# Patient Record
Sex: MALE | Race: WHITE | NOT HISPANIC OR LATINO | Employment: OTHER | URBAN - METROPOLITAN AREA
[De-identification: names, ages, dates, MRNs, and addresses within clinical notes are randomized per-mention and may not be internally consistent; named-entity substitution may affect disease eponyms.]

---

## 2023-01-30 PROBLEM — E78.5 HYPERLIPIDEMIA: Status: ACTIVE | Noted: 2023-01-30

## 2023-01-30 PROBLEM — E11.621 DIABETIC FOOT ULCER (MULTI): Status: ACTIVE | Noted: 2023-01-30
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
## 2023-01-30 PROBLEM — J01.90 ACUTE SINUSITIS: Status: ACTIVE | Noted: 2023-01-30

## 2023-01-30 PROBLEM — S98.139A AMPUTATED TOE (CMS-HCC): Status: ACTIVE | Noted: 2023-01-30

## 2023-01-30 PROBLEM — E03.9 HYPOTHYROIDISM: Status: ACTIVE | Noted: 2023-01-30

## 2023-01-30 PROBLEM — L97.509 DIABETIC FOOT ULCER (MULTI): Status: ACTIVE | Noted: 2023-01-30

## 2023-01-30 PROBLEM — J44.9 COPD (CHRONIC OBSTRUCTIVE PULMONARY DISEASE) (MULTI): Status: ACTIVE | Noted: 2023-01-30

## 2023-01-30 PROBLEM — I10 HYPERTENSION: Status: ACTIVE | Noted: 2023-01-30

## 2023-01-30 PROBLEM — I21.02: Status: ACTIVE | Noted: 2023-01-30

## 2023-01-30 PROBLEM — Z89.519 S/P BKA (BELOW KNEE AMPUTATION) UNILATERAL (MULTI): Status: ACTIVE | Noted: 2023-01-30

## 2023-01-30 PROBLEM — B02.29 POST HERPETIC NEURALGIA: Status: ACTIVE | Noted: 2023-01-30

## 2023-01-30 PROBLEM — F41.9 ANXIETY: Status: ACTIVE | Noted: 2023-01-30

## 2023-01-30 PROBLEM — M86.679: Status: ACTIVE | Noted: 2023-01-30

## 2023-01-30 PROBLEM — K59.00 CONSTIPATION: Status: ACTIVE | Noted: 2023-01-30
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
## 2023-01-30 PROBLEM — K58.9 IBS (IRRITABLE BOWEL SYNDROME): Status: ACTIVE | Noted: 2023-01-30

## 2023-01-30 PROBLEM — Z04.9 CONDITION NOT FOUND: Status: ACTIVE | Noted: 2023-01-30

## 2023-01-30 PROBLEM — R07.9 CHEST PAIN: Status: ACTIVE | Noted: 2023-01-30

## 2023-01-30 PROBLEM — J30.2 ALLERGIC RHINITIS, SEASONAL: Status: ACTIVE | Noted: 2023-01-30

## 2023-01-30 PROBLEM — D64.9 ANEMIA: Status: ACTIVE | Noted: 2023-01-30

## 2023-01-30 PROBLEM — G47.00 INSOMNIA: Status: ACTIVE | Noted: 2023-01-30

## 2023-01-30 PROBLEM — J32.9 SINUSITIS: Status: ACTIVE | Noted: 2023-01-30

## 2023-01-30 PROBLEM — E11.9 DIABETES MELLITUS (MULTI): Status: ACTIVE | Noted: 2023-01-30

## 2023-01-30 PROBLEM — R53.83 FATIGUE: Status: ACTIVE | Noted: 2023-01-30

## 2023-01-30 RX ORDER — ATORVASTATIN CALCIUM 40 MG/1
1 TABLET, FILM COATED ORAL NIGHTLY
COMMUNITY
Start: 2022-03-14 | End: 2023-04-06 | Stop reason: SDUPTHER

## 2023-01-30 RX ORDER — BUSPIRONE HYDROCHLORIDE 15 MG/1
0.5 TABLET ORAL 2 TIMES DAILY
COMMUNITY
Start: 2022-05-26 | End: 2023-10-09

## 2023-01-30 RX ORDER — SERTRALINE HYDROCHLORIDE 50 MG/1
1 TABLET, FILM COATED ORAL DAILY
COMMUNITY
Start: 2022-05-26

## 2023-01-30 RX ORDER — HYDROXYZINE PAMOATE 25 MG/1
1 CAPSULE ORAL EVERY 6 HOURS PRN
COMMUNITY
Start: 2022-03-14 | End: 2023-05-15

## 2023-01-30 RX ORDER — PEN NEEDLE, DIABETIC 30 GX3/16"
NEEDLE, DISPOSABLE MISCELLANEOUS
COMMUNITY
End: 2023-04-06 | Stop reason: SDUPTHER

## 2023-01-30 RX ORDER — METOPROLOL SUCCINATE 25 MG/1
1 TABLET, EXTENDED RELEASE ORAL DAILY
COMMUNITY
Start: 2022-03-14

## 2023-01-30 RX ORDER — LEVOTHYROXINE SODIUM 50 UG/1
1 CAPSULE ORAL
COMMUNITY
Start: 2022-03-15 | End: 2023-04-06 | Stop reason: SDUPTHER

## 2023-01-30 RX ORDER — PRASUGREL 10 MG/1
1 TABLET, FILM COATED ORAL DAILY
COMMUNITY
Start: 2021-12-21 | End: 2023-04-06 | Stop reason: SDUPTHER

## 2023-01-30 RX ORDER — HYDROXYZINE HYDROCHLORIDE 25 MG/1
1 TABLET, FILM COATED ORAL EVERY 6 HOURS PRN
COMMUNITY
Start: 2022-05-26 | End: 2023-04-06 | Stop reason: SDUPTHER

## 2023-01-30 RX ORDER — NITROGLYCERIN 80 MG/1
PATCH TRANSDERMAL
COMMUNITY
Start: 2020-12-30

## 2023-01-30 RX ORDER — FLUTICASONE PROPIONATE 50 MCG
2 SPRAY, SUSPENSION (ML) NASAL DAILY
COMMUNITY
Start: 2017-05-02

## 2023-01-30 RX ORDER — INSULIN GLARGINE 100 [IU]/ML
20 INJECTION, SOLUTION SUBCUTANEOUS 2 TIMES DAILY
COMMUNITY
Start: 2016-04-22 | End: 2023-10-24 | Stop reason: SDUPTHER

## 2023-01-30 RX ORDER — ASPIRIN 81 MG/1
1 TABLET ORAL DAILY
COMMUNITY
Start: 2022-03-14 | End: 2023-04-06 | Stop reason: SDUPTHER

## 2023-01-30 RX ORDER — LOSARTAN POTASSIUM 25 MG/1
1 TABLET ORAL DAILY
COMMUNITY
Start: 2022-03-15

## 2023-01-30 RX ORDER — INSULIN LISPRO 100 [IU]/ML
INJECTION, SOLUTION INTRAVENOUS; SUBCUTANEOUS
COMMUNITY
Start: 2016-04-22

## 2023-01-30 RX ORDER — DICYCLOMINE HYDROCHLORIDE 10 MG/1
1 CAPSULE ORAL 2 TIMES DAILY PRN
COMMUNITY
Start: 2022-03-15 | End: 2023-03-09 | Stop reason: SDUPTHER

## 2023-01-30 RX ORDER — MIRTAZAPINE 15 MG/1
1 TABLET, FILM COATED ORAL NIGHTLY
COMMUNITY
Start: 2022-05-26 | End: 2023-03-07 | Stop reason: SDUPTHER

## 2023-01-30 RX ORDER — INSULIN LISPRO 100 [IU]/ML
INJECTION, SOLUTION INTRAVENOUS; SUBCUTANEOUS
COMMUNITY
Start: 2021-10-17

## 2023-03-06 ENCOUNTER — APPOINTMENT (OUTPATIENT)
Dept: PRIMARY CARE | Facility: CLINIC | Age: 78
End: 2023-03-06
Payer: MEDICARE

## 2023-03-07 DIAGNOSIS — K58.9 IRRITABLE BOWEL SYNDROME, UNSPECIFIED TYPE: ICD-10-CM

## 2023-03-07 DIAGNOSIS — F41.9 ANXIETY: Primary | ICD-10-CM

## 2023-03-07 DIAGNOSIS — F51.01 PRIMARY INSOMNIA: ICD-10-CM

## 2023-03-07 RX ORDER — MIRTAZAPINE 15 MG/1
15 TABLET, FILM COATED ORAL NIGHTLY
Qty: 30 TABLET | Refills: 0 | Status: SHIPPED | OUTPATIENT
Start: 2023-03-07 | End: 2023-03-08 | Stop reason: SDUPTHER

## 2023-03-09 RX ORDER — MIRTAZAPINE 15 MG/1
15 TABLET, FILM COATED ORAL NIGHTLY
Qty: 30 TABLET | Refills: 0 | Status: SHIPPED | OUTPATIENT
Start: 2023-03-09 | End: 2023-05-30

## 2023-03-09 RX ORDER — DICYCLOMINE HYDROCHLORIDE 10 MG/1
10 CAPSULE ORAL 2 TIMES DAILY PRN
Qty: 180 CAPSULE | Refills: 1 | Status: SHIPPED | OUTPATIENT
Start: 2023-03-09 | End: 2023-04-06 | Stop reason: SDUPTHER

## 2023-04-06 DIAGNOSIS — E11.621 DIABETIC FOOT ULCER ASSOCIATED WITH TYPE 2 DIABETES MELLITUS, UNSPECIFIED LATERALITY, UNSPECIFIED PART OF FOOT, UNSPECIFIED ULCER STAGE (MULTI): ICD-10-CM

## 2023-04-06 DIAGNOSIS — L97.509 DIABETIC FOOT ULCER ASSOCIATED WITH TYPE 2 DIABETES MELLITUS, UNSPECIFIED LATERALITY, UNSPECIFIED PART OF FOOT, UNSPECIFIED ULCER STAGE (MULTI): ICD-10-CM

## 2023-04-06 DIAGNOSIS — K58.9 IRRITABLE BOWEL SYNDROME, UNSPECIFIED TYPE: ICD-10-CM

## 2023-04-06 DIAGNOSIS — Z79.4 TYPE 2 DIABETES MELLITUS WITHOUT COMPLICATION, WITH LONG-TERM CURRENT USE OF INSULIN (MULTI): ICD-10-CM

## 2023-04-06 DIAGNOSIS — I25.10 CORONARY ARTERY DISEASE, UNSPECIFIED VESSEL OR LESION TYPE, UNSPECIFIED WHETHER ANGINA PRESENT, UNSPECIFIED WHETHER NATIVE OR TRANSPLANTED HEART: Primary | ICD-10-CM

## 2023-04-06 DIAGNOSIS — E11.9 TYPE 2 DIABETES MELLITUS WITHOUT COMPLICATION, WITH LONG-TERM CURRENT USE OF INSULIN (MULTI): ICD-10-CM

## 2023-04-06 DIAGNOSIS — E78.5 HYPERLIPIDEMIA, UNSPECIFIED HYPERLIPIDEMIA TYPE: ICD-10-CM

## 2023-04-06 DIAGNOSIS — E03.9 HYPOTHYROIDISM, UNSPECIFIED TYPE: ICD-10-CM

## 2023-04-06 RX ORDER — ASPIRIN 81 MG/1
81 TABLET ORAL DAILY
Qty: 90 TABLET | Refills: 0 | Status: SHIPPED | OUTPATIENT
Start: 2023-04-06

## 2023-04-06 RX ORDER — HYDROXYZINE HYDROCHLORIDE 25 MG/1
25 TABLET, FILM COATED ORAL EVERY 6 HOURS PRN
Qty: 60 TABLET | Refills: 2 | Status: SHIPPED | OUTPATIENT
Start: 2023-04-06 | End: 2023-05-15

## 2023-04-06 RX ORDER — PRASUGREL 10 MG/1
10 TABLET, FILM COATED ORAL DAILY
Qty: 90 TABLET | Refills: 0 | Status: SHIPPED | OUTPATIENT
Start: 2023-04-06

## 2023-04-06 RX ORDER — ATORVASTATIN CALCIUM 40 MG/1
40 TABLET, FILM COATED ORAL NIGHTLY
Qty: 90 TABLET | Refills: 0 | Status: SHIPPED | OUTPATIENT
Start: 2023-04-06

## 2023-04-06 RX ORDER — LANCETS 33 GAUGE
EACH MISCELLANEOUS
Qty: 100 EACH | Refills: 3 | Status: SHIPPED | OUTPATIENT
Start: 2023-04-06

## 2023-04-06 RX ORDER — PEN NEEDLE, DIABETIC 30 GX3/16"
NEEDLE, DISPOSABLE MISCELLANEOUS
Qty: 100 EACH | Refills: 3 | Status: SHIPPED | OUTPATIENT
Start: 2023-04-06

## 2023-04-06 RX ORDER — DICYCLOMINE HYDROCHLORIDE 10 MG/1
10 CAPSULE ORAL 2 TIMES DAILY PRN
Qty: 180 CAPSULE | Refills: 1 | Status: SHIPPED | OUTPATIENT
Start: 2023-04-06 | End: 2023-06-05 | Stop reason: SDUPTHER

## 2023-04-06 RX ORDER — LEVOTHYROXINE SODIUM 50 UG/1
50 CAPSULE ORAL
Qty: 90 CAPSULE | Refills: 0 | Status: SHIPPED | OUTPATIENT
Start: 2023-04-06 | End: 2023-05-15 | Stop reason: SDUPTHER

## 2023-05-13 DIAGNOSIS — E03.9 HYPOTHYROIDISM, UNSPECIFIED TYPE: ICD-10-CM

## 2023-05-13 DIAGNOSIS — E11.621 DIABETIC FOOT ULCER ASSOCIATED WITH TYPE 2 DIABETES MELLITUS, UNSPECIFIED LATERALITY, UNSPECIFIED PART OF FOOT, UNSPECIFIED ULCER STAGE (MULTI): ICD-10-CM

## 2023-05-13 DIAGNOSIS — L97.509 DIABETIC FOOT ULCER ASSOCIATED WITH TYPE 2 DIABETES MELLITUS, UNSPECIFIED LATERALITY, UNSPECIFIED PART OF FOOT, UNSPECIFIED ULCER STAGE (MULTI): ICD-10-CM

## 2023-05-15 RX ORDER — LEVOTHYROXINE SODIUM 50 UG/1
50 CAPSULE ORAL
Qty: 90 CAPSULE | Refills: 0 | Status: SHIPPED | OUTPATIENT
Start: 2023-05-15 | End: 2023-08-07

## 2023-05-15 RX ORDER — HYDROXYZINE HYDROCHLORIDE 25 MG/1
TABLET, FILM COATED ORAL
Qty: 180 TABLET | Refills: 0 | Status: SHIPPED | OUTPATIENT
Start: 2023-05-15 | End: 2023-06-20

## 2023-05-27 DIAGNOSIS — F51.01 PRIMARY INSOMNIA: ICD-10-CM

## 2023-05-30 RX ORDER — MIRTAZAPINE 15 MG/1
15 TABLET, FILM COATED ORAL NIGHTLY
Qty: 90 TABLET | Refills: 1 | Status: SHIPPED | OUTPATIENT
Start: 2023-05-30 | End: 2023-11-28

## 2023-05-31 RX ORDER — LIDOCAINE 40 MG/G
CREAM TOPICAL
COMMUNITY
Start: 2022-02-18

## 2023-06-05 ENCOUNTER — LAB (OUTPATIENT)
Dept: LAB | Facility: LAB | Age: 78
End: 2023-06-05
Payer: MEDICARE

## 2023-06-05 ENCOUNTER — OFFICE VISIT (OUTPATIENT)
Dept: PRIMARY CARE | Facility: CLINIC | Age: 78
End: 2023-06-05
Payer: MEDICARE

## 2023-06-05 VITALS
DIASTOLIC BLOOD PRESSURE: 74 MMHG | TEMPERATURE: 97.6 F | OXYGEN SATURATION: 100 % | BODY MASS INDEX: 22.18 KG/M2 | WEIGHT: 138 LBS | HEART RATE: 73 BPM | SYSTOLIC BLOOD PRESSURE: 128 MMHG | HEIGHT: 66 IN

## 2023-06-05 DIAGNOSIS — I10 HYPERTENSION, UNSPECIFIED TYPE: Primary | ICD-10-CM

## 2023-06-05 DIAGNOSIS — K58.9 IRRITABLE BOWEL SYNDROME, UNSPECIFIED TYPE: ICD-10-CM

## 2023-06-05 DIAGNOSIS — Z89.519 S/P BKA (BELOW KNEE AMPUTATION) UNILATERAL (MULTI): ICD-10-CM

## 2023-06-05 DIAGNOSIS — I10 HYPERTENSION, UNSPECIFIED TYPE: ICD-10-CM

## 2023-06-05 DIAGNOSIS — E78.5 HYPERLIPIDEMIA, UNSPECIFIED HYPERLIPIDEMIA TYPE: ICD-10-CM

## 2023-06-05 DIAGNOSIS — D64.9 ANEMIA, UNSPECIFIED TYPE: ICD-10-CM

## 2023-06-05 DIAGNOSIS — E03.9 HYPOTHYROIDISM, UNSPECIFIED TYPE: ICD-10-CM

## 2023-06-05 DIAGNOSIS — Z79.4 TYPE 2 DIABETES MELLITUS WITHOUT COMPLICATION, WITH LONG-TERM CURRENT USE OF INSULIN (MULTI): ICD-10-CM

## 2023-06-05 DIAGNOSIS — E11.9 TYPE 2 DIABETES MELLITUS WITHOUT COMPLICATION, WITH LONG-TERM CURRENT USE OF INSULIN (MULTI): ICD-10-CM

## 2023-06-05 DIAGNOSIS — E55.9 VITAMIN D DEFICIENCY: ICD-10-CM

## 2023-06-05 LAB
ALANINE AMINOTRANSFERASE (SGPT) (U/L) IN SER/PLAS: 24 U/L (ref 10–52)
ALBUMIN (G/DL) IN SER/PLAS: 4 G/DL (ref 3.4–5)
ALKALINE PHOSPHATASE (U/L) IN SER/PLAS: 92 U/L (ref 33–136)
ANION GAP IN SER/PLAS: 12 MMOL/L (ref 10–20)
ASPARTATE AMINOTRANSFERASE (SGOT) (U/L) IN SER/PLAS: 17 U/L (ref 9–39)
BILIRUBIN TOTAL (MG/DL) IN SER/PLAS: 1.1 MG/DL (ref 0–1.2)
CALCIDIOL (25 OH VITAMIN D3) (NG/ML) IN SER/PLAS: 12 NG/ML
CALCIUM (MG/DL) IN SER/PLAS: 9.3 MG/DL (ref 8.6–10.6)
CARBON DIOXIDE, TOTAL (MMOL/L) IN SER/PLAS: 29 MMOL/L (ref 21–32)
CHLORIDE (MMOL/L) IN SER/PLAS: 103 MMOL/L (ref 98–107)
CHOLESTEROL (MG/DL) IN SER/PLAS: 79 MG/DL (ref 0–199)
CHOLESTEROL IN HDL (MG/DL) IN SER/PLAS: 32.4 MG/DL
CHOLESTEROL/HDL RATIO: 2.4
CREATININE (MG/DL) IN SER/PLAS: 1.39 MG/DL (ref 0.5–1.3)
ERYTHROCYTE DISTRIBUTION WIDTH (RATIO) BY AUTOMATED COUNT: 15.7 % (ref 11.5–14.5)
ERYTHROCYTE MEAN CORPUSCULAR HEMOGLOBIN CONCENTRATION (G/DL) BY AUTOMATED: 31.7 G/DL (ref 32–36)
ERYTHROCYTE MEAN CORPUSCULAR VOLUME (FL) BY AUTOMATED COUNT: 87 FL (ref 80–100)
ERYTHROCYTES (10*6/UL) IN BLOOD BY AUTOMATED COUNT: 4.23 X10E12/L (ref 4.5–5.9)
ESTIMATED AVERAGE GLUCOSE FOR HBA1C: 143 MG/DL
GFR MALE: 52 ML/MIN/1.73M2
GLUCOSE (MG/DL) IN SER/PLAS: 170 MG/DL (ref 74–99)
HEMATOCRIT (%) IN BLOOD BY AUTOMATED COUNT: 36.6 % (ref 41–52)
HEMOGLOBIN (G/DL) IN BLOOD: 11.6 G/DL (ref 13.5–17.5)
HEMOGLOBIN A1C/HEMOGLOBIN TOTAL IN BLOOD: 6.6 %
LDL: 27 MG/DL (ref 0–99)
LEUKOCYTES (10*3/UL) IN BLOOD BY AUTOMATED COUNT: 6.1 X10E9/L (ref 4.4–11.3)
NRBC (PER 100 WBCS) BY AUTOMATED COUNT: 0 /100 WBC (ref 0–0)
PLATELETS (10*3/UL) IN BLOOD AUTOMATED COUNT: 121 X10E9/L (ref 150–450)
POTASSIUM (MMOL/L) IN SER/PLAS: 4.5 MMOL/L (ref 3.5–5.3)
PROTEIN TOTAL: 7.3 G/DL (ref 6.4–8.2)
SODIUM (MMOL/L) IN SER/PLAS: 139 MMOL/L (ref 136–145)
THYROTROPIN (MIU/L) IN SER/PLAS BY DETECTION LIMIT <= 0.05 MIU/L: 5.98 MIU/L (ref 0.44–3.98)
THYROXINE (T4) FREE (NG/DL) IN SER/PLAS: 1.03 NG/DL (ref 0.78–1.48)
TRIGLYCERIDE (MG/DL) IN SER/PLAS: 97 MG/DL (ref 0–149)
UREA NITROGEN (MG/DL) IN SER/PLAS: 20 MG/DL (ref 6–23)
VLDL: 19 MG/DL (ref 0–40)

## 2023-06-05 PROCEDURE — 82306 VITAMIN D 25 HYDROXY: CPT

## 2023-06-05 PROCEDURE — 84439 ASSAY OF FREE THYROXINE: CPT

## 2023-06-05 PROCEDURE — 1036F TOBACCO NON-USER: CPT | Performed by: STUDENT IN AN ORGANIZED HEALTH CARE EDUCATION/TRAINING PROGRAM

## 2023-06-05 PROCEDURE — 85027 COMPLETE CBC AUTOMATED: CPT

## 2023-06-05 PROCEDURE — 1159F MED LIST DOCD IN RCRD: CPT | Performed by: STUDENT IN AN ORGANIZED HEALTH CARE EDUCATION/TRAINING PROGRAM

## 2023-06-05 PROCEDURE — 83036 HEMOGLOBIN GLYCOSYLATED A1C: CPT

## 2023-06-05 PROCEDURE — 80061 LIPID PANEL: CPT

## 2023-06-05 PROCEDURE — 84443 ASSAY THYROID STIM HORMONE: CPT

## 2023-06-05 PROCEDURE — 80053 COMPREHEN METABOLIC PANEL: CPT

## 2023-06-05 PROCEDURE — 1160F RVW MEDS BY RX/DR IN RCRD: CPT | Performed by: STUDENT IN AN ORGANIZED HEALTH CARE EDUCATION/TRAINING PROGRAM

## 2023-06-05 PROCEDURE — 3074F SYST BP LT 130 MM HG: CPT | Performed by: STUDENT IN AN ORGANIZED HEALTH CARE EDUCATION/TRAINING PROGRAM

## 2023-06-05 PROCEDURE — 3078F DIAST BP <80 MM HG: CPT | Performed by: STUDENT IN AN ORGANIZED HEALTH CARE EDUCATION/TRAINING PROGRAM

## 2023-06-05 PROCEDURE — 99214 OFFICE O/P EST MOD 30 MIN: CPT | Performed by: STUDENT IN AN ORGANIZED HEALTH CARE EDUCATION/TRAINING PROGRAM

## 2023-06-05 PROCEDURE — 36415 COLL VENOUS BLD VENIPUNCTURE: CPT

## 2023-06-05 RX ORDER — DICYCLOMINE HYDROCHLORIDE 10 MG/1
10 CAPSULE ORAL 2 TIMES DAILY PRN
Qty: 180 CAPSULE | Refills: 1 | Status: SHIPPED | OUTPATIENT
Start: 2023-06-05 | End: 2023-11-28

## 2023-06-05 ASSESSMENT — PATIENT HEALTH QUESTIONNAIRE - PHQ9
1. LITTLE INTEREST OR PLEASURE IN DOING THINGS: NOT AT ALL
SUM OF ALL RESPONSES TO PHQ9 QUESTIONS 1 AND 2: 0
2. FEELING DOWN, DEPRESSED OR HOPELESS: NOT AT ALL

## 2023-06-05 ASSESSMENT — ENCOUNTER SYMPTOMS
OCCASIONAL FEELINGS OF UNSTEADINESS: 0
DEPRESSION: 0
LOSS OF SENSATION IN FEET: 0

## 2023-06-05 NOTE — PROGRESS NOTES
77-year-old male presenting for follow-up on multiple conditions:    Left BKA   Completed physical therapy.  Still requiring walker, has played in the prosthetic, does not have good balance.  Does not want further physical therapy at this time.    HTN  Stable, tolerates current regimen well    HLD  Stable, tolerating current regimen well.    DMII  Stable, reports home blood sugars at goal    History of MI status post PCI with stents  Following with cardiology, compliant with medication    Hypothyroidism  Stable, tolerating current regimen well.    COPD  Stable, tolerating current regimen well.    12 point ROS reviewed and negative other than as stated in HPI    General: Alert, oriented, pleasant, in no acute distress  HEENT:      Head: normocephalic, atraumatic;      eyes: EOMI, no scleral icterus;   CV: Heart with regular rate and rhythm, normal S1/S2, no murmurs  Lungs: CTAB without wheezing, rhonchi or rales; good respiratory effort, no increased work of breathing  Abdomen: soft, non-tender, non-distended, no masses appreciated  Extremities: BKA left with prostatic  Neuro: Cranial nerves grossly intact; alert and oriented, wheelchair-bound  Psych: Appropriate mood and affect    1.  Left BKA  - Advised to call prosthetic  or consider further physical therapy for his continued walker dependence.  Patient declines at this time    2.  HTN  - At goal in office  - Continue losartan 25 mg daily    3.  HLD  - Continue atorvastatin 40 mg daily  - Repeat lipid panel    4.  DMII   -Repeat A1c, microalbumin  - Continue Lantus 20 units twice daily, Humalog SSI    5.  History of MI status post PCI with stents  - Continue aspirin 81 mg daily, atorvastatin 40 mg daily, metoprolol succinate 25 mg daily  - Continue to follow with cardiology at [facility]    6.  Hypothyroidism  - Continue levothyroxine 50 mcg daily  - Repeat TSH    7.  COPD  - Stable, not medicated    6-month labs    Follow-up 6 months  Medicare    Christopher D'Amico, DO

## 2023-06-06 DIAGNOSIS — E03.9 HYPOTHYROIDISM, UNSPECIFIED TYPE: ICD-10-CM

## 2023-06-06 DIAGNOSIS — E55.9 VITAMIN D DEFICIENCY: Primary | ICD-10-CM

## 2023-06-06 RX ORDER — ERGOCALCIFEROL 1.25 MG/1
50000 CAPSULE ORAL
Qty: 12 CAPSULE | Refills: 0 | Status: SHIPPED | OUTPATIENT
Start: 2023-06-06 | End: 2023-08-29

## 2023-06-06 NOTE — RESULT ENCOUNTER NOTE
Mild anemia, improved from last labs.  Continue to monitor.  Borderline low platelets, consistent with past labs, continue to monitor.    Low HDL at 32.4 slightly improved from last labs, continue to improve diet.    Chronic kidney disease mostly stable over the past year, mild decrease in function.  Continue to avoid nephrotoxic medications.    TSH mildly elevated with normal T4, slight increase in levothyroxine may be warranted.  He can take 1 extra tablet 1 day a week, in other words 1 tablet daily 6 days a week, and 2 tablets 1 day a week.  We can repeat labs in 4-6 weeks.    Vitamin D significantly low at 12, would recommend weekly supplementation x12 weeks with follow-up lab    A1c at 6.5, at goal.  Continue to monitor blood sugar routinely    Remaining labs unremarkable

## 2023-06-20 DIAGNOSIS — E11.621 DIABETIC FOOT ULCER ASSOCIATED WITH TYPE 2 DIABETES MELLITUS, UNSPECIFIED LATERALITY, UNSPECIFIED PART OF FOOT, UNSPECIFIED ULCER STAGE (MULTI): ICD-10-CM

## 2023-06-20 DIAGNOSIS — L97.509 DIABETIC FOOT ULCER ASSOCIATED WITH TYPE 2 DIABETES MELLITUS, UNSPECIFIED LATERALITY, UNSPECIFIED PART OF FOOT, UNSPECIFIED ULCER STAGE (MULTI): ICD-10-CM

## 2023-06-20 RX ORDER — HYDROXYZINE HYDROCHLORIDE 25 MG/1
TABLET, FILM COATED ORAL
Qty: 180 TABLET | Refills: 0 | Status: SHIPPED | OUTPATIENT
Start: 2023-06-20 | End: 2023-07-31

## 2023-07-31 DIAGNOSIS — L97.509 DIABETIC FOOT ULCER ASSOCIATED WITH TYPE 2 DIABETES MELLITUS, UNSPECIFIED LATERALITY, UNSPECIFIED PART OF FOOT, UNSPECIFIED ULCER STAGE (MULTI): ICD-10-CM

## 2023-07-31 DIAGNOSIS — E11.621 DIABETIC FOOT ULCER ASSOCIATED WITH TYPE 2 DIABETES MELLITUS, UNSPECIFIED LATERALITY, UNSPECIFIED PART OF FOOT, UNSPECIFIED ULCER STAGE (MULTI): ICD-10-CM

## 2023-07-31 RX ORDER — HYDROXYZINE HYDROCHLORIDE 25 MG/1
TABLET, FILM COATED ORAL
Qty: 180 TABLET | Refills: 0 | Status: SHIPPED | OUTPATIENT
Start: 2023-07-31 | End: 2024-02-14

## 2023-08-04 DIAGNOSIS — E03.9 HYPOTHYROIDISM, UNSPECIFIED TYPE: ICD-10-CM

## 2023-08-07 RX ORDER — LEVOTHYROXINE SODIUM 50 UG/1
TABLET ORAL
Qty: 90 TABLET | Refills: 0 | Status: SHIPPED | OUTPATIENT
Start: 2023-08-07 | End: 2023-10-30

## 2023-10-07 DIAGNOSIS — F41.9 ANXIETY: Primary | ICD-10-CM

## 2023-10-09 RX ORDER — BUSPIRONE HYDROCHLORIDE 15 MG/1
TABLET ORAL
Qty: 90 TABLET | Refills: 0 | Status: SHIPPED | OUTPATIENT
Start: 2023-10-09 | End: 2024-01-08

## 2023-10-24 DIAGNOSIS — E11.69 TYPE 2 DIABETES MELLITUS WITH OTHER SPECIFIED COMPLICATION, UNSPECIFIED WHETHER LONG TERM INSULIN USE (MULTI): ICD-10-CM

## 2023-10-24 RX ORDER — INSULIN GLARGINE 100 [IU]/ML
20 INJECTION, SOLUTION SUBCUTANEOUS 2 TIMES DAILY
Qty: 3 ML | Refills: 3 | Status: SHIPPED | OUTPATIENT
Start: 2023-10-24 | End: 2023-10-25 | Stop reason: SDUPTHER

## 2023-10-25 ENCOUNTER — TELEPHONE (OUTPATIENT)
Dept: PRIMARY CARE | Facility: CLINIC | Age: 78
End: 2023-10-25
Payer: MEDICARE

## 2023-10-25 DIAGNOSIS — E11.69 TYPE 2 DIABETES MELLITUS WITH OTHER SPECIFIED COMPLICATION, UNSPECIFIED WHETHER LONG TERM INSULIN USE (MULTI): ICD-10-CM

## 2023-10-25 RX ORDER — INSULIN GLARGINE 100 [IU]/ML
20 INJECTION, SOLUTION SUBCUTANEOUS 2 TIMES DAILY
Qty: 36 ML | Refills: 1 | Status: SHIPPED | OUTPATIENT
Start: 2023-10-25 | End: 2024-04-22

## 2023-10-25 NOTE — TELEPHONE ENCOUNTER
Can you send another script for the patient's Lantus, it was refilled yesterday however the patient is complaining that the quantity was wrong ,normally gets 3-4 pens at one time and yesterday he was only given 1 box

## 2023-10-30 DIAGNOSIS — E03.9 HYPOTHYROIDISM, UNSPECIFIED TYPE: ICD-10-CM

## 2023-10-30 RX ORDER — LEVOTHYROXINE SODIUM 50 UG/1
TABLET ORAL
Qty: 90 TABLET | Refills: 0 | Status: SHIPPED | OUTPATIENT
Start: 2023-10-30 | End: 2024-02-14

## 2023-11-25 DIAGNOSIS — K58.9 IRRITABLE BOWEL SYNDROME, UNSPECIFIED TYPE: ICD-10-CM

## 2023-11-27 DIAGNOSIS — F51.01 PRIMARY INSOMNIA: ICD-10-CM

## 2023-11-28 RX ORDER — MIRTAZAPINE 15 MG/1
15 TABLET, FILM COATED ORAL NIGHTLY
Qty: 90 TABLET | Refills: 0 | Status: SHIPPED | OUTPATIENT
Start: 2023-11-28

## 2023-11-28 RX ORDER — DICYCLOMINE HYDROCHLORIDE 10 MG/1
CAPSULE ORAL
Qty: 180 CAPSULE | Refills: 0 | Status: SHIPPED | OUTPATIENT
Start: 2023-11-28 | End: 2024-03-04 | Stop reason: SDUPTHER

## 2023-12-11 ENCOUNTER — OFFICE VISIT (OUTPATIENT)
Dept: PRIMARY CARE | Facility: CLINIC | Age: 78
End: 2023-12-11
Payer: MEDICARE

## 2023-12-11 VITALS
OXYGEN SATURATION: 95 % | HEIGHT: 66 IN | DIASTOLIC BLOOD PRESSURE: 71 MMHG | SYSTOLIC BLOOD PRESSURE: 122 MMHG | HEART RATE: 77 BPM | BODY MASS INDEX: 24.11 KG/M2 | WEIGHT: 150 LBS

## 2023-12-11 DIAGNOSIS — J44.9 CHRONIC OBSTRUCTIVE PULMONARY DISEASE, UNSPECIFIED COPD TYPE (MULTI): ICD-10-CM

## 2023-12-11 DIAGNOSIS — D69.6 THROMBOCYTOPENIA (CMS-HCC): ICD-10-CM

## 2023-12-11 DIAGNOSIS — E03.9 HYPOTHYROIDISM, UNSPECIFIED TYPE: ICD-10-CM

## 2023-12-11 DIAGNOSIS — R26.81 GAIT INSTABILITY: ICD-10-CM

## 2023-12-11 DIAGNOSIS — E78.5 HYPERLIPIDEMIA, UNSPECIFIED HYPERLIPIDEMIA TYPE: ICD-10-CM

## 2023-12-11 DIAGNOSIS — R05.8 PRODUCTIVE COUGH: ICD-10-CM

## 2023-12-11 DIAGNOSIS — E55.9 VITAMIN D DEFICIENCY: ICD-10-CM

## 2023-12-11 DIAGNOSIS — Z89.519 S/P BKA (BELOW KNEE AMPUTATION) UNILATERAL (MULTI): ICD-10-CM

## 2023-12-11 DIAGNOSIS — I50.9 CONGESTIVE HEART FAILURE, UNSPECIFIED HF CHRONICITY, UNSPECIFIED HEART FAILURE TYPE (MULTI): ICD-10-CM

## 2023-12-11 DIAGNOSIS — E11.9 TYPE 2 DIABETES MELLITUS WITHOUT COMPLICATION, WITH LONG-TERM CURRENT USE OF INSULIN (MULTI): ICD-10-CM

## 2023-12-11 DIAGNOSIS — I10 HYPERTENSION, UNSPECIFIED TYPE: Primary | ICD-10-CM

## 2023-12-11 DIAGNOSIS — D64.9 ANEMIA, UNSPECIFIED TYPE: ICD-10-CM

## 2023-12-11 DIAGNOSIS — E11.69 TYPE 2 DIABETES MELLITUS WITH OTHER SPECIFIED COMPLICATION, WITHOUT LONG-TERM CURRENT USE OF INSULIN (MULTI): ICD-10-CM

## 2023-12-11 DIAGNOSIS — I25.119 ATHEROSCLEROSIS OF NATIVE CORONARY ARTERY OF NATIVE HEART WITH ANGINA PECTORIS (CMS-HCC): ICD-10-CM

## 2023-12-11 DIAGNOSIS — Z00.00 MEDICARE ANNUAL WELLNESS VISIT, SUBSEQUENT: ICD-10-CM

## 2023-12-11 DIAGNOSIS — Z79.4 TYPE 2 DIABETES MELLITUS WITHOUT COMPLICATION, WITH LONG-TERM CURRENT USE OF INSULIN (MULTI): ICD-10-CM

## 2023-12-11 PROBLEM — T83.511S: Status: ACTIVE | Noted: 2022-03-01

## 2023-12-11 PROBLEM — I25.10 CORONARY ARTERY DISEASE: Status: ACTIVE | Noted: 2023-12-11

## 2023-12-11 PROBLEM — M86.60 CHRONIC OSTEOMYELITIS (MULTI): Status: ACTIVE | Noted: 2023-12-11

## 2023-12-11 PROBLEM — Z86.69 PERSONAL HISTORY OF OTHER DISEASES OF THE NERVOUS SYSTEM AND SENSE ORGANS: Status: ACTIVE | Noted: 2022-03-01

## 2023-12-11 PROBLEM — M86.679: Status: RESOLVED | Noted: 2023-01-30 | Resolved: 2023-12-11

## 2023-12-11 PROBLEM — F32.9 MAJOR DEPRESSIVE DISORDER: Status: ACTIVE | Noted: 2023-12-11

## 2023-12-11 PROBLEM — S88.112A: Status: ACTIVE | Noted: 2023-12-11

## 2023-12-11 PROBLEM — E11.621 DIABETIC FOOT ULCER (MULTI): Status: RESOLVED | Noted: 2023-01-30 | Resolved: 2023-12-11

## 2023-12-11 PROBLEM — F32.A DEPRESSION, UNSPECIFIED: Status: ACTIVE | Noted: 2022-03-01

## 2023-12-11 PROBLEM — F05 DELIRIUM DUE TO KNOWN PHYSIOLOGICAL CONDITION: Status: ACTIVE | Noted: 2023-12-11

## 2023-12-11 PROBLEM — K21.9 GASTROESOPHAGEAL REFLUX DISEASE: Status: ACTIVE | Noted: 2023-12-11

## 2023-12-11 PROBLEM — F32.9 MAJOR DEPRESSIVE DISORDER, SINGLE EPISODE, UNSPECIFIED: Status: ACTIVE | Noted: 2023-12-11

## 2023-12-11 PROBLEM — L97.509 DIABETIC FOOT ULCER (MULTI): Status: RESOLVED | Noted: 2023-01-30 | Resolved: 2023-12-11

## 2023-12-11 PROBLEM — E44.0 MODERATE PROTEIN-CALORIE MALNUTRITION (MULTI): Status: RESOLVED | Noted: 2022-03-01 | Resolved: 2023-12-11

## 2023-12-11 PROBLEM — M86.60 CHRONIC OSTEOMYELITIS (MULTI): Status: RESOLVED | Noted: 2023-12-11 | Resolved: 2023-12-11

## 2023-12-11 PROBLEM — R41.0 DISORIENTATION, UNSPECIFIED: Status: ACTIVE | Noted: 2022-03-01

## 2023-12-11 PROBLEM — F41.1 GENERALIZED ANXIETY DISORDER: Status: ACTIVE | Noted: 2022-03-01

## 2023-12-11 PROBLEM — K59.4 ANAL SPASM: Status: ACTIVE | Noted: 2022-03-01

## 2023-12-11 PROBLEM — E44.0 MODERATE PROTEIN-CALORIE MALNUTRITION (MULTI): Status: ACTIVE | Noted: 2022-03-01

## 2023-12-11 PROBLEM — Z98.61 CORONARY ANGIOPLASTY STATUS: Status: ACTIVE | Noted: 2022-03-01

## 2023-12-11 PROBLEM — G47.9 SLEEP DISORDER: Status: ACTIVE | Noted: 2023-12-11

## 2023-12-11 PROBLEM — R27.9 UNSPECIFIED LACK OF COORDINATION: Status: ACTIVE | Noted: 2022-03-02

## 2023-12-11 PROBLEM — M62.81 MUSCLE WEAKNESS (GENERALIZED): Status: ACTIVE | Noted: 2022-03-02

## 2023-12-11 PROBLEM — S88.119A AMPUTATED BELOW KNEE (MULTI): Status: ACTIVE | Noted: 2022-03-01

## 2023-12-11 PROCEDURE — 99214 OFFICE O/P EST MOD 30 MIN: CPT | Performed by: STUDENT IN AN ORGANIZED HEALTH CARE EDUCATION/TRAINING PROGRAM

## 2023-12-11 PROCEDURE — G0439 PPPS, SUBSEQ VISIT: HCPCS | Performed by: STUDENT IN AN ORGANIZED HEALTH CARE EDUCATION/TRAINING PROGRAM

## 2023-12-11 PROCEDURE — 1126F AMNT PAIN NOTED NONE PRSNT: CPT | Performed by: STUDENT IN AN ORGANIZED HEALTH CARE EDUCATION/TRAINING PROGRAM

## 2023-12-11 PROCEDURE — 3074F SYST BP LT 130 MM HG: CPT | Performed by: STUDENT IN AN ORGANIZED HEALTH CARE EDUCATION/TRAINING PROGRAM

## 2023-12-11 PROCEDURE — 1170F FXNL STATUS ASSESSED: CPT | Performed by: STUDENT IN AN ORGANIZED HEALTH CARE EDUCATION/TRAINING PROGRAM

## 2023-12-11 PROCEDURE — 1036F TOBACCO NON-USER: CPT | Performed by: STUDENT IN AN ORGANIZED HEALTH CARE EDUCATION/TRAINING PROGRAM

## 2023-12-11 PROCEDURE — 1160F RVW MEDS BY RX/DR IN RCRD: CPT | Performed by: STUDENT IN AN ORGANIZED HEALTH CARE EDUCATION/TRAINING PROGRAM

## 2023-12-11 PROCEDURE — 1159F MED LIST DOCD IN RCRD: CPT | Performed by: STUDENT IN AN ORGANIZED HEALTH CARE EDUCATION/TRAINING PROGRAM

## 2023-12-11 PROCEDURE — 3078F DIAST BP <80 MM HG: CPT | Performed by: STUDENT IN AN ORGANIZED HEALTH CARE EDUCATION/TRAINING PROGRAM

## 2023-12-11 RX ORDER — CYCLOBENZAPRINE HYDROCHLORIDE 7.5 MG/1
TABLET, FILM COATED ORAL
COMMUNITY

## 2023-12-11 RX ORDER — HYDROXYZINE PAMOATE 25 MG/1
CAPSULE ORAL EVERY 8 HOURS
COMMUNITY

## 2023-12-11 RX ORDER — ACETAMINOPHEN 325 MG/1
TABLET ORAL EVERY 4 HOURS
COMMUNITY
End: 2023-12-11 | Stop reason: ALTCHOICE

## 2023-12-11 RX ORDER — AMOXICILLIN AND CLAVULANATE POTASSIUM 875; 125 MG/1; MG/1
875 TABLET, FILM COATED ORAL 2 TIMES DAILY
Qty: 20 TABLET | Refills: 0 | Status: SHIPPED | OUTPATIENT
Start: 2023-12-11 | End: 2023-12-21

## 2023-12-11 RX ORDER — CLONAZEPAM 0.5 MG/1
TABLET ORAL EVERY 8 HOURS
COMMUNITY

## 2023-12-11 RX ORDER — BRIMONIDINE TARTRATE 2 MG/ML
SOLUTION/ DROPS OPHTHALMIC EVERY 8 HOURS
COMMUNITY

## 2023-12-11 RX ORDER — VALSARTAN 40 MG/1
TABLET ORAL EVERY 24 HOURS
COMMUNITY

## 2023-12-11 RX ORDER — HYDROCORTISONE 25 MG/G
1 CREAM TOPICAL EVERY 12 HOURS
COMMUNITY

## 2023-12-11 RX ORDER — CARBOXYMETHYLCELLULOSE SODIUM 5 MG/ML
SOLUTION/ DROPS OPHTHALMIC
COMMUNITY

## 2023-12-11 ASSESSMENT — ENCOUNTER SYMPTOMS
LOSS OF SENSATION IN FEET: 0
OCCASIONAL FEELINGS OF UNSTEADINESS: 1
DEPRESSION: 0

## 2023-12-11 ASSESSMENT — ACTIVITIES OF DAILY LIVING (ADL)
TAKING_MEDICATION: INDEPENDENT
DOING_HOUSEWORK: TOTAL CARE
GROCERY_SHOPPING: NEEDS ASSISTANCE
MANAGING_FINANCES: INDEPENDENT
BATHING: INDEPENDENT
DRESSING: INDEPENDENT

## 2023-12-11 NOTE — PROGRESS NOTES
"Subjective   Reason for Visit: Chavez Llamas is an 78 y.o. male here for a Medicare Wellness visit.          Reviewed all medications by prescribing practitioner or clinical pharmacist (such as prescriptions, OTCs, herbal therapies and supplements) and documented in the medical record.    HPI    Patient Care Team:  Christopher D'Amico, DO as PCP - General (Family Medicine)  Brennan Lopez DO as PCP - [facility] Attributed Provider     Review of Systems    Objective   Vitals:  /71   Pulse 77   Ht 1.676 m (5' 6\")   Wt 68 kg (150 lb)   SpO2 95%   BMI 24.21 kg/m²       Physical Exam    Assessment/Plan   Problem List Items Addressed This Visit    None           HPI: 78-year-old male presenting for follow-up on chronic conditions, Medicare annual wellness exam.    Left BKA   Completed physical therapy at the time.  Currently has prostatic.  Is able to walk around the house when holding the wall, mostly uses wheelchair, due to fear of falling and breaking his hip.     HTN  Stable, tolerates current regimen well     HLD  Stable, tolerating current regimen well.     DMII  Stable, reports home blood sugars at goal     History of MI status post PCI with stents, CHF  Following with cardiology, compliant with medication     Hypothyroidism  Stable, tolerating current regimen well.     COPD, productive cough  Stable, not currently medicated.  Reports over the past few months continues to have productive cough, though it will improve a few days at a time.    SocHx:   - Smoking: Approximately 20 years, quit about 40 years ago    12 point ROS reviewed and negative other than as stated in HPI    General: Alert, oriented, pleasant, in no acute distress  HEENT:      Head: normocephalic, atraumatic;      eyes: EOMI, no scleral icterus;   Neck: soft, supple, non-tender, no masses appreciated  CV: Heart with regular rate and rhythm, normal S1/S2, no murmurs  Lungs: CTAB without wheezing, rhonchi or rales; good respiratory " effort, no increased work of breathing  Abdomen: soft, non-tender, non-distended, no masses appreciated  Extremities: Trace to +1 pitting edema on the right, BKA prosthetic on the left  Neuro: Cranial nerves grossly intact; alert and oriented, in wheelchair  Psych: Appropriate mood and affect    #HM  -CBC, CMP, Lipid panel, Vit D, TSH with reflex T4  -Vaccines:       Flu: declines      Shingrix: Recommended, advised to go to local pharmacy      Pneumococcal: UTD      Tdap: Recommended, advised to go to local pharmacy  -Colonoscopy: No longer screening  -AAA screening: No aneurysm on CT in 2022    #Status post left BKA #gait instability  -Recommend physical therapy evaluation and treatment     #HTN  - At goal in office  - Continue losartan 25 mg daily     #HLD  - Continue atorvastatin 40 mg daily  - Repeat lipid panel     #DMII   -Repeat A1c, microalbumin  - Continue Lantus 20 units twice daily, Humalog SSI     #History of MI status post PCI with stents #CHF  - Continue aspirin 81 mg daily, atorvastatin 40 mg daily, metoprolol succinate 25 mg daily  - Continue to follow with cardiology at [facility]     #Hypothyroidism  - Continue levothyroxine 50 mcg daily  - Repeat TSH     #COPD #Productive cough  - Stable, not medicated  -Given persistent cough, agreed to prescribe Augmentin 875 mg twice daily x 10 days    F/U 3-6 months    Chris D'Amico, DO

## 2024-01-06 DIAGNOSIS — F41.9 ANXIETY: ICD-10-CM

## 2024-01-08 RX ORDER — BUSPIRONE HYDROCHLORIDE 15 MG/1
TABLET ORAL
Qty: 90 TABLET | Refills: 2 | Status: SHIPPED | OUTPATIENT
Start: 2024-01-08

## 2024-02-14 DIAGNOSIS — E11.621 DIABETIC FOOT ULCER ASSOCIATED WITH TYPE 2 DIABETES MELLITUS, UNSPECIFIED LATERALITY, UNSPECIFIED PART OF FOOT, UNSPECIFIED ULCER STAGE (MULTI): ICD-10-CM

## 2024-02-14 DIAGNOSIS — L97.509 DIABETIC FOOT ULCER ASSOCIATED WITH TYPE 2 DIABETES MELLITUS, UNSPECIFIED LATERALITY, UNSPECIFIED PART OF FOOT, UNSPECIFIED ULCER STAGE (MULTI): ICD-10-CM

## 2024-02-14 DIAGNOSIS — E03.9 HYPOTHYROIDISM, UNSPECIFIED TYPE: ICD-10-CM

## 2024-02-14 RX ORDER — LEVOTHYROXINE SODIUM 50 UG/1
TABLET ORAL
Qty: 90 TABLET | Refills: 0 | Status: SHIPPED | OUTPATIENT
Start: 2024-02-14 | End: 2024-05-09 | Stop reason: SDUPTHER

## 2024-02-14 RX ORDER — HYDROXYZINE HYDROCHLORIDE 25 MG/1
TABLET, FILM COATED ORAL
Qty: 180 TABLET | Refills: 0 | Status: SHIPPED | OUTPATIENT
Start: 2024-02-14 | End: 2024-03-25

## 2024-03-04 DIAGNOSIS — K58.9 IRRITABLE BOWEL SYNDROME, UNSPECIFIED TYPE: ICD-10-CM

## 2024-03-04 RX ORDER — DICYCLOMINE HYDROCHLORIDE 10 MG/1
CAPSULE ORAL
Qty: 180 CAPSULE | Refills: 1 | Status: SHIPPED | OUTPATIENT
Start: 2024-03-04

## 2024-03-25 DIAGNOSIS — E11.621 DIABETIC FOOT ULCER ASSOCIATED WITH TYPE 2 DIABETES MELLITUS, UNSPECIFIED LATERALITY, UNSPECIFIED PART OF FOOT, UNSPECIFIED ULCER STAGE (MULTI): ICD-10-CM

## 2024-03-25 DIAGNOSIS — L97.509 DIABETIC FOOT ULCER ASSOCIATED WITH TYPE 2 DIABETES MELLITUS, UNSPECIFIED LATERALITY, UNSPECIFIED PART OF FOOT, UNSPECIFIED ULCER STAGE (MULTI): ICD-10-CM

## 2024-03-25 RX ORDER — HYDROXYZINE HYDROCHLORIDE 25 MG/1
TABLET, FILM COATED ORAL
Qty: 180 TABLET | Refills: 0 | Status: SHIPPED | OUTPATIENT
Start: 2024-03-25

## 2024-05-09 DIAGNOSIS — E03.9 HYPOTHYROIDISM, UNSPECIFIED TYPE: ICD-10-CM

## 2024-05-09 RX ORDER — LEVOTHYROXINE SODIUM 50 UG/1
TABLET ORAL
Qty: 90 TABLET | Refills: 2 | Status: SHIPPED | OUTPATIENT
Start: 2024-05-09

## 2024-06-03 ENCOUNTER — TELEPHONE (OUTPATIENT)
Dept: PRIMARY CARE | Facility: CLINIC | Age: 79
End: 2024-06-03
Payer: MEDICARE

## 2024-06-03 NOTE — TELEPHONE ENCOUNTER
The POA for the [facility] [phone] called requesting some type of HomeCare- she would like to speak with you when you have time///his wife Kayleigh is in the Critical care Unit at [facility] and she is his main caregiver and once she is released from the hospital, she will not be able to assist him at home and will need help

## 2024-06-11 ENCOUNTER — LAB (OUTPATIENT)
Dept: LAB | Facility: LAB | Age: 79
End: 2024-06-11
Payer: MEDICARE

## 2024-06-11 ENCOUNTER — APPOINTMENT (OUTPATIENT)
Dept: PRIMARY CARE | Facility: CLINIC | Age: 79
End: 2024-06-11
Payer: MEDICARE

## 2024-06-11 VITALS
WEIGHT: 150 LBS | HEIGHT: 66 IN | SYSTOLIC BLOOD PRESSURE: 124 MMHG | BODY MASS INDEX: 24.11 KG/M2 | OXYGEN SATURATION: 96 % | DIASTOLIC BLOOD PRESSURE: 62 MMHG | HEART RATE: 77 BPM

## 2024-06-11 DIAGNOSIS — I50.9 CONGESTIVE HEART FAILURE, UNSPECIFIED HF CHRONICITY, UNSPECIFIED HEART FAILURE TYPE (MULTI): ICD-10-CM

## 2024-06-11 DIAGNOSIS — E78.5 HYPERLIPIDEMIA, UNSPECIFIED HYPERLIPIDEMIA TYPE: ICD-10-CM

## 2024-06-11 DIAGNOSIS — D69.6 THROMBOCYTOPENIA (CMS-HCC): ICD-10-CM

## 2024-06-11 DIAGNOSIS — J44.9 CHRONIC OBSTRUCTIVE PULMONARY DISEASE, UNSPECIFIED COPD TYPE (MULTI): ICD-10-CM

## 2024-06-11 DIAGNOSIS — E55.9 VITAMIN D DEFICIENCY: ICD-10-CM

## 2024-06-11 DIAGNOSIS — E11.9 TYPE 2 DIABETES MELLITUS WITHOUT COMPLICATION, WITH LONG-TERM CURRENT USE OF INSULIN (MULTI): ICD-10-CM

## 2024-06-11 DIAGNOSIS — I25.119 ATHEROSCLEROSIS OF NATIVE CORONARY ARTERY OF NATIVE HEART WITH ANGINA PECTORIS (CMS-HCC): ICD-10-CM

## 2024-06-11 DIAGNOSIS — E03.9 HYPOTHYROIDISM, UNSPECIFIED TYPE: ICD-10-CM

## 2024-06-11 DIAGNOSIS — Z79.4 TYPE 2 DIABETES MELLITUS WITHOUT COMPLICATION, WITH LONG-TERM CURRENT USE OF INSULIN (MULTI): ICD-10-CM

## 2024-06-11 DIAGNOSIS — Z89.519 S/P BKA (BELOW KNEE AMPUTATION) UNILATERAL (MULTI): ICD-10-CM

## 2024-06-11 DIAGNOSIS — Z00.00 MEDICARE ANNUAL WELLNESS VISIT, SUBSEQUENT: ICD-10-CM

## 2024-06-11 DIAGNOSIS — I10 HYPERTENSION, UNSPECIFIED TYPE: ICD-10-CM

## 2024-06-11 DIAGNOSIS — I10 HYPERTENSION, UNSPECIFIED TYPE: Primary | ICD-10-CM

## 2024-06-11 DIAGNOSIS — D64.9 ANEMIA, UNSPECIFIED TYPE: ICD-10-CM

## 2024-06-11 PROBLEM — H35.60 RETINAL HEMORRHAGE, UNSPECIFIED EYE: Status: ACTIVE | Noted: 2022-02-12

## 2024-06-11 PROBLEM — Z95.1 PRESENCE OF AORTOCORONARY BYPASS GRAFT: Status: ACTIVE | Noted: 2022-02-12

## 2024-06-11 PROBLEM — I50.22 CHRONIC SYSTOLIC (CONGESTIVE) HEART FAILURE (MULTI): Status: ACTIVE | Noted: 2022-02-12

## 2024-06-11 PROBLEM — Z89.512 ACQUIRED ABSENCE OF LEFT LEG BELOW KNEE (MULTI): Status: ACTIVE | Noted: 2022-02-12

## 2024-06-11 PROBLEM — N17.9 ACUTE KIDNEY FAILURE, UNSPECIFIED (CMS-HCC): Status: ACTIVE | Noted: 2022-02-12

## 2024-06-11 PROBLEM — N20.0 CALCULUS OF KIDNEY: Status: ACTIVE | Noted: 2022-02-12

## 2024-06-11 LAB
25(OH)D3 SERPL-MCNC: 28 NG/ML (ref 30–100)
ALBUMIN SERPL BCP-MCNC: 3.5 G/DL (ref 3.4–5)
ALP SERPL-CCNC: 82 U/L (ref 33–136)
ALT SERPL W P-5'-P-CCNC: 9 U/L (ref 10–52)
ANION GAP SERPL CALC-SCNC: 14 MMOL/L (ref 10–20)
AST SERPL W P-5'-P-CCNC: 10 U/L (ref 9–39)
BILIRUB SERPL-MCNC: 1.3 MG/DL (ref 0–1.2)
BUN SERPL-MCNC: 18 MG/DL (ref 6–23)
CALCIUM SERPL-MCNC: 8.4 MG/DL (ref 8.6–10.6)
CHLORIDE SERPL-SCNC: 100 MMOL/L (ref 98–107)
CHOLEST SERPL-MCNC: 60 MG/DL (ref 0–199)
CHOLESTEROL/HDL RATIO: 3.3
CO2 SERPL-SCNC: 26 MMOL/L (ref 21–32)
CREAT SERPL-MCNC: 1.32 MG/DL (ref 0.5–1.3)
EGFRCR SERPLBLD CKD-EPI 2021: 55 ML/MIN/1.73M*2
ERYTHROCYTE [DISTWIDTH] IN BLOOD BY AUTOMATED COUNT: 16 % (ref 11.5–14.5)
EST. AVERAGE GLUCOSE BLD GHB EST-MCNC: 105 MG/DL
GLUCOSE SERPL-MCNC: 180 MG/DL (ref 74–99)
HBA1C MFR BLD: 5.3 %
HCT VFR BLD AUTO: 30.1 % (ref 41–52)
HDLC SERPL-MCNC: 18.1 MG/DL
HGB BLD-MCNC: 9.7 G/DL (ref 13.5–17.5)
LDLC SERPL CALC-MCNC: 23 MG/DL
MCH RBC QN AUTO: 26.4 PG (ref 26–34)
MCHC RBC AUTO-ENTMCNC: 32.2 G/DL (ref 32–36)
MCV RBC AUTO: 82 FL (ref 80–100)
NON HDL CHOLESTEROL: 42 MG/DL (ref 0–149)
NRBC BLD-RTO: 0 /100 WBCS (ref 0–0)
PLATELET # BLD AUTO: 121 X10*3/UL (ref 150–450)
POTASSIUM SERPL-SCNC: 4.4 MMOL/L (ref 3.5–5.3)
PROT SERPL-MCNC: 6.5 G/DL (ref 6.4–8.2)
RBC # BLD AUTO: 3.68 X10*6/UL (ref 4.5–5.9)
SODIUM SERPL-SCNC: 136 MMOL/L (ref 136–145)
T4 FREE SERPL-MCNC: 1.26 NG/DL (ref 0.78–1.48)
TRIGL SERPL-MCNC: 93 MG/DL (ref 0–149)
TSH SERPL-ACNC: 4.44 MIU/L (ref 0.44–3.98)
VLDL: 19 MG/DL (ref 0–40)
WBC # BLD AUTO: 4.9 X10*3/UL (ref 4.4–11.3)

## 2024-06-11 PROCEDURE — 85027 COMPLETE CBC AUTOMATED: CPT

## 2024-06-11 PROCEDURE — 84443 ASSAY THYROID STIM HORMONE: CPT

## 2024-06-11 PROCEDURE — 1157F ADVNC CARE PLAN IN RCRD: CPT | Performed by: STUDENT IN AN ORGANIZED HEALTH CARE EDUCATION/TRAINING PROGRAM

## 2024-06-11 PROCEDURE — 3078F DIAST BP <80 MM HG: CPT | Performed by: STUDENT IN AN ORGANIZED HEALTH CARE EDUCATION/TRAINING PROGRAM

## 2024-06-11 PROCEDURE — 36415 COLL VENOUS BLD VENIPUNCTURE: CPT

## 2024-06-11 PROCEDURE — 1159F MED LIST DOCD IN RCRD: CPT | Performed by: STUDENT IN AN ORGANIZED HEALTH CARE EDUCATION/TRAINING PROGRAM

## 2024-06-11 PROCEDURE — 1036F TOBACCO NON-USER: CPT | Performed by: STUDENT IN AN ORGANIZED HEALTH CARE EDUCATION/TRAINING PROGRAM

## 2024-06-11 PROCEDURE — 1160F RVW MEDS BY RX/DR IN RCRD: CPT | Performed by: STUDENT IN AN ORGANIZED HEALTH CARE EDUCATION/TRAINING PROGRAM

## 2024-06-11 PROCEDURE — 99214 OFFICE O/P EST MOD 30 MIN: CPT | Performed by: STUDENT IN AN ORGANIZED HEALTH CARE EDUCATION/TRAINING PROGRAM

## 2024-06-11 PROCEDURE — 3074F SYST BP LT 130 MM HG: CPT | Performed by: STUDENT IN AN ORGANIZED HEALTH CARE EDUCATION/TRAINING PROGRAM

## 2024-06-11 PROCEDURE — G2211 COMPLEX E/M VISIT ADD ON: HCPCS | Performed by: STUDENT IN AN ORGANIZED HEALTH CARE EDUCATION/TRAINING PROGRAM

## 2024-06-11 PROCEDURE — 80053 COMPREHEN METABOLIC PANEL: CPT

## 2024-06-11 PROCEDURE — 82306 VITAMIN D 25 HYDROXY: CPT

## 2024-06-11 PROCEDURE — 84439 ASSAY OF FREE THYROXINE: CPT

## 2024-06-11 PROCEDURE — 83036 HEMOGLOBIN GLYCOSYLATED A1C: CPT

## 2024-06-11 PROCEDURE — 80061 LIPID PANEL: CPT

## 2024-06-11 RX ORDER — SPIRONOLACTONE 25 MG/1
25 TABLET ORAL
COMMUNITY
Start: 2024-05-23

## 2024-06-11 RX ORDER — METOPROLOL SUCCINATE 25 MG/1
25 TABLET, EXTENDED RELEASE ORAL DAILY
Qty: 90 TABLET | Refills: 3 | Status: SHIPPED | OUTPATIENT
Start: 2024-06-11 | End: 2025-06-11

## 2024-06-11 NOTE — LETTER
June 13, 2024     Chavez Llamas  [address] [address]      Dear Mr. Llamas:    Below are the results from your recent visit:    Mild kidney dysfunction, stable over past year.     TSH is borderline elevated with normal T4.  This might indicate some minor level of undertreatment.  We can continue the current regimen and repeat labs at next visit, or he could consider taking 1 tablet 6 days a week and 2 tablets 1 day a week, and repeating lab in 4-6 weeks.     Borderline vitamin D deficiency at 28, recommend OTC vitamin D3, 2482-4129 units daily if not already taking.     Moderate anemia, mostly stable over the past couple years.  Likely secondary to chronic diabetes.     Hemoglobin A1c is relatively low considering that you're on just insulin.  Are you following with endocrinology?  If not I would consider changing your medication regimen to try to get you off insulin altogether and on more effective modern medications.  Particularly given your cardiac history, you would likely benefit from something like Ozempic/Mounjaro and Jardiance/Farxiga, and you could likely get off insulin altogether.  If you are amenable, I will get you  started with Meryl, our pharmacist.      Please give our office a call at [phone] and Manju will assist you.      Remaining labs unremarkable

## 2024-06-11 NOTE — PROGRESS NOTES
78-year-old male presenting for 6-month follow-up on chronic conditions.    Left BKA   Completed physical therapy at the time.  Currently has prosthetic.     HTN  Stable, tolerates current regimen well     HLD  Stable, tolerating current regimen well.     DMII  Stable, reports home blood sugars at goal     History of MI status post PCI with stents, CHF  Following with cardiology, compliant with medication     Hypothyroidism  Stable, tolerating current regimen well.     COPD  Stable, not currently medicated.    SocHx:   - Smoking: Approximately 20 years, quit about 40 years ago    12 point ROS reviewed and negative other than as stated in HPI    General: Alert, oriented, pleasant, in no acute distress  HEENT:      Head: normocephalic, atraumatic;      eyes: EOMI, no scleral icterus;   Neck: soft, supple, non-tender, no masses appreciated  CV: Heart with regular rate and rhythm, normal S1/S2, no murmurs  Lungs: CTAB without wheezing, rhonchi or rales; good respiratory effort, no increased work of breathing  Neuro: Cranial nerves grossly intact; alert and oriented, in wheelchair  Psych: Appropriate mood and affect      #HTN  - At goal in office  - Continue losartan 25 mg daily, metoprolol succinate 25 mg  -Repeat CMP    #HLD  - Continue atorvastatin 40 mg daily  - Repeat lipid panel     #DMII   -Repeat A1c, microalbumin  - Continue Lantus 20 units twice daily, Humalog SSI     #History of MI status post PCI with stents #CHF  - Continue aspirin 81 mg daily, atorvastatin 40 mg daily, metoprolol succinate 25 mg daily  - Continue to follow with cardiology at [facility]     #Hypothyroidism  - Continue levothyroxine 50 mcg daily  - Repeat TSH     #COPD   - Stable, not medicated    #Status post left BKA #gait instability  - Stable, using wheelchair in office    F/U 3-6 months, Medicare in December    Chris D'Amico, DO

## 2024-06-12 NOTE — RESULT ENCOUNTER NOTE
Mild kidney dysfunction, stable over past year.    TSH is borderline elevated with normal T4.  This might indicate some minor level of undertreatment.  We can continue the current regimen and repeat labs at next visit, or he could consider taking 1 tablet 6 days a week and 2 tablets 1 day a week, and repeating lab in 4-6 weeks.    Borderline vitamin D deficiency at 28, recommend OTC vitamin D3, 1757-4388 units daily if not already taking.    Moderate anemia, mostly stable over the past couple years.  Likely secondary to chronic diabetes.    Hemoglobin A1c is relatively low considering he is on just insulin.  Does he follow with endocrinology?  If not I would consider changing his medication regimen to try to get him off insulin altogether and on more effective modern medications.  Particularly given his cardiac history, he would likely benefit from something like Ozempic/Mounjaro and Jardiance/Farxiga, and he could likely get off insulin altogether.  If he is amenable, I will get him started with Meryl.    Remaining labs unremarkable.

## 2024-06-13 ENCOUNTER — TELEPHONE (OUTPATIENT)
Dept: PRIMARY CARE | Facility: CLINIC | Age: 79
End: 2024-06-13
Payer: MEDICARE

## 2024-06-27 ENCOUNTER — TELEPHONE (OUTPATIENT)
Dept: PRIMARY CARE | Facility: CLINIC | Age: 79
End: 2024-06-27
Payer: MEDICARE

## 2024-06-27 NOTE — TELEPHONE ENCOUNTER
Result Communication    Resulted Orders   CBC   Result Value Ref Range    WBC 4.9 4.4 - 11.3 x10*3/uL    nRBC 0.0 0.0 - 0.0 /100 WBCs    RBC 3.68 (L) 4.50 - 5.90 x10*6/uL    Hemoglobin 9.7 (L) 13.5 - 17.5 g/dL    Hematocrit 30.1 (L) 41.0 - 52.0 %    MCV 82 80 - 100 fL    MCH 26.4 26.0 - 34.0 pg    MCHC 32.2 32.0 - 36.0 g/dL    RDW 16.0 (H) 11.5 - 14.5 %    Platelets 121 (L) 150 - 450 x10*3/uL   Lipid Panel   Result Value Ref Range    Cholesterol 60 0 - 199 mg/dL      Comment:            Age      Desirable   Borderline High   High     0-19 Y     0 - 169       170 - 199     >/= 200    20-24 Y     0 - 189       190 - 224     >/= 225         >24 Y     0 - 199       200 - 239     >/= 240   **All ranges are based on fasting samples. Specific   therapeutic targets will vary based on patient-specific   cardiac risk.    Pediatric guidelines reference:Pediatrics 2011, 128(S5).Adult guidelines reference: NCEP ATPIII Guidelines,LUIS ENRIQUE 2001, 258:2486-97    Venipuncture immediately after or during the administration of Metamizole may lead to falsely low results. Testing should be performed immediately prior to Metamizole dosing.    HDL-Cholesterol 18.1 mg/dL      Comment:        Age       Very Low   Low     Normal    High    0-19 Y    < 35      < 40     40-45     ----  20-24 Y    ----     < 40      >45      ----        >24 Y      ----     < 40     40-60      >60      Cholesterol/HDL Ratio 3.3       Comment:        Ref Values  Desirable  < 3.4  High Risk  > 5.0    LDL Calculated 23 <=99 mg/dL      Comment:                                  Near   Borderline      AGE      Desirable  Optimal    High     High     Very High     0-19 Y     0 - 109     ---    110-129   >/= 130     ----    20-24 Y     0 - 119     ---    120-159   >/= 160     ----      >24 Y     0 -  99   100-129  130-159   160-189     >/=190      VLDL 19 0 - 40 mg/dL    Triglycerides 93 0 - 149 mg/dL      Comment:         Age         Desirable   Borderline High   High      Very High   0 D-90 D    19 - 174         ----         ----        ----  91 D- 9 Y     0 -  74        75 -  99     >/= 100      ----    10-19 Y     0 -  89        90 - 129     >/= 130      ----    20-24 Y     0 - 114       115 - 149     >/= 150      ----         >24 Y     0 - 149       150 - 199    200- 499    >/= 500    Venipuncture immediately after or during the administration of Metamizole may lead to falsely low results. Testing should be performed immediately prior to Metamizole dosing.    Non HDL Cholesterol 42 0 - 149 mg/dL      Comment:            Age       Desirable   Borderline High   High     Very High     0-19 Y     0 - 119       120 - 144     >/= 145    >/= 160    20-24 Y     0 - 149       150 - 189     >/= 190      ----         >24 Y    30 mg/dL above LDL Cholesterol goal     Comprehensive Metabolic Panel   Result Value Ref Range    Glucose 180 (H) 74 - 99 mg/dL    Sodium 136 136 - 145 mmol/L    Potassium 4.4 3.5 - 5.3 mmol/L    Chloride 100 98 - 107 mmol/L    Bicarbonate 26 21 - 32 mmol/L    Anion Gap 14 10 - 20 mmol/L    Urea Nitrogen 18 6 - 23 mg/dL    Creatinine 1.32 (H) 0.50 - 1.30 mg/dL    eGFR 55 (L) >60 mL/min/1.73m*2      Comment:      Calculations of estimated GFR are performed using the 2021 CKD-EPI Study Refit equation without the race variable for the IDMS-Traceable creatinine methods.  https://jasn.asnjournals.org/content/early/2021/09/22/ASN.5994800723    Calcium 8.4 (L) 8.6 - 10.6 mg/dL    Albumin 3.5 3.4 - 5.0 g/dL    Alkaline Phosphatase 82 33 - 136 U/L    Total Protein 6.5 6.4 - 8.2 g/dL    AST 10 9 - 39 U/L    Bilirubin, Total 1.3 (H) 0.0 - 1.2 mg/dL    ALT 9 (L) 10 - 52 U/L      Comment:      Patients treated with Sulfasalazine may generate falsely decreased results for ALT.   TSH with reflex to Free T4 if abnormal   Result Value Ref Range    Thyroid Stimulating Hormone 4.44 (H) 0.44 - 3.98 mIU/L    Narrative    TSH testing is performed using different testing methodology at  [facility] than at other [facility]. Direct result comparisons should only be made within the same method.     Vitamin D 25-Hydroxy,Total (for eval of Vitamin D levels)   Result Value Ref Range    Vitamin D, 25-Hydroxy, Total 28 (L) 30 - 100 ng/mL    Narrative    Deficiency:         < 20   ng/ml  Insufficiency:      20-29  ng/ml  Sufficiency:         ng/ml  This assay accurately quantifies the sum of Vitamin D3, 25-Hydroxy and Vitamin D2,25-Hydroxy.   Hemoglobin A1C   Result Value Ref Range    Hemoglobin A1C 5.3 see below %    Estimated Average Glucose 105 Not Established mg/dL    Narrative    Diagnosis of Diabetes-Adults  Non-Diabetic: < or = 5.6%  Increased risk for developing diabetes: 5.7-6.4%  Diagnostic of diabetes: > or = 6.5%    Monitoring of Diabetes  Age (y)....................... Therapeutic Goal (%)  Adults: >18.........................<7.0  Pediatrics: 13-18...................<7.5  Pediatrics: 7-12....................<8.0  Pediatrics: 0-6..................... 7.5-8.5    American Diabetes Association. Diabetes Care 33(S1), Jan 2010       Thyroxine, Free   Result Value Ref Range    Thyroxine, Free 1.26 0.78 - 1.48 ng/dL    Narrative    Thyroxine Free testing is performed using different testing methodology at [facility] than at other [facility]. Direct result comparisons should only be made within the same method.       1:57 PM      Results were not successfully communicated with the patient and they did not acknowledge their understanding.

## 2024-06-27 NOTE — TELEPHONE ENCOUNTER
Result Communication    Resulted Orders   CBC   Result Value Ref Range    WBC 4.9 4.4 - 11.3 x10*3/uL    nRBC 0.0 0.0 - 0.0 /100 WBCs    RBC 3.68 (L) 4.50 - 5.90 x10*6/uL    Hemoglobin 9.7 (L) 13.5 - 17.5 g/dL    Hematocrit 30.1 (L) 41.0 - 52.0 %    MCV 82 80 - 100 fL    MCH 26.4 26.0 - 34.0 pg    MCHC 32.2 32.0 - 36.0 g/dL    RDW 16.0 (H) 11.5 - 14.5 %    Platelets 121 (L) 150 - 450 x10*3/uL   Lipid Panel   Result Value Ref Range    Cholesterol 60 0 - 199 mg/dL      Comment:            Age      Desirable   Borderline High   High     0-19 Y     0 - 169       170 - 199     >/= 200    20-24 Y     0 - 189       190 - 224     >/= 225         >24 Y     0 - 199       200 - 239     >/= 240   **All ranges are based on fasting samples. Specific   therapeutic targets will vary based on patient-specific   cardiac risk.    Pediatric guidelines reference:Pediatrics 2011, 128(S5).Adult guidelines reference: NCEP ATPIII Guidelines,LUIS ENRIQUE 2001, 258:2486-97    Venipuncture immediately after or during the administration of Metamizole may lead to falsely low results. Testing should be performed immediately prior to Metamizole dosing.    HDL-Cholesterol 18.1 mg/dL      Comment:        Age       Very Low   Low     Normal    High    0-19 Y    < 35      < 40     40-45     ----  20-24 Y    ----     < 40      >45      ----        >24 Y      ----     < 40     40-60      >60      Cholesterol/HDL Ratio 3.3       Comment:        Ref Values  Desirable  < 3.4  High Risk  > 5.0    LDL Calculated 23 <=99 mg/dL      Comment:                                  Near   Borderline      AGE      Desirable  Optimal    High     High     Very High     0-19 Y     0 - 109     ---    110-129   >/= 130     ----    20-24 Y     0 - 119     ---    120-159   >/= 160     ----      >24 Y     0 -  99   100-129  130-159   160-189     >/=190      VLDL 19 0 - 40 mg/dL    Triglycerides 93 0 - 149 mg/dL      Comment:         Age         Desirable   Borderline High   High      Very High   0 D-90 D    19 - 174         ----         ----        ----  91 D- 9 Y     0 -  74        75 -  99     >/= 100      ----    10-19 Y     0 -  89        90 - 129     >/= 130      ----    20-24 Y     0 - 114       115 - 149     >/= 150      ----         >24 Y     0 - 149       150 - 199    200- 499    >/= 500    Venipuncture immediately after or during the administration of Metamizole may lead to falsely low results. Testing should be performed immediately prior to Metamizole dosing.    Non HDL Cholesterol 42 0 - 149 mg/dL      Comment:            Age       Desirable   Borderline High   High     Very High     0-19 Y     0 - 119       120 - 144     >/= 145    >/= 160    20-24 Y     0 - 149       150 - 189     >/= 190      ----         >24 Y    30 mg/dL above LDL Cholesterol goal     Comprehensive Metabolic Panel   Result Value Ref Range    Glucose 180 (H) 74 - 99 mg/dL    Sodium 136 136 - 145 mmol/L    Potassium 4.4 3.5 - 5.3 mmol/L    Chloride 100 98 - 107 mmol/L    Bicarbonate 26 21 - 32 mmol/L    Anion Gap 14 10 - 20 mmol/L    Urea Nitrogen 18 6 - 23 mg/dL    Creatinine 1.32 (H) 0.50 - 1.30 mg/dL    eGFR 55 (L) >60 mL/min/1.73m*2      Comment:      Calculations of estimated GFR are performed using the 2021 CKD-EPI Study Refit equation without the race variable for the IDMS-Traceable creatinine methods.  https://jasn.asnjournals.org/content/early/2021/09/22/ASN.6682010897    Calcium 8.4 (L) 8.6 - 10.6 mg/dL    Albumin 3.5 3.4 - 5.0 g/dL    Alkaline Phosphatase 82 33 - 136 U/L    Total Protein 6.5 6.4 - 8.2 g/dL    AST 10 9 - 39 U/L    Bilirubin, Total 1.3 (H) 0.0 - 1.2 mg/dL    ALT 9 (L) 10 - 52 U/L      Comment:      Patients treated with Sulfasalazine may generate falsely decreased results for ALT.   TSH with reflex to Free T4 if abnormal   Result Value Ref Range    Thyroid Stimulating Hormone 4.44 (H) 0.44 - 3.98 mIU/L    Narrative    TSH testing is performed using different testing methodology at  [facility] than at other [facility]. Direct result comparisons should only be made within the same method.     Vitamin D 25-Hydroxy,Total (for eval of Vitamin D levels)   Result Value Ref Range    Vitamin D, 25-Hydroxy, Total 28 (L) 30 - 100 ng/mL    Narrative    Deficiency:         < 20   ng/ml  Insufficiency:      20-29  ng/ml  Sufficiency:         ng/ml  This assay accurately quantifies the sum of Vitamin D3, 25-Hydroxy and Vitamin D2,25-Hydroxy.   Hemoglobin A1C   Result Value Ref Range    Hemoglobin A1C 5.3 see below %    Estimated Average Glucose 105 Not Established mg/dL    Narrative    Diagnosis of Diabetes-Adults  Non-Diabetic: < or = 5.6%  Increased risk for developing diabetes: 5.7-6.4%  Diagnostic of diabetes: > or = 6.5%    Monitoring of Diabetes  Age (y)....................... Therapeutic Goal (%)  Adults: >18.........................<7.0  Pediatrics: 13-18...................<7.5  Pediatrics: 7-12....................<8.0  Pediatrics: 0-6..................... 7.5-8.5    American Diabetes Association. Diabetes Care 33(S1), Jan 2010       Thyroxine, Free   Result Value Ref Range    Thyroxine, Free 1.26 0.78 - 1.48 ng/dL    Narrative    Thyroxine Free testing is performed using different testing methodology at [facility] than at other [facility]. Direct result comparisons should only be made within the same method.       11:36 AM      Results were not successfully communicated with the patient and they did not acknowledge their understanding.  Left message for pt to return call in regards to lab results; sent pt a letter a week ago still; has not called in;

## 2024-06-27 NOTE — TELEPHONE ENCOUNTER
----- Message from Christopher D'Amico, DO sent at 6/12/2024  6:52 PM EDT -----  Mild kidney dysfunction, stable over past year.    TSH is borderline elevated with normal T4.  This might indicate some minor level of undertreatment.  We can continue the current regimen and repeat labs at next visit, or he could consider taking 1 tablet 6 days a week and 2 tablets 1 day a week, and repeating lab in 4-6 weeks.    Borderline vitamin D deficiency at 28, recommend OTC vitamin D3, 3151-4213 units daily if not already taking.    Moderate anemia, mostly stable over the past couple years.  Likely secondary to chronic diabetes.    Hemoglobin A1c is relatively low considering he is on just insulin.  Does he follow with endocrinology?  If not I would consider changing his medication regimen to try to get him off insulin altogether and on more effective modern medications.  Particularly given his cardiac history, he would likely benefit from something like Ozempic/Mounjaro and Jardiance/Farxiga, and he could likely get off insulin altogether.  If he is amenable, I will get him started with Meryl.    Remaining labs unremarkable.

## 2024-06-27 NOTE — TELEPHONE ENCOUNTER
----- Message from Christopher D'Amico, DO sent at 6/12/2024  6:52 PM EDT -----  Mild kidney dysfunction, stable over past year.    TSH is borderline elevated with normal T4.  This might indicate some minor level of undertreatment.  We can continue the current regimen and repeat labs at next visit, or he could consider taking 1 tablet 6 days a week and 2 tablets 1 day a week, and repeating lab in 4-6 weeks.    Borderline vitamin D deficiency at 28, recommend OTC vitamin D3, 5556-0808 units daily if not already taking.    Moderate anemia, mostly stable over the past couple years.  Likely secondary to chronic diabetes.    Hemoglobin A1c is relatively low considering he is on just insulin.  Does he follow with endocrinology?  If not I would consider changing his medication regimen to try to get him off insulin altogether and on more effective modern medications.  Particularly given his cardiac history, he would likely benefit from something like Ozempic/Mounjaro and Jardiance/Farxiga, and he could likely get off insulin altogether.  If he is amenable, I will get him started with Meryl.    Remaining labs unremarkable.

## 2024-08-08 DIAGNOSIS — E78.5 HYPERLIPIDEMIA, UNSPECIFIED HYPERLIPIDEMIA TYPE: ICD-10-CM

## 2024-08-08 DIAGNOSIS — I10 HYPERTENSION, UNSPECIFIED TYPE: ICD-10-CM

## 2024-08-10 RX ORDER — SPIRONOLACTONE 25 MG/1
25 TABLET ORAL
Qty: 90 TABLET | Refills: 1 | Status: SHIPPED | OUTPATIENT
Start: 2024-08-10

## 2024-08-10 RX ORDER — ATORVASTATIN CALCIUM 40 MG/1
40 TABLET, FILM COATED ORAL NIGHTLY
Qty: 90 TABLET | Refills: 0 | Status: SHIPPED | OUTPATIENT
Start: 2024-08-10

## 2024-09-09 DIAGNOSIS — L97.509 DIABETIC FOOT ULCER ASSOCIATED WITH TYPE 2 DIABETES MELLITUS, UNSPECIFIED LATERALITY, UNSPECIFIED PART OF FOOT, UNSPECIFIED ULCER STAGE (MULTI): ICD-10-CM

## 2024-09-09 DIAGNOSIS — E11.621 DIABETIC FOOT ULCER ASSOCIATED WITH TYPE 2 DIABETES MELLITUS, UNSPECIFIED LATERALITY, UNSPECIFIED PART OF FOOT, UNSPECIFIED ULCER STAGE (MULTI): ICD-10-CM

## 2024-09-09 RX ORDER — HYDROXYZINE HYDROCHLORIDE 25 MG/1
TABLET, FILM COATED ORAL
Qty: 180 TABLET | Refills: 0 | Status: SHIPPED | OUTPATIENT
Start: 2024-09-09

## 2024-10-13 DIAGNOSIS — F41.9 ANXIETY: ICD-10-CM

## 2024-10-14 RX ORDER — BUSPIRONE HYDROCHLORIDE 15 MG/1
TABLET ORAL
Qty: 90 TABLET | Refills: 0 | Status: SHIPPED | OUTPATIENT
Start: 2024-10-14

## 2024-11-22 DIAGNOSIS — E11.69 TYPE 2 DIABETES MELLITUS WITH OTHER SPECIFIED COMPLICATION, UNSPECIFIED WHETHER LONG TERM INSULIN USE (MULTI): ICD-10-CM

## 2024-11-25 RX ORDER — INSULIN GLARGINE 100 [IU]/ML
INJECTION, SOLUTION SUBCUTANEOUS
Qty: 36 ML | Refills: 0 | Status: SHIPPED | OUTPATIENT
Start: 2024-11-25

## 2024-12-03 ENCOUNTER — TELEPHONE (OUTPATIENT)
Dept: PHARMACY | Facility: CLINIC | Age: 79
End: 2024-12-03

## 2024-12-03 DIAGNOSIS — E11.9 TYPE 2 DIABETES MELLITUS WITHOUT COMPLICATION, WITH LONG-TERM CURRENT USE OF INSULIN (MULTI): ICD-10-CM

## 2024-12-03 DIAGNOSIS — Z79.4 TYPE 2 DIABETES MELLITUS WITHOUT COMPLICATION, WITH LONG-TERM CURRENT USE OF INSULIN (MULTI): ICD-10-CM

## 2024-12-03 RX ORDER — INSULIN LISPRO 100 [IU]/ML
INJECTION, SOLUTION INTRAVENOUS; SUBCUTANEOUS
Qty: 15 ML | Refills: 2 | Status: CANCELLED | OUTPATIENT
Start: 2024-12-03

## 2024-12-03 NOTE — TELEPHONE ENCOUNTER
Spoke with patients pharmacy to clarify insulin refill:   Lantus 20 units once daily: confirmed pt is adherent   Humalog:   Patient has not filled since 2022  [facility] has a history of 1 prescription being written at 20 units, same dose as lantus     Left voicemail for patients daughter to discuss.     Thanks,   Meryl Houston

## 2024-12-04 ENCOUNTER — APPOINTMENT (OUTPATIENT)
Dept: RADIOLOGY | Facility: HOSPITAL | Age: 79
DRG: 250 | End: 2024-12-04
Payer: MEDICARE

## 2024-12-04 ENCOUNTER — APPOINTMENT (OUTPATIENT)
Dept: CARDIOLOGY | Facility: HOSPITAL | Age: 79
DRG: 250 | End: 2024-12-04
Payer: MEDICARE

## 2024-12-04 ENCOUNTER — TELEPHONE (OUTPATIENT)
Dept: PHARMACY | Facility: HOSPITAL | Age: 79
End: 2024-12-04
Payer: MEDICARE

## 2024-12-04 ENCOUNTER — HOSPITAL ENCOUNTER (INPATIENT)
Facility: HOSPITAL | Age: 79
Discharge: HOME HEALTH | End: 2024-12-04
Admitting: INTERNAL MEDICINE
Payer: MEDICARE

## 2024-12-04 DIAGNOSIS — Z79.4 TYPE 2 DIABETES MELLITUS WITHOUT COMPLICATION, WITH LONG-TERM CURRENT USE OF INSULIN (MULTI): Primary | ICD-10-CM

## 2024-12-04 DIAGNOSIS — U07.1 COVID-19: ICD-10-CM

## 2024-12-04 DIAGNOSIS — R55 SYNCOPE, UNSPECIFIED SYNCOPE TYPE: ICD-10-CM
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
## 2024-12-04 DIAGNOSIS — E11.621 DIABETIC FOOT ULCER ASSOCIATED WITH TYPE 2 DIABETES MELLITUS, UNSPECIFIED LATERALITY, UNSPECIFIED PART OF FOOT, UNSPECIFIED ULCER STAGE (MULTI): ICD-10-CM

## 2024-12-04 DIAGNOSIS — Z79.4 TYPE 2 DIABETES MELLITUS WITHOUT COMPLICATION, WITH LONG-TERM CURRENT USE OF INSULIN (MULTI): ICD-10-CM

## 2024-12-04 DIAGNOSIS — E78.5 HYPERLIPIDEMIA, UNSPECIFIED HYPERLIPIDEMIA TYPE: ICD-10-CM

## 2024-12-04 DIAGNOSIS — L97.509 DIABETIC FOOT ULCER ASSOCIATED WITH TYPE 2 DIABETES MELLITUS, UNSPECIFIED LATERALITY, UNSPECIFIED PART OF FOOT, UNSPECIFIED ULCER STAGE (MULTI): ICD-10-CM

## 2024-12-04 DIAGNOSIS — I25.85 CHRONIC CORONARY MICROVASCULAR DYSFUNCTION: ICD-10-CM

## 2024-12-04 DIAGNOSIS — I21.4 NSTEMI (NON-ST ELEVATED MYOCARDIAL INFARCTION) (MULTI): ICD-10-CM

## 2024-12-04 DIAGNOSIS — E11.9 TYPE 2 DIABETES MELLITUS WITHOUT COMPLICATION, WITH LONG-TERM CURRENT USE OF INSULIN (MULTI): ICD-10-CM

## 2024-12-04 DIAGNOSIS — E87.20 LACTIC ACIDOSIS: ICD-10-CM

## 2024-12-04 DIAGNOSIS — F41.9 ANXIETY: ICD-10-CM

## 2024-12-04 DIAGNOSIS — R55 SYNCOPE AND COLLAPSE: ICD-10-CM

## 2024-12-04 DIAGNOSIS — E11.9 TYPE 2 DIABETES MELLITUS WITHOUT COMPLICATION, WITH LONG-TERM CURRENT USE OF INSULIN (MULTI): Primary | ICD-10-CM

## 2024-12-04 LAB
ABO GROUP (TYPE) IN BLOOD: NORMAL
ALBUMIN SERPL BCP-MCNC: 3.9 G/DL (ref 3.4–5)
ALP SERPL-CCNC: 75 U/L (ref 33–136)
ALT SERPL W P-5'-P-CCNC: 14 U/L (ref 10–52)
ANION GAP SERPL CALCULATED.3IONS-SCNC: 21 MMOL/L (ref 10–20)
ANTIBODY SCREEN: NORMAL
APPEARANCE UR: CLEAR
AST SERPL W P-5'-P-CCNC: 12 U/L (ref 9–39)
BASOPHILS # BLD AUTO: 0.02 X10*3/UL (ref 0–0.1)
BASOPHILS NFR BLD AUTO: 0.2 %
BILIRUB SERPL-MCNC: 1.7 MG/DL (ref 0–1.2)
BILIRUB UR STRIP.AUTO-MCNC: NEGATIVE MG/DL
BNP SERPL-MCNC: 848 PG/ML (ref 0–99)
BUN SERPL-MCNC: 24 MG/DL (ref 6–23)
CALCIUM SERPL-MCNC: 9.3 MG/DL (ref 8.6–10.3)
CARDIAC TROPONIN I PNL SERPL HS: 22 NG/L (ref 0–20)
CARDIAC TROPONIN I PNL SERPL HS: 24 NG/L (ref 0–20)
CHLORIDE SERPL-SCNC: 98 MMOL/L (ref 98–107)
CK SERPL-CCNC: 68 U/L (ref 0–325)
CO2 SERPL-SCNC: 22 MMOL/L (ref 21–32)
COLOR UR: ABNORMAL
CREAT SERPL-MCNC: 1.46 MG/DL (ref 0.5–1.3)
EGFRCR SERPLBLD CKD-EPI 2021: 48 ML/MIN/1.73M*2
EOSINOPHIL # BLD AUTO: 0 X10*3/UL (ref 0–0.4)
EOSINOPHIL NFR BLD AUTO: 0 %
ERYTHROCYTE [DISTWIDTH] IN BLOOD BY AUTOMATED COUNT: 14.9 % (ref 11.5–14.5)
FLUAV RNA RESP QL NAA+PROBE: NOT DETECTED
FLUBV RNA RESP QL NAA+PROBE: NOT DETECTED
GLUCOSE SERPL-MCNC: 225 MG/DL (ref 74–99)
GLUCOSE UR STRIP.AUTO-MCNC: ABNORMAL MG/DL
HCT VFR BLD AUTO: 35.8 % (ref 41–52)
HGB BLD-MCNC: 10.6 G/DL (ref 13.5–17.5)
HGB BLD-MCNC: 11.8 G/DL (ref 13.5–17.5)
HYALINE CASTS #/AREA URNS AUTO: ABNORMAL /LPF
IMM GRANULOCYTES # BLD AUTO: 0.06 X10*3/UL (ref 0–0.5)
IMM GRANULOCYTES NFR BLD AUTO: 0.6 % (ref 0–0.9)
KETONES UR STRIP.AUTO-MCNC: ABNORMAL MG/DL
LACTATE SERPL-SCNC: 2.6 MMOL/L (ref 0.4–2)
LACTATE SERPL-SCNC: 7.9 MMOL/L (ref 0.4–2)
LEUKOCYTE ESTERASE UR QL STRIP.AUTO: NEGATIVE
LYMPHOCYTES # BLD AUTO: 0.25 X10*3/UL (ref 0.8–3)
LYMPHOCYTES NFR BLD AUTO: 2.7 %
MAGNESIUM SERPL-MCNC: 1.87 MG/DL (ref 1.6–2.4)
MCH RBC QN AUTO: 28.7 PG (ref 26–34)
MCHC RBC AUTO-ENTMCNC: 33 G/DL (ref 32–36)
MCV RBC AUTO: 87 FL (ref 80–100)
MONOCYTES # BLD AUTO: 0.8 X10*3/UL (ref 0.05–0.8)
MONOCYTES NFR BLD AUTO: 8.5 %
MUCOUS THREADS #/AREA URNS AUTO: ABNORMAL /LPF
NEUTROPHILS # BLD AUTO: 8.29 X10*3/UL (ref 1.6–5.5)
NEUTROPHILS NFR BLD AUTO: 88 %
NITRITE UR QL STRIP.AUTO: NEGATIVE
NRBC BLD-RTO: 0 /100 WBCS (ref 0–0)
PH UR STRIP.AUTO: 5.5 [PH]
PHOSPHATE SERPL-MCNC: 5.4 MG/DL (ref 2.5–4.9)
PLATELET # BLD AUTO: 113 X10*3/UL (ref 150–450)
POTASSIUM SERPL-SCNC: 5.1 MMOL/L (ref 3.5–5.3)
PROT SERPL-MCNC: 7.3 G/DL (ref 6.4–8.2)
PROT UR STRIP.AUTO-MCNC: ABNORMAL MG/DL
RBC # BLD AUTO: 4.11 X10*6/UL (ref 4.5–5.9)
RBC # UR STRIP.AUTO: ABNORMAL /UL
RBC #/AREA URNS AUTO: ABNORMAL /HPF
RH FACTOR (ANTIGEN D): NORMAL
SARS-COV-2 RNA RESP QL NAA+PROBE: DETECTED
SODIUM SERPL-SCNC: 136 MMOL/L (ref 136–145)
SP GR UR STRIP.AUTO: >1.05
UROBILINOGEN UR STRIP.AUTO-MCNC: NORMAL MG/DL
WBC # BLD AUTO: 9.4 X10*3/UL (ref 4.4–11.3)
WBC #/AREA URNS AUTO: ABNORMAL /HPF

## 2024-12-04 PROCEDURE — 72131 CT LUMBAR SPINE W/O DYE: CPT

## 2024-12-04 PROCEDURE — 84100 ASSAY OF PHOSPHORUS: CPT

## 2024-12-04 PROCEDURE — 99223 1ST HOSP IP/OBS HIGH 75: CPT | Performed by: INTERNAL MEDICINE

## 2024-12-04 PROCEDURE — 83605 ASSAY OF LACTIC ACID: CPT

## 2024-12-04 PROCEDURE — 93005 ELECTROCARDIOGRAM TRACING: CPT

## 2024-12-04 PROCEDURE — 76377 3D RENDER W/INTRP POSTPROCES: CPT

## 2024-12-04 PROCEDURE — 96374 THER/PROPH/DIAG INJ IV PUSH: CPT

## 2024-12-04 PROCEDURE — 70486 CT MAXILLOFACIAL W/O DYE: CPT

## 2024-12-04 PROCEDURE — 74177 CT ABD & PELVIS W/CONTRAST: CPT

## 2024-12-04 PROCEDURE — 87040 BLOOD CULTURE FOR BACTERIA: CPT | Mod: WESLAB

## 2024-12-04 PROCEDURE — 72128 CT CHEST SPINE W/O DYE: CPT

## 2024-12-04 PROCEDURE — 72125 CT NECK SPINE W/O DYE: CPT

## 2024-12-04 PROCEDURE — 96361 HYDRATE IV INFUSION ADD-ON: CPT

## 2024-12-04 PROCEDURE — 83735 ASSAY OF MAGNESIUM: CPT

## 2024-12-04 PROCEDURE — 71045 X-RAY EXAM CHEST 1 VIEW: CPT

## 2024-12-04 PROCEDURE — 82550 ASSAY OF CK (CPK): CPT

## 2024-12-04 PROCEDURE — 70450 CT HEAD/BRAIN W/O DYE: CPT

## 2024-12-04 PROCEDURE — 84484 ASSAY OF TROPONIN QUANT: CPT

## 2024-12-04 PROCEDURE — 96375 TX/PRO/DX INJ NEW DRUG ADDON: CPT

## 2024-12-04 PROCEDURE — 86901 BLOOD TYPING SEROLOGIC RH(D): CPT

## 2024-12-04 PROCEDURE — G0390 TRAUMA RESPONS W/HOSP CRITI: HCPCS

## 2024-12-04 PROCEDURE — 90471 IMMUNIZATION ADMIN: CPT

## 2024-12-04 PROCEDURE — 99285 EMERGENCY DEPT VISIT HI MDM: CPT | Mod: 25

## 2024-12-04 PROCEDURE — 83880 ASSAY OF NATRIURETIC PEPTIDE: CPT

## 2024-12-04 PROCEDURE — 72170 X-RAY EXAM OF PELVIS: CPT

## 2024-12-04 PROCEDURE — 87636 SARSCOV2 & INF A&B AMP PRB: CPT

## 2024-12-04 PROCEDURE — 2550000001 HC RX 255 CONTRASTS

## 2024-12-04 PROCEDURE — 71045 X-RAY EXAM CHEST 1 VIEW: CPT | Mod: FOREIGN READ | Performed by: RADIOLOGY

## 2024-12-04 PROCEDURE — 80061 LIPID PANEL: CPT | Mod: WESLAB | Performed by: NURSE PRACTITIONER

## 2024-12-04 PROCEDURE — 85018 HEMOGLOBIN: CPT | Performed by: INTERNAL MEDICINE

## 2024-12-04 PROCEDURE — 85025 COMPLETE CBC W/AUTO DIFF WBC: CPT

## 2024-12-04 PROCEDURE — 84146 ASSAY OF PROLACTIN: CPT | Mod: WESLAB | Performed by: INTERNAL MEDICINE

## 2024-12-04 PROCEDURE — 2500000004 HC RX 250 GENERAL PHARMACY W/ HCPCS (ALT 636 FOR OP/ED)

## 2024-12-04 PROCEDURE — 72170 X-RAY EXAM OF PELVIS: CPT | Mod: FOREIGN READ | Performed by: RADIOLOGY

## 2024-12-04 PROCEDURE — G0378 HOSPITAL OBSERVATION PER HR: HCPCS

## 2024-12-04 PROCEDURE — 36415 COLL VENOUS BLD VENIPUNCTURE: CPT

## 2024-12-04 PROCEDURE — 80053 COMPREHEN METABOLIC PANEL: CPT

## 2024-12-04 PROCEDURE — 93010 ELECTROCARDIOGRAM REPORT: CPT | Performed by: INTERNAL MEDICINE

## 2024-12-04 PROCEDURE — 90715 TDAP VACCINE 7 YRS/> IM: CPT

## 2024-12-04 PROCEDURE — 81001 URINALYSIS AUTO W/SCOPE: CPT

## 2024-12-04 PROCEDURE — 96376 TX/PRO/DX INJ SAME DRUG ADON: CPT

## 2024-12-04 RX ORDER — ONDANSETRON HYDROCHLORIDE 2 MG/ML
4 INJECTION, SOLUTION INTRAVENOUS EVERY 8 HOURS PRN
Status: DISCONTINUED | OUTPATIENT
Start: 2024-12-04 | End: 2024-12-10 | Stop reason: HOSPADM

## 2024-12-04 RX ORDER — ONDANSETRON HYDROCHLORIDE 2 MG/ML
4 INJECTION, SOLUTION INTRAVENOUS ONCE
Status: COMPLETED | OUTPATIENT
Start: 2024-12-04 | End: 2024-12-04

## 2024-12-04 RX ORDER — GUAIFENESIN/DEXTROMETHORPHAN 100-10MG/5
5 SYRUP ORAL EVERY 4 HOURS PRN
Status: DISCONTINUED | OUTPATIENT
Start: 2024-12-04 | End: 2024-12-10 | Stop reason: HOSPADM

## 2024-12-04 RX ORDER — ACETAMINOPHEN 160 MG/5ML
650 SOLUTION ORAL EVERY 4 HOURS PRN
Status: DISCONTINUED | OUTPATIENT
Start: 2024-12-04 | End: 2024-12-10 | Stop reason: HOSPADM

## 2024-12-04 RX ORDER — ACETAMINOPHEN 325 MG/1
650 TABLET ORAL EVERY 4 HOURS PRN
Status: DISCONTINUED | OUTPATIENT
Start: 2024-12-04 | End: 2024-12-10 | Stop reason: HOSPADM

## 2024-12-04 RX ORDER — PROCHLORPERAZINE EDISYLATE 5 MG/ML
10 INJECTION INTRAMUSCULAR; INTRAVENOUS ONCE
Status: COMPLETED | OUTPATIENT
Start: 2024-12-04 | End: 2024-12-04

## 2024-12-04 RX ORDER — ACETAMINOPHEN 500 MG
5 TABLET ORAL NIGHTLY PRN
Status: DISCONTINUED | OUTPATIENT
Start: 2024-12-04 | End: 2024-12-10 | Stop reason: HOSPADM

## 2024-12-04 RX ORDER — MORPHINE SULFATE 4 MG/ML
4 INJECTION, SOLUTION INTRAMUSCULAR; INTRAVENOUS ONCE
Status: COMPLETED | OUTPATIENT
Start: 2024-12-04 | End: 2024-12-04

## 2024-12-04 RX ORDER — PANTOPRAZOLE SODIUM 40 MG/10ML
40 INJECTION, POWDER, LYOPHILIZED, FOR SOLUTION INTRAVENOUS 2 TIMES DAILY
Status: DISCONTINUED | OUTPATIENT
Start: 2024-12-04 | End: 2024-12-10 | Stop reason: HOSPADM

## 2024-12-04 RX ORDER — ONDANSETRON 4 MG/1
4 TABLET, ORALLY DISINTEGRATING ORAL EVERY 8 HOURS PRN
Status: DISCONTINUED | OUTPATIENT
Start: 2024-12-04 | End: 2024-12-10 | Stop reason: HOSPADM

## 2024-12-04 RX ORDER — ACETAMINOPHEN 650 MG/1
650 SUPPOSITORY RECTAL EVERY 4 HOURS PRN
Status: DISCONTINUED | OUTPATIENT
Start: 2024-12-04 | End: 2024-12-10 | Stop reason: HOSPADM

## 2024-12-04 RX ADMIN — ONDANSETRON 4 MG: 2 INJECTION INTRAMUSCULAR; INTRAVENOUS at 18:33

## 2024-12-04 RX ADMIN — SODIUM CHLORIDE 500 ML: 9 INJECTION, SOLUTION INTRAVENOUS at 19:24

## 2024-12-04 RX ADMIN — MORPHINE SULFATE 4 MG: 4 INJECTION, SOLUTION INTRAMUSCULAR; INTRAVENOUS at 16:26

## 2024-12-04 RX ADMIN — PROCHLORPERAZINE EDISYLATE 10 MG: 5 INJECTION INTRAMUSCULAR; INTRAVENOUS at 21:41

## 2024-12-04 RX ADMIN — TETANUS TOXOID, REDUCED DIPHTHERIA TOXOID AND ACELLULAR PERTUSSIS VACCINE, ADSORBED 0.5 ML: 5; 2.5; 8; 8; 2.5 SUSPENSION INTRAMUSCULAR at 16:17

## 2024-12-04 RX ADMIN — ONDANSETRON 4 MG: 2 INJECTION INTRAMUSCULAR; INTRAVENOUS at 16:26

## 2024-12-04 RX ADMIN — IOHEXOL 75 ML: 350 INJECTION, SOLUTION INTRAVENOUS at 17:47

## 2024-12-04 RX ADMIN — ONDANSETRON 4 MG: 2 INJECTION INTRAMUSCULAR; INTRAVENOUS at 22:26

## 2024-12-04 ASSESSMENT — LIFESTYLE VARIABLES
HAVE YOU EVER FELT YOU SHOULD CUT DOWN ON YOUR DRINKING: NO
EVER HAD A DRINK FIRST THING IN THE MORNING TO STEADY YOUR NERVES TO GET RID OF A HANGOVER: NO
TOTAL SCORE: 0
HAVE PEOPLE ANNOYED YOU BY CRITICIZING YOUR DRINKING: NO
EVER FELT BAD OR GUILTY ABOUT YOUR DRINKING: NO

## 2024-12-04 ASSESSMENT — PAIN SCALES - GENERAL: PAINLEVEL_OUTOF10: 9

## 2024-12-04 ASSESSMENT — PAIN - FUNCTIONAL ASSESSMENT: PAIN_FUNCTIONAL_ASSESSMENT: 0-10

## 2024-12-04 NOTE — ED TRIAGE NOTES
Pt had a syncopal episode on the toilet and fell and hit his head. Pt has laceration on his nose. Pt also c/o left hip pain. Pt states he has also been vomiting coffee ground emesis all day today. Pt has been on the floor for quite sometime before EMS arrival.

## 2024-12-04 NOTE — H&P
Location [facility]     History Of Present Illness  Chavez Llamas is a 79 y.o. male on day 0 of admission presenting with a chief complaint of syncopal episode today on the toilet with subsequent fall.  When he fell and then when he woke up he woke up on his right side.  Says that he hit his head and left hip when he fell.    Has had a cough for about 2 days and has been running up wet thick lime green secretions.  Denies any angina or shortness of breath. Has been having generalized abdominal pain for the past 2 days that started after he started coughing that he says is feels like sore like he pulled a muscle from the coughing that is his left upper quadrant and right upper quadrant that was sudden, constant, has been the same, without any radiation, upon ER presentation now are both 4-5 that he has never had before.  He does not have any known hernias.  Has never had an ulcer before.  Has never had an EGD.  Said had a colonoscopy many moons ago does not recall when otherwise as screening and was negative.  Denies ever falling or having a single bowel episode before.  Denies straining on the toilet stating that he just sat down.  Has any symptoms immediately before having the syncopal episode.  He definitely hit his head when he fell.  Says that he felt like his had exploded on top before he fell which he is never had before.    Has had nausea intermittently for a couple of weeks that he thinks he gets just when he is hungry.  He says he is very nauseated now.  After he passed out and fell today said he vomited 8 times and it was in the dark so he could not tell if there was any blood in it but he says that the squad said that it was coffee grounds.    States that his bilateral hips hurt the left side being more so than the right.  States that the front of his head and the top of his head hurt.  Denies any pain from anywhere else.  Does not have any diagnoses of poor circulation he is aware of.  Never gets  any swelling in his legs.    Denies any fevers.  That he has had chills and cold sweats at night for 2 days. He denies any dysuria.  Passing gas.  Last bowel movement was yesterday and normal-says he does not know if there was any blood.  Denies any hematuria.    Denies taking any anticoagulants when asked other than stating he is on a baby aspirin however when calling [facility] for his home medication list after I saw the patient he is on Effient.  Please see as below past medical and past surgical history.  Please also see his labs and imaging that are noted as delineated below.    Home medications  Patient doesn't know his home meds and doses and frequencies and daughter at bedside doesn't know other than for humalog and says patient gets all his meds except for humalog from [facility] in [place]-so I called them:  Metoprolol tartrate ER 5 mg once daily  Lantus 20 units twice daily  Atorvastatin 40 mg daily  EFFIENT 10 mg daily  Spironolactone 25 mg every morning  Levothyroxine 50 mcg every morning  Spironolactone 7.5 mg twice a day  Buspar 7.5 mg twice daily  Hydroxyzine 25 mg twice daily  Nitro 0.4 as needed    Humalog that he gets elsewhere    Past Medical History  CAD  MI's x 11 (eleven)  Elevated cholesterol  Hypothyroidism  IDDM    Surgical History  CABG 20+ years ago  Left foot toes amputated about 9 years ago  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  Patient says that he has had a hard  time coming out of anesthesia in the past.  2) General: No significant unintentional weight loss or gain, fevers, weakness, fatigue, appetite loss.  Positive chills   3) HEENT: As per HPI  4) Cardiac: No angina or leg edema.  5) Pulmonary: No asthma, wheezing, sob.  6) GI: As per HPI.   7) Hematologic: No known personal or family history of bleeding diathesis.  8) Endocrine: Positive diabetes and hypothyroidism  9) : No dysuria, hematuria, or frequency.  10) Musculoskeletal: As per HPI  11) Neuro: No history of seizures or strokes.  12) Psych: No anxiety or depression    Physical Exam  Seen with surgeon  1) VS-noted as documented.  2) General-laying on gurney. Has chills. Pleasant and cooperative.  3) Neuro-Awake, alert, oriented to person, place, and time. Speech is normal. Affect is normal. Follows commands appropriately.  4) HEENT-Head normocephalic.  Does have some mild abrasions to the top of his head.  C-collar on.  Brown dried vomit on the right side of his mouth cheek.  Dried blood on his nose.  Conjunctiva pink. Sclera anicteric. MMM.  5) Heart-irregular-EKG being done says A-fib  The monitor says vent rhythm with a rate of 83  Blood pressure on the monitor 104/69 with a MAP of 78  6) Lungs-Clear on room air. Speaks in complete sentences and does not have any accessory muscle use.  7) Extremities-No edema. The patient's extremities are warm and normal color.  Right knee abrasion.  Right shin abrasions x 2.  Well-healed left BKA.  Right lower extremity is a 4 out of 5.  8) Skin-Warm and dry. No diaphoresis or jaundice.  9) Psych-Normal mood. Appropriate affect.   10) Abdomen-Soft. Positive bowel sounds. Non distended.  Diffuse abdominal tenderness.  No obvious hernias    Has some end thoracic tenderness    Daughter and her  came to bedside    Vital signs in last 24 hours:  Temperature:  [35.8 °C (96.4 °F)] 35.8 °C (96.4 °F)  Heart Rate:  [75-87] 78  Respirations:  [16-20] 16  BP: ()/(54-79)  "84/73  Heart Rate:  [75-87]   Temperature:  [35.8 °C (96.4 °F)]   Respirations:  [16-20]   BP: ()/(54-79)   Height:  [172.7 cm (5' 8\")]   Weight:  [68.5 kg (151 lb 0.2 oz)]   Pulse Ox:  [95 %-99 %]      Intake/Output last 3 Shifts:  No intake/output data recorded.    Scheduled medications    Continuous medications    PRN medications    Relevant Results  Results from last 7 days   Lab Units 12/04/24  1623   WBC AUTO x10*3/uL 9.4   HEMOGLOBIN g/dL 11.8*   HEMATOCRIT % 35.8*   PLATELETS AUTO x10*3/uL 113*      Assessment/Plan   Trauma due to syncopal episode with subsequent fall  Patient also shivering with chills  Anion gap 21  TB 1.7  Lactate 7.9    Troponin 24  WBC 9.4  H&H 11.8 and 35.8   Plts 113  Work up for infectious source  Flu A and B negative  COVID-positive  U/A  Await results of today's blood cultures  Await results of all of today's imaging  Note done as H&P instead of consult in case patient gets admitted to us    PMH:  CAD with history of MI's x 11 (eleven)  Patient on Effient   Elevated cholesterol  Hypothyroidism  IDDM    Kimberly Medina, APRN-CNP           "

## 2024-12-04 NOTE — TELEPHONE ENCOUNTER
Spoke with patients daughter to discuss insulin regimen:   - Patient has been taking humalog and lantus   - He last filled humalog at an unknown pharmacy, however it was not [facility], his daughter does not know what dosage he has been taking   - He is using finger prick method to test his BG  - Last A1c was 5.6%, given age and medication regimen this is well below goal   - We will follow up after next A1c (12/10) to make adjustments to medication regimen at this time     Thanks,   Meryl Houston

## 2024-12-04 NOTE — ED PROVIDER NOTES
HPI   Chief Complaint   Patient presents with   • Fall       Patient is an 79-year-old male presents today with a chief complaint of a fall.  Patient has single episode while on the toilet, fell and hit his head, patient has an abrasion to his nose, and complaint of left hip pain, patient is having vomiting, and some coffee-ground emesis.  Patient been on the order for a while for EMS arrived, patient has no headache, no dizziness, states most of the week, normal names of all her, like his prosthesis, loose.  Patient is a limited trauma activation.      History provided by:  Patient and EMS personnel          Patient History   Past Medical History:   Diagnosis Date   • Old myocardial infarction 02/01/2017    Past heart attack     Past Surgical History:   Procedure Laterality Date   • CORONARY ARTERY BYPASS GRAFT  04/14/2015    Coronary Artery Surgery   • MR HEAD ANGIO WO IV CONTRAST  2/23/2022    MR HEAD ANGIO WO IV CONTRAST [facility]     Family History   Problem Relation Name Age of Onset   • Diabetes Mother     • Other (Cerebrovascular Accident) Father       Social History     Tobacco Use   • Smoking status: Former     Types: Cigarettes   • Smokeless tobacco: Never   Vaping Use   • Vaping status: Never Used   Substance Use Topics   • Alcohol use: Not on file   • Drug use: Not on file       Physical Exam   ED Triage Vitals [12/04/24 1557]   Temperature Heart Rate Respirations BP   35.8 °C (96.4 °F) 87 20 130/79      Pulse Ox Temp src Heart Rate Source Patient Position   97 % -- -- --      BP Location FiO2 (%)     -- --       Physical Exam  Vitals and nursing note reviewed. Exam conducted with a chaperone present.   Constitutional:       General: He is not in acute distress.     Appearance: Normal appearance. He is normal weight. He is not ill-appearing, toxic-appearing or diaphoretic.   HENT:      Head: Normocephalic.      Right Ear: Tympanic membrane, ear canal and external ear normal.      Left Ear:  Tympanic membrane, ear canal and external ear normal.      Nose: No congestion or rhinorrhea.      Mouth/Throat:      Mouth: Mucous membranes are moist.      Pharynx: Oropharynx is clear.   Eyes:      Extraocular Movements: Extraocular movements intact.      Conjunctiva/sclera: Conjunctivae normal.      Pupils: Pupils are equal, round, and reactive to light.   Cardiovascular:      Rate and Rhythm: Normal rate and regular rhythm.      Pulses: Normal pulses.      Heart sounds: Normal heart sounds.   Pulmonary:      Effort: Pulmonary effort is normal.      Breath sounds: Normal breath sounds.   Abdominal:      General: Abdomen is flat. Bowel sounds are normal.      Palpations: Abdomen is soft.   Genitourinary:     Penis: Normal.    Musculoskeletal:         General: Normal range of motion.      Cervical back: Normal range of motion and neck supple.   Skin:     General: Skin is warm and dry.      Capillary Refill: Capillary refill takes less than 2 seconds.   Neurological:      General: No focal deficit present.      Mental Status: He is alert and oriented to person, place, and time. Mental status is at baseline.   Psychiatric:         Mood and Affect: Mood normal.         Behavior: Behavior normal.         Thought Content: Thought content normal.         Judgment: Judgment normal.           ED Course & MDM   ED Course as of 12/05/24 0051   Wed Dec 04, 2024   1615 EKG interpreted by myself independently, EKG shows an A-fib, rate of 71 bpm, , , QTc 478, patient has a left bundle branch block, do not appreciate a significant ST elevation depression, negative for acute MI. [RACHAEL]   2100 EKG interpreted by myself independently, EKG shows sinus rhythm with first-degree AV block, rate 91 bpm, AL interval 230, , , QTc 47, patient has a left bundle branch block, no ST elevation or depression, negative for acute MI. [RACHAEL]      ED Course User Index  [RACHAEL] Rubin Vivar,          Diagnoses as of 12/05/24 0051    Fall, initial encounter   Lactic acidosis   COVID-19                 No data recorded                                 Medical Decision Making  Patient seen and evaluated at bedside, patient is in no acute distress.  I will order a CT head, cervical, thoracic,  Facial area, lumbar, CT chest abdomen pelvis, CBC, CMP, troponin, lactic acid, COVID, flu, Zofran, morphine, normal saline.. Differential diagnosis includes but is not limited to vasovagal syncope, ACS, intercranial hemorrhage, cervical fracture, hip fracture, traumatic injury.  Patient's lab work and imaging as below, patient had a significantly elevated lactic acidosis noted, this did improve on repeat, due to patient's COVID-19 diagnoses, generalized weakness, and elevated lactate, patient admitted to the general medicine service for further evaluation and treatment, potential further workup for seizure as cause.    Diagnosis: Fall, 19, lactic acidosis  CT thoracic spine wo IV contrast   Final Result    1. No evidence for acute injury to the chest, abdomen, pelvis,    thoracic, or lumbar spine.    2. There is mild chronic compression of the superior endplate of L4.    Mild degenerative change in the thoracic and lumbar spine.    Signed by Calin Montana MD     CT lumbar spine wo IV contrast   Final Result    1. No evidence for acute injury to the chest, abdomen, pelvis,    thoracic, or lumbar spine.    2. There is mild chronic compression of the superior endplate of L4.    Mild degenerative change in the thoracic and lumbar spine.    Signed by Calin Montana MD     CT chest abdomen pelvis w IV contrast   Final Result    1. No evidence for acute injury to the chest, abdomen, pelvis,    thoracic, or lumbar spine.    2. There is mild chronic compression of the superior endplate of L4.    Mild degenerative change in the thoracic and lumbar spine.    Signed by Calin Montana MD     CT head wo IV contrast   Final Result    No acute intracranial  abnormality.          No evidence of facial bone or cervical spine fracture.                Signed by: Andrez Veloz 12/4/2024 5:35 PM    Dictation workstation:   [id number]     CT maxillofacial bones wo IV contrast   Final Result    No acute intracranial abnormality.          No evidence of facial bone or cervical spine fracture.                Signed by: Andrez Veloz 12/4/2024 5:35 PM    Dictation workstation:   [id number]     CT cervical spine wo IV contrast   Final Result    No acute intracranial abnormality.          No evidence of facial bone or cervical spine fracture.                Signed by: Andrez Veloz 12/4/2024 5:35 PM    Dictation workstation:   [id number]     CT 3D reconstruction   Final Result    1. No evidence for acute injury to the chest, abdomen, pelvis,    thoracic, or lumbar spine.    2. There is mild chronic compression of the superior endplate of L4.    Mild degenerative change in the thoracic and lumbar spine.    Signed by Calin Montana MD     XR pelvis 1-2 views   Final Result    No acute osseous abnormalities.    Signed by Dajuan Hall MD     XR chest 1 view   Final Result    No evidence consolidating infiltrate or effusion.    Findings suggestive of fracture of the right ninth rib.    Signed by Dajuan Hall MD     Transthoracic Echo (TTE) Complete    (Results Pending)  Results for orders placed or performed during the hospital encounter of 12/04/24  -CBC and Auto Differential:   Collection Time: 12/04/24  4:23 PM       Result                      Value             Ref Range           WBC                         9.4               4.4 - 11.3 x*       nRBC                        0.0               0.0 - 0.0 /1*       RBC                         4.11 (L)          4.50 - 5.90 *       Hemoglobin                  11.8 (L)          13.5 - 17.5 *       Hematocrit                  35.8 (L)          41.0 - 52.0 %       MCV                         87                80 - 100 fL         MCH                          28.7              26.0 - 34.0 *       MCHC                        33.0              32.0 - 36.0 *       RDW                         14.9 (H)          11.5 - 14.5 %       Platelets                   113 (L)           150 - 450 x1*       Neutrophils %               88.0              40.0 - 80.0 %       Immature Granulocytes *     0.6               0.0 - 0.9 %         Lymphocytes %               2.7               13.0 - 44.0 %       Monocytes %                 8.5               2.0 - 10.0 %        Eosinophils %               0.0               0.0 - 6.0 %         Basophils %                 0.2               0.0 - 2.0 %         Neutrophils Absolute        8.29 (H)          1.60 - 5.50 *       Immature Granulocytes *     0.06              0.00 - 0.50 *       Lymphocytes Absolute        0.25 (L)          0.80 - 3.00 *       Monocytes Absolute          0.80              0.05 - 0.80 *       Eosinophils Absolute        0.00              0.00 - 0.40 *       Basophils Absolute          0.02              0.00 - 0.10 *  -Phosphorus:   Collection Time: 12/04/24  4:23 PM       Result                      Value             Ref Range           Phosphorus                  5.4 (H)           2.5 - 4.9 mg*  -Magnesium:   Collection Time: 12/04/24  4:23 PM       Result                      Value             Ref Range           Magnesium                   1.87              1.60 - 2.40 *  -Comprehensive metabolic panel:   Collection Time: 12/04/24  4:23 PM       Result                      Value             Ref Range           Glucose                     225 (H)           74 - 99 mg/dL       Sodium                      136               136 - 145 mm*       Potassium                   5.1               3.5 - 5.3 mm*       Chloride                    98                98 - 107 mmo*       Bicarbonate                 22                21 - 32 mmol*       Anion Gap                   21 (H)            10 - 20 mmol*       Urea  Nitrogen               24 (H)            6 - 23 mg/dL        Creatinine                  1.46 (H)          0.50 - 1.30 *       eGFR                        48 (L)            >60 mL/min/1*       Calcium                     9.3               8.6 - 10.3 m*       Albumin                     3.9               3.4 - 5.0 g/*       Alkaline Phosphatase        75                33 - 136 U/L        Total Protein               7.3               6.4 - 8.2 g/*       AST                         12                9 - 39 U/L          Bilirubin, Total            1.7 (H)           0.0 - 1.2 mg*       ALT                         14                10 - 52 U/L    -Lactate:   Collection Time: 12/04/24  4:23 PM       Result                      Value             Ref Range           Lactate                     7.9 (HH)          0.4 - 2.0 mm*  -B-Type Natriuretic Peptide:   Collection Time: 12/04/24  4:23 PM       Result                      Value             Ref Range           BNP                         848 (H)           0 - 99 pg/mL   -Creatine Kinase:   Collection Time: 12/04/24  4:23 PM       Result                      Value             Ref Range           Creatine Kinase             68                0 - 325 U/L    -Type And Screen:   Collection Time: 12/04/24  4:23 PM       Result                      Value             Ref Range           ABO TYPE                    O                                     Rh TYPE                     POS                                   ANTIBODY SCREEN             NEG                              -Troponin I, High Sensitivity, Initial:   Collection Time: 12/04/24  4:23 PM       Result                      Value             Ref Range           Troponin I, High Sensi*     24 (H)            0 - 20 ng/L    -Sars-CoV-2 and Influenza A/B PCR:   Collection Time: 12/04/24  4:27 PM       Result                      Value             Ref Range           Flu A Result                Not Detected      Not Detected         Flu B Result                Not Detected      Not Detected        Coronavirus 2019, PCR       Detected (A)      Not Detected   -Troponin, High Sensitivity, 1 Hour:   Collection Time: 12/04/24  6:58 PM       Result                      Value             Ref Range           Troponin I, High Sensi*     22 (H)            0 - 20 ng/L    -Lactate:   Collection Time: 12/04/24  6:58 PM       Result                      Value             Ref Range           Lactate                     2.6 (H)           0.4 - 2.0 mm*  -Urinalysis with Reflex Culture and Microscopic:   Collection Time: 12/04/24  9:30 PM       Result                      Value             Ref Range           Color, Urine                Light-Yellow      Light-Yellow*       Appearance, Urine           Clear             Clear               Specific Gravity, Urine     >1.050 (N)        1.005 - 1.035       pH, Urine                   5.5               5.0, 5.5, 6.*       Protein, Urine              70 (1+) (A)       NEGATIVE, 10*       Glucose, Urine              100 (1+) (A)      Normal mg/dL        Blood, Urine                0.5 (2+) (A)      NEGATIVE            Ketones, Urine              TRACE (A)         NEGATIVE mg/*       Bilirubin, Urine            NEGATIVE          NEGATIVE            Urobilinogen, Urine         Normal            Normal mg/dL        Nitrite, Urine              NEGATIVE          NEGATIVE            Leukocyte Esterase, Ur*     NEGATIVE          NEGATIVE       -Urinalysis Microscopic:   Collection Time: 12/04/24  9:30 PM       Result                      Value             Ref Range           WBC, Urine                  NONE              1-5, NONE /H*       RBC, Urine                  3-5               NONE, 1-2, 3*       Mucus, Urine                FEW               Reference ra*       Hyaline Casts, Urine        2+ (A)            NONE /LPF      -Troponin I, High Sensitivity:   Collection Time: 12/04/24 11:47 PM       Result                       Value             Ref Range           Troponin I, High Sensi*     192 (HH)          0 - 20 ng/L    -Hemoglobin:   Collection Time: 12/04/24 11:47 PM       Result                      Value             Ref Range           Hemoglobin                  10.6 (L)          13.5 - 17.5 *  .        Procedure  Procedures  Sections of this report were created using voice-to-text technology and may contain errors in translation    Rubin Vivar DO  Emergency Medicine         Rubin Vivar DO  12/05/24 0051

## 2024-12-04 NOTE — CONSULTS
Inpatient consult to Acute Care Surgery  Consult performed by: YANELI Phan-CNP  Consult ordered by: Rubin Vivar, DO      See today's H&P

## 2024-12-04 NOTE — Clinical Note
Vessel: RCA. Balloon inserted. Inflated 15 ml for 30 seconds  Second inflation balloon ruptured up at 15 ml for 20 sec  Balloon removed

## 2024-12-05 ENCOUNTER — APPOINTMENT (OUTPATIENT)
Dept: CARDIOLOGY | Facility: HOSPITAL | Age: 79
DRG: 250 | End: 2024-12-05
Payer: MEDICARE
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
## 2024-12-05 PROBLEM — R11.10 EMESIS: Status: ACTIVE | Noted: 2024-12-05

## 2024-12-05 PROBLEM — I25.10 CAD (CORONARY ARTERY DISEASE): Status: ACTIVE | Noted: 2024-12-05

## 2024-12-05 PROBLEM — U07.1 COVID-19: Status: ACTIVE | Noted: 2024-12-05

## 2024-12-05 PROBLEM — I21.4 NSTEMI (NON-ST ELEVATED MYOCARDIAL INFARCTION) (MULTI): Status: ACTIVE | Noted: 2024-12-05

## 2024-12-05 LAB
ALBUMIN SERPL BCP-MCNC: 3.5 G/DL (ref 3.4–5)
ALP SERPL-CCNC: 64 U/L (ref 33–136)
ALT SERPL W P-5'-P-CCNC: 18 U/L (ref 10–52)
ANION GAP SERPL CALCULATED.3IONS-SCNC: 12 MMOL/L (ref 10–20)
AST SERPL W P-5'-P-CCNC: 36 U/L (ref 9–39)
ATRIAL RATE: 91 BPM
BASOPHILS # BLD AUTO: 0.01 X10*3/UL (ref 0–0.1)
BASOPHILS NFR BLD AUTO: 0.1 %
BILIRUB SERPL-MCNC: 1.1 MG/DL (ref 0–1.2)
BUN SERPL-MCNC: 32 MG/DL (ref 6–23)
CALCIUM SERPL-MCNC: 8.6 MG/DL (ref 8.6–10.3)
CARDIAC TROPONIN I PNL SERPL HS: 192 NG/L (ref 0–20)
CARDIAC TROPONIN I PNL SERPL HS: 4799 NG/L (ref 0–20)
CHLORIDE SERPL-SCNC: 102 MMOL/L (ref 98–107)
CHOLEST SERPL-MCNC: 80 MG/DL (ref 0–199)
CHOLESTEROL/HDL RATIO: 2.9
CO2 SERPL-SCNC: 26 MMOL/L (ref 21–32)
CREAT SERPL-MCNC: 1.39 MG/DL (ref 0.5–1.3)
EGFRCR SERPLBLD CKD-EPI 2021: 51 ML/MIN/1.73M*2
EOSINOPHIL # BLD AUTO: 0 X10*3/UL (ref 0–0.4)
EOSINOPHIL NFR BLD AUTO: 0 %
ERYTHROCYTE [DISTWIDTH] IN BLOOD BY AUTOMATED COUNT: 15.2 % (ref 11.5–14.5)
EST. AVERAGE GLUCOSE BLD GHB EST-MCNC: 111 MG/DL
GLUCOSE BLD MANUAL STRIP-MCNC: 108 MG/DL (ref 74–99)
GLUCOSE BLD MANUAL STRIP-MCNC: 111 MG/DL (ref 74–99)
GLUCOSE BLD MANUAL STRIP-MCNC: 117 MG/DL (ref 74–99)
GLUCOSE BLD MANUAL STRIP-MCNC: 85 MG/DL (ref 74–99)
GLUCOSE SERPL-MCNC: 114 MG/DL (ref 74–99)
HBA1C MFR BLD: 5.5 %
HCT VFR BLD AUTO: 31.1 % (ref 41–52)
HDLC SERPL-MCNC: 27.9 MG/DL
HGB BLD-MCNC: 10.4 G/DL (ref 13.5–17.5)
HOLD SPECIMEN: NORMAL
IMM GRANULOCYTES # BLD AUTO: 0.04 X10*3/UL (ref 0–0.5)
IMM GRANULOCYTES NFR BLD AUTO: 0.5 % (ref 0–0.9)
LDLC SERPL CALC-MCNC: 34 MG/DL
LYMPHOCYTES # BLD AUTO: 0.5 X10*3/UL (ref 0.8–3)
LYMPHOCYTES NFR BLD AUTO: 6.6 %
MCH RBC QN AUTO: 28.5 PG (ref 26–34)
MCHC RBC AUTO-ENTMCNC: 33.4 G/DL (ref 32–36)
MCV RBC AUTO: 85 FL (ref 80–100)
MONOCYTES # BLD AUTO: 0.82 X10*3/UL (ref 0.05–0.8)
MONOCYTES NFR BLD AUTO: 10.9 %
NEUTROPHILS # BLD AUTO: 6.15 X10*3/UL (ref 1.6–5.5)
NEUTROPHILS NFR BLD AUTO: 81.9 %
NON HDL CHOLESTEROL: 52 MG/DL (ref 0–149)
NRBC BLD-RTO: 0 /100 WBCS (ref 0–0)
P AXIS: 67 DEGREES
P OFFSET: 155 MS
P ONSET: 96 MS
PLATELET # BLD AUTO: 102 X10*3/UL (ref 150–450)
POTASSIUM SERPL-SCNC: 4.2 MMOL/L (ref 3.5–5.3)
PR INTERVAL: 230 MS
PROLACTIN SERPL-MCNC: 12.4 UG/L (ref 2–18)
PROLACTIN SERPL-MCNC: 37.1 UG/L (ref 2–18)
PROT SERPL-MCNC: 6.5 G/DL (ref 6.4–8.2)
Q ONSET: 211 MS
Q ONSET: 213 MS
QRS COUNT: 12 BEATS
QRS COUNT: 15 BEATS
QRS DURATION: 158 MS
QRS DURATION: 162 MS
QT INTERVAL: 396 MS
QT INTERVAL: 440 MS
QTC CALCULATION(BAZETT): 478 MS
QTC CALCULATION(BAZETT): 487 MS
QTC FREDERICIA: 455 MS
QTC FREDERICIA: 465 MS
R AXIS: -9 DEGREES
R AXIS: 43 DEGREES
RBC # BLD AUTO: 3.65 X10*6/UL (ref 4.5–5.9)
SODIUM SERPL-SCNC: 136 MMOL/L (ref 136–145)
T AXIS: 166 DEGREES
T AXIS: 175 DEGREES
T OFFSET: 409 MS
T OFFSET: 433 MS
TRIGL SERPL-MCNC: 92 MG/DL (ref 0–149)
VENTRICULAR RATE: 71 BPM
VENTRICULAR RATE: 91 BPM
VLDL: 18 MG/DL (ref 0–40)
WBC # BLD AUTO: 7.5 X10*3/UL (ref 4.4–11.3)

## 2024-12-05 PROCEDURE — 99233 SBSQ HOSP IP/OBS HIGH 50: CPT | Performed by: INTERNAL MEDICINE

## 2024-12-05 PROCEDURE — 2060000001 HC INTERMEDIATE ICU ROOM DAILY

## 2024-12-05 PROCEDURE — 36415 COLL VENOUS BLD VENIPUNCTURE: CPT | Performed by: PSYCHIATRY & NEUROLOGY

## 2024-12-05 PROCEDURE — 99223 1ST HOSP IP/OBS HIGH 75: CPT | Performed by: INTERNAL MEDICINE

## 2024-12-05 PROCEDURE — 83036 HEMOGLOBIN GLYCOSYLATED A1C: CPT | Mod: WESLAB | Performed by: NURSE PRACTITIONER

## 2024-12-05 PROCEDURE — 2500000004 HC RX 250 GENERAL PHARMACY W/ HCPCS (ALT 636 FOR OP/ED): Performed by: INTERNAL MEDICINE

## 2024-12-05 PROCEDURE — 2500000002 HC RX 250 W HCPCS SELF ADMINISTERED DRUGS (ALT 637 FOR MEDICARE OP, ALT 636 FOR OP/ED): Performed by: INTERNAL MEDICINE

## 2024-12-05 PROCEDURE — 84146 ASSAY OF PROLACTIN: CPT | Mod: WESLAB | Performed by: PSYCHIATRY & NEUROLOGY

## 2024-12-05 PROCEDURE — 36415 COLL VENOUS BLD VENIPUNCTURE: CPT | Performed by: INTERNAL MEDICINE

## 2024-12-05 PROCEDURE — 2500000001 HC RX 250 WO HCPCS SELF ADMINISTERED DRUGS (ALT 637 FOR MEDICARE OP): Performed by: INTERNAL MEDICINE

## 2024-12-05 PROCEDURE — 85025 COMPLETE CBC W/AUTO DIFF WBC: CPT | Performed by: INTERNAL MEDICINE

## 2024-12-05 PROCEDURE — 80053 COMPREHEN METABOLIC PANEL: CPT | Performed by: INTERNAL MEDICINE

## 2024-12-05 PROCEDURE — 2500000002 HC RX 250 W HCPCS SELF ADMINISTERED DRUGS (ALT 637 FOR MEDICARE OP, ALT 636 FOR OP/ED): Performed by: NURSE PRACTITIONER

## 2024-12-05 PROCEDURE — 84484 ASSAY OF TROPONIN QUANT: CPT | Performed by: INTERNAL MEDICINE

## 2024-12-05 PROCEDURE — 82947 ASSAY GLUCOSE BLOOD QUANT: CPT

## 2024-12-05 RX ORDER — BUSPIRONE HYDROCHLORIDE 5 MG/1
7.5 TABLET ORAL 2 TIMES DAILY
Status: DISCONTINUED | OUTPATIENT
Start: 2024-12-05 | End: 2024-12-10 | Stop reason: HOSPADM

## 2024-12-05 RX ORDER — SUCRALFATE 1 G/10ML
1 SUSPENSION ORAL EVERY 6 HOURS SCHEDULED
Status: DISCONTINUED | OUTPATIENT
Start: 2024-12-05 | End: 2024-12-10 | Stop reason: HOSPADM

## 2024-12-05 RX ORDER — NITROGLYCERIN 80 MG/1
1 PATCH TRANSDERMAL DAILY
Status: DISCONTINUED | OUTPATIENT
Start: 2024-12-05 | End: 2024-12-07

## 2024-12-05 RX ORDER — DEXTROSE 50 % IN WATER (D50W) INTRAVENOUS SYRINGE
12.5
Status: DISCONTINUED | OUTPATIENT
Start: 2024-12-05 | End: 2024-12-10 | Stop reason: HOSPADM

## 2024-12-05 RX ORDER — DEXTROSE 50 % IN WATER (D50W) INTRAVENOUS SYRINGE
25
Status: DISCONTINUED | OUTPATIENT
Start: 2024-12-05 | End: 2024-12-10 | Stop reason: HOSPADM

## 2024-12-05 RX ORDER — METOPROLOL SUCCINATE 25 MG/1
25 TABLET, EXTENDED RELEASE ORAL DAILY
Status: DISCONTINUED | OUTPATIENT
Start: 2024-12-05 | End: 2024-12-10 | Stop reason: HOSPADM

## 2024-12-05 RX ORDER — MIRTAZAPINE 15 MG/1
15 TABLET, FILM COATED ORAL NIGHTLY
Status: DISCONTINUED | OUTPATIENT
Start: 2024-12-05 | End: 2024-12-10 | Stop reason: HOSPADM

## 2024-12-05 RX ORDER — LEVOTHYROXINE SODIUM 50 UG/1
50 TABLET ORAL DAILY
Status: DISCONTINUED | OUTPATIENT
Start: 2024-12-05 | End: 2024-12-10 | Stop reason: HOSPADM

## 2024-12-05 RX ORDER — ASPIRIN 81 MG/1
81 TABLET ORAL DAILY
Status: DISCONTINUED | OUTPATIENT
Start: 2024-12-05 | End: 2024-12-10 | Stop reason: HOSPADM

## 2024-12-05 RX ORDER — PRASUGREL 10 MG/1
10 TABLET, FILM COATED ORAL DAILY
Status: DISCONTINUED | OUTPATIENT
Start: 2024-12-05 | End: 2024-12-10 | Stop reason: HOSPADM

## 2024-12-05 RX ORDER — ATORVASTATIN CALCIUM 40 MG/1
40 TABLET, FILM COATED ORAL NIGHTLY
Status: DISCONTINUED | OUTPATIENT
Start: 2024-12-05 | End: 2024-12-10 | Stop reason: HOSPADM

## 2024-12-05 RX ORDER — INSULIN LISPRO 100 [IU]/ML
0-10 INJECTION, SOLUTION INTRAVENOUS; SUBCUTANEOUS
Status: DISCONTINUED | OUTPATIENT
Start: 2024-12-05 | End: 2024-12-10 | Stop reason: HOSPADM

## 2024-12-05 RX ADMIN — LEVOTHYROXINE SODIUM 50 MCG: 0.05 TABLET ORAL at 11:02

## 2024-12-05 RX ADMIN — PANTOPRAZOLE SODIUM 40 MG: 40 INJECTION, POWDER, FOR SOLUTION INTRAVENOUS at 20:38

## 2024-12-05 RX ADMIN — METOPROLOL SUCCINATE 25 MG: 25 TABLET, EXTENDED RELEASE ORAL at 11:02

## 2024-12-05 RX ADMIN — SUCRALFATE ORAL SUSPENSION 1 G: 1 SUSPENSION ORAL at 12:28

## 2024-12-05 RX ADMIN — ASPIRIN 81 MG: 81 TABLET, COATED ORAL at 11:02

## 2024-12-05 RX ADMIN — SUCRALFATE ORAL SUSPENSION 1 G: 1 SUSPENSION ORAL at 17:34

## 2024-12-05 RX ADMIN — NITROGLYCERIN 1 PATCH: 0.4 PATCH TRANSDERMAL at 13:51

## 2024-12-05 RX ADMIN — ATORVASTATIN CALCIUM 40 MG: 40 TABLET, FILM COATED ORAL at 20:38

## 2024-12-05 RX ADMIN — ACETAMINOPHEN 650 MG: 325 TABLET ORAL at 20:38

## 2024-12-05 RX ADMIN — ACETAMINOPHEN 650 MG: 325 TABLET ORAL at 16:22

## 2024-12-05 RX ADMIN — MIRTAZAPINE 15 MG: 15 TABLET, FILM COATED ORAL at 20:38

## 2024-12-05 RX ADMIN — PANTOPRAZOLE SODIUM 40 MG: 40 INJECTION, POWDER, FOR SOLUTION INTRAVENOUS at 03:28

## 2024-12-05 RX ADMIN — BUSPIRONE HYDROCHLORIDE 7.5 MG: 5 TABLET ORAL at 20:38

## 2024-12-05 RX ADMIN — PRASUGREL 10 MG: 10 TABLET, FILM COATED ORAL at 11:02

## 2024-12-05 RX ADMIN — PANTOPRAZOLE SODIUM 40 MG: 40 INJECTION, POWDER, FOR SOLUTION INTRAVENOUS at 10:00

## 2024-12-05 SDOH — SOCIAL STABILITY: SOCIAL INSECURITY: WITHIN THE LAST YEAR, HAVE YOU BEEN AFRAID OF YOUR PARTNER OR EX-PARTNER?: NO

## 2024-12-05 SDOH — ECONOMIC STABILITY: INCOME INSECURITY: IN THE PAST 12 MONTHS HAS THE ELECTRIC, GAS, OIL, OR WATER COMPANY THREATENED TO SHUT OFF SERVICES IN YOUR HOME?: NO

## 2024-12-05 SDOH — SOCIAL STABILITY: SOCIAL INSECURITY: WITHIN THE LAST YEAR, HAVE YOU BEEN HUMILIATED OR EMOTIONALLY ABUSED IN OTHER WAYS BY YOUR PARTNER OR EX-PARTNER?: NO

## 2024-12-05 SDOH — SOCIAL STABILITY: SOCIAL INSECURITY
WITHIN THE LAST YEAR, HAVE YOU BEEN RAPED OR FORCED TO HAVE ANY KIND OF SEXUAL ACTIVITY BY YOUR PARTNER OR EX-PARTNER?: NO

## 2024-12-05 SDOH — SOCIAL STABILITY: SOCIAL INSECURITY: HAVE YOU HAD THOUGHTS OF HARMING ANYONE ELSE?: NO

## 2024-12-05 SDOH — ECONOMIC STABILITY: HOUSING INSECURITY: IN THE LAST 12 MONTHS, WAS THERE A TIME WHEN YOU WERE NOT ABLE TO PAY THE MORTGAGE OR RENT ON TIME?: NO

## 2024-12-05 SDOH — ECONOMIC STABILITY: TRANSPORTATION INSECURITY: IN THE PAST 12 MONTHS, HAS LACK OF TRANSPORTATION KEPT YOU FROM MEDICAL APPOINTMENTS OR FROM GETTING MEDICATIONS?: NO

## 2024-12-05 SDOH — ECONOMIC STABILITY: FOOD INSECURITY
WITHIN THE PAST 12 MONTHS, YOU WORRIED THAT YOUR FOOD WOULD RUN OUT BEFORE YOU GOT THE MONEY TO BUY MORE.: PATIENT DECLINED

## 2024-12-05 SDOH — SOCIAL STABILITY: SOCIAL INSECURITY
WITHIN THE LAST YEAR, HAVE YOU BEEN KICKED, HIT, SLAPPED, OR OTHERWISE PHYSICALLY HURT BY YOUR PARTNER OR EX-PARTNER?: NO

## 2024-12-05 SDOH — HEALTH STABILITY: PHYSICAL HEALTH
HOW OFTEN DO YOU NEED TO HAVE SOMEONE HELP YOU WHEN YOU READ INSTRUCTIONS, PAMPHLETS, OR OTHER WRITTEN MATERIAL FROM YOUR DOCTOR OR PHARMACY?: RARELY

## 2024-12-05 SDOH — ECONOMIC STABILITY: FOOD INSECURITY: HOW HARD IS IT FOR YOU TO PAY FOR THE VERY BASICS LIKE FOOD, HOUSING, MEDICAL CARE, AND HEATING?: NOT VERY HARD

## 2024-12-05 SDOH — SOCIAL STABILITY: SOCIAL INSECURITY: ABUSE: ADULT

## 2024-12-05 SDOH — ECONOMIC STABILITY: HOUSING INSECURITY: IN THE PAST 12 MONTHS, HOW MANY TIMES HAVE YOU MOVED WHERE YOU WERE LIVING?: 1

## 2024-12-05 SDOH — SOCIAL STABILITY: SOCIAL NETWORK: HOW OFTEN DO YOU ATTEND MEETINGS OF THE CLUBS OR ORGANIZATIONS YOU BELONG TO?: PATIENT UNABLE TO ANSWER

## 2024-12-05 SDOH — SOCIAL STABILITY: SOCIAL INSECURITY: ARE THERE ANY APPARENT SIGNS OF INJURIES/BEHAVIORS THAT COULD BE RELATED TO ABUSE/NEGLECT?: NO

## 2024-12-05 SDOH — ECONOMIC STABILITY: HOUSING INSECURITY: AT ANY TIME IN THE PAST 12 MONTHS, WERE YOU HOMELESS OR LIVING IN A SHELTER (INCLUDING NOW)?: NO

## 2024-12-05 SDOH — SOCIAL STABILITY: SOCIAL NETWORK: HOW OFTEN DO YOU GET TOGETHER WITH FRIENDS OR RELATIVES?: PATIENT UNABLE TO ANSWER

## 2024-12-05 SDOH — SOCIAL STABILITY: SOCIAL INSECURITY: WERE YOU ABLE TO COMPLETE ALL THE BEHAVIORAL HEALTH SCREENINGS?: YES

## 2024-12-05 SDOH — ECONOMIC STABILITY: FOOD INSECURITY: WITHIN THE PAST 12 MONTHS, THE FOOD YOU BOUGHT JUST DIDN'T LAST AND YOU DIDN'T HAVE MONEY TO GET MORE.: PATIENT DECLINED

## 2024-12-05 SDOH — SOCIAL STABILITY: SOCIAL INSECURITY: DOES ANYONE TRY TO KEEP YOU FROM HAVING/CONTACTING OTHER FRIENDS OR DOING THINGS OUTSIDE YOUR HOME?: NO

## 2024-12-05 SDOH — SOCIAL STABILITY: SOCIAL INSECURITY: ARE YOU MARRIED, WIDOWED, DIVORCED, SEPARATED, NEVER MARRIED, OR LIVING WITH A PARTNER?: WIDOWED

## 2024-12-05 SDOH — SOCIAL STABILITY: SOCIAL INSECURITY: HAVE YOU HAD ANY THOUGHTS OF HARMING ANYONE ELSE?: NO

## 2024-12-05 SDOH — SOCIAL STABILITY: SOCIAL NETWORK: HOW OFTEN DO YOU ATTEND CHURCH OR RELIGIOUS SERVICES?: PATIENT UNABLE TO ANSWER

## 2024-12-05 SDOH — ECONOMIC STABILITY: HOUSING INSECURITY
IN THE LAST 12 MONTHS, WAS THERE A TIME WHEN YOU WERE NOT ABLE TO PAY THE MORTGAGE OR RENT ON TIME?: PATIENT UNABLE TO ANSWER

## 2024-12-05 SDOH — SOCIAL STABILITY: SOCIAL INSECURITY: DO YOU FEEL ANYONE HAS EXPLOITED OR TAKEN ADVANTAGE OF YOU FINANCIALLY OR OF YOUR PERSONAL PROPERTY?: NO

## 2024-12-05 SDOH — SOCIAL STABILITY: SOCIAL INSECURITY: ARE YOU OR HAVE YOU BEEN THREATENED OR ABUSED PHYSICALLY, EMOTIONALLY, OR SEXUALLY BY ANYONE?: NO

## 2024-12-05 SDOH — SOCIAL STABILITY: SOCIAL INSECURITY: DO YOU FEEL UNSAFE GOING BACK TO THE PLACE WHERE YOU ARE LIVING?: NO

## 2024-12-05 SDOH — SOCIAL STABILITY: SOCIAL INSECURITY: HAS ANYONE EVER THREATENED TO HURT YOUR FAMILY OR YOUR PETS?: NO

## 2024-12-05 ASSESSMENT — ENCOUNTER SYMPTOMS
DIARRHEA: 0
SORE THROAT: 0
CONSTIPATION: 0
CHILLS: 0
COUGH: 1
PALPITATIONS: 0
VOMITING: 0
NUMBNESS: 1
SHORTNESS OF BREATH: 0
HEADACHES: 1
NAUSEA: 0
ABDOMINAL PAIN: 0
FEVER: 0

## 2024-12-05 ASSESSMENT — COGNITIVE AND FUNCTIONAL STATUS - GENERAL
DAILY ACTIVITIY SCORE: 18
MOVING TO AND FROM BED TO CHAIR: A LITTLE
PERSONAL GROOMING: A LITTLE
PATIENT BASELINE BEDBOUND: NO
DRESSING REGULAR UPPER BODY CLOTHING: A LITTLE
WALKING IN HOSPITAL ROOM: A LITTLE
MOVING TO AND FROM BED TO CHAIR: A LITTLE
MOVING FROM LYING ON BACK TO SITTING ON SIDE OF FLAT BED WITH BEDRAILS: A LITTLE
EATING MEALS: A LITTLE
MOVING FROM LYING ON BACK TO SITTING ON SIDE OF FLAT BED WITH BEDRAILS: A LITTLE
TURNING FROM BACK TO SIDE WHILE IN FLAT BAD: A LITTLE
CLIMB 3 TO 5 STEPS WITH RAILING: A LITTLE
MOVING FROM LYING ON BACK TO SITTING ON SIDE OF FLAT BED WITH BEDRAILS: A LITTLE
WALKING IN HOSPITAL ROOM: A LITTLE
STANDING UP FROM CHAIR USING ARMS: A LOT
EATING MEALS: A LITTLE
MOBILITY SCORE: 17
MOBILITY SCORE: 18
STANDING UP FROM CHAIR USING ARMS: A LITTLE
TOILETING: A LITTLE
TOILETING: A LITTLE
TURNING FROM BACK TO SIDE WHILE IN FLAT BAD: A LITTLE
DAILY ACTIVITIY SCORE: 24
CLIMB 3 TO 5 STEPS WITH RAILING: A LITTLE
DAILY ACTIVITIY SCORE: 18
DRESSING REGULAR LOWER BODY CLOTHING: A LITTLE
PERSONAL GROOMING: A LITTLE
HELP NEEDED FOR BATHING: A LITTLE
MOVING TO AND FROM BED TO CHAIR: A LITTLE
TURNING FROM BACK TO SIDE WHILE IN FLAT BAD: A LITTLE
DRESSING REGULAR UPPER BODY CLOTHING: A LITTLE
HELP NEEDED FOR BATHING: A LITTLE
MOBILITY SCORE: 18
WALKING IN HOSPITAL ROOM: A LITTLE
DRESSING REGULAR LOWER BODY CLOTHING: A LITTLE
CLIMB 3 TO 5 STEPS WITH RAILING: A LITTLE
STANDING UP FROM CHAIR USING ARMS: A LITTLE

## 2024-12-05 ASSESSMENT — PAIN - FUNCTIONAL ASSESSMENT
PAIN_FUNCTIONAL_ASSESSMENT: 0-10

## 2024-12-05 ASSESSMENT — LIFESTYLE VARIABLES
AUDIT-C TOTAL SCORE: 0
PRESCIPTION_ABUSE_PAST_12_MONTHS: NO
HOW OFTEN DO YOU HAVE 6 OR MORE DRINKS ON ONE OCCASION: NEVER
HOW MANY STANDARD DRINKS CONTAINING ALCOHOL DO YOU HAVE ON A TYPICAL DAY: PATIENT DOES NOT DRINK
AUDIT-C TOTAL SCORE: 0
SKIP TO QUESTIONS 9-10: 1
SUBSTANCE_ABUSE_PAST_12_MONTHS: NO
HOW OFTEN DO YOU HAVE A DRINK CONTAINING ALCOHOL: NEVER

## 2024-12-05 ASSESSMENT — PATIENT HEALTH QUESTIONNAIRE - PHQ9
2. FEELING DOWN, DEPRESSED OR HOPELESS: NOT AT ALL
SUM OF ALL RESPONSES TO PHQ9 QUESTIONS 1 & 2: 0
1. LITTLE INTEREST OR PLEASURE IN DOING THINGS: NOT AT ALL

## 2024-12-05 ASSESSMENT — ACTIVITIES OF DAILY LIVING (ADL)
BATHING: INDEPENDENT
DRESSING YOURSELF: INDEPENDENT
PATIENT'S MEMORY ADEQUATE TO SAFELY COMPLETE DAILY ACTIVITIES?: YES
HEARING - LEFT EAR: FUNCTIONAL
HEARING - RIGHT EAR: FUNCTIONAL
TOILETING: INDEPENDENT
ASSISTIVE_DEVICE: OTHER (COMMENT)
ADEQUATE_TO_COMPLETE_ADL: YES
LACK_OF_TRANSPORTATION: NO
WALKS IN HOME: INDEPENDENT
FEEDING YOURSELF: INDEPENDENT
GROOMING: INDEPENDENT
JUDGMENT_ADEQUATE_SAFELY_COMPLETE_DAILY_ACTIVITIES: YES
LACK_OF_TRANSPORTATION: NO

## 2024-12-05 ASSESSMENT — PAIN SCALES - GENERAL
PAINLEVEL_OUTOF10: 3
PAINLEVEL_OUTOF10: 1
PAINLEVEL_OUTOF10: 0 - NO PAIN
PAINLEVEL_OUTOF10: 0 - NO PAIN
PAINLEVEL_OUTOF10: 3
PAINLEVEL_OUTOF10: 0 - NO PAIN

## 2024-12-05 ASSESSMENT — PAIN DESCRIPTION - DESCRIPTORS
DESCRIPTORS: ACHING

## 2024-12-05 ASSESSMENT — PAIN DESCRIPTION - LOCATION: LOCATION: HEAD

## 2024-12-05 NOTE — ASSESSMENT & PLAN NOTE
"Recorded as \"coffee ground\" by EMS but no further evidence  With the patient being on Effient and aspirin, will treat currently for possible upper GI bleed with IV Protonix  Stat hemoglobin done is lower by about 1.2 g, but would expect this with the degree of fluid resuscitation in the emergency department  Repeat hemoglobin at 06 100.  "

## 2024-12-05 NOTE — PROGRESS NOTES
Chavez Llamas is a 79 y.o. male on day 0 of admission presenting with Syncope and collapse.      Subjective   The patient denies cough chest congestion or chest pain.  He reports he had a cough yesterday with some yellow sputum.       Objective     Last Recorded Vitals  /61 (BP Location: Right arm, Patient Position: Lying)   Pulse 60   Temp 36.5 °C (97.7 °F) (Temporal)   Resp 24   Wt 69.6 kg (153 lb 7 oz)   SpO2 100%   Intake/Output last 3 Shifts:    Intake/Output Summary (Last 24 hours) at 12/5/2024 1350  Last data filed at 12/5/2024 0500  Gross per 24 hour   Intake 500 ml   Output 300 ml   Net 200 ml       Admission Weight  Weight: 68.5 kg (151 lb 0.2 oz) (12/04/24 1559)    Daily Weight  12/05/24 : 69.6 kg (153 lb 7 oz)    Image Results  ECG 12 lead  Sinus rhythm with 1st degree AV block  Left bundle branch block  Abnormal ECG  When compared with ECG of 04-DEC-2024 16:12, (unconfirmed)  Sinus rhythm has replaced Atrial fibrillation  ECG 12 lead  Atrial fibrillation with a competing junctional pacemaker  Left bundle branch block  Abnormal ECG  When compared with ECG of 18-FEB-2022 13:46,  Atrial fibrillation has replaced Sinus rhythm  Left bundle branch block is now Present  Criteria for Anterior infarct are no longer Present      Physical Exam  Constitutional:       Appearance: Normal appearance.   HENT:      Head: Normocephalic and atraumatic.      Nose: Nose normal.      Mouth/Throat:      Mouth: Mucous membranes are moist.      Pharynx: Oropharynx is clear.   Eyes:      Extraocular Movements: Extraocular movements intact.      Conjunctiva/sclera: Conjunctivae normal.      Pupils: Pupils are equal, round, and reactive to light.   Cardiovascular:      Rate and Rhythm: Normal rate and regular rhythm.      Pulses: Normal pulses.      Heart sounds: Normal heart sounds.   Pulmonary:      Effort: Pulmonary effort is normal.      Breath sounds: Normal breath sounds.   Abdominal:      General: Abdomen is  flat. Bowel sounds are normal.      Palpations: Abdomen is soft.      Tenderness: There is no abdominal tenderness.   Musculoskeletal:         General: Normal range of motion.      Cervical back: Normal range of motion and neck supple.   Skin:     General: Skin is warm and dry.   Neurological:      General: No focal deficit present.      Mental Status: He is alert and oriented to person, place, and time.   Psychiatric:         Mood and Affect: Mood normal.         Behavior: Behavior normal.         Thought Content: Thought content normal.         Relevant Results               Assessment/Plan                  Assessment & Plan  Syncope and collapse  Most likely vasovagal or related to non-STEMI with possible arrhythmia.  He will be monitored on telemetry.  Hemodynamic status is normal.  Plan for further evaluation and treatment per cardiology.  Emesis  He has had no recurrent hematemesis or emesis since admission.  He reports he vomited 5 times after he passed out.  This may have been secondary to RCA non-STEMI.  No further GI evaluation or treatment is indicated.  Hemoglobin levels show no significant changes.  COVID-19  Without hypoxemia.    CAD (coronary artery disease)  Clinical course and laboratory studies compatible with non-STEMI.  Plan for further evaluation and treatment and including possible cardiac catheterization per cardiology.  Case reviewed with cardiology and patient in room today.  ACS protocol medications are resumed as ordered.      Fall, initial encounter  Routine fall precautions per nursing protocol  Hyperlipidemia  Statin is continued.  Blood pressure adequately controlled.  Present  Hypertension  Medications are continued.  Vital signs will be monitored and medications adjusted as indicated.  Hypothyroidism  This is controlled by history.  The present medications are continued.  S/P BKA (below knee amputation) unilateral (Multi)  He reports he is trying to locate his left lower extremity  prosthetic.  Fall precautions as above  Type 2 diabetes mellitus without complications (Multi)  Blood sugars adequately controlled.  Continue present treatment with sliding scale coverage while nothing by mouth status awaiting decision on possible left heart catheterization.  NSTEMI (non-ST elevated myocardial infarction) (Multi)    He is having no active anginal symptoms during his hospitalization.  Cardiology is concerned about reocclusion of right coronary artery.  ACS protocol initiated by cardiology with plan for possible heart catheterization after review with interventional cardiologist.  The present treatment is continued.  Plan for further evaluation and treatment per cardiology.              Sedrick Diop MD

## 2024-12-05 NOTE — NURSING NOTE
Four Eye Skin Check completed by Basil OLIVIA and Estevan OLIVIA.     Blanchable redness to sacrum marked to LDA, photo taken, wound was mesasured and assessed. Wound consulted, initiated Nutrition consult if applicable.     Patient also has small cut to right knee, bandaged with mepilex. Small cut to nose from fall PTA.    Airway patent, Nasal mucosa clear. Mouth with normal mucosa. Throat has no vesicles, no oropharyngeal exudates and uvula is midline.

## 2024-12-05 NOTE — ASSESSMENT & PLAN NOTE
79 y.o. male with history of HTN, HLD, DMII, hypothyroid, COPD, CAD (CABG x2), PVD presenting after an episode of syncope.  Episode of loss of consciousness immediately following sudden onset severe occipital headache.  He continues to have a dull headache today as well as notable left upper and lower extremity paresthesias which are new and feeling as though he does not have control of his left hand.  Plan for stroke workup and EEG.  Reassess tomorrow.

## 2024-12-05 NOTE — H&P
"History Of Present Illness  Chavez Llamas is a 79 y.o. male presenting with syncope.  Over the last 2 days, the patient has had a worsening cough productive of yellowish sputum with associated nausea.  Today, the patient was sitting on the toilet, he developed sudden pressure on the back of his head, next thing he remembers is being on the ground.  Per conversation with ER and with daughter, EMS did report \"coffee-ground emesis\" at the house, but cleaned this up beforehand this was not witnessed by anyone; no current emesis while in the emergency department and no melena.  A trauma was called and evaluated by surgery, and CT imaging was largely unremarkable.  COVID did come back positive however, and family asked that remdesivir not be given if it would become necessary.  Of interest, initial lactate was 8.9, repeat was 2.6; family and patient deny any history of seizure disorder.     Past Medical History  He has a past medical history of Old myocardial infarction (02/01/2017).    Surgical History  He has a past surgical history that includes Coronary artery bypass graft (04/14/2015) and MR angio head wo IV contrast (2/23/2022).     Social History  He reports that he has quit smoking. His smoking use included cigarettes. He has never used smokeless tobacco. No history on file for alcohol use and drug use.    Family History  Family History   Problem Relation Name Age of Onset    Diabetes Mother      Other (Cerebrovascular Accident) Father          Allergies  Patient has no known allergies.    Review of Systems  Per HPI  Physical Exam  General: Elderly male, ill-appearing  Eyes: Clear sclera   Neck: No JVD  Cardio: No dedicated stethoscope in room.  Abdomen: Soft nondistended  Skin: Warm dry  Neuro: Oriented x 3.  Skeletal: No bony deformity  Last Recorded Vitals  /65   Pulse 95   Temp 35.8 °C (96.4 °F)   Resp 19   Wt 68.5 kg (151 lb 0.2 oz)   SpO2 97%     Relevant Results      Results for orders placed " or performed during the hospital encounter of 12/04/24 (from the past 24 hours)   CBC and Auto Differential   Result Value Ref Range    WBC 9.4 4.4 - 11.3 x10*3/uL    nRBC 0.0 0.0 - 0.0 /100 WBCs    RBC 4.11 (L) 4.50 - 5.90 x10*6/uL    Hemoglobin 11.8 (L) 13.5 - 17.5 g/dL    Hematocrit 35.8 (L) 41.0 - 52.0 %    MCV 87 80 - 100 fL    MCH 28.7 26.0 - 34.0 pg    MCHC 33.0 32.0 - 36.0 g/dL    RDW 14.9 (H) 11.5 - 14.5 %    Platelets 113 (L) 150 - 450 x10*3/uL    Neutrophils % 88.0 40.0 - 80.0 %    Immature Granulocytes %, Automated 0.6 0.0 - 0.9 %    Lymphocytes % 2.7 13.0 - 44.0 %    Monocytes % 8.5 2.0 - 10.0 %    Eosinophils % 0.0 0.0 - 6.0 %    Basophils % 0.2 0.0 - 2.0 %    Neutrophils Absolute 8.29 (H) 1.60 - 5.50 x10*3/uL    Immature Granulocytes Absolute, Automated 0.06 0.00 - 0.50 x10*3/uL    Lymphocytes Absolute 0.25 (L) 0.80 - 3.00 x10*3/uL    Monocytes Absolute 0.80 0.05 - 0.80 x10*3/uL    Eosinophils Absolute 0.00 0.00 - 0.40 x10*3/uL    Basophils Absolute 0.02 0.00 - 0.10 x10*3/uL   Phosphorus   Result Value Ref Range    Phosphorus 5.4 (H) 2.5 - 4.9 mg/dL   Magnesium   Result Value Ref Range    Magnesium 1.87 1.60 - 2.40 mg/dL   Comprehensive metabolic panel   Result Value Ref Range    Glucose 225 (H) 74 - 99 mg/dL    Sodium 136 136 - 145 mmol/L    Potassium 5.1 3.5 - 5.3 mmol/L    Chloride 98 98 - 107 mmol/L    Bicarbonate 22 21 - 32 mmol/L    Anion Gap 21 (H) 10 - 20 mmol/L    Urea Nitrogen 24 (H) 6 - 23 mg/dL    Creatinine 1.46 (H) 0.50 - 1.30 mg/dL    eGFR 48 (L) >60 mL/min/1.73m*2    Calcium 9.3 8.6 - 10.3 mg/dL    Albumin 3.9 3.4 - 5.0 g/dL    Alkaline Phosphatase 75 33 - 136 U/L    Total Protein 7.3 6.4 - 8.2 g/dL    AST 12 9 - 39 U/L    Bilirubin, Total 1.7 (H) 0.0 - 1.2 mg/dL    ALT 14 10 - 52 U/L   Lactate   Result Value Ref Range    Lactate 7.9 (HH) 0.4 - 2.0 mmol/L   B-Type Natriuretic Peptide   Result Value Ref Range     (H) 0 - 99 pg/mL   Creatine Kinase   Result Value Ref Range     "Creatine Kinase 68 0 - 325 U/L   Type And Screen   Result Value Ref Range    ABO TYPE O     Rh TYPE POS     ANTIBODY SCREEN NEG    Troponin I, High Sensitivity, Initial   Result Value Ref Range    Troponin I, High Sensitivity 24 (H) 0 - 20 ng/L   Sars-CoV-2 and Influenza A/B PCR   Result Value Ref Range    Flu A Result Not Detected Not Detected    Flu B Result Not Detected Not Detected    Coronavirus 2019, PCR Detected (A) Not Detected   Troponin, High Sensitivity, 1 Hour   Result Value Ref Range    Troponin I, High Sensitivity 22 (H) 0 - 20 ng/L   Lactate   Result Value Ref Range    Lactate 2.6 (H) 0.4 - 2.0 mmol/L   Urinalysis with Reflex Culture and Microscopic   Result Value Ref Range    Color, Urine Light-Yellow Light-Yellow, Yellow, Dark-Yellow    Appearance, Urine Clear Clear    Specific Gravity, Urine >1.050 (N) 1.005 - 1.035    pH, Urine 5.5 5.0, 5.5, 6.0, 6.5, 7.0, 7.5, 8.0    Protein, Urine 70 (1+) (A) NEGATIVE, 10 (TRACE), 20 (TRACE) mg/dL    Glucose, Urine 100 (1+) (A) Normal mg/dL    Blood, Urine 0.5 (2+) (A) NEGATIVE    Ketones, Urine TRACE (A) NEGATIVE mg/dL    Bilirubin, Urine NEGATIVE NEGATIVE    Urobilinogen, Urine Normal Normal mg/dL    Nitrite, Urine NEGATIVE NEGATIVE    Leukocyte Esterase, Urine NEGATIVE NEGATIVE   Urinalysis Microscopic   Result Value Ref Range    WBC, Urine NONE 1-5, NONE /HPF    RBC, Urine 3-5 NONE, 1-2, 3-5 /HPF    Mucus, Urine FEW Reference range not established. /LPF    Hyaline Casts, Urine 2+ (A) NONE /LPF   Hemoglobin   Result Value Ref Range    Hemoglobin 10.6 (L) 13.5 - 17.5 g/dL         Assessment/Plan   Assessment & Plan  Syncope and collapse  While on toilet.  Possible vasovagal event versus other.  With the very elevated lactate which cleared quickly with fluids, I do have some suspicion for seizure.  Prolactin ordered.  Monitor on telemetry  Echocardiogram  Neurology and cardiology consults.    Emesis  Recorded as \"coffee ground\" by EMS but no further " evidence  With the patient being on Effient and aspirin, will treat currently for possible upper GI bleed with IV Protonix  Stat hemoglobin done is lower by about 1.2 g, but would expect this with the degree of fluid resuscitation in the emergency department  Repeat hemoglobin at 06 100.  COVID-19  Without hypoxemia.    CAD (coronary artery disease)  While assessing for possible GI bleed, will hold on antiplatelets.        Physical therapy Occupational Therapy.    Discussed CODE STATUS and he states that he would like to be full code.    ADD 1258: Third troponin is estimated at 192.  With concern for GI bleed and are receiving aspirin at least earlier today and on Effient, will hold on heparinization.  Repeat troponin at 05 100 and will time out with the CBC.    ADD 0631: Troponin markedly elevated at 5000.  Getting EKG, reviewed previous which shows left bundle branch block.  No current chest pain and hemodynamically stable.  There is been no evidence of hematemesis while here but there has been a slight hemoglobin drop (possibly hemodilutional, at least partially).  Discussed with Dr. Nuno over the phone, and given some evidence of GI bleed, will hold on anticoagulation.  He will discuss with interventional cardiology on possible catheterization.         Davin Crews,

## 2024-12-05 NOTE — PROGRESS NOTES
"Physical Therapy                 Therapy Communication Note    Patient Name: Chavez Llamas  MRN: [id number]  Department: [address]  Room: 16/16  Today's Date: 12/5/2024     Discipline: Physical Therapy    Missed Visit Reason: Missed Visit Reason: Other (Comment) (Troponin 4,799 12/5/24 at 5:05 (elevated from 192 on 12/4/24 at 23:47);  cardiology consult;  pending cardiology.)    Missed Time: Cancel    Addendum 14:59;  Per physician progress note this date;  \"...Cardiology is concerned about reocclusion of right coronary artery. ACS protocol initiated by cardiology with plan for possible heart catheterization after review with interventional cardiologist.\";  incomplete database;  pending cardiology.      "

## 2024-12-05 NOTE — PROGRESS NOTES
Chavez Llamas is a 79 y.o. male on day 0 of admission presenting with Syncope and collapse.      Subjective   Denies dizziness chest pain or shortness of breath       Objective     Last Recorded Vitals  /52 (BP Location: Right arm, Patient Position: Lying)   Pulse 66   Temp 37 °C (98.6 °F) (Temporal)   Resp (!) 23   Wt 69.6 kg (153 lb 7 oz)   SpO2 98%   Intake/Output last 3 Shifts:    Intake/Output Summary (Last 24 hours) at 12/5/2024 0958  Last data filed at 12/5/2024 0500  Gross per 24 hour   Intake 500 ml   Output 300 ml   Net 200 ml       Admission Weight  Weight: 68.5 kg (151 lb 0.2 oz) (12/04/24 1559)    Daily Weight  12/05/24 : 69.6 kg (153 lb 7 oz)    Image Results  ECG 12 lead  Sinus rhythm with 1st degree AV block  Left bundle branch block  Abnormal ECG  When compared with ECG of 04-DEC-2024 16:12, (unconfirmed)  Sinus rhythm has replaced Atrial fibrillation  ECG 12 lead  Atrial fibrillation with a competing junctional pacemaker  Left bundle branch block  Abnormal ECG  When compared with ECG of 18-FEB-2022 13:46,  Atrial fibrillation has replaced Sinus rhythm  Left bundle branch block is now Present  Criteria for Anterior infarct are no longer Present      Physical Exam  Constitutional:       Appearance: Normal appearance.   HENT:      Head: Normocephalic and atraumatic.      Nose: Nose normal.      Mouth/Throat:      Mouth: Mucous membranes are moist.      Pharynx: Oropharynx is clear.   Eyes:      Extraocular Movements: Extraocular movements intact.      Conjunctiva/sclera: Conjunctivae normal.      Pupils: Pupils are equal, round, and reactive to light.   Cardiovascular:      Rate and Rhythm: Normal rate and regular rhythm.      Pulses: Normal pulses.      Heart sounds: Normal heart sounds.   Pulmonary:      Effort: Pulmonary effort is normal.      Breath sounds: Normal breath sounds.   Abdominal:      General: Abdomen is flat. Bowel sounds are normal.      Palpations: Abdomen is soft.     "  Tenderness: There is no abdominal tenderness.   Musculoskeletal:         General: Normal range of motion.      Cervical back: Normal range of motion and neck supple.   Skin:     General: Skin is warm and dry.   Neurological:      General: No focal deficit present.      Mental Status: He is alert and oriented to person, place, and time.   Psychiatric:         Mood and Affect: Mood normal.         Behavior: Behavior normal.         Thought Content: Thought content normal.         Relevant Results               Assessment/Plan                  Assessment & Plan  Syncope and collapse  While on toilet.  Possible vasovagal event versus other.  With the very elevated lactate which cleared quickly with fluids, I do have some suspicion for seizure.  Prolactin ordered.  Monitor on telemetry  Echocardiogram  Neurology and cardiology consults.    Emesis  Recorded as \"coffee ground\" by EMS but no further evidence  With the patient being on Effient and aspirin, will treat currently for possible upper GI bleed with IV Protonix  Stat hemoglobin done is lower by about 1.2 g, but would expect this with the degree of fluid resuscitation in the emergency department  Repeat hemoglobin at 06 100.  COVID-19  Without hypoxemia.    CAD (coronary artery disease)  While assessing for possible GI bleed, will hold on antiplatelets.      Fall, initial encounter    Fall precautions              Sedrick Diop MD      "

## 2024-12-05 NOTE — NURSING NOTE
Assumed care of patient. Pt currently resting in bed. No complaints voiced at this time. Bed in low and locked position. Call bell and personal items within reach.

## 2024-12-05 NOTE — CONSULTS
Inpatient consult to Cardiology  Consult performed by: Benjamin Nuno MD  Consult ordered by: Davin Crews DO  Reason for consult: syncope          Please see consult note by Dr. Nuno on 12/5/24

## 2024-12-05 NOTE — ASSESSMENT & PLAN NOTE
He has had no recurrent hematemesis or emesis since admission.  He reports he vomited 5 times after he passed out.  This may have been secondary to RCA non-STEMI.  No further GI evaluation or treatment is indicated.  Hemoglobin levels show no significant changes.

## 2024-12-05 NOTE — ASSESSMENT & PLAN NOTE
Blood sugars adequately controlled.  Continue present treatment with sliding scale coverage while nothing by mouth status awaiting decision on possible left heart catheterization.

## 2024-12-05 NOTE — ASSESSMENT & PLAN NOTE
Clinical course and laboratory studies compatible with non-STEMI.  Plan for further evaluation and treatment and including possible cardiac catheterization per cardiology.  Case reviewed with cardiology and patient in room today.  ACS protocol medications are resumed as ordered.

## 2024-12-05 NOTE — CONSULTS
Consults  History Of Present Illness:    Chavez Llamas is a 79 y.o. male presenting with syncope.    The patient is an 79-year-old white male with a relatively extensive past medical history which includes hypertension, hyperlipidemia, type 2 diabetes, hypothyroidism, former smoking, COPD, coronary artery disease and peripheral vascular disease.    The patient has a longstanding history of coronary artery disease and underwent a CABG x 2 with an LIMA to the LAD and SVG to unknown target in the 1990s at [facility].  The patient may have had a PCI procedure at the [facility] subsequent to that.  On 3/12/2010 the patient underwent cardiac catheterization [facility] which showed a 70% distal stenosis of the LMCA and 100% occlusion of the proximal LAD and proximal LCx.  There was a 70% stenosis of the proximal ramus intermedius branch widely patent LIMA graft to the LAD totally occluded saphenous vein graft to unknown target and moderate LV dysfunction ejection fraction 35-40%.  The patient underwent a PCI and bare-metal stent deployment to the mid RCA at that time.  The patient was subsequently underwent a repeat cardiac cath and PCI in 12/1/2020 at [facility].  At that time the patient was noted to have an 80 to 85% stenosis within the previously deployed mid RCA stent with an additional 80% stenosis at the distal edge of the stent.  The distal RCA had 3 tandem lesions ranging between 80 to 95%.  The LIMA to the LAD was widely patent but there was a 50% stenosis of the mid LAD beyond the anastomotic site.  The patient subsequently underwent PCI and stent deployment x 3 to the RCA.  The patient returned to [facility] with accelerated angina acute coronary syndrome for a delayed in-stent thrombosis and on 3/13/2021 the patient had successful balloon angioplasty of the proximal segment of the PDA RCA with a 12 mm balloon and balloon angioplasty of the distal mid and proximal  RCA.  Of note the patient required a temporary transvenous pacemaker at that time for transient third-degree AV block.  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    Patient was in usual state of health until yesterday.  In retrospect he had had 3 days of congested cough but did not feel overtly ill.  He was on the toilet and evidently felt like his head was going to explode and he had a syncopal event falling against the towel rack.  The patient recalls having 6 episodes of vomiting upon returning to consciousness.  According to EMS they may have had a hematemesis although the vomitus had been cleaned before their arrival.  The patient did not recall having any chest discomfort.  The patient was brought by EMS to [facility] where he was tested as positive for COVID-19.  CBC includes a hemoglobin of 11.8 hematocrit 35.8.  The comprehensive metabolic panel notable for creatinine 1.46.  BNP was 848 CK of 68.  Nasal swab negative for influenza A and B and actually was positive for COVID.  The initial high-sensitivity troponin was 22.  The patient's EKG showed sinus rhythm with a left bundle branch block conduction delay.  Review of one of the patient's EKGs indicates the possibility of a transient Mobitz 1 second-degree AV block.  There is a suggestion of possible ischemic ST segment depression in leads I to aVL in the apical precordial leads difficult to discern from his repolarization abnormality from left bundle branch block conduction delay.  The repeat high-sensitivity troponins however have been elevated at 22 then 192 then 4799.  The comprehensive metabolic panel notable for creatinine of 1.39.  CBC includes hematocrit of 31.1 hemoglobin 10.4 most recently with a WBC of 7500.  Occult blood pending.  Patient has been seen by gastroenterology.  Of note the patient did have CT scans of the head cervical spine and  maxillofacial bones all negative for fracture or subdural hematoma.     Last Recorded Vitals:  Vitals:    12/04/24 2345 12/05/24 0045 12/05/24 0237 12/05/24 0440   BP: 112/65 118/59 114/52    BP Location:   Right arm    Patient Position:   Lying    Pulse: 95 82 66    Resp: 19 (!) 23     Temp:   37 °C (98.6 °F)    TempSrc:   Temporal    SpO2: 97% (!) 93% 98%    Weight:    69.6 kg (153 lb 7 oz)   Height:           Last Labs:  CBC - 12/5/2024:  5:05 AM  7.5 10.4 102    31.1      CMP - 12/5/2024:  5:05 AM  8.6 6.5 36 --- 1.1   5.4 3.5 18 64      PTT - No results in last year.  _   _ _     Troponin I, High Sensitivity   Date/Time Value Ref Range Status   12/05/2024 05:05 AM 4,799 (HH) 0 - 20 ng/L Final     Comment:     Previous result verified on 12/5/2024 0033 on specimen/case [id number] called with component [facility] for procedure Troponin I, High Sensitivity with value 192 ng/L.   12/04/2024 11:47  (HH) 0 - 20 ng/L Final   12/04/2024 06:58 PM 22 (H) 0 - 20 ng/L Final     BNP   Date/Time Value Ref Range Status   12/04/2024 04:23  (H) 0 - 99 pg/mL Final     Hemoglobin A1C   Date/Time Value Ref Range Status   06/11/2024 09:29 AM 5.3 see below % Final   06/05/2023 10:02 AM 6.6 (A) % Final     Comment:          Diagnosis of Diabetes-Adults   Non-Diabetic: < or = 5.6%   Increased risk for developing diabetes: 5.7-6.4%   Diagnostic of diabetes: > or = 6.5%  .       Monitoring of Diabetes                Age (y)     Therapeutic Goal (%)   Adults:          >18           <7.0   Pediatrics:    13-18           <7.5                   7-12           <8.0                   0- 6            7.5-8.5   American Diabetes Association. Diabetes Care 33(S1), Jan 2010.   06/01/2022 02:54 PM 4.2 % Final     Comment:          Diagnosis of Diabetes-Adults   Non-Diabetic: < or = 5.6%   Increased risk for developing diabetes: 5.7-6.4%   Diagnostic of diabetes: > or = 6.5%  .       Monitoring of Diabetes                Age (y)      "Therapeutic Goal (%)   Adults:          >18           <7.0   Pediatrics:    13-18           <7.5                   7-12           <8.0                   0- 6            7.5-8.5   American Diabetes Association. Diabetes Care 33(S1), Jan 2010.       LDL Calculated   Date/Time Value Ref Range Status   06/11/2024 09:29 AM 23 <=99 mg/dL Final     Comment:                                 Near   Borderline      AGE      Desirable  Optimal    High     High     Very High     0-19 Y     0 - 109     ---    110-129   >/= 130     ----    20-24 Y     0 - 119     ---    120-159   >/= 160     ----      >24 Y     0 -  99   100-129  130-159   160-189     >/=190     02/23/2022 05:15 AM 22 (L) 65 - 130 MG/DL Final   03/14/2021 04:17 AM 22 (L) 65 - 130 MG/DL Final   12/02/2020 03:37 AM 51 (L) 65 - 130 MG/DL Final     VLDL   Date/Time Value Ref Range Status   06/11/2024 09:29 AM 19 0 - 40 mg/dL Final   06/05/2023 10:02 AM 19 0 - 40 mg/dL Final   09/21/2022 08:32 AM 13 0 - 40 mg/dL Final   09/03/2021 08:50 AM 15 0 - 40 mg/dL Final      Last I/O:  I/O last 3 completed shifts:  In: 500 (7.2 mL/kg) [IV Piggyback:500]  Out: 300 (4.3 mL/kg) [Urine:300 (0.1 mL/kg/hr)]  Weight: 69.6 kg     Past Cardiology Tests (Last 3 Years):  EKG:  ECG 12 lead 12/04/2024 (Preliminary)      ECG 12 lead 12/04/2024 (Preliminary)    Echo:  No results found for this or any previous visit from the past 1095 days.    Ejection Fractions:  No results found for: \"EF\"  Cath:  No results found for this or any previous visit from the past 1095 days.    Stress Test:  No results found for this or any previous visit from the past 1095 days.    Cardiac Imaging:  No results found for this or any previous visit from the past 1095 days.      Past Medical History:  He has a past medical history of Old myocardial infarction (02/01/2017).    Past Surgical History:  He has a past surgical history that includes Coronary artery bypass graft (04/14/2015) and MR angio head wo IV contrast " (2/23/2022).      Social History:  He reports that he has quit smoking. His smoking use included cigarettes. He has never used smokeless tobacco. No history on file for alcohol use and drug use.    Family History:  Family History   Problem Relation Name Age of Onset    Diabetes Mother      Other (Cerebrovascular Accident) Father          Allergies:  Patient has no known allergies.    Inpatient Medications:  Scheduled medications   Medication Dose Route Frequency    aspirin  81 mg oral Daily    atorvastatin  40 mg oral Nightly    busPIRone  7.5 mg oral BID    insulin lispro  0-10 Units subcutaneous TID AC    levothyroxine  50 mcg oral Daily    metoprolol succinate XL  25 mg oral Daily    mirtazapine  15 mg oral Nightly    nitroglycerin  1 patch transdermal Daily    pantoprazole  40 mg intravenous BID    prasugrel  10 mg oral Daily    sucralfate  1 g oral q6h JENNIFER     PRN medications   Medication    acetaminophen    Or    acetaminophen    Or    acetaminophen    benzocaine-menthol    dextromethorphan-guaifenesin    melatonin    ondansetron ODT    Or    ondansetron     Continuous Medications   Medication Dose Last Rate     Outpatient Medications:  Current Outpatient Medications   Medication Instructions    aspirin 81 mg, oral, Daily    atorvastatin (LIPITOR) 40 mg, oral, Nightly    busPIRone (Buspar) 15 mg tablet TAKE 1/2 (ONE-HALF) TABLET BY MOUTH IN THE MORNING AND AT BEDTIME    dicyclomine (Bentyl) 10 mg capsule TAKE 1 CAPSULE BY MOUTH TWICE DAILY AS NEEDED FOR  IBS    hydrOXYzine HCL (Atarax) 25 mg tablet Take one tablet by mouth twice trivedi    hydrOXYzine pamoate (Vistaril) 25 mg capsule Every 8 hours    insulin lispro (HumaLOG) 100 unit/mL injection Inject 20 units subcutaneously once daily    lancets (BD Ultra Fine Lancets) 33 gauge misc Testing T.I.D    Lantus Solostar U-100 Insulin 100 unit/mL (3 mL) pen INJECT 20 UNITS SUBCUTANEOUSLY TWICE DAILY.    levothyroxine (Synthroid, Levoxyl) 50 mcg tablet TAKE 1  "TABLET BY MOUTH ONCE DAILY IN THE MORNING BEFORE MEAL(S) ON AN EMPTY STOMACH    metoprolol succinate XL (TOPROL-XL) 25 mg, oral, Daily    mirtazapine (REMERON) 15 mg, oral, Nightly    nitroglycerin (Nitrodur) 0.4 mg/hr patch USE AS DIRECTED.    pen needle, diabetic 31 gauge x 3/16\" needle Use as directed    prasugrel (EFFIENT) 10 mg, oral, Daily    spironolactone (ALDACTONE) 25 mg, oral, Daily before breakfast       Physical Exam:  The patient is a nonobese balding elderly white male lying supine in bed.  He is awake alert conversant in no overt painful respiratory distress.  JVP not elevated carotid impulses are 2+  Chest fair air movement breath sounds clear anteriorly.  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  No peripheral edema on the right pedal pulses are present     Assessment/Plan   12/5: The patient is an 79-year-old white male with a history of former smoking COPD hypertension hyperlipidemia type 2 diabetes remote CABG x 2 in the 1990s at [facility] with LIMA to LAD and saphenous vein graft to unknown target.  He had a PCI bare-metal stent deployed to the mid RCA in 2010.  In 2020 he had PCI and stent appointment for an in-stent restenosis within the mid RCA.  In 3/2021 he required PTCA for in-stent restenosis/thrombus within the RCA.  At that time he did have some transient complete heart block requiring temporary pacemaker.  Patient is currently admitted with a syncopal event while on the toilet.  Initial possibilities include a simple vasovagal event related to nausea and vomiting with the patient being COVID-positive as well.  However his high-sensitivity troponin is elevated and it is certainly conceivable that he had an episode of myocardial ischemia resulting in vagal reaction and transient bradycardia with syncope.  In this regard he would be at high risk for a recurrent RCA stenosis given the fact that he has had 3 " separate interventional procedures to that vessel.  The patient had been on aspirin and Effient prior to admission both of which have been briefly held.  IV heparin not instituted out of concern initially because for the hematemesis.  This patient may benefit from a relook coronary angiogram to determine whether there is a need for additional PCI to the RCA and will discuss with interventional cardiology.    Peripheral IV 12/04/24 20 G Left Forearm (Active)   Site Assessment Clean;Dry;Intact 12/05/24 1000   Dressing Type Transparent 12/05/24 1000   Line Status Flushed 12/05/24 1000   Cap Change Cap changed 12/05/24 1000   Dressing Status Clean;Dry 12/05/24 1000   Dressing Intervention Other (Comment) 12/05/24 1000   Number of days: 1       Code Status:  Full Code    I spent  minutes in the professional and overall care of this patient.        Benjamin Nuno MD

## 2024-12-05 NOTE — ASSESSMENT & PLAN NOTE
While on toilet.  Possible vasovagal event versus other.  With the very elevated lactate which cleared quickly with fluids, I do have some suspicion for seizure.  Prolactin ordered.  Monitor on telemetry  Echocardiogram  Neurology and cardiology consults.

## 2024-12-05 NOTE — CONSULTS
Inpatient consult to Neurology  Consult performed by: Neyda Lynne, YANELI-CNP  Consult ordered by: Davin Crews DO          History Of Present Illness  Chavez Llamas is a 79 y.o. male with history of HTN, HLD, DMII, hypothyroid, COPD, CAD (CABG x2), PVD presenting after an episode of syncope.   Patient is awake alert and oriented to all tells me that he had a cough for a few days prior to admission however overall felt fine on the day of 12/4 he went to the restroom and was sitting on the toilet, not yet having a bowel movement ,denies bearing down, suddenly felt extreme occipital head pain and the next thing he remembers he was waking up on the floor.  He also tells me that he has new paresthesias to his left hand arm and left leg he feels he does not have good control of his left hand either.  He continues to have a headache today it is dull and not worsening.  Chronic difficulty with his vision no new changes.  CT of the brain and CT of the cervical spine were unremarkable for acute findings  Labs positive COVID  Past Medical History  Past Medical History:   Diagnosis Date    Old myocardial infarction 02/01/2017    Past heart attack     Surgical History  Past Surgical History:   Procedure Laterality Date    CORONARY ARTERY BYPASS GRAFT  04/14/2015    Coronary Artery Surgery    MR HEAD ANGIO WO IV CONTRAST  2/23/2022    MR HEAD ANGIO WO IV CONTRAST [facility]     Social History  Social History     Tobacco Use    Smoking status: Former     Types: Cigarettes    Smokeless tobacco: Never   Vaping Use    Vaping status: Never Used     Allergies  Patient has no known allergies.  Medications Prior to Admission   Medication Sig Dispense Refill Last Dose/Taking    aspirin 81 mg EC tablet Take 1 tablet (81 mg) by mouth once daily. 90 tablet 0     atorvastatin (Lipitor) 40 mg tablet Take 1 tablet (40 mg) by mouth once daily at bedtime. 90 tablet 0     busPIRone (Buspar) 15 mg tablet TAKE 1/2 (ONE-HALF)  "TABLET BY MOUTH IN THE MORNING AND AT BEDTIME 90 tablet 0     dicyclomine (Bentyl) 10 mg capsule TAKE 1 CAPSULE BY MOUTH TWICE DAILY AS NEEDED FOR   capsule 1     hydrOXYzine HCL (Atarax) 25 mg tablet Take one tablet by mouth twice trivedi 180 tablet 0     hydrOXYzine pamoate (Vistaril) 25 mg capsule every 8 hours.       insulin lispro (HumaLOG) 100 unit/mL injection Take as directed per insulin instructions. Inject 20 units subcutaneously once daily       lancets (BD Ultra Fine Lancets) 33 gauge misc Testing T.I.D 100 each 3     Lantus Solostar U-100 Insulin 100 unit/mL (3 mL) pen INJECT 20 UNITS SUBCUTANEOUSLY TWICE DAILY. 36 mL 0     levothyroxine (Synthroid, Levoxyl) 50 mcg tablet TAKE 1 TABLET BY MOUTH ONCE DAILY IN THE MORNING BEFORE MEAL(S) ON AN EMPTY STOMACH 90 tablet 2     metoprolol succinate XL (Toprol-XL) 25 mg 24 hr tablet Take 1 tablet (25 mg) by mouth once daily. 90 tablet 3     mirtazapine (Remeron) 15 mg tablet TAKE 1 TABLET BY MOUTH ONCE DAILY AT BEDTIME 90 tablet 0     nitroglycerin (Nitrodur) 0.4 mg/hr patch USE AS DIRECTED.       pen needle, diabetic 31 gauge x 3/16\" needle Use as directed 100 each 3     prasugrel (Effient) 10 mg tablet Take 1 tablet (10 mg) by mouth once daily. 90 tablet 0     spironolactone (Aldactone) 25 mg tablet Take 1 tablet (25 mg) by mouth once daily in the morning. Take before meals. 90 tablet 1        Review of Systems   Neurological:  Positive for syncope and numbness.       Neurological Exam  Neurologic exam:    Awake alert oriented to all, no dysarthria, no aphasia  Naming repetition intact, speech is fluent  PERRL, EOMI without ptosis or nystagmus, visual fields intact, face symmetrical, tongue midline  Strength in upper and lower extremities is 5/5 ( left BKA)  Sensation is reduced to pinprick to left upper and lower extremities compared to right otherwise intact  FTN intact EMIR intact  Hyporeflexia throughout  Toes downgoing on the right, left BKA  Gait " "not assessed at this time    Physical Exam  Cardiovascular:      Rate and Rhythm: Normal rate.   Pulmonary:      Effort: Pulmonary effort is normal.         Last Recorded Vitals  Blood pressure 114/52, pulse 66, temperature 37 °C (98.6 °F), temperature source Temporal, resp. rate (!) 23, height 1.727 m (5' 8\"), weight 69.6 kg (153 lb 7 oz), SpO2 98%.    Relevant Results                    Ubaldo Coma Scale  Best Eye Response: Spontaneous  Best Verbal Response: Oriented  Best Motor Response: Follows commands  Ubaldo Coma Scale Score: 15                 I have personally reviewed the following imaging results ECG 12 lead    Result Date: 12/5/2024  Sinus rhythm with 1st degree AV block Left bundle branch block Abnormal ECG When compared with ECG of 04-DEC-2024 16:12, (unconfirmed) Sinus rhythm has replaced Atrial fibrillation    ECG 12 lead    Result Date: 12/5/2024  Atrial fibrillation with a competing junctional pacemaker Left bundle branch block Abnormal ECG When compared with ECG of 18-FEB-2022 13:46, Atrial fibrillation has replaced Sinus rhythm Left bundle branch block is now Present Criteria for Anterior infarct are no longer Present    CT chest abdomen pelvis w IV contrast    Result Date: 12/4/2024  STUDY: CT Chest, Abdomen, and Pelvis with IV Contrast, CT Thoracic Spine and Lumbar Spine without IV Contrast; 12/4/2024 5:48 PM. INDICATION: Trauma, fall.  Head injury.  Coffee ground emesis.  Syncope COMPARISON: CT AP 2/18/2022. ACCESSION NUMBER(S): [id number], [id number], [id number], [id number] ORDERING CLINICIAN: GRETEL FALCON TECHNIQUE: CT of the chest, abdomen, and pelvis was performed.  Contiguous axial images were obtained at 3 mm slice thickness through the chest, abdomen, and pelvis.  Coronal and sagittal reconstructions at 3 mm slice thickness were performed.  Omnipaque 350 75 mL was administered intravenously.  Please note that spinal images were generated from the original CT abdomen and pelvis " imaging. FINDINGS: CHEST: MEDIASTINUM: The heart is normal in size without pericardial effusion.  Central vascular structures opacify normally.  LUNGS/PLEURA: There is no pleural effusion, pleural thickening, or pneumothorax. The airways are patent. Lungs are clear without consolidation, interstitial disease, or suspicious nodules. LYMPH NODES: Thoracic lymph nodes are not enlarged. ABDOMEN:  LIVER: No hepatomegaly.  Smooth surface contour.  Normal attenuation.  BILE DUCTS: No intrahepatic or extrahepatic biliary ductal dilatation.  GALLBLADDER: The gallbladder is unremarkable. STOMACH: No abnormalities identified.  PANCREAS: No masses or ductal dilatation.  SPLEEN: No splenomegaly or focal splenic lesion.  ADRENAL GLANDS: No thickening or nodules.  KIDNEYS AND URETERS: Kidneys are normal in size and location.  No renal or ureteral calculi.  PELVIS:  BLADDER: No abnormalities identified.  REPRODUCTIVE ORGANS: No abnormalities identified.  BOWEL: No abnormalities identified.  VESSELS: No abnormalities identified.  Abdominal aorta is normal in caliber.  PERITONEUM/RETROPERITONEUM/LYMPH NODES: No free fluid.  No pneumoperitoneum. No lymphadenopathy.  ABDOMINAL WALL: No abnormalities identified. SOFT TISSUES: No abnormalities identified.  BONES: No acute fracture or aggressive osseous lesion. THORACIC SPINE: There is mild dextroscoliosis. The spine is in otherwise good alignment.  There is no fracture or traumatic subluxation. The vertebral body heights are well maintained.  Disc spaces are preserved.  No significant central canal stenosis is demonstrated. The neural foramina are patent throughout. There is mild degenerative change. The paravertebral soft tissues are within normal limits. LUMBAR SPINE: The alignment is anatomic.  There is mild compression of the superior endplate of L4. This was present on the study dated 2/18/2022 and is not significantly changed. The vertebral body heights are otherwise well  maintained.  Disc spaces are preserved.  No significant central canal stenosis is demonstrated.  The neural foramina are patent throughout.  The paravertebral soft tissues are within normal limits.    1. No evidence for acute injury to the chest, abdomen, pelvis, thoracic, or lumbar spine. 2. There is mild chronic compression of the superior endplate of L4. Mild degenerative change in the thoracic and lumbar spine. Signed by Calin Montana MD    CT thoracic spine wo IV contrast    Result Date: 12/4/2024  STUDY: CT Chest, Abdomen, and Pelvis with IV Contrast, CT Thoracic Spine and Lumbar Spine without IV Contrast; 12/4/2024 5:48 PM. INDICATION: Trauma, fall.  Head injury.  Coffee ground emesis.  Syncope COMPARISON: CT AP 2/18/2022. ACCESSION NUMBER(S): [id number], [id number], [id number], [id number] ORDERING CLINICIAN: GRETEL FALCON TECHNIQUE: CT of the chest, abdomen, and pelvis was performed.  Contiguous axial images were obtained at 3 mm slice thickness through the chest, abdomen, and pelvis.  Coronal and sagittal reconstructions at 3 mm slice thickness were performed.  Omnipaque 350 75 mL was administered intravenously.  Please note that spinal images were generated from the original CT abdomen and pelvis imaging. FINDINGS: CHEST: MEDIASTINUM: The heart is normal in size without pericardial effusion.  Central vascular structures opacify normally.  LUNGS/PLEURA: There is no pleural effusion, pleural thickening, or pneumothorax. The airways are patent. Lungs are clear without consolidation, interstitial disease, or suspicious nodules. LYMPH NODES: Thoracic lymph nodes are not enlarged. ABDOMEN:  LIVER: No hepatomegaly.  Smooth surface contour.  Normal attenuation.  BILE DUCTS: No intrahepatic or extrahepatic biliary ductal dilatation.  GALLBLADDER: The gallbladder is unremarkable. STOMACH: No abnormalities identified.  PANCREAS: No masses or ductal dilatation.  SPLEEN: No splenomegaly or focal splenic lesion.   ADRENAL GLANDS: No thickening or nodules.  KIDNEYS AND URETERS: Kidneys are normal in size and location.  No renal or ureteral calculi.  PELVIS:  BLADDER: No abnormalities identified.  REPRODUCTIVE ORGANS: No abnormalities identified.  BOWEL: No abnormalities identified.  VESSELS: No abnormalities identified.  Abdominal aorta is normal in caliber.  PERITONEUM/RETROPERITONEUM/LYMPH NODES: No free fluid.  No pneumoperitoneum. No lymphadenopathy.  ABDOMINAL WALL: No abnormalities identified. SOFT TISSUES: No abnormalities identified.  BONES: No acute fracture or aggressive osseous lesion. THORACIC SPINE: There is mild dextroscoliosis. The spine is in otherwise good alignment.  There is no fracture or traumatic subluxation. The vertebral body heights are well maintained.  Disc spaces are preserved.  No significant central canal stenosis is demonstrated. The neural foramina are patent throughout. There is mild degenerative change. The paravertebral soft tissues are within normal limits. LUMBAR SPINE: The alignment is anatomic.  There is mild compression of the superior endplate of L4. This was present on the study dated 2/18/2022 and is not significantly changed. The vertebral body heights are otherwise well maintained.  Disc spaces are preserved.  No significant central canal stenosis is demonstrated.  The neural foramina are patent throughout.  The paravertebral soft tissues are within normal limits.    1. No evidence for acute injury to the chest, abdomen, pelvis, thoracic, or lumbar spine. 2. There is mild chronic compression of the superior endplate of L4. Mild degenerative change in the thoracic and lumbar spine. Signed by Calin Montana MD    CT lumbar spine wo IV contrast    Result Date: 12/4/2024  STUDY: CT Chest, Abdomen, and Pelvis with IV Contrast, CT Thoracic Spine and Lumbar Spine without IV Contrast; 12/4/2024 5:48 PM. INDICATION: Trauma, fall.  Head injury.  Coffee ground emesis.  Syncope COMPARISON: CT  AP 2/18/2022. ACCESSION NUMBER(S): [id number], [id number], [id number], [id number] ORDERING CLINICIAN: GRETEL FALCON TECHNIQUE: CT of the chest, abdomen, and pelvis was performed.  Contiguous axial images were obtained at 3 mm slice thickness through the chest, abdomen, and pelvis.  Coronal and sagittal reconstructions at 3 mm slice thickness were performed.  Omnipaque 350 75 mL was administered intravenously.  Please note that spinal images were generated from the original CT abdomen and pelvis imaging. FINDINGS: CHEST: MEDIASTINUM: The heart is normal in size without pericardial effusion.  Central vascular structures opacify normally.  LUNGS/PLEURA: There is no pleural effusion, pleural thickening, or pneumothorax. The airways are patent. Lungs are clear without consolidation, interstitial disease, or suspicious nodules. LYMPH NODES: Thoracic lymph nodes are not enlarged. ABDOMEN:  LIVER: No hepatomegaly.  Smooth surface contour.  Normal attenuation.  BILE DUCTS: No intrahepatic or extrahepatic biliary ductal dilatation.  GALLBLADDER: The gallbladder is unremarkable. STOMACH: No abnormalities identified.  PANCREAS: No masses or ductal dilatation.  SPLEEN: No splenomegaly or focal splenic lesion.  ADRENAL GLANDS: No thickening or nodules.  KIDNEYS AND URETERS: Kidneys are normal in size and location.  No renal or ureteral calculi.  PELVIS:  BLADDER: No abnormalities identified.  REPRODUCTIVE ORGANS: No abnormalities identified.  BOWEL: No abnormalities identified.  VESSELS: No abnormalities identified.  Abdominal aorta is normal in caliber.  PERITONEUM/RETROPERITONEUM/LYMPH NODES: No free fluid.  No pneumoperitoneum. No lymphadenopathy.  ABDOMINAL WALL: No abnormalities identified. SOFT TISSUES: No abnormalities identified.  BONES: No acute fracture or aggressive osseous lesion. THORACIC SPINE: There is mild dextroscoliosis. The spine is in otherwise good alignment.  There is no fracture or traumatic  subluxation. The vertebral body heights are well maintained.  Disc spaces are preserved.  No significant central canal stenosis is demonstrated. The neural foramina are patent throughout. There is mild degenerative change. The paravertebral soft tissues are within normal limits. LUMBAR SPINE: The alignment is anatomic.  There is mild compression of the superior endplate of L4. This was present on the study dated 2/18/2022 and is not significantly changed. The vertebral body heights are otherwise well maintained.  Disc spaces are preserved.  No significant central canal stenosis is demonstrated.  The neural foramina are patent throughout.  The paravertebral soft tissues are within normal limits.    1. No evidence for acute injury to the chest, abdomen, pelvis, thoracic, or lumbar spine. 2. There is mild chronic compression of the superior endplate of L4. Mild degenerative change in the thoracic and lumbar spine. Signed by Calin Montana MD    CT 3D reconstruction    Result Date: 12/4/2024  STUDY: CT Chest, Abdomen, and Pelvis with IV Contrast, CT Thoracic Spine and Lumbar Spine without IV Contrast; 12/4/2024 5:48 PM. INDICATION: Trauma, fall.  Head injury.  Coffee ground emesis.  Syncope COMPARISON: CT AP 2/18/2022. ACCESSION NUMBER(S): [id number], [id number], [id number], [id number] ORDERING CLINICIAN: GRETEL FALCON TECHNIQUE: CT of the chest, abdomen, and pelvis was performed.  Contiguous axial images were obtained at 3 mm slice thickness through the chest, abdomen, and pelvis.  Coronal and sagittal reconstructions at 3 mm slice thickness were performed.  Omnipaque 350 75 mL was administered intravenously.  Please note that spinal images were generated from the original CT abdomen and pelvis imaging. FINDINGS: CHEST: MEDIASTINUM: The heart is normal in size without pericardial effusion.  Central vascular structures opacify normally.  LUNGS/PLEURA: There is no pleural effusion, pleural thickening, or  pneumothorax. The airways are patent. Lungs are clear without consolidation, interstitial disease, or suspicious nodules. LYMPH NODES: Thoracic lymph nodes are not enlarged. ABDOMEN:  LIVER: No hepatomegaly.  Smooth surface contour.  Normal attenuation.  BILE DUCTS: No intrahepatic or extrahepatic biliary ductal dilatation.  GALLBLADDER: The gallbladder is unremarkable. STOMACH: No abnormalities identified.  PANCREAS: No masses or ductal dilatation.  SPLEEN: No splenomegaly or focal splenic lesion.  ADRENAL GLANDS: No thickening or nodules.  KIDNEYS AND URETERS: Kidneys are normal in size and location.  No renal or ureteral calculi.  PELVIS:  BLADDER: No abnormalities identified.  REPRODUCTIVE ORGANS: No abnormalities identified.  BOWEL: No abnormalities identified.  VESSELS: No abnormalities identified.  Abdominal aorta is normal in caliber.  PERITONEUM/RETROPERITONEUM/LYMPH NODES: No free fluid.  No pneumoperitoneum. No lymphadenopathy.  ABDOMINAL WALL: No abnormalities identified. SOFT TISSUES: No abnormalities identified.  BONES: No acute fracture or aggressive osseous lesion. THORACIC SPINE: There is mild dextroscoliosis. The spine is in otherwise good alignment.  There is no fracture or traumatic subluxation. The vertebral body heights are well maintained.  Disc spaces are preserved.  No significant central canal stenosis is demonstrated. The neural foramina are patent throughout. There is mild degenerative change. The paravertebral soft tissues are within normal limits. LUMBAR SPINE: The alignment is anatomic.  There is mild compression of the superior endplate of L4. This was present on the study dated 2/18/2022 and is not significantly changed. The vertebral body heights are otherwise well maintained.  Disc spaces are preserved.  No significant central canal stenosis is demonstrated.  The neural foramina are patent throughout.  The paravertebral soft tissues are within normal limits.    1. No evidence for  acute injury to the chest, abdomen, pelvis, thoracic, or lumbar spine. 2. There is mild chronic compression of the superior endplate of L4. Mild degenerative change in the thoracic and lumbar spine. Signed by Calin Montana MD    CT head wo IV contrast    Result Date: 12/4/2024  Interpreted By:  Andrez Veloz, STUDY: CT HEAD WO IV CONTRAST; CT CERVICAL SPINE WO IV CONTRAST; CT FACIAL BONES WO IV CONTRAST;  12/4/2024 5:22 pm   INDICATION: Signs/Symptoms:fall, struck face; Signs/Symptoms:fall, struck head, +loc. Altered mental status   COMPARISON: None.   ACCESSION NUMBER(S): [id number]; [id number]; [id number]   ORDERING CLINICIAN: GRETEL FALCON   TECHNIQUE: Axial noncontrast CT images of the head, face, and cervical spine. 3D volume rendering of the facial bones was obtained on a separate workstation also reviewed.   FINDINGS: BRAIN PARENCHYMA: Gray-white matter interfaces are preserved. No mass, mass effect or midline shift. Scattered periventricular and deep white matter hypodensities suggestive of mild chronic microvascular ischemic change.   HEMORRHAGE: No acute intracranial hemorrhage. VENTRICLES and EXTRA-AXIAL SPACES: No hydrocephalus. No extra-axial collections. Mild generalized cerebral volume loss and associated ventricular and sulcal enlargement. EXTRACRANIAL SOFT TISSUES: Unremarkable. CALVARIUM: No depressed calvarial fracture.   FACIAL BONES: No acute facial bone fracture. There is absent dentition. SOFT TISSUES: No evidence of large soft tissue hematoma. Extensive bilateral facial vascular calcifications, which may be seen with diabetes or chronic renal disease. PARANASAL SINUSES: No hemorrhage in the paranasal sinuses. Mild polypoid mucosal thickening of the bilateral frontal sinuses, ethmoid air cells, and maxillary sinuses. MASTOIDS: Within normal limits. ORBITS: The globes, extraocular muscles and optic nerve sheath complexes are symmetric. No retrobulbar hematoma.   ALIGNMENT: Normal cervical  lordosis. VERTEBRAE: No acute fracture. Vertebral body heights are maintained. There are no suspicious osseous lesions. Degenerative disc space narrowing and mild posterolateral osseous spurring at C4-C5 through C6-C7 contributing to mild bilateral foraminal stenosis at this levels.. SPINAL CANAL: Small calcified central disc protrusion contributing to mild-to-moderate central canal stenosis at C3-C4 additionally small disc osteophyte complexes at. PREVERTEBRAL SOFT TISSUES: No prevertebral soft tissue swelling. LUNG APICES: Partially visualized emphysematous changes in the lung apices with asymmetric bronchiectasis and presumed scarring in the left lung apex. Please see separately dictated chest CT.   OTHER FINDINGS: None.         No acute intracranial abnormality.   No evidence of facial bone or cervical spine fracture.     Signed by: Andrez Veloz 12/4/2024 5:35 PM Dictation workstation:   [id number]    CT maxillofacial bones wo IV contrast    Result Date: 12/4/2024  Interpreted By:  Adnrez Veloz, STUDY: CT HEAD WO IV CONTRAST; CT CERVICAL SPINE WO IV CONTRAST; CT FACIAL BONES WO IV CONTRAST;  12/4/2024 5:22 pm   INDICATION: Signs/Symptoms:fall, struck face; Signs/Symptoms:fall, struck head, +loc. Altered mental status   COMPARISON: None.   ACCESSION NUMBER(S): [id number]; [id number]; [id number]   ORDERING CLINICIAN: GRETEL FALCON   TECHNIQUE: Axial noncontrast CT images of the head, face, and cervical spine. 3D volume rendering of the facial bones was obtained on a separate workstation also reviewed.   FINDINGS: BRAIN PARENCHYMA: Gray-white matter interfaces are preserved. No mass, mass effect or midline shift. Scattered periventricular and deep white matter hypodensities suggestive of mild chronic microvascular ischemic change.   HEMORRHAGE: No acute intracranial hemorrhage. VENTRICLES and EXTRA-AXIAL SPACES: No hydrocephalus. No extra-axial collections. Mild generalized cerebral volume loss and  associated ventricular and sulcal enlargement. EXTRACRANIAL SOFT TISSUES: Unremarkable. CALVARIUM: No depressed calvarial fracture.   FACIAL BONES: No acute facial bone fracture. There is absent dentition. SOFT TISSUES: No evidence of large soft tissue hematoma. Extensive bilateral facial vascular calcifications, which may be seen with diabetes or chronic renal disease. PARANASAL SINUSES: No hemorrhage in the paranasal sinuses. Mild polypoid mucosal thickening of the bilateral frontal sinuses, ethmoid air cells, and maxillary sinuses. MASTOIDS: Within normal limits. ORBITS: The globes, extraocular muscles and optic nerve sheath complexes are symmetric. No retrobulbar hematoma.   ALIGNMENT: Normal cervical lordosis. VERTEBRAE: No acute fracture. Vertebral body heights are maintained. There are no suspicious osseous lesions. Degenerative disc space narrowing and mild posterolateral osseous spurring at C4-C5 through C6-C7 contributing to mild bilateral foraminal stenosis at this levels.. SPINAL CANAL: Small calcified central disc protrusion contributing to mild-to-moderate central canal stenosis at C3-C4 additionally small disc osteophyte complexes at. PREVERTEBRAL SOFT TISSUES: No prevertebral soft tissue swelling. LUNG APICES: Partially visualized emphysematous changes in the lung apices with asymmetric bronchiectasis and presumed scarring in the left lung apex. Please see separately dictated chest CT.   OTHER FINDINGS: None.         No acute intracranial abnormality.   No evidence of facial bone or cervical spine fracture.     Signed by: Andrez Veloz 12/4/2024 5:35 PM Dictation workstation:   [id number]    CT cervical spine wo IV contrast    Result Date: 12/4/2024  Interpreted By:  Andrez Veloz, STUDY: CT HEAD WO IV CONTRAST; CT CERVICAL SPINE WO IV CONTRAST; CT FACIAL BONES WO IV CONTRAST;  12/4/2024 5:22 pm   INDICATION: Signs/Symptoms:fall, struck face; Signs/Symptoms:fall, struck head, +loc. Altered mental  status   COMPARISON: None.   ACCESSION NUMBER(S): [id number]; [id number]; [id number]   ORDERING CLINICIAN: GRETEL FALCON   TECHNIQUE: Axial noncontrast CT images of the head, face, and cervical spine. 3D volume rendering of the facial bones was obtained on a separate workstation also reviewed.   FINDINGS: BRAIN PARENCHYMA: Gray-white matter interfaces are preserved. No mass, mass effect or midline shift. Scattered periventricular and deep white matter hypodensities suggestive of mild chronic microvascular ischemic change.   HEMORRHAGE: No acute intracranial hemorrhage. VENTRICLES and EXTRA-AXIAL SPACES: No hydrocephalus. No extra-axial collections. Mild generalized cerebral volume loss and associated ventricular and sulcal enlargement. EXTRACRANIAL SOFT TISSUES: Unremarkable. CALVARIUM: No depressed calvarial fracture.   FACIAL BONES: No acute facial bone fracture. There is absent dentition. SOFT TISSUES: No evidence of large soft tissue hematoma. Extensive bilateral facial vascular calcifications, which may be seen with diabetes or chronic renal disease. PARANASAL SINUSES: No hemorrhage in the paranasal sinuses. Mild polypoid mucosal thickening of the bilateral frontal sinuses, ethmoid air cells, and maxillary sinuses. MASTOIDS: Within normal limits. ORBITS: The globes, extraocular muscles and optic nerve sheath complexes are symmetric. No retrobulbar hematoma.   ALIGNMENT: Normal cervical lordosis. VERTEBRAE: No acute fracture. Vertebral body heights are maintained. There are no suspicious osseous lesions. Degenerative disc space narrowing and mild posterolateral osseous spurring at C4-C5 through C6-C7 contributing to mild bilateral foraminal stenosis at this levels.. SPINAL CANAL: Small calcified central disc protrusion contributing to mild-to-moderate central canal stenosis at C3-C4 additionally small disc osteophyte complexes at. PREVERTEBRAL SOFT TISSUES: No prevertebral soft tissue swelling. LUNG APICES:  Partially visualized emphysematous changes in the lung apices with asymmetric bronchiectasis and presumed scarring in the left lung apex. Please see separately dictated chest CT.   OTHER FINDINGS: None.         No acute intracranial abnormality.   No evidence of facial bone or cervical spine fracture.     Signed by: Andrez Veloz 12/4/2024 5:35 PM Dictation workstation:   [id number]    XR chest 1 view    Result Date: 12/4/2024  STUDY: Chest Radiograph;  12/4/24, 4:14PM. INDICATION: Fall. COMPARISON: XR Chest 3/15/21. ACCESSION NUMBER(S): [id number] ORDERING CLINICIAN: GRETEL FALCON TECHNIQUE:  Frontal chest was obtained at 16:13 hours. FINDINGS: The patient status post median sternotomy. CARDIOMEDIASTINAL SILHOUETTE: Cardiomediastinal silhouette is normal in size and configuration.  LUNGS: Lungs are clear.  ABDOMEN: No remarkable upper abdominal findings.  BONES: There is a lucency through the anterior lateral right ninth rib and a nondisplaced fracture cannot be excluded.    No evidence consolidating infiltrate or effusion. Findings suggestive of fracture of the right ninth rib. Signed by Dajuan Hall MD    XR pelvis 1-2 views    Result Date: 12/4/2024  STUDY: Pelvis Radiographs; 12/04/2024 4:13 PM INDICATION: Fall. COMPARISON: 02/18/2022 CT Abdomen and Pelvis. ACCESSION NUMBER(S): [id number] ORDERING CLINICIAN: GRETEL FALCON TECHNIQUE:  One view of the pelvis. FINDINGS:  There are degenerative change the facet joints of the lower lumbosacral spine.  The pelvic ring is intact.  There is no acute fracture.      No acute osseous abnormalities. Signed by Dajuan Hall MD  .      Assessment/Plan   Assessment & Plan  Syncope and collapse  79 y.o. male with history of HTN, HLD, DMII, hypothyroid, COPD, CAD (CABG x2), PVD presenting after an episode of syncope.  Episode of loss of consciousness immediately following sudden onset severe occipital headache.  He continues to have a dull headache today as well as notable left  upper and lower extremity paresthesias which are new and feeling as though he does not have control of his left hand.  Plan for stroke workup and EEG.  Reassess tomorrow.  Emesis    COVID-19    CAD (coronary artery disease)    Fall, initial encounter                 YANELI Mcneil-CNP

## 2024-12-05 NOTE — CONSULTS
Inpatient consult to Infectious Diseases  Consult performed by: Ludwig Malik MD  Consult ordered by: Davin Crews DO            Primary MD: Christopher D'Amico, DO    Reason For Consult  Coronavirus infection    History Of Present Illness  Chavez Llamas is a 79 y.o. male presenting with syncopal episode.  He went to the restroom and found himself waking up on the floor.  He reports having mild nonproductive cough a couple of days prior to presentation.  He came to the hospital for further evaluation management.  He reports headaches but no change in vision.  He has had normal oxygen saturation.  Chest imaging was negative for pneumonia.  I was notified that family does not want remdesivir.  His main complaint is headaches  He also complains of nausea, but denies any abdominal pain or diarrhea.  He denies any fever or chills     Past Medical History  He has a past medical history of Old myocardial infarction (02/01/2017).    Surgical History  He has a past surgical history that includes Coronary artery bypass graft (04/14/2015) and MR angio head wo IV contrast (2/23/2022).     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     Immunization History   Administered Date(s) Administered    Flu vaccine, trivalent, preservative free, HIGH-DOSE, age 65y+ (Fluzone) 10/19/2016, 09/13/2017, 09/28/2018, 10/03/2019,  10/02/2020, 09/03/2021, 09/21/2022    Influenza, Unspecified 09/25/2009, 11/09/2010, 10/11/2012, 09/25/2013, 09/21/2022    Influenza, seasonal, injectable 10/05/2015    Moderna SARS-CoV-2 Vaccination 02/28/2021, 03/26/2021    Pneumococcal Conjugate, Unspecified 10/19/2016    Pneumococcal conjugate vaccine, 13-valent (PREVNAR 13) 10/19/2016    Pneumococcal polysaccharide vaccine, 23-valent, age 2 years and older (PNEUMOVAX 23) 12/21/2017    Tdap vaccine, age 7 year and older (BOOSTRIX, ADACEL) 12/04/2024     Medications  Home medications:  Medications Prior to Admission   Medication Sig Dispense Refill Last Dose/Taking    aspirin 81 mg EC tablet Take 1 tablet (81 mg) by mouth once daily. 90 tablet 0     atorvastatin (Lipitor) 40 mg tablet Take 1 tablet (40 mg) by mouth once daily at bedtime. 90 tablet 0     busPIRone (Buspar) 15 mg tablet TAKE 1/2 (ONE-HALF) TABLET BY MOUTH IN THE MORNING AND AT BEDTIME 90 tablet 0     dicyclomine (Bentyl) 10 mg capsule TAKE 1 CAPSULE BY MOUTH TWICE DAILY AS NEEDED FOR   capsule 1     hydrOXYzine HCL (Atarax) 25 mg tablet Take one tablet by mouth twice trivedi 180 tablet 0     hydrOXYzine pamoate (Vistaril) 25 mg capsule every 8 hours.       insulin lispro (HumaLOG) 100 unit/mL injection Take as directed per insulin instructions. Inject 20 units subcutaneously once daily       lancets (BD Ultra Fine Lancets) 33 gauge misc Testing T.I.D 100 each 3     Lantus Solostar U-100 Insulin 100 unit/mL (3 mL) pen INJECT 20 UNITS SUBCUTANEOUSLY TWICE DAILY. 36 mL 0     levothyroxine (Synthroid, Levoxyl) 50 mcg tablet TAKE 1 TABLET BY MOUTH ONCE DAILY IN THE MORNING BEFORE MEAL(S) ON AN EMPTY STOMACH 90 tablet 2     metoprolol succinate XL (Toprol-XL) 25 mg 24 hr tablet Take 1 tablet (25 mg) by mouth once daily. 90 tablet 3     mirtazapine (Remeron) 15 mg tablet TAKE 1 TABLET BY MOUTH ONCE DAILY AT BEDTIME 90 tablet 0     nitroglycerin (Nitrodur) 0.4 mg/hr patch USE AS DIRECTED.       pen  "needle, diabetic 31 gauge x 3/16\" needle Use as directed 100 each 3     prasugrel (Effient) 10 mg tablet Take 1 tablet (10 mg) by mouth once daily. 90 tablet 0     spironolactone (Aldactone) 25 mg tablet Take 1 tablet (25 mg) by mouth once daily in the morning. Take before meals. 90 tablet 1      Current medications:  Scheduled medications  aspirin, 81 mg, oral, Daily  atorvastatin, 40 mg, oral, Nightly  busPIRone, 7.5 mg, oral, BID  insulin lispro, 0-10 Units, subcutaneous, TID AC  levothyroxine, 50 mcg, oral, Daily  metoprolol succinate XL, 25 mg, oral, Daily  mirtazapine, 15 mg, oral, Nightly  nitroglycerin, 1 patch, transdermal, Daily  pantoprazole, 40 mg, intravenous, BID  prasugrel, 10 mg, oral, Daily  sucralfate, 1 g, oral, q6h JENNIFER      Continuous medications     PRN medications  PRN medications: acetaminophen **OR** acetaminophen **OR** acetaminophen, benzocaine-menthol, dextromethorphan-guaifenesin, melatonin, ondansetron ODT **OR** ondansetron    Review of Systems   Constitutional:  Negative for chills and fever.   HENT:  Negative for sore throat.    Respiratory:  Positive for cough. Negative for shortness of breath.    Cardiovascular:  Negative for chest pain, palpitations and leg swelling.   Gastrointestinal:  Negative for abdominal pain, constipation, diarrhea, nausea and vomiting.   Neurological:  Positive for syncope and headaches.   All other systems reviewed and are negative.       Objective  Range of Vitals (last 24 hours)  Heart Rate:  [48-95]   Temp:  [35.8 °C (96.4 °F)-37 °C (98.6 °F)]   Resp:  [7-27]   BP: ()/()   Height:  [172.7 cm (5' 8\")]   Weight:  [68.5 kg (151 lb 0.2 oz)-69.6 kg (153 lb 7 oz)]   SpO2:  [77 %-100 %]   Daily Weight  12/05/24 : 69.6 kg (153 lb 7 oz)    Body mass index is 23.33 kg/m².     Physical Exam  Constitutional:       Appearance: Normal appearance.   HENT:      Head: Normocephalic and atraumatic.      Nose: Nose normal.   Eyes:      General: No scleral " icterus.     Extraocular Movements: Extraocular movements intact.      Conjunctiva/sclera: Conjunctivae normal.   Cardiovascular:      Rate and Rhythm: Normal rate and regular rhythm.      Heart sounds: Normal heart sounds.   Pulmonary:      Effort: Pulmonary effort is normal.      Breath sounds: Normal breath sounds.   Abdominal:      General: Bowel sounds are normal.      Palpations: Abdomen is soft.      Tenderness: There is no abdominal tenderness.   Musculoskeletal:      Cervical back: Normal range of motion and neck supple.      Right lower leg: No edema.      Left lower leg: No edema.   Skin:     General: Skin is warm and dry.   Neurological:      Mental Status: He is alert and oriented to person, place, and time.   Psychiatric:         Behavior: Behavior normal.          Relevant Results  Outside Hospital Results    Labs  Results from last 72 hours   Lab Units 12/05/24  0505 12/04/24  2347 12/04/24  1623   WBC AUTO x10*3/uL 7.5  --  9.4   HEMOGLOBIN g/dL 10.4* 10.6* 11.8*   HEMATOCRIT % 31.1*  --  35.8*   PLATELETS AUTO x10*3/uL 102*  --  113*   NEUTROS PCT AUTO % 81.9  --  88.0   LYMPHS PCT AUTO % 6.6  --  2.7   MONOS PCT AUTO % 10.9  --  8.5   EOS PCT AUTO % 0.0  --  0.0     Results from last 72 hours   Lab Units 12/05/24  0505 12/04/24  1623   SODIUM mmol/L 136 136   POTASSIUM mmol/L 4.2 5.1   CHLORIDE mmol/L 102 98   CO2 mmol/L 26 22   BUN mg/dL 32* 24*   CREATININE mg/dL 1.39* 1.46*   GLUCOSE mg/dL 114* 225*   CALCIUM mg/dL 8.6 9.3   ANION GAP mmol/L 12 21*   EGFR mL/min/1.73m*2 51* 48*   PHOSPHORUS mg/dL  --  5.4*     Results from last 72 hours   Lab Units 12/05/24  0505 12/04/24  1623   ALK PHOS U/L 64 75   BILIRUBIN TOTAL mg/dL 1.1 1.7*   PROTEIN TOTAL g/dL 6.5 7.3   ALT U/L 18 14   AST U/L 36 12   ALBUMIN g/dL 3.5 3.9     Estimated Creatinine Clearance: 41 mL/min (A) (by C-G formula based on SCr of 1.39 mg/dL (H)).  CRP   Date Value Ref Range Status   01/31/2022 15.3 (H) 0 - 2.0 MG/DL Final      "Comment:     Performed at [address] [address]     Sedimentation Rate   Date Value Ref Range Status   01/31/2022 37 (H) 0 - 12 MM/HR Final     Comment:     Performed at [address] [address]     No results found for: \"HIV1X2\", \"HIVCONF\", \"XJZPPT7RG\"  No results found for: \"HEPCABINIT\", \"HEPCAB\", \"HCVPCRQUANT\"  Microbiology  Reviewed-blood cultures pending  Imaging  ECG 12 lead    Result Date: 12/5/2024  Sinus rhythm with 1st degree AV block Left bundle branch block Abnormal ECG When compared with ECG of 04-DEC-2024 16:12, (unconfirmed) Sinus rhythm has replaced Atrial fibrillation Confirmed by Jonathan Tristan ([id number]) on 12/5/2024 2:45:20 PM    ECG 12 lead    Result Date: 12/5/2024  Atrial fibrillation with a competing junctional pacemaker Left bundle branch block Abnormal ECG When compared with ECG of 18-FEB-2022 13:46, Atrial fibrillation has replaced Sinus rhythm Left bundle branch block is now Present Criteria for Anterior infarct are no longer Present Confirmed by Jonathan Tristan ([id number]) on 12/5/2024 2:42:44 PM    CT chest abdomen pelvis w IV contrast    Result Date: 12/4/2024  STUDY: CT Chest, Abdomen, and Pelvis with IV Contrast, CT Thoracic Spine and Lumbar Spine without IV Contrast; 12/4/2024 5:48 PM. INDICATION: Trauma, fall.  Head injury.  Coffee ground emesis.  Syncope COMPARISON: CT AP 2/18/2022. ACCESSION NUMBER(S): [id number], [id number], [id number], [id number] ORDERING CLINICIAN: GRETEL FALCON TECHNIQUE: CT of the chest, abdomen, and pelvis was performed.  Contiguous axial images were obtained at 3 mm slice thickness through the chest, abdomen, and pelvis.  Coronal and sagittal reconstructions at 3 mm slice thickness were performed.  Omnipaque 350 75 mL was administered intravenously.  Please note that spinal images were generated from the original CT abdomen and pelvis imaging. FINDINGS: CHEST: MEDIASTINUM: The heart is normal in size without " pericardial effusion.  Central vascular structures opacify normally.  LUNGS/PLEURA: There is no pleural effusion, pleural thickening, or pneumothorax. The airways are patent. Lungs are clear without consolidation, interstitial disease, or suspicious nodules. LYMPH NODES: Thoracic lymph nodes are not enlarged. ABDOMEN:  LIVER: No hepatomegaly.  Smooth surface contour.  Normal attenuation.  BILE DUCTS: No intrahepatic or extrahepatic biliary ductal dilatation.  GALLBLADDER: The gallbladder is unremarkable. STOMACH: No abnormalities identified.  PANCREAS: No masses or ductal dilatation.  SPLEEN: No splenomegaly or focal splenic lesion.  ADRENAL GLANDS: No thickening or nodules.  KIDNEYS AND URETERS: Kidneys are normal in size and location.  No renal or ureteral calculi.  PELVIS:  BLADDER: No abnormalities identified.  REPRODUCTIVE ORGANS: No abnormalities identified.  BOWEL: No abnormalities identified.  VESSELS: No abnormalities identified.  Abdominal aorta is normal in caliber.  PERITONEUM/RETROPERITONEUM/LYMPH NODES: No free fluid.  No pneumoperitoneum. No lymphadenopathy.  ABDOMINAL WALL: No abnormalities identified. SOFT TISSUES: No abnormalities identified.  BONES: No acute fracture or aggressive osseous lesion. THORACIC SPINE: There is mild dextroscoliosis. The spine is in otherwise good alignment.  There is no fracture or traumatic subluxation. The vertebral body heights are well maintained.  Disc spaces are preserved.  No significant central canal stenosis is demonstrated. The neural foramina are patent throughout. There is mild degenerative change. The paravertebral soft tissues are within normal limits. LUMBAR SPINE: The alignment is anatomic.  There is mild compression of the superior endplate of L4. This was present on the study dated 2/18/2022 and is not significantly changed. The vertebral body heights are otherwise well maintained.  Disc spaces are preserved.  No significant central canal stenosis is  demonstrated.  The neural foramina are patent throughout.  The paravertebral soft tissues are within normal limits.    1. No evidence for acute injury to the chest, abdomen, pelvis, thoracic, or lumbar spine. 2. There is mild chronic compression of the superior endplate of L4. Mild degenerative change in the thoracic and lumbar spine. Signed by Calin Montana MD    CT thoracic spine wo IV contrast    Result Date: 12/4/2024  STUDY: CT Chest, Abdomen, and Pelvis with IV Contrast, CT Thoracic Spine and Lumbar Spine without IV Contrast; 12/4/2024 5:48 PM. INDICATION: Trauma, fall.  Head injury.  Coffee ground emesis.  Syncope COMPARISON: CT AP 2/18/2022. ACCESSION NUMBER(S): [id number], [id number], [id number], [id number] ORDERING CLINICIAN: GRETEL FALCON TECHNIQUE: CT of the chest, abdomen, and pelvis was performed.  Contiguous axial images were obtained at 3 mm slice thickness through the chest, abdomen, and pelvis.  Coronal and sagittal reconstructions at 3 mm slice thickness were performed.  Omnipaque 350 75 mL was administered intravenously.  Please note that spinal images were generated from the original CT abdomen and pelvis imaging. FINDINGS: CHEST: MEDIASTINUM: The heart is normal in size without pericardial effusion.  Central vascular structures opacify normally.  LUNGS/PLEURA: There is no pleural effusion, pleural thickening, or pneumothorax. The airways are patent. Lungs are clear without consolidation, interstitial disease, or suspicious nodules. LYMPH NODES: Thoracic lymph nodes are not enlarged. ABDOMEN:  LIVER: No hepatomegaly.  Smooth surface contour.  Normal attenuation.  BILE DUCTS: No intrahepatic or extrahepatic biliary ductal dilatation.  GALLBLADDER: The gallbladder is unremarkable. STOMACH: No abnormalities identified.  PANCREAS: No masses or ductal dilatation.  SPLEEN: No splenomegaly or focal splenic lesion.  ADRENAL GLANDS: No thickening or nodules.  KIDNEYS AND URETERS: Kidneys are  normal in size and location.  No renal or ureteral calculi.  PELVIS:  BLADDER: No abnormalities identified.  REPRODUCTIVE ORGANS: No abnormalities identified.  BOWEL: No abnormalities identified.  VESSELS: No abnormalities identified.  Abdominal aorta is normal in caliber.  PERITONEUM/RETROPERITONEUM/LYMPH NODES: No free fluid.  No pneumoperitoneum. No lymphadenopathy.  ABDOMINAL WALL: No abnormalities identified. SOFT TISSUES: No abnormalities identified.  BONES: No acute fracture or aggressive osseous lesion. THORACIC SPINE: There is mild dextroscoliosis. The spine is in otherwise good alignment.  There is no fracture or traumatic subluxation. The vertebral body heights are well maintained.  Disc spaces are preserved.  No significant central canal stenosis is demonstrated. The neural foramina are patent throughout. There is mild degenerative change. The paravertebral soft tissues are within normal limits. LUMBAR SPINE: The alignment is anatomic.  There is mild compression of the superior endplate of L4. This was present on the study dated 2/18/2022 and is not significantly changed. The vertebral body heights are otherwise well maintained.  Disc spaces are preserved.  No significant central canal stenosis is demonstrated.  The neural foramina are patent throughout.  The paravertebral soft tissues are within normal limits.    1. No evidence for acute injury to the chest, abdomen, pelvis, thoracic, or lumbar spine. 2. There is mild chronic compression of the superior endplate of L4. Mild degenerative change in the thoracic and lumbar spine. Signed by Calin Montana MD    CT lumbar spine wo IV contrast    Result Date: 12/4/2024  STUDY: CT Chest, Abdomen, and Pelvis with IV Contrast, CT Thoracic Spine and Lumbar Spine without IV Contrast; 12/4/2024 5:48 PM. INDICATION: Trauma, fall.  Head injury.  Coffee ground emesis.  Syncope COMPARISON: CT AP 2/18/2022. ACCESSION NUMBER(S): [id number], [id number], [id number],  [id number] ORDERING CLINICIAN: GRETEL FALCON TECHNIQUE: CT of the chest, abdomen, and pelvis was performed.  Contiguous axial images were obtained at 3 mm slice thickness through the chest, abdomen, and pelvis.  Coronal and sagittal reconstructions at 3 mm slice thickness were performed.  Omnipaque 350 75 mL was administered intravenously.  Please note that spinal images were generated from the original CT abdomen and pelvis imaging. FINDINGS: CHEST: MEDIASTINUM: The heart is normal in size without pericardial effusion.  Central vascular structures opacify normally.  LUNGS/PLEURA: There is no pleural effusion, pleural thickening, or pneumothorax. The airways are patent. Lungs are clear without consolidation, interstitial disease, or suspicious nodules. LYMPH NODES: Thoracic lymph nodes are not enlarged. ABDOMEN:  LIVER: No hepatomegaly.  Smooth surface contour.  Normal attenuation.  BILE DUCTS: No intrahepatic or extrahepatic biliary ductal dilatation.  GALLBLADDER: The gallbladder is unremarkable. STOMACH: No abnormalities identified.  PANCREAS: No masses or ductal dilatation.  SPLEEN: No splenomegaly or focal splenic lesion.  ADRENAL GLANDS: No thickening or nodules.  KIDNEYS AND URETERS: Kidneys are normal in size and location.  No renal or ureteral calculi.  PELVIS:  BLADDER: No abnormalities identified.  REPRODUCTIVE ORGANS: No abnormalities identified.  BOWEL: No abnormalities identified.  VESSELS: No abnormalities identified.  Abdominal aorta is normal in caliber.  PERITONEUM/RETROPERITONEUM/LYMPH NODES: No free fluid.  No pneumoperitoneum. No lymphadenopathy.  ABDOMINAL WALL: No abnormalities identified. SOFT TISSUES: No abnormalities identified.  BONES: No acute fracture or aggressive osseous lesion. THORACIC SPINE: There is mild dextroscoliosis. The spine is in otherwise good alignment.  There is no fracture or traumatic subluxation. The vertebral body heights are well maintained.  Disc spaces are  preserved.  No significant central canal stenosis is demonstrated. The neural foramina are patent throughout. There is mild degenerative change. The paravertebral soft tissues are within normal limits. LUMBAR SPINE: The alignment is anatomic.  There is mild compression of the superior endplate of L4. This was present on the study dated 2/18/2022 and is not significantly changed. The vertebral body heights are otherwise well maintained.  Disc spaces are preserved.  No significant central canal stenosis is demonstrated.  The neural foramina are patent throughout.  The paravertebral soft tissues are within normal limits.    1. No evidence for acute injury to the chest, abdomen, pelvis, thoracic, or lumbar spine. 2. There is mild chronic compression of the superior endplate of L4. Mild degenerative change in the thoracic and lumbar spine. Signed by Calin Montana MD    CT 3D reconstruction    Result Date: 12/4/2024  STUDY: CT Chest, Abdomen, and Pelvis with IV Contrast, CT Thoracic Spine and Lumbar Spine without IV Contrast; 12/4/2024 5:48 PM. INDICATION: Trauma, fall.  Head injury.  Coffee ground emesis.  Syncope COMPARISON: CT AP 2/18/2022. ACCESSION NUMBER(S): [id number], [id number], [id number], [id number] ORDERING CLINICIAN: GRETEL FALCON TECHNIQUE: CT of the chest, abdomen, and pelvis was performed.  Contiguous axial images were obtained at 3 mm slice thickness through the chest, abdomen, and pelvis.  Coronal and sagittal reconstructions at 3 mm slice thickness were performed.  Omnipaque 350 75 mL was administered intravenously.  Please note that spinal images were generated from the original CT abdomen and pelvis imaging. FINDINGS: CHEST: MEDIASTINUM: The heart is normal in size without pericardial effusion.  Central vascular structures opacify normally.  LUNGS/PLEURA: There is no pleural effusion, pleural thickening, or pneumothorax. The airways are patent. Lungs are clear without consolidation,  interstitial disease, or suspicious nodules. LYMPH NODES: Thoracic lymph nodes are not enlarged. ABDOMEN:  LIVER: No hepatomegaly.  Smooth surface contour.  Normal attenuation.  BILE DUCTS: No intrahepatic or extrahepatic biliary ductal dilatation.  GALLBLADDER: The gallbladder is unremarkable. STOMACH: No abnormalities identified.  PANCREAS: No masses or ductal dilatation.  SPLEEN: No splenomegaly or focal splenic lesion.  ADRENAL GLANDS: No thickening or nodules.  KIDNEYS AND URETERS: Kidneys are normal in size and location.  No renal or ureteral calculi.  PELVIS:  BLADDER: No abnormalities identified.  REPRODUCTIVE ORGANS: No abnormalities identified.  BOWEL: No abnormalities identified.  VESSELS: No abnormalities identified.  Abdominal aorta is normal in caliber.  PERITONEUM/RETROPERITONEUM/LYMPH NODES: No free fluid.  No pneumoperitoneum. No lymphadenopathy.  ABDOMINAL WALL: No abnormalities identified. SOFT TISSUES: No abnormalities identified.  BONES: No acute fracture or aggressive osseous lesion. THORACIC SPINE: There is mild dextroscoliosis. The spine is in otherwise good alignment.  There is no fracture or traumatic subluxation. The vertebral body heights are well maintained.  Disc spaces are preserved.  No significant central canal stenosis is demonstrated. The neural foramina are patent throughout. There is mild degenerative change. The paravertebral soft tissues are within normal limits. LUMBAR SPINE: The alignment is anatomic.  There is mild compression of the superior endplate of L4. This was present on the study dated 2/18/2022 and is not significantly changed. The vertebral body heights are otherwise well maintained.  Disc spaces are preserved.  No significant central canal stenosis is demonstrated.  The neural foramina are patent throughout.  The paravertebral soft tissues are within normal limits.    1. No evidence for acute injury to the chest, abdomen, pelvis, thoracic, or lumbar spine. 2.  There is mild chronic compression of the superior endplate of L4. Mild degenerative change in the thoracic and lumbar spine. Signed by Calin Montana MD    CT head wo IV contrast    Result Date: 12/4/2024  Interpreted By:  Andrez Veloz, STUDY: CT HEAD WO IV CONTRAST; CT CERVICAL SPINE WO IV CONTRAST; CT FACIAL BONES WO IV CONTRAST;  12/4/2024 5:22 pm   INDICATION: Signs/Symptoms:fall, struck face; Signs/Symptoms:fall, struck head, +loc. Altered mental status   COMPARISON: None.   ACCESSION NUMBER(S): [id number]; [id number]; [id number]   ORDERING CLINICIAN: GRETEL FALCON   TECHNIQUE: Axial noncontrast CT images of the head, face, and cervical spine. 3D volume rendering of the facial bones was obtained on a separate workstation also reviewed.   FINDINGS: BRAIN PARENCHYMA: Gray-white matter interfaces are preserved. No mass, mass effect or midline shift. Scattered periventricular and deep white matter hypodensities suggestive of mild chronic microvascular ischemic change.   HEMORRHAGE: No acute intracranial hemorrhage. VENTRICLES and EXTRA-AXIAL SPACES: No hydrocephalus. No extra-axial collections. Mild generalized cerebral volume loss and associated ventricular and sulcal enlargement. EXTRACRANIAL SOFT TISSUES: Unremarkable. CALVARIUM: No depressed calvarial fracture.   FACIAL BONES: No acute facial bone fracture. There is absent dentition. SOFT TISSUES: No evidence of large soft tissue hematoma. Extensive bilateral facial vascular calcifications, which may be seen with diabetes or chronic renal disease. PARANASAL SINUSES: No hemorrhage in the paranasal sinuses. Mild polypoid mucosal thickening of the bilateral frontal sinuses, ethmoid air cells, and maxillary sinuses. MASTOIDS: Within normal limits. ORBITS: The globes, extraocular muscles and optic nerve sheath complexes are symmetric. No retrobulbar hematoma.   ALIGNMENT: Normal cervical lordosis. VERTEBRAE: No acute fracture. Vertebral body heights are  maintained. There are no suspicious osseous lesions. Degenerative disc space narrowing and mild posterolateral osseous spurring at C4-C5 through C6-C7 contributing to mild bilateral foraminal stenosis at this levels.. SPINAL CANAL: Small calcified central disc protrusion contributing to mild-to-moderate central canal stenosis at C3-C4 additionally small disc osteophyte complexes at. PREVERTEBRAL SOFT TISSUES: No prevertebral soft tissue swelling. LUNG APICES: Partially visualized emphysematous changes in the lung apices with asymmetric bronchiectasis and presumed scarring in the left lung apex. Please see separately dictated chest CT.   OTHER FINDINGS: None.         No acute intracranial abnormality.   No evidence of facial bone or cervical spine fracture.     Signed by: Andrez Veloz 12/4/2024 5:35 PM Dictation workstation:   [id number]    CT maxillofacial bones wo IV contrast    Result Date: 12/4/2024  Interpreted By:  Andrez Veloz, STUDY: CT HEAD WO IV CONTRAST; CT CERVICAL SPINE WO IV CONTRAST; CT FACIAL BONES WO IV CONTRAST;  12/4/2024 5:22 pm   INDICATION: Signs/Symptoms:fall, struck face; Signs/Symptoms:fall, struck head, +loc. Altered mental status   COMPARISON: None.   ACCESSION NUMBER(S): [id number]; [id number]; [id number]   ORDERING CLINICIAN: GRETEL FALCON   TECHNIQUE: Axial noncontrast CT images of the head, face, and cervical spine. 3D volume rendering of the facial bones was obtained on a separate workstation also reviewed.   FINDINGS: BRAIN PARENCHYMA: Gray-white matter interfaces are preserved. No mass, mass effect or midline shift. Scattered periventricular and deep white matter hypodensities suggestive of mild chronic microvascular ischemic change.   HEMORRHAGE: No acute intracranial hemorrhage. VENTRICLES and EXTRA-AXIAL SPACES: No hydrocephalus. No extra-axial collections. Mild generalized cerebral volume loss and associated ventricular and sulcal enlargement. EXTRACRANIAL SOFT TISSUES:  Unremarkable. CALVARIUM: No depressed calvarial fracture.   FACIAL BONES: No acute facial bone fracture. There is absent dentition. SOFT TISSUES: No evidence of large soft tissue hematoma. Extensive bilateral facial vascular calcifications, which may be seen with diabetes or chronic renal disease. PARANASAL SINUSES: No hemorrhage in the paranasal sinuses. Mild polypoid mucosal thickening of the bilateral frontal sinuses, ethmoid air cells, and maxillary sinuses. MASTOIDS: Within normal limits. ORBITS: The globes, extraocular muscles and optic nerve sheath complexes are symmetric. No retrobulbar hematoma.   ALIGNMENT: Normal cervical lordosis. VERTEBRAE: No acute fracture. Vertebral body heights are maintained. There are no suspicious osseous lesions. Degenerative disc space narrowing and mild posterolateral osseous spurring at C4-C5 through C6-C7 contributing to mild bilateral foraminal stenosis at this levels.. SPINAL CANAL: Small calcified central disc protrusion contributing to mild-to-moderate central canal stenosis at C3-C4 additionally small disc osteophyte complexes at. PREVERTEBRAL SOFT TISSUES: No prevertebral soft tissue swelling. LUNG APICES: Partially visualized emphysematous changes in the lung apices with asymmetric bronchiectasis and presumed scarring in the left lung apex. Please see separately dictated chest CT.   OTHER FINDINGS: None.         No acute intracranial abnormality.   No evidence of facial bone or cervical spine fracture.     Signed by: Andrez Veloz 12/4/2024 5:35 PM Dictation workstation:   [id number]    CT cervical spine wo IV contrast    Result Date: 12/4/2024  Interpreted By:  Andrez Veloz, STUDY: CT HEAD WO IV CONTRAST; CT CERVICAL SPINE WO IV CONTRAST; CT FACIAL BONES WO IV CONTRAST;  12/4/2024 5:22 pm   INDICATION: Signs/Symptoms:fall, struck face; Signs/Symptoms:fall, struck head, +loc. Altered mental status   COMPARISON: None.   ACCESSION NUMBER(S): [id number];  [id number]; [id number]   ORDERING CLINICIAN: GRETEL FALCON   TECHNIQUE: Axial noncontrast CT images of the head, face, and cervical spine. 3D volume rendering of the facial bones was obtained on a separate workstation also reviewed.   FINDINGS: BRAIN PARENCHYMA: Gray-white matter interfaces are preserved. No mass, mass effect or midline shift. Scattered periventricular and deep white matter hypodensities suggestive of mild chronic microvascular ischemic change.   HEMORRHAGE: No acute intracranial hemorrhage. VENTRICLES and EXTRA-AXIAL SPACES: No hydrocephalus. No extra-axial collections. Mild generalized cerebral volume loss and associated ventricular and sulcal enlargement. EXTRACRANIAL SOFT TISSUES: Unremarkable. CALVARIUM: No depressed calvarial fracture.   FACIAL BONES: No acute facial bone fracture. There is absent dentition. SOFT TISSUES: No evidence of large soft tissue hematoma. Extensive bilateral facial vascular calcifications, which may be seen with diabetes or chronic renal disease. PARANASAL SINUSES: No hemorrhage in the paranasal sinuses. Mild polypoid mucosal thickening of the bilateral frontal sinuses, ethmoid air cells, and maxillary sinuses. MASTOIDS: Within normal limits. ORBITS: The globes, extraocular muscles and optic nerve sheath complexes are symmetric. No retrobulbar hematoma.   ALIGNMENT: Normal cervical lordosis. VERTEBRAE: No acute fracture. Vertebral body heights are maintained. There are no suspicious osseous lesions. Degenerative disc space narrowing and mild posterolateral osseous spurring at C4-C5 through C6-C7 contributing to mild bilateral foraminal stenosis at this levels.. SPINAL CANAL: Small calcified central disc protrusion contributing to mild-to-moderate central canal stenosis at C3-C4 additionally small disc osteophyte complexes at. PREVERTEBRAL SOFT TISSUES: No prevertebral soft tissue swelling. LUNG APICES: Partially visualized emphysematous changes in the lung apices with  asymmetric bronchiectasis and presumed scarring in the left lung apex. Please see separately dictated chest CT.   OTHER FINDINGS: None.         No acute intracranial abnormality.   No evidence of facial bone or cervical spine fracture.     Signed by: Andrez Veloz 12/4/2024 5:35 PM Dictation workstation:   [id number]    XR chest 1 view    Result Date: 12/4/2024  STUDY: Chest Radiograph;  12/4/24, 4:14PM. INDICATION: Fall. COMPARISON: XR Chest 3/15/21. ACCESSION NUMBER(S): [id number] ORDERING CLINICIAN: GRETEL FALCON TECHNIQUE:  Frontal chest was obtained at 16:13 hours. FINDINGS: The patient status post median sternotomy. CARDIOMEDIASTINAL SILHOUETTE: Cardiomediastinal silhouette is normal in size and configuration.  LUNGS: Lungs are clear.  ABDOMEN: No remarkable upper abdominal findings.  BONES: There is a lucency through the anterior lateral right ninth rib and a nondisplaced fracture cannot be excluded.    No evidence consolidating infiltrate or effusion. Findings suggestive of fracture of the right ninth rib. Signed by Dajuan Hall MD    XR pelvis 1-2 views    Result Date: 12/4/2024  STUDY: Pelvis Radiographs; 12/04/2024 4:13 PM INDICATION: Fall. COMPARISON: 02/18/2022 CT Abdomen and Pelvis. ACCESSION NUMBER(S): [id number] ORDERING CLINICIAN: GRETEL FALCON TECHNIQUE:  One view of the pelvis. FINDINGS:  There are degenerative change the facet joints of the lower lumbosacral spine.  The pelvic ring is intact.  There is no acute fracture.      No acute osseous abnormalities. Signed by Dajuan Hall MD    Assessment/Plan   Syncope, vasovagal versus related to coronavirus infection  Coronavirus infection-normal oxygenation, patient high risk for progression    Monitor oxygenation  Remdesivir declined  Cardiology follow-up  Monitor temperature and WBC  Droplet plus precautions      Ludwig Malik MD

## 2024-12-05 NOTE — PROGRESS NOTES
Occupational Therapy                 Therapy Communication Note    Patient Name: Chavez Llamas  MRN: [id number]  Department: [facility] 3 E  Room: 16/16-B  Today's Date: 12/5/2024     Discipline: Occupational Therapy    Missed Visit Reason: Missed Visit Reason: Cancel (Troponin 4,799 12/5/24 at 5:05 (elevated from 192 on 12/4/24 at 23:47);  cardiology consult;  pending cardiology)    Missed Time: Cancel    Comment:

## 2024-12-05 NOTE — ASSESSMENT & PLAN NOTE
Most likely vasovagal or related to non-STEMI with possible arrhythmia.  He will be monitored on telemetry.  Hemodynamic status is normal.  Plan for further evaluation and treatment per cardiology.

## 2024-12-05 NOTE — CONSULTS
"Consults    Reason For Consult  CGE    History Of Present Illness  Chavez Llamas is a 79 y.o. male presenting with syncope. He was found to have elevated trop and + covid. Per chart review, had episode of coffee ground emesis at home. No melena or vomiting since arrival. HH consistent with baseline. Takes baby Aspirin and Effient. BUN/Cr both elevated. CT c/a/p without bowel wall thickening. No recorded EGD or hx GI blood loss.       Past Medical History  He has a past medical history of Old myocardial infarction (02/01/2017).    Surgical History  He has a past surgical history that includes Coronary artery bypass graft (04/14/2015) and MR angio head wo IV contrast (2/23/2022).     Social History  He reports that he has quit smoking. His smoking use included cigarettes. He has never used smokeless tobacco. No history on file for alcohol use and drug use.    Family History  Family History   Problem Relation Name Age of Onset    Diabetes Mother      Other (Cerebrovascular Accident) Father          Allergies  Patient has no known allergies.    Review of Systems     Physical Exam     Last Recorded Vitals  Blood pressure 114/52, pulse 66, temperature 37 °C (98.6 °F), temperature source Temporal, resp. rate (!) 23, height 1.727 m (5' 8\"), weight 69.6 kg (153 lb 7 oz), SpO2 98%.    Relevant Results  Results for orders placed or performed during the hospital encounter of 12/04/24 (from the past 24 hours)   CBC and Auto Differential   Result Value Ref Range    WBC 9.4 4.4 - 11.3 x10*3/uL    nRBC 0.0 0.0 - 0.0 /100 WBCs    RBC 4.11 (L) 4.50 - 5.90 x10*6/uL    Hemoglobin 11.8 (L) 13.5 - 17.5 g/dL    Hematocrit 35.8 (L) 41.0 - 52.0 %    MCV 87 80 - 100 fL    MCH 28.7 26.0 - 34.0 pg    MCHC 33.0 32.0 - 36.0 g/dL    RDW 14.9 (H) 11.5 - 14.5 %    Platelets 113 (L) 150 - 450 x10*3/uL    Neutrophils % 88.0 40.0 - 80.0 %    Immature Granulocytes %, Automated 0.6 0.0 - 0.9 %    Lymphocytes % 2.7 13.0 - 44.0 %    Monocytes % 8.5 " 2.0 - 10.0 %    Eosinophils % 0.0 0.0 - 6.0 %    Basophils % 0.2 0.0 - 2.0 %    Neutrophils Absolute 8.29 (H) 1.60 - 5.50 x10*3/uL    Immature Granulocytes Absolute, Automated 0.06 0.00 - 0.50 x10*3/uL    Lymphocytes Absolute 0.25 (L) 0.80 - 3.00 x10*3/uL    Monocytes Absolute 0.80 0.05 - 0.80 x10*3/uL    Eosinophils Absolute 0.00 0.00 - 0.40 x10*3/uL    Basophils Absolute 0.02 0.00 - 0.10 x10*3/uL   Phosphorus   Result Value Ref Range    Phosphorus 5.4 (H) 2.5 - 4.9 mg/dL   Magnesium   Result Value Ref Range    Magnesium 1.87 1.60 - 2.40 mg/dL   Comprehensive metabolic panel   Result Value Ref Range    Glucose 225 (H) 74 - 99 mg/dL    Sodium 136 136 - 145 mmol/L    Potassium 5.1 3.5 - 5.3 mmol/L    Chloride 98 98 - 107 mmol/L    Bicarbonate 22 21 - 32 mmol/L    Anion Gap 21 (H) 10 - 20 mmol/L    Urea Nitrogen 24 (H) 6 - 23 mg/dL    Creatinine 1.46 (H) 0.50 - 1.30 mg/dL    eGFR 48 (L) >60 mL/min/1.73m*2    Calcium 9.3 8.6 - 10.3 mg/dL    Albumin 3.9 3.4 - 5.0 g/dL    Alkaline Phosphatase 75 33 - 136 U/L    Total Protein 7.3 6.4 - 8.2 g/dL    AST 12 9 - 39 U/L    Bilirubin, Total 1.7 (H) 0.0 - 1.2 mg/dL    ALT 14 10 - 52 U/L   Lactate   Result Value Ref Range    Lactate 7.9 (HH) 0.4 - 2.0 mmol/L   B-Type Natriuretic Peptide   Result Value Ref Range     (H) 0 - 99 pg/mL   Creatine Kinase   Result Value Ref Range    Creatine Kinase 68 0 - 325 U/L   Type And Screen   Result Value Ref Range    ABO TYPE O     Rh TYPE POS     ANTIBODY SCREEN NEG    Troponin I, High Sensitivity, Initial   Result Value Ref Range    Troponin I, High Sensitivity 24 (H) 0 - 20 ng/L   Blood Culture    Specimen: Peripheral Venipuncture; Blood culture   Result Value Ref Range    Blood Culture Loaded on Instrument - Culture in progress    Blood Culture    Specimen: Peripheral Venipuncture; Blood culture   Result Value Ref Range    Blood Culture Loaded on Instrument - Culture in progress    ECG 12 lead   Result Value Ref Range    Ventricular  Rate 71 BPM    QRS Duration 158 ms    QT Interval 440 ms    QTC Calculation(Bazett) 478 ms    R Axis 43 degrees    T Axis 166 degrees    QRS Count 12 beats    Q Onset 213 ms    T Offset 433 ms    QTC Fredericia 465 ms   Sars-CoV-2 and Influenza A/B PCR   Result Value Ref Range    Flu A Result Not Detected Not Detected    Flu B Result Not Detected Not Detected    Coronavirus 2019, PCR Detected (A) Not Detected   Troponin, High Sensitivity, 1 Hour   Result Value Ref Range    Troponin I, High Sensitivity 22 (H) 0 - 20 ng/L   Lactate   Result Value Ref Range    Lactate 2.6 (H) 0.4 - 2.0 mmol/L   ECG 12 lead   Result Value Ref Range    Ventricular Rate 91 BPM    Atrial Rate 91 BPM    OH Interval 230 ms    QRS Duration 162 ms    QT Interval 396 ms    QTC Calculation(Bazett) 487 ms    P Axis 67 degrees    R Axis -9 degrees    T Axis 175 degrees    QRS Count 15 beats    Q Onset 211 ms    P Onset 96 ms    P Offset 155 ms    T Offset 409 ms    QTC Fredericia 455 ms   Urinalysis with Reflex Culture and Microscopic   Result Value Ref Range    Color, Urine Light-Yellow Light-Yellow, Yellow, Dark-Yellow    Appearance, Urine Clear Clear    Specific Gravity, Urine >1.050 (N) 1.005 - 1.035    pH, Urine 5.5 5.0, 5.5, 6.0, 6.5, 7.0, 7.5, 8.0    Protein, Urine 70 (1+) (A) NEGATIVE, 10 (TRACE), 20 (TRACE) mg/dL    Glucose, Urine 100 (1+) (A) Normal mg/dL    Blood, Urine 0.5 (2+) (A) NEGATIVE    Ketones, Urine TRACE (A) NEGATIVE mg/dL    Bilirubin, Urine NEGATIVE NEGATIVE    Urobilinogen, Urine Normal Normal mg/dL    Nitrite, Urine NEGATIVE NEGATIVE    Leukocyte Esterase, Urine NEGATIVE NEGATIVE   Extra Urine Gray Tube   Result Value Ref Range    Extra Tube Hold for add-ons.    Urinalysis Microscopic   Result Value Ref Range    WBC, Urine NONE 1-5, NONE /HPF    RBC, Urine 3-5 NONE, 1-2, 3-5 /HPF    Mucus, Urine FEW Reference range not established. /LPF    Hyaline Casts, Urine 2+ (A) NONE /LPF   Troponin I, High Sensitivity   Result Value  Ref Range    Troponin I, High Sensitivity 192 (HH) 0 - 20 ng/L   Hemoglobin   Result Value Ref Range    Hemoglobin 10.6 (L) 13.5 - 17.5 g/dL   Troponin I, High Sensitivity   Result Value Ref Range    Troponin I, High Sensitivity 4,799 (HH) 0 - 20 ng/L   Comprehensive Metabolic Panel   Result Value Ref Range    Glucose 114 (H) 74 - 99 mg/dL    Sodium 136 136 - 145 mmol/L    Potassium 4.2 3.5 - 5.3 mmol/L    Chloride 102 98 - 107 mmol/L    Bicarbonate 26 21 - 32 mmol/L    Anion Gap 12 10 - 20 mmol/L    Urea Nitrogen 32 (H) 6 - 23 mg/dL    Creatinine 1.39 (H) 0.50 - 1.30 mg/dL    eGFR 51 (L) >60 mL/min/1.73m*2    Calcium 8.6 8.6 - 10.3 mg/dL    Albumin 3.5 3.4 - 5.0 g/dL    Alkaline Phosphatase 64 33 - 136 U/L    Total Protein 6.5 6.4 - 8.2 g/dL    AST 36 9 - 39 U/L    Bilirubin, Total 1.1 0.0 - 1.2 mg/dL    ALT 18 10 - 52 U/L   CBC and Auto Differential   Result Value Ref Range    WBC 7.5 4.4 - 11.3 x10*3/uL    nRBC 0.0 0.0 - 0.0 /100 WBCs    RBC 3.65 (L) 4.50 - 5.90 x10*6/uL    Hemoglobin 10.4 (L) 13.5 - 17.5 g/dL    Hematocrit 31.1 (L) 41.0 - 52.0 %    MCV 85 80 - 100 fL    MCH 28.5 26.0 - 34.0 pg    MCHC 33.4 32.0 - 36.0 g/dL    RDW 15.2 (H) 11.5 - 14.5 %    Platelets 102 (L) 150 - 450 x10*3/uL    Neutrophils % 81.9 40.0 - 80.0 %    Immature Granulocytes %, Automated 0.5 0.0 - 0.9 %    Lymphocytes % 6.6 13.0 - 44.0 %    Monocytes % 10.9 2.0 - 10.0 %    Eosinophils % 0.0 0.0 - 6.0 %    Basophils % 0.1 0.0 - 2.0 %    Neutrophils Absolute 6.15 (H) 1.60 - 5.50 x10*3/uL    Immature Granulocytes Absolute, Automated 0.04 0.00 - 0.50 x10*3/uL    Lymphocytes Absolute 0.50 (L) 0.80 - 3.00 x10*3/uL    Monocytes Absolute 0.82 (H) 0.05 - 0.80 x10*3/uL    Eosinophils Absolute 0.00 0.00 - 0.40 x10*3/uL    Basophils Absolute 0.01 0.00 - 0.10 x10*3/uL     ECG 12 lead    Result Date: 12/5/2024  Sinus rhythm with 1st degree AV block Left bundle branch block Abnormal ECG When compared with ECG of 04-DEC-2024 16:12, (unconfirmed) Sinus  rhythm has replaced Atrial fibrillation    ECG 12 lead    Result Date: 12/5/2024  Atrial fibrillation with a competing junctional pacemaker Left bundle branch block Abnormal ECG When compared with ECG of 18-FEB-2022 13:46, Atrial fibrillation has replaced Sinus rhythm Left bundle branch block is now Present Criteria for Anterior infarct are no longer Present    CT chest abdomen pelvis w IV contrast    Result Date: 12/4/2024  STUDY: CT Chest, Abdomen, and Pelvis with IV Contrast, CT Thoracic Spine and Lumbar Spine without IV Contrast; 12/4/2024 5:48 PM. INDICATION: Trauma, fall.  Head injury.  Coffee ground emesis.  Syncope COMPARISON: CT AP 2/18/2022. ACCESSION NUMBER(S): [id number], [id number], [id number], [id number] ORDERING CLINICIAN: GRETEL FALCON TECHNIQUE: CT of the chest, abdomen, and pelvis was performed.  Contiguous axial images were obtained at 3 mm slice thickness through the chest, abdomen, and pelvis.  Coronal and sagittal reconstructions at 3 mm slice thickness were performed.  Omnipaque 350 75 mL was administered intravenously.  Please note that spinal images were generated from the original CT abdomen and pelvis imaging. FINDINGS: CHEST: MEDIASTINUM: The heart is normal in size without pericardial effusion.  Central vascular structures opacify normally.  LUNGS/PLEURA: There is no pleural effusion, pleural thickening, or pneumothorax. The airways are patent. Lungs are clear without consolidation, interstitial disease, or suspicious nodules. LYMPH NODES: Thoracic lymph nodes are not enlarged. ABDOMEN:  LIVER: No hepatomegaly.  Smooth surface contour.  Normal attenuation.  BILE DUCTS: No intrahepatic or extrahepatic biliary ductal dilatation.  GALLBLADDER: The gallbladder is unremarkable. STOMACH: No abnormalities identified.  PANCREAS: No masses or ductal dilatation.  SPLEEN: No splenomegaly or focal splenic lesion.  ADRENAL GLANDS: No thickening or nodules.  KIDNEYS AND URETERS: Kidneys are  normal in size and location.  No renal or ureteral calculi.  PELVIS:  BLADDER: No abnormalities identified.  REPRODUCTIVE ORGANS: No abnormalities identified.  BOWEL: No abnormalities identified.  VESSELS: No abnormalities identified.  Abdominal aorta is normal in caliber.  PERITONEUM/RETROPERITONEUM/LYMPH NODES: No free fluid.  No pneumoperitoneum. No lymphadenopathy.  ABDOMINAL WALL: No abnormalities identified. SOFT TISSUES: No abnormalities identified.  BONES: No acute fracture or aggressive osseous lesion. THORACIC SPINE: There is mild dextroscoliosis. The spine is in otherwise good alignment.  There is no fracture or traumatic subluxation. The vertebral body heights are well maintained.  Disc spaces are preserved.  No significant central canal stenosis is demonstrated. The neural foramina are patent throughout. There is mild degenerative change. The paravertebral soft tissues are within normal limits. LUMBAR SPINE: The alignment is anatomic.  There is mild compression of the superior endplate of L4. This was present on the study dated 2/18/2022 and is not significantly changed. The vertebral body heights are otherwise well maintained.  Disc spaces are preserved.  No significant central canal stenosis is demonstrated.  The neural foramina are patent throughout.  The paravertebral soft tissues are within normal limits.    1. No evidence for acute injury to the chest, abdomen, pelvis, thoracic, or lumbar spine. 2. There is mild chronic compression of the superior endplate of L4. Mild degenerative change in the thoracic and lumbar spine. Signed by Calin Montana MD    CT thoracic spine wo IV contrast    Result Date: 12/4/2024  STUDY: CT Chest, Abdomen, and Pelvis with IV Contrast, CT Thoracic Spine and Lumbar Spine without IV Contrast; 12/4/2024 5:48 PM. INDICATION: Trauma, fall.  Head injury.  Coffee ground emesis.  Syncope COMPARISON: CT AP 2/18/2022. ACCESSION NUMBER(S): [id number], [id number],  [id number], [id number] ORDERING CLINICIAN: GRETEL FALCON TECHNIQUE: CT of the chest, abdomen, and pelvis was performed.  Contiguous axial images were obtained at 3 mm slice thickness through the chest, abdomen, and pelvis.  Coronal and sagittal reconstructions at 3 mm slice thickness were performed.  Omnipaque 350 75 mL was administered intravenously.  Please note that spinal images were generated from the original CT abdomen and pelvis imaging. FINDINGS: CHEST: MEDIASTINUM: The heart is normal in size without pericardial effusion.  Central vascular structures opacify normally.  LUNGS/PLEURA: There is no pleural effusion, pleural thickening, or pneumothorax. The airways are patent. Lungs are clear without consolidation, interstitial disease, or suspicious nodules. LYMPH NODES: Thoracic lymph nodes are not enlarged. ABDOMEN:  LIVER: No hepatomegaly.  Smooth surface contour.  Normal attenuation.  BILE DUCTS: No intrahepatic or extrahepatic biliary ductal dilatation.  GALLBLADDER: The gallbladder is unremarkable. STOMACH: No abnormalities identified.  PANCREAS: No masses or ductal dilatation.  SPLEEN: No splenomegaly or focal splenic lesion.  ADRENAL GLANDS: No thickening or nodules.  KIDNEYS AND URETERS: Kidneys are normal in size and location.  No renal or ureteral calculi.  PELVIS:  BLADDER: No abnormalities identified.  REPRODUCTIVE ORGANS: No abnormalities identified.  BOWEL: No abnormalities identified.  VESSELS: No abnormalities identified.  Abdominal aorta is normal in caliber.  PERITONEUM/RETROPERITONEUM/LYMPH NODES: No free fluid.  No pneumoperitoneum. No lymphadenopathy.  ABDOMINAL WALL: No abnormalities identified. SOFT TISSUES: No abnormalities identified.  BONES: No acute fracture or aggressive osseous lesion. THORACIC SPINE: There is mild dextroscoliosis. The spine is in otherwise good alignment.  There is no fracture or traumatic subluxation. The vertebral body heights are well maintained.  Disc  spaces are preserved.  No significant central canal stenosis is demonstrated. The neural foramina are patent throughout. There is mild degenerative change. The paravertebral soft tissues are within normal limits. LUMBAR SPINE: The alignment is anatomic.  There is mild compression of the superior endplate of L4. This was present on the study dated 2/18/2022 and is not significantly changed. The vertebral body heights are otherwise well maintained.  Disc spaces are preserved.  No significant central canal stenosis is demonstrated.  The neural foramina are patent throughout.  The paravertebral soft tissues are within normal limits.    1. No evidence for acute injury to the chest, abdomen, pelvis, thoracic, or lumbar spine. 2. There is mild chronic compression of the superior endplate of L4. Mild degenerative change in the thoracic and lumbar spine. Signed by Calin Montana MD    CT lumbar spine wo IV contrast    Result Date: 12/4/2024  STUDY: CT Chest, Abdomen, and Pelvis with IV Contrast, CT Thoracic Spine and Lumbar Spine without IV Contrast; 12/4/2024 5:48 PM. INDICATION: Trauma, fall.  Head injury.  Coffee ground emesis.  Syncope COMPARISON: CT AP 2/18/2022. ACCESSION NUMBER(S): [id number], [id number], [id number], [id number] ORDERING CLINICIAN: GRETEL FALCON TECHNIQUE: CT of the chest, abdomen, and pelvis was performed.  Contiguous axial images were obtained at 3 mm slice thickness through the chest, abdomen, and pelvis.  Coronal and sagittal reconstructions at 3 mm slice thickness were performed.  Omnipaque 350 75 mL was administered intravenously.  Please note that spinal images were generated from the original CT abdomen and pelvis imaging. FINDINGS: CHEST: MEDIASTINUM: The heart is normal in size without pericardial effusion.  Central vascular structures opacify normally.  LUNGS/PLEURA: There is no pleural effusion, pleural thickening, or pneumothorax. The airways are patent. Lungs are clear without  consolidation, interstitial disease, or suspicious nodules. LYMPH NODES: Thoracic lymph nodes are not enlarged. ABDOMEN:  LIVER: No hepatomegaly.  Smooth surface contour.  Normal attenuation.  BILE DUCTS: No intrahepatic or extrahepatic biliary ductal dilatation.  GALLBLADDER: The gallbladder is unremarkable. STOMACH: No abnormalities identified.  PANCREAS: No masses or ductal dilatation.  SPLEEN: No splenomegaly or focal splenic lesion.  ADRENAL GLANDS: No thickening or nodules.  KIDNEYS AND URETERS: Kidneys are normal in size and location.  No renal or ureteral calculi.  PELVIS:  BLADDER: No abnormalities identified.  REPRODUCTIVE ORGANS: No abnormalities identified.  BOWEL: No abnormalities identified.  VESSELS: No abnormalities identified.  Abdominal aorta is normal in caliber.  PERITONEUM/RETROPERITONEUM/LYMPH NODES: No free fluid.  No pneumoperitoneum. No lymphadenopathy.  ABDOMINAL WALL: No abnormalities identified. SOFT TISSUES: No abnormalities identified.  BONES: No acute fracture or aggressive osseous lesion. THORACIC SPINE: There is mild dextroscoliosis. The spine is in otherwise good alignment.  There is no fracture or traumatic subluxation. The vertebral body heights are well maintained.  Disc spaces are preserved.  No significant central canal stenosis is demonstrated. The neural foramina are patent throughout. There is mild degenerative change. The paravertebral soft tissues are within normal limits. LUMBAR SPINE: The alignment is anatomic.  There is mild compression of the superior endplate of L4. This was present on the study dated 2/18/2022 and is not significantly changed. The vertebral body heights are otherwise well maintained.  Disc spaces are preserved.  No significant central canal stenosis is demonstrated.  The neural foramina are patent throughout.  The paravertebral soft tissues are within normal limits.    1. No evidence for acute injury to the chest, abdomen, pelvis, thoracic, or lumbar  spine. 2. There is mild chronic compression of the superior endplate of L4. Mild degenerative change in the thoracic and lumbar spine. Signed by Calin Montana MD    CT 3D reconstruction    Result Date: 12/4/2024  STUDY: CT Chest, Abdomen, and Pelvis with IV Contrast, CT Thoracic Spine and Lumbar Spine without IV Contrast; 12/4/2024 5:48 PM. INDICATION: Trauma, fall.  Head injury.  Coffee ground emesis.  Syncope COMPARISON: CT AP 2/18/2022. ACCESSION NUMBER(S): [id number], [id number], [id number], [id number] ORDERING CLINICIAN: GRETEL FALCON TECHNIQUE: CT of the chest, abdomen, and pelvis was performed.  Contiguous axial images were obtained at 3 mm slice thickness through the chest, abdomen, and pelvis.  Coronal and sagittal reconstructions at 3 mm slice thickness were performed.  Omnipaque 350 75 mL was administered intravenously.  Please note that spinal images were generated from the original CT abdomen and pelvis imaging. FINDINGS: CHEST: MEDIASTINUM: The heart is normal in size without pericardial effusion.  Central vascular structures opacify normally.  LUNGS/PLEURA: There is no pleural effusion, pleural thickening, or pneumothorax. The airways are patent. Lungs are clear without consolidation, interstitial disease, or suspicious nodules. LYMPH NODES: Thoracic lymph nodes are not enlarged. ABDOMEN:  LIVER: No hepatomegaly.  Smooth surface contour.  Normal attenuation.  BILE DUCTS: No intrahepatic or extrahepatic biliary ductal dilatation.  GALLBLADDER: The gallbladder is unremarkable. STOMACH: No abnormalities identified.  PANCREAS: No masses or ductal dilatation.  SPLEEN: No splenomegaly or focal splenic lesion.  ADRENAL GLANDS: No thickening or nodules.  KIDNEYS AND URETERS: Kidneys are normal in size and location.  No renal or ureteral calculi.  PELVIS:  BLADDER: No abnormalities identified.  REPRODUCTIVE ORGANS: No abnormalities identified.  BOWEL: No abnormalities identified.  VESSELS: No  abnormalities identified.  Abdominal aorta is normal in caliber.  PERITONEUM/RETROPERITONEUM/LYMPH NODES: No free fluid.  No pneumoperitoneum. No lymphadenopathy.  ABDOMINAL WALL: No abnormalities identified. SOFT TISSUES: No abnormalities identified.  BONES: No acute fracture or aggressive osseous lesion. THORACIC SPINE: There is mild dextroscoliosis. The spine is in otherwise good alignment.  There is no fracture or traumatic subluxation. The vertebral body heights are well maintained.  Disc spaces are preserved.  No significant central canal stenosis is demonstrated. The neural foramina are patent throughout. There is mild degenerative change. The paravertebral soft tissues are within normal limits. LUMBAR SPINE: The alignment is anatomic.  There is mild compression of the superior endplate of L4. This was present on the study dated 2/18/2022 and is not significantly changed. The vertebral body heights are otherwise well maintained.  Disc spaces are preserved.  No significant central canal stenosis is demonstrated.  The neural foramina are patent throughout.  The paravertebral soft tissues are within normal limits.    1. No evidence for acute injury to the chest, abdomen, pelvis, thoracic, or lumbar spine. 2. There is mild chronic compression of the superior endplate of L4. Mild degenerative change in the thoracic and lumbar spine. Signed by Calin Montana MD    CT head wo IV contrast    Result Date: 12/4/2024  Interpreted By:  Andrez Veloz, STUDY: CT HEAD WO IV CONTRAST; CT CERVICAL SPINE WO IV CONTRAST; CT FACIAL BONES WO IV CONTRAST;  12/4/2024 5:22 pm   INDICATION: Signs/Symptoms:fall, struck face; Signs/Symptoms:fall, struck head, +loc. Altered mental status   COMPARISON: None.   ACCESSION NUMBER(S): [id number]; [id number]; [id number]   ORDERING CLINICIAN: GRETEL FALCON   TECHNIQUE: Axial noncontrast CT images of the head, face, and cervical spine. 3D volume rendering of the facial bones was obtained on  a separate workstation also reviewed.   FINDINGS: BRAIN PARENCHYMA: Gray-white matter interfaces are preserved. No mass, mass effect or midline shift. Scattered periventricular and deep white matter hypodensities suggestive of mild chronic microvascular ischemic change.   HEMORRHAGE: No acute intracranial hemorrhage. VENTRICLES and EXTRA-AXIAL SPACES: No hydrocephalus. No extra-axial collections. Mild generalized cerebral volume loss and associated ventricular and sulcal enlargement. EXTRACRANIAL SOFT TISSUES: Unremarkable. CALVARIUM: No depressed calvarial fracture.   FACIAL BONES: No acute facial bone fracture. There is absent dentition. SOFT TISSUES: No evidence of large soft tissue hematoma. Extensive bilateral facial vascular calcifications, which may be seen with diabetes or chronic renal disease. PARANASAL SINUSES: No hemorrhage in the paranasal sinuses. Mild polypoid mucosal thickening of the bilateral frontal sinuses, ethmoid air cells, and maxillary sinuses. MASTOIDS: Within normal limits. ORBITS: The globes, extraocular muscles and optic nerve sheath complexes are symmetric. No retrobulbar hematoma.   ALIGNMENT: Normal cervical lordosis. VERTEBRAE: No acute fracture. Vertebral body heights are maintained. There are no suspicious osseous lesions. Degenerative disc space narrowing and mild posterolateral osseous spurring at C4-C5 through C6-C7 contributing to mild bilateral foraminal stenosis at this levels.. SPINAL CANAL: Small calcified central disc protrusion contributing to mild-to-moderate central canal stenosis at C3-C4 additionally small disc osteophyte complexes at. PREVERTEBRAL SOFT TISSUES: No prevertebral soft tissue swelling. LUNG APICES: Partially visualized emphysematous changes in the lung apices with asymmetric bronchiectasis and presumed scarring in the left lung apex. Please see separately dictated chest CT.   OTHER FINDINGS: None.         No acute intracranial abnormality.   No evidence  of facial bone or cervical spine fracture.     Signed by: Andrez Veloz 12/4/2024 5:35 PM Dictation workstation:   [id number]    CT maxillofacial bones wo IV contrast    Result Date: 12/4/2024  Interpreted By:  Andrez Veloz, STUDY: CT HEAD WO IV CONTRAST; CT CERVICAL SPINE WO IV CONTRAST; CT FACIAL BONES WO IV CONTRAST;  12/4/2024 5:22 pm   INDICATION: Signs/Symptoms:fall, struck face; Signs/Symptoms:fall, struck head, +loc. Altered mental status   COMPARISON: None.   ACCESSION NUMBER(S): [id number]; [id number]; [id number]   ORDERING CLINICIAN: GRETEL FALCON   TECHNIQUE: Axial noncontrast CT images of the head, face, and cervical spine. 3D volume rendering of the facial bones was obtained on a separate workstation also reviewed.   FINDINGS: BRAIN PARENCHYMA: Gray-white matter interfaces are preserved. No mass, mass effect or midline shift. Scattered periventricular and deep white matter hypodensities suggestive of mild chronic microvascular ischemic change.   HEMORRHAGE: No acute intracranial hemorrhage. VENTRICLES and EXTRA-AXIAL SPACES: No hydrocephalus. No extra-axial collections. Mild generalized cerebral volume loss and associated ventricular and sulcal enlargement. EXTRACRANIAL SOFT TISSUES: Unremarkable. CALVARIUM: No depressed calvarial fracture.   FACIAL BONES: No acute facial bone fracture. There is absent dentition. SOFT TISSUES: No evidence of large soft tissue hematoma. Extensive bilateral facial vascular calcifications, which may be seen with diabetes or chronic renal disease. PARANASAL SINUSES: No hemorrhage in the paranasal sinuses. Mild polypoid mucosal thickening of the bilateral frontal sinuses, ethmoid air cells, and maxillary sinuses. MASTOIDS: Within normal limits. ORBITS: The globes, extraocular muscles and optic nerve sheath complexes are symmetric. No retrobulbar hematoma.   ALIGNMENT: Normal cervical lordosis. VERTEBRAE: No acute fracture. Vertebral body heights are maintained. There  are no suspicious osseous lesions. Degenerative disc space narrowing and mild posterolateral osseous spurring at C4-C5 through C6-C7 contributing to mild bilateral foraminal stenosis at this levels.. SPINAL CANAL: Small calcified central disc protrusion contributing to mild-to-moderate central canal stenosis at C3-C4 additionally small disc osteophyte complexes at. PREVERTEBRAL SOFT TISSUES: No prevertebral soft tissue swelling. LUNG APICES: Partially visualized emphysematous changes in the lung apices with asymmetric bronchiectasis and presumed scarring in the left lung apex. Please see separately dictated chest CT.   OTHER FINDINGS: None.         No acute intracranial abnormality.   No evidence of facial bone or cervical spine fracture.     Signed by: Andrez Veloz 12/4/2024 5:35 PM Dictation workstation:   [id number]    CT cervical spine wo IV contrast    Result Date: 12/4/2024  Interpreted By:  Andrez Veloz, STUDY: CT HEAD WO IV CONTRAST; CT CERVICAL SPINE WO IV CONTRAST; CT FACIAL BONES WO IV CONTRAST;  12/4/2024 5:22 pm   INDICATION: Signs/Symptoms:fall, struck face; Signs/Symptoms:fall, struck head, +loc. Altered mental status   COMPARISON: None.   ACCESSION NUMBER(S): [id number]; [id number]; [id number]   ORDERING CLINICIAN: GRETEL FALCON   TECHNIQUE: Axial noncontrast CT images of the head, face, and cervical spine. 3D volume rendering of the facial bones was obtained on a separate workstation also reviewed.   FINDINGS: BRAIN PARENCHYMA: Gray-white matter interfaces are preserved. No mass, mass effect or midline shift. Scattered periventricular and deep white matter hypodensities suggestive of mild chronic microvascular ischemic change.   HEMORRHAGE: No acute intracranial hemorrhage. VENTRICLES and EXTRA-AXIAL SPACES: No hydrocephalus. No extra-axial collections. Mild generalized cerebral volume loss and associated ventricular and sulcal enlargement. EXTRACRANIAL SOFT TISSUES: Unremarkable. CALVARIUM:  No depressed calvarial fracture.   FACIAL BONES: No acute facial bone fracture. There is absent dentition. SOFT TISSUES: No evidence of large soft tissue hematoma. Extensive bilateral facial vascular calcifications, which may be seen with diabetes or chronic renal disease. PARANASAL SINUSES: No hemorrhage in the paranasal sinuses. Mild polypoid mucosal thickening of the bilateral frontal sinuses, ethmoid air cells, and maxillary sinuses. MASTOIDS: Within normal limits. ORBITS: The globes, extraocular muscles and optic nerve sheath complexes are symmetric. No retrobulbar hematoma.   ALIGNMENT: Normal cervical lordosis. VERTEBRAE: No acute fracture. Vertebral body heights are maintained. There are no suspicious osseous lesions. Degenerative disc space narrowing and mild posterolateral osseous spurring at C4-C5 through C6-C7 contributing to mild bilateral foraminal stenosis at this levels.. SPINAL CANAL: Small calcified central disc protrusion contributing to mild-to-moderate central canal stenosis at C3-C4 additionally small disc osteophyte complexes at. PREVERTEBRAL SOFT TISSUES: No prevertebral soft tissue swelling. LUNG APICES: Partially visualized emphysematous changes in the lung apices with asymmetric bronchiectasis and presumed scarring in the left lung apex. Please see separately dictated chest CT.   OTHER FINDINGS: None.         No acute intracranial abnormality.   No evidence of facial bone or cervical spine fracture.     Signed by: Andrez Veloz 12/4/2024 5:35 PM Dictation workstation:   [id number]    XR chest 1 view    Result Date: 12/4/2024  STUDY: Chest Radiograph;  12/4/24, 4:14PM. INDICATION: Fall. COMPARISON: XR Chest 3/15/21. ACCESSION NUMBER(S): [id number] ORDERING CLINICIAN: GRETEL FALCON TECHNIQUE:  Frontal chest was obtained at 16:13 hours. FINDINGS: The patient status post median sternotomy. CARDIOMEDIASTINAL SILHOUETTE: Cardiomediastinal silhouette is normal in size and configuration.  LUNGS:  Lungs are clear.  ABDOMEN: No remarkable upper abdominal findings.  BONES: There is a lucency through the anterior lateral right ninth rib and a nondisplaced fracture cannot be excluded.    No evidence consolidating infiltrate or effusion. Findings suggestive of fracture of the right ninth rib. Signed by Dajuan Hall MD    XR pelvis 1-2 views    Result Date: 12/4/2024  STUDY: Pelvis Radiographs; 12/04/2024 4:13 PM INDICATION: Fall. COMPARISON: 02/18/2022 CT Abdomen and Pelvis. ACCESSION NUMBER(S): [id number] ORDERING CLINICIAN: GRETEL FALCON TECHNIQUE:  One view of the pelvis. FINDINGS:  There are degenerative change the facet joints of the lower lumbosacral spine.  The pelvic ring is intact.  There is no acute fracture.      No acute osseous abnormalities. Signed by Dajuan Hall MD        Assessment/Plan     Covid, Anemia, Emesis   No active melena or hematemesis since arrival. HH consistent with baseline. No urgent plans for endoscopy given + covid status, cardiac abnormalities, and lack of bleeding. BUN/Cr both elevated. Unclear if he even had true hematemesis as this was at home. Recommend conservative management with high dose PPI and Carafate, No absolute contraindication to anticoagulation from GI standpoint with close monitoring of HH     I spent 30 minutes in the professional and overall care of this patient.

## 2024-12-06 ENCOUNTER — APPOINTMENT (OUTPATIENT)
Dept: CARDIOLOGY | Facility: HOSPITAL | Age: 79
End: 2024-12-06
Payer: MEDICARE

## 2024-12-06 ENCOUNTER — APPOINTMENT (OUTPATIENT)
Dept: RADIOLOGY | Facility: HOSPITAL | Age: 79
DRG: 250 | End: 2024-12-06
Payer: MEDICARE

## 2024-12-06 ENCOUNTER — APPOINTMENT (OUTPATIENT)
Dept: CARDIOLOGY | Facility: HOSPITAL | Age: 79
DRG: 250 | End: 2024-12-06
Payer: MEDICARE

## 2024-12-06 ENCOUNTER — APPOINTMENT (OUTPATIENT)
Dept: NEUROLOGY | Facility: HOSPITAL | Age: 79
DRG: 250 | End: 2024-12-06
Payer: MEDICARE

## 2024-12-06 LAB
ACT BLD: 281 SEC (ref 89–169)
ACT BLD: 343 SEC (ref 89–169)
ALBUMIN SERPL BCP-MCNC: 3.2 G/DL (ref 3.4–5)
ALP SERPL-CCNC: 55 U/L (ref 33–136)
ALT SERPL W P-5'-P-CCNC: 20 U/L (ref 10–52)
ANION GAP SERPL CALCULATED.3IONS-SCNC: 10 MMOL/L (ref 10–20)
AORTIC VALVE PEAK VELOCITY: 1.4 M/S
APTT PPP: 60.1 SECONDS (ref 22–32.5)
APTT PPP: >139 SECONDS (ref 22–32.5)
AST SERPL W P-5'-P-CCNC: 44 U/L (ref 9–39)
AV PEAK GRADIENT: 8 MMHG
AVA (PEAK VEL): 1.94 CM2
BILIRUB SERPL-MCNC: 1.4 MG/DL (ref 0–1.2)
BUN SERPL-MCNC: 37 MG/DL (ref 6–23)
CALCIUM SERPL-MCNC: 8.5 MG/DL (ref 8.6–10.3)
CARDIAC TROPONIN I PNL SERPL HS: 3272 NG/L (ref 0–20)
CHLORIDE SERPL-SCNC: 104 MMOL/L (ref 98–107)
CO2 SERPL-SCNC: 27 MMOL/L (ref 21–32)
CREAT SERPL-MCNC: 1.41 MG/DL (ref 0.5–1.3)
EGFRCR SERPLBLD CKD-EPI 2021: 50 ML/MIN/1.73M*2
EJECTION FRACTION APICAL 4 CHAMBER: 30.6
EJECTION FRACTION: 43 %
ERYTHROCYTE [DISTWIDTH] IN BLOOD BY AUTOMATED COUNT: 15.1 % (ref 11.5–14.5)
GLOBAL LONGITUDINAL STRAIN: -11 %
GLUCOSE BLD MANUAL STRIP-MCNC: 92 MG/DL (ref 74–99)
GLUCOSE SERPL-MCNC: 89 MG/DL (ref 74–99)
HCT VFR BLD AUTO: 29.7 % (ref 41–52)
HGB BLD-MCNC: 10.2 G/DL (ref 13.5–17.5)
HOLD SPECIMEN: NORMAL
LEFT ATRIUM VOLUME AREA LENGTH INDEX BSA: 42.9 ML/M2
LEFT VENTRICLE INTERNAL DIMENSION DIASTOLE: 5.05 CM (ref 3.5–6)
LEFT VENTRICULAR OUTFLOW TRACT DIAMETER: 1.98 CM
LV EJECTION FRACTION BIPLANE: 38 %
MCH RBC QN AUTO: 28.9 PG (ref 26–34)
MCHC RBC AUTO-ENTMCNC: 34.3 G/DL (ref 32–36)
MCV RBC AUTO: 84 FL (ref 80–100)
MITRAL VALVE E/A RATIO: 0.63
NRBC BLD-RTO: 0 /100 WBCS (ref 0–0)
PLATELET # BLD AUTO: 98 X10*3/UL (ref 150–450)
POTASSIUM SERPL-SCNC: 4.2 MMOL/L (ref 3.5–5.3)
PROT SERPL-MCNC: 6.3 G/DL (ref 6.4–8.2)
RBC # BLD AUTO: 3.53 X10*6/UL (ref 4.5–5.9)
SODIUM SERPL-SCNC: 137 MMOL/L (ref 136–145)
WBC # BLD AUTO: 6.1 X10*3/UL (ref 4.4–11.3)

## 2024-12-06 PROCEDURE — 84484 ASSAY OF TROPONIN QUANT: CPT | Performed by: INTERNAL MEDICINE

## 2024-12-06 PROCEDURE — 2500000001 HC RX 250 WO HCPCS SELF ADMINISTERED DRUGS (ALT 637 FOR MEDICARE OP): Performed by: INTERNAL MEDICINE

## 2024-12-06 PROCEDURE — 80053 COMPREHEN METABOLIC PANEL: CPT | Performed by: INTERNAL MEDICINE

## 2024-12-06 PROCEDURE — C1769 GUIDE WIRE: HCPCS | Performed by: INTERNAL MEDICINE

## 2024-12-06 PROCEDURE — 99153 MOD SED SAME PHYS/QHP EA: CPT | Performed by: INTERNAL MEDICINE

## 2024-12-06 PROCEDURE — 85027 COMPLETE CBC AUTOMATED: CPT | Performed by: INTERNAL MEDICINE

## 2024-12-06 PROCEDURE — 82947 ASSAY GLUCOSE BLOOD QUANT: CPT

## 2024-12-06 PROCEDURE — 37799 UNLISTED PX VASCULAR SURGERY: CPT | Performed by: INTERNAL MEDICINE

## 2024-12-06 PROCEDURE — B2111ZZ FLUOROSCOPY OF MULTIPLE CORONARY ARTERIES USING LOW OSMOLAR CONTRAST: ICD-10-PCS | Performed by: INTERNAL MEDICINE

## 2024-12-06 PROCEDURE — 93356 MYOCRD STRAIN IMG SPCKL TRCK: CPT | Performed by: INTERNAL MEDICINE

## 2024-12-06 PROCEDURE — 2500000002 HC RX 250 W HCPCS SELF ADMINISTERED DRUGS (ALT 637 FOR MEDICARE OP, ALT 636 FOR OP/ED): Performed by: NURSE PRACTITIONER

## 2024-12-06 PROCEDURE — 2720000007 HC OR 272 NO HCPCS: Performed by: INTERNAL MEDICINE

## 2024-12-06 PROCEDURE — 2500000001 HC RX 250 WO HCPCS SELF ADMINISTERED DRUGS (ALT 637 FOR MEDICARE OP): Performed by: NURSE PRACTITIONER

## 2024-12-06 PROCEDURE — 2500000004 HC RX 250 GENERAL PHARMACY W/ HCPCS (ALT 636 FOR OP/ED): Performed by: NURSE PRACTITIONER

## 2024-12-06 PROCEDURE — B2151ZZ FLUOROSCOPY OF LEFT HEART USING LOW OSMOLAR CONTRAST: ICD-10-PCS | Performed by: INTERNAL MEDICINE

## 2024-12-06 PROCEDURE — 4A023N7 MEASUREMENT OF CARDIAC SAMPLING AND PRESSURE, LEFT HEART, PERCUTANEOUS APPROACH: ICD-10-PCS | Performed by: INTERNAL MEDICINE

## 2024-12-06 PROCEDURE — 93880 EXTRACRANIAL BILAT STUDY: CPT | Performed by: INTERNAL MEDICINE

## 2024-12-06 PROCEDURE — C1887 CATHETER, GUIDING: HCPCS | Performed by: INTERNAL MEDICINE

## 2024-12-06 PROCEDURE — 85347 COAGULATION TIME ACTIVATED: CPT

## 2024-12-06 PROCEDURE — 2500000004 HC RX 250 GENERAL PHARMACY W/ HCPCS (ALT 636 FOR OP/ED): Performed by: INTERNAL MEDICINE

## 2024-12-06 PROCEDURE — 99233 SBSQ HOSP IP/OBS HIGH 50: CPT | Performed by: INTERNAL MEDICINE

## 2024-12-06 PROCEDURE — G0278 ILIAC ART ANGIO,CARDIAC CATH: HCPCS | Performed by: INTERNAL MEDICINE

## 2024-12-06 PROCEDURE — 93459 L HRT ART/GRFT ANGIO: CPT | Performed by: INTERNAL MEDICINE

## 2024-12-06 PROCEDURE — 93880 EXTRACRANIAL BILAT STUDY: CPT

## 2024-12-06 PROCEDURE — C1725 CATH, TRANSLUMIN NON-LASER: HCPCS | Performed by: INTERNAL MEDICINE

## 2024-12-06 PROCEDURE — 85730 THROMBOPLASTIN TIME PARTIAL: CPT | Performed by: INTERNAL MEDICINE

## 2024-12-06 PROCEDURE — 99152 MOD SED SAME PHYS/QHP 5/>YRS: CPT | Performed by: INTERNAL MEDICINE

## 2024-12-06 PROCEDURE — 2550000001 HC RX 255 CONTRASTS: Performed by: INTERNAL MEDICINE

## 2024-12-06 PROCEDURE — 02703ZZ DILATION OF CORONARY ARTERY, ONE ARTERY, PERCUTANEOUS APPROACH: ICD-10-PCS | Performed by: INTERNAL MEDICINE

## 2024-12-06 PROCEDURE — 2060000001 HC INTERMEDIATE ICU ROOM DAILY

## 2024-12-06 PROCEDURE — 93356 MYOCRD STRAIN IMG SPCKL TRCK: CPT

## 2024-12-06 PROCEDURE — 92920 PRQ TRLUML C ANGIOP 1ART&/BR: CPT | Performed by: INTERNAL MEDICINE

## 2024-12-06 PROCEDURE — 93306 TTE W/DOPPLER COMPLETE: CPT | Performed by: INTERNAL MEDICINE

## 2024-12-06 PROCEDURE — 2500000002 HC RX 250 W HCPCS SELF ADMINISTERED DRUGS (ALT 637 FOR MEDICARE OP, ALT 636 FOR OP/ED): Performed by: INTERNAL MEDICINE

## 2024-12-06 PROCEDURE — 36415 COLL VENOUS BLD VENIPUNCTURE: CPT | Performed by: INTERNAL MEDICINE

## 2024-12-06 PROCEDURE — C1894 INTRO/SHEATH, NON-LASER: HCPCS | Performed by: INTERNAL MEDICINE

## 2024-12-06 RX ORDER — FENTANYL CITRATE 50 UG/ML
INJECTION, SOLUTION INTRAMUSCULAR; INTRAVENOUS AS NEEDED
Status: DISCONTINUED | OUTPATIENT
Start: 2024-12-06 | End: 2024-12-06 | Stop reason: HOSPADM

## 2024-12-06 RX ORDER — MIDAZOLAM HYDROCHLORIDE 1 MG/ML
INJECTION, SOLUTION INTRAMUSCULAR; INTRAVENOUS AS NEEDED
Status: DISCONTINUED | OUTPATIENT
Start: 2024-12-06 | End: 2024-12-06 | Stop reason: HOSPADM

## 2024-12-06 RX ORDER — IODIXANOL 320 MG/ML
INJECTION, SOLUTION INTRAVASCULAR AS NEEDED
Status: DISCONTINUED | OUTPATIENT
Start: 2024-12-06 | End: 2024-12-06 | Stop reason: HOSPADM

## 2024-12-06 RX ORDER — HEPARIN SODIUM 1000 [USP'U]/ML
INJECTION, SOLUTION INTRAVENOUS; SUBCUTANEOUS AS NEEDED
Status: DISCONTINUED | OUTPATIENT
Start: 2024-12-06 | End: 2024-12-06 | Stop reason: HOSPADM

## 2024-12-06 RX ORDER — LIDOCAINE HYDROCHLORIDE 10 MG/ML
INJECTION, SOLUTION EPIDURAL; INFILTRATION; INTRACAUDAL; PERINEURAL AS NEEDED
Status: DISCONTINUED | OUTPATIENT
Start: 2024-12-06 | End: 2024-12-06 | Stop reason: HOSPADM

## 2024-12-06 RX ADMIN — ATORVASTATIN CALCIUM 40 MG: 40 TABLET, FILM COATED ORAL at 20:51

## 2024-12-06 RX ADMIN — PRASUGREL 10 MG: 10 TABLET, FILM COATED ORAL at 08:56

## 2024-12-06 RX ADMIN — SUCRALFATE ORAL SUSPENSION 1 G: 1 SUSPENSION ORAL at 00:05

## 2024-12-06 RX ADMIN — LEVOTHYROXINE SODIUM 50 MCG: 0.05 TABLET ORAL at 06:04

## 2024-12-06 RX ADMIN — MIRTAZAPINE 15 MG: 15 TABLET, FILM COATED ORAL at 20:51

## 2024-12-06 RX ADMIN — PANTOPRAZOLE SODIUM 40 MG: 40 INJECTION, POWDER, FOR SOLUTION INTRAVENOUS at 20:51

## 2024-12-06 RX ADMIN — ACETAMINOPHEN 650 MG: 325 TABLET ORAL at 08:56

## 2024-12-06 RX ADMIN — SUCRALFATE ORAL SUSPENSION 1 G: 1 SUSPENSION ORAL at 06:04

## 2024-12-06 RX ADMIN — METOPROLOL SUCCINATE 25 MG: 25 TABLET, EXTENDED RELEASE ORAL at 10:30

## 2024-12-06 RX ADMIN — ASPIRIN 81 MG: 81 TABLET, COATED ORAL at 08:56

## 2024-12-06 RX ADMIN — SUCRALFATE ORAL SUSPENSION 1 G: 1 SUSPENSION ORAL at 23:20

## 2024-12-06 RX ADMIN — BUSPIRONE HYDROCHLORIDE 7.5 MG: 5 TABLET ORAL at 20:51

## 2024-12-06 ASSESSMENT — COGNITIVE AND FUNCTIONAL STATUS - GENERAL
DAILY ACTIVITIY SCORE: 18
TOILETING: A LITTLE
MOBILITY SCORE: 18
DRESSING REGULAR LOWER BODY CLOTHING: A LITTLE
EATING MEALS: A LITTLE
HELP NEEDED FOR BATHING: A LITTLE
MOVING FROM LYING ON BACK TO SITTING ON SIDE OF FLAT BED WITH BEDRAILS: A LITTLE
STANDING UP FROM CHAIR USING ARMS: A LITTLE
DRESSING REGULAR UPPER BODY CLOTHING: A LITTLE
MOVING TO AND FROM BED TO CHAIR: A LITTLE
WALKING IN HOSPITAL ROOM: A LITTLE
CLIMB 3 TO 5 STEPS WITH RAILING: A LITTLE
TURNING FROM BACK TO SIDE WHILE IN FLAT BAD: A LITTLE
PERSONAL GROOMING: A LITTLE

## 2024-12-06 ASSESSMENT — PAIN - FUNCTIONAL ASSESSMENT
PAIN_FUNCTIONAL_ASSESSMENT: 0-10

## 2024-12-06 ASSESSMENT — PAIN SCALES - GENERAL
PAINLEVEL_OUTOF10: 0 - NO PAIN
PAINLEVEL_OUTOF10: 0 - NO PAIN
PAINLEVEL_OUTOF10: 6

## 2024-12-06 ASSESSMENT — PAIN DESCRIPTION - DESCRIPTORS: DESCRIPTORS: ACHING;DISCOMFORT;SORE

## 2024-12-06 NOTE — ASSESSMENT & PLAN NOTE
Clinical course and laboratory studies compatible with non-STEMI.  He has had no active anginal symptoms since admission.  ACS protocol initiated on admission and is continued.  He is scheduled for left heart catheterization today.  Plan for further evaluation and treatment per cardiology.

## 2024-12-06 NOTE — PROGRESS NOTES
"Chavez Llamas is a 79 y.o. male on day 1 of admission presenting with Syncope and collapse.    Subjective   Patient denies any further vomiting. No melena. HH stable        Objective     Physical Exam  Vitals reviewed.   Constitutional:       General: He is awake.      Appearance: Normal appearance.   HENT:      Head: Normocephalic and atraumatic.      Mouth/Throat:      Mouth: Mucous membranes are moist.   Cardiovascular:      Rate and Rhythm: Normal rate and regular rhythm.   Pulmonary:      Effort: Pulmonary effort is normal.      Breath sounds: Normal breath sounds.   Abdominal:      General: There is no distension.      Palpations: Abdomen is soft.      Tenderness: There is no abdominal tenderness. There is no guarding.   Musculoskeletal:      Cervical back: Normal range of motion and neck supple.   Skin:     General: Skin is warm and dry.   Neurological:      General: No focal deficit present.      Mental Status: He is alert and oriented to person, place, and time. Mental status is at baseline.   Psychiatric:         Attention and Perception: Attention and perception normal.         Mood and Affect: Mood normal.         Behavior: Behavior normal.         Last Recorded Vitals  Blood pressure (!) 110/48, pulse 82, temperature 37.2 °C (99 °F), temperature source Temporal, resp. rate 22, height 1.727 m (5' 8\"), weight 69 kg (152 lb 1.9 oz), SpO2 99%.  Intake/Output last 3 Shifts:  I/O last 3 completed shifts:  In: 560 (8.1 mL/kg) [P.O.:60; IV Piggyback:500]  Out: 1000 (14.5 mL/kg) [Urine:1000 (0.4 mL/kg/hr)]  Weight: 69 kg     Relevant Results               Results for orders placed or performed during the hospital encounter of 12/04/24 (from the past 24 hours)   Prolactin   Result Value Ref Range    Prolactin 12.4 2.0 - 18.0 ug/L   POCT GLUCOSE   Result Value Ref Range    POCT Glucose 117 (H) 74 - 99 mg/dL   POCT GLUCOSE   Result Value Ref Range    POCT Glucose 108 (H) 74 - 99 mg/dL   POCT GLUCOSE   Result " Value Ref Range    POCT Glucose 85 74 - 99 mg/dL   POCT GLUCOSE   Result Value Ref Range    POCT Glucose 92 74 - 99 mg/dL   CBC   Result Value Ref Range    WBC 6.1 4.4 - 11.3 x10*3/uL    nRBC 0.0 0.0 - 0.0 /100 WBCs    RBC 3.53 (L) 4.50 - 5.90 x10*6/uL    Hemoglobin 10.2 (L) 13.5 - 17.5 g/dL    Hematocrit 29.7 (L) 41.0 - 52.0 %    MCV 84 80 - 100 fL    MCH 28.9 26.0 - 34.0 pg    MCHC 34.3 32.0 - 36.0 g/dL    RDW 15.1 (H) 11.5 - 14.5 %    Platelets 98 (L) 150 - 450 x10*3/uL   PST Top   Result Value Ref Range    Extra Tube Hold for add-ons.      ECG 12 lead    Result Date: 12/5/2024  Sinus rhythm with 1st degree AV block Left bundle branch block Abnormal ECG When compared with ECG of 04-DEC-2024 16:12, (unconfirmed) Sinus rhythm has replaced Atrial fibrillation Confirmed by Jonathan Tristan ([id number]) on 12/5/2024 2:45:20 PM    ECG 12 lead    Result Date: 12/5/2024  Atrial fibrillation with a competing junctional pacemaker Left bundle branch block Abnormal ECG When compared with ECG of 18-FEB-2022 13:46, Atrial fibrillation has replaced Sinus rhythm Left bundle branch block is now Present Criteria for Anterior infarct are no longer Present Confirmed by Jonathan Tristan ([id number]) on 12/5/2024 2:42:44 PM    CT chest abdomen pelvis w IV contrast    Result Date: 12/4/2024  STUDY: CT Chest, Abdomen, and Pelvis with IV Contrast, CT Thoracic Spine and Lumbar Spine without IV Contrast; 12/4/2024 5:48 PM. INDICATION: Trauma, fall.  Head injury.  Coffee ground emesis.  Syncope COMPARISON: CT AP 2/18/2022. ACCESSION NUMBER(S): [id number], [id number], [id number], [id number] ORDERING CLINICIAN: GRETEL FALCON TECHNIQUE: CT of the chest, abdomen, and pelvis was performed.  Contiguous axial images were obtained at 3 mm slice thickness through the chest, abdomen, and pelvis.  Coronal and sagittal reconstructions at 3 mm slice thickness were performed.  Omnipaque 350 75 mL was administered intravenously.  Please note that spinal images were  generated from the original CT abdomen and pelvis imaging. FINDINGS: CHEST: MEDIASTINUM: The heart is normal in size without pericardial effusion.  Central vascular structures opacify normally.  LUNGS/PLEURA: There is no pleural effusion, pleural thickening, or pneumothorax. The airways are patent. Lungs are clear without consolidation, interstitial disease, or suspicious nodules. LYMPH NODES: Thoracic lymph nodes are not enlarged. ABDOMEN:  LIVER: No hepatomegaly.  Smooth surface contour.  Normal attenuation.  BILE DUCTS: No intrahepatic or extrahepatic biliary ductal dilatation.  GALLBLADDER: The gallbladder is unremarkable. STOMACH: No abnormalities identified.  PANCREAS: No masses or ductal dilatation.  SPLEEN: No splenomegaly or focal splenic lesion.  ADRENAL GLANDS: No thickening or nodules.  KIDNEYS AND URETERS: Kidneys are normal in size and location.  No renal or ureteral calculi.  PELVIS:  BLADDER: No abnormalities identified.  REPRODUCTIVE ORGANS: No abnormalities identified.  BOWEL: No abnormalities identified.  VESSELS: No abnormalities identified.  Abdominal aorta is normal in caliber.  PERITONEUM/RETROPERITONEUM/LYMPH NODES: No free fluid.  No pneumoperitoneum. No lymphadenopathy.  ABDOMINAL WALL: No abnormalities identified. SOFT TISSUES: No abnormalities identified.  BONES: No acute fracture or aggressive osseous lesion. THORACIC SPINE: There is mild dextroscoliosis. The spine is in otherwise good alignment.  There is no fracture or traumatic subluxation. The vertebral body heights are well maintained.  Disc spaces are preserved.  No significant central canal stenosis is demonstrated. The neural foramina are patent throughout. There is mild degenerative change. The paravertebral soft tissues are within normal limits. LUMBAR SPINE: The alignment is anatomic.  There is mild compression of the superior endplate of L4. This was present on the study dated 2/18/2022 and is not significantly changed. The  vertebral body heights are otherwise well maintained.  Disc spaces are preserved.  No significant central canal stenosis is demonstrated.  The neural foramina are patent throughout.  The paravertebral soft tissues are within normal limits.    1. No evidence for acute injury to the chest, abdomen, pelvis, thoracic, or lumbar spine. 2. There is mild chronic compression of the superior endplate of L4. Mild degenerative change in the thoracic and lumbar spine. Signed by Calin Montana MD    CT thoracic spine wo IV contrast    Result Date: 12/4/2024  STUDY: CT Chest, Abdomen, and Pelvis with IV Contrast, CT Thoracic Spine and Lumbar Spine without IV Contrast; 12/4/2024 5:48 PM. INDICATION: Trauma, fall.  Head injury.  Coffee ground emesis.  Syncope COMPARISON: CT AP 2/18/2022. ACCESSION NUMBER(S): [id number], [id number], [id number], [id number] ORDERING CLINICIAN: GRETEL FALCON TECHNIQUE: CT of the chest, abdomen, and pelvis was performed.  Contiguous axial images were obtained at 3 mm slice thickness through the chest, abdomen, and pelvis.  Coronal and sagittal reconstructions at 3 mm slice thickness were performed.  Omnipaque 350 75 mL was administered intravenously.  Please note that spinal images were generated from the original CT abdomen and pelvis imaging. FINDINGS: CHEST: MEDIASTINUM: The heart is normal in size without pericardial effusion.  Central vascular structures opacify normally.  LUNGS/PLEURA: There is no pleural effusion, pleural thickening, or pneumothorax. The airways are patent. Lungs are clear without consolidation, interstitial disease, or suspicious nodules. LYMPH NODES: Thoracic lymph nodes are not enlarged. ABDOMEN:  LIVER: No hepatomegaly.  Smooth surface contour.  Normal attenuation.  BILE DUCTS: No intrahepatic or extrahepatic biliary ductal dilatation.  GALLBLADDER: The gallbladder is unremarkable. STOMACH: No abnormalities identified.  PANCREAS: No masses or ductal dilatation.   SPLEEN: No splenomegaly or focal splenic lesion.  ADRENAL GLANDS: No thickening or nodules.  KIDNEYS AND URETERS: Kidneys are normal in size and location.  No renal or ureteral calculi.  PELVIS:  BLADDER: No abnormalities identified.  REPRODUCTIVE ORGANS: No abnormalities identified.  BOWEL: No abnormalities identified.  VESSELS: No abnormalities identified.  Abdominal aorta is normal in caliber.  PERITONEUM/RETROPERITONEUM/LYMPH NODES: No free fluid.  No pneumoperitoneum. No lymphadenopathy.  ABDOMINAL WALL: No abnormalities identified. SOFT TISSUES: No abnormalities identified.  BONES: No acute fracture or aggressive osseous lesion. THORACIC SPINE: There is mild dextroscoliosis. The spine is in otherwise good alignment.  There is no fracture or traumatic subluxation. The vertebral body heights are well maintained.  Disc spaces are preserved.  No significant central canal stenosis is demonstrated. The neural foramina are patent throughout. There is mild degenerative change. The paravertebral soft tissues are within normal limits. LUMBAR SPINE: The alignment is anatomic.  There is mild compression of the superior endplate of L4. This was present on the study dated 2/18/2022 and is not significantly changed. The vertebral body heights are otherwise well maintained.  Disc spaces are preserved.  No significant central canal stenosis is demonstrated.  The neural foramina are patent throughout.  The paravertebral soft tissues are within normal limits.    1. No evidence for acute injury to the chest, abdomen, pelvis, thoracic, or lumbar spine. 2. There is mild chronic compression of the superior endplate of L4. Mild degenerative change in the thoracic and lumbar spine. Signed by Calin Montana MD    CT lumbar spine wo IV contrast    Result Date: 12/4/2024  STUDY: CT Chest, Abdomen, and Pelvis with IV Contrast, CT Thoracic Spine and Lumbar Spine without IV Contrast; 12/4/2024 5:48 PM. INDICATION: Trauma, fall.  Head  injury.  Coffee ground emesis.  Syncope COMPARISON: CT AP 2/18/2022. ACCESSION NUMBER(S): [id number], [id number], [id number], [id number] ORDERING CLINICIAN: GRETEL FALCON TECHNIQUE: CT of the chest, abdomen, and pelvis was performed.  Contiguous axial images were obtained at 3 mm slice thickness through the chest, abdomen, and pelvis.  Coronal and sagittal reconstructions at 3 mm slice thickness were performed.  Omnipaque 350 75 mL was administered intravenously.  Please note that spinal images were generated from the original CT abdomen and pelvis imaging. FINDINGS: CHEST: MEDIASTINUM: The heart is normal in size without pericardial effusion.  Central vascular structures opacify normally.  LUNGS/PLEURA: There is no pleural effusion, pleural thickening, or pneumothorax. The airways are patent. Lungs are clear without consolidation, interstitial disease, or suspicious nodules. LYMPH NODES: Thoracic lymph nodes are not enlarged. ABDOMEN:  LIVER: No hepatomegaly.  Smooth surface contour.  Normal attenuation.  BILE DUCTS: No intrahepatic or extrahepatic biliary ductal dilatation.  GALLBLADDER: The gallbladder is unremarkable. STOMACH: No abnormalities identified.  PANCREAS: No masses or ductal dilatation.  SPLEEN: No splenomegaly or focal splenic lesion.  ADRENAL GLANDS: No thickening or nodules.  KIDNEYS AND URETERS: Kidneys are normal in size and location.  No renal or ureteral calculi.  PELVIS:  BLADDER: No abnormalities identified.  REPRODUCTIVE ORGANS: No abnormalities identified.  BOWEL: No abnormalities identified.  VESSELS: No abnormalities identified.  Abdominal aorta is normal in caliber.  PERITONEUM/RETROPERITONEUM/LYMPH NODES: No free fluid.  No pneumoperitoneum. No lymphadenopathy.  ABDOMINAL WALL: No abnormalities identified. SOFT TISSUES: No abnormalities identified.  BONES: No acute fracture or aggressive osseous lesion. THORACIC SPINE: There is mild dextroscoliosis. The spine is in otherwise good  alignment.  There is no fracture or traumatic subluxation. The vertebral body heights are well maintained.  Disc spaces are preserved.  No significant central canal stenosis is demonstrated. The neural foramina are patent throughout. There is mild degenerative change. The paravertebral soft tissues are within normal limits. LUMBAR SPINE: The alignment is anatomic.  There is mild compression of the superior endplate of L4. This was present on the study dated 2/18/2022 and is not significantly changed. The vertebral body heights are otherwise well maintained.  Disc spaces are preserved.  No significant central canal stenosis is demonstrated.  The neural foramina are patent throughout.  The paravertebral soft tissues are within normal limits.    1. No evidence for acute injury to the chest, abdomen, pelvis, thoracic, or lumbar spine. 2. There is mild chronic compression of the superior endplate of L4. Mild degenerative change in the thoracic and lumbar spine. Signed by Calin Montana MD    CT 3D reconstruction    Result Date: 12/4/2024  STUDY: CT Chest, Abdomen, and Pelvis with IV Contrast, CT Thoracic Spine and Lumbar Spine without IV Contrast; 12/4/2024 5:48 PM. INDICATION: Trauma, fall.  Head injury.  Coffee ground emesis.  Syncope COMPARISON: CT AP 2/18/2022. ACCESSION NUMBER(S): [id number], [id number], [id number], [id number] ORDERING CLINICIAN: GRETEL FALCON TECHNIQUE: CT of the chest, abdomen, and pelvis was performed.  Contiguous axial images were obtained at 3 mm slice thickness through the chest, abdomen, and pelvis.  Coronal and sagittal reconstructions at 3 mm slice thickness were performed.  Omnipaque 350 75 mL was administered intravenously.  Please note that spinal images were generated from the original CT abdomen and pelvis imaging. FINDINGS: CHEST: MEDIASTINUM: The heart is normal in size without pericardial effusion.  Central vascular structures opacify normally.  LUNGS/PLEURA: There is no  pleural effusion, pleural thickening, or pneumothorax. The airways are patent. Lungs are clear without consolidation, interstitial disease, or suspicious nodules. LYMPH NODES: Thoracic lymph nodes are not enlarged. ABDOMEN:  LIVER: No hepatomegaly.  Smooth surface contour.  Normal attenuation.  BILE DUCTS: No intrahepatic or extrahepatic biliary ductal dilatation.  GALLBLADDER: The gallbladder is unremarkable. STOMACH: No abnormalities identified.  PANCREAS: No masses or ductal dilatation.  SPLEEN: No splenomegaly or focal splenic lesion.  ADRENAL GLANDS: No thickening or nodules.  KIDNEYS AND URETERS: Kidneys are normal in size and location.  No renal or ureteral calculi.  PELVIS:  BLADDER: No abnormalities identified.  REPRODUCTIVE ORGANS: No abnormalities identified.  BOWEL: No abnormalities identified.  VESSELS: No abnormalities identified.  Abdominal aorta is normal in caliber.  PERITONEUM/RETROPERITONEUM/LYMPH NODES: No free fluid.  No pneumoperitoneum. No lymphadenopathy.  ABDOMINAL WALL: No abnormalities identified. SOFT TISSUES: No abnormalities identified.  BONES: No acute fracture or aggressive osseous lesion. THORACIC SPINE: There is mild dextroscoliosis. The spine is in otherwise good alignment.  There is no fracture or traumatic subluxation. The vertebral body heights are well maintained.  Disc spaces are preserved.  No significant central canal stenosis is demonstrated. The neural foramina are patent throughout. There is mild degenerative change. The paravertebral soft tissues are within normal limits. LUMBAR SPINE: The alignment is anatomic.  There is mild compression of the superior endplate of L4. This was present on the study dated 2/18/2022 and is not significantly changed. The vertebral body heights are otherwise well maintained.  Disc spaces are preserved.  No significant central canal stenosis is demonstrated.  The neural foramina are patent throughout.  The paravertebral soft tissues are  within normal limits.    1. No evidence for acute injury to the chest, abdomen, pelvis, thoracic, or lumbar spine. 2. There is mild chronic compression of the superior endplate of L4. Mild degenerative change in the thoracic and lumbar spine. Signed by Calin Montana MD    CT head wo IV contrast    Result Date: 12/4/2024  Interpreted By:  Andrez Veloz, STUDY: CT HEAD WO IV CONTRAST; CT CERVICAL SPINE WO IV CONTRAST; CT FACIAL BONES WO IV CONTRAST;  12/4/2024 5:22 pm   INDICATION: Signs/Symptoms:fall, struck face; Signs/Symptoms:fall, struck head, +loc. Altered mental status   COMPARISON: None.   ACCESSION NUMBER(S): [id number]; [id number]; [id number]   ORDERING CLINICIAN: GRETEL FALCON   TECHNIQUE: Axial noncontrast CT images of the head, face, and cervical spine. 3D volume rendering of the facial bones was obtained on a separate workstation also reviewed.   FINDINGS: BRAIN PARENCHYMA: Gray-white matter interfaces are preserved. No mass, mass effect or midline shift. Scattered periventricular and deep white matter hypodensities suggestive of mild chronic microvascular ischemic change.   HEMORRHAGE: No acute intracranial hemorrhage. VENTRICLES and EXTRA-AXIAL SPACES: No hydrocephalus. No extra-axial collections. Mild generalized cerebral volume loss and associated ventricular and sulcal enlargement. EXTRACRANIAL SOFT TISSUES: Unremarkable. CALVARIUM: No depressed calvarial fracture.   FACIAL BONES: No acute facial bone fracture. There is absent dentition. SOFT TISSUES: No evidence of large soft tissue hematoma. Extensive bilateral facial vascular calcifications, which may be seen with diabetes or chronic renal disease. PARANASAL SINUSES: No hemorrhage in the paranasal sinuses. Mild polypoid mucosal thickening of the bilateral frontal sinuses, ethmoid air cells, and maxillary sinuses. MASTOIDS: Within normal limits. ORBITS: The globes, extraocular muscles and optic nerve sheath complexes are symmetric. No  retrobulbar hematoma.   ALIGNMENT: Normal cervical lordosis. VERTEBRAE: No acute fracture. Vertebral body heights are maintained. There are no suspicious osseous lesions. Degenerative disc space narrowing and mild posterolateral osseous spurring at C4-C5 through C6-C7 contributing to mild bilateral foraminal stenosis at this levels.. SPINAL CANAL: Small calcified central disc protrusion contributing to mild-to-moderate central canal stenosis at C3-C4 additionally small disc osteophyte complexes at. PREVERTEBRAL SOFT TISSUES: No prevertebral soft tissue swelling. LUNG APICES: Partially visualized emphysematous changes in the lung apices with asymmetric bronchiectasis and presumed scarring in the left lung apex. Please see separately dictated chest CT.   OTHER FINDINGS: None.         No acute intracranial abnormality.   No evidence of facial bone or cervical spine fracture.     Signed by: Anrdez Veloz 12/4/2024 5:35 PM Dictation workstation:   [id number]    CT maxillofacial bones wo IV contrast    Result Date: 12/4/2024  Interpreted By:  Andrez Veloz, STUDY: CT HEAD WO IV CONTRAST; CT CERVICAL SPINE WO IV CONTRAST; CT FACIAL BONES WO IV CONTRAST;  12/4/2024 5:22 pm   INDICATION: Signs/Symptoms:fall, struck face; Signs/Symptoms:fall, struck head, +loc. Altered mental status   COMPARISON: None.   ACCESSION NUMBER(S): [id number]; [id number]; [id number]   ORDERING CLINICIAN: GRETEL FALCON   TECHNIQUE: Axial noncontrast CT images of the head, face, and cervical spine. 3D volume rendering of the facial bones was obtained on a separate workstation also reviewed.   FINDINGS: BRAIN PARENCHYMA: Gray-white matter interfaces are preserved. No mass, mass effect or midline shift. Scattered periventricular and deep white matter hypodensities suggestive of mild chronic microvascular ischemic change.   HEMORRHAGE: No acute intracranial hemorrhage. VENTRICLES and EXTRA-AXIAL SPACES: No hydrocephalus. No extra-axial collections.  Mild generalized cerebral volume loss and associated ventricular and sulcal enlargement. EXTRACRANIAL SOFT TISSUES: Unremarkable. CALVARIUM: No depressed calvarial fracture.   FACIAL BONES: No acute facial bone fracture. There is absent dentition. SOFT TISSUES: No evidence of large soft tissue hematoma. Extensive bilateral facial vascular calcifications, which may be seen with diabetes or chronic renal disease. PARANASAL SINUSES: No hemorrhage in the paranasal sinuses. Mild polypoid mucosal thickening of the bilateral frontal sinuses, ethmoid air cells, and maxillary sinuses. MASTOIDS: Within normal limits. ORBITS: The globes, extraocular muscles and optic nerve sheath complexes are symmetric. No retrobulbar hematoma.   ALIGNMENT: Normal cervical lordosis. VERTEBRAE: No acute fracture. Vertebral body heights are maintained. There are no suspicious osseous lesions. Degenerative disc space narrowing and mild posterolateral osseous spurring at C4-C5 through C6-C7 contributing to mild bilateral foraminal stenosis at this levels.. SPINAL CANAL: Small calcified central disc protrusion contributing to mild-to-moderate central canal stenosis at C3-C4 additionally small disc osteophyte complexes at. PREVERTEBRAL SOFT TISSUES: No prevertebral soft tissue swelling. LUNG APICES: Partially visualized emphysematous changes in the lung apices with asymmetric bronchiectasis and presumed scarring in the left lung apex. Please see separately dictated chest CT.   OTHER FINDINGS: None.         No acute intracranial abnormality.   No evidence of facial bone or cervical spine fracture.     Signed by: Andrez Veloz 12/4/2024 5:35 PM Dictation workstation:   [id number]    CT cervical spine wo IV contrast    Result Date: 12/4/2024  Interpreted By:  Andrez Veloz, STUDY: CT HEAD WO IV CONTRAST; CT CERVICAL SPINE WO IV CONTRAST; CT FACIAL BONES WO IV CONTRAST;  12/4/2024 5:22 pm   INDICATION: Signs/Symptoms:fall, struck face;  Signs/Symptoms:fall, struck head, +loc. Altered mental status   COMPARISON: None.   ACCESSION NUMBER(S): [id number]; [id number]; [id number]   ORDERING CLINICIAN: GRETEL FALCON   TECHNIQUE: Axial noncontrast CT images of the head, face, and cervical spine. 3D volume rendering of the facial bones was obtained on a separate workstation also reviewed.   FINDINGS: BRAIN PARENCHYMA: Gray-white matter interfaces are preserved. No mass, mass effect or midline shift. Scattered periventricular and deep white matter hypodensities suggestive of mild chronic microvascular ischemic change.   HEMORRHAGE: No acute intracranial hemorrhage. VENTRICLES and EXTRA-AXIAL SPACES: No hydrocephalus. No extra-axial collections. Mild generalized cerebral volume loss and associated ventricular and sulcal enlargement. EXTRACRANIAL SOFT TISSUES: Unremarkable. CALVARIUM: No depressed calvarial fracture.   FACIAL BONES: No acute facial bone fracture. There is absent dentition. SOFT TISSUES: No evidence of large soft tissue hematoma. Extensive bilateral facial vascular calcifications, which may be seen with diabetes or chronic renal disease. PARANASAL SINUSES: No hemorrhage in the paranasal sinuses. Mild polypoid mucosal thickening of the bilateral frontal sinuses, ethmoid air cells, and maxillary sinuses. MASTOIDS: Within normal limits. ORBITS: The globes, extraocular muscles and optic nerve sheath complexes are symmetric. No retrobulbar hematoma.   ALIGNMENT: Normal cervical lordosis. VERTEBRAE: No acute fracture. Vertebral body heights are maintained. There are no suspicious osseous lesions. Degenerative disc space narrowing and mild posterolateral osseous spurring at C4-C5 through C6-C7 contributing to mild bilateral foraminal stenosis at this levels.. SPINAL CANAL: Small calcified central disc protrusion contributing to mild-to-moderate central canal stenosis at C3-C4 additionally small disc osteophyte complexes at. PREVERTEBRAL SOFT  TISSUES: No prevertebral soft tissue swelling. LUNG APICES: Partially visualized emphysematous changes in the lung apices with asymmetric bronchiectasis and presumed scarring in the left lung apex. Please see separately dictated chest CT.   OTHER FINDINGS: None.         No acute intracranial abnormality.   No evidence of facial bone or cervical spine fracture.     Signed by: Andrez Veloz 12/4/2024 5:35 PM Dictation workstation:   [id number]    XR chest 1 view    Result Date: 12/4/2024  STUDY: Chest Radiograph;  12/4/24, 4:14PM. INDICATION: Fall. COMPARISON: XR Chest 3/15/21. ACCESSION NUMBER(S): [id number] ORDERING CLINICIAN: GRETEL FALCON TECHNIQUE:  Frontal chest was obtained at 16:13 hours. FINDINGS: The patient status post median sternotomy. CARDIOMEDIASTINAL SILHOUETTE: Cardiomediastinal silhouette is normal in size and configuration.  LUNGS: Lungs are clear.  ABDOMEN: No remarkable upper abdominal findings.  BONES: There is a lucency through the anterior lateral right ninth rib and a nondisplaced fracture cannot be excluded.    No evidence consolidating infiltrate or effusion. Findings suggestive of fracture of the right ninth rib. Signed by Dajuan Hall MD    XR pelvis 1-2 views    Result Date: 12/4/2024  STUDY: Pelvis Radiographs; 12/04/2024 4:13 PM INDICATION: Fall. COMPARISON: 02/18/2022 CT Abdomen and Pelvis. ACCESSION NUMBER(S): [id number] ORDERING CLINICIAN: GRETEL FALCON TECHNIQUE:  One view of the pelvis. FINDINGS:  There are degenerative change the facet joints of the lower lumbosacral spine.  The pelvic ring is intact.  There is no acute fracture.      No acute osseous abnormalities. Signed by Dajuan Hall MD                  Assessment/Plan   Assessment & Plan  Syncope and collapse    Hyperlipidemia    Hypertension    Hypothyroidism    S/P BKA (below knee amputation) unilateral (Multi)    Type 2 diabetes mellitus without complications (Multi)    Emesis    COVID-19    CAD (coronary artery  disease)    Fall, initial encounter    NSTEMI (non-ST elevated myocardial infarction) (Multi)    Covid, NV, Anemia   No active melena or hematemesis since arrival. HH stable and consistent with baseline. No plans for endoscopy at this time. GI will sign off. Please call us with any signs of overt GI blood loss to reconsider EGD. For now, continue high dose PPI        I spent 20 minutes in the professional and overall care of this patient.      Harini Alva, APRN-CNP

## 2024-12-06 NOTE — ASSESSMENT & PLAN NOTE
Most likely vasovagal or related to non-STEMI with possible arrhythmia.  He will be monitored on telemetry.  Hemodynamic status is normal.  Scheduled for left heart catheterization today.  He has had no significant arrhythmia on telemetry.  Plan for further evaluation and treatment per cardiology.

## 2024-12-06 NOTE — CONSULTS
Wound Care Consult     Visit Date: 12/6/2024      Patient Name: Chavez Llamas         MRN: [id number]           YOB: 1945     Reason for Consult: Sacral wound        Wound History: deferred to the patient's chart     Pertinent Labs:   Albumin   Date Value Ref Range Status   12/06/2024 3.2 (L) 3.4 - 5.0 g/dL Final       Wound Assessment:  Wound 12/05/24 Pressure Injury Buttock (Active)   Wound Image   12/05/24 0234   Site Assessment Blanchable erythema 12/05/24 0234   Joselin-Wound Assessment Blanchable erythema;Intact 12/06/24 1300   Non-staged Wound Description Not applicable 12/06/24 1300   Pressure Injury Stage 1 12/06/24 1300   Shape diffuse 12/06/24 1300   Wound Length (cm) 10.5 cm 12/06/24 1300   Wound Width (cm) 11 cm 12/06/24 1300   Wound Surface Area (cm^2) 115.5 cm^2 12/06/24 1300   Wound Depth (cm) 0 cm 12/06/24 1300   Wound Volume (cm^3) 0 cm^3 12/06/24 1300   Drainage Description None 12/05/24 2038   Drainage Amount None 12/05/24 2038   Dressing Foam 12/05/24 2038   Dressing Changed New 12/05/24 2038   Dressing Status Clean;Dry 12/05/24 2038       Wound Team Summary Assessment: Wound care Recommendations:  Wash area with soap and water and pat dry-Apply TRIAD cream sacral area and cover with foam dressing . Change dressing daily     Wound Team Plan: Follow up to wound care recommendations as needed     Matt Alves RN  12/6/2024  1:09 PM

## 2024-12-06 NOTE — PROGRESS NOTES
Chavez Llamas is a 79 y.o. male on day 1 of admission presenting with Syncope and collapse.      Subjective  Patient sitting up in bed in no acute distress  Awaiting cardiac cath  Has no complaints of my exam  Tmax 36.5    Objective        Last Recorded Vitals      12/5/2024    12:56 PM 12/5/2024     4:23 PM 12/5/2024     7:00 PM 12/5/2024    11:47 PM 12/6/2024     3:41 AM 12/6/2024     4:35 AM 12/6/2024     8:40 AM   Vitals   Systolic 104 110 106 97 100  110   Diastolic 61 46 62 51 55  48   BP Location Right arm Right arm Left arm Left arm Left arm  Left arm   Heart Rate 60 77 73 64 77  82   Temp 36.5 °C (97.7 °F) 36 °C (96.8 °F) 36.3 °C (97.3 °F) 36.4 °C (97.5 °F) 36.4 °C (97.5 °F)  37.2 °C (99 °F)   Resp 24 14 17 22      Weight (lb)      152.12    BMI      23.13 kg/m2    BSA (m2)      1.82 m2        Imaging  ECG 12 lead    Result Date: 12/5/2024  Sinus rhythm with 1st degree AV block Left bundle branch block Abnormal ECG When compared with ECG of 04-DEC-2024 16:12, (unconfirmed) Sinus rhythm has replaced Atrial fibrillation Confirmed by Jonathan Tristan ([id number]) on 12/5/2024 2:45:20 PM    ECG 12 lead    Result Date: 12/5/2024  Atrial fibrillation with a competing junctional pacemaker Left bundle branch block Abnormal ECG When compared with ECG of 18-FEB-2022 13:46, Atrial fibrillation has replaced Sinus rhythm Left bundle branch block is now Present Criteria for Anterior infarct are no longer Present Confirmed by Jonathan Tristan ([id number]) on 12/5/2024 2:42:44 PM    CT chest abdomen pelvis w IV contrast    Result Date: 12/4/2024  STUDY: CT Chest, Abdomen, and Pelvis with IV Contrast, CT Thoracic Spine and Lumbar Spine without IV Contrast; 12/4/2024 5:48 PM. INDICATION: Trauma, fall.  Head injury.  Coffee ground emesis.  Syncope COMPARISON: CT AP 2/18/2022. ACCESSION NUMBER(S): [id number], [id number], [id number], [id number] ORDERING CLINICIAN: GRETEL FALCON TECHNIQUE: CT of the chest, abdomen, and pelvis was  performed.  Contiguous axial images were obtained at 3 mm slice thickness through the chest, abdomen, and pelvis.  Coronal and sagittal reconstructions at 3 mm slice thickness were performed.  Omnipaque 350 75 mL was administered intravenously.  Please note that spinal images were generated from the original CT abdomen and pelvis imaging. FINDINGS: CHEST: MEDIASTINUM: The heart is normal in size without pericardial effusion.  Central vascular structures opacify normally.  LUNGS/PLEURA: There is no pleural effusion, pleural thickening, or pneumothorax. The airways are patent. Lungs are clear without consolidation, interstitial disease, or suspicious nodules. LYMPH NODES: Thoracic lymph nodes are not enlarged. ABDOMEN:  LIVER: No hepatomegaly.  Smooth surface contour.  Normal attenuation.  BILE DUCTS: No intrahepatic or extrahepatic biliary ductal dilatation.  GALLBLADDER: The gallbladder is unremarkable. STOMACH: No abnormalities identified.  PANCREAS: No masses or ductal dilatation.  SPLEEN: No splenomegaly or focal splenic lesion.  ADRENAL GLANDS: No thickening or nodules.  KIDNEYS AND URETERS: Kidneys are normal in size and location.  No renal or ureteral calculi.  PELVIS:  BLADDER: No abnormalities identified.  REPRODUCTIVE ORGANS: No abnormalities identified.  BOWEL: No abnormalities identified.  VESSELS: No abnormalities identified.  Abdominal aorta is normal in caliber.  PERITONEUM/RETROPERITONEUM/LYMPH NODES: No free fluid.  No pneumoperitoneum. No lymphadenopathy.  ABDOMINAL WALL: No abnormalities identified. SOFT TISSUES: No abnormalities identified.  BONES: No acute fracture or aggressive osseous lesion. THORACIC SPINE: There is mild dextroscoliosis. The spine is in otherwise good alignment.  There is no fracture or traumatic subluxation. The vertebral body heights are well maintained.  Disc spaces are preserved.  No significant central canal stenosis is demonstrated. The neural foramina are patent  throughout. There is mild degenerative change. The paravertebral soft tissues are within normal limits. LUMBAR SPINE: The alignment is anatomic.  There is mild compression of the superior endplate of L4. This was present on the study dated 2/18/2022 and is not significantly changed. The vertebral body heights are otherwise well maintained.  Disc spaces are preserved.  No significant central canal stenosis is demonstrated.  The neural foramina are patent throughout.  The paravertebral soft tissues are within normal limits.    1. No evidence for acute injury to the chest, abdomen, pelvis, thoracic, or lumbar spine. 2. There is mild chronic compression of the superior endplate of L4. Mild degenerative change in the thoracic and lumbar spine. Signed by Calin Montana MD    CT thoracic spine wo IV contrast    Result Date: 12/4/2024  STUDY: CT Chest, Abdomen, and Pelvis with IV Contrast, CT Thoracic Spine and Lumbar Spine without IV Contrast; 12/4/2024 5:48 PM. INDICATION: Trauma, fall.  Head injury.  Coffee ground emesis.  Syncope COMPARISON: CT AP 2/18/2022. ACCESSION NUMBER(S): [id number], [id number], [id number], [id number] ORDERING CLINICIAN: GRETEL FALCON TECHNIQUE: CT of the chest, abdomen, and pelvis was performed.  Contiguous axial images were obtained at 3 mm slice thickness through the chest, abdomen, and pelvis.  Coronal and sagittal reconstructions at 3 mm slice thickness were performed.  Omnipaque 350 75 mL was administered intravenously.  Please note that spinal images were generated from the original CT abdomen and pelvis imaging. FINDINGS: CHEST: MEDIASTINUM: The heart is normal in size without pericardial effusion.  Central vascular structures opacify normally.  LUNGS/PLEURA: There is no pleural effusion, pleural thickening, or pneumothorax. The airways are patent. Lungs are clear without consolidation, interstitial disease, or suspicious nodules. LYMPH NODES: Thoracic lymph nodes are not enlarged.  ABDOMEN:  LIVER: No hepatomegaly.  Smooth surface contour.  Normal attenuation.  BILE DUCTS: No intrahepatic or extrahepatic biliary ductal dilatation.  GALLBLADDER: The gallbladder is unremarkable. STOMACH: No abnormalities identified.  PANCREAS: No masses or ductal dilatation.  SPLEEN: No splenomegaly or focal splenic lesion.  ADRENAL GLANDS: No thickening or nodules.  KIDNEYS AND URETERS: Kidneys are normal in size and location.  No renal or ureteral calculi.  PELVIS:  BLADDER: No abnormalities identified.  REPRODUCTIVE ORGANS: No abnormalities identified.  BOWEL: No abnormalities identified.  VESSELS: No abnormalities identified.  Abdominal aorta is normal in caliber.  PERITONEUM/RETROPERITONEUM/LYMPH NODES: No free fluid.  No pneumoperitoneum. No lymphadenopathy.  ABDOMINAL WALL: No abnormalities identified. SOFT TISSUES: No abnormalities identified.  BONES: No acute fracture or aggressive osseous lesion. THORACIC SPINE: There is mild dextroscoliosis. The spine is in otherwise good alignment.  There is no fracture or traumatic subluxation. The vertebral body heights are well maintained.  Disc spaces are preserved.  No significant central canal stenosis is demonstrated. The neural foramina are patent throughout. There is mild degenerative change. The paravertebral soft tissues are within normal limits. LUMBAR SPINE: The alignment is anatomic.  There is mild compression of the superior endplate of L4. This was present on the study dated 2/18/2022 and is not significantly changed. The vertebral body heights are otherwise well maintained.  Disc spaces are preserved.  No significant central canal stenosis is demonstrated.  The neural foramina are patent throughout.  The paravertebral soft tissues are within normal limits.    1. No evidence for acute injury to the chest, abdomen, pelvis, thoracic, or lumbar spine. 2. There is mild chronic compression of the superior endplate of L4. Mild degenerative change in the  thoracic and lumbar spine. Signed by Calin Montana MD    CT lumbar spine wo IV contrast    Result Date: 12/4/2024  STUDY: CT Chest, Abdomen, and Pelvis with IV Contrast, CT Thoracic Spine and Lumbar Spine without IV Contrast; 12/4/2024 5:48 PM. INDICATION: Trauma, fall.  Head injury.  Coffee ground emesis.  Syncope COMPARISON: CT AP 2/18/2022. ACCESSION NUMBER(S): [id number], [id number], [id number], [id number] ORDERING CLINICIAN: GRETEL FALCON TECHNIQUE: CT of the chest, abdomen, and pelvis was performed.  Contiguous axial images were obtained at 3 mm slice thickness through the chest, abdomen, and pelvis.  Coronal and sagittal reconstructions at 3 mm slice thickness were performed.  Omnipaque 350 75 mL was administered intravenously.  Please note that spinal images were generated from the original CT abdomen and pelvis imaging. FINDINGS: CHEST: MEDIASTINUM: The heart is normal in size without pericardial effusion.  Central vascular structures opacify normally.  LUNGS/PLEURA: There is no pleural effusion, pleural thickening, or pneumothorax. The airways are patent. Lungs are clear without consolidation, interstitial disease, or suspicious nodules. LYMPH NODES: Thoracic lymph nodes are not enlarged. ABDOMEN:  LIVER: No hepatomegaly.  Smooth surface contour.  Normal attenuation.  BILE DUCTS: No intrahepatic or extrahepatic biliary ductal dilatation.  GALLBLADDER: The gallbladder is unremarkable. STOMACH: No abnormalities identified.  PANCREAS: No masses or ductal dilatation.  SPLEEN: No splenomegaly or focal splenic lesion.  ADRENAL GLANDS: No thickening or nodules.  KIDNEYS AND URETERS: Kidneys are normal in size and location.  No renal or ureteral calculi.  PELVIS:  BLADDER: No abnormalities identified.  REPRODUCTIVE ORGANS: No abnormalities identified.  BOWEL: No abnormalities identified.  VESSELS: No abnormalities identified.  Abdominal aorta is normal in caliber.  PERITONEUM/RETROPERITONEUM/LYMPH NODES:  No free fluid.  No pneumoperitoneum. No lymphadenopathy.  ABDOMINAL WALL: No abnormalities identified. SOFT TISSUES: No abnormalities identified.  BONES: No acute fracture or aggressive osseous lesion. THORACIC SPINE: There is mild dextroscoliosis. The spine is in otherwise good alignment.  There is no fracture or traumatic subluxation. The vertebral body heights are well maintained.  Disc spaces are preserved.  No significant central canal stenosis is demonstrated. The neural foramina are patent throughout. There is mild degenerative change. The paravertebral soft tissues are within normal limits. LUMBAR SPINE: The alignment is anatomic.  There is mild compression of the superior endplate of L4. This was present on the study dated 2/18/2022 and is not significantly changed. The vertebral body heights are otherwise well maintained.  Disc spaces are preserved.  No significant central canal stenosis is demonstrated.  The neural foramina are patent throughout.  The paravertebral soft tissues are within normal limits.    1. No evidence for acute injury to the chest, abdomen, pelvis, thoracic, or lumbar spine. 2. There is mild chronic compression of the superior endplate of L4. Mild degenerative change in the thoracic and lumbar spine. Signed by Calin Montana MD    CT 3D reconstruction    Result Date: 12/4/2024  STUDY: CT Chest, Abdomen, and Pelvis with IV Contrast, CT Thoracic Spine and Lumbar Spine without IV Contrast; 12/4/2024 5:48 PM. INDICATION: Trauma, fall.  Head injury.  Coffee ground emesis.  Syncope COMPARISON: CT AP 2/18/2022. ACCESSION NUMBER(S): [id number], [id number], [id number], [id number] ORDERING CLINICIAN: GRETEL FALCON TECHNIQUE: CT of the chest, abdomen, and pelvis was performed.  Contiguous axial images were obtained at 3 mm slice thickness through the chest, abdomen, and pelvis.  Coronal and sagittal reconstructions at 3 mm slice thickness were performed.  Omnipaque 350 75 mL was  administered intravenously.  Please note that spinal images were generated from the original CT abdomen and pelvis imaging. FINDINGS: CHEST: MEDIASTINUM: The heart is normal in size without pericardial effusion.  Central vascular structures opacify normally.  LUNGS/PLEURA: There is no pleural effusion, pleural thickening, or pneumothorax. The airways are patent. Lungs are clear without consolidation, interstitial disease, or suspicious nodules. LYMPH NODES: Thoracic lymph nodes are not enlarged. ABDOMEN:  LIVER: No hepatomegaly.  Smooth surface contour.  Normal attenuation.  BILE DUCTS: No intrahepatic or extrahepatic biliary ductal dilatation.  GALLBLADDER: The gallbladder is unremarkable. STOMACH: No abnormalities identified.  PANCREAS: No masses or ductal dilatation.  SPLEEN: No splenomegaly or focal splenic lesion.  ADRENAL GLANDS: No thickening or nodules.  KIDNEYS AND URETERS: Kidneys are normal in size and location.  No renal or ureteral calculi.  PELVIS:  BLADDER: No abnormalities identified.  REPRODUCTIVE ORGANS: No abnormalities identified.  BOWEL: No abnormalities identified.  VESSELS: No abnormalities identified.  Abdominal aorta is normal in caliber.  PERITONEUM/RETROPERITONEUM/LYMPH NODES: No free fluid.  No pneumoperitoneum. No lymphadenopathy.  ABDOMINAL WALL: No abnormalities identified. SOFT TISSUES: No abnormalities identified.  BONES: No acute fracture or aggressive osseous lesion. THORACIC SPINE: There is mild dextroscoliosis. The spine is in otherwise good alignment.  There is no fracture or traumatic subluxation. The vertebral body heights are well maintained.  Disc spaces are preserved.  No significant central canal stenosis is demonstrated. The neural foramina are patent throughout. There is mild degenerative change. The paravertebral soft tissues are within normal limits. LUMBAR SPINE: The alignment is anatomic.  There is mild compression of the superior endplate of L4. This was present on  the study dated 2/18/2022 and is not significantly changed. The vertebral body heights are otherwise well maintained.  Disc spaces are preserved.  No significant central canal stenosis is demonstrated.  The neural foramina are patent throughout.  The paravertebral soft tissues are within normal limits.    1. No evidence for acute injury to the chest, abdomen, pelvis, thoracic, or lumbar spine. 2. There is mild chronic compression of the superior endplate of L4. Mild degenerative change in the thoracic and lumbar spine. Signed by Calin Montana MD    CT head wo IV contrast    Result Date: 12/4/2024  Interpreted By:  Andrez Veloz, STUDY: CT HEAD WO IV CONTRAST; CT CERVICAL SPINE WO IV CONTRAST; CT FACIAL BONES WO IV CONTRAST;  12/4/2024 5:22 pm   INDICATION: Signs/Symptoms:fall, struck face; Signs/Symptoms:fall, struck head, +loc. Altered mental status   COMPARISON: None.   ACCESSION NUMBER(S): [id number]; [id number]; [id number]   ORDERING CLINICIAN: GRETEL FALCON   TECHNIQUE: Axial noncontrast CT images of the head, face, and cervical spine. 3D volume rendering of the facial bones was obtained on a separate workstation also reviewed.   FINDINGS: BRAIN PARENCHYMA: Gray-white matter interfaces are preserved. No mass, mass effect or midline shift. Scattered periventricular and deep white matter hypodensities suggestive of mild chronic microvascular ischemic change.   HEMORRHAGE: No acute intracranial hemorrhage. VENTRICLES and EXTRA-AXIAL SPACES: No hydrocephalus. No extra-axial collections. Mild generalized cerebral volume loss and associated ventricular and sulcal enlargement. EXTRACRANIAL SOFT TISSUES: Unremarkable. CALVARIUM: No depressed calvarial fracture.   FACIAL BONES: No acute facial bone fracture. There is absent dentition. SOFT TISSUES: No evidence of large soft tissue hematoma. Extensive bilateral facial vascular calcifications, which may be seen with diabetes or chronic renal disease. PARANASAL  SINUSES: No hemorrhage in the paranasal sinuses. Mild polypoid mucosal thickening of the bilateral frontal sinuses, ethmoid air cells, and maxillary sinuses. MASTOIDS: Within normal limits. ORBITS: The globes, extraocular muscles and optic nerve sheath complexes are symmetric. No retrobulbar hematoma.   ALIGNMENT: Normal cervical lordosis. VERTEBRAE: No acute fracture. Vertebral body heights are maintained. There are no suspicious osseous lesions. Degenerative disc space narrowing and mild posterolateral osseous spurring at C4-C5 through C6-C7 contributing to mild bilateral foraminal stenosis at this levels.. SPINAL CANAL: Small calcified central disc protrusion contributing to mild-to-moderate central canal stenosis at C3-C4 additionally small disc osteophyte complexes at. PREVERTEBRAL SOFT TISSUES: No prevertebral soft tissue swelling. LUNG APICES: Partially visualized emphysematous changes in the lung apices with asymmetric bronchiectasis and presumed scarring in the left lung apex. Please see separately dictated chest CT.   OTHER FINDINGS: None.         No acute intracranial abnormality.   No evidence of facial bone or cervical spine fracture.     Signed by: Andrez Veloz 12/4/2024 5:35 PM Dictation workstation:   [id number]    CT maxillofacial bones wo IV contrast    Result Date: 12/4/2024  Interpreted By:  Andrez Veloz, STUDY: CT HEAD WO IV CONTRAST; CT CERVICAL SPINE WO IV CONTRAST; CT FACIAL BONES WO IV CONTRAST;  12/4/2024 5:22 pm   INDICATION: Signs/Symptoms:fall, struck face; Signs/Symptoms:fall, struck head, +loc. Altered mental status   COMPARISON: None.   ACCESSION NUMBER(S): [id number]; [id number]; [id number]   ORDERING CLINICIAN: GRETEL FALCON   TECHNIQUE: Axial noncontrast CT images of the head, face, and cervical spine. 3D volume rendering of the facial bones was obtained on a separate workstation also reviewed.   FINDINGS: BRAIN PARENCHYMA: Gray-white matter interfaces are preserved. No  mass, mass effect or midline shift. Scattered periventricular and deep white matter hypodensities suggestive of mild chronic microvascular ischemic change.   HEMORRHAGE: No acute intracranial hemorrhage. VENTRICLES and EXTRA-AXIAL SPACES: No hydrocephalus. No extra-axial collections. Mild generalized cerebral volume loss and associated ventricular and sulcal enlargement. EXTRACRANIAL SOFT TISSUES: Unremarkable. CALVARIUM: No depressed calvarial fracture.   FACIAL BONES: No acute facial bone fracture. There is absent dentition. SOFT TISSUES: No evidence of large soft tissue hematoma. Extensive bilateral facial vascular calcifications, which may be seen with diabetes or chronic renal disease. PARANASAL SINUSES: No hemorrhage in the paranasal sinuses. Mild polypoid mucosal thickening of the bilateral frontal sinuses, ethmoid air cells, and maxillary sinuses. MASTOIDS: Within normal limits. ORBITS: The globes, extraocular muscles and optic nerve sheath complexes are symmetric. No retrobulbar hematoma.   ALIGNMENT: Normal cervical lordosis. VERTEBRAE: No acute fracture. Vertebral body heights are maintained. There are no suspicious osseous lesions. Degenerative disc space narrowing and mild posterolateral osseous spurring at C4-C5 through C6-C7 contributing to mild bilateral foraminal stenosis at this levels.. SPINAL CANAL: Small calcified central disc protrusion contributing to mild-to-moderate central canal stenosis at C3-C4 additionally small disc osteophyte complexes at. PREVERTEBRAL SOFT TISSUES: No prevertebral soft tissue swelling. LUNG APICES: Partially visualized emphysematous changes in the lung apices with asymmetric bronchiectasis and presumed scarring in the left lung apex. Please see separately dictated chest CT.   OTHER FINDINGS: None.         No acute intracranial abnormality.   No evidence of facial bone or cervical spine fracture.     Signed by: Andrez Veloz 12/4/2024 5:35 PM Dictation workstation:    [id number]    CT cervical spine wo IV contrast    Result Date: 12/4/2024  Interpreted By:  Andrez Veloz, STUDY: CT HEAD WO IV CONTRAST; CT CERVICAL SPINE WO IV CONTRAST; CT FACIAL BONES WO IV CONTRAST;  12/4/2024 5:22 pm   INDICATION: Signs/Symptoms:fall, struck face; Signs/Symptoms:fall, struck head, +loc. Altered mental status   COMPARISON: None.   ACCESSION NUMBER(S): [id number]; [id number]; [id number]   ORDERING CLINICIAN: GRETEL FALCON   TECHNIQUE: Axial noncontrast CT images of the head, face, and cervical spine. 3D volume rendering of the facial bones was obtained on a separate workstation also reviewed.   FINDINGS: BRAIN PARENCHYMA: Gray-white matter interfaces are preserved. No mass, mass effect or midline shift. Scattered periventricular and deep white matter hypodensities suggestive of mild chronic microvascular ischemic change.   HEMORRHAGE: No acute intracranial hemorrhage. VENTRICLES and EXTRA-AXIAL SPACES: No hydrocephalus. No extra-axial collections. Mild generalized cerebral volume loss and associated ventricular and sulcal enlargement. EXTRACRANIAL SOFT TISSUES: Unremarkable. CALVARIUM: No depressed calvarial fracture.   FACIAL BONES: No acute facial bone fracture. There is absent dentition. SOFT TISSUES: No evidence of large soft tissue hematoma. Extensive bilateral facial vascular calcifications, which may be seen with diabetes or chronic renal disease. PARANASAL SINUSES: No hemorrhage in the paranasal sinuses. Mild polypoid mucosal thickening of the bilateral frontal sinuses, ethmoid air cells, and maxillary sinuses. MASTOIDS: Within normal limits. ORBITS: The globes, extraocular muscles and optic nerve sheath complexes are symmetric. No retrobulbar hematoma.   ALIGNMENT: Normal cervical lordosis. VERTEBRAE: No acute fracture. Vertebral body heights are maintained. There are no suspicious osseous lesions. Degenerative disc space narrowing and mild posterolateral osseous spurring at C4-C5  through C6-C7 contributing to mild bilateral foraminal stenosis at this levels.. SPINAL CANAL: Small calcified central disc protrusion contributing to mild-to-moderate central canal stenosis at C3-C4 additionally small disc osteophyte complexes at. PREVERTEBRAL SOFT TISSUES: No prevertebral soft tissue swelling. LUNG APICES: Partially visualized emphysematous changes in the lung apices with asymmetric bronchiectasis and presumed scarring in the left lung apex. Please see separately dictated chest CT.   OTHER FINDINGS: None.         No acute intracranial abnormality.   No evidence of facial bone or cervical spine fracture.     Signed by: Andrez Veloz 12/4/2024 5:35 PM Dictation workstation:   [id number]    XR chest 1 view    Result Date: 12/4/2024  STUDY: Chest Radiograph;  12/4/24, 4:14PM. INDICATION: Fall. COMPARISON: XR Chest 3/15/21. ACCESSION NUMBER(S): [id number] ORDERING CLINICIAN: GRETEL FALCON TECHNIQUE:  Frontal chest was obtained at 16:13 hours. FINDINGS: The patient status post median sternotomy. CARDIOMEDIASTINAL SILHOUETTE: Cardiomediastinal silhouette is normal in size and configuration.  LUNGS: Lungs are clear.  ABDOMEN: No remarkable upper abdominal findings.  BONES: There is a lucency through the anterior lateral right ninth rib and a nondisplaced fracture cannot be excluded.    No evidence consolidating infiltrate or effusion. Findings suggestive of fracture of the right ninth rib. Signed by Dajuan Hall MD    XR pelvis 1-2 views    Result Date: 12/4/2024  STUDY: Pelvis Radiographs; 12/04/2024 4:13 PM INDICATION: Fall. COMPARISON: 02/18/2022 CT Abdomen and Pelvis. ACCESSION NUMBER(S): [id number] ORDERING CLINICIAN: GRETEL FALCON TECHNIQUE:  One view of the pelvis. FINDINGS:  There are degenerative change the facet joints of the lower lumbosacral spine.  The pelvic ring is intact.  There is no acute fracture.      No acute osseous abnormalities. Signed by Dajuan Hall MD       Relevant  Results  Results for orders placed or performed during the hospital encounter of 12/04/24 (from the past 24 hours)   Prolactin   Result Value Ref Range    Prolactin 12.4 2.0 - 18.0 ug/L   POCT GLUCOSE   Result Value Ref Range    POCT Glucose 117 (H) 74 - 99 mg/dL   POCT GLUCOSE   Result Value Ref Range    POCT Glucose 108 (H) 74 - 99 mg/dL   POCT GLUCOSE   Result Value Ref Range    POCT Glucose 85 74 - 99 mg/dL   POCT GLUCOSE   Result Value Ref Range    POCT Glucose 92 74 - 99 mg/dL   CBC   Result Value Ref Range    WBC 6.1 4.4 - 11.3 x10*3/uL    nRBC 0.0 0.0 - 0.0 /100 WBCs    RBC 3.53 (L) 4.50 - 5.90 x10*6/uL    Hemoglobin 10.2 (L) 13.5 - 17.5 g/dL    Hematocrit 29.7 (L) 41.0 - 52.0 %    MCV 84 80 - 100 fL    MCH 28.9 26.0 - 34.0 pg    MCHC 34.3 32.0 - 36.0 g/dL    RDW 15.1 (H) 11.5 - 14.5 %    Platelets 98 (L) 150 - 450 x10*3/uL   PST Top   Result Value Ref Range    Extra Tube Hold for add-ons.    Comprehensive Metabolic Panel   Result Value Ref Range    Glucose 89 74 - 99 mg/dL    Sodium 137 136 - 145 mmol/L    Potassium 4.2 3.5 - 5.3 mmol/L    Chloride 104 98 - 107 mmol/L    Bicarbonate 27 21 - 32 mmol/L    Anion Gap 10 10 - 20 mmol/L    Urea Nitrogen 37 (H) 6 - 23 mg/dL    Creatinine 1.41 (H) 0.50 - 1.30 mg/dL    eGFR 50 (L) >60 mL/min/1.73m*2    Calcium 8.5 (L) 8.6 - 10.3 mg/dL    Albumin 3.2 (L) 3.4 - 5.0 g/dL    Alkaline Phosphatase 55 33 - 136 U/L    Total Protein 6.3 (L) 6.4 - 8.2 g/dL    AST 44 (H) 9 - 39 U/L    Bilirubin, Total 1.4 (H) 0.0 - 1.2 mg/dL    ALT 20 10 - 52 U/L   POCT GLUCOSE   Result Value Ref Range    POCT Glucose 92 74 - 99 mg/dL   Transthoracic Echo (TTE) Complete   Result Value Ref Range    BSA 1.82 m2       Physical Exam  Constitutional:       Appearance: Normal appearance.   HENT:      Head: Normocephalic and atraumatic.      Nose: Nose normal.   Eyes:      General: No scleral icterus.     Extraocular Movements: Extraocular movements intact.      Conjunctiva/sclera: Conjunctivae  normal.   Cardiovascular:      Rate and Rhythm: Normal rate and regular rhythm.      Heart sounds: Normal heart sounds.   Pulmonary:      Effort: Pulmonary effort is normal.      Breath sounds: Normal breath sounds.   Abdominal:      General: Bowel sounds are normal.      Palpations: Abdomen is soft.      Tenderness: There is no abdominal tenderness.   Musculoskeletal:      Cervical back: Normal range of motion and neck supple.      Right lower leg: No edema.      Left lower leg: No edema.   Skin:     General: Skin is warm and dry.   Neurological:      Mental Status: He is alert and oriented to person, place, and time.   Psychiatric:         Behavior: Behavior normal.     No results found for the last 90 days.    This patient does not have an active medication from one of the medication groupers.    Assessment/Plan  Syncope, vasovagal versus related to coronavirus infection  Coronavirus infection-normal oxygenation, patient high risk for progression     Monitor oxygenation  Remdesivir declined 12/5  Cardiology follow-up, plan for cardiac cath on 12/6  Monitor temperature and WBC  Droplet plus precautions      Total time spent caring for the patient today was 20 minutes.  This includes time spent before the visit reviewing the chart, time spent during the visit, and time spent after the visit on documentation.

## 2024-12-06 NOTE — PROGRESS NOTES
12/06/24 1343   Discharge Planning   Living Arrangements Alone   Support Systems Children   Assistance Needed children help with shopping and cooking   Type of Residence Private residence   Number of Stairs Within Residence 0   Home or Post Acute Services Post acute facilities (Rehab/SNF/etc)   Expected Discharge Disposition SNF   Does the patient need discharge transport arranged? Yes   RoundTrip coordination needed? Yes     Met with step aldo Bowles in the [facility] out side of patient room.  Patient is in isolation for +COVID.  Jelena is POA.  A copy of the paperwork was requested for hospital records.  Patient resides alone in a ranch style home with a basement.  There are two steps to enter the home.  Patient is basically wheelchair bound with a history of a left BKA.   He is able to pivot in and out of wheelchair with a walker. Family shops and provides meals.  Step daughter sets up pills.  Family checks on patient daily.   Patient is able to do his own laundry. Patient does not require oxygen or cpap.  Patient is diabetic and checks blood sugar levels daily with a glucometer. Patient does not smoke or use alcohol.  Patient did have a fall from the toilet just prior to admission.     Plan is for a heart cath today.  Patient will need PT/OT when cleared by cardiology. Step daughter feels patient may benefit from SNF.

## 2024-12-06 NOTE — PROGRESS NOTES
Occupational Therapy                 Therapy Communication Note    Patient Name: Chavez Llamas  MRN: [id number]  Department: [facility]  Room: 16/16-B  Today's Date: 12/6/2024     Discipline: Occupational Therapy    Missed Visit Reason: Missed Visit Reason: Cancel (Last troponin reading is 4,799.  Per cardiology, patient to undergo coronary angiogram to determine whether there is a need for additional PCI to the RCA.  Will hold OT/PT at this time)    Missed Time: Cancel    Comment:

## 2024-12-06 NOTE — CARE PLAN
The patient's goals for the shift include  to have my MRI    The clinical goals for the shift include pt will remain safe and free from injury      Problem: Pain - Adult  Goal: Verbalizes/displays adequate comfort level or baseline comfort level  Outcome: Progressing     Problem: Safety - Adult  Goal: Free from fall injury  Outcome: Progressing     Problem: Discharge Planning  Goal: Discharge to home or other facility with appropriate resources  Outcome: Progressing     Problem: Chronic Conditions and Co-morbidities  Goal: Patient's chronic conditions and co-morbidity symptoms are monitored and maintained or improved  Outcome: Progressing     Problem: Diabetes  Goal: Achieve decreasing blood glucose levels by end of shift  Outcome: Progressing  Goal: Increase stability of blood glucose readings by end of shift  Outcome: Progressing  Goal: Decrease in ketones present in urine by end of shift  Outcome: Progressing  Goal: Maintain electrolyte levels within acceptable range throughout shift  Outcome: Progressing  Goal: Maintain glucose levels >70mg/dl to <250mg/dl throughout shift  Outcome: Progressing  Goal: No changes in neurological exam by end of shift  Outcome: Progressing  Goal: Learn about and adhere to nutrition recommendations by end of shift  Outcome: Progressing  Goal: Vital signs within normal range for age by end of shift  Outcome: Progressing  Goal: Increase self care and/or family involovement by end of shift  Outcome: Progressing  Goal: Receive DSME education by end of shift  Outcome: Progressing

## 2024-12-06 NOTE — PROGRESS NOTES
Physical Therapy                 Therapy Communication Note    Patient Name: Chavez Llamas  MRN: [id number]  Department: [facility] NONV1  Room: 16/16-B  Today's Date: 12/6/2024     Discipline: Physical Therapy    Missed Visit Reason: Missed Visit Reason: Cancel, Other (Comment) (Last troponin reading is 4,799.  Per cardiology, patient to undergo coronary angiogram to determine whether there is a need for additional PCI to the RCA.  Will hold OT/PT at this time)    Missed Time: Cancel

## 2024-12-06 NOTE — PROGRESS NOTES
Subjective Data:      Overnight Events:         Objective Data:  Last Recorded Vitals:  Vitals:    12/05/24 2347 12/06/24 0341 12/06/24 0435 12/06/24 0840   BP: 97/51 100/55  (!) 110/48   BP Location: Left arm Left arm  Left arm   Patient Position: Lying Lying  Lying   Pulse: 64 77  82   Resp: 22      Temp: 36.4 °C (97.5 °F) 36.4 °C (97.5 °F)  37.2 °C (99 °F)   TempSrc: Temporal Temporal  Temporal   SpO2: 97% 99%     Weight:   69 kg (152 lb 1.9 oz)    Height:           Last Labs:  CBC - 12/6/2024:  4:55 AM  6.1 10.2 98    29.7      CMP - 12/6/2024:  4:55 AM  8.5 6.3 44 --- 1.4   5.4 3.2 20 55      PTT - No results in last year.  _   _ _     TROPHS   Date/Time Value Ref Range Status   12/05/2024 05:05 AM 4,799 0 - 20 ng/L Final     Comment:     Previous result verified on 12/5/2024 0033 on specimen/case [id number] called with component [facility] for procedure Troponin I, High Sensitivity with value 192 ng/L.   12/04/2024 11:47  0 - 20 ng/L Final   12/04/2024 06:58 PM 22 0 - 20 ng/L Final     BNP   Date/Time Value Ref Range Status   12/04/2024 04:23  0 - 99 pg/mL Final     HGBA1C   Date/Time Value Ref Range Status   12/05/2024 05:05 AM 5.5 See comment % Final   06/11/2024 09:29 AM 5.3 see below % Final     LDLCALC   Date/Time Value Ref Range Status   12/04/2024 11:47 PM 34 <=99 mg/dL Final     Comment:                                 Near   Borderline      AGE      Desirable  Optimal    High     High     Very High     0-19 Y     0 - 109     ---    110-129   >/= 130     ----    20-24 Y     0 - 119     ---    120-159   >/= 160     ----      >24 Y     0 -  99   100-129  130-159   160-189     >/=190     06/11/2024 09:29 AM 23 <=99 mg/dL Final     Comment:                                 Near   Borderline      AGE      Desirable  Optimal    High     High     Very High     0-19 Y     0 - 109     ---    110-129   >/= 130     ----    20-24 Y     0 - 119     ---    120-159   >/= 160     ----      >24 Y     0 -  99  "  100-129  130-159   160-189     >/=190     02/23/2022 05:15 AM 22 65 - 130 MG/DL Final   03/14/2021 04:17 AM 22 65 - 130 MG/DL Final   12/02/2020 03:37 AM 51 65 - 130 MG/DL Final     VLDL   Date/Time Value Ref Range Status   12/04/2024 11:47 PM 18 0 - 40 mg/dL Final   06/11/2024 09:29 AM 19 0 - 40 mg/dL Final   06/05/2023 10:02 AM 19 0 - 40 mg/dL Final   09/21/2022 08:32 AM 13 0 - 40 mg/dL Final   09/03/2021 08:50 AM 15 0 - 40 mg/dL Final      Last I/O:  I/O last 3 completed shifts:  In: 560 (8.1 mL/kg) [P.O.:60; IV Piggyback:500]  Out: 1000 (14.5 mL/kg) [Urine:1000 (0.4 mL/kg/hr)]  Weight: 69 kg     Past Cardiology Tests (Last 3 Years):  EKG:  ECG 12 lead 12/04/2024      ECG 12 lead 12/04/2024    Echo:  No results found for this or any previous visit from the past 1095 days.    Ejection Fractions:  No results found for: \"EF\"  Cath:  No results found for this or any previous visit from the past 1095 days.    Stress Test:  No results found for this or any previous visit from the past 1095 days.    Cardiac Imaging:  No results found for this or any previous visit from the past 1095 days.      Inpatient Medications:  Scheduled medications   Medication Dose Route Frequency    aspirin  81 mg oral Daily    atorvastatin  40 mg oral Nightly    busPIRone  7.5 mg oral BID    insulin lispro  0-10 Units subcutaneous TID AC    levothyroxine  50 mcg oral Daily    metoprolol succinate XL  25 mg oral Daily    mirtazapine  15 mg oral Nightly    nitroglycerin  1 patch transdermal Daily    pantoprazole  40 mg intravenous BID    prasugrel  10 mg oral Daily    sucralfate  1 g oral q6h JENNIFER     PRN medications   Medication    acetaminophen    Or    acetaminophen    Or    acetaminophen    benzocaine-menthol    dextromethorphan-guaifenesin    dextrose    dextrose    glucagon    glucagon    melatonin    ondansetron ODT    Or    ondansetron     Continuous Medications   Medication Dose Last Rate       Physical Exam:  The patient is a nonobese " balding elderly white male lying supine in bed.  He is awake alert conversant in no overt painful respiratory distress.  JVP not elevated carotid impulses are 2+  Chest fair air movement breath sounds clear anteriorly.  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  No peripheral edema on the right pedal pulses are present        Assessment/Plan   12/5: The patient is an 79-year-old white male with a history of former smoking COPD hypertension hyperlipidemia type 2 diabetes remote CABG x 2 in the 1990s at [facility] with LIMA to LAD and saphenous vein graft to unknown target.  He had a PCI bare-metal stent deployed to the mid RCA in 2010.  In 2020 he had PCI and stent appointment for an in-stent restenosis within the mid RCA.  In 3/2021 he required PTCA for in-stent restenosis/thrombus within the RCA.  At that time he did have some transient complete heart block requiring temporary pacemaker.  Patient is currently admitted with a syncopal event while on the toilet.  Initial possibilities include a simple vasovagal event related to nausea and vomiting with the patient being COVID-positive as well.  However his high-sensitivity troponin is elevated and it is certainly conceivable that he had an episode of myocardial ischemia resulting in vagal reaction and transient bradycardia with syncope.  In this regard he would be at high risk for a recurrent RCA stenosis given the fact that he has had 3 separate interventional procedures to that vessel.  The patient had been on aspirin and Effient prior to admission both of which have been briefly held.  IV heparin not instituted out of concern initially because for the hematemesis.  This patient may benefit from a relook coronary angiogram to determine whether there is a need for additional PCI to the RCA and will discuss with interventional cardiology.    12/6: Patient had a uneventful night without any  chest discomfort nausea vomiting.  Telemetry monitor demonstrates sinus rhythm primarily in the 70s per minute range occasional PACs no high-grade AV gracie heart block with baseline left bundle branch block conduction delay.  Will initiate low-dose Toprol-XL 25 mg daily.  All renal parameters remain stable with creatinine of 1.41 potassium of 4.2.  Serial hemoglobins remain stable currently 10.2 with hematocrit of 29.7.  His initial hemoglobin was 11.8 with hematocrit of 35.8.  Patient is being monitored by gastroenterology.  The patient has had no active melena or hematemesis and there is no initial plan at this point for any endoscopic procedure.  The patient empirically is been placed on high-dose PPI.  Patient being followed by infectious disease for his COVID positivity and remdesivir therapy was declined by the patient.  Will continue to monitor high-sensitivity troponins care.  The case was discussed at length with interventional cardiology and the patient is scheduled to undergo cardiac cath and possible PCI to the suspected culprit RCA.  The patient remains on his aspirin and prasugrel which had been taken for an extended period of time since last PCI procedure in 3/2021.  Patient currently receiving nitro drip patch as well at least on a temporary basis.          Code Status:  Full Code    I spent  minutes in the professional and overall care of this patient.        Benjamin Nuno MD

## 2024-12-06 NOTE — PROGRESS NOTES
12/06/24 1333   [facility] Disability Status   Are you deaf or do you have serious difficulty hearing? N   Are you blind or do you have serious difficulty seeing, even when wearing glasses? N   Because of a physical, mental, or emotional condition, do you have serious difficulty concentrating, remembering, or making decisions? (5 years old or older) N   Do you have serious difficulty walking or climbing stairs? Y  (BKA)   Do you have serious difficulty dressing or bathing? N   Because of a physical, mental, or emotional condition, do you have serious difficulty doing errands alone such as visiting the doctor? Y

## 2024-12-06 NOTE — PROGRESS NOTES
Pt with altered mental status in the setting of STEMI and Covid.  His overall presentation suggests a non-neurologic event and I am inclined to cancel the MRI and the EEG is not going to get done today, so I have cancelled it.  Please call with further neurologic questions and concerns.

## 2024-12-06 NOTE — PROGRESS NOTES
Chavez Llamas is a 79 y.o. male on day 1 of admission presenting with Syncope and collapse.      Subjective   He feels better today.  He is hungry since he has been n.p.o. since admission.  He denies chest pain, shortness of breath, nausea or vomiting today.  He is scheduled for cardiac catheterization at 10:30 AM today.       Objective     Last Recorded Vitals  BP (!) 110/48 (BP Location: Left arm, Patient Position: Lying)   Pulse 82   Temp 37.2 °C (99 °F) (Temporal)   Resp 22   Wt 69 kg (152 lb 1.9 oz)   SpO2 99%   Intake/Output last 3 Shifts:    Intake/Output Summary (Last 24 hours) at 12/6/2024 1042  Last data filed at 12/6/2024 0608  Gross per 24 hour   Intake 60 ml   Output 700 ml   Net -640 ml       Admission Weight  Weight: 68.5 kg (151 lb 0.2 oz) (12/04/24 1559)    Daily Weight  12/06/24 : 69 kg (152 lb 1.9 oz)    Image Results  ECG 12 lead  Sinus rhythm with 1st degree AV block  Left bundle branch block  Abnormal ECG  When compared with ECG of 04-DEC-2024 16:12, (unconfirmed)  Sinus rhythm has replaced Atrial fibrillation  Confirmed by Jonathan Tristan ([id number]) on 12/5/2024 2:45:20 PM  ECG 12 lead  Atrial fibrillation with a competing junctional pacemaker  Left bundle branch block  Abnormal ECG  When compared with ECG of 18-FEB-2022 13:46,  Atrial fibrillation has replaced Sinus rhythm  Left bundle branch block is now Present  Criteria for Anterior infarct are no longer Present  Confirmed by Jonathan Tristan ([id number]) on 12/5/2024 2:42:44 PM      Physical Exam  Constitutional:       Appearance: Normal appearance.   HENT:      Head: Normocephalic and atraumatic.      Nose: Nose normal.      Mouth/Throat:      Mouth: Mucous membranes are moist.      Pharynx: Oropharynx is clear.   Eyes:      Extraocular Movements: Extraocular movements intact.      Conjunctiva/sclera: Conjunctivae normal.      Pupils: Pupils are equal, round, and reactive to light.   Cardiovascular:      Rate and Rhythm: Normal rate and  regular rhythm.      Pulses: Normal pulses.      Heart sounds: Normal heart sounds.   Pulmonary:      Effort: Pulmonary effort is normal.      Breath sounds: Normal breath sounds.   Abdominal:      General: Abdomen is flat. Bowel sounds are normal.      Palpations: Abdomen is soft.      Tenderness: There is no abdominal tenderness.   Musculoskeletal:         General: Normal range of motion.      Cervical back: Normal range of motion and neck supple.   Skin:     General: Skin is warm and dry.   Neurological:      General: No focal deficit present.      Mental Status: He is alert and oriented to person, place, and time.   Psychiatric:         Mood and Affect: Mood normal.         Behavior: Behavior normal.         Thought Content: Thought content normal.         Relevant Results               Assessment/Plan                  Assessment & Plan  Syncope and collapse  Most likely vasovagal or related to non-STEMI with possible arrhythmia.  He will be monitored on telemetry.  Hemodynamic status is normal.  Scheduled for left heart catheterization today.  He has had no significant arrhythmia on telemetry.  Plan for further evaluation and treatment per cardiology.  Emesis  He has had no recurrent hematemesis or emesis since admission.  He reports he vomited 5 times after he passed out.  This may have been secondary to RCA non-STEMI.  No further GI evaluation or treatment is indicated.  Hemoglobin levels show no significant changes.  COVID-19  Without hypoxemia.  No indication for antiviral treatment.  Respiratory status will be monitored.    CAD (coronary artery disease)  Clinical course and laboratory studies compatible with non-STEMI.  He has had no active anginal symptoms since admission.  ACS protocol initiated on admission and is continued.  He is scheduled for left heart catheterization today.  Plan for further evaluation and treatment per cardiology.  Fall, initial encounter  Routine fall precautions per nursing  protocol  Hyperlipidemia  Statin is continued.  Blood pressure adequately controlled.  Present  Hypertension  Medications are continued.  Vital signs will be monitored and medications adjusted as indicated.  Hypothyroidism  This is controlled by history.  The present medications are continued.  S/P BKA (below knee amputation) unilateral (Multi)  He reports he is trying to locate his left lower extremity prosthetic.  Fall precautions as above  Type 2 diabetes mellitus without complications (Multi)  Blood sugars adequately controlled.  Continue present treatment with sliding scale coverage while nothing by mouth status awaiting decision on possible left heart catheterization.  NSTEMI (non-ST elevated myocardial infarction) (Multi)    No recurrent anginal symptoms today.  Left heart catheterization today.            Sedrick Diop MD

## 2024-12-06 NOTE — ASSESSMENT & PLAN NOTE
Without hypoxemia.  No indication for antiviral treatment.  Respiratory status will be monitored.

## 2024-12-07 ENCOUNTER — HOME HEALTH ADMISSION (OUTPATIENT)
Dept: HOME HEALTH SERVICES | Facility: HOME HEALTH | Age: 79
End: 2024-12-07
Payer: MEDICARE

## 2024-12-07 ENCOUNTER — PHARMACY VISIT (OUTPATIENT)
Dept: PHARMACY | Facility: CLINIC | Age: 79
End: 2024-12-07

## 2024-12-07 ENCOUNTER — DOCUMENTATION (OUTPATIENT)
Dept: HOME HEALTH SERVICES | Facility: HOME HEALTH | Age: 79
End: 2024-12-07
Payer: MEDICARE

## 2024-12-07 LAB
ANION GAP SERPL CALCULATED.3IONS-SCNC: 13 MMOL/L (ref 10–20)
BUN SERPL-MCNC: 39 MG/DL (ref 6–23)
CALCIUM SERPL-MCNC: 8.1 MG/DL (ref 8.6–10.3)
CARDIAC TROPONIN I PNL SERPL HS: 2068 NG/L (ref 0–20)
CHLORIDE SERPL-SCNC: 102 MMOL/L (ref 98–107)
CO2 SERPL-SCNC: 24 MMOL/L (ref 21–32)
CREAT SERPL-MCNC: 1.57 MG/DL (ref 0.5–1.3)
EGFRCR SERPLBLD CKD-EPI 2021: 44 ML/MIN/1.73M*2
ERYTHROCYTE [DISTWIDTH] IN BLOOD BY AUTOMATED COUNT: 15.2 % (ref 11.5–14.5)
GLUCOSE BLD MANUAL STRIP-MCNC: 101 MG/DL (ref 74–99)
GLUCOSE BLD MANUAL STRIP-MCNC: 147 MG/DL (ref 74–99)
GLUCOSE BLD MANUAL STRIP-MCNC: 149 MG/DL (ref 74–99)
GLUCOSE BLD MANUAL STRIP-MCNC: 154 MG/DL (ref 74–99)
GLUCOSE BLD MANUAL STRIP-MCNC: 156 MG/DL (ref 74–99)
GLUCOSE SERPL-MCNC: 106 MG/DL (ref 74–99)
HCT VFR BLD AUTO: 28.8 % (ref 41–52)
HGB BLD-MCNC: 9.8 G/DL (ref 13.5–17.5)
MCH RBC QN AUTO: 28.9 PG (ref 26–34)
MCHC RBC AUTO-ENTMCNC: 34 G/DL (ref 32–36)
MCV RBC AUTO: 85 FL (ref 80–100)
NRBC BLD-RTO: 0 /100 WBCS (ref 0–0)
PLATELET # BLD AUTO: 104 X10*3/UL (ref 150–450)
POTASSIUM SERPL-SCNC: 4.1 MMOL/L (ref 3.5–5.3)
RBC # BLD AUTO: 3.39 X10*6/UL (ref 4.5–5.9)
SODIUM SERPL-SCNC: 135 MMOL/L (ref 136–145)
WBC # BLD AUTO: 4 X10*3/UL (ref 4.4–11.3)

## 2024-12-07 PROCEDURE — 2500000002 HC RX 250 W HCPCS SELF ADMINISTERED DRUGS (ALT 637 FOR MEDICARE OP, ALT 636 FOR OP/ED): Performed by: NURSE PRACTITIONER

## 2024-12-07 PROCEDURE — 97161 PT EVAL LOW COMPLEX 20 MIN: CPT | Mod: GP

## 2024-12-07 PROCEDURE — RXMED WILLOW AMBULATORY MEDICATION CHARGE

## 2024-12-07 PROCEDURE — 97110 THERAPEUTIC EXERCISES: CPT | Mod: GP

## 2024-12-07 PROCEDURE — 84484 ASSAY OF TROPONIN QUANT: CPT | Performed by: NURSE PRACTITIONER

## 2024-12-07 PROCEDURE — 99233 SBSQ HOSP IP/OBS HIGH 50: CPT | Performed by: INTERNAL MEDICINE

## 2024-12-07 PROCEDURE — 2500000004 HC RX 250 GENERAL PHARMACY W/ HCPCS (ALT 636 FOR OP/ED): Performed by: NURSE PRACTITIONER

## 2024-12-07 PROCEDURE — 1200000002 HC GENERAL ROOM WITH TELEMETRY DAILY

## 2024-12-07 PROCEDURE — 36415 COLL VENOUS BLD VENIPUNCTURE: CPT | Performed by: NURSE PRACTITIONER

## 2024-12-07 PROCEDURE — 85027 COMPLETE CBC AUTOMATED: CPT | Performed by: NURSE PRACTITIONER

## 2024-12-07 PROCEDURE — 2500000001 HC RX 250 WO HCPCS SELF ADMINISTERED DRUGS (ALT 637 FOR MEDICARE OP): Performed by: NURSE PRACTITIONER

## 2024-12-07 PROCEDURE — 99232 SBSQ HOSP IP/OBS MODERATE 35: CPT | Performed by: INTERNAL MEDICINE

## 2024-12-07 PROCEDURE — 80048 BASIC METABOLIC PNL TOTAL CA: CPT | Performed by: NURSE PRACTITIONER

## 2024-12-07 PROCEDURE — 2500000001 HC RX 250 WO HCPCS SELF ADMINISTERED DRUGS (ALT 637 FOR MEDICARE OP): Performed by: INTERNAL MEDICINE

## 2024-12-07 PROCEDURE — 82947 ASSAY GLUCOSE BLOOD QUANT: CPT

## 2024-12-07 RX ORDER — AMLODIPINE BESYLATE 5 MG/1
5 TABLET ORAL DAILY
Qty: 30 TABLET | Refills: 0 | Status: SHIPPED | OUTPATIENT
Start: 2024-12-08 | End: 2024-12-10

## 2024-12-07 RX ORDER — AMLODIPINE BESYLATE 5 MG/1
5 TABLET ORAL DAILY
Status: DISCONTINUED | OUTPATIENT
Start: 2024-12-07 | End: 2024-12-10 | Stop reason: HOSPADM

## 2024-12-07 RX ADMIN — NITROGLYCERIN 1 PATCH: 0.4 PATCH TRANSDERMAL at 09:04

## 2024-12-07 RX ADMIN — LEVOTHYROXINE SODIUM 50 MCG: 0.05 TABLET ORAL at 05:12

## 2024-12-07 RX ADMIN — ACETAMINOPHEN 650 MG: 325 TABLET ORAL at 12:43

## 2024-12-07 RX ADMIN — INSULIN LISPRO 2 UNITS: 100 INJECTION, SOLUTION INTRAVENOUS; SUBCUTANEOUS at 12:43

## 2024-12-07 RX ADMIN — ATORVASTATIN CALCIUM 40 MG: 40 TABLET, FILM COATED ORAL at 22:00

## 2024-12-07 RX ADMIN — BUSPIRONE HYDROCHLORIDE 7.5 MG: 5 TABLET ORAL at 22:00

## 2024-12-07 RX ADMIN — PANTOPRAZOLE SODIUM 40 MG: 40 INJECTION, POWDER, FOR SOLUTION INTRAVENOUS at 22:01

## 2024-12-07 RX ADMIN — SUCRALFATE ORAL SUSPENSION 1 G: 1 SUSPENSION ORAL at 11:33

## 2024-12-07 RX ADMIN — SUCRALFATE ORAL SUSPENSION 1 G: 1 SUSPENSION ORAL at 05:13

## 2024-12-07 RX ADMIN — PANTOPRAZOLE SODIUM 40 MG: 40 INJECTION, POWDER, FOR SOLUTION INTRAVENOUS at 09:04

## 2024-12-07 RX ADMIN — METOPROLOL SUCCINATE 25 MG: 25 TABLET, EXTENDED RELEASE ORAL at 09:04

## 2024-12-07 RX ADMIN — AMLODIPINE BESYLATE 5 MG: 5 TABLET ORAL at 11:33

## 2024-12-07 RX ADMIN — MIRTAZAPINE 15 MG: 15 TABLET, FILM COATED ORAL at 22:00

## 2024-12-07 RX ADMIN — SUCRALFATE ORAL SUSPENSION 1 G: 1 SUSPENSION ORAL at 17:44

## 2024-12-07 RX ADMIN — ASPIRIN 81 MG: 81 TABLET, COATED ORAL at 09:04

## 2024-12-07 RX ADMIN — PRASUGREL 10 MG: 10 TABLET, FILM COATED ORAL at 09:04

## 2024-12-07 RX ADMIN — BUSPIRONE HYDROCHLORIDE 7.5 MG: 5 TABLET ORAL at 09:04

## 2024-12-07 ASSESSMENT — COGNITIVE AND FUNCTIONAL STATUS - GENERAL
MOBILITY SCORE: 18
PERSONAL GROOMING: A LITTLE
CLIMB 3 TO 5 STEPS WITH RAILING: TOTAL
HELP NEEDED FOR BATHING: A LITTLE
TOILETING: A LITTLE
DRESSING REGULAR UPPER BODY CLOTHING: A LITTLE
EATING MEALS: A LITTLE
TURNING FROM BACK TO SIDE WHILE IN FLAT BAD: A LITTLE
STANDING UP FROM CHAIR USING ARMS: TOTAL
MOVING TO AND FROM BED TO CHAIR: A LITTLE
WALKING IN HOSPITAL ROOM: TOTAL
HELP NEEDED FOR BATHING: A LITTLE
WALKING IN HOSPITAL ROOM: A LITTLE
CLIMB 3 TO 5 STEPS WITH RAILING: A LITTLE
MOVING TO AND FROM BED TO CHAIR: A LITTLE
DAILY ACTIVITIY SCORE: 19
TOILETING: A LITTLE
MOVING TO AND FROM BED TO CHAIR: TOTAL
STANDING UP FROM CHAIR USING ARMS: A LITTLE
WALKING IN HOSPITAL ROOM: A LITTLE
DAILY ACTIVITIY SCORE: 19
DRESSING REGULAR UPPER BODY CLOTHING: A LITTLE
TURNING FROM BACK TO SIDE WHILE IN FLAT BAD: A LOT
HELP NEEDED FOR BATHING: A LITTLE
CLIMB 3 TO 5 STEPS WITH RAILING: TOTAL
PERSONAL GROOMING: A LITTLE
STANDING UP FROM CHAIR USING ARMS: A LITTLE
DRESSING REGULAR UPPER BODY CLOTHING: A LITTLE
MOBILITY SCORE: 9
MOVING TO AND FROM BED TO CHAIR: A LITTLE
WALKING IN HOSPITAL ROOM: A LITTLE
DRESSING REGULAR LOWER BODY CLOTHING: A LITTLE
MOVING FROM LYING ON BACK TO SITTING ON SIDE OF FLAT BED WITH BEDRAILS: A LITTLE
EATING MEALS: A LITTLE
EATING MEALS: A LITTLE
MOVING FROM LYING ON BACK TO SITTING ON SIDE OF FLAT BED WITH BEDRAILS: A LITTLE
DAILY ACTIVITIY SCORE: 18
TOILETING: A LITTLE
MOBILITY SCORE: 18
MOBILITY SCORE: 18
CLIMB 3 TO 5 STEPS WITH RAILING: TOTAL
PERSONAL GROOMING: A LITTLE
STANDING UP FROM CHAIR USING ARMS: A LITTLE

## 2024-12-07 ASSESSMENT — PAIN SCALES - GENERAL
PAINLEVEL_OUTOF10: 0 - NO PAIN
PAINLEVEL_OUTOF10: 8

## 2024-12-07 ASSESSMENT — PAIN - FUNCTIONAL ASSESSMENT
PAIN_FUNCTIONAL_ASSESSMENT: FLACC (FACE, LEGS, ACTIVITY, CRY, CONSOLABILITY)
PAIN_FUNCTIONAL_ASSESSMENT: FLACC (FACE, LEGS, ACTIVITY, CRY, CONSOLABILITY)
PAIN_FUNCTIONAL_ASSESSMENT: 0-10
PAIN_FUNCTIONAL_ASSESSMENT: 0-10

## 2024-12-07 ASSESSMENT — ACTIVITIES OF DAILY LIVING (ADL): ADL_ASSISTANCE: INDEPENDENT

## 2024-12-07 ASSESSMENT — PAIN DESCRIPTION - DESCRIPTORS: DESCRIPTORS: ACHING;DISCOMFORT

## 2024-12-07 NOTE — NURSING NOTE
Spoke with pts Daughter Jelena to notify of pt being transferred to room 423B. Updated that pt refused medication to help with his cough at this time. Pt is resting in bed with no complaints at this time. Will call report and transfer pt to room 423.

## 2024-12-07 NOTE — PROGRESS NOTES
Chavez Llamas is a 79 y.o. male on day 2 of admission presenting with Syncope and collapse.    Subjective   Interval History:   Afebrile, no chills  S/p cardiac cath-note reviewed  Nonproductive cough  Complains of generalized weakness  Remains on room air  Agreed to evaluation by physical therapy    Review of Systems   All other systems reviewed and are negative.      Objective   Range of Vitals (last 24 hours)  Heart Rate:  [70-88]   Temp:  [36 °C (96.8 °F)-37.1 °C (98.7 °F)]   Resp:  [0-31]   BP: ()/(39-91)   Weight:  [68.1 kg (150 lb 2.1 oz)]   SpO2:  [93 %-100 %]   Daily Weight  12/07/24 : 68.1 kg (150 lb 2.1 oz)    Body mass index is 22.83 kg/m².    Physical Exam  Constitutional:       Appearance: Normal appearance.   HENT:      Head: Normocephalic and atraumatic.      Nose: Nose normal.   Eyes:      General: No scleral icterus.     Extraocular Movements: Extraocular movements intact.      Conjunctiva/sclera: Conjunctivae normal.   Cardiovascular:      Rate and Rhythm: Normal rate and regular rhythm.      Heart sounds: Normal heart sounds.   Pulmonary:      Effort: Pulmonary effort is normal.      Breath sounds: Normal breath sounds.   Abdominal:      General: Bowel sounds are normal.      Palpations: Abdomen is soft.      Tenderness: There is no abdominal tenderness.   Musculoskeletal:      Cervical back: Normal range of motion and neck supple.      Right lower leg: No edema.      Left lower leg: No edema.   Skin:     General: Skin is warm and dry.   Neurological:      Mental Status: He is alert and oriented to person, place, and time.   Psychiatric:         Behavior: Behavior normal.     Antibiotics  This patient does not have an active medication from one of the medication groupers.    Relevant Results  Labs  Results from last 72 hours   Lab Units 12/07/24  0417 12/06/24  0455 12/05/24  0505 12/04/24  2347 12/04/24  1623   WBC AUTO x10*3/uL 4.0* 6.1 7.5  --  9.4   HEMOGLOBIN g/dL 9.8* 10.2* 10.4*    < > 11.8*   HEMATOCRIT % 28.8* 29.7* 31.1*  --  35.8*   PLATELETS AUTO x10*3/uL 104* 98* 102*  --  113*   NEUTROS PCT AUTO %  --   --  81.9  --  88.0   LYMPHS PCT AUTO %  --   --  6.6  --  2.7   MONOS PCT AUTO %  --   --  10.9  --  8.5   EOS PCT AUTO %  --   --  0.0  --  0.0    < > = values in this interval not displayed.     Results from last 72 hours   Lab Units 12/07/24  0417 12/06/24  0455 12/05/24  0505 12/04/24  1623   SODIUM mmol/L 135* 137 136 136   POTASSIUM mmol/L 4.1 4.2 4.2 5.1   CHLORIDE mmol/L 102 104 102 98   CO2 mmol/L 24 27 26 22   BUN mg/dL 39* 37* 32* 24*   CREATININE mg/dL 1.57* 1.41* 1.39* 1.46*   GLUCOSE mg/dL 106* 89 114* 225*   CALCIUM mg/dL 8.1* 8.5* 8.6 9.3   ANION GAP mmol/L 13 10 12 21*   EGFR mL/min/1.73m*2 44* 50* 51* 48*   PHOSPHORUS mg/dL  --   --   --  5.4*     Results from last 72 hours   Lab Units 12/06/24 0455 12/05/24  0505 12/04/24  1623   ALK PHOS U/L 55 64 75   BILIRUBIN TOTAL mg/dL 1.4* 1.1 1.7*   PROTEIN TOTAL g/dL 6.3* 6.5 7.3   ALT U/L 20 18 14   AST U/L 44* 36 12   ALBUMIN g/dL 3.2* 3.5 3.9     Estimated Creatinine Clearance: 36.1 mL/min (A) (by C-G formula based on SCr of 1.57 mg/dL (H)).  CRP   Date Value Ref Range Status   01/31/2022 15.3 (H) 0 - 2.0 MG/DL Final     Comment:     Performed at [address] [address]     Microbiology    Imaging  Cardiac Catheterization Procedure    Result Date: 12/6/2024           [address] [address]            Phone [phone] Cardiovascular Catheterization Report Patient Name:     ORLANDO VICTORIA    Performing Physician:  [id number] Chaim Soriano MD Study Date:       12/6/2024            Verifying Physician:   [id number] Chaim Soriano MD MRN/PID:          [id number]             Cardiologist/Co-Scrub: Accession#:       [id number]         Ordering  Provider:     [id number] ANTHONY CASANOVA Date of           1945 / 79      Cardiologist: Birth/Age:        years Gender:           M                    Fellow: Encounter#:       [id number]           Surgeon:  Study: Left Heart Cath  Indications: ORLANDO VICTORIA is a 79 year old male who presents with dyslipidemia, hypertension and a chest pain assessment of atypical angina. Worsening angina. Stress test performed: No. CTA performed: No. [place] accessed: No. LVEF Assessed: Yes. LVEF = 40%. Cardiac arrest: No. Cardiac surgical consult: No. Cardiovascular Instability: No Frailty status of patient entering lab: 4 = Vulnerable.  Procedure Description: After infiltration with 2% Lidocaine, the right femoral artery was cannulated with a modified Seldinger technique. Subsequently a 6 Luxembourger sheath was placed in the right femoral artery. Selective coronary catheterization was performed using a 5 Fr catheter(s) exchanged over a guide wire to cannulate the coronary arteries. A JL 4 tip catheter was used for left coronary injections. A JR 4 tip catheter was used for right coronary injections.  Coronary Angiography: The coronary circulation is right dominant.  Left Main Coronary Artery: The left main has distal disease of 90%. Only calcified vessel.  Left Anterior Descending Coronary Artery Distribution: Left anterior descending artery is 100% occluded in the midsegment heavily calcified from the ostial artery. Diagonal 1 coming off of 90% lesion of the LAD before becoming 100% occluded.  Circumflex Coronary Artery Distribution: Left circumflex has severe diffuse disease in proximal to mid segment. 100% occluded in the midsegment.  Ramus Intermedius: The ramus is midsize artery with the ostial disease of 50%.  Right Coronary Artery Distribution: Right coronary artery is right dominant vessel. Has anterior takeoff. Very tortuous. Has stents in the proximal mid mid  distal segment probably 3 or 4 overlapped stents. There is underexpanded stent in the mid segment was 40% in-stent restenosis. There is a severe in-stent restenosis in underexpanded mid segment of the stent. Distally patent stents with moderate diffuse disease throughout the vessels.  Coronary Grafts:  LIMA Graft: Left internal mammary artery graft conduit, originating from the aorta and attached to the mid left anterior descending, is normal. The left internal mammary artery graft conduit originating from the aorta and attached to the mid left anterior descending is normal.  Left Ventriculography: Left ventricular end-diastolic pressure was normal at 10 millimeters of mercury. LV gram showed ejection fraction of 40% with diffuse hypokinesis. No gradient across aortic valve.  Coronary Interventions: Angiography reveals a 99% stenosis of the proximal to mid, mid and mid to distal right coronary artery coronary artery. Pre-intervention CIERRA flow was 3. Percutaneous coronary intervention was performed within the proximal to mid, mid and mid to distal right coronary artery. The vessel was pre-dilated using a compliant balloon 1.5 mm x 10 mm at 14 VERONICA. The stent was post dilated using a non-compliant balloon 2.0 mm x 8 mm at 22 VERONICA. The stenosis was successfully reduced from 99% to 50%. Post-intervention CIERRA flow was 3. Patient has complex lesion at the level of mid and proximal mid RCA the RCA has multiple interventions in the past and multiple overlapped stents. 2 underexpanded stents segment and heavily calcified artery. We used multiple guide but were unable to engage perfectly the artery due to anterior and subsequently 90 degrees patent. We tried with AL 7 5 as well as 3 DRC and finally a JR4 were able to get a better coaxial engagement. We had difficulty advancing any kind of balloon at the. Finally with raj wires and guide Real were able to advance initially 1.5 x 10 mm compliant balloon and we treated the mid  and proximal mid segment with high pressures balloon angioplasty and the balloon in fact ruptured. Subsequently with right with 2.0 x 10 mm noncompliant balloon and we were able to balloon but without significant improvement in the stenosis. Finally we used 2.0 x 8 mm noncompliant balloon with high pressures up to 22 manuel we got some improvement at the level of the mid RCA in-stent restenosis which was at the beginning 99% now it is about 50%. We also achieved slightly better stent expansion in the proximal mid lesion. Post stents remains underexpanded. Patient remained stable had CIERRA-3 flow.  Graft Stenosis Summary: Graft     Destination of Graft LIMA  mid left anterior descending  Cardiac Cath Post Procedure Notes: Post Procedure Diagnosis: Triple vessel disease. Blood Loss:               Estimated blood loss during the procedure was 10 cc                           mls. Specimens Removed:        Number of specimen(s) removed: none. ____________________________________________________________________________________ CONCLUSIONS:  1. Severe three-vessel coronary artery disease.  2. Patent LIMA to LAD but after the anastomosis there is moderate tubular lesion that has been stable for long time.  3. Distal left main of 90% that is giving flow to midsize ramus. Has been stable for many years.  4. Severe diffuse right coronary artery disease was 2 areas of underexpanded stents and severe in-stent restenosis of 90% in the midsegment.  5. Balloon angioplasty was attempted we were unable to expand the mid RCA stent. There was still significant in-stent restenosis of 50% after balloon angioplasty. Improved from 99%. CIERRA-3 flow.  6. Medical management for now. ICD 10 Codes: Non ST elevation (NSTEMI) myocardial infarction-I21.4  CPT Codes: Left Heart Cath Bypass Graft w ventriculography and coronary angio(LHC)-16807; Angioplasty, single Right Coronary major Artery/branch (PCI)-16743.RC; Moderate Sedation Services initial 15  minutes patient >5 years-57100  [id number] Chaim Soriano MD Performing Physician Electronically signed by [id number] Chaim Soriano MD on 12/6/2024 at 4:13:05 PM  ** Final **     Transthoracic Echo (TTE) Complete    Result Date: 12/6/2024           [place] [id number]            Phone [phone] TRANSTHORACIC ECHOCARDIOGRAM REPORT Patient Name:       ORLANDO Oconnell Physician:    [id number] Benjamin Nuno MD Study Date:         12/6/2024            Ordering Provider:    [id number] ABBY MATA MRN/PID:            [id number]             Fellow: Accession#:         [id number]         Nurse: Date of Birth/Age:  1945 / 79      Sonographer:          Marce ECHEVERRIA Gender Assigned at  M                    Additional Staff: Birth: Height:             172.70 cm            Admit Date:           12/4/2024 Weight:             68.95 kg             Admission Status:     Inpatient -                                                                Routine BSA / BMI:          1.82 m2 / 23.12      Department Location:                     kg/m2 Blood Pressure: 100 /55 mmHg Study Type:    TRANSTHORACIC ECHO (TTE) COMPLETE Diagnosis/ICD: Syncope and collapse-R55 Indication:    Syncope CPT Codes:     Echo Complete w Full Doppler-[id number] Patient History: Pertinent History: Acute Myocardial Infraction involving left anterior                    descending coronary artery CP HTN HLD CAD CHF NSTEMI DMII                    COPD. Study Detail: The following Echo studies were performed: 2D, M-Mode, Doppler,               color flow and Strain.  PHYSICIAN INTERPRETATION: Left Ventricle: The left ventricular systolic function is mildly decreased, with a visually estimated ejection fraction of 40-45%. Wall  motion is abnormal. The left ventricular cavity size is normal. There is left ventricular concentric remodeling. Left Ventricular Global Longitudinal Strain - -11.0 %. Abnormal (paradoxical) septal motion consistent with post-operative status and abnormal (paradoxical) septal motion, consistent with left bundle branch block. Spectral Doppler shows an abnormal pattern of left ventricular diastolic filling. There is severe hypokinesis of the basal inferior wall. Left Atrium: The left atrium is mildly dilated. 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 Right Ventricle: The right ventricle is normal in size. There is normal right ventriclar wall thickness. There is normal right ventricular global systolic function. Right Atrium: The right atrium is normal in size. Aortic Valve: The aortic valve is trileaflet. There is mild aortic valve cusp calcification. There is no evidence of reduced aortic valve leaflet excursion excursion. There is evidence of mildly elevated transaortic gradients consistent with sclerosis of the aortic valve. There is mild aortic valve regurgitation. The peak instantaneous gradient of the aortic valve is 8 mmHg. Mitral Valve: The mitral valve is normal in structure. There is normal mitral valve leaflet mobility. There is mild mitral annular calcification. There is mild to moderate mitral valve regurgitation. Tricuspid Valve: The tricuspid valve is structurally normal. There is normal tricuspid valve leaflet mobility. There is trace to mild tricuspid regurgitation. Pulmonic Valve: The pulmonic valve is structurally normal. There is trace pulmonic valve regurgitation. Pericardium: No pericardial effusion noted. Aorta: The aortic root is abnormal. The ascending aorta is at the upper limits of normal. The aortic root is at the upper limits of normal size. There is plaque visualized in the ascending aorta, which is classified as a Grade 2 [mild (focal or diffuse) intimal thickening of 2-3 mm]  atherosclerosis. Systemic Veins: The inferior vena cava appears normal in size, with IVC inspiratory collapse greater than 50%.  CONCLUSIONS:  1. The left ventricular systolic function is mildly decreased, with a visually estimated ejection fraction of 40-45%.  2. Abnormal wall motion.  3. Spectral Doppler shows an abnormal pattern of left ventricular diastolic filling.  4. Abnormal septal motion consistent with post-operative status and abnormal septal motion consistent with left bundle branch block.  5. There is severe hypokinesis of the basal inferior wall.  6. There is normal right ventricular global systolic function.  7. Mild to moderate mitral valve regurgitation.  8. Trace to mild tricuspid regurgitation.  9. Aortic valve sclerosis. 10. Mild aortic valve regurgitation. 11. Atrial septal aneurysm present. 12. There is plaque visualized in the ascending aorta. QUANTITATIVE DATA SUMMARY:  2D MEASUREMENTS:            Normal Ranges: LAs:             3.87 cm    (2.7-4.0cm) IVSd:            0.96 cm    (0.6-1.1cm) LVPWd:           1.07 cm    (0.6-1.1cm) LVIDd:           5.05 cm    (3.9-5.9cm) LVIDs:           4.10 cm LV Mass Index:   103.9 g/m2 LV % FS          18.9 %  LA VOLUME:                    Normal Ranges: LA Vol A4C:        64.7 ml    (22+/-6mL/m2) LA Vol A2C:        84.4 ml LA Vol BP:         78.1 ml LA Vol Index A4C:  35.6 ml/m2 LA Vol Index A2C:  46.4 ml/m2 LA Vol Index BP:   42.9 ml/m2 LA Area A4C:       19.9 cm2 LA Area A2C:       24.0 cm2 LA Major Axis A4C: 5.2 cm LA Major Axis A2C: 5.8 cm LA Volume Index:   42.5 ml/m2 LA Vol A4C:        59.0 ml LA Vol A2C:        78.7 ml LA Vol Index BSA:  37.9 ml/m2  RA VOLUME BY A/L METHOD:            Normal Ranges: RA Vol A4C:              52.4 ml    (8.3-19.5ml) RA Vol Index A4C:        28.8 ml/m2 RA Area A4C:             17.2 cm2 RA Major Axis A4C:       4.8 cm  M-MODE MEASUREMENTS:         Normal Ranges: LAs:                 5.33 cm (2.7-4.0cm)  AORTA  MEASUREMENTS:         Normal Ranges: Ao Sinus, d:        3.60 cm (2.1-3.5cm) Ao STJ, d:          3.40 cm (1.7-3.4cm) Asc Ao, d:          3.40 cm (2.1-3.4cm)  LV SYSTOLIC FUNCTION BY 2D PLANIMETRY (MOD):                                         Normal Ranges: EF-A4C View:                      31 %  (>=55%) EF-A2C View:                      46 % EF-Biplane:                       38 % EF-Visual:                        43 % EF-Auto:                          40 % LV EF Reported:                   43 % Global Longitudinal Strain (GLS): -11 %  LV DIASTOLIC FUNCTION:             Normal Ranges: MV Peak E:             0.45 m/s    (0.7-1.2 m/s) MV Peak A:             0.72 m/s    (0.42-0.7 m/s) E/A Ratio:             0.63        (1.0-2.2) MV A Dur:              188.39 msec  MITRAL VALVE:          Normal Ranges: MV DT:        207 msec (150-240msec)  AORTIC VALVE:           Normal Ranges: AoV Vmax:      1.40 m/s (<=1.7m/s) AoV Peak P.9 mmHg (<20mmHg) LVOT Max Deonte:  0.89 m/s (<=1.1m/s) LVOT VTI:      14.82 cm LVOT Diameter: 1.98 cm  (1.8-2.4cm) AoV Area,Vmax: 1.94 cm2 (2.5-4.5cm2)  RIGHT VENTRICLE: RV Basal 2.57 cm RV Mid   3.00 cm RV Major 6.2 cm  TRICUSPID VALVE/RVSP:         Normal Ranges: IVC Diam:             1.70 cm  AORTA: Asc Ao Diam 3.41 cm  [id number] Benjamin Nuno MD Electronically signed on 2024 at 1:29:30 PM  ** Final **     Vascular US Carotid Artery Duplex Bilateral    Result Date: 2024           [address] [address]            Phone [phone]  Vascular Lab Report  VASC US CAROTID ARTERY DUPLEX BILATERAL Patient Name:      ORLANDO Oconnell Physician:  [id number] Marina Farr MD Study Date:        2024           Ordering Provider:  [id number] LUIS DANIEL CARDENAS MRN/PID:           [id number]            Fellow: Accession#:        [id number]        Technologist:       Zoya Pina RVT Date of  Birth/Age: 1945 / 79     Technologist 2:                    years Gender:            M                   Encounter#:         [id number] Admission Status:  Inpatient           Location Performed: [facility]  Diagnosis/ICD: Syncope and collapse-R55 CPT Codes:     60949 Cerebrovascular Carotid Duplex scan complete  CONCLUSIONS: Right Carotid: Findings are consistent with less than 50% stenosis of the right proximal internal carotid artery. Right external carotid artery appears patent with no evidence of stenosis. The right vertebral artery is patent with antegrade flow. No evidence of hemodynamically significant stenosis in the right subclavian artery. Left Carotid: Findings are consistent with 50 to 69% stenosis of the left proximal internal carotid artery. Left external carotid artery appears patent with no evidence of stenosis. The left vertebral artery is patent with antegrade flow. No evidence of hemodynamically significant stenosis in the left subclavian artery.  Imaging & Doppler Findings: Right Plaque Morph: The proximal right internal carotid artery demonstrates heterogenous plaque. The proximal right external carotid artery demonstrates heterogenous plaque. The distal right common carotid artery demonstrates heterogenous plaque. Left Plaque Morph: The proximal left internal carotid artery demonstrates heterogenous and calcified plaque. The proximal left external carotid artery demonstrates heterogenous and calcified plaque. The distal left common carotid artery demonstrates heterogenous plaque.   Right                        Left   PSV      EDV                PSV      EDV 65 cm/s            CCA P    78 cm/s 60 cm/s            CCA D    49 cm/s 81 cm/s  21 cm/s   ICA P    202 cm/s 65 cm/s 65 cm/s  18 cm/s   ICA M    128 cm/s 26 cm/s 74 cm/s  24 cm/s   ICA D    60 cm/s  19 cm/s 100 cm/s            ECA     137 cm/s 38 cm/s  11 cm/s Vertebral  65 cm/s  15 cm/s 66 cm/s          Subclavian 141 cm/s                 Right Left ICA/CCA Ratio  1.4  4.1   [id number] Marina Farr MD Electronically signed by [id number] Marina Farr MD on 12/6/2024 at 12:19:53 PM  ** Final **     ECG 12 lead    Result Date: 12/5/2024  Sinus rhythm with 1st degree AV block Left bundle branch block Abnormal ECG When compared with ECG of 04-DEC-2024 16:12, (unconfirmed) Sinus rhythm has replaced Atrial fibrillation Confirmed by Jonathan Tristan ([id number]) on 12/5/2024 2:45:20 PM    ECG 12 lead    Result Date: 12/5/2024  Atrial fibrillation with a competing junctional pacemaker Left bundle branch block Abnormal ECG When compared with ECG of 18-FEB-2022 13:46, Atrial fibrillation has replaced Sinus rhythm Left bundle branch block is now Present Criteria for Anterior infarct are no longer Present Confirmed by Jonathan Tristan ([id number]) on 12/5/2024 2:42:44 PM    CT chest abdomen pelvis w IV contrast    Result Date: 12/4/2024  STUDY: CT Chest, Abdomen, and Pelvis with IV Contrast, CT Thoracic Spine and Lumbar Spine without IV Contrast; 12/4/2024 5:48 PM. INDICATION: Trauma, fall.  Head injury.  Coffee ground emesis.  Syncope COMPARISON: CT AP 2/18/2022. ACCESSION NUMBER(S): [id number], [id number], [id number], [id number] ORDERING CLINICIAN: GRETEL FALCON TECHNIQUE: CT of the chest, abdomen, and pelvis was performed.  Contiguous axial images were obtained at 3 mm slice thickness through the chest, abdomen, and pelvis.  Coronal and sagittal reconstructions at 3 mm slice thickness were performed.  Omnipaque 350 75 mL was administered intravenously.  Please note that spinal images were generated from the original CT abdomen and pelvis imaging. FINDINGS: CHEST: MEDIASTINUM: The heart is normal in size without pericardial effusion.  Central vascular structures opacify normally.  LUNGS/PLEURA: There is no pleural effusion, pleural thickening, or pneumothorax. The airways are patent. Lungs are clear without consolidation, interstitial disease, or suspicious nodules.  LYMPH NODES: Thoracic lymph nodes are not enlarged. ABDOMEN:  LIVER: No hepatomegaly.  Smooth surface contour.  Normal attenuation.  BILE DUCTS: No intrahepatic or extrahepatic biliary ductal dilatation.  GALLBLADDER: The gallbladder is unremarkable. STOMACH: No abnormalities identified.  PANCREAS: No masses or ductal dilatation.  SPLEEN: No splenomegaly or focal splenic lesion.  ADRENAL GLANDS: No thickening or nodules.  KIDNEYS AND URETERS: Kidneys are normal in size and location.  No renal or ureteral calculi.  PELVIS:  BLADDER: No abnormalities identified.  REPRODUCTIVE ORGANS: No abnormalities identified.  BOWEL: No abnormalities identified.  VESSELS: No abnormalities identified.  Abdominal aorta is normal in caliber.  PERITONEUM/RETROPERITONEUM/LYMPH NODES: No free fluid.  No pneumoperitoneum. No lymphadenopathy.  ABDOMINAL WALL: No abnormalities identified. SOFT TISSUES: No abnormalities identified.  BONES: No acute fracture or aggressive osseous lesion. THORACIC SPINE: There is mild dextroscoliosis. The spine is in otherwise good alignment.  There is no fracture or traumatic subluxation. The vertebral body heights are well maintained.  Disc spaces are preserved.  No significant central canal stenosis is demonstrated. The neural foramina are patent throughout. There is mild degenerative change. The paravertebral soft tissues are within normal limits. LUMBAR SPINE: The alignment is anatomic.  There is mild compression of the superior endplate of L4. This was present on the study dated 2/18/2022 and is not significantly changed. The vertebral body heights are otherwise well maintained.  Disc spaces are preserved.  No significant central canal stenosis is demonstrated.  The neural foramina are patent throughout.  The paravertebral soft tissues are within normal limits.    1. No evidence for acute injury to the chest, abdomen, pelvis, thoracic, or lumbar spine. 2. There is mild chronic compression of the superior  endplate of L4. Mild degenerative change in the thoracic and lumbar spine. Signed by Calin Montana MD    CT thoracic spine wo IV contrast    Result Date: 12/4/2024  STUDY: CT Chest, Abdomen, and Pelvis with IV Contrast, CT Thoracic Spine and Lumbar Spine without IV Contrast; 12/4/2024 5:48 PM. INDICATION: Trauma, fall.  Head injury.  Coffee ground emesis.  Syncope COMPARISON: CT AP 2/18/2022. ACCESSION NUMBER(S): [id number], [id number], [id number], [id number] ORDERING CLINICIAN: GRETEL FALCON TECHNIQUE: CT of the chest, abdomen, and pelvis was performed.  Contiguous axial images were obtained at 3 mm slice thickness through the chest, abdomen, and pelvis.  Coronal and sagittal reconstructions at 3 mm slice thickness were performed.  Omnipaque 350 75 mL was administered intravenously.  Please note that spinal images were generated from the original CT abdomen and pelvis imaging. FINDINGS: CHEST: MEDIASTINUM: The heart is normal in size without pericardial effusion.  Central vascular structures opacify normally.  LUNGS/PLEURA: There is no pleural effusion, pleural thickening, or pneumothorax. The airways are patent. Lungs are clear without consolidation, interstitial disease, or suspicious nodules. LYMPH NODES: Thoracic lymph nodes are not enlarged. ABDOMEN:  LIVER: No hepatomegaly.  Smooth surface contour.  Normal attenuation.  BILE DUCTS: No intrahepatic or extrahepatic biliary ductal dilatation.  GALLBLADDER: The gallbladder is unremarkable. STOMACH: No abnormalities identified.  PANCREAS: No masses or ductal dilatation.  SPLEEN: No splenomegaly or focal splenic lesion.  ADRENAL GLANDS: No thickening or nodules.  KIDNEYS AND URETERS: Kidneys are normal in size and location.  No renal or ureteral calculi.  PELVIS:  BLADDER: No abnormalities identified.  REPRODUCTIVE ORGANS: No abnormalities identified.  BOWEL: No abnormalities identified.  VESSELS: No abnormalities identified.  Abdominal aorta is normal in  caliber.  PERITONEUM/RETROPERITONEUM/LYMPH NODES: No free fluid.  No pneumoperitoneum. No lymphadenopathy.  ABDOMINAL WALL: No abnormalities identified. SOFT TISSUES: No abnormalities identified.  BONES: No acute fracture or aggressive osseous lesion. THORACIC SPINE: There is mild dextroscoliosis. The spine is in otherwise good alignment.  There is no fracture or traumatic subluxation. The vertebral body heights are well maintained.  Disc spaces are preserved.  No significant central canal stenosis is demonstrated. The neural foramina are patent throughout. There is mild degenerative change. The paravertebral soft tissues are within normal limits. LUMBAR SPINE: The alignment is anatomic.  There is mild compression of the superior endplate of L4. This was present on the study dated 2/18/2022 and is not significantly changed. The vertebral body heights are otherwise well maintained.  Disc spaces are preserved.  No significant central canal stenosis is demonstrated.  The neural foramina are patent throughout.  The paravertebral soft tissues are within normal limits.    1. No evidence for acute injury to the chest, abdomen, pelvis, thoracic, or lumbar spine. 2. There is mild chronic compression of the superior endplate of L4. Mild degenerative change in the thoracic and lumbar spine. Signed by Calin Montana MD    CT lumbar spine wo IV contrast    Result Date: 12/4/2024  STUDY: CT Chest, Abdomen, and Pelvis with IV Contrast, CT Thoracic Spine and Lumbar Spine without IV Contrast; 12/4/2024 5:48 PM. INDICATION: Trauma, fall.  Head injury.  Coffee ground emesis.  Syncope COMPARISON: CT AP 2/18/2022. ACCESSION NUMBER(S): [id number], [id number], [id number], [id number] ORDERING CLINICIAN: GRETEL FALCON TECHNIQUE: CT of the chest, abdomen, and pelvis was performed.  Contiguous axial images were obtained at 3 mm slice thickness through the chest, abdomen, and pelvis.  Coronal and sagittal reconstructions at 3 mm slice  thickness were performed.  Omnipaque 350 75 mL was administered intravenously.  Please note that spinal images were generated from the original CT abdomen and pelvis imaging. FINDINGS: CHEST: MEDIASTINUM: The heart is normal in size without pericardial effusion.  Central vascular structures opacify normally.  LUNGS/PLEURA: There is no pleural effusion, pleural thickening, or pneumothorax. The airways are patent. Lungs are clear without consolidation, interstitial disease, or suspicious nodules. LYMPH NODES: Thoracic lymph nodes are not enlarged. ABDOMEN:  LIVER: No hepatomegaly.  Smooth surface contour.  Normal attenuation.  BILE DUCTS: No intrahepatic or extrahepatic biliary ductal dilatation.  GALLBLADDER: The gallbladder is unremarkable. STOMACH: No abnormalities identified.  PANCREAS: No masses or ductal dilatation.  SPLEEN: No splenomegaly or focal splenic lesion.  ADRENAL GLANDS: No thickening or nodules.  KIDNEYS AND URETERS: Kidneys are normal in size and location.  No renal or ureteral calculi.  PELVIS:  BLADDER: No abnormalities identified.  REPRODUCTIVE ORGANS: No abnormalities identified.  BOWEL: No abnormalities identified.  VESSELS: No abnormalities identified.  Abdominal aorta is normal in caliber.  PERITONEUM/RETROPERITONEUM/LYMPH NODES: No free fluid.  No pneumoperitoneum. No lymphadenopathy.  ABDOMINAL WALL: No abnormalities identified. SOFT TISSUES: No abnormalities identified.  BONES: No acute fracture or aggressive osseous lesion. THORACIC SPINE: There is mild dextroscoliosis. The spine is in otherwise good alignment.  There is no fracture or traumatic subluxation. The vertebral body heights are well maintained.  Disc spaces are preserved.  No significant central canal stenosis is demonstrated. The neural foramina are patent throughout. There is mild degenerative change. The paravertebral soft tissues are within normal limits. LUMBAR SPINE: The alignment is anatomic.  There is mild compression  of the superior endplate of L4. This was present on the study dated 2/18/2022 and is not significantly changed. The vertebral body heights are otherwise well maintained.  Disc spaces are preserved.  No significant central canal stenosis is demonstrated.  The neural foramina are patent throughout.  The paravertebral soft tissues are within normal limits.    1. No evidence for acute injury to the chest, abdomen, pelvis, thoracic, or lumbar spine. 2. There is mild chronic compression of the superior endplate of L4. Mild degenerative change in the thoracic and lumbar spine. Signed by Calin Montana MD    CT 3D reconstruction    Result Date: 12/4/2024  STUDY: CT Chest, Abdomen, and Pelvis with IV Contrast, CT Thoracic Spine and Lumbar Spine without IV Contrast; 12/4/2024 5:48 PM. INDICATION: Trauma, fall.  Head injury.  Coffee ground emesis.  Syncope COMPARISON: CT AP 2/18/2022. ACCESSION NUMBER(S): [id number], [id number], [id number], [id number] ORDERING CLINICIAN: GRETEL FALCON TECHNIQUE: CT of the chest, abdomen, and pelvis was performed.  Contiguous axial images were obtained at 3 mm slice thickness through the chest, abdomen, and pelvis.  Coronal and sagittal reconstructions at 3 mm slice thickness were performed.  Omnipaque 350 75 mL was administered intravenously.  Please note that spinal images were generated from the original CT abdomen and pelvis imaging. FINDINGS: CHEST: MEDIASTINUM: The heart is normal in size without pericardial effusion.  Central vascular structures opacify normally.  LUNGS/PLEURA: There is no pleural effusion, pleural thickening, or pneumothorax. The airways are patent. Lungs are clear without consolidation, interstitial disease, or suspicious nodules. LYMPH NODES: Thoracic lymph nodes are not enlarged. ABDOMEN:  LIVER: No hepatomegaly.  Smooth surface contour.  Normal attenuation.  BILE DUCTS: No intrahepatic or extrahepatic biliary ductal dilatation.  GALLBLADDER: The gallbladder  is unremarkable. STOMACH: No abnormalities identified.  PANCREAS: No masses or ductal dilatation.  SPLEEN: No splenomegaly or focal splenic lesion.  ADRENAL GLANDS: No thickening or nodules.  KIDNEYS AND URETERS: Kidneys are normal in size and location.  No renal or ureteral calculi.  PELVIS:  BLADDER: No abnormalities identified.  REPRODUCTIVE ORGANS: No abnormalities identified.  BOWEL: No abnormalities identified.  VESSELS: No abnormalities identified.  Abdominal aorta is normal in caliber.  PERITONEUM/RETROPERITONEUM/LYMPH NODES: No free fluid.  No pneumoperitoneum. No lymphadenopathy.  ABDOMINAL WALL: No abnormalities identified. SOFT TISSUES: No abnormalities identified.  BONES: No acute fracture or aggressive osseous lesion. THORACIC SPINE: There is mild dextroscoliosis. The spine is in otherwise good alignment.  There is no fracture or traumatic subluxation. The vertebral body heights are well maintained.  Disc spaces are preserved.  No significant central canal stenosis is demonstrated. The neural foramina are patent throughout. There is mild degenerative change. The paravertebral soft tissues are within normal limits. LUMBAR SPINE: The alignment is anatomic.  There is mild compression of the superior endplate of L4. This was present on the study dated 2/18/2022 and is not significantly changed. The vertebral body heights are otherwise well maintained.  Disc spaces are preserved.  No significant central canal stenosis is demonstrated.  The neural foramina are patent throughout.  The paravertebral soft tissues are within normal limits.    1. No evidence for acute injury to the chest, abdomen, pelvis, thoracic, or lumbar spine. 2. There is mild chronic compression of the superior endplate of L4. Mild degenerative change in the thoracic and lumbar spine. Signed by Calin Montana MD    CT head wo IV contrast    Result Date: 12/4/2024  Interpreted By:  Andrez Veloz, STUDY: CT HEAD WO IV CONTRAST; CT CERVICAL  SPINE WO IV CONTRAST; CT FACIAL BONES WO IV CONTRAST;  12/4/2024 5:22 pm   INDICATION: Signs/Symptoms:fall, struck face; Signs/Symptoms:fall, struck head, +loc. Altered mental status   COMPARISON: None.   ACCESSION NUMBER(S): [id number]; [id number]; [id number]   ORDERING CLINICIAN: GRETEL FALCON   TECHNIQUE: Axial noncontrast CT images of the head, face, and cervical spine. 3D volume rendering of the facial bones was obtained on a separate workstation also reviewed.   FINDINGS: BRAIN PARENCHYMA: Gray-white matter interfaces are preserved. No mass, mass effect or midline shift. Scattered periventricular and deep white matter hypodensities suggestive of mild chronic microvascular ischemic change.   HEMORRHAGE: No acute intracranial hemorrhage. VENTRICLES and EXTRA-AXIAL SPACES: No hydrocephalus. No extra-axial collections. Mild generalized cerebral volume loss and associated ventricular and sulcal enlargement. EXTRACRANIAL SOFT TISSUES: Unremarkable. CALVARIUM: No depressed calvarial fracture.   FACIAL BONES: No acute facial bone fracture. There is absent dentition. SOFT TISSUES: No evidence of large soft tissue hematoma. Extensive bilateral facial vascular calcifications, which may be seen with diabetes or chronic renal disease. PARANASAL SINUSES: No hemorrhage in the paranasal sinuses. Mild polypoid mucosal thickening of the bilateral frontal sinuses, ethmoid air cells, and maxillary sinuses. MASTOIDS: Within normal limits. ORBITS: The globes, extraocular muscles and optic nerve sheath complexes are symmetric. No retrobulbar hematoma.   ALIGNMENT: Normal cervical lordosis. VERTEBRAE: No acute fracture. Vertebral body heights are maintained. There are no suspicious osseous lesions. Degenerative disc space narrowing and mild posterolateral osseous spurring at C4-C5 through C6-C7 contributing to mild bilateral foraminal stenosis at this levels.. SPINAL CANAL: Small calcified central disc protrusion contributing to  mild-to-moderate central canal stenosis at C3-C4 additionally small disc osteophyte complexes at. PREVERTEBRAL SOFT TISSUES: No prevertebral soft tissue swelling. LUNG APICES: Partially visualized emphysematous changes in the lung apices with asymmetric bronchiectasis and presumed scarring in the left lung apex. Please see separately dictated chest CT.   OTHER FINDINGS: None.         No acute intracranial abnormality.   No evidence of facial bone or cervical spine fracture.     Signed by: Andrez Veloz 12/4/2024 5:35 PM Dictation workstation:   [id number]    CT maxillofacial bones wo IV contrast    Result Date: 12/4/2024  Interpreted By:  Andrez Veloz, STUDY: CT HEAD WO IV CONTRAST; CT CERVICAL SPINE WO IV CONTRAST; CT FACIAL BONES WO IV CONTRAST;  12/4/2024 5:22 pm   INDICATION: Signs/Symptoms:fall, struck face; Signs/Symptoms:fall, struck head, +loc. Altered mental status   COMPARISON: None.   ACCESSION NUMBER(S): [id number]; [id number]; [id number]   ORDERING CLINICIAN: GRETEL FALCON   TECHNIQUE: Axial noncontrast CT images of the head, face, and cervical spine. 3D volume rendering of the facial bones was obtained on a separate workstation also reviewed.   FINDINGS: BRAIN PARENCHYMA: Gray-white matter interfaces are preserved. No mass, mass effect or midline shift. Scattered periventricular and deep white matter hypodensities suggestive of mild chronic microvascular ischemic change.   HEMORRHAGE: No acute intracranial hemorrhage. VENTRICLES and EXTRA-AXIAL SPACES: No hydrocephalus. No extra-axial collections. Mild generalized cerebral volume loss and associated ventricular and sulcal enlargement. EXTRACRANIAL SOFT TISSUES: Unremarkable. CALVARIUM: No depressed calvarial fracture.   FACIAL BONES: No acute facial bone fracture. There is absent dentition. SOFT TISSUES: No evidence of large soft tissue hematoma. Extensive bilateral facial vascular calcifications, which may be seen with diabetes or chronic renal  disease. PARANASAL SINUSES: No hemorrhage in the paranasal sinuses. Mild polypoid mucosal thickening of the bilateral frontal sinuses, ethmoid air cells, and maxillary sinuses. MASTOIDS: Within normal limits. ORBITS: The globes, extraocular muscles and optic nerve sheath complexes are symmetric. No retrobulbar hematoma.   ALIGNMENT: Normal cervical lordosis. VERTEBRAE: No acute fracture. Vertebral body heights are maintained. There are no suspicious osseous lesions. Degenerative disc space narrowing and mild posterolateral osseous spurring at C4-C5 through C6-C7 contributing to mild bilateral foraminal stenosis at this levels.. SPINAL CANAL: Small calcified central disc protrusion contributing to mild-to-moderate central canal stenosis at C3-C4 additionally small disc osteophyte complexes at. PREVERTEBRAL SOFT TISSUES: No prevertebral soft tissue swelling. LUNG APICES: Partially visualized emphysematous changes in the lung apices with asymmetric bronchiectasis and presumed scarring in the left lung apex. Please see separately dictated chest CT.   OTHER FINDINGS: None.         No acute intracranial abnormality.   No evidence of facial bone or cervical spine fracture.     Signed by: Andrez Veloz 12/4/2024 5:35 PM Dictation workstation:   [id number]    CT cervical spine wo IV contrast    Result Date: 12/4/2024  Interpreted By:  Andrez Veloz, STUDY: CT HEAD WO IV CONTRAST; CT CERVICAL SPINE WO IV CONTRAST; CT FACIAL BONES WO IV CONTRAST;  12/4/2024 5:22 pm   INDICATION: Signs/Symptoms:fall, struck face; Signs/Symptoms:fall, struck head, +loc. Altered mental status   COMPARISON: None.   ACCESSION NUMBER(S): [id number]; [id number]; [id number]   ORDERING CLINICIAN: GRETEL FALCON   TECHNIQUE: Axial noncontrast CT images of the head, face, and cervical spine. 3D volume rendering of the facial bones was obtained on a separate workstation also reviewed.   FINDINGS: BRAIN PARENCHYMA: Gray-white matter interfaces are  preserved. No mass, mass effect or midline shift. Scattered periventricular and deep white matter hypodensities suggestive of mild chronic microvascular ischemic change.   HEMORRHAGE: No acute intracranial hemorrhage. VENTRICLES and EXTRA-AXIAL SPACES: No hydrocephalus. No extra-axial collections. Mild generalized cerebral volume loss and associated ventricular and sulcal enlargement. EXTRACRANIAL SOFT TISSUES: Unremarkable. CALVARIUM: No depressed calvarial fracture.   FACIAL BONES: No acute facial bone fracture. There is absent dentition. SOFT TISSUES: No evidence of large soft tissue hematoma. Extensive bilateral facial vascular calcifications, which may be seen with diabetes or chronic renal disease. PARANASAL SINUSES: No hemorrhage in the paranasal sinuses. Mild polypoid mucosal thickening of the bilateral frontal sinuses, ethmoid air cells, and maxillary sinuses. MASTOIDS: Within normal limits. ORBITS: The globes, extraocular muscles and optic nerve sheath complexes are symmetric. No retrobulbar hematoma.   ALIGNMENT: Normal cervical lordosis. VERTEBRAE: No acute fracture. Vertebral body heights are maintained. There are no suspicious osseous lesions. Degenerative disc space narrowing and mild posterolateral osseous spurring at C4-C5 through C6-C7 contributing to mild bilateral foraminal stenosis at this levels.. SPINAL CANAL: Small calcified central disc protrusion contributing to mild-to-moderate central canal stenosis at C3-C4 additionally small disc osteophyte complexes at. PREVERTEBRAL SOFT TISSUES: No prevertebral soft tissue swelling. LUNG APICES: Partially visualized emphysematous changes in the lung apices with asymmetric bronchiectasis and presumed scarring in the left lung apex. Please see separately dictated chest CT.   OTHER FINDINGS: None.         No acute intracranial abnormality.   No evidence of facial bone or cervical spine fracture.     Signed by: Andrez Veloz 12/4/2024 5:35 PM Dictation  workstation:   [id number]    XR chest 1 view    Result Date: 12/4/2024  STUDY: Chest Radiograph;  12/4/24, 4:14PM. INDICATION: Fall. COMPARISON: XR Chest 3/15/21. ACCESSION NUMBER(S): [id number] ORDERING CLINICIAN: GRETEL FALCON TECHNIQUE:  Frontal chest was obtained at 16:13 hours. FINDINGS: The patient status post median sternotomy. CARDIOMEDIASTINAL SILHOUETTE: Cardiomediastinal silhouette is normal in size and configuration.  LUNGS: Lungs are clear.  ABDOMEN: No remarkable upper abdominal findings.  BONES: There is a lucency through the anterior lateral right ninth rib and a nondisplaced fracture cannot be excluded.    No evidence consolidating infiltrate or effusion. Findings suggestive of fracture of the right ninth rib. Signed by Dajuan Hall MD    XR pelvis 1-2 views    Result Date: 12/4/2024  STUDY: Pelvis Radiographs; 12/04/2024 4:13 PM INDICATION: Fall. COMPARISON: 02/18/2022 CT Abdomen and Pelvis. ACCESSION NUMBER(S): [id number] ORDERING CLINICIAN: GRETEL FALCON TECHNIQUE:  One view of the pelvis. FINDINGS:  There are degenerative change the facet joints of the lower lumbosacral spine.  The pelvic ring is intact.  There is no acute fracture.      No acute osseous abnormalities. Signed by Dajuan Hall MD     Assessment/Plan   Syncope, vasovagal versus related to coronavirus infection  Coronary artery disease s/p cardiac cath  Coronavirus infection-normal oxygenation, patient high risk for progression     Monitor oxygenation  Remdesivir declined 12/5  Cardiology follow-up  Monitor temperature and WBC  Droplet plus precautions  Discharge planning      Ludwig Malik MD

## 2024-12-07 NOTE — NURSING NOTE
Continued care of patient. Resting comfortably, No signs of pain or distress. Dressing to right groin dry and intact. Good palpable pulses. Will continue to monitor.

## 2024-12-07 NOTE — PROGRESS NOTES
Subjective Data:      Overnight Events:         Objective Data:  Last Recorded Vitals:  Vitals:    12/06/24 2245 12/07/24 0047 12/07/24 0443 12/07/24 0833   BP:  121/58 114/54    BP Location:  Right arm Left arm    Patient Position:  Lying Lying    Pulse: 72 70 75    Resp: (!) 0 18 17 18   Temp:  36 °C (96.8 °F) 36.2 °C (97.2 °F) 36.4 °C (97.5 °F)   TempSrc:  Temporal Temporal Temporal   SpO2: 94% 93%     Weight:   68.1 kg (150 lb 2.1 oz)    Height:           Last Labs:  CBC - 12/7/2024:  4:17 AM  4.0 9.8 104    28.8      CMP - 12/7/2024:  4:17 AM  8.1 6.3 44 --- 1.4   5.4 3.2 20 55      PTT - 12/6/2024:  7:42 PM  _   _ 60.1     TROPHS   Date/Time Value Ref Range Status   12/07/2024 04:17 AM 2,068 0 - 20 ng/L Final   12/06/2024 04:55 AM 3,272 0 - 20 ng/L Final   12/05/2024 05:05 AM 4,799 0 - 20 ng/L Final     Comment:     Previous result verified on 12/5/2024 0033 on specimen/case [id number] called with component [facility] for procedure Troponin I, High Sensitivity with value 192 ng/L.     BNP   Date/Time Value Ref Range Status   12/04/2024 04:23  0 - 99 pg/mL Final     HGBA1C   Date/Time Value Ref Range Status   12/05/2024 05:05 AM 5.5 See comment % Final   06/11/2024 09:29 AM 5.3 see below % Final     LDLCALC   Date/Time Value Ref Range Status   12/04/2024 11:47 PM 34 <=99 mg/dL Final     Comment:                                 Near   Borderline      AGE      Desirable  Optimal    High     High     Very High     0-19 Y     0 - 109     ---    110-129   >/= 130     ----    20-24 Y     0 - 119     ---    120-159   >/= 160     ----      >24 Y     0 -  99   100-129  130-159   160-189     >/=190     06/11/2024 09:29 AM 23 <=99 mg/dL Final     Comment:                                 Near   Borderline      AGE      Desirable  Optimal    High     High     Very High     0-19 Y     0 - 109     ---    110-129   >/= 130     ----    20-24 Y     0 - 119     ---    120-159   >/= 160     ----      >24 Y     0 -  99    100-129  130-159   160-189     >/=190     02/23/2022 05:15 AM 22 65 - 130 MG/DL Final   03/14/2021 04:17 AM 22 65 - 130 MG/DL Final   12/02/2020 03:37 AM 51 65 - 130 MG/DL Final     VLDL   Date/Time Value Ref Range Status   12/04/2024 11:47 PM 18 0 - 40 mg/dL Final   06/11/2024 09:29 AM 19 0 - 40 mg/dL Final   06/05/2023 10:02 AM 19 0 - 40 mg/dL Final   09/21/2022 08:32 AM 13 0 - 40 mg/dL Final   09/03/2021 08:50 AM 15 0 - 40 mg/dL Final      Last I/O:  I/O last 3 completed shifts:  In: 160 (2.3 mL/kg) [P.O.:160]  Out: 1170 (17.2 mL/kg) [Urine:1150 (0.5 mL/kg/hr); Blood:20]  Weight: 68.1 kg     Past Cardiology Tests (Last 3 Years):  EKG:  ECG 12 lead 12/04/2024      ECG 12 lead 12/04/2024    Echo:  Transthoracic Echo (TTE) Complete 12/06/2024    Ejection Fractions:  EF   Date/Time Value Ref Range Status   12/06/2024 08:59 AM 43 %      Cath:  Cardiac Catheterization Procedure 12/06/2024    Stress Test:  No results found for this or any previous visit from the past 1095 days.    Cardiac Imaging:  No results found for this or any previous visit from the past 1095 days.      Inpatient Medications:  Scheduled medications   Medication Dose Route Frequency    aspirin  81 mg oral Daily    atorvastatin  40 mg oral Nightly    busPIRone  7.5 mg oral BID    insulin lispro  0-10 Units subcutaneous TID AC    levothyroxine  50 mcg oral Daily    metoprolol succinate XL  25 mg oral Daily    mirtazapine  15 mg oral Nightly    nitroglycerin  1 patch transdermal Daily    oxygen   inhalation Continuous - 02/gases    pantoprazole  40 mg intravenous BID    prasugrel  10 mg oral Daily    sucralfate  1 g oral q6h JENNIFER     PRN medications   Medication    acetaminophen    Or    acetaminophen    Or    acetaminophen    benzocaine-menthol    dextromethorphan-guaifenesin    dextrose    dextrose    glucagon    glucagon    melatonin    ondansetron ODT    Or    ondansetron     Continuous Medications   Medication Dose Last Rate       Physical  Exam:  The patient is a nonobese balding elderly white male lying supine in bed.  He is awake alert conversant in no overt painful respiratory distress.  JVP not elevated carotid impulses are 2+  Chest fair air movement breath sounds clear anteriorly.  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  No peripheral edema on the right pedal pulses are present     Assessment/Plan   12/5: The patient is an 79-year-old white male with a history of former smoking COPD hypertension hyperlipidemia type 2 diabetes remote CABG x 2 in the 1990s at [facility] with LIMA to LAD and saphenous vein graft to unknown target.  He had a PCI bare-metal stent deployed to the mid RCA in 2010.  In 2020 he had PCI and stent appointment for an in-stent restenosis within the mid RCA.  In 3/2021 he required PTCA for in-stent restenosis/thrombus within the RCA.  At that time he did have some transient complete heart block requiring temporary pacemaker.  Patient is currently admitted with a syncopal event while on the toilet.  Initial possibilities include a simple vasovagal event related to nausea and vomiting with the patient being COVID-positive as well.  However his high-sensitivity troponin is elevated and it is certainly conceivable that he had an episode of myocardial ischemia resulting in vagal reaction and transient bradycardia with syncope.  In this regard he would be at high risk for a recurrent RCA stenosis given the fact that he has had 3 separate interventional procedures to that vessel.  The patient had been on aspirin and Effient prior to admission both of which have been briefly held.  IV heparin not instituted out of concern initially because for the hematemesis.  This patient may benefit from a relook coronary angiogram to determine whether there is a need for additional PCI to the RCA and will discuss with interventional cardiology.     12/6: Patient had a  uneventful night without any chest discomfort nausea vomiting.  Telemetry monitor demonstrates sinus rhythm primarily in the 70s per minute range occasional PACs no high-grade AV gracie heart block with baseline left bundle branch block conduction delay.  Will initiate low-dose Toprol-XL 25 mg daily.  All renal parameters remain stable with creatinine of 1.41 potassium of 4.2.  Serial hemoglobins remain stable currently 10.2 with hematocrit of 29.7.  His initial hemoglobin was 11.8 with hematocrit of 35.8.  Patient is being monitored by gastroenterology.  The patient has had no active melena or hematemesis and there is no initial plan at this point for any endoscopic procedure.  The patient empirically is been placed on high-dose PPI.  Patient being followed by infectious disease for his COVID positivity and remdesivir therapy was declined by the patient.  Will continue to monitor high-sensitivity troponins care.  The case was discussed at length with interventional cardiology and the patient is scheduled to undergo cardiac cath and possible PCI to the suspected culprit RCA.  The patient remains on his aspirin and prasugrel which had been taken for an extended period of time since last PCI procedure in 3/2021.  Patient currently receiving nitro  patch as well at least on a temporary basis.    12/7: The patient was seen events reviewed in detail discussed with patient and nursing staff.  The patient did undergo cardiac catheterization yesterday.  He was indeed found to have an in-stent restenosis within the mid RCA that was high-grade 90%.  A extended attempt was made to reopen the lesion with standard PTCA but there was balloon ruptures and ultimately the lesion could only be improved to about 70%.  The previously placed stents evidently were slightly underexpanded and the patient is thought to be at high risk for restenosis in the future.  Patient currently feeling fairly weak and requesting regular diet.  He  initially refusing physical therapy and home health care but may reconsider.  He is still recuperating from COVID.  With prefer that the patient remain for another 24 hours.  All labs from today include a creatinine of 1.57.  His troponin is declining currently 2068.  CBC relatively unchanged hemoglobin 9.8 WBC of 4000.  Will change from nitro drip patch to amlodipine 5 mg daily.  Continue the low-dose Toprol XL 25 mg daily.  The patient did had some transient second-degree AV gracie heart block during his PCI procedure but only when there was balloon inflation.          Peripheral IV 12/06/24 20 G Left;Posterior Forearm (Active)   Site Assessment Clean;Dry;Intact 12/06/24 2000   Dressing Type Tape;Transparent 12/06/24 2000   Line Status Capped;Flushed 12/06/24 2000   Dressing Status Clean;Dry;Occlusive 12/06/24 2000   Number of days: 1       Code Status:  Full Code    I spent 25 minutes in the professional and overall care of this patient.        Benjamin Nuno MD   Yes

## 2024-12-07 NOTE — NURSING NOTE
Transferred from ICU per bed Post Heart Catheterization. Report done.  Assumed care of patient. Dressing to Right Groin dry and intact. No complaint at this time, Will monitor.

## 2024-12-07 NOTE — NURSING NOTE
Four Eye Skin Check completed by Ping Rehman and Rosangela Cheatham.      No Acute findings noted and the following was completed:

## 2024-12-07 NOTE — CARE PLAN
Problem: Pain - Adult  Goal: Verbalizes/displays adequate comfort level or baseline comfort level  Outcome: Progressing     Problem: Safety - Adult  Goal: Free from fall injury  Outcome: Progressing     Problem: Discharge Planning  Goal: Discharge to home or other facility with appropriate resources  Outcome: Progressing     Problem: Chronic Conditions and Co-morbidities  Goal: Patient's chronic conditions and co-morbidity symptoms are monitored and maintained or improved  Outcome: Progressing     Problem: Diabetes  Goal: Achieve decreasing blood glucose levels by end of shift  Outcome: Progressing  Goal: Increase stability of blood glucose readings by end of shift  Outcome: Progressing  Goal: Decrease in ketones present in urine by end of shift  Outcome: Progressing  Goal: Maintain electrolyte levels within acceptable range throughout shift  Outcome: Progressing  Goal: Maintain glucose levels >70mg/dl to <250mg/dl throughout shift  Outcome: Progressing  Goal: No changes in neurological exam by end of shift  Outcome: Progressing  Goal: Learn about and adhere to nutrition recommendations by end of shift  Outcome: Progressing  Goal: Vital signs within normal range for age by end of shift  Outcome: Progressing  Goal: Increase self care and/or family involovement by end of shift  Outcome: Progressing  Goal: Receive DSME education by end of shift  Outcome: Progressing   The patient's goals for the shift include      The clinical goals for the shift include pt will remain safe and free from injury    Over the shift, the patient did not make progress toward the following goals. Barriers to progression include . Recommendations to address these barriers include .

## 2024-12-07 NOTE — DISCHARGE SUMMARY
"Discharge Diagnosis  Syncope and collapse    Issues Requiring Follow-Up    Home health care  Discharge Meds     Medication List      CONTINUE taking these medications     aspirin 81 mg EC tablet; Take 1 tablet (81 mg) by mouth once daily.   atorvastatin 40 mg tablet; Commonly known as: Lipitor; Take 1 tablet (40   mg) by mouth once daily at bedtime.   busPIRone 15 mg tablet; Commonly known as: Buspar; TAKE 1/2 (ONE-HALF)   TABLET BY MOUTH IN THE MORNING AND AT BEDTIME   dicyclomine 10 mg capsule; Commonly known as: Bentyl; TAKE 1 CAPSULE BY   MOUTH TWICE DAILY AS NEEDED FOR  IBS   insulin lispro 100 unit/mL injection; Commonly known as: HumaLOG   lancets 33 gauge misc; Commonly known as: BD Ultra Fine Lancets; Testing   T.I.D   Lantus Solostar U-100 Insulin 100 unit/mL (3 mL) pen; Generic drug:   insulin glargine; INJECT 20 UNITS SUBCUTANEOUSLY TWICE DAILY.   levothyroxine 50 mcg tablet; Commonly known as: Synthroid, Levoxyl; TAKE   1 TABLET BY MOUTH ONCE DAILY IN THE MORNING BEFORE MEAL(S) ON AN EMPTY   STOMACH   metoprolol succinate XL 25 mg 24 hr tablet; Commonly known as:   Toprol-XL; Take 1 tablet (25 mg) by mouth once daily.   mirtazapine 15 mg tablet; Commonly known as: Remeron; TAKE 1 TABLET BY   MOUTH ONCE DAILY AT BEDTIME   nitroglycerin 0.4 mg/hr patch; Commonly known as: Nitrodur   pen needle, diabetic 31 gauge x 3/16\" needle; Use as directed   prasugrel 10 mg tablet; Commonly known as: Effient; Take 1 tablet (10   mg) by mouth once daily.   spironolactone 25 mg tablet; Commonly known as: Aldactone; Take 1 tablet   (25 mg) by mouth once daily in the morning. Take before meals.     STOP taking these medications     hydrOXYzine HCL 25 mg tablet; Commonly known as: Atarax   hydrOXYzine pamoate 25 mg capsule; Commonly known as: Vistaril       Test Results Pending At Discharge  Pending Labs       Order Current Status    Blood Culture Preliminary result    Blood Culture Preliminary result            [facility] " Course   The patient was admitted to cardiac stepdown unit for evaluation and treatment of acute non-ST elevation myocardial infarction.  He presented with syncope nausea and vomiting compatible with RCA occlusion symptoms.  He has previous history of multiple cardiac stents.  He was treated with ACS protocol on admission with home medications continued.  He had left heart catheterization and angioplasty of in-stent stenosis of right coronary artery with operative report listed below.  He has had no recurrent anginal symptoms including no nausea vomiting or presyncopal symptoms since procedure.  He has generalized weakness but does not consent to short-term skilled nursing facility placement for rehabilitation and is refusing physical therapy occupational therapy evaluation.  He is discharged to home with home health care as ordered.    Pertinent Physical Exam At Time of Discharge  Physical Exam  Constitutional:       Appearance: Normal appearance.   HENT:      Head: Normocephalic and atraumatic.      Nose: Nose normal.      Mouth/Throat:      Mouth: Mucous membranes are moist.      Pharynx: Oropharynx is clear.   Eyes:      Extraocular Movements: Extraocular movements intact.      Conjunctiva/sclera: Conjunctivae normal.      Pupils: Pupils are equal, round, and reactive to light.   Cardiovascular:      Rate and Rhythm: Normal rate and regular rhythm.      Pulses: Normal pulses.      Heart sounds: Normal heart sounds.   Pulmonary:      Effort: Pulmonary effort is normal.      Breath sounds: Normal breath sounds.   Abdominal:      General: Abdomen is flat. Bowel sounds are normal.      Palpations: Abdomen is soft.      Tenderness: There is no abdominal tenderness.   Musculoskeletal:         General: Normal range of motion.      Cervical back: Normal range of motion and neck supple.   Skin:     General: Skin is warm and dry.   Neurological:      General: No focal deficit present.      Mental Status: He is alert and  oriented to person, place, and time.   Psychiatric:         Mood and Affect: Mood normal.         Behavior: Behavior normal.         Thought Content: Thought content normal.         Outpatient Follow-Up  Future Appointments   Date Time Provider Department [place]   12/9/2024  9:00 AM [facility] EEG EQUIP 1 [facility]   12/10/2024  8:20 AM Christopher D'Amico, DO [id number] [facility]   12/11/2024  8:00 AM Martinez GuptaD [id number] [facility]     Discharge time is 35 minutes    Sedrick Diop MD

## 2024-12-07 NOTE — HH CARE COORDINATION
Home Care received a Referral for Nursing, Physical Therapy, and Occupational Therapy. We have processed the referral for a Start of Care on 24-48 HOURS .     If you have any questions or concerns, please feel free to contact us at [phone]. Follow the prompts, enter your five digit zip code, and you will be directed to your care team on E[facility] 1.

## 2024-12-07 NOTE — PROGRESS NOTES
"Occupational Therapy                 Therapy Communication Note    Patient Name: Chavez Llamas  MRN: [id number]  Department: [facility]   Room: 03/03-A  Today's Date: 12/7/2024     Discipline: Occupational Therapy    Missed Visit Reason: Missed Visit Reason: Patient refused    Missed Time: Attempt    Comment:   Therapist donned PPE and attempted to see patient.  Patient stated, \"you people are trying to kick me out of here and I don't got time for this insurance bull*!!\"  Attempted to redirect patient without any success.  Assisted patient with setting up his tray for breakfast and exited the room.  Informed nursing of patient's behavior (Lillian MARTINEZ).  "

## 2024-12-07 NOTE — NURSING NOTE
Discussed appropriate level of care with Hospitalist as patient has a troponin greater than 2000 per this morning labs. Per hospitalist patient can be on floor status bed as troponin is down trending.

## 2024-12-07 NOTE — NURSING NOTE
Patient arrived to room from SDU. Not in any acute distress. Oriented to Call light system and room. Denies any needs. Call light and possessions within reach. Bed alarm on.

## 2024-12-07 NOTE — PROGRESS NOTES
Physical Therapy    Physical Therapy Evaluation & Treatment    Patient Name: Chavez Llamas  MRN: [id number]  Department: [facility] E  Room: 03/03-A  Today's Date: 12/7/2024   Time Calculation  Start Time: 1155  Stop Time: 1220  Time Calculation (min): 25 min    Assessment/Plan   PT Assessment  PT Assessment Results: Decreased strength, Decreased range of motion, Decreased endurance, Impaired balance, Decreased mobility, Decreased coordination, Decreased cognition, Impaired judgement, Decreased safety awareness, Impaired vision, Impaired sensation, Pain  Rehab Prognosis: Good  Barriers to Discharge: weakness, decreased balance, increased fall risk, poor safety insight  Evaluation/Treatment Tolerance: Patient limited by pain, Patient limited by fatigue, Other (Comment) (weakness; no left BKA available)  Medical Staff Made Aware: Yes  Strengths: Support of extended family/friends, Living arrangement secure  Barriers to Participation: Comorbidities, Coping skills  End of Session Communication: Bedside nurse  Assessment Comment: pt required close supervision to min assist of 1 for the above limited mobility however all mobility effortful, slow and requiring verbal/tactile cues for safety. pt demonstrates decreased strength, balance, AROM, endurance, mobility ease and safety, + high fall risk. pt is functioning below baseline and will benefit from continued skilled PT services while in acute care and mod int rehab post hospital d/c. Pt is NOT safe to be home alone for any length of time currently.  End of Session Patient Position: Bed, 3 rail up, Alarm on (call button in reach)   IP OR SWING BED PT PLAN  Inpatient or Swing Bed: Inpatient  PT Plan  Treatment/Interventions: Bed mobility, Transfer training, Gait training, Stair training, Balance training, Strengthening, Endurance training, Range of motion, Therapeutic exercise, Therapeutic activity  PT Plan: Ongoing PT  PT Frequency: 4 times per week  PT Discharge  Recommendations: Moderate intensity level of continued care  Equipment Recommended upon Discharge: Wheeled walker, Wheelchair, Other (comment) (left BKA prosthesis)  PT Recommended Transfer Status: Assist x2, Other (comment) (TBD; FWW; spoke with daughter regarding getting left BKA prosthesis into hospital)  PT - OK to Discharge: Yes      Subjective     General Visit Information:  General  Reason for Referral: impaired mobility; s/p NSTEMI; + Covid  Referred By: Dr Crews  Past Medical History Relevant to Rehab: DM, MI, CAD/CABG, s/p PCI, CHF, PVD, s/p left BKA, anxiety, COPD, falls, IBS  Family/Caregiver Present: No  Caregiver Feedback: Daughter arrived after PT session complete; Spoke at length regarding pt's current functional status compared to home functional status.  Prior to Session Communication: Bedside nurse  Patient Position Received: Bed, 3 rail up, Alarm on  Preferred Learning Style: verbal, visual  General Comment: 80 yo WM admitted [facility] via ED 12/4/24 with c/o syncope/fall face first off of commode. + N/V upon arrival; Pt tested + for COVID. Troponins initially 24 , elevation as high as 4, 799; Heart cath 12/6 with PCI to mid RCA---only about 70% successful of reopening stenotic area/stent. Troponins decreased to 2,068. Cleared by Dr Nuno and nurse for therapy; pt repuctantly agreeable to therapy with mod encouragement. + SCARLETT coreas, telemetry.  Home Living:  Home Living  Type of Home: House  Lives With: Alone  Home Adaptive Equipment: Wheelchair-manual, Walker rolling or standard  Home Layout: One level  Home Access: Stairs to enter with rails  Entrance Stairs-Rails: Both  Entrance Stairs-Number of Steps: 2  Bathroom Shower/Tub: Tub/shower unit  Bathroom Toilet: Standard  Bathroom Equipment: Shower chair with back  Bathroom Accessibility: pt reports he sponge bathes only in main floor bathroom  Home Living Comments: laundry on main floor  Prior Level of Function:  Prior Function Per  "Pt/Caregiver Report  Level of [place]: Independent with ADLs and functional transfers, Needs assistance with homemaking  Receives Help From: Family (daughters, sister and brother in-law per pt; pt reports family stops by every day)  ADL Assistance: Independent (sponge bathes; dressing)  Homemaking Assistance: Needs assistance (pt reports he does the laundry and cooks sometimes)  Driving/Transportation:  (family provides transportation)  Shopping:  (family does)  Ambulatory Assistance:  (uses w/c mostly; independent donning/doffoing left BKA prosthesis; pt reports limited amb without device into/out of the bathroom)  Vocational: Retired  Prior Function Comments: family manages his meds; pt admits to 1 fall in past 6 mos  Precautions:  Precautions  Hearing/Visual Limitations: pt reports no glasses of aides  Medical Precautions: Fall precautions, Infection precautions (+ Covid)    Vital Signs (Past 2hrs)        Date/Time Vitals Session Patient Position Pulse Resp SpO2 BP MAP (mmHg)    12/07/24 1155 During PT  Sitting  72  --  93 %  120/58  79                        Objective   Pain:  Pain Assessment  Pain Assessment: 0-10  0-10 (Numeric) Pain Score: 8  Pain Type: Acute pain  Pain Location: Groin  Pain Orientation: Left  Pain Radiating Towards: left LE  Pain Descriptors: Aching, Discomfort  Pain Interventions:  (pain meds per nurse ( alerted nurse); positioning, ice pack;)  Cognition:  Cognition  Overall Cognitive Status: Impaired  Orientation Level: Disoriented to time  Cognition Comments: pt appears somewhat confused; Cooperative 50% of time; pt stating, \" You don't know what you are talking about\" multiple times throughout session  Safety/Judgement:  (decreased safety insight during functional mobility)  Insight: Mild  Processing Speed: Delayed    General Assessments:  General Observation  General Observation: pt appeared somewhat upset and argumentative throughout session; slightly confused; small dressing " "right groin; + abrasion mid frontal forehead with dull ecchymosis               Activity Tolerance  Endurance: Decreased tolerance for upright activites  Activity Tolerance Comments: poor due to weakness, fatigue and left BKA prosthesis not available per pt    Sensation  Sensation Comment: + neuropathy right foot    Strength  Strength Comments: right hip 2/5, knee 2+/5, ankle 2+/5; left hip 2/5, knee 3/5  Coordination  Movements are Fluid and Coordinated: No  Lower Body Coordination: decreased bilateral LE's due to pain complaints, weakness, all movements limited and guarded    Postural Control  Posture Comment: mild to mod forward head, protracted shldrs    Static Sitting Balance  Static Sitting-Balance Support: Bilateral upper extremity supported  Static Sitting-Level of Assistance: Close supervision  Static Sitting-Comment/Number of Minutes: 7 to 8 min---fair + at best  Dynamic Sitting Balance  Dynamic Sitting-Balance Support: Bilateral upper extremity supported  Dynamic Sitting-Level of Assistance: Contact guard  Dynamic Sitting-Comments: fair    Static Standing Balance  Static Standing-Comment/Number of Minutes: NT---pt refused; no left BKAprosthesis available also  Dynamic Standing Balance  Dynamic Standing-Comments: pt refused; no left BKA prosthesis available also  Functional Assessments:  Bed Mobility  Bed Mobility: Yes  Bed Mobility 1  Bed Mobility 1: Supine to sitting  Level of Assistance 1: Minimum assistance, Moderate verbal cues, Minimal tactile cues  Bed Mobility Comments 1: head of bed elevated; initial min assist for trunk up; pt instructing PT \" not to help me\"---observed mod to max difficulty achieving sititng edge of bed, using bedrail with verbal/tactile cues for savage UE active use, via modified logrolling  Bed Mobility 2  Bed Mobility  2: Sitting to supine  Level of Assistance 2: Close supervision  Bed Mobility Comments 2: head of bed flat; mod difficulty observed for trunk down, savage LE's onto " bed, transition slow and effortful  Bed Mobility 3  Bed Mobility 3: Scooting  Level of Assistance 3: Maximum assistance  Bed Mobility Comments 3: max assist of 1 using drawsheet to scoot pt up toward head of bed, min active assist with bilateral UE's    Transfers  Transfer: No (pt refused)    Ambulation/Gait Training  Ambulation/Gait Training Performed: No    Stairs  Stairs: No  Extremity/Trunk Assessments:  Cervical Spine   Cervical Spine:  (mild to mod forward head)  RLE   RLE :  (see above strength comments)  LLE   LLE :  (see above strength comments)  Treatments:  Therapeutic Exercise  Therapeutic Exercise Performed: Yes  Therapeutic Exercise Activity 1: supine right AP x 15 reps  Therapeutic Exercise Activity 2: supine savage heel slides x 6 reps  Therapeutic Exercise Activity 3: supine right SLR x 3 reps  Therapeutic Exercise Activity 4: seated right LAQ's x 12 reps ( mod limited active right knee ext); left x 10 reps  Therapeutic Exercise Activity 5: supine savage hip abd/add x 3 reps  Outcome Measures:  [facility] Basic Mobility  Turning from your back to your side while in a flat bed without using bedrails: A little  Moving from lying on your back to sitting on the side of a flat bed without using bedrails: A lot  Moving to and from bed to chair (including a wheelchair): Total  Standing up from a chair using your arms (e.g. wheelchair or bedside chair): Total  To walk in hospital room: Total  Climbing 3-5 steps with railing: Total  Basic Mobility - Total Score: 9    Encounter Problems       Encounter Problems (Active)       Balance       STG - Maintains dynamic standing balance with upper extremity support (Progressing)       Start:  12/07/24    Expected End:  01/04/25       INTERVENTIONS:  1. Practice standing with minimal support.  2. Educate patient about standing tolerance.  3. Educate patient about independence with gait, transfers, and ADL's.  4. Educate patient about use of assistive device.  5. Educate patient  about self-directed care.         STG - Maintains static sitting balance without upper extremity support x 15 min edge of bed with good balance (Progressing)       Start:  12/07/24    Expected End:  01/04/25       INTERVENTIONS:  1. Practice sitting on the edge of a bed/mat with minimal support.  2. Educate patient about maintining total hip precautions while maintaining balance.  3. Educate patient about pressure relief.  4. Educate patient about use of assistive device.            Mobility       STG - Patient will ambulate 10 ft, FWW, + left BKA prosthesis, min assist of 1 (Progressing)       Start:  12/07/24    Expected End:  01/04/25            Increase strength bilateral LE's by 1/2 grade at all major joints to promote mobility ease (Progressing)       Start:  12/07/24    Expected End:  01/04/25               PT Transfers       STG - Transfer from bed to chair min assist of 1 stand pivot to right (Progressing)       Start:  12/07/24    Expected End:  01/04/25            STG - Patient to transfer to and from sit to supine mod Ind (Progressing)       Start:  12/07/24    Expected End:  01/04/25            STG - Patient will transfer sit to and from stand with min assist of 1 (Progressing)       Start:  12/07/24    Expected End:  01/04/25               Pain       pt will verbalize no > 4/10 pain during functional mobility       Start:  12/07/24    Expected End:  01/04/25               Pain - Adult          Safety       LTG - Patient will demonstrate safety requirements appropriate to situation/environment (Progressing)       Start:  12/07/24    Expected End:  01/04/25                   Education Documentation  Precautions, taught by Barbra Panda PT at 12/7/2024  1:03 PM.  Learner: Patient  Readiness: Acceptance  Method: Explanation, Demonstration  Response: Needs Reinforcement    Mobility Training, taught by Barbra Panda, PT at 12/7/2024  1:03 PM.  Learner: Patient  Readiness: Acceptance  Method: Explanation,  Demonstration  Response: Needs Reinforcement    Education Comments  No comments found.

## 2024-12-07 NOTE — PROGRESS NOTES
Pt is declining SNF, also declining to dc home and states he is not ready to return home, does not feel medically stable. [facility] met with pt and his stepdaughter at bedside, explained that he can appeal his dc with [facility] who will review his clinicals and decide whether he is stable enough for dc or agree that he needs to stay in the hospital for more time. [facility] provided the IM letter, pt requested his stepdaughter provide acknowledgement signature as he cannot see well which she did, a copy was made and given to them. [facility] asked them to call the number on the front of the IM to make [facility] aware of request to appeal dc, [facility] is faxing clinicals to [facility] in the meantime.    Update: Pts stepdacris Bowles provided appeals case number of [id number], this was sent with the clinicals to [facility]. Brief discussion around anticipated dc plan for whenever the pt discharges, at this time he would like to return home with [facility].     Pending: Decision from [facility] on pts appeal of dc.      12/07/24 8062   Discharge Planning   Expected Discharge Disposition Home

## 2024-12-07 NOTE — CARE PLAN
Problem: Pain - Adult  Goal: Verbalizes/displays adequate comfort level or baseline comfort level  Outcome: Progressing     Problem: Safety - Adult  Goal: Free from fall injury  Outcome: Progressing     Problem: Discharge Planning  Goal: Discharge to home or other facility with appropriate resources  Outcome: Progressing     Problem: Chronic Conditions and Co-morbidities  Goal: Patient's chronic conditions and co-morbidity symptoms are monitored and maintained or improved  Outcome: Progressing     Problem: Diabetes  Goal: Achieve decreasing blood glucose levels by end of shift  Outcome: Progressing  Goal: Increase stability of blood glucose readings by end of shift  Outcome: Progressing  Goal: Decrease in ketones present in urine by end of shift  Outcome: Progressing  Goal: Maintain electrolyte levels within acceptable range throughout shift  Outcome: Progressing  Goal: Maintain glucose levels >70mg/dl to <250mg/dl throughout shift  Outcome: Progressing  Goal: No changes in neurological exam by end of shift  Outcome: Progressing  Goal: Learn about and adhere to nutrition recommendations by end of shift  Outcome: Progressing  Goal: Vital signs within normal range for age by end of shift  Outcome: Progressing  Goal: Increase self care and/or family involovement by end of shift  Outcome: Progressing  Goal: Receive DSME education by end of shift  Outcome: Progressing      The clinical goals for the shift include pt remains stable

## 2024-12-07 NOTE — NURSING NOTE
"Patient's sheath pulled at 20:52. Report called to SDU at 22:30 to Ping. At time of transport, patient informed of covid precautions and will require reenforcement on education as he and his family both state \"covid isn't real\"   And are resistant to wearing PPE. Sheath site intact on transfer with no indication of bleeding at site, dressing clean, dry and intact, and no hematoma present with good doppler pulse present to right LE.    "

## 2024-12-08 ENCOUNTER — APPOINTMENT (OUTPATIENT)
Dept: RADIOLOGY | Facility: HOSPITAL | Age: 79
DRG: 250 | End: 2024-12-08
Payer: MEDICARE

## 2024-12-08 VITALS
TEMPERATURE: 98.6 F | OXYGEN SATURATION: 99 % | DIASTOLIC BLOOD PRESSURE: 54 MMHG | RESPIRATION RATE: 17 BRPM | SYSTOLIC BLOOD PRESSURE: 122 MMHG | WEIGHT: 150.13 LBS | HEART RATE: 75 BPM | HEIGHT: 68 IN | BODY MASS INDEX: 22.75 KG/M2

## 2024-12-08 LAB
ANION GAP SERPL CALCULATED.3IONS-SCNC: 11 MMOL/L (ref 10–20)
BACTERIA BLD CULT: NORMAL
BACTERIA BLD CULT: NORMAL
BUN SERPL-MCNC: 41 MG/DL (ref 6–23)
CALCIUM SERPL-MCNC: 7.8 MG/DL (ref 8.6–10.3)
CHLORIDE SERPL-SCNC: 104 MMOL/L (ref 98–107)
CO2 SERPL-SCNC: 24 MMOL/L (ref 21–32)
CREAT SERPL-MCNC: 1.65 MG/DL (ref 0.5–1.3)
EGFRCR SERPLBLD CKD-EPI 2021: 42 ML/MIN/1.73M*2
GLUCOSE BLD MANUAL STRIP-MCNC: 129 MG/DL (ref 74–99)
GLUCOSE BLD MANUAL STRIP-MCNC: 180 MG/DL (ref 74–99)
GLUCOSE BLD MANUAL STRIP-MCNC: 183 MG/DL (ref 74–99)
GLUCOSE SERPL-MCNC: 136 MG/DL (ref 74–99)
HOLD SPECIMEN: NORMAL
POTASSIUM SERPL-SCNC: 3.8 MMOL/L (ref 3.5–5.3)
SODIUM SERPL-SCNC: 135 MMOL/L (ref 136–145)

## 2024-12-08 PROCEDURE — 99232 SBSQ HOSP IP/OBS MODERATE 35: CPT | Performed by: INTERNAL MEDICINE

## 2024-12-08 PROCEDURE — 82947 ASSAY GLUCOSE BLOOD QUANT: CPT

## 2024-12-08 PROCEDURE — 2500000002 HC RX 250 W HCPCS SELF ADMINISTERED DRUGS (ALT 637 FOR MEDICARE OP, ALT 636 FOR OP/ED): Performed by: NURSE PRACTITIONER

## 2024-12-08 PROCEDURE — 36415 COLL VENOUS BLD VENIPUNCTURE: CPT | Performed by: INTERNAL MEDICINE

## 2024-12-08 PROCEDURE — 2500000001 HC RX 250 WO HCPCS SELF ADMINISTERED DRUGS (ALT 637 FOR MEDICARE OP): Performed by: INTERNAL MEDICINE

## 2024-12-08 PROCEDURE — 2500000005 HC RX 250 GENERAL PHARMACY W/O HCPCS: Performed by: NURSE PRACTITIONER

## 2024-12-08 PROCEDURE — 80048 BASIC METABOLIC PNL TOTAL CA: CPT | Performed by: INTERNAL MEDICINE

## 2024-12-08 PROCEDURE — 2500000001 HC RX 250 WO HCPCS SELF ADMINISTERED DRUGS (ALT 637 FOR MEDICARE OP): Performed by: NURSE PRACTITIONER

## 2024-12-08 PROCEDURE — 71045 X-RAY EXAM CHEST 1 VIEW: CPT | Performed by: RADIOLOGY

## 2024-12-08 PROCEDURE — 99233 SBSQ HOSP IP/OBS HIGH 50: CPT | Performed by: FAMILY MEDICINE

## 2024-12-08 PROCEDURE — 71045 X-RAY EXAM CHEST 1 VIEW: CPT

## 2024-12-08 PROCEDURE — 1200000002 HC GENERAL ROOM WITH TELEMETRY DAILY

## 2024-12-08 PROCEDURE — 2500000004 HC RX 250 GENERAL PHARMACY W/ HCPCS (ALT 636 FOR OP/ED): Performed by: NURSE PRACTITIONER

## 2024-12-08 RX ADMIN — ASPIRIN 81 MG: 81 TABLET, COATED ORAL at 09:16

## 2024-12-08 RX ADMIN — LEVOTHYROXINE SODIUM 50 MCG: 0.05 TABLET ORAL at 05:20

## 2024-12-08 RX ADMIN — PANTOPRAZOLE SODIUM 40 MG: 40 INJECTION, POWDER, FOR SOLUTION INTRAVENOUS at 09:16

## 2024-12-08 RX ADMIN — MIRTAZAPINE 15 MG: 15 TABLET, FILM COATED ORAL at 20:45

## 2024-12-08 RX ADMIN — BUSPIRONE HYDROCHLORIDE 7.5 MG: 5 TABLET ORAL at 09:20

## 2024-12-08 RX ADMIN — Medication 97 PERCENT: at 02:44

## 2024-12-08 RX ADMIN — PRASUGREL 10 MG: 10 TABLET, FILM COATED ORAL at 09:16

## 2024-12-08 RX ADMIN — INSULIN LISPRO 2 UNITS: 100 INJECTION, SOLUTION INTRAVENOUS; SUBCUTANEOUS at 12:30

## 2024-12-08 RX ADMIN — AMLODIPINE BESYLATE 5 MG: 5 TABLET ORAL at 09:15

## 2024-12-08 RX ADMIN — INSULIN LISPRO 2 UNITS: 100 INJECTION, SOLUTION INTRAVENOUS; SUBCUTANEOUS at 17:26

## 2024-12-08 RX ADMIN — METOPROLOL SUCCINATE 25 MG: 25 TABLET, EXTENDED RELEASE ORAL at 09:16

## 2024-12-08 RX ADMIN — ATORVASTATIN CALCIUM 40 MG: 40 TABLET, FILM COATED ORAL at 20:45

## 2024-12-08 RX ADMIN — SUCRALFATE ORAL SUSPENSION 1 G: 1 SUSPENSION ORAL at 12:47

## 2024-12-08 RX ADMIN — BUSPIRONE HYDROCHLORIDE 7.5 MG: 5 TABLET ORAL at 20:45

## 2024-12-08 ASSESSMENT — COGNITIVE AND FUNCTIONAL STATUS - GENERAL
EATING MEALS: A LITTLE
DRESSING REGULAR UPPER BODY CLOTHING: A LITTLE
DAILY ACTIVITIY SCORE: 18
TOILETING: A LITTLE
MOBILITY SCORE: 15
CLIMB 3 TO 5 STEPS WITH RAILING: TOTAL
DAILY ACTIVITIY SCORE: 19
MOVING FROM LYING ON BACK TO SITTING ON SIDE OF FLAT BED WITH BEDRAILS: A LOT
CLIMB 3 TO 5 STEPS WITH RAILING: TOTAL
STANDING UP FROM CHAIR USING ARMS: A LITTLE
PERSONAL GROOMING: A LITTLE
MOVING TO AND FROM BED TO CHAIR: A LITTLE
EATING MEALS: A LITTLE
DRESSING REGULAR LOWER BODY CLOTHING: A LITTLE
DRESSING REGULAR UPPER BODY CLOTHING: A LITTLE
HELP NEEDED FOR BATHING: A LITTLE
MOBILITY SCORE: 19
TOILETING: A LITTLE
HELP NEEDED FOR BATHING: A LITTLE
STANDING UP FROM CHAIR USING ARMS: A LITTLE
WALKING IN HOSPITAL ROOM: A LITTLE
WALKING IN HOSPITAL ROOM: A LITTLE
PERSONAL GROOMING: A LITTLE
TURNING FROM BACK TO SIDE WHILE IN FLAT BAD: A LITTLE

## 2024-12-08 ASSESSMENT — PAIN SCALES - GENERAL
PAINLEVEL_OUTOF10: 7
PAINLEVEL_OUTOF10: 0 - NO PAIN
PAINLEVEL_OUTOF10: 0 - NO PAIN

## 2024-12-08 NOTE — CARE PLAN
Problem: Pain - Adult  Goal: Verbalizes/displays adequate comfort level or baseline comfort level  Outcome: Progressing   The patient's goals for the shift include      The clinical goals for the shift include maintain safety

## 2024-12-08 NOTE — PROGRESS NOTES
Chavez Llamas is a 79 y.o. male on day 3 of admission presenting with Syncope and collapse.    Subjective   He feels lousy  S/p cardiac cath-note reviewed  Nonproductive cough  Complains of generalized weakness  Remains on room air  Agreed to evaluation by physical therapy    Review of Systems   All other systems reviewed and are negative.    Objective   Range of Vitals (last 24 hours)  Heart Rate:  [62-72]   Temp:  [36.1 °C (97 °F)-36.9 °C (98.4 °F)]   Resp:  [17-19]   BP: (105-129)/(44-66)   SpO2:  [93 %-100 %]   Daily Weight  12/07/24 : 68.1 kg (150 lb 2.1 oz)    Body mass index is 22.83 kg/m².    Physical Exam  Constitutional:       Appearance: Normal appearance.   HENT:      Head: Normocephalic and atraumatic.      Nose: Nose normal.   Eyes:      General: No scleral icterus.     Extraocular Movements: Extraocular movements intact.      Conjunctiva/sclera: Conjunctivae normal.   Cardiovascular:      Rate and Rhythm: Normal rate and regular rhythm.      Heart sounds: Normal heart sounds.   Pulmonary:      Effort: Pulmonary effort is normal.      Breath sounds: Normal breath sounds.   Abdominal:      General: Bowel sounds are normal.      Palpations: Abdomen is soft.      Tenderness: There is no abdominal tenderness.   Musculoskeletal:      Cervical back: Normal range of motion and neck supple.      Right lower leg: No edema.      Left lower leg: No edema.   Skin:     General: Skin is warm and dry.   Neurological:      Mental Status: He is alert and oriented to person, place, and time.   Psychiatric:         Behavior: Behavior normal.     Antibiotics  This patient does not have an active medication from one of the medication groupers.    Relevant Results  Labs  Results from last 72 hours   Lab Units 12/07/24  0417 12/06/24  0455   WBC AUTO x10*3/uL 4.0* 6.1   HEMOGLOBIN g/dL 9.8* 10.2*   HEMATOCRIT % 28.8* 29.7*   PLATELETS AUTO x10*3/uL 104* 98*     Results from last 72 hours   Lab Units 12/08/24  0609  12/07/24  0417 12/06/24  0455   SODIUM mmol/L 135* 135* 137   POTASSIUM mmol/L 3.8 4.1 4.2   CHLORIDE mmol/L 104 102 104   CO2 mmol/L 24 24 27   BUN mg/dL 41* 39* 37*   CREATININE mg/dL 1.65* 1.57* 1.41*   GLUCOSE mg/dL 136* 106* 89   CALCIUM mg/dL 7.8* 8.1* 8.5*   ANION GAP mmol/L 11 13 10   EGFR mL/min/1.73m*2 42* 44* 50*     Results from last 72 hours   Lab Units 12/06/24  0455   ALK PHOS U/L 55   BILIRUBIN TOTAL mg/dL 1.4*   PROTEIN TOTAL g/dL 6.3*   ALT U/L 20   AST U/L 44*   ALBUMIN g/dL 3.2*     Estimated Creatinine Clearance: 34.4 mL/min (A) (by C-G formula based on SCr of 1.65 mg/dL (H)).  CRP   Date Value Ref Range Status   01/31/2022 15.3 (H) 0 - 2.0 MG/DL Final     Comment:     Performed at [place] [id number]     Microbiology    Imaging  Cardiac Catheterization Procedure    Result Date: 12/6/2024           [place] [address]            Phone [phone] Cardiovascular Catheterization Report Patient Name:     ORLANDO VICTORIA    Performing Physician:  Alia Soriano MD Study Date:       12/6/2024            Verifying Physician:   Alia Soriano MD MRN/PID:          [id number]             Cardiologist/Co-Scrub: Accession#:       [id number]         Ordering Provider:     [id number] SORIANO Date of           1945 / 79      Cardiologist: Birth/Age:        years Gender:           M                    Fellow: Encounter#:       [id number]           Surgeon:  Study: Left Heart Cath  Indications: ORLANDO VICTORIA is a 79 year old male who presents with dyslipidemia, hypertension and a chest pain assessment of atypical angina. Worsening angina. Stress test performed: No. CTA performed: No. [place] accessed: No. LVEF Assessed: Yes. LVEF =  40%. Cardiac arrest: No. Cardiac surgical consult: No. Cardiovascular Instability: No Frailty status of patient entering lab: 4 = Vulnerable.  Procedure Description: After infiltration with 2% Lidocaine, the right femoral artery was cannulated with a modified Seldinger technique. Subsequently a 6 Jordanian sheath was placed in the right femoral artery. Selective coronary catheterization was performed using a 5 Fr catheter(s) exchanged over a guide wire to cannulate the coronary arteries. A JL 4 tip catheter was used for left coronary injections. A JR 4 tip catheter was used for right coronary injections.  Coronary Angiography: The coronary circulation is right dominant.  Left Main Coronary Artery: The left main has distal disease of 90%. Only calcified vessel.  Left Anterior Descending Coronary Artery Distribution: Left anterior descending artery is 100% occluded in the midsegment heavily calcified from the ostial artery. Diagonal 1 coming off of 90% lesion of the LAD before becoming 100% occluded.  Circumflex Coronary Artery Distribution: Left circumflex has severe diffuse disease in proximal to mid segment. 100% occluded in the midsegment.  Ramus Intermedius: The ramus is midsize artery with the ostial disease of 50%.  Right Coronary Artery Distribution: Right coronary artery is right dominant vessel. Has anterior takeoff. Very tortuous. Has stents in the proximal mid mid distal segment probably 3 or 4 overlapped stents. There is underexpanded stent in the mid segment was 40% in-stent restenosis. There is a severe in-stent restenosis in underexpanded mid segment of the stent. Distally patent stents with moderate diffuse disease throughout the vessels.  Coronary Grafts:  LIMA Graft: Left internal mammary artery graft conduit, originating from the aorta and attached to the mid left anterior descending, is normal. The left internal mammary artery graft conduit originating from the aorta and attached to the mid left  anterior descending is normal.  Left Ventriculography: Left ventricular end-diastolic pressure was normal at 10 millimeters of mercury. LV gram showed ejection fraction of 40% with diffuse hypokinesis. No gradient across aortic valve.  Coronary Interventions: Angiography reveals a 99% stenosis of the proximal to mid, mid and mid to distal right coronary artery coronary artery. Pre-intervention CIERRA flow was 3. Percutaneous coronary intervention was performed within the proximal to mid, mid and mid to distal right coronary artery. The vessel was pre-dilated using a compliant balloon 1.5 mm x 10 mm at 14 VERONICA. The stent was post dilated using a non-compliant balloon 2.0 mm x 8 mm at 22 VERONICA. The stenosis was successfully reduced from 99% to 50%. Post-intervention CIERRA flow was 3. Patient has complex lesion at the level of mid and proximal mid RCA the RCA has multiple interventions in the past and multiple overlapped stents. 2 underexpanded stents segment and heavily calcified artery. We used multiple guide but were unable to engage perfectly the artery due to anterior and subsequently 90 degrees patent. We tried with AL 7 5 as well as 3 DRC and finally a JR4 were able to get a better coaxial engagement. We had difficulty advancing any kind of balloon at the. Finally with raj wires and guide Real were able to advance initially 1.5 x 10 mm compliant balloon and we treated the mid and proximal mid segment with high pressures balloon angioplasty and the balloon in fact ruptured. Subsequently with right with 2.0 x 10 mm noncompliant balloon and we were able to balloon but without significant improvement in the stenosis. Finally we used 2.0 x 8 mm noncompliant balloon with high pressures up to 22 veronica we got some improvement at the level of the mid RCA in-stent restenosis which was at the beginning 99% now it is about 50%. We also achieved slightly better stent expansion in the proximal mid lesion. Post stents remains  underexpanded. Patient remained stable had CIERRA-3 flow.  Graft Stenosis Summary: Graft     Destination of Graft LIMA  mid left anterior descending  Cardiac Cath Post Procedure Notes: Post Procedure Diagnosis: Triple vessel disease. Blood Loss:               Estimated blood loss during the procedure was 10 cc                           mls. Specimens Removed:        Number of specimen(s) removed: none. ____________________________________________________________________________________ CONCLUSIONS:  1. Severe three-vessel coronary artery disease.  2. Patent LIMA to LAD but after the anastomosis there is moderate tubular lesion that has been stable for long time.  3. Distal left main of 90% that is giving flow to midsize ramus. Has been stable for many years.  4. Severe diffuse right coronary artery disease was 2 areas of underexpanded stents and severe in-stent restenosis of 90% in the midsegment.  5. Balloon angioplasty was attempted we were unable to expand the mid RCA stent. There was still significant in-stent restenosis of 50% after balloon angioplasty. Improved from 99%. CIERRA-3 flow.  6. Medical management for now. ICD 10 Codes: Non ST elevation (NSTEMI) myocardial infarction-I21.4  CPT Codes: Left Heart Cath Bypass Graft w ventriculography and coronary angio(LHC)-64999; Angioplasty, single Right Coronary major Artery/branch (PCI)-21094.RC; Moderate Sedation Services initial 15 minutes patient >5 years-27393  [id number] Chaim Soriano MD Performing Physician Electronically signed by [id number] Chaim Soriano MD on 12/6/2024 at 4:13:05 PM  ** Final **     Transthoracic Echo (TTE) Complete    Result Date: 12/6/2024           [place] [address]            Phone [phone] TRANSTHORACIC ECHOCARDIOGRAM REPORT Patient Name:       ORLANDO Oconnell Physician:    [id number] Benjamin Nuno MD Study Date:          12/6/2024            Ordering Provider:    [id number] ABBY MATA MRN/PID:            [id number]             Fellow: Accession#:         [id number]         Nurse: Date of Birth/Age:  1945 / 79      Sonographer:          Marce ECHEVERRIA Gender Assigned at  M                    Additional Staff: Birth: Height:             172.70 cm            Admit Date:           12/4/2024 Weight:             68.95 kg             Admission Status:     Inpatient -                                                                Routine BSA / BMI:          1.82 m2 / 23.12      Department Location:                     kg/m2 Blood Pressure: 100 /55 mmHg Study Type:    TRANSTHORACIC ECHO (TTE) COMPLETE Diagnosis/ICD: Syncope and collapse-R55 Indication:    Syncope CPT Codes:     Echo Complete w Full Doppler-54665 Patient History: Pertinent History: Acute Myocardial Infraction involving left anterior                    descending coronary artery CP HTN HLD CAD CHF NSTEMI DMII                    COPD. Study Detail: The following Echo studies were performed: 2D, M-Mode, Doppler,               color flow and Strain.  PHYSICIAN INTERPRETATION: Left Ventricle: The left ventricular systolic function is mildly decreased, with a visually estimated ejection fraction of 40-45%. Wall motion is abnormal. The left ventricular cavity size is normal. There is left ventricular concentric remodeling. Left Ventricular Global Longitudinal Strain - -11.0 %. Abnormal (paradoxical) septal motion consistent with post-operative status and abnormal (paradoxical) septal motion, consistent with left bundle branch block. Spectral Doppler shows an abnormal pattern of left ventricular diastolic filling. There is severe hypokinesis of the basal inferior wall. Left Atrium: The left atrium is mildly dilated. There is evidence of an atrial  septal aneurysm. Right Ventricle: The right ventricle is normal in size. There is normal right ventriclar wall thickness. There is normal right ventricular global systolic function. Right Atrium: The right atrium is normal in size. Aortic Valve: The aortic valve is trileaflet. There is mild aortic valve cusp calcification. There is no evidence of reduced aortic valve leaflet excursion excursion. There is evidence of mildly elevated transaortic gradients consistent with sclerosis of the aortic valve. There is mild aortic valve regurgitation. The peak instantaneous gradient of the aortic valve is 8 mmHg. Mitral Valve: The mitral valve is normal in structure. There is normal mitral valve leaflet mobility. There is mild mitral annular calcification. There is mild to moderate mitral valve regurgitation. Tricuspid Valve: The tricuspid valve is structurally normal. There is normal tricuspid valve leaflet mobility. There is trace to mild tricuspid regurgitation. Pulmonic Valve: The pulmonic valve is structurally normal. There is trace pulmonic valve regurgitation. Pericardium: No pericardial effusion noted. Aorta: The aortic root is abnormal. The ascending aorta is at the upper limits of normal. The aortic root is at the upper limits of normal size. There is plaque visualized in the ascending aorta, which is classified as a Grade 2 [mild (focal or diffuse) intimal thickening of 2-3 mm] atherosclerosis. Systemic Veins: The inferior vena cava appears normal in size, with IVC inspiratory collapse greater than 50%.  CONCLUSIONS:  1. The left ventricular systolic function is mildly decreased, with a visually estimated ejection fraction of 40-45%.  2. Abnormal wall motion.  3. Spectral Doppler shows an abnormal pattern of left ventricular diastolic filling.  4. Abnormal septal motion consistent with post-operative status and abnormal septal motion consistent with left bundle branch block.  5. There is severe hypokinesis of the  basal inferior wall.  6. There is normal right ventricular global systolic function.  7. Mild to moderate mitral valve regurgitation.  8. Trace to mild tricuspid regurgitation.  9. Aortic valve sclerosis. 10. Mild aortic valve regurgitation. 11. Atrial septal aneurysm present. 12. There is plaque visualized in the ascending aorta. QUANTITATIVE DATA SUMMARY:  2D MEASUREMENTS:            Normal Ranges: LAs:             3.87 cm    (2.7-4.0cm) IVSd:            0.96 cm    (0.6-1.1cm) LVPWd:           1.07 cm    (0.6-1.1cm) LVIDd:           5.05 cm    (3.9-5.9cm) LVIDs:           4.10 cm LV Mass Index:   103.9 g/m2 LV % FS          18.9 %  LA VOLUME:                    Normal Ranges: LA Vol A4C:        64.7 ml    (22+/-6mL/m2) LA Vol A2C:        84.4 ml LA Vol BP:         78.1 ml LA Vol Index A4C:  35.6 ml/m2 LA Vol Index A2C:  46.4 ml/m2 LA Vol Index BP:   42.9 ml/m2 LA Area A4C:       19.9 cm2 LA Area A2C:       24.0 cm2 LA Major Axis A4C: 5.2 cm LA Major Axis A2C: 5.8 cm LA Volume Index:   42.5 ml/m2 LA Vol A4C:        59.0 ml LA Vol A2C:        78.7 ml LA Vol Index BSA:  37.9 ml/m2  RA VOLUME BY A/L METHOD:            Normal Ranges: RA Vol A4C:              52.4 ml    (8.3-19.5ml) RA Vol Index A4C:        28.8 ml/m2 RA Area A4C:             17.2 cm2 RA Major Axis A4C:       4.8 cm  M-MODE MEASUREMENTS:         Normal Ranges: LAs:                 5.33 cm (2.7-4.0cm)  AORTA MEASUREMENTS:         Normal Ranges: Ao Sinus, d:        3.60 cm (2.1-3.5cm) Ao STJ, d:          3.40 cm (1.7-3.4cm) Asc Ao, d:          3.40 cm (2.1-3.4cm)  LV SYSTOLIC FUNCTION BY 2D PLANIMETRY (MOD):                                         Normal Ranges: EF-A4C View:                      31 %  (>=55%) EF-A2C View:                      46 % EF-Biplane:                       38 % EF-Visual:                        43 % EF-Auto:                          40 % LV EF Reported:                   43 % Global Longitudinal Strain (GLS): -11 %  LV DIASTOLIC  FUNCTION:             Normal Ranges: MV Peak E:             0.45 m/s    (0.7-1.2 m/s) MV Peak A:             0.72 m/s    (0.42-0.7 m/s) E/A Ratio:             0.63        (1.0-2.2) MV A Dur:              188.39 msec  MITRAL VALVE:          Normal Ranges: MV DT:        207 msec (150-240msec)  AORTIC VALVE:           Normal Ranges: AoV Vmax:      1.40 m/s (<=1.7m/s) AoV Peak P.9 mmHg (<20mmHg) LVOT Max Deonte:  0.89 m/s (<=1.1m/s) LVOT VTI:      14.82 cm LVOT Diameter: 1.98 cm  (1.8-2.4cm) AoV Area,Vmax: 1.94 cm2 (2.5-4.5cm2)  RIGHT VENTRICLE: RV Basal 2.57 cm RV Mid   3.00 cm RV Major 6.2 cm  TRICUSPID VALVE/RVSP:         Normal Ranges: IVC Diam:             1.70 cm  AORTA: Asc Ao Diam 3.41 cm  [id number] Benjamin Nuno MD Electronically signed on 2024 at 1:29:30 PM  ** Final **     Vascular US Carotid Artery Duplex Bilateral    Result Date: 2024           [place] [address]            Phone [phone]  Vascular Lab Report  [facility] US CAROTID ARTERY DUPLEX BILATERAL Patient Name:      ORLANDO Oconnell Physician:  [id number] Marina Farr MD Study Date:        2024           Ordering Provider:  [id number] LUIS DANIEL CARDENAS MRN/PID:           [id number]            Fellow: Accession#:        [id number]        Technologist:       Zoya Pina RVT Date of Birth/Age: 1945 / 79     Technologist 2:                    years Gender:            M                   Encounter#:         [id number] Admission Status:  Inpatient           Location Performed: [facility]  Diagnosis/ICD: Syncope and collapse-R55 CPT Codes:     70074 Cerebrovascular Carotid Duplex scan complete  CONCLUSIONS: Right Carotid: Findings are consistent with less than 50% stenosis of the right proximal internal carotid artery. Right external carotid artery appears patent with no evidence of stenosis. The right vertebral artery  is patent with antegrade flow. No evidence of hemodynamically significant stenosis in the right subclavian artery. Left Carotid: Findings are consistent with 50 to 69% stenosis of the left proximal internal carotid artery. Left external carotid artery appears patent with no evidence of stenosis. The left vertebral artery is patent with antegrade flow. No evidence of hemodynamically significant stenosis in the left subclavian artery.  Imaging & Doppler Findings: Right Plaque Morph: The proximal right internal carotid artery demonstrates heterogenous plaque. The proximal right external carotid artery demonstrates heterogenous plaque. The distal right common carotid artery demonstrates heterogenous plaque. Left Plaque Morph: The proximal left internal carotid artery demonstrates heterogenous and calcified plaque. The proximal left external carotid artery demonstrates heterogenous and calcified plaque. The distal left common carotid artery demonstrates heterogenous plaque.   Right                        Left   PSV      EDV                PSV      EDV 65 cm/s            CCA P    78 cm/s 60 cm/s            CCA D    49 cm/s 81 cm/s  21 cm/s   ICA P    202 cm/s 65 cm/s 65 cm/s  18 cm/s   ICA M    128 cm/s 26 cm/s 74 cm/s  24 cm/s   ICA D    60 cm/s  19 cm/s 100 cm/s            ECA     137 cm/s 38 cm/s  11 cm/s Vertebral  65 cm/s  15 cm/s 66 cm/s          Subclavian 141 cm/s                Right Left ICA/CCA Ratio  1.4  4.1   [id number] Marina Farr MD Electronically signed by [id number] Marina Farr MD on 12/6/2024 at 12:19:53 PM  ** Final **     ECG 12 lead    Result Date: 12/5/2024  Sinus rhythm with 1st degree AV block Left bundle branch block Abnormal ECG When compared with ECG of 04-DEC-2024 16:12, (unconfirmed) Sinus rhythm has replaced Atrial fibrillation Confirmed by Jonathan Tristan ([id number]) on 12/5/2024 2:45:20 PM    ECG 12 lead    Result Date: 12/5/2024  Atrial fibrillation with a competing junctional pacemaker Left bundle  branch block Abnormal ECG When compared with ECG of 18-FEB-2022 13:46, Atrial fibrillation has replaced Sinus rhythm Left bundle branch block is now Present Criteria for Anterior infarct are no longer Present Confirmed by Jonathan Tristan ([id number]) on 12/5/2024 2:42:44 PM    CT chest abdomen pelvis w IV contrast    Result Date: 12/4/2024  STUDY: CT Chest, Abdomen, and Pelvis with IV Contrast, CT Thoracic Spine and Lumbar Spine without IV Contrast; 12/4/2024 5:48 PM. INDICATION: Trauma, fall.  Head injury.  Coffee ground emesis.  Syncope COMPARISON: CT AP 2/18/2022. ACCESSION NUMBER(S): [id number], [id number], [id number], [id number] ORDERING CLINICIAN: GRETEL FALCON TECHNIQUE: CT of the chest, abdomen, and pelvis was performed.  Contiguous axial images were obtained at 3 mm slice thickness through the chest, abdomen, and pelvis.  Coronal and sagittal reconstructions at 3 mm slice thickness were performed.  Omnipaque 350 75 mL was administered intravenously.  Please note that spinal images were generated from the original CT abdomen and pelvis imaging. FINDINGS: CHEST: MEDIASTINUM: The heart is normal in size without pericardial effusion.  Central vascular structures opacify normally.  LUNGS/PLEURA: There is no pleural effusion, pleural thickening, or pneumothorax. The airways are patent. Lungs are clear without consolidation, interstitial disease, or suspicious nodules. LYMPH NODES: Thoracic lymph nodes are not enlarged. ABDOMEN:  LIVER: No hepatomegaly.  Smooth surface contour.  Normal attenuation.  BILE DUCTS: No intrahepatic or extrahepatic biliary ductal dilatation.  GALLBLADDER: The gallbladder is unremarkable. STOMACH: No abnormalities identified.  PANCREAS: No masses or ductal dilatation.  SPLEEN: No splenomegaly or focal splenic lesion.  ADRENAL GLANDS: No thickening or nodules.  KIDNEYS AND URETERS: Kidneys are normal in size and location.  No renal or ureteral calculi.  PELVIS:  BLADDER: No abnormalities  identified.  REPRODUCTIVE ORGANS: No abnormalities identified.  BOWEL: No abnormalities identified.  VESSELS: No abnormalities identified.  Abdominal aorta is normal in caliber.  PERITONEUM/RETROPERITONEUM/LYMPH NODES: No free fluid.  No pneumoperitoneum. No lymphadenopathy.  ABDOMINAL WALL: No abnormalities identified. SOFT TISSUES: No abnormalities identified.  BONES: No acute fracture or aggressive osseous lesion. THORACIC SPINE: There is mild dextroscoliosis. The spine is in otherwise good alignment.  There is no fracture or traumatic subluxation. The vertebral body heights are well maintained.  Disc spaces are preserved.  No significant central canal stenosis is demonstrated. The neural foramina are patent throughout. There is mild degenerative change. The paravertebral soft tissues are within normal limits. LUMBAR SPINE: The alignment is anatomic.  There is mild compression of the superior endplate of L4. This was present on the study dated 2/18/2022 and is not significantly changed. The vertebral body heights are otherwise well maintained.  Disc spaces are preserved.  No significant central canal stenosis is demonstrated.  The neural foramina are patent throughout.  The paravertebral soft tissues are within normal limits.    1. No evidence for acute injury to the chest, abdomen, pelvis, thoracic, or lumbar spine. 2. There is mild chronic compression of the superior endplate of L4. Mild degenerative change in the thoracic and lumbar spine. Signed by Calin Montana MD    CT thoracic spine wo IV contrast    Result Date: 12/4/2024  STUDY: CT Chest, Abdomen, and Pelvis with IV Contrast, CT Thoracic Spine and Lumbar Spine without IV Contrast; 12/4/2024 5:48 PM. INDICATION: Trauma, fall.  Head injury.  Coffee ground emesis.  Syncope COMPARISON: CT AP 2/18/2022. ACCESSION NUMBER(S): [id number], [id number], [id number], [id number] ORDERING CLINICIAN: GRETEL FALCON TECHNIQUE: CT of the chest, abdomen, and pelvis  was performed.  Contiguous axial images were obtained at 3 mm slice thickness through the chest, abdomen, and pelvis.  Coronal and sagittal reconstructions at 3 mm slice thickness were performed.  Omnipaque 350 75 mL was administered intravenously.  Please note that spinal images were generated from the original CT abdomen and pelvis imaging. FINDINGS: CHEST: MEDIASTINUM: The heart is normal in size without pericardial effusion.  Central vascular structures opacify normally.  LUNGS/PLEURA: There is no pleural effusion, pleural thickening, or pneumothorax. The airways are patent. Lungs are clear without consolidation, interstitial disease, or suspicious nodules. LYMPH NODES: Thoracic lymph nodes are not enlarged. ABDOMEN:  LIVER: No hepatomegaly.  Smooth surface contour.  Normal attenuation.  BILE DUCTS: No intrahepatic or extrahepatic biliary ductal dilatation.  GALLBLADDER: The gallbladder is unremarkable. STOMACH: No abnormalities identified.  PANCREAS: No masses or ductal dilatation.  SPLEEN: No splenomegaly or focal splenic lesion.  ADRENAL GLANDS: No thickening or nodules.  KIDNEYS AND URETERS: Kidneys are normal in size and location.  No renal or ureteral calculi.  PELVIS:  BLADDER: No abnormalities identified.  REPRODUCTIVE ORGANS: No abnormalities identified.  BOWEL: No abnormalities identified.  VESSELS: No abnormalities identified.  Abdominal aorta is normal in caliber.  PERITONEUM/RETROPERITONEUM/LYMPH NODES: No free fluid.  No pneumoperitoneum. No lymphadenopathy.  ABDOMINAL WALL: No abnormalities identified. SOFT TISSUES: No abnormalities identified.  BONES: No acute fracture or aggressive osseous lesion. THORACIC SPINE: There is mild dextroscoliosis. The spine is in otherwise good alignment.  There is no fracture or traumatic subluxation. The vertebral body heights are well maintained.  Disc spaces are preserved.  No significant central canal stenosis is demonstrated. The neural foramina are patent  throughout. There is mild degenerative change. The paravertebral soft tissues are within normal limits. LUMBAR SPINE: The alignment is anatomic.  There is mild compression of the superior endplate of L4. This was present on the study dated 2/18/2022 and is not significantly changed. The vertebral body heights are otherwise well maintained.  Disc spaces are preserved.  No significant central canal stenosis is demonstrated.  The neural foramina are patent throughout.  The paravertebral soft tissues are within normal limits.    1. No evidence for acute injury to the chest, abdomen, pelvis, thoracic, or lumbar spine. 2. There is mild chronic compression of the superior endplate of L4. Mild degenerative change in the thoracic and lumbar spine. Signed by Calin Montana MD    CT lumbar spine wo IV contrast    Result Date: 12/4/2024  STUDY: CT Chest, Abdomen, and Pelvis with IV Contrast, CT Thoracic Spine and Lumbar Spine without IV Contrast; 12/4/2024 5:48 PM. INDICATION: Trauma, fall.  Head injury.  Coffee ground emesis.  Syncope COMPARISON: CT AP 2/18/2022. ACCESSION NUMBER(S): [id number], [id number], [id number], [id number] ORDERING CLINICIAN: GRETEL FALCON TECHNIQUE: CT of the chest, abdomen, and pelvis was performed.  Contiguous axial images were obtained at 3 mm slice thickness through the chest, abdomen, and pelvis.  Coronal and sagittal reconstructions at 3 mm slice thickness were performed.  Omnipaque 350 75 mL was administered intravenously.  Please note that spinal images were generated from the original CT abdomen and pelvis imaging. FINDINGS: CHEST: MEDIASTINUM: The heart is normal in size without pericardial effusion.  Central vascular structures opacify normally.  LUNGS/PLEURA: There is no pleural effusion, pleural thickening, or pneumothorax. The airways are patent. Lungs are clear without consolidation, interstitial disease, or suspicious nodules. LYMPH NODES: Thoracic lymph nodes are not enlarged.  ABDOMEN:  LIVER: No hepatomegaly.  Smooth surface contour.  Normal attenuation.  BILE DUCTS: No intrahepatic or extrahepatic biliary ductal dilatation.  GALLBLADDER: The gallbladder is unremarkable. STOMACH: No abnormalities identified.  PANCREAS: No masses or ductal dilatation.  SPLEEN: No splenomegaly or focal splenic lesion.  ADRENAL GLANDS: No thickening or nodules.  KIDNEYS AND URETERS: Kidneys are normal in size and location.  No renal or ureteral calculi.  PELVIS:  BLADDER: No abnormalities identified.  REPRODUCTIVE ORGANS: No abnormalities identified.  BOWEL: No abnormalities identified.  VESSELS: No abnormalities identified.  Abdominal aorta is normal in caliber.  PERITONEUM/RETROPERITONEUM/LYMPH NODES: No free fluid.  No pneumoperitoneum. No lymphadenopathy.  ABDOMINAL WALL: No abnormalities identified. SOFT TISSUES: No abnormalities identified.  BONES: No acute fracture or aggressive osseous lesion. THORACIC SPINE: There is mild dextroscoliosis. The spine is in otherwise good alignment.  There is no fracture or traumatic subluxation. The vertebral body heights are well maintained.  Disc spaces are preserved.  No significant central canal stenosis is demonstrated. The neural foramina are patent throughout. There is mild degenerative change. The paravertebral soft tissues are within normal limits. LUMBAR SPINE: The alignment is anatomic.  There is mild compression of the superior endplate of L4. This was present on the study dated 2/18/2022 and is not significantly changed. The vertebral body heights are otherwise well maintained.  Disc spaces are preserved.  No significant central canal stenosis is demonstrated.  The neural foramina are patent throughout.  The paravertebral soft tissues are within normal limits.    1. No evidence for acute injury to the chest, abdomen, pelvis, thoracic, or lumbar spine. 2. There is mild chronic compression of the superior endplate of L4. Mild degenerative change in the  thoracic and lumbar spine. Signed by Calin Montana MD    CT 3D reconstruction    Result Date: 12/4/2024  STUDY: CT Chest, Abdomen, and Pelvis with IV Contrast, CT Thoracic Spine and Lumbar Spine without IV Contrast; 12/4/2024 5:48 PM. INDICATION: Trauma, fall.  Head injury.  Coffee ground emesis.  Syncope COMPARISON: CT AP 2/18/2022. ACCESSION NUMBER(S): [id number], [id number], [id number], [id number] ORDERING CLINICIAN: GRETEL FALCON TECHNIQUE: CT of the chest, abdomen, and pelvis was performed.  Contiguous axial images were obtained at 3 mm slice thickness through the chest, abdomen, and pelvis.  Coronal and sagittal reconstructions at 3 mm slice thickness were performed.  Omnipaque 350 75 mL was administered intravenously.  Please note that spinal images were generated from the original CT abdomen and pelvis imaging. FINDINGS: CHEST: MEDIASTINUM: The heart is normal in size without pericardial effusion.  Central vascular structures opacify normally.  LUNGS/PLEURA: There is no pleural effusion, pleural thickening, or pneumothorax. The airways are patent. Lungs are clear without consolidation, interstitial disease, or suspicious nodules. LYMPH NODES: Thoracic lymph nodes are not enlarged. ABDOMEN:  LIVER: No hepatomegaly.  Smooth surface contour.  Normal attenuation.  BILE DUCTS: No intrahepatic or extrahepatic biliary ductal dilatation.  GALLBLADDER: The gallbladder is unremarkable. STOMACH: No abnormalities identified.  PANCREAS: No masses or ductal dilatation.  SPLEEN: No splenomegaly or focal splenic lesion.  ADRENAL GLANDS: No thickening or nodules.  KIDNEYS AND URETERS: Kidneys are normal in size and location.  No renal or ureteral calculi.  PELVIS:  BLADDER: No abnormalities identified.  REPRODUCTIVE ORGANS: No abnormalities identified.  BOWEL: No abnormalities identified.  VESSELS: No abnormalities identified.  Abdominal aorta is normal in caliber.  PERITONEUM/RETROPERITONEUM/LYMPH NODES: No free  fluid.  No pneumoperitoneum. No lymphadenopathy.  ABDOMINAL WALL: No abnormalities identified. SOFT TISSUES: No abnormalities identified.  BONES: No acute fracture or aggressive osseous lesion. THORACIC SPINE: There is mild dextroscoliosis. The spine is in otherwise good alignment.  There is no fracture or traumatic subluxation. The vertebral body heights are well maintained.  Disc spaces are preserved.  No significant central canal stenosis is demonstrated. The neural foramina are patent throughout. There is mild degenerative change. The paravertebral soft tissues are within normal limits. LUMBAR SPINE: The alignment is anatomic.  There is mild compression of the superior endplate of L4. This was present on the study dated 2/18/2022 and is not significantly changed. The vertebral body heights are otherwise well maintained.  Disc spaces are preserved.  No significant central canal stenosis is demonstrated.  The neural foramina are patent throughout.  The paravertebral soft tissues are within normal limits.    1. No evidence for acute injury to the chest, abdomen, pelvis, thoracic, or lumbar spine. 2. There is mild chronic compression of the superior endplate of L4. Mild degenerative change in the thoracic and lumbar spine. Signed by Calin Montana MD    CT head wo IV contrast    Result Date: 12/4/2024  Interpreted By:  Andrez Veloz, STUDY: CT HEAD WO IV CONTRAST; CT CERVICAL SPINE WO IV CONTRAST; CT FACIAL BONES WO IV CONTRAST;  12/4/2024 5:22 pm   INDICATION: Signs/Symptoms:fall, struck face; Signs/Symptoms:fall, struck head, +loc. Altered mental status   COMPARISON: None.   ACCESSION NUMBER(S): [id number]; [id number]; [id number]   ORDERING CLINICIAN: GRETEL FALCON   TECHNIQUE: Axial noncontrast CT images of the head, face, and cervical spine. 3D volume rendering of the facial bones was obtained on a separate workstation also reviewed.   FINDINGS: BRAIN PARENCHYMA: Gray-white matter interfaces are preserved.  No mass, mass effect or midline shift. Scattered periventricular and deep white matter hypodensities suggestive of mild chronic microvascular ischemic change.   HEMORRHAGE: No acute intracranial hemorrhage. VENTRICLES and EXTRA-AXIAL SPACES: No hydrocephalus. No extra-axial collections. Mild generalized cerebral volume loss and associated ventricular and sulcal enlargement. EXTRACRANIAL SOFT TISSUES: Unremarkable. CALVARIUM: No depressed calvarial fracture.   FACIAL BONES: No acute facial bone fracture. There is absent dentition. SOFT TISSUES: No evidence of large soft tissue hematoma. Extensive bilateral facial vascular calcifications, which may be seen with diabetes or chronic renal disease. PARANASAL SINUSES: No hemorrhage in the paranasal sinuses. Mild polypoid mucosal thickening of the bilateral frontal sinuses, ethmoid air cells, and maxillary sinuses. MASTOIDS: Within normal limits. ORBITS: The globes, extraocular muscles and optic nerve sheath complexes are symmetric. No retrobulbar hematoma.   ALIGNMENT: Normal cervical lordosis. VERTEBRAE: No acute fracture. Vertebral body heights are maintained. There are no suspicious osseous lesions. Degenerative disc space narrowing and mild posterolateral osseous spurring at C4-C5 through C6-C7 contributing to mild bilateral foraminal stenosis at this levels.. SPINAL CANAL: Small calcified central disc protrusion contributing to mild-to-moderate central canal stenosis at C3-C4 additionally small disc osteophyte complexes at. PREVERTEBRAL SOFT TISSUES: No prevertebral soft tissue swelling. LUNG APICES: Partially visualized emphysematous changes in the lung apices with asymmetric bronchiectasis and presumed scarring in the left lung apex. Please see separately dictated chest CT.   OTHER FINDINGS: None.         No acute intracranial abnormality.   No evidence of facial bone or cervical spine fracture.     Signed by: Andrez Veloz 12/4/2024 5:35 PM Dictation workstation:    [id number]    CT maxillofacial bones wo IV contrast    Result Date: 12/4/2024  Interpreted By:  Andrez Veloz, STUDY: CT HEAD WO IV CONTRAST; CT CERVICAL SPINE WO IV CONTRAST; CT FACIAL BONES WO IV CONTRAST;  12/4/2024 5:22 pm   INDICATION: Signs/Symptoms:fall, struck face; Signs/Symptoms:fall, struck head, +loc. Altered mental status   COMPARISON: None.   ACCESSION NUMBER(S): [id number]; [id number]; [id number]   ORDERING CLINICIAN: GRETEL FALCON   TECHNIQUE: Axial noncontrast CT images of the head, face, and cervical spine. 3D volume rendering of the facial bones was obtained on a separate workstation also reviewed.   FINDINGS: BRAIN PARENCHYMA: Gray-white matter interfaces are preserved. No mass, mass effect or midline shift. Scattered periventricular and deep white matter hypodensities suggestive of mild chronic microvascular ischemic change.   HEMORRHAGE: No acute intracranial hemorrhage. VENTRICLES and EXTRA-AXIAL SPACES: No hydrocephalus. No extra-axial collections. Mild generalized cerebral volume loss and associated ventricular and sulcal enlargement. EXTRACRANIAL SOFT TISSUES: Unremarkable. CALVARIUM: No depressed calvarial fracture.   FACIAL BONES: No acute facial bone fracture. There is absent dentition. SOFT TISSUES: No evidence of large soft tissue hematoma. Extensive bilateral facial vascular calcifications, which may be seen with diabetes or chronic renal disease. PARANASAL SINUSES: No hemorrhage in the paranasal sinuses. Mild polypoid mucosal thickening of the bilateral frontal sinuses, ethmoid air cells, and maxillary sinuses. MASTOIDS: Within normal limits. ORBITS: The globes, extraocular muscles and optic nerve sheath complexes are symmetric. No retrobulbar hematoma.   ALIGNMENT: Normal cervical lordosis. VERTEBRAE: No acute fracture. Vertebral body heights are maintained. There are no suspicious osseous lesions. Degenerative disc space narrowing and mild posterolateral osseous spurring at  C4-C5 through C6-C7 contributing to mild bilateral foraminal stenosis at this levels.. SPINAL CANAL: Small calcified central disc protrusion contributing to mild-to-moderate central canal stenosis at C3-C4 additionally small disc osteophyte complexes at. PREVERTEBRAL SOFT TISSUES: No prevertebral soft tissue swelling. LUNG APICES: Partially visualized emphysematous changes in the lung apices with asymmetric bronchiectasis and presumed scarring in the left lung apex. Please see separately dictated chest CT.   OTHER FINDINGS: None.         No acute intracranial abnormality.   No evidence of facial bone or cervical spine fracture.     Signed by: Andrez Veloz 12/4/2024 5:35 PM Dictation workstation:   [id number]    CT cervical spine wo IV contrast    Result Date: 12/4/2024  Interpreted By:  Andrez Veloz, STUDY: CT HEAD WO IV CONTRAST; CT CERVICAL SPINE WO IV CONTRAST; CT FACIAL BONES WO IV CONTRAST;  12/4/2024 5:22 pm   INDICATION: Signs/Symptoms:fall, struck face; Signs/Symptoms:fall, struck head, +loc. Altered mental status   COMPARISON: None.   ACCESSION NUMBER(S): [id number]; [id number]; [id number]   ORDERING CLINICIAN: GRETEL FALCON   TECHNIQUE: Axial noncontrast CT images of the head, face, and cervical spine. 3D volume rendering of the facial bones was obtained on a separate workstation also reviewed.   FINDINGS: BRAIN PARENCHYMA: Gray-white matter interfaces are preserved. No mass, mass effect or midline shift. Scattered periventricular and deep white matter hypodensities suggestive of mild chronic microvascular ischemic change.   HEMORRHAGE: No acute intracranial hemorrhage. VENTRICLES and EXTRA-AXIAL SPACES: No hydrocephalus. No extra-axial collections. Mild generalized cerebral volume loss and associated ventricular and sulcal enlargement. EXTRACRANIAL SOFT TISSUES: Unremarkable. CALVARIUM: No depressed calvarial fracture.   FACIAL BONES: No acute facial bone fracture. There is absent dentition. SOFT  TISSUES: No evidence of large soft tissue hematoma. Extensive bilateral facial vascular calcifications, which may be seen with diabetes or chronic renal disease. PARANASAL SINUSES: No hemorrhage in the paranasal sinuses. Mild polypoid mucosal thickening of the bilateral frontal sinuses, ethmoid air cells, and maxillary sinuses. MASTOIDS: Within normal limits. ORBITS: The globes, extraocular muscles and optic nerve sheath complexes are symmetric. No retrobulbar hematoma.   ALIGNMENT: Normal cervical lordosis. VERTEBRAE: No acute fracture. Vertebral body heights are maintained. There are no suspicious osseous lesions. Degenerative disc space narrowing and mild posterolateral osseous spurring at C4-C5 through C6-C7 contributing to mild bilateral foraminal stenosis at this levels.. SPINAL CANAL: Small calcified central disc protrusion contributing to mild-to-moderate central canal stenosis at C3-C4 additionally small disc osteophyte complexes at. PREVERTEBRAL SOFT TISSUES: No prevertebral soft tissue swelling. LUNG APICES: Partially visualized emphysematous changes in the lung apices with asymmetric bronchiectasis and presumed scarring in the left lung apex. Please see separately dictated chest CT.   OTHER FINDINGS: None.         No acute intracranial abnormality.   No evidence of facial bone or cervical spine fracture.     Signed by: Andrez Veloz 12/4/2024 5:35 PM Dictation workstation:   [id number]    XR chest 1 view    Result Date: 12/4/2024  STUDY: Chest Radiograph;  12/4/24, 4:14PM. INDICATION: Fall. COMPARISON: XR Chest 3/15/21. ACCESSION NUMBER(S): [id number] ORDERING CLINICIAN: GRETEL FALCON TECHNIQUE:  Frontal chest was obtained at 16:13 hours. FINDINGS: The patient status post median sternotomy. CARDIOMEDIASTINAL SILHOUETTE: Cardiomediastinal silhouette is normal in size and configuration.  LUNGS: Lungs are clear.  ABDOMEN: No remarkable upper abdominal findings.  BONES: There is a lucency through the  anterior lateral right ninth rib and a nondisplaced fracture cannot be excluded.    No evidence consolidating infiltrate or effusion. Findings suggestive of fracture of the right ninth rib. Signed by Dajuan Hall MD    XR pelvis 1-2 views    Result Date: 12/4/2024  STUDY: Pelvis Radiographs; 12/04/2024 4:13 PM INDICATION: Fall. COMPARISON: 02/18/2022 CT Abdomen and Pelvis. ACCESSION NUMBER(S): [id number] ORDERING CLINICIAN: GRETEL FALCON TECHNIQUE:  One view of the pelvis. FINDINGS:  There are degenerative change the facet joints of the lower lumbosacral spine.  The pelvic ring is intact.  There is no acute fracture.      No acute osseous abnormalities. Signed by Dajuan Hall MD     Assessment/Plan   NSTEMI status post PCI to the RCA  Syncope, vasovagal versus related to coronavirus infection  Coronavirus infection-normal oxygenation     Plan:  The patient is coughing this morning and feels lousy.  He was hypotensive overnight and received IV fluids.  I will order chest x-ray to rule out pneumonia  Monitor oxygenation  Remdesivir declined 12/5  Cardiology follow-up  Droplet plus precautions  Discharge planning, refused SNF    Vinh Bernal MD

## 2024-12-08 NOTE — NURSING NOTE
Assumed care for patient at 1900. Patient is resting in bed. Bedtime medications given and patient tolerated well. No c/o pain at the moment. Will continue to monitor.

## 2024-12-08 NOTE — PROGRESS NOTES
Subjective Data:      Overnight Events:         Objective Data:  Last Recorded Vitals:  Vitals:    12/07/24 1800 12/07/24 2349 12/08/24 0244 12/08/24 0939   BP: (!) 112/44 129/60  118/52   BP Location: Right arm Right arm  Right arm   Patient Position: Lying Lying  Lying   Pulse: 62 69  68   Resp: 19 18  17   Temp: 36.8 °C (98.2 °F) 36.9 °C (98.4 °F)  37.6 °C (99.7 °F)   TempSrc: Oral Oral  Oral   SpO2: 100% 97% 97% 98%   Weight:       Height:           Last Labs:  CBC - 12/7/2024:  4:17 AM  4.0 9.8 104    28.8      CMP - 12/8/2024:  6:09 AM  7.8 6.3 44 --- 1.4   5.4 3.2 20 55      PTT - 12/6/2024:  7:42 PM  _   _ 60.1     TROPHS   Date/Time Value Ref Range Status   12/07/2024 04:17 AM 2,068 0 - 20 ng/L Final   12/06/2024 04:55 AM 3,272 0 - 20 ng/L Final   12/05/2024 05:05 AM 4,799 0 - 20 ng/L Final     Comment:     Previous result verified on 12/5/2024 0033 on specimen/case [id number] called with component [facility] for procedure Troponin I, High Sensitivity with value 192 ng/L.     BNP   Date/Time Value Ref Range Status   12/04/2024 04:23  0 - 99 pg/mL Final     HGBA1C   Date/Time Value Ref Range Status   12/05/2024 05:05 AM 5.5 See comment % Final   06/11/2024 09:29 AM 5.3 see below % Final     LDLCALC   Date/Time Value Ref Range Status   12/04/2024 11:47 PM 34 <=99 mg/dL Final     Comment:                                 Near   Borderline      AGE      Desirable  Optimal    High     High     Very High     0-19 Y     0 - 109     ---    110-129   >/= 130     ----    20-24 Y     0 - 119     ---    120-159   >/= 160     ----      >24 Y     0 -  99   100-129  130-159   160-189     >/=190     06/11/2024 09:29 AM 23 <=99 mg/dL Final     Comment:                                 Near   Borderline      AGE      Desirable  Optimal    High     High     Very High     0-19 Y     0 - 109     ---    110-129   >/= 130     ----    20-24 Y     0 - 119     ---    120-159   >/= 160     ----      >24 Y     0 -  99   100-129   130-159   160-189     >/=190     02/23/2022 05:15 AM 22 65 - 130 MG/DL Final   03/14/2021 04:17 AM 22 65 - 130 MG/DL Final   12/02/2020 03:37 AM 51 65 - 130 MG/DL Final     VLDL   Date/Time Value Ref Range Status   12/04/2024 11:47 PM 18 0 - 40 mg/dL Final   06/11/2024 09:29 AM 19 0 - 40 mg/dL Final   06/05/2023 10:02 AM 19 0 - 40 mg/dL Final   09/21/2022 08:32 AM 13 0 - 40 mg/dL Final   09/03/2021 08:50 AM 15 0 - 40 mg/dL Final      Last I/O:  I/O last 3 completed shifts:  In: 300 (4.4 mL/kg) [P.O.:300]  Out: 450 (6.6 mL/kg) [Urine:450 (0.2 mL/kg/hr)]  Weight: 68.1 kg     Past Cardiology Tests (Last 3 Years):  EKG:  ECG 12 lead 12/04/2024      ECG 12 lead 12/04/2024    Echo:  Transthoracic Echo (TTE) Complete 12/06/2024    Ejection Fractions:  EF   Date/Time Value Ref Range Status   12/06/2024 08:59 AM 43 %      Cath:  Cardiac Catheterization Procedure 12/06/2024    Stress Test:  No results found for this or any previous visit from the past 1095 days.    Cardiac Imaging:  No results found for this or any previous visit from the past 1095 days.      Inpatient Medications:  Scheduled medications   Medication Dose Route Frequency    amLODIPine  5 mg oral Daily    aspirin  81 mg oral Daily    atorvastatin  40 mg oral Nightly    busPIRone  7.5 mg oral BID    insulin lispro  0-10 Units subcutaneous TID AC    levothyroxine  50 mcg oral Daily    metoprolol succinate XL  25 mg oral Daily    mirtazapine  15 mg oral Nightly    oxygen   inhalation Continuous - 02/gases    pantoprazole  40 mg intravenous BID    prasugrel  10 mg oral Daily    sucralfate  1 g oral q6h JENNIFER     PRN medications   Medication    acetaminophen    Or    acetaminophen    Or    acetaminophen    benzocaine-menthol    dextromethorphan-guaifenesin    dextrose    dextrose    glucagon    glucagon    melatonin    ondansetron ODT    Or    ondansetron     Continuous Medications   Medication Dose Last Rate       Physical Exam:  The patient is a nonobese balding  elderly white male lying supine in bed.  He is awake alert conversant in no overt painful respiratory distress.  JVP not elevated carotid impulses are 2+  Chest fair air movement breath sounds clear anteriorly.  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  No peripheral edema on the right pedal pulses are present     Assessment/Plan   12/5: The patient is an 79-year-old white male with a history of former smoking COPD hypertension hyperlipidemia type 2 diabetes remote CABG x 2 in the 1990s at [facility] with LIMA to LAD and saphenous vein graft to unknown target.  He had a PCI bare-metal stent deployed to the mid RCA in 2010.  In 2020 he had PCI and stent appointment for an in-stent restenosis within the mid RCA.  In 3/2021 he required PTCA for in-stent restenosis/thrombus within the RCA.  At that time he did have some transient complete heart block requiring temporary pacemaker.  Patient is currently admitted with a syncopal event while on the toilet.  Initial possibilities include a simple vasovagal event related to nausea and vomiting with the patient being COVID-positive as well.  However his high-sensitivity troponin is elevated and it is certainly conceivable that he had an episode of myocardial ischemia resulting in vagal reaction and transient bradycardia with syncope.  In this regard he would be at high risk for a recurrent RCA stenosis given the fact that he has had 3 separate interventional procedures to that vessel.  The patient had been on aspirin and Effient prior to admission both of which have been briefly held.  IV heparin not instituted out of concern initially because for the hematemesis.  This patient may benefit from a relook coronary angiogram to determine whether there is a need for additional PCI to the RCA and will discuss with interventional cardiology.     12/6: Patient had a uneventful night without any chest  discomfort nausea vomiting.  Telemetry monitor demonstrates sinus rhythm primarily in the 70s per minute range occasional PACs no high-grade AV gracie heart block with baseline left bundle branch block conduction delay.  Will initiate low-dose Toprol-XL 25 mg daily.  All renal parameters remain stable with creatinine of 1.41 potassium of 4.2.  Serial hemoglobins remain stable currently 10.2 with hematocrit of 29.7.  His initial hemoglobin was 11.8 with hematocrit of 35.8.  Patient is being monitored by gastroenterology.  The patient has had no active melena or hematemesis and there is no initial plan at this point for any endoscopic procedure.  The patient empirically is been placed on high-dose PPI.  Patient being followed by infectious disease for his COVID positivity and remdesivir therapy was declined by the patient.  Will continue to monitor high-sensitivity troponins care.  The case was discussed at length with interventional cardiology and the patient is scheduled to undergo cardiac cath and possible PCI to the suspected culprit RCA.  The patient remains on his aspirin and prasugrel which had been taken for an extended period of time since last PCI procedure in 3/2021.  Patient currently receiving nitro  patch as well at least on a temporary basis.     12/7: The patient was seen events reviewed in detail discussed with patient and nursing staff.  The patient did undergo cardiac catheterization yesterday.  He was indeed found to have an in-stent restenosis within the mid RCA that was high-grade 90%.  A extended attempt was made to reopen the lesion with standard PTCA but there was balloon ruptures and ultimately the lesion could only be improved to about 70%.  The previously placed stents evidently were slightly underexpanded and the patient is thought to be at high risk for restenosis in the future.  Patient currently feeling fairly weak and requesting regular diet.  He initially refusing physical therapy and  home health care but may reconsider.  He is still recuperating from COVID.  With prefer that the patient remain for another 24 hours.  All labs from today include a creatinine of 1.57.  His troponin is declining currently 2068.  CBC relatively unchanged hemoglobin 9.8 WBC of 4000.  Will change from nitro drip patch to amlodipine 5 mg daily.  Continue the low-dose Toprol XL 25 mg daily.  The patient did had some transient second-degree AV gracie heart block during his PCI procedure but only when there was balloon inflation.     12/8: The patient is lying in bed supine recent increased cough.  He still complains of some generalized weakness though less so than yesterday.  He is resistant and and refuses to be sent to a rehab facility. Recorded blood pressures appear to be acceptable in the range of 107 to 129 mmHg.  The patient currently is on amlodipine 5 mg daily rather than Nitropatch along with Toprol-XL 25 mg daily and dual antiplatelet therapy.  Renal parameters remain relatively unchanged creatinine to 1.65.  Anticipate discharge in next 24 to 48 hours.  He will be seen in the office in 3 to 4 weeks.    Peripheral IV 12/06/24 20 G Left;Posterior Forearm (Active)   Site Assessment Clean;Dry;Intact 12/07/24 2100   Dressing Status Clean;Dry 12/07/24 2100   Number of days: 2       Code Status:  Full Code    I spent 20 minutes in the professional and overall care of this patient.        Benjamin Nuno MD

## 2024-12-08 NOTE — CARE PLAN
The patient's goals for the shift include      The clinical goals for the shift include pt remains stable      Problem: Pain - Adult  Goal: Verbalizes/displays adequate comfort level or baseline comfort level  Outcome: Progressing     Problem: Safety - Adult  Goal: Free from fall injury  Outcome: Progressing     Problem: Discharge Planning  Goal: Discharge to home or other facility with appropriate resources  Outcome: Progressing     Problem: Chronic Conditions and Co-morbidities  Goal: Patient's chronic conditions and co-morbidity symptoms are monitored and maintained or improved  Outcome: Progressing     Problem: Diabetes  Goal: Achieve decreasing blood glucose levels by end of shift  Outcome: Progressing  Goal: Increase stability of blood glucose readings by end of shift  Outcome: Progressing  Goal: Decrease in ketones present in urine by end of shift  Outcome: Progressing  Goal: Maintain electrolyte levels within acceptable range throughout shift  Outcome: Progressing  Goal: Maintain glucose levels >70mg/dl to <250mg/dl throughout shift  Outcome: Progressing  Goal: No changes in neurological exam by end of shift  Outcome: Progressing  Goal: Learn about and adhere to nutrition recommendations by end of shift  Outcome: Progressing  Goal: Vital signs within normal range for age by end of shift  Outcome: Progressing  Goal: Increase self care and/or family involovement by end of shift  Outcome: Progressing  Goal: Receive DSME education by end of shift  Outcome: Progressing

## 2024-12-09 ENCOUNTER — APPOINTMENT (OUTPATIENT)
Dept: NEUROLOGY | Facility: HOSPITAL | Age: 79
DRG: 250 | End: 2024-12-09
Payer: MEDICARE

## 2024-12-09 LAB
ANION GAP SERPL CALCULATED.3IONS-SCNC: 11 MMOL/L (ref 10–20)
BACTERIA BLD CULT: NORMAL
BACTERIA BLD CULT: NORMAL
BUN SERPL-MCNC: 34 MG/DL (ref 6–23)
CALCIUM SERPL-MCNC: 8.1 MG/DL (ref 8.6–10.3)
CHLORIDE SERPL-SCNC: 103 MMOL/L (ref 98–107)
CO2 SERPL-SCNC: 27 MMOL/L (ref 21–32)
CREAT SERPL-MCNC: 1.62 MG/DL (ref 0.5–1.3)
EGFRCR SERPLBLD CKD-EPI 2021: 43 ML/MIN/1.73M*2
ERYTHROCYTE [DISTWIDTH] IN BLOOD BY AUTOMATED COUNT: 14.9 % (ref 11.5–14.5)
GLUCOSE BLD MANUAL STRIP-MCNC: 160 MG/DL (ref 74–99)
GLUCOSE BLD MANUAL STRIP-MCNC: 163 MG/DL (ref 74–99)
GLUCOSE BLD MANUAL STRIP-MCNC: 178 MG/DL (ref 74–99)
GLUCOSE SERPL-MCNC: 131 MG/DL (ref 74–99)
HCT VFR BLD AUTO: 27.1 % (ref 41–52)
HGB BLD-MCNC: 9.5 G/DL (ref 13.5–17.5)
MCH RBC QN AUTO: 28.8 PG (ref 26–34)
MCHC RBC AUTO-ENTMCNC: 35.1 G/DL (ref 32–36)
MCV RBC AUTO: 82 FL (ref 80–100)
NRBC BLD-RTO: 0 /100 WBCS (ref 0–0)
PLATELET # BLD AUTO: 108 X10*3/UL (ref 150–450)
POTASSIUM SERPL-SCNC: 3.8 MMOL/L (ref 3.5–5.3)
RBC # BLD AUTO: 3.3 X10*6/UL (ref 4.5–5.9)
SODIUM SERPL-SCNC: 137 MMOL/L (ref 136–145)
WBC # BLD AUTO: 4.5 X10*3/UL (ref 4.4–11.3)

## 2024-12-09 PROCEDURE — 82947 ASSAY GLUCOSE BLOOD QUANT: CPT

## 2024-12-09 PROCEDURE — 36415 COLL VENOUS BLD VENIPUNCTURE: CPT | Performed by: FAMILY MEDICINE

## 2024-12-09 PROCEDURE — 2500000005 HC RX 250 GENERAL PHARMACY W/O HCPCS: Performed by: NURSE PRACTITIONER

## 2024-12-09 PROCEDURE — 85027 COMPLETE CBC AUTOMATED: CPT | Performed by: FAMILY MEDICINE

## 2024-12-09 PROCEDURE — 2500000001 HC RX 250 WO HCPCS SELF ADMINISTERED DRUGS (ALT 637 FOR MEDICARE OP): Performed by: INTERNAL MEDICINE

## 2024-12-09 PROCEDURE — 2500000004 HC RX 250 GENERAL PHARMACY W/ HCPCS (ALT 636 FOR OP/ED): Performed by: NURSE PRACTITIONER

## 2024-12-09 PROCEDURE — 2500000002 HC RX 250 W HCPCS SELF ADMINISTERED DRUGS (ALT 637 FOR MEDICARE OP, ALT 636 FOR OP/ED): Performed by: NURSE PRACTITIONER

## 2024-12-09 PROCEDURE — 1200000002 HC GENERAL ROOM WITH TELEMETRY DAILY

## 2024-12-09 PROCEDURE — 2500000001 HC RX 250 WO HCPCS SELF ADMINISTERED DRUGS (ALT 637 FOR MEDICARE OP): Performed by: NURSE PRACTITIONER

## 2024-12-09 PROCEDURE — 99232 SBSQ HOSP IP/OBS MODERATE 35: CPT | Performed by: INTERNAL MEDICINE

## 2024-12-09 PROCEDURE — 97166 OT EVAL MOD COMPLEX 45 MIN: CPT | Mod: GO

## 2024-12-09 PROCEDURE — 99232 SBSQ HOSP IP/OBS MODERATE 35: CPT | Performed by: NURSE PRACTITIONER

## 2024-12-09 PROCEDURE — 80048 BASIC METABOLIC PNL TOTAL CA: CPT | Performed by: FAMILY MEDICINE

## 2024-12-09 PROCEDURE — 97530 THERAPEUTIC ACTIVITIES: CPT | Mod: GO

## 2024-12-09 RX ADMIN — PRASUGREL 10 MG: 10 TABLET, FILM COATED ORAL at 08:28

## 2024-12-09 RX ADMIN — Medication 21 PERCENT: at 08:43

## 2024-12-09 RX ADMIN — LEVOTHYROXINE SODIUM 50 MCG: 0.05 TABLET ORAL at 05:02

## 2024-12-09 RX ADMIN — BUSPIRONE HYDROCHLORIDE 7.5 MG: 5 TABLET ORAL at 21:34

## 2024-12-09 RX ADMIN — INSULIN LISPRO 2 UNITS: 100 INJECTION, SOLUTION INTRAVENOUS; SUBCUTANEOUS at 17:24

## 2024-12-09 RX ADMIN — ACETAMINOPHEN 650 MG: 325 TABLET ORAL at 00:51

## 2024-12-09 RX ADMIN — Medication 5 MG: at 21:33

## 2024-12-09 RX ADMIN — ASPIRIN 81 MG: 81 TABLET, COATED ORAL at 08:28

## 2024-12-09 RX ADMIN — MIRTAZAPINE 15 MG: 15 TABLET, FILM COATED ORAL at 21:33

## 2024-12-09 RX ADMIN — INSULIN LISPRO 2 UNITS: 100 INJECTION, SOLUTION INTRAVENOUS; SUBCUTANEOUS at 08:29

## 2024-12-09 RX ADMIN — METOPROLOL SUCCINATE 25 MG: 25 TABLET, EXTENDED RELEASE ORAL at 08:28

## 2024-12-09 RX ADMIN — AMLODIPINE BESYLATE 5 MG: 5 TABLET ORAL at 08:28

## 2024-12-09 RX ADMIN — ATORVASTATIN CALCIUM 40 MG: 40 TABLET, FILM COATED ORAL at 21:33

## 2024-12-09 RX ADMIN — PANTOPRAZOLE SODIUM 40 MG: 40 INJECTION, POWDER, FOR SOLUTION INTRAVENOUS at 21:33

## 2024-12-09 ASSESSMENT — PAIN SCALES - GENERAL
PAINLEVEL_OUTOF10: 0 - NO PAIN
PAINLEVEL_OUTOF10: 1
PAINLEVEL_OUTOF10: 3
PAINLEVEL_OUTOF10: 0 - NO PAIN
PAINLEVEL_OUTOF10: 1

## 2024-12-09 ASSESSMENT — COGNITIVE AND FUNCTIONAL STATUS - GENERAL
PERSONAL GROOMING: A LITTLE
EATING MEALS: A LITTLE
PERSONAL GROOMING: A LITTLE
MOVING FROM LYING ON BACK TO SITTING ON SIDE OF FLAT BED WITH BEDRAILS: A LOT
MOVING TO AND FROM BED TO CHAIR: A LITTLE
TOILETING: A LITTLE
TOILETING: A LOT
DAILY ACTIVITIY SCORE: 16
STANDING UP FROM CHAIR USING ARMS: A LITTLE
HELP NEEDED FOR BATHING: A LOT
CLIMB 3 TO 5 STEPS WITH RAILING: TOTAL
MOBILITY SCORE: 15
DAILY ACTIVITIY SCORE: 18
HELP NEEDED FOR BATHING: A LITTLE
DRESSING REGULAR UPPER BODY CLOTHING: A LITTLE
WALKING IN HOSPITAL ROOM: A LITTLE
DRESSING REGULAR UPPER BODY CLOTHING: A LITTLE
DRESSING REGULAR LOWER BODY CLOTHING: A LOT
DRESSING REGULAR LOWER BODY CLOTHING: A LITTLE
TURNING FROM BACK TO SIDE WHILE IN FLAT BAD: A LITTLE

## 2024-12-09 ASSESSMENT — PAIN DESCRIPTION - DESCRIPTORS
DESCRIPTORS: ACHING;DISCOMFORT
DESCRIPTORS: ACHING;DISCOMFORT

## 2024-12-09 ASSESSMENT — PAIN - FUNCTIONAL ASSESSMENT
PAIN_FUNCTIONAL_ASSESSMENT: 0-10

## 2024-12-09 ASSESSMENT — PAIN DESCRIPTION - ORIENTATION: ORIENTATION: LEFT

## 2024-12-09 ASSESSMENT — ACTIVITIES OF DAILY LIVING (ADL)
ADL_ASSISTANCE: INDEPENDENT
BATHING_ASSISTANCE: MODERATE

## 2024-12-09 ASSESSMENT — PAIN DESCRIPTION - LOCATION: LOCATION: GROIN

## 2024-12-09 NOTE — NURSING NOTE
Assumed care for patient at 1900. Patient is in bed. Patient took bed time medications and tolerated well. Will continue to follow plan of care.

## 2024-12-09 NOTE — PROGRESS NOTES
"Chavez Llamas is a 79 y.o. male on day 4 of admission presenting with Syncope and collapse.    RNCC, accompanied by CORWIN Camacho, met with patient, informed him that his appeal was denied. RNCC offered to call patient's daughter to let her know. He wanted RNCC to use his cell phone \"so I can hear what you say to her\". It was explained to patient that he has COVID and his phone is considered contaminated. He became agitated, complaining about rules, tells RNCC \"you should hear yourself, the words you are using\". RNCC ended the interaction. Upon leaving the room, patient states \"call my daughter\".   RNCC called patient's daughter Jelena to inform her of denial. She is out of town for a , will make arrangements to pick him up tomorrow.  Patient given original fax from [facility], copy went in chart.     Shaylee Woodall RN      "

## 2024-12-09 NOTE — CARE PLAN
The patient's goals for the shift include      The clinical goals for the shift include maintain safety      Problem: Pain - Adult  Goal: Verbalizes/displays adequate comfort level or baseline comfort level  Outcome: Progressing     Problem: Safety - Adult  Goal: Free from fall injury  Outcome: Progressing     Problem: Discharge Planning  Goal: Discharge to home or other facility with appropriate resources  Outcome: Progressing     Problem: Chronic Conditions and Co-morbidities  Goal: Patient's chronic conditions and co-morbidity symptoms are monitored and maintained or improved  Outcome: Progressing     Problem: Diabetes  Goal: Achieve decreasing blood glucose levels by end of shift  Outcome: Progressing  Goal: Increase stability of blood glucose readings by end of shift  Outcome: Progressing  Goal: Decrease in ketones present in urine by end of shift  Outcome: Progressing  Goal: Maintain electrolyte levels within acceptable range throughout shift  Outcome: Progressing  Goal: Maintain glucose levels >70mg/dl to <250mg/dl throughout shift  Outcome: Progressing  Goal: No changes in neurological exam by end of shift  Outcome: Progressing  Goal: Learn about and adhere to nutrition recommendations by end of shift  Outcome: Progressing  Goal: Vital signs within normal range for age by end of shift  Outcome: Progressing  Goal: Increase self care and/or family involovement by end of shift  Outcome: Progressing  Goal: Receive DSME education by end of shift  Outcome: Progressing

## 2024-12-09 NOTE — DOCUMENTATION CLARIFICATION NOTE
PATIENT:               ORLANDO VICTORIA  ACCT #:                  [id number]  MRN:                       [id number]  :                       1945  ADMIT DATE:       2024 3:46 PM  DISCH DATE:  RESPONDING PROVIDER #:        [id number]          PROVIDER RESPONSE TEXT:    Chronic Systolic Congestive Heart Failure    CDI QUERY TEXT:    Clarification    Instruction:    Based on your assessment of the patient and the clinical information, please provide the requested documentation by clicking on the appropriate radio button and enter any additional information if prompted.    Question: Please further clarify the type and acuity of congestive heart failure    When answering this query, please exercise your independent professional judgment. The fact that a question is being asked, does not imply that any particular answer is desired or expected.    The patient's clinical indicators include:  Clinical Information: 79y.o. M presents via EMS s/p syncope episode w/fall. Admitted with Syncope & COVID-19 without hypoxemia. -VS: 35.8, 94, 21, 130/79, 97% on RA     H&P: Syncope and collapse while on toilet.  Possible vasovagal event versus other. COVID-19 without hypoxemia. CAD with history of MI's x 11 (eleven)     Cardiology Consult: Patient is currently admitted with a syncopal event while on the toilet.  Initial possibilities include a simple vasovagal event related to nausea and vomiting with the patient being COVID-positive as well.  However his high-sensitivity troponin is elevated and it is certainly conceivable that he had an episode of myocardial ischemia resulting in vagal reaction and transient bradycardia with syncope.    Clinical Indicators:   BNP: 848   COVID-19: Detected     at 1623 Troponin I- 24   at 1858 Troponin I- 22   at 2347 Troponin I- 192   at 0505 Troponin I- 4,799  12 at 0455 Troponin I- 3,272   at 0417 Troponin I- 2,068    1.The left ventricular systolic  function is mildly decreased, with a visually estimated ejection fraction of 40-45%.  2.Abnormal wall motion.  3.Spectral Doppler shows an abnormal pattern of left ventricular diastolic filling.  4.Abnormal septal motion consistent with post-operative status and abnormal septal motion consistent with left bundle branch block.  5.There is severe hypokinesis of the basal inferior wall.  6.There is normal right ventricular global systolic function.  7.Mild to moderate mitral valve regurgitation.  8.Trace to mild tricuspid regurgitation.  9.Aortic valve sclerosis.  10.Mild aortic valve regurgitation.  11.Atrial septal aneurysm present.  12.There is plaque visualized in the ascending aorta.    Treatment:  12/6 Left heart catherization    Risk Factors: PMHx: Old MI, CAD, DM2 w/peripheral vascular disease, COPD.  BMI: 23.13  Options provided:  -- Acute Systolic Congestive Heart Failure  -- Acute on Chronic Systolic Congestive Heart Failure  -- Chronic Systolic Congestive Heart Failure  -- Other - I will add my own diagnosis  -- Refer to Clinical Documentation Reviewer    Query created by: Paige Almaguer on 12/9/2024 1:16 PM      Electronically signed by:  KATE CALIXTO 12/9/2024 3:41 PM

## 2024-12-09 NOTE — NURSING NOTE
Patient reports coughing over night, offered cough medication, patient refused. Patient reports leg pain, patient refused tylenol, and offered repositioning, patient refused protonix and buspar, attending notified.

## 2024-12-09 NOTE — PROGRESS NOTES
"Chavez Llamas is a 79 y.o. male on day 4 of admission presenting with Syncope and collapse.    Subjective   Alert and oriented x 3.  Denies complaints chest pain or pressure palpitations or feeling rapid heart rate.  Reports cough.       Objective     Physical Exam  Vitals and nursing note reviewed.   HENT:      Head: Normocephalic and atraumatic.      Nose: Nose normal.      Mouth/Throat:      Mouth: Mucous membranes are moist.      Pharynx: Oropharynx is clear.   Cardiovascular:      Rate and Rhythm: Normal rate and regular rhythm.      Pulses: Normal pulses.      Heart sounds: Normal heart sounds. No murmur heard.     No friction rub. No gallop.   Pulmonary:      Effort: Pulmonary effort is normal.      Breath sounds: Normal breath sounds.      Comments: Cough elicited with deep inspiration  Abdominal:      General: Bowel sounds are normal.   Musculoskeletal:         General: Normal range of motion.      Cervical back: Normal range of motion.      Right lower leg: No edema.      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      Capillary Refill: Capillary refill takes less than 2 seconds.   Neurological:      Mental Status: He is alert and oriented to person, place, and time. Mental status is at baseline.         Last Recorded Vitals  Blood pressure (!) 119/46, pulse 70, temperature 36.8 °C (98.3 °F), temperature source Oral, resp. rate 18, height 1.727 m (5' 8\"), weight 68.1 kg (150 lb 2.1 oz), SpO2 97%.  Intake/Output last 3 Shifts:  I/O last 3 completed shifts:  In: - (0 mL/kg)   Out: 800 (11.7 mL/kg) [Urine:800 (0.3 mL/kg/hr)]  Weight: 68.1 kg     Relevant Results  Results for orders placed or performed during the hospital encounter of 12/04/24 (from the past 24 hours)   POCT GLUCOSE   Result Value Ref Range    POCT Glucose 129 (H) 74 - 99 mg/dL   POCT GLUCOSE   Result Value Ref Range    POCT Glucose 183 (H) 74 - 99 mg/dL   POCT GLUCOSE "   Result Value Ref Range    POCT Glucose 180 (H) 74 - 99 mg/dL   CBC   Result Value Ref Range    WBC 4.5 4.4 - 11.3 x10*3/uL    nRBC 0.0 0.0 - 0.0 /100 WBCs    RBC 3.30 (L) 4.50 - 5.90 x10*6/uL    Hemoglobin 9.5 (L) 13.5 - 17.5 g/dL    Hematocrit 27.1 (L) 41.0 - 52.0 %    MCV 82 80 - 100 fL    MCH 28.8 26.0 - 34.0 pg    MCHC 35.1 32.0 - 36.0 g/dL    RDW 14.9 (H) 11.5 - 14.5 %    Platelets 108 (L) 150 - 450 x10*3/uL   Basic Metabolic Panel   Result Value Ref Range    Glucose 131 (H) 74 - 99 mg/dL    Sodium 137 136 - 145 mmol/L    Potassium 3.8 3.5 - 5.3 mmol/L    Chloride 103 98 - 107 mmol/L    Bicarbonate 27 21 - 32 mmol/L    Anion Gap 11 10 - 20 mmol/L    Urea Nitrogen 34 (H) 6 - 23 mg/dL    Creatinine 1.62 (H) 0.50 - 1.30 mg/dL    eGFR 43 (L) >60 mL/min/1.73m*2    Calcium 8.1 (L) 8.6 - 10.3 mg/dL     Transthoracic Echo (TTE) Complete    Result Date: 12/6/2024           [place] [address]            Phone [phone] TRANSTHORACIC ECHOCARDIOGRAM REPORT Patient Name:       ORLANDO Oconnell Physician:    [id number] Benjamin Nuno MD Study Date:         12/6/2024            Ordering Provider:    [id number] ABBY MATA MRN/PID:            [id number]             Fellow: Accession#:         [id number]         Nurse: Date of Birth/Age:  1945 / 79      Sonographer:          Marce ECHEVERRIA Gender Assigned at  M                    Additional Staff: Birth: Height:             172.70 cm            Admit Date:           12/4/2024 Weight:             68.95 kg             Admission Status:     Inpatient -                                                                Routine BSA / BMI:          1.82 m2 / 23.12      Department Location:                     kg/m2 Blood Pressure:  100 /55 mmHg Study Type:    TRANSTHORACIC ECHO (TTE) COMPLETE Diagnosis/ICD: Syncope and collapse-R55 Indication:    Syncope CPT Codes:     Echo Complete w Full [id number] Patient History: Pertinent History: Acute Myocardial Infraction involving left anterior                    descending coronary artery CP HTN HLD CAD CHF NSTEMI DMII                    COPD. Study Detail: The following Echo studies were performed: 2D, M-Mode, Doppler,               color flow and Strain.  PHYSICIAN INTERPRETATION: Left Ventricle: The left ventricular systolic function is mildly decreased, with a visually estimated ejection fraction of 40-45%. Wall motion is abnormal. The left ventricular cavity size is normal. There is left ventricular concentric remodeling. Left Ventricular Global Longitudinal Strain - -11.0 %. Abnormal (paradoxical) septal motion consistent with post-operative status and abnormal (paradoxical) septal motion, consistent with left bundle branch block. Spectral Doppler shows an abnormal pattern of left ventricular diastolic filling. There is severe hypokinesis of the basal inferior wall. Left Atrium: The left atrium is mildly dilated. 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 Right Ventricle: The right ventricle is normal in size. There is normal right ventriclar wall thickness. There is normal right ventricular global systolic function. Right Atrium: The right atrium is normal in size. Aortic Valve: The aortic valve is trileaflet. There is mild aortic valve cusp calcification. There is no evidence of reduced aortic valve leaflet excursion excursion. There is evidence of mildly elevated transaortic gradients consistent with sclerosis of the aortic valve. There is mild aortic valve regurgitation. The peak instantaneous gradient of the aortic valve is 8 mmHg. Mitral Valve: The mitral valve is normal in structure. There is normal mitral valve leaflet mobility. There is mild mitral annular calcification. There  is mild to moderate mitral valve regurgitation. Tricuspid Valve: The tricuspid valve is structurally normal. There is normal tricuspid valve leaflet mobility. There is trace to mild tricuspid regurgitation. Pulmonic Valve: The pulmonic valve is structurally normal. There is trace pulmonic valve regurgitation. Pericardium: No pericardial effusion noted. Aorta: The aortic root is abnormal. The ascending aorta is at the upper limits of normal. The aortic root is at the upper limits of normal size. There is plaque visualized in the ascending aorta, which is classified as a Grade 2 [mild (focal or diffuse) intimal thickening of 2-3 mm] atherosclerosis. Systemic Veins: The inferior vena cava appears normal in size, with IVC inspiratory collapse greater than 50%.  CONCLUSIONS:  1. The left ventricular systolic function is mildly decreased, with a visually estimated ejection fraction of 40-45%.  2. Abnormal wall motion.  3. Spectral Doppler shows an abnormal pattern of left ventricular diastolic filling.  4. Abnormal septal motion consistent with post-operative status and abnormal septal motion consistent with left bundle branch block.  5. There is severe hypokinesis of the basal inferior wall.  6. There is normal right ventricular global systolic function.  7. Mild to moderate mitral valve regurgitation.  8. Trace to mild tricuspid regurgitation.  9. Aortic valve sclerosis. 10. Mild aortic valve regurgitation. 11. Atrial septal aneurysm present. 12. There is plaque visualized in the ascending aorta. QUANTITATIVE DATA SUMMARY:  2D MEASUREMENTS:            Normal Ranges: LAs:             3.87 cm    (2.7-4.0cm) IVSd:            0.96 cm    (0.6-1.1cm) LVPWd:           1.07 cm    (0.6-1.1cm) LVIDd:           5.05 cm    (3.9-5.9cm) LVIDs:           4.10 cm LV Mass Index:   103.9 g/m2 LV % FS          18.9 %  LA VOLUME:                    Normal Ranges: LA Vol A4C:        64.7 ml    (22+/-6mL/m2) LA Vol A2C:        84.4 ml LA Vol  BP:         78.1 ml LA Vol Index A4C:  35.6 ml/m2 LA Vol Index A2C:  46.4 ml/m2 LA Vol Index BP:   42.9 ml/m2 LA Area A4C:       19.9 cm2 LA Area A2C:       24.0 cm2 LA Major Axis A4C: 5.2 cm LA Major Axis A2C: 5.8 cm LA Volume Index:   42.5 ml/m2 LA Vol A4C:        59.0 ml LA Vol A2C:        78.7 ml LA Vol Index BSA:  37.9 ml/m2  RA VOLUME BY A/L METHOD:            Normal Ranges: RA Vol A4C:              52.4 ml    (8.3-19.5ml) RA Vol Index A4C:        28.8 ml/m2 RA Area A4C:             17.2 cm2 RA Major Axis A4C:       4.8 cm  M-MODE MEASUREMENTS:         Normal Ranges: LAs:                 5.33 cm (2.7-4.0cm)  AORTA MEASUREMENTS:         Normal Ranges: Ao Sinus, d:        3.60 cm (2.1-3.5cm) Ao STJ, d:          3.40 cm (1.7-3.4cm) Asc Ao, d:          3.40 cm (2.1-3.4cm)  LV SYSTOLIC FUNCTION BY 2D PLANIMETRY (MOD):                                         Normal Ranges: EF-A4C View:                      31 %  (>=55%) EF-A2C View:                      46 % EF-Biplane:                       38 % EF-Visual:                        43 % EF-Auto:                          40 % LV EF Reported:                   43 % Global Longitudinal Strain (GLS): -11 %  LV DIASTOLIC FUNCTION:             Normal Ranges: MV Peak E:             0.45 m/s    (0.7-1.2 m/s) MV Peak A:             0.72 m/s    (0.42-0.7 m/s) E/A Ratio:             0.63        (1.0-2.2) MV A Dur:              188.39 msec  MITRAL VALVE:          Normal Ranges: MV DT:        207 msec (150-240msec)  AORTIC VALVE:           Normal Ranges: AoV Vmax:      1.40 m/s (<=1.7m/s) AoV Peak P.9 mmHg (<20mmHg) LVOT Max Deonte:  0.89 m/s (<=1.1m/s) LVOT VTI:      14.82 cm LVOT Diameter: 1.98 cm  (1.8-2.4cm) AoV Area,Vmax: 1.94 cm2 (2.5-4.5cm2)  RIGHT VENTRICLE: RV Basal 2.57 cm RV Mid   3.00 cm RV Major 6.2 cm  TRICUSPID VALVE/RVSP:         Normal Ranges: IVC Diam:             1.70 cm  AORTA: Asc Ao Diam 3.41 cm  [id number] Benjamin Nuno MD Electronically signed on 2024 at  1:29:30 PM  ** Final **          Assessment/Plan   Assessment & Plan  Syncope and collapse    Hyperlipidemia    Hypertension    Hypothyroidism    S/P BKA (below knee amputation) unilateral (Multi)    Type 2 diabetes mellitus without complications (Multi)    Emesis    COVID-19    CAD (coronary artery disease)    Fall, initial encounter    NSTEMI (non-ST elevated myocardial infarction) (Multi)      12/5: The patient is an 79-year-old white male with a history of former smoking COPD hypertension hyperlipidemia type 2 diabetes remote CABG x 2 in the 1990s at [facility] with LIMA to LAD and saphenous vein graft to unknown target.  He had a PCI bare-metal stent deployed to the mid RCA in 2010.  In 2020 he had PCI and stent appointment for an in-stent restenosis within the mid RCA.  In 3/2021 he required PTCA for in-stent restenosis/thrombus within the RCA.  At that time he did have some transient complete heart block requiring temporary pacemaker.  Patient is currently admitted with a syncopal event while on the toilet.  Initial possibilities include a simple vasovagal event related to nausea and vomiting with the patient being COVID-positive as well.  However his high-sensitivity troponin is elevated and it is certainly conceivable that he had an episode of myocardial ischemia resulting in vagal reaction and transient bradycardia with syncope.  In this regard he would be at high risk for a recurrent RCA stenosis given the fact that he has had 3 separate interventional procedures to that vessel.  The patient had been on aspirin and Effient prior to admission both of which have been briefly held.  IV heparin not instituted out of concern initially because for the hematemesis.  This patient may benefit from a relook coronary angiogram to determine whether there is a need for additional PCI to the RCA and will discuss with interventional cardiology.     12/6: Patient had a uneventful night without any chest  discomfort nausea vomiting.  Telemetry monitor demonstrates sinus rhythm primarily in the 70s per minute range occasional PACs no high-grade AV gracie heart block with baseline left bundle branch block conduction delay.  Will initiate low-dose Toprol-XL 25 mg daily.  All renal parameters remain stable with creatinine of 1.41 potassium of 4.2.  Serial hemoglobins remain stable currently 10.2 with hematocrit of 29.7.  His initial hemoglobin was 11.8 with hematocrit of 35.8.  Patient is being monitored by gastroenterology.  The patient has had no active melena or hematemesis and there is no initial plan at this point for any endoscopic procedure.  The patient empirically is been placed on high-dose PPI.  Patient being followed by infectious disease for his COVID positivity and remdesivir therapy was declined by the patient.  Will continue to monitor high-sensitivity troponins care.  The case was discussed at length with interventional cardiology and the patient is scheduled to undergo cardiac cath and possible PCI to the suspected culprit RCA.  The patient remains on his aspirin and prasugrel which had been taken for an extended period of time since last PCI procedure in 3/2021.  Patient currently receiving nitro  patch as well at least on a temporary basis.     12/7: The patient was seen events reviewed in detail discussed with patient and nursing staff.  The patient did undergo cardiac catheterization yesterday.  He was indeed found to have an in-stent restenosis within the mid RCA that was high-grade 90%.  A extended attempt was made to reopen the lesion with standard PTCA but there was balloon ruptures and ultimately the lesion could only be improved to about 70%.  The previously placed stents evidently were slightly underexpanded and the patient is thought to be at high risk for restenosis in the future.  Patient currently feeling fairly weak and requesting regular diet.  He initially refusing physical therapy and  home health care but may reconsider.  He is still recuperating from COVID.  With prefer that the patient remain for another 24 hours.  All labs from today include a creatinine of 1.57.  His troponin is declining currently 2068.  CBC relatively unchanged hemoglobin 9.8 WBC of 4000.  Will change from nitro drip patch to amlodipine 5 mg daily.  Continue the low-dose Toprol XL 25 mg daily.  The patient did had some transient second-degree AV gracie heart block during his PCI procedure but only when there was balloon inflation.     12/8: The patient is lying in bed supine recent increased cough.  He still complains of some generalized weakness though less so than yesterday.  He is resistant and and refuses to be sent to a rehab facility. Recorded blood pressures appear to be acceptable in the range of 107 to 129 mmHg.  The patient currently is on amlodipine 5 mg daily rather than Nitropatch along with Toprol-XL 25 mg daily and dual antiplatelet therapy.  Renal parameters remain relatively unchanged creatinine to 1.65.  Anticipate discharge in next 24 to 48 hours.  He will be seen in the office in 3 to 4 weeks.    12/9: Stable overnight.  Continues to report cough.  Denies complaints chest pain or pressure palpitations or feeling rapid heart rate.  In isolation for coronavirus infection.  Blood pressures have been generally stable albeit mildly hypotensive occasionally.  On a combination as described above of amlodipine and metoprolol.  Parameters are in place for these medications.  Patient has been eating and drinking poorly, however this morning he has an appetite and is eating well.  Appears to be generally euvolemic on examination.  No further recommendations at this time.  Will sign off.  Patient to follow-up with Dr. Nuno in the office in the next 3 to 4 weeks.    I spent 35 minutes in the professional and overall care of this patient.      Kulwinder Bradley, APRN-CNP

## 2024-12-09 NOTE — PROGRESS NOTES
Chavez Victoria is a 79 y.o. male on day 4 of admission presenting with Syncope and collapse.      Subjective   Patient complain of feeling weak.  He denied chest pain.  Patient stated he has nonproductive cough.  He denied fever or chills.         Objective     Last Recorded Vitals  BP (!) 104/46 (BP Location: Left arm, Patient Position: Lying)   Pulse 69   Temp 36.2 °C (97.2 °F) (Oral)   Resp 17   Wt 68.1 kg (150 lb 2.1 oz)   SpO2 99%   Intake/Output last 3 Shifts:    Intake/Output Summary (Last 24 hours) at 12/9/2024 1332  Last data filed at 12/9/2024 0933  Gross per 24 hour   Intake 360 ml   Output 800 ml   Net -440 ml       Admission Weight  Weight: 68.5 kg (151 lb 0.2 oz) (12/04/24 1559)    Daily Weight  12/07/24 : 68.1 kg (150 lb 2.1 oz)    Image Results  Cardiac Catheterization [place] [address]             Phone [phone]    Cardiovascular Catheterization Report    Patient Name:     CHAVEZ VICTORIA    Performing Physician:  Alia Soriano MD  Study Date:       12/6/2024            Verifying Physician:   Alia Soriano MD  MRN/PID:          [id number]             Cardiologist/Co-Scrub:  Accession#:       [id number]         Ordering Provider:     Alia SORIANO  Date of           1945 / 79      Cardiologist:  Birth/Age:        years  Gender:           M                    Fellow:  Encounter#:       [id number]           Surgeon:       Study: Left Heart Cath       Indications:  CHAVEZ VICTORIA is a 79 year old male who presents with dyslipidemia, hypertension and a chest pain assessment of atypical angina. Worsening angina.  Stress test performed: No. CTA performed: No. [facility] accessed: No. LVEF  Assessed: Yes. LVEF =  40%.  Cardiac arrest: No.  Cardiac surgical consult: No.  Cardiovascular Instability: No  Frailty status of patient entering lab: 4 = Vulnerable.       Procedure Description:  After infiltration with 2% Lidocaine, the right femoral artery was cannulated with a modified Seldinger technique. Subsequently a 6 [place] sheath was placed in the right femoral artery. Selective coronary catheterization was performed using a 5 Fr catheter(s) exchanged over a guide wire to cannulate the coronary arteries. A JL 4 tip catheter was used for left coronary injections. A JR 4 tip catheter was used for right coronary injections.     Coronary Angiography:  The coronary circulation is right dominant.     Left Main Coronary Artery:  The left main has distal disease of 90%. Only calcified vessel.     Left Anterior Descending Coronary Artery Distribution:  Left anterior descending artery is 100% occluded in the midsegment heavily calcified from the ostial artery. Diagonal 1 coming off of 90% lesion of the LAD before becoming 100% occluded.     Circumflex Coronary Artery Distribution:  Left circumflex has severe diffuse disease in proximal to mid segment. 100% occluded in the midsegment.     Ramus Intermedius:  The ramus is midsize artery with the ostial disease of 50%.     Right Coronary Artery Distribution:    Right coronary artery is right dominant vessel. Has anterior takeoff. Very tortuous. Has stents in the proximal mid mid distal segment probably 3 or 4 overlapped stents. There is underexpanded stent in the mid segment was 40% in-stent restenosis. There is a severe in-stent restenosis in underexpanded mid segment of the stent. Distally patent stents with moderate diffuse disease throughout the vessels.     Coronary Grafts:     LIMA Graft:  Left internal mammary artery graft conduit, originating from the aorta and attached to the mid left anterior descending, is normal. The left internal mammary artery graft conduit originating from  the aorta and attached to the mid left anterior descending is normal.       Left Ventriculography:  Left ventricular end-diastolic pressure was normal at 10 millimeters of mercury. LV gram showed ejection fraction of 40% with diffuse hypokinesis. No gradient across aortic valve.     Coronary Interventions:  Angiography reveals a 99% stenosis of the proximal to mid, mid and mid to distal right coronary artery coronary artery. Pre-intervention CIERRA flow was 3. Percutaneous coronary intervention was performed within the proximal to mid, mid and mid to distal right coronary artery. The vessel was pre-dilated using a compliant balloon 1.5 mm x 10 mm at 14 VERONICA. The stent was post dilated using a non-compliant balloon 2.0 mm x 8 mm at 22 VERONICA. The stenosis was successfully reduced from 99% to 50%. Post-intervention CIERRA flow was 3. Patient has complex lesion at the level of mid and proximal mid RCA the RCA has multiple interventions in the past and multiple overlapped stents. 2 underexpanded stents segment and heavily calcified artery. We used multiple guide but were unable to engage perfectly the artery due to anterior and subsequently 90 degrees patent. We tried with AL 7 5 as well as 3 DRC and finally a JR4 were able to get a better coaxial engagement. We had difficulty advancing any kind of   balloon at the. Finally with raj wires and guide Real were able to advance initially 1.5 x 10 mm compliant balloon and we treated the mid and proximal mid segment with high pressures balloon angioplasty and the balloon in fact ruptured. Subsequently with right with 2.0 x 10 mm noncompliant balloon and we were able to balloon but without significant improvement in the stenosis. Finally we used 2.0 x 8 mm noncompliant balloon with high pressures up to 22 veronica we got some improvement at the level of the mid RCA in-stent restenosis which was at the beginning 99% now it is about 50%. We also achieved slightly better stent expansion in  the proximal mid lesion. Post stents remains underexpanded. Patient remained stable had CIERRA-3 flow.     Graft Stenosis Summary:  Graft     Destination of Graft  LIMA  mid left anterior descending       Cardiac Cath Post Procedure Notes:  Post Procedure Diagnosis: Triple vessel disease.  Blood Loss:               Estimated blood loss during the procedure was 10 cc                            mls.  Specimens Removed:        Number of specimen(s) removed: none.    ____________________________________________________________________________________  CONCLUSIONS:   1. Severe three-vessel coronary artery disease.   2. Patent LIMA to LAD but after the anastomosis there is moderate tubular lesion that has been stable for long time.   3. Distal left main of 90% that is giving flow to midsize ramus. Has been stable for many years.   4. Severe diffuse right coronary artery disease was 2 areas of underexpanded stents and severe in-stent restenosis of 90% in the midsegment.   5. Balloon angioplasty was attempted we were unable to expand the mid RCA stent. There was still significant in-stent restenosis of 50% after balloon angioplasty. Improved from 99%. CIERRA-3 flow.   6. Medical management for now.    ICD 10 Codes:  Non ST elevation (NSTEMI) myocardial infarction-I21.4     CPT Codes:  Left Heart Cath Bypass Graft w ventriculography and coronary angio(LHC)-47828; Angioplasty, single Right Coronary major Artery/branch (PCI)-07651.RC; Moderate Sedation Services initial 15 minutes patient >5 years-71861     [id number] Chaim Soriano MD  Performing Physician  Electronically signed by [id number] Chaim Soriano MD on 12/6/2024 at 4:13:05 PM         ** Final **      Physical Exam    General: pleasant cooperating during physical exam.  Ecchymotic area on his forehead from fall  HEENT: Pupils are equal and reactive to light and commendation , oral mucosa moist, no JVD o  Cardiovascular: Normal sinus rhythm, no MRG.  Lungs: Clear to auscultation  bilaterally, no wheezing, no crackles, no dullness to percussion.  Abdomen: No hepatosplenomegaly appreciated, soft , not tender, positive bowel sounds, positive bowel movement.  Neuro: Alert and oriented x3, strength in upper and lower extremities , sensation intact.  Musculoskeletal: Midline thoracic scar from previous surgery, left below-knee amputation n.  Vascular: Pulses are intact in upper and lower extremities  Skin: Ecchymosis in his face  Assessment/Plan      Syncope and collapse  Most likely vasovagal  Patient was found to have NSTEMI  CT head on admission no acute intracranial findings  CT maxillofacial facial bones no evidence of fracture    NSTEMI  History of coronary artery disease  Evaluated by cardiology  Initially he was on ACL protocol.  Patient had left heart catheterization.  He was found to have lesion in RCA.  He had previous stent in RCA  Continue with dual antiplatelet therapy  Cardiology on the case  Patient is on metoprolol succinate and amlodipine.  Continue with atorvastatin    Nausea and vomiting  Evaluated by GI    Chronic kidney disease  Advised the patient to follow-up with his primary care physician    Deconditioning  Patient was evaluated by physical therapy  Physical therapy recommended skilled nursing facility  Discussed with care coordination on the case.  Plan to discharge home with home and care in a.m.    Status post left BKA.    Hyperlipidemia  Hypertension  Continue with current medication.     Diabetes mellitus type 2  Cover with insulin sliding scale.    COVID-19 infection  Patient does not required oxygen  Oxygen saturation 99% on room air.  ID on the case  Expect the patient to get discharged in a.m.  Continue with droplet precaution contact isolation.    Discussed in detail regarding discharge planning  Discussed in detail with care coordination on the case  Plan to discharge home in a.m.         Marcos Amaya MD

## 2024-12-09 NOTE — PROGRESS NOTES
Chavez Llamas is a 79 y.o. male on day 4 of admission presenting with Syncope and collapse.    Subjective   Interval History:   Room not entered- limit exposure  Patient observed-lying in bed, not in any acute distress  Afebrile  Remains on room air       Review of Systems    Objective   Range of Vitals (last 24 hours)  Heart Rate:  [69-75]   Temp:  [36.2 °C (97.2 °F)-37.9 °C (100.2 °F)]   Resp:  [17-18]   BP: (104-122)/(46-54)   SpO2:  [97 %-99 %]   Daily Weight  12/07/24 : 68.1 kg (150 lb 2.1 oz)    Body mass index is 22.83 kg/m².    Physical Exam  Awake, alert    Antibiotics  This patient does not have an active medication from one of the medication groupers.    Relevant Results  Labs  Results from last 72 hours   Lab Units 12/09/24  0439 12/07/24  0417   WBC AUTO x10*3/uL 4.5 4.0*   HEMOGLOBIN g/dL 9.5* 9.8*   HEMATOCRIT % 27.1* 28.8*   PLATELETS AUTO x10*3/uL 108* 104*     Results from last 72 hours   Lab Units 12/09/24  0439 12/08/24  0609 12/07/24  0417   SODIUM mmol/L 137 135* 135*   POTASSIUM mmol/L 3.8 3.8 4.1   CHLORIDE mmol/L 103 104 102   CO2 mmol/L 27 24 24   BUN mg/dL 34* 41* 39*   CREATININE mg/dL 1.62* 1.65* 1.57*   GLUCOSE mg/dL 131* 136* 106*   CALCIUM mg/dL 8.1* 7.8* 8.1*   ANION GAP mmol/L 11 11 13   EGFR mL/min/1.73m*2 43* 42* 44*         Estimated Creatinine Clearance: 35 mL/min (A) (by C-G formula based on SCr of 1.62 mg/dL (H)).  CRP   Date Value Ref Range Status   01/31/2022 15.3 (H) 0 - 2.0 MG/DL Final     Comment:     Performed at [place] [id number]     Microbiology  Reviewed  Imaging  XR chest 1 view    Result Date: 12/8/2024  Interpreted By:  Ryan Freitas, STUDY: XR CHEST 1 VIEW;  12/8/2024 9:11 am   INDICATION: Signs/Symptoms:cough.     COMPARISON: CT chest and portable chest both from 4 December 2024   ACCESSION NUMBER(S): [id number]   ORDERING CLINICIAN: BRENDA PELAEZ   TECHNIQUE: Single frontal view of the chest; Portable technique   FINDINGS:   LINES  AND TUBES: n/a   HEART AND MEDIASTINUM: Heart size: Within normal limits Thoracic aorta: Within expected limits Radha and nonvascular: within normal limits   PULMONARY VESSELS: Within normal limits; no sign of edema   LUNGS AND AIRWAYS: Clear; no acute airspace disease   PLEURA: Effusion: No substantial pleural effusion on either side Pneumothorax: No substantial pneumothorax on either side Other: n/a   BONES: No acute findings       NO ACUTE DISEASE IN THE CHEST   MACRO: None   Signed by: Ryan Freitas 12/8/2024 9:27 AM Dictation workstation:   [id number]    Cardiac Catheterization Procedure    Result Date: 12/6/2024           [place] [address]            Phone [phone] Cardiovascular Catheterization Report Patient Name:     ORLANDO VICTORIA    Performing Physician:  Alia Soriano MD Study Date:       12/6/2024            Verifying Physician:   Alia Soriano MD MRN/PID:          [id number]             Cardiologist/Co-Scrub: Accession#:       [id number]         Ordering Provider:     Alia SORIANO Date of           1945 / 79      Cardiologist: Birth/Age:        years Gender:           M                    Fellow: Encounter#:       [id number]           Surgeon:  Study: Left Heart Cath  Indications: ORLANDO VICTORIA is a 79 year old male who presents with dyslipidemia, hypertension and a chest pain assessment of atypical angina. Worsening angina. Stress test performed: No. CTA performed: No. [place]at[place] accessed: No. LVEF Assessed: Yes. LVEF = 40%. Cardiac arrest: No. Cardiac surgical consult: No. Cardiovascular Instability: No Frailty status of patient entering lab: 4 = Vulnerable.  Procedure Description: After infiltration with 2% Lidocaine, the right femoral  artery was cannulated with a modified Seldinger technique. Subsequently a 6 Anguillan sheath was placed in the right femoral artery. Selective coronary catheterization was performed using a 5 Fr catheter(s) exchanged over a guide wire to cannulate the coronary arteries. A JL 4 tip catheter was used for left coronary injections. A JR 4 tip catheter was used for right coronary injections.  Coronary Angiography: The coronary circulation is right dominant.  Left Main Coronary Artery: The left main has distal disease of 90%. Only calcified vessel.  Left Anterior Descending Coronary Artery Distribution: Left anterior descending artery is 100% occluded in the midsegment heavily calcified from the ostial artery. Diagonal 1 coming off of 90% lesion of the LAD before becoming 100% occluded.  Circumflex Coronary Artery Distribution: Left circumflex has severe diffuse disease in proximal to mid segment. 100% occluded in the midsegment.  Ramus Intermedius: The ramus is midsize artery with the ostial disease of 50%.  Right Coronary Artery Distribution: Right coronary artery is right dominant vessel. Has anterior takeoff. Very tortuous. Has stents in the proximal mid mid distal segment probably 3 or 4 overlapped stents. There is underexpanded stent in the mid segment was 40% in-stent restenosis. There is a severe in-stent restenosis in underexpanded mid segment of the stent. Distally patent stents with moderate diffuse disease throughout the vessels.  Coronary Grafts:  LIMA Graft: Left internal mammary artery graft conduit, originating from the aorta and attached to the mid left anterior descending, is normal. The left internal mammary artery graft conduit originating from the aorta and attached to the mid left anterior descending is normal.  Left Ventriculography: Left ventricular end-diastolic pressure was normal at 10 millimeters of mercury. LV gram showed ejection fraction of 40% with diffuse hypokinesis. No gradient across  aortic valve.  Coronary Interventions: Angiography reveals a 99% stenosis of the proximal to mid, mid and mid to distal right coronary artery coronary artery. Pre-intervention CIERRA flow was 3. Percutaneous coronary intervention was performed within the proximal to mid, mid and mid to distal right coronary artery. The vessel was pre-dilated using a compliant balloon 1.5 mm x 10 mm at 14 VERONICA. The stent was post dilated using a non-compliant balloon 2.0 mm x 8 mm at 22 VERONICA. The stenosis was successfully reduced from 99% to 50%. Post-intervention CIERRA flow was 3. Patient has complex lesion at the level of mid and proximal mid RCA the RCA has multiple interventions in the past and multiple overlapped stents. 2 underexpanded stents segment and heavily calcified artery. We used multiple guide but were unable to engage perfectly the artery due to anterior and subsequently 90 degrees patent. We tried with AL 7 5 as well as 3 DRC and finally a JR4 were able to get a better coaxial engagement. We had difficulty advancing any kind of balloon at the. Finally with raj wires and guide Real were able to advance initially 1.5 x 10 mm compliant balloon and we treated the mid and proximal mid segment with high pressures balloon angioplasty and the balloon in fact ruptured. Subsequently with right with 2.0 x 10 mm noncompliant balloon and we were able to balloon but without significant improvement in the stenosis. Finally we used 2.0 x 8 mm noncompliant balloon with high pressures up to 22 veronica we got some improvement at the level of the mid RCA in-stent restenosis which was at the beginning 99% now it is about 50%. We also achieved slightly better stent expansion in the proximal mid lesion. Post stents remains underexpanded. Patient remained stable had CIERRA-3 flow.  Graft Stenosis Summary: Graft     Destination of Graft LIMA  mid left anterior descending  Cardiac Cath Post Procedure Notes: Post Procedure Diagnosis: Triple vessel  disease. Blood Loss:               Estimated blood loss during the procedure was 10 cc                           mls. Specimens Removed:        Number of specimen(s) removed: none. ____________________________________________________________________________________ CONCLUSIONS:  1. Severe three-vessel coronary artery disease.  2. Patent LIMA to LAD but after the anastomosis there is moderate tubular lesion that has been stable for long time.  3. Distal left main of 90% that is giving flow to midsize ramus. Has been stable for many years.  4. Severe diffuse right coronary artery disease was 2 areas of underexpanded stents and severe in-stent restenosis of 90% in the midsegment.  5. Balloon angioplasty was attempted we were unable to expand the mid RCA stent. There was still significant in-stent restenosis of 50% after balloon angioplasty. Improved from 99%. CIERRA-3 flow.  6. Medical management for now. ICD 10 Codes: Non ST elevation (NSTEMI) myocardial infarction-I21.4  CPT Codes: Left Heart Cath Bypass Graft w ventriculography and coronary angio(LHC)-63238; Angioplasty, single Right Coronary major Artery/branch (PCI)-16325.RC; Moderate Sedation Services initial 15 minutes patient >5 years-99144  [id number] Chaim Soriano MD Performing Physician Electronically signed by [id number] Chaim Soriano MD on 12/6/2024 at 4:13:05 PM  ** Final **     Transthoracic Echo (TTE) Complete    Result Date: 12/6/2024           [place] [address]            Phone [phone] TRANSTHORACIC ECHOCARDIOGRAM REPORT Patient Name:       ORLANDO Oconnell Physician:    [id number] Benjamin Nuno MD Study Date:         12/6/2024            Ordering Provider:    [id number] ABBY MATA MRN/PID:            [id number]             Fellow: Accession#:         [id number]         Nurse:  Date of Birth/Age:  1945 / 79      Sonographer:          Marce ECHEVERRIA Gender Assigned at  M                    Additional Staff: Birth: Height:             172.70 cm            Admit Date:           12/4/2024 Weight:             68.95 kg             Admission Status:     Inpatient -                                                                Routine BSA / BMI:          1.82 m2 / 23.12      Department Location:                     kg/m2 Blood Pressure: 100 /55 mmHg Study Type:    TRANSTHORACIC ECHO (TTE) COMPLETE Diagnosis/ICD: Syncope and collapse-R55 Indication:    Syncope CPT Codes:     Echo Complete w Full Doppler-61330 Patient History: Pertinent History: Acute Myocardial Infraction involving left anterior                    descending coronary artery CP HTN HLD CAD CHF NSTEMI DMII                    COPD. Study Detail: The following Echo studies were performed: 2D, M-Mode, Doppler,               color flow and Strain.  PHYSICIAN INTERPRETATION: Left Ventricle: The left ventricular systolic function is mildly decreased, with a visually estimated ejection fraction of 40-45%. Wall motion is abnormal. The left ventricular cavity size is normal. There is left ventricular concentric remodeling. Left Ventricular Global Longitudinal Strain - -11.0 %. Abnormal (paradoxical) septal motion consistent with post-operative status and abnormal (paradoxical) septal motion, consistent with left bundle branch block. Spectral Doppler shows an abnormal pattern of left ventricular diastolic filling. There is severe hypokinesis of the basal inferior wall. Left Atrium: The left atrium is mildly dilated. 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 Right Ventricle: The right ventricle is normal in size. There is normal right ventriclar wall thickness. There is normal right ventricular global systolic function. Right Atrium: The right atrium is normal in  size. Aortic Valve: The aortic valve is trileaflet. There is mild aortic valve cusp calcification. There is no evidence of reduced aortic valve leaflet excursion excursion. There is evidence of mildly elevated transaortic gradients consistent with sclerosis of the aortic valve. There is mild aortic valve regurgitation. The peak instantaneous gradient of the aortic valve is 8 mmHg. Mitral Valve: The mitral valve is normal in structure. There is normal mitral valve leaflet mobility. There is mild mitral annular calcification. There is mild to moderate mitral valve regurgitation. Tricuspid Valve: The tricuspid valve is structurally normal. There is normal tricuspid valve leaflet mobility. There is trace to mild tricuspid regurgitation. Pulmonic Valve: The pulmonic valve is structurally normal. There is trace pulmonic valve regurgitation. Pericardium: No pericardial effusion noted. Aorta: The aortic root is abnormal. The ascending aorta is at the upper limits of normal. The aortic root is at the upper limits of normal size. There is plaque visualized in the ascending aorta, which is classified as a Grade 2 [mild (focal or diffuse) intimal thickening of 2-3 mm] atherosclerosis. Systemic Veins: The inferior vena cava appears normal in size, with IVC inspiratory collapse greater than 50%.  CONCLUSIONS:  1. The left ventricular systolic function is mildly decreased, with a visually estimated ejection fraction of 40-45%.  2. Abnormal wall motion.  3. Spectral Doppler shows an abnormal pattern of left ventricular diastolic filling.  4. Abnormal septal motion consistent with post-operative status and abnormal septal motion consistent with left bundle branch block.  5. There is severe hypokinesis of the basal inferior wall.  6. There is normal right ventricular global systolic function.  7. Mild to moderate mitral valve regurgitation.  8. Trace to mild tricuspid regurgitation.  9. Aortic valve sclerosis. 10. Mild aortic valve  regurgitation. 11. Atrial septal aneurysm present. 12. There is plaque visualized in the ascending aorta. QUANTITATIVE DATA SUMMARY:  2D MEASUREMENTS:            Normal Ranges: LAs:             3.87 cm    (2.7-4.0cm) IVSd:            0.96 cm    (0.6-1.1cm) LVPWd:           1.07 cm    (0.6-1.1cm) LVIDd:           5.05 cm    (3.9-5.9cm) LVIDs:           4.10 cm LV Mass Index:   103.9 g/m2 LV % FS          18.9 %  LA VOLUME:                    Normal Ranges: LA Vol A4C:        64.7 ml    (22+/-6mL/m2) LA Vol A2C:        84.4 ml LA Vol BP:         78.1 ml LA Vol Index A4C:  35.6 ml/m2 LA Vol Index A2C:  46.4 ml/m2 LA Vol Index BP:   42.9 ml/m2 LA Area A4C:       19.9 cm2 LA Area A2C:       24.0 cm2 LA Major Axis A4C: 5.2 cm LA Major Axis A2C: 5.8 cm LA Volume Index:   42.5 ml/m2 LA Vol A4C:        59.0 ml LA Vol A2C:        78.7 ml LA Vol Index BSA:  37.9 ml/m2  RA VOLUME BY A/L METHOD:            Normal Ranges: RA Vol A4C:              52.4 ml    (8.3-19.5ml) RA Vol Index A4C:        28.8 ml/m2 RA Area A4C:             17.2 cm2 RA Major Axis A4C:       4.8 cm  M-MODE MEASUREMENTS:         Normal Ranges: LAs:                 5.33 cm (2.7-4.0cm)  AORTA MEASUREMENTS:         Normal Ranges: Ao Sinus, d:        3.60 cm (2.1-3.5cm) Ao STJ, d:          3.40 cm (1.7-3.4cm) Asc Ao, d:          3.40 cm (2.1-3.4cm)  LV SYSTOLIC FUNCTION BY 2D PLANIMETRY (MOD):                                         Normal Ranges: EF-A4C View:                      31 %  (>=55%) EF-A2C View:                      46 % EF-Biplane:                       38 % EF-Visual:                        43 % EF-Auto:                          40 % LV EF Reported:                   43 % Global Longitudinal Strain (GLS): -11 %  LV DIASTOLIC FUNCTION:             Normal Ranges: MV Peak E:             0.45 m/s    (0.7-1.2 m/s) MV Peak A:             0.72 m/s    (0.42-0.7 m/s) E/A Ratio:             0.63        (1.0-2.2) MV A Dur:              188.39 msec  MITRAL  VALVE:          Normal Ranges: MV DT:        207 msec (150-240msec)  AORTIC VALVE:           Normal Ranges: AoV Vmax:      1.40 m/s (<=1.7m/s) AoV Peak P.9 mmHg (<20mmHg) LVOT Max Deonte:  0.89 m/s (<=1.1m/s) LVOT VTI:      14.82 cm LVOT Diameter: 1.98 cm  (1.8-2.4cm) AoV Area,Vmax: 1.94 cm2 (2.5-4.5cm2)  RIGHT VENTRICLE: RV Basal 2.57 cm RV Mid   3.00 cm RV Major 6.2 cm  TRICUSPID VALVE/RVSP:         Normal Ranges: IVC Diam:             1.70 cm  AORTA: Asc Ao Diam 3.41 cm  [id number] Benjamin Nuno MD Electronically signed on 2024 at 1:29:30 PM  ** Final **     Vascular US Carotid Artery Duplex Bilateral    Result Date: 2024           [place] [address]            Phone [phone]  Vascular Lab Report  [facility] US CAROTID ARTERY DUPLEX BILATERAL Patient Name:      ORLANDO VICTORIA   Reading Physician:  [id number] Marina Farr MD Study Date:        2024           Ordering Provider:  [id number] LUIS DANIEL CARDENAS MRN/PID:           [id number]            Fellow: Accession#:        [id number]        Technologist:       Zoya Pina RVT Date of Birth/Age: 1945 / 79     Technologist 2:                    years Gender:            M                   Encounter#:         [id number] Admission Status:  Inpatient           Location Performed: [facility]  Diagnosis/ICD: Syncope and collapse-R55 CPT Codes:     56883 Cerebrovascular Carotid Duplex scan complete  CONCLUSIONS: Right Carotid: Findings are consistent with less than 50% stenosis of the right proximal internal carotid artery. Right external carotid artery appears patent with no evidence of stenosis. The right vertebral artery is patent with antegrade flow. No evidence of hemodynamically significant stenosis in the right subclavian artery. Left Carotid: Findings are consistent with 50 to 69% stenosis of the left proximal internal carotid artery.  Left external carotid artery appears patent with no evidence of stenosis. The left vertebral artery is patent with antegrade flow. No evidence of hemodynamically significant stenosis in the left subclavian artery.  Imaging & Doppler Findings: Right Plaque Morph: The proximal right internal carotid artery demonstrates heterogenous plaque. The proximal right external carotid artery demonstrates heterogenous plaque. The distal right common carotid artery demonstrates heterogenous plaque. Left Plaque Morph: The proximal left internal carotid artery demonstrates heterogenous and calcified plaque. The proximal left external carotid artery demonstrates heterogenous and calcified plaque. The distal left common carotid artery demonstrates heterogenous plaque.   Right                        Left   PSV      EDV                PSV      EDV 65 cm/s            CCA P    78 cm/s 60 cm/s            CCA D    49 cm/s 81 cm/s  21 cm/s   ICA P    202 cm/s 65 cm/s 65 cm/s  18 cm/s   ICA M    128 cm/s 26 cm/s 74 cm/s  24 cm/s   ICA D    60 cm/s  19 cm/s 100 cm/s            ECA     137 cm/s 38 cm/s  11 cm/s Vertebral  65 cm/s  15 cm/s 66 cm/s          Subclavian 141 cm/s                Right Left ICA/CCA Ratio  1.4  4.1   [id number] Marina Farr MD Electronically signed by [id number] Marina Farr MD on 12/6/2024 at 12:19:53 PM  ** Final **     ECG 12 lead    Result Date: 12/5/2024  Sinus rhythm with 1st degree AV block Left bundle branch block Abnormal ECG When compared with ECG of 04-DEC-2024 16:12, (unconfirmed) Sinus rhythm has replaced Atrial fibrillation Confirmed by Jonathan Tristan ([id number]) on 12/5/2024 2:45:20 PM    ECG 12 lead    Result Date: 12/5/2024  Atrial fibrillation with a competing junctional pacemaker Left bundle branch block Abnormal ECG When compared with ECG of 18-FEB-2022 13:46, Atrial fibrillation has replaced Sinus rhythm Left bundle branch block is now Present Criteria for Anterior infarct are no longer Present Confirmed by  Jonathan Tristan ([id number]) on 12/5/2024 2:42:44 PM    CT chest abdomen pelvis w IV contrast    Result Date: 12/4/2024  STUDY: CT Chest, Abdomen, and Pelvis with IV Contrast, CT Thoracic Spine and Lumbar Spine without IV Contrast; 12/4/2024 5:48 PM. INDICATION: Trauma, fall.  Head injury.  Coffee ground emesis.  Syncope COMPARISON: CT AP 2/18/2022. ACCESSION NUMBER(S): [id number], [id number], [id number], [id number] ORDERING CLINICIAN: GRETEL FALCNO TECHNIQUE: CT of the chest, abdomen, and pelvis was performed.  Contiguous axial images were obtained at 3 mm slice thickness through the chest, abdomen, and pelvis.  Coronal and sagittal reconstructions at 3 mm slice thickness were performed.  Omnipaque 350 75 mL was administered intravenously.  Please note that spinal images were generated from the original CT abdomen and pelvis imaging. FINDINGS: CHEST: MEDIASTINUM: The heart is normal in size without pericardial effusion.  Central vascular structures opacify normally.  LUNGS/PLEURA: There is no pleural effusion, pleural thickening, or pneumothorax. The airways are patent. Lungs are clear without consolidation, interstitial disease, or suspicious nodules. LYMPH NODES: Thoracic lymph nodes are not enlarged. ABDOMEN:  LIVER: No hepatomegaly.  Smooth surface contour.  Normal attenuation.  BILE DUCTS: No intrahepatic or extrahepatic biliary ductal dilatation.  GALLBLADDER: The gallbladder is unremarkable. STOMACH: No abnormalities identified.  PANCREAS: No masses or ductal dilatation.  SPLEEN: No splenomegaly or focal splenic lesion.  ADRENAL GLANDS: No thickening or nodules.  KIDNEYS AND URETERS: Kidneys are normal in size and location.  No renal or ureteral calculi.  PELVIS:  BLADDER: No abnormalities identified.  REPRODUCTIVE ORGANS: No abnormalities identified.  BOWEL: No abnormalities identified.  VESSELS: No abnormalities identified.  Abdominal aorta is normal in caliber.  PERITONEUM/RETROPERITONEUM/LYMPH NODES: No  free fluid.  No pneumoperitoneum. No lymphadenopathy.  ABDOMINAL WALL: No abnormalities identified. SOFT TISSUES: No abnormalities identified.  BONES: No acute fracture or aggressive osseous lesion. THORACIC SPINE: There is mild dextroscoliosis. The spine is in otherwise good alignment.  There is no fracture or traumatic subluxation. The vertebral body heights are well maintained.  Disc spaces are preserved.  No significant central canal stenosis is demonstrated. The neural foramina are patent throughout. There is mild degenerative change. The paravertebral soft tissues are within normal limits. LUMBAR SPINE: The alignment is anatomic.  There is mild compression of the superior endplate of L4. This was present on the study dated 2/18/2022 and is not significantly changed. The vertebral body heights are otherwise well maintained.  Disc spaces are preserved.  No significant central canal stenosis is demonstrated.  The neural foramina are patent throughout.  The paravertebral soft tissues are within normal limits.    1. No evidence for acute injury to the chest, abdomen, pelvis, thoracic, or lumbar spine. 2. There is mild chronic compression of the superior endplate of L4. Mild degenerative change in the thoracic and lumbar spine. Signed by Calin Montana MD    CT thoracic spine wo IV contrast    Result Date: 12/4/2024  STUDY: CT Chest, Abdomen, and Pelvis with IV Contrast, CT Thoracic Spine and Lumbar Spine without IV Contrast; 12/4/2024 5:48 PM. INDICATION: Trauma, fall.  Head injury.  Coffee ground emesis.  Syncope COMPARISON: CT AP 2/18/2022. ACCESSION NUMBER(S): [id number], [id number], [id number], [id number] ORDERING CLINICIAN: GRETEL FALCON TECHNIQUE: CT of the chest, abdomen, and pelvis was performed.  Contiguous axial images were obtained at 3 mm slice thickness through the chest, abdomen, and pelvis.  Coronal and sagittal reconstructions at 3 mm slice thickness were performed.  Omnipaque 350 75 mL was  administered intravenously.  Please note that spinal images were generated from the original CT abdomen and pelvis imaging. FINDINGS: CHEST: MEDIASTINUM: The heart is normal in size without pericardial effusion.  Central vascular structures opacify normally.  LUNGS/PLEURA: There is no pleural effusion, pleural thickening, or pneumothorax. The airways are patent. Lungs are clear without consolidation, interstitial disease, or suspicious nodules. LYMPH NODES: Thoracic lymph nodes are not enlarged. ABDOMEN:  LIVER: No hepatomegaly.  Smooth surface contour.  Normal attenuation.  BILE DUCTS: No intrahepatic or extrahepatic biliary ductal dilatation.  GALLBLADDER: The gallbladder is unremarkable. STOMACH: No abnormalities identified.  PANCREAS: No masses or ductal dilatation.  SPLEEN: No splenomegaly or focal splenic lesion.  ADRENAL GLANDS: No thickening or nodules.  KIDNEYS AND URETERS: Kidneys are normal in size and location.  No renal or ureteral calculi.  PELVIS:  BLADDER: No abnormalities identified.  REPRODUCTIVE ORGANS: No abnormalities identified.  BOWEL: No abnormalities identified.  VESSELS: No abnormalities identified.  Abdominal aorta is normal in caliber.  PERITONEUM/RETROPERITONEUM/LYMPH NODES: No free fluid.  No pneumoperitoneum. No lymphadenopathy.  ABDOMINAL WALL: No abnormalities identified. SOFT TISSUES: No abnormalities identified.  BONES: No acute fracture or aggressive osseous lesion. THORACIC SPINE: There is mild dextroscoliosis. The spine is in otherwise good alignment.  There is no fracture or traumatic subluxation. The vertebral body heights are well maintained.  Disc spaces are preserved.  No significant central canal stenosis is demonstrated. The neural foramina are patent throughout. There is mild degenerative change. The paravertebral soft tissues are within normal limits. LUMBAR SPINE: The alignment is anatomic.  There is mild compression of the superior endplate of L4. This was present on  the study dated 2/18/2022 and is not significantly changed. The vertebral body heights are otherwise well maintained.  Disc spaces are preserved.  No significant central canal stenosis is demonstrated.  The neural foramina are patent throughout.  The paravertebral soft tissues are within normal limits.    1. No evidence for acute injury to the chest, abdomen, pelvis, thoracic, or lumbar spine. 2. There is mild chronic compression of the superior endplate of L4. Mild degenerative change in the thoracic and lumbar spine. Signed by Calin Montana MD    CT lumbar spine wo IV contrast    Result Date: 12/4/2024  STUDY: CT Chest, Abdomen, and Pelvis with IV Contrast, CT Thoracic Spine and Lumbar Spine without IV Contrast; 12/4/2024 5:48 PM. INDICATION: Trauma, fall.  Head injury.  Coffee ground emesis.  Syncope COMPARISON: CT AP 2/18/2022. ACCESSION NUMBER(S): [id number], [id number], [id number], [id number] ORDERING CLINICIAN: GRETEL FALCON TECHNIQUE: CT of the chest, abdomen, and pelvis was performed.  Contiguous axial images were obtained at 3 mm slice thickness through the chest, abdomen, and pelvis.  Coronal and sagittal reconstructions at 3 mm slice thickness were performed.  Omnipaque 350 75 mL was administered intravenously.  Please note that spinal images were generated from the original CT abdomen and pelvis imaging. FINDINGS: CHEST: MEDIASTINUM: The heart is normal in size without pericardial effusion.  Central vascular structures opacify normally.  LUNGS/PLEURA: There is no pleural effusion, pleural thickening, or pneumothorax. The airways are patent. Lungs are clear without consolidation, interstitial disease, or suspicious nodules. LYMPH NODES: Thoracic lymph nodes are not enlarged. ABDOMEN:  LIVER: No hepatomegaly.  Smooth surface contour.  Normal attenuation.  BILE DUCTS: No intrahepatic or extrahepatic biliary ductal dilatation.  GALLBLADDER: The gallbladder is unremarkable. STOMACH: No abnormalities  identified.  PANCREAS: No masses or ductal dilatation.  SPLEEN: No splenomegaly or focal splenic lesion.  ADRENAL GLANDS: No thickening or nodules.  KIDNEYS AND URETERS: Kidneys are normal in size and location.  No renal or ureteral calculi.  PELVIS:  BLADDER: No abnormalities identified.  REPRODUCTIVE ORGANS: No abnormalities identified.  BOWEL: No abnormalities identified.  VESSELS: No abnormalities identified.  Abdominal aorta is normal in caliber.  PERITONEUM/RETROPERITONEUM/LYMPH NODES: No free fluid.  No pneumoperitoneum. No lymphadenopathy.  ABDOMINAL WALL: No abnormalities identified. SOFT TISSUES: No abnormalities identified.  BONES: No acute fracture or aggressive osseous lesion. THORACIC SPINE: There is mild dextroscoliosis. The spine is in otherwise good alignment.  There is no fracture or traumatic subluxation. The vertebral body heights are well maintained.  Disc spaces are preserved.  No significant central canal stenosis is demonstrated. The neural foramina are patent throughout. There is mild degenerative change. The paravertebral soft tissues are within normal limits. LUMBAR SPINE: The alignment is anatomic.  There is mild compression of the superior endplate of L4. This was present on the study dated 2/18/2022 and is not significantly changed. The vertebral body heights are otherwise well maintained.  Disc spaces are preserved.  No significant central canal stenosis is demonstrated.  The neural foramina are patent throughout.  The paravertebral soft tissues are within normal limits.    1. No evidence for acute injury to the chest, abdomen, pelvis, thoracic, or lumbar spine. 2. There is mild chronic compression of the superior endplate of L4. Mild degenerative change in the thoracic and lumbar spine. Signed by Calin Montana MD    CT 3D reconstruction    Result Date: 12/4/2024  STUDY: CT Chest, Abdomen, and Pelvis with IV Contrast, CT Thoracic Spine and Lumbar Spine without IV Contrast; 12/4/2024  5:48 PM. INDICATION: Trauma, fall.  Head injury.  Coffee ground emesis.  Syncope COMPARISON: CT AP 2/18/2022. ACCESSION NUMBER(S): [id number], [id number], [id number], [id number] ORDERING CLINICIAN: GRETEL FALCON TECHNIQUE: CT of the chest, abdomen, and pelvis was performed.  Contiguous axial images were obtained at 3 mm slice thickness through the chest, abdomen, and pelvis.  Coronal and sagittal reconstructions at 3 mm slice thickness were performed.  Omnipaque 350 75 mL was administered intravenously.  Please note that spinal images were generated from the original CT abdomen and pelvis imaging. FINDINGS: CHEST: MEDIASTINUM: The heart is normal in size without pericardial effusion.  Central vascular structures opacify normally.  LUNGS/PLEURA: There is no pleural effusion, pleural thickening, or pneumothorax. The airways are patent. Lungs are clear without consolidation, interstitial disease, or suspicious nodules. LYMPH NODES: Thoracic lymph nodes are not enlarged. ABDOMEN:  LIVER: No hepatomegaly.  Smooth surface contour.  Normal attenuation.  BILE DUCTS: No intrahepatic or extrahepatic biliary ductal dilatation.  GALLBLADDER: The gallbladder is unremarkable. STOMACH: No abnormalities identified.  PANCREAS: No masses or ductal dilatation.  SPLEEN: No splenomegaly or focal splenic lesion.  ADRENAL GLANDS: No thickening or nodules.  KIDNEYS AND URETERS: Kidneys are normal in size and location.  No renal or ureteral calculi.  PELVIS:  BLADDER: No abnormalities identified.  REPRODUCTIVE ORGANS: No abnormalities identified.  BOWEL: No abnormalities identified.  VESSELS: No abnormalities identified.  Abdominal aorta is normal in caliber.  PERITONEUM/RETROPERITONEUM/LYMPH NODES: No free fluid.  No pneumoperitoneum. No lymphadenopathy.  ABDOMINAL WALL: No abnormalities identified. SOFT TISSUES: No abnormalities identified.  BONES: No acute fracture or aggressive osseous lesion. THORACIC SPINE: There is mild  dextroscoliosis. The spine is in otherwise good alignment.  There is no fracture or traumatic subluxation. The vertebral body heights are well maintained.  Disc spaces are preserved.  No significant central canal stenosis is demonstrated. The neural foramina are patent throughout. There is mild degenerative change. The paravertebral soft tissues are within normal limits. LUMBAR SPINE: The alignment is anatomic.  There is mild compression of the superior endplate of L4. This was present on the study dated 2/18/2022 and is not significantly changed. The vertebral body heights are otherwise well maintained.  Disc spaces are preserved.  No significant central canal stenosis is demonstrated.  The neural foramina are patent throughout.  The paravertebral soft tissues are within normal limits.    1. No evidence for acute injury to the chest, abdomen, pelvis, thoracic, or lumbar spine. 2. There is mild chronic compression of the superior endplate of L4. Mild degenerative change in the thoracic and lumbar spine. Signed by Calin Monatna MD    CT head wo IV contrast    Result Date: 12/4/2024  Interpreted By:  Andrez Veloz, STUDY: CT HEAD WO IV CONTRAST; CT CERVICAL SPINE WO IV CONTRAST; CT FACIAL BONES WO IV CONTRAST;  12/4/2024 5:22 pm   INDICATION: Signs/Symptoms:fall, struck face; Signs/Symptoms:fall, struck head, +loc. Altered mental status   COMPARISON: None.   ACCESSION NUMBER(S): [id number]; [id number]; [id number]   ORDERING CLINICIAN: GRETEL FALCON   TECHNIQUE: Axial noncontrast CT images of the head, face, and cervical spine. 3D volume rendering of the facial bones was obtained on a separate workstation also reviewed.   FINDINGS: BRAIN PARENCHYMA: Gray-white matter interfaces are preserved. No mass, mass effect or midline shift. Scattered periventricular and deep white matter hypodensities suggestive of mild chronic microvascular ischemic change.   HEMORRHAGE: No acute intracranial hemorrhage. VENTRICLES and  EXTRA-AXIAL SPACES: No hydrocephalus. No extra-axial collections. Mild generalized cerebral volume loss and associated ventricular and sulcal enlargement. EXTRACRANIAL SOFT TISSUES: Unremarkable. CALVARIUM: No depressed calvarial fracture.   FACIAL BONES: No acute facial bone fracture. There is absent dentition. SOFT TISSUES: No evidence of large soft tissue hematoma. Extensive bilateral facial vascular calcifications, which may be seen with diabetes or chronic renal disease. PARANASAL SINUSES: No hemorrhage in the paranasal sinuses. Mild polypoid mucosal thickening of the bilateral frontal sinuses, ethmoid air cells, and maxillary sinuses. MASTOIDS: Within normal limits. ORBITS: The globes, extraocular muscles and optic nerve sheath complexes are symmetric. No retrobulbar hematoma.   ALIGNMENT: Normal cervical lordosis. VERTEBRAE: No acute fracture. Vertebral body heights are maintained. There are no suspicious osseous lesions. Degenerative disc space narrowing and mild posterolateral osseous spurring at C4-C5 through C6-C7 contributing to mild bilateral foraminal stenosis at this levels.. SPINAL CANAL: Small calcified central disc protrusion contributing to mild-to-moderate central canal stenosis at C3-C4 additionally small disc osteophyte complexes at. PREVERTEBRAL SOFT TISSUES: No prevertebral soft tissue swelling. LUNG APICES: Partially visualized emphysematous changes in the lung apices with asymmetric bronchiectasis and presumed scarring in the left lung apex. Please see separately dictated chest CT.   OTHER FINDINGS: None.         No acute intracranial abnormality.   No evidence of facial bone or cervical spine fracture.     Signed by: Andrez Veloz 12/4/2024 5:35 PM Dictation workstation:   [id number]    CT maxillofacial bones wo IV contrast    Result Date: 12/4/2024  Interpreted By:  Andrez Veloz, STUDY: CT HEAD WO IV CONTRAST; CT CERVICAL SPINE WO IV CONTRAST; CT FACIAL BONES WO IV CONTRAST;   12/4/2024 5:22 pm   INDICATION: Signs/Symptoms:fall, struck face; Signs/Symptoms:fall, struck head, +loc. Altered mental status   COMPARISON: None.   ACCESSION NUMBER(S): [id number]; [id number]; [id number]   ORDERING CLINICIAN: GRETEL FALCON   TECHNIQUE: Axial noncontrast CT images of the head, face, and cervical spine. 3D volume rendering of the facial bones was obtained on a separate workstation also reviewed.   FINDINGS: BRAIN PARENCHYMA: Gray-white matter interfaces are preserved. No mass, mass effect or midline shift. Scattered periventricular and deep white matter hypodensities suggestive of mild chronic microvascular ischemic change.   HEMORRHAGE: No acute intracranial hemorrhage. VENTRICLES and EXTRA-AXIAL SPACES: No hydrocephalus. No extra-axial collections. Mild generalized cerebral volume loss and associated ventricular and sulcal enlargement. EXTRACRANIAL SOFT TISSUES: Unremarkable. CALVARIUM: No depressed calvarial fracture.   FACIAL BONES: No acute facial bone fracture. There is absent dentition. SOFT TISSUES: No evidence of large soft tissue hematoma. Extensive bilateral facial vascular calcifications, which may be seen with diabetes or chronic renal disease. PARANASAL SINUSES: No hemorrhage in the paranasal sinuses. Mild polypoid mucosal thickening of the bilateral frontal sinuses, ethmoid air cells, and maxillary sinuses. MASTOIDS: Within normal limits. ORBITS: The globes, extraocular muscles and optic nerve sheath complexes are symmetric. No retrobulbar hematoma.   ALIGNMENT: Normal cervical lordosis. VERTEBRAE: No acute fracture. Vertebral body heights are maintained. There are no suspicious osseous lesions. Degenerative disc space narrowing and mild posterolateral osseous spurring at C4-C5 through C6-C7 contributing to mild bilateral foraminal stenosis at this levels.. SPINAL CANAL: Small calcified central disc protrusion contributing to mild-to-moderate central canal stenosis at C3-C4  additionally small disc osteophyte complexes at. PREVERTEBRAL SOFT TISSUES: No prevertebral soft tissue swelling. LUNG APICES: Partially visualized emphysematous changes in the lung apices with asymmetric bronchiectasis and presumed scarring in the left lung apex. Please see separately dictated chest CT.   OTHER FINDINGS: None.         No acute intracranial abnormality.   No evidence of facial bone or cervical spine fracture.     Signed by: Andrez Veloz 12/4/2024 5:35 PM Dictation workstation:   [id number]    CT cervical spine wo IV contrast    Result Date: 12/4/2024  Interpreted By:  Andrez Veloz, STUDY: CT HEAD WO IV CONTRAST; CT CERVICAL SPINE WO IV CONTRAST; CT FACIAL BONES WO IV CONTRAST;  12/4/2024 5:22 pm   INDICATION: Signs/Symptoms:fall, struck face; Signs/Symptoms:fall, struck head, +loc. Altered mental status   COMPARISON: None.   ACCESSION NUMBER(S): [id number]; [id number]; [id number]   ORDERING CLINICIAN: GRETEL FALCON   TECHNIQUE: Axial noncontrast CT images of the head, face, and cervical spine. 3D volume rendering of the facial bones was obtained on a separate workstation also reviewed.   FINDINGS: BRAIN PARENCHYMA: Gray-white matter interfaces are preserved. No mass, mass effect or midline shift. Scattered periventricular and deep white matter hypodensities suggestive of mild chronic microvascular ischemic change.   HEMORRHAGE: No acute intracranial hemorrhage. VENTRICLES and EXTRA-AXIAL SPACES: No hydrocephalus. No extra-axial collections. Mild generalized cerebral volume loss and associated ventricular and sulcal enlargement. EXTRACRANIAL SOFT TISSUES: Unremarkable. CALVARIUM: No depressed calvarial fracture.   FACIAL BONES: No acute facial bone fracture. There is absent dentition. SOFT TISSUES: No evidence of large soft tissue hematoma. Extensive bilateral facial vascular calcifications, which may be seen with diabetes or chronic renal disease. PARANASAL SINUSES: No hemorrhage in the  paranasal sinuses. Mild polypoid mucosal thickening of the bilateral frontal sinuses, ethmoid air cells, and maxillary sinuses. MASTOIDS: Within normal limits. ORBITS: The globes, extraocular muscles and optic nerve sheath complexes are symmetric. No retrobulbar hematoma.   ALIGNMENT: Normal cervical lordosis. VERTEBRAE: No acute fracture. Vertebral body heights are maintained. There are no suspicious osseous lesions. Degenerative disc space narrowing and mild posterolateral osseous spurring at C4-C5 through C6-C7 contributing to mild bilateral foraminal stenosis at this levels.. SPINAL CANAL: Small calcified central disc protrusion contributing to mild-to-moderate central canal stenosis at C3-C4 additionally small disc osteophyte complexes at. PREVERTEBRAL SOFT TISSUES: No prevertebral soft tissue swelling. LUNG APICES: Partially visualized emphysematous changes in the lung apices with asymmetric bronchiectasis and presumed scarring in the left lung apex. Please see separately dictated chest CT.   OTHER FINDINGS: None.         No acute intracranial abnormality.   No evidence of facial bone or cervical spine fracture.     Signed by: Andrez Veloz 12/4/2024 5:35 PM Dictation workstation:   [id number]    XR chest 1 view    Result Date: 12/4/2024  STUDY: Chest Radiograph;  12/4/24, 4:14PM. INDICATION: Fall. COMPARISON: XR Chest 3/15/21. ACCESSION NUMBER(S): [id number] ORDERING CLINICIAN: GRETEL FALCON TECHNIQUE:  Frontal chest was obtained at 16:13 hours. FINDINGS: The patient status post median sternotomy. CARDIOMEDIASTINAL SILHOUETTE: Cardiomediastinal silhouette is normal in size and configuration.  LUNGS: Lungs are clear.  ABDOMEN: No remarkable upper abdominal findings.  BONES: There is a lucency through the anterior lateral right ninth rib and a nondisplaced fracture cannot be excluded.    No evidence consolidating infiltrate or effusion. Findings suggestive of fracture of the right ninth rib. Signed by Dajuan  MD Rafael    XR pelvis 1-2 views    Result Date: 12/4/2024  STUDY: Pelvis Radiographs; 12/04/2024 4:13 PM INDICATION: Fall. COMPARISON: 02/18/2022 CT Abdomen and Pelvis. ACCESSION NUMBER(S): [id number] ORDERING CLINICIAN: GRETEL FALCON TECHNIQUE:  One view of the pelvis. FINDINGS:  There are degenerative change the facet joints of the lower lumbosacral spine.  The pelvic ring is intact.  There is no acute fracture.      No acute osseous abnormalities. Signed by Dajuan Hall MD        Assessment/Plan   Syncope, vasovagal versus related to coronavirus infection, resolved  Coronary artery disease s/p cardiac cath  Coronavirus infection-normal oxygenation, patient high risk for progression     Monitor oxygenation  Remdesivir declined 12/5  Cardiology follow-up  Monitor temperature and WBC  Droplet plus precautions  Discharge planning      Ludwig Malik MD

## 2024-12-09 NOTE — CARE PLAN
Problem: Bathing  Goal: STG - Patient will bathe lower body independent with set up  Outcome: Progressing     Problem: Dressings Lower Extremities  Goal: STG - Patient to complete lower body dressing independent with set up(underwear/pants)  Outcome: Progressing     Problem: Dressing Upper Extremities  Goal: LTG - Patient will complete upper body dressing independent  Outcome: Progressing     Problem: Grooming  Goal: STG - Patient completes grooming Independent  Outcome: Progressing     Problem: Toileting  Goal: STG - Patient will complete toileting tasks with 2ww and close supervision  Outcome: Progressing     Problem: OT Transfers  Goal: LTG - Patient will perform functional transfers with close supervision and 2ww  Outcome: Progressing

## 2024-12-09 NOTE — NURSING NOTE
Confirmed with Dago Bradley that patient has low BP and that patient is to receive full dose of morning BP medications.

## 2024-12-09 NOTE — PROGRESS NOTES
Occupational Therapy    Evaluation/Treatment    Patient Name: Chavez Llamas  MRN: [id number]  Department: [address]  Room: 421/421-B  Today's Date: 12/09/24  Time Calculation  Start Time: 1335  Stop Time: 1358  Time Calculation (min): 23 min       Assessment:  OT Assessment: Referral received, chart reviewed, and evaluation complete.  Presents with weakness, decreased aerobic capacity, and impaired blance.  Would benefit from acute OT services.  Recommend moderate intensity upon discharge.  Prognosis: Good  Barriers to Discharge: Decreased caregiver support  Evaluation/Treatment Tolerance: Patient limited by fatigue  Medical Staff Made Aware: Yes  End of Session Communication: Bedside nurse  End of Session Patient Position: Bed, 3 rail up, Alarm off, not on at start of session  Prognosis: Good  Barriers to Discharge: Decreased caregiver support  Evaluation/Treatment Tolerance: Patient limited by fatigue  Medical Staff Made Aware: Yes  Plan:  Treatment Interventions: ADL retraining, Functional transfer training, Endurance training, Patient/family training, Neuromuscular reeducation, Compensatory technique education  OT Frequency: 3 times per week  OT Discharge Recommendations: Moderate intensity level of continued care  Equipment Recommended upon Discharge: Wheeled walker  OT Recommended Transfer Status: Minimal assist, Assist of 1 (with prosthetic and walker)  OT - OK to Discharge: Yes  Treatment Interventions: ADL retraining, Functional transfer training, Endurance training, Patient/family training, Neuromuscular reeducation, Compensatory technique education    Subjective   Current Problem:  1. Fall, initial encounter        2. Syncope and collapse  Transthoracic Echo (TTE) Complete    Transthoracic Echo (TTE) Complete      3. Lactic acidosis        4. COVID-19        5. Hyperlipidemia, unspecified hyperlipidemia type  Vascular US Carotid Artery Duplex Bilateral    Vascular US Carotid Artery Duplex Bilateral       6. Syncope, unspecified syncope type  Vascular US Carotid Artery Duplex Bilateral    Vascular US Carotid Artery Duplex Bilateral      7. NSTEMI (non-ST elevated myocardial infarction) (Multi)  Case Request Cath Lab: Left Heart Cath    Case Request Cath Lab: Left Heart Cath    Cardiac Catheterization Procedure    Cardiac Catheterization Procedure      8. Chronic coronary microvascular dysfunction  Referral to Home Care    amLODIPine (Norvasc) 5 mg tablet        General:   OT Received On: 12/09/24  General  Reason for Referral: decreased functional status  Referred By: iVnh Bernal MD  Past Medical History Relevant to Rehab: DM, MI, CAD/CABG, s/p PCI, CHF, PVD, s/p left BKA, anxiety, COPD, falls, IBS  Family/Caregiver Present: Yes  Caregiver Feedback: friend was present and approached therapist after session and explained that patient is not always truthful with his answers.  he states patient lives alone and frequently falls.  It is his opininion that patient is not safe to be home alone.  Falls frequently (patient's spouse passed away and he has step daughters that check on him not that often)  Prior to Session Communication: Bedside nurse  Patient Position Received: Bed, 3 rail up  General Comment: 79 year WM admitted from home with c/o syncopal episode.  He apparently passed out while on the toilet.   Precautions:  Hearing/Visual Limitations: Hearing WFL, no glasses  Medical Precautions: Fall precautions    Vital Sign (Past 2hrs)                 Pain:  Pain Assessment  Pain Assessment: 0-10  0-10 (Numeric) Pain Score: 1  Pain Type: Acute pain  Pain Location: Groin    Objective   Cognition:  Overall Cognitive Status: Impaired  Orientation Level: Oriented X4  Problem Solving: Exceptions to WFL  Safety/Judgement: Exceptions to WFL           Home Living:  Type of Home: House  Lives With: Alone  Home Layout: Multi-level  Home Access: Stairs to enter with rails  Entrance Stairs-Rails: Both  Entrance Stairs-Number of  Steps: 1+2  Bathroom Shower/Tub: Tub/shower unit  Bathroom Toilet: Standard  Bathroom Equipment: Grab bars in shower, Shower chair with back  Prior Function:  Level of Levy: Independent with ADLs and functional transfers, Needs assistance with homemaking  Receives Help From: Family  ADL Assistance: Independent  Homemaking Assistance: Needs assistance  Ambulatory Assistance: Independent  Vocational: Retired  Hand Dominance: Right  IADL History:     ADL:  Eating Assistance: Independent  Grooming Assistance: Minimal  Bathing Assistance: Moderate  UE Dressing Assistance: Minimal  LE Dressing Assistance: Moderate  Toileting Assistance with Device: Moderate  Activities of Daily Living: LE Dressing  LE Dressing: Yes  Sock Level of Assistance: Setup  Shoe Level of Assistance: Setup  LE Dressing Where Assessed: Edge of bed  LE Dressing Comments: right side don/doffed sock and shoe, on left side don/doffed prosthesis  Activity Tolerance:  Endurance: Decreased tolerance for upright activites  Functional Standing Tolerance:     Bed Mobility/Transfers: Bed Mobility  Bed Mobility: Yes  Bed Mobility 1  Bed Mobility 1: Supine to sitting  Level of Assistance 1: Minimum assistance  Bed Mobility 2  Bed Mobility  2: Sitting to supine  Level of Assistance 2: Minimum assistance         Sitting Balance:  Static Sitting Balance  Static Sitting-Balance Support: Feet supported  Static Sitting-Level of Assistance: Independent  Standing Balance:  Static Standing Balance  Static Standing-Balance Support: Bilateral upper extremity supported  Static Standing-Level of Assistance: Minimum assistance  Static Standing-Comment/Number of Minutes: donned prosthetic LE and used walker  Sensation:  Light Touch: No apparent deficits  Strength:  Strength Comments: BUe 3+/5  Coordination:  Movements are Fluid and Coordinated: No   Hand Function:  Hand Function  Gross Grasp: Functional  Coordination: Impaired        Outcome Measures: [facility] Daily  Activity  Putting on and taking off regular lower body clothing: A lot  Bathing (including washing, rinsing, drying): A lot  Putting on and taking off regular upper body clothing: A little  Toileting, which includes using toilet, bedpan or urinal: A lot  Taking care of personal grooming such as brushing teeth: A little  Eating Meals: None  Daily Activity - Total Score: 16        Goals:       Signed            Problem: Bathing  Goal: STG - Patient will bathe lower body independent with set up  Outcome: Progressing     Problem: Dressings Lower Extremities  Goal: STG - Patient to complete lower body dressing independent with set up(underwear/pants)  Outcome: Progressing     Problem: Dressing Upper Extremities  Goal: LTG - Patient will complete upper body dressing independent  Outcome: Progressing     Problem: Grooming  Goal: STG - Patient completes grooming Independent  Outcome: Progressing     Problem: Toileting  Goal: STG - Patient will complete toileting tasks with 2ww and close supervision  Outcome: Progressing     Problem: OT Transfers  Goal: LTG - Patient will perform functional transfers with close supervision and 2ww  Outcome: Progressing

## 2024-12-09 NOTE — NURSING NOTE
Dr. Amaya made aware that patient is refusing insulin, patient has family at bedside that is providing outside food. Education provided and patient does not want any education as he is 79 and made it this long

## 2024-12-09 NOTE — NURSING NOTE
Assumed care of patient at this time, patient is resting in bed with brake in place and call light in reach denies ant need, alarm in place.

## 2024-12-10 ENCOUNTER — PHARMACY VISIT (OUTPATIENT)
Dept: PHARMACY | Facility: CLINIC | Age: 79
End: 2024-12-10
Payer: COMMERCIAL

## 2024-12-10 ENCOUNTER — APPOINTMENT (OUTPATIENT)
Dept: PRIMARY CARE | Facility: CLINIC | Age: 79
End: 2024-12-10
Payer: MEDICARE

## 2024-12-10 VITALS
HEART RATE: 64 BPM | OXYGEN SATURATION: 100 % | WEIGHT: 150.13 LBS | DIASTOLIC BLOOD PRESSURE: 51 MMHG | BODY MASS INDEX: 22.75 KG/M2 | SYSTOLIC BLOOD PRESSURE: 116 MMHG | RESPIRATION RATE: 16 BRPM | TEMPERATURE: 98.6 F | HEIGHT: 68 IN

## 2024-12-10 LAB
ANION GAP SERPL CALCULATED.3IONS-SCNC: 10 MMOL/L (ref 10–20)
BUN SERPL-MCNC: 29 MG/DL (ref 6–23)
CALCIUM SERPL-MCNC: 8.1 MG/DL (ref 8.6–10.3)
CHLORIDE SERPL-SCNC: 104 MMOL/L (ref 98–107)
CO2 SERPL-SCNC: 26 MMOL/L (ref 21–32)
CREAT SERPL-MCNC: 1.37 MG/DL (ref 0.5–1.3)
EGFRCR SERPLBLD CKD-EPI 2021: 52 ML/MIN/1.73M*2
ERYTHROCYTE [DISTWIDTH] IN BLOOD BY AUTOMATED COUNT: 15 % (ref 11.5–14.5)
GLUCOSE BLD MANUAL STRIP-MCNC: 150 MG/DL (ref 74–99)
GLUCOSE SERPL-MCNC: 152 MG/DL (ref 74–99)
HCT VFR BLD AUTO: 27.9 % (ref 41–52)
HGB BLD-MCNC: 9.5 G/DL (ref 13.5–17.5)
MCH RBC QN AUTO: 28.8 PG (ref 26–34)
MCHC RBC AUTO-ENTMCNC: 34.1 G/DL (ref 32–36)
MCV RBC AUTO: 85 FL (ref 80–100)
NRBC BLD-RTO: 0 /100 WBCS (ref 0–0)
PLATELET # BLD AUTO: 115 X10*3/UL (ref 150–450)
POTASSIUM SERPL-SCNC: 3.8 MMOL/L (ref 3.5–5.3)
RBC # BLD AUTO: 3.3 X10*6/UL (ref 4.5–5.9)
SODIUM SERPL-SCNC: 136 MMOL/L (ref 136–145)
WBC # BLD AUTO: 5.1 X10*3/UL (ref 4.4–11.3)

## 2024-12-10 PROCEDURE — 2500000002 HC RX 250 W HCPCS SELF ADMINISTERED DRUGS (ALT 637 FOR MEDICARE OP, ALT 636 FOR OP/ED): Performed by: NURSE PRACTITIONER

## 2024-12-10 PROCEDURE — 2500000001 HC RX 250 WO HCPCS SELF ADMINISTERED DRUGS (ALT 637 FOR MEDICARE OP): Performed by: INTERNAL MEDICINE

## 2024-12-10 PROCEDURE — 82947 ASSAY GLUCOSE BLOOD QUANT: CPT

## 2024-12-10 PROCEDURE — 99239 HOSP IP/OBS DSCHRG MGMT >30: CPT | Performed by: INTERNAL MEDICINE

## 2024-12-10 PROCEDURE — 2500000001 HC RX 250 WO HCPCS SELF ADMINISTERED DRUGS (ALT 637 FOR MEDICARE OP): Performed by: NURSE PRACTITIONER

## 2024-12-10 PROCEDURE — 85027 COMPLETE CBC AUTOMATED: CPT | Performed by: INTERNAL MEDICINE

## 2024-12-10 PROCEDURE — 36415 COLL VENOUS BLD VENIPUNCTURE: CPT | Performed by: INTERNAL MEDICINE

## 2024-12-10 PROCEDURE — 80048 BASIC METABOLIC PNL TOTAL CA: CPT | Performed by: INTERNAL MEDICINE

## 2024-12-10 PROCEDURE — RXMED WILLOW AMBULATORY MEDICATION CHARGE

## 2024-12-10 RX ORDER — INSULIN LISPRO 100 [IU]/ML
10 INJECTION, SOLUTION INTRAVENOUS; SUBCUTANEOUS DAILY
Start: 2024-12-10 | End: 2025-02-02 | Stop reason: HOSPADM

## 2024-12-10 RX ORDER — AMLODIPINE BESYLATE 5 MG/1
5 TABLET ORAL DAILY
Qty: 30 TABLET | Refills: 0 | Status: SHIPPED | OUTPATIENT
Start: 2024-12-10 | End: 2025-01-25 | Stop reason: HOSPADM

## 2024-12-10 RX ORDER — HYDROXYZINE PAMOATE 25 MG/1
25 CAPSULE ORAL 3 TIMES DAILY PRN
Qty: 30 CAPSULE | Refills: 0 | Status: SHIPPED | OUTPATIENT
Start: 2024-12-10 | End: 2025-02-02 | Stop reason: HOSPADM

## 2024-12-10 RX ADMIN — AMLODIPINE BESYLATE 5 MG: 5 TABLET ORAL at 08:57

## 2024-12-10 RX ADMIN — ASPIRIN 81 MG: 81 TABLET, COATED ORAL at 08:57

## 2024-12-10 RX ADMIN — PRASUGREL 10 MG: 10 TABLET, FILM COATED ORAL at 08:57

## 2024-12-10 RX ADMIN — METOPROLOL SUCCINATE 25 MG: 25 TABLET, EXTENDED RELEASE ORAL at 08:57

## 2024-12-10 RX ADMIN — BUSPIRONE HYDROCHLORIDE 7.5 MG: 5 TABLET ORAL at 08:57

## 2024-12-10 RX ADMIN — SUCRALFATE ORAL SUSPENSION 1 G: 1 SUSPENSION ORAL at 11:17

## 2024-12-10 RX ADMIN — LEVOTHYROXINE SODIUM 50 MCG: 0.05 TABLET ORAL at 06:22

## 2024-12-10 ASSESSMENT — COGNITIVE AND FUNCTIONAL STATUS - GENERAL
PERSONAL GROOMING: A LITTLE
TURNING FROM BACK TO SIDE WHILE IN FLAT BAD: A LITTLE
MOVING TO AND FROM BED TO CHAIR: A LOT
MOBILITY SCORE: 14
TOILETING: A LITTLE
DAILY ACTIVITIY SCORE: 18
EATING MEALS: A LITTLE
DRESSING REGULAR LOWER BODY CLOTHING: A LITTLE
HELP NEEDED FOR BATHING: A LITTLE
STANDING UP FROM CHAIR USING ARMS: A LOT
WALKING IN HOSPITAL ROOM: A LOT
CLIMB 3 TO 5 STEPS WITH RAILING: A LOT
DRESSING REGULAR UPPER BODY CLOTHING: A LITTLE
MOVING FROM LYING ON BACK TO SITTING ON SIDE OF FLAT BED WITH BEDRAILS: A LITTLE

## 2024-12-10 ASSESSMENT — PAIN SCALES - GENERAL: PAINLEVEL_OUTOF10: 0 - NO PAIN

## 2024-12-10 NOTE — NURSING NOTE
Assumed care of pt. Pt resting in bed, NAD. Bed low and locked. Bed alarm on for safety. Call light within reach. Covid isolation precautions in place. RA. BSSR completed

## 2024-12-10 NOTE — NURSING NOTE
Pt packed up. IV discontinued. Pure wick taken off. Pt dressed and prosthetic placed on pt's L stump. Family member bedside. Pt asked RN to go over discharge instructions with family member. Family member has no further questions. Aware that a script was sent home with family a couple of days ago.

## 2024-12-10 NOTE — PROGRESS NOTES
12/10/24 1159   Discharge Planning   Expected Discharge Disposition Home     Patient declining HHC, family states they will try to convince patient and if he agrees they will get ordered by patients PCP     **Patient has a  safe discharge plan**

## 2024-12-10 NOTE — PROGRESS NOTES
Chavez Llamas is a 79 y.o. male on day 5 of admission presenting with Syncope and collapse.      Subjective  Patient lying in bed in no distress  Stable on room air  Eager for discharge home today  Tmax 37.5    Objective        Last Recorded Vitals      12/9/2024    12:00 AM 12/9/2024     2:45 AM 12/9/2024     4:54 AM 12/9/2024     8:43 AM 12/9/2024     5:11 PM 12/10/2024    12:25 AM 12/10/2024     7:00 AM   Vitals   Systolic 119   104 117 118 116   Diastolic 46   46 57 54 51   BP Location Left arm   Left arm Left arm Left arm Left arm   Heart Rate 70   69 66 66 64   Temp 37.9 °C (100.2 °F) 37.2 °C (99 °F) 36.8 °C (98.3 °F) 36.2 °C (97.2 °F) 36.9 °C (98.4 °F) 37.5 °C (99.5 °F) 37 °C (98.6 °F)   Resp 18   17 18 16 16       Imaging  No results found.      Relevant Results  Results for orders placed or performed during the hospital encounter of 12/04/24 (from the past 24 hours)   POCT GLUCOSE   Result Value Ref Range    POCT Glucose 160 (H) 74 - 99 mg/dL   POCT GLUCOSE   Result Value Ref Range    POCT Glucose 178 (H) 74 - 99 mg/dL   CBC   Result Value Ref Range    WBC 5.1 4.4 - 11.3 x10*3/uL    nRBC 0.0 0.0 - 0.0 /100 WBCs    RBC 3.30 (L) 4.50 - 5.90 x10*6/uL    Hemoglobin 9.5 (L) 13.5 - 17.5 g/dL    Hematocrit 27.9 (L) 41.0 - 52.0 %    MCV 85 80 - 100 fL    MCH 28.8 26.0 - 34.0 pg    MCHC 34.1 32.0 - 36.0 g/dL    RDW 15.0 (H) 11.5 - 14.5 %    Platelets 115 (L) 150 - 450 x10*3/uL   Basic metabolic panel   Result Value Ref Range    Glucose 152 (H) 74 - 99 mg/dL    Sodium 136 136 - 145 mmol/L    Potassium 3.8 3.5 - 5.3 mmol/L    Chloride 104 98 - 107 mmol/L    Bicarbonate 26 21 - 32 mmol/L    Anion Gap 10 10 - 20 mmol/L    Urea Nitrogen 29 (H) 6 - 23 mg/dL    Creatinine 1.37 (H) 0.50 - 1.30 mg/dL    eGFR 52 (L) >60 mL/min/1.73m*2    Calcium 8.1 (L) 8.6 - 10.3 mg/dL   POCT GLUCOSE   Result Value Ref Range    POCT Glucose 150 (H) 74 - 99 mg/dL     Constitutional:  Alert and oriented to person, place, date/time in no  acute distress.  HEENT:  Atraumatic, normocephalic. PERRL. EOMI.  Nares patent.  Mucous membranes moist.  .   Neck:  Trachea midline.  Respiratory:  Clear to auscultation.  Cardiac:  Regular rate and rhythm.  No murmurs.  Cardiovascular:  No edema of the extremities.  Pulse exam: Radial and femoral pulses palpable bilateral. Pedal pulses:  Abdominal:  Soft, nontender, nondistended, bowel sounds present.  Musculoskeletal:  Moves extremities freely.  Dermatological: Clean and dry   Neurological: Alert and oriented to person, place, date/time  Psych:  Calm, cooperative    No results found for the last 90 days.    This patient does not have an active medication from one of the medication groupers.    Assessment/Plan  Syncope, vasovagal versus related to coronavirus infection, resolved  Coronary artery disease s/p cardiac cath  Coronavirus infection-normal oxygenation, patient high risk for progression     Monitor oxygenation  Remdesivir declined 12/5  Cardiology follow-up  Monitor temperature and WBC  Droplet plus precautions  Discharge planning    Total time spent caring for the patient today was 15 minutes.  This includes time spent before the visit reviewing the chart, time spent during the visit, and time spent after the visit on documentation.

## 2024-12-10 NOTE — CARE PLAN
The patient's goals for the shift include      The clinical goals for the shift include discharge    Over the shift, the patient did not make progress toward the following goals. Barriers to progression include n/a. Recommendations to address these barriers include n/a.

## 2024-12-10 NOTE — NURSING NOTE
Pt refused to work with PT. Assisted pt with ordering breakfast. Pt denies any other needs/concerns. Pt will not take his protonix or insulin that are ordered

## 2024-12-10 NOTE — CARE PLAN
The patient's goals for the shift include  rest  The clinical goals for the shift include safety

## 2024-12-10 NOTE — DISCHARGE SUMMARY
Discharge Diagnosis  Syncope and collapse    Issues Requiring Follow-Up  Coronary artery disease  NSTEMI  Syncope and collapse  COVID 19 infection  Anxiety      Discharge Meds     Medication List      START taking these medications     amLODIPine 5 mg tablet; Commonly known as: Norvasc; Take 1 tablet (5 mg)   by mouth once daily.     CHANGE how you take these medications     hydrOXYzine pamoate 25 mg capsule; Commonly known as: Vistaril; Take 1   capsule (25 mg) by mouth 3 times a day as needed for anxiety.; What   changed: how much to take, how to take this, when to take this, reasons to   take this   insulin lispro 100 unit/mL injection; Commonly known as: HumaLOG; Inject   10 Units under the skin early in the morning.. Inject 10 units   subcutaneous daily.  Please check blood glucose level before injection.;   What changed: how much to take, how to take this, when to take this,   additional instructions     CONTINUE taking these medications     aspirin 81 mg EC tablet; Take 1 tablet (81 mg) by mouth once daily.   atorvastatin 40 mg tablet; Commonly known as: Lipitor; Take 1 tablet (40   mg) by mouth once daily at bedtime.   busPIRone 15 mg tablet; Commonly known as: Buspar; TAKE 1/2 (ONE-HALF)   TABLET BY MOUTH IN THE MORNING AND AT BEDTIME   dicyclomine 10 mg capsule; Commonly known as: Bentyl; TAKE 1 CAPSULE BY   MOUTH TWICE DAILY AS NEEDED FOR  IBS   lancets 33 gauge misc; Commonly known as: BD Ultra Fine Lancets; Testing   T.I.D   Lantus Solostar U-100 Insulin 100 unit/mL (3 mL) pen; Generic drug:   insulin glargine; INJECT 20 UNITS SUBCUTANEOUSLY TWICE DAILY.   levothyroxine 50 mcg tablet; Commonly known as: Synthroid, Levoxyl; TAKE   1 TABLET BY MOUTH ONCE DAILY IN THE MORNING BEFORE MEAL(S) ON AN EMPTY   STOMACH   metoprolol succinate XL 25 mg 24 hr tablet; Commonly known as:   Toprol-XL; Take 1 tablet (25 mg) by mouth once daily.   mirtazapine 15 mg tablet; Commonly known as: Remeron; TAKE 1 TABLET BY  "  MOUTH ONCE DAILY AT BEDTIME   nitroglycerin 0.4 mg/hr patch; Commonly known as: Nitrodur   pen needle, diabetic 31 gauge x 3/16\" needle; Use as directed   prasugrel 10 mg tablet; Commonly known as: Effient; Take 1 tablet (10   mg) by mouth once daily.     STOP taking these medications     hydrOXYzine HCL 25 mg tablet; Commonly known as: Atarax   spironolactone 25 mg tablet; Commonly known as: Aldactone       Test Results Pending At Discharge  Pending Labs       No current pending labs.            Hospital Course     Patient is an 79 years old male with past medical history of coronary artery disease, hypertension, anxiety .  Patient presented to [facility] ER complaining of chest pain.  Patient was found to have acute non-ST elevation myocardial infarction.  Patient presented with syncope.  CAT scan of the brain did not reveal acute intracranial findings.  He was evaluated by cardiology patient was evaluated by Dr. Nuno from cardiology services.  Patient was taken to cardiac cath.  He was found to have lesion in RCA patient has previous stenting RCA.  Patient is on dual antiplatelet therapy.  I advised the patient regarding risk and benefits being on medication.  Patient is on aspirin, prasugrel atorvastatin.  He was found to have COVID-19 infection.  Patient was not hypoxic.  He was evaluated by Dr. Mandy Sánchez from ID services.  After few days in hospital patient was feeling quite more comfortable.  patient has been chest pain-free.  Initially there was plan to discharge patient to skilled nursing facility.  Discussed with care coordination on the case her.  Patient was advised to get discharged home with home health care.  He declined home health care  Discussed with cardiology on the case.  Patient is clinically hemodynamically to get discharged today.    Discussed with his daughter Jelena on the phone regarding new medication and few other changes of his medication  Decrease in the dose of lispro.  " Discontinue Nitropatch and spironolactone   patient will follow-up with Dr. Nuno in 2 weeks    Pertinent Physical Exam At Time of Discharge  Physical Exam  General: In non acute distress, cooperating during physical exam.  HEENT: Pupils are equal and reactive to light and commendation , oral mucosa moist, no JVD oral mucosa is moist.  Cardiovascular: Normal sinus rhythm, no MRG.  Lungs: Clear to auscultation bilaterally, no wheezing, no crackles, no dullness to percussion.  Abdomen: No hepatosplenomegaly appreciated, soft , not tender, positive bowel sounds, positive bowel movement.  Neuro: Alert and oriented x3, strength in upper and lower extremities , sensation intact.  Midline thoracic scar from previous surgery   musculoskeletal: No swelling in lower extremities, no limitation in range of motion.  Vascular: Pulses are intact in upper and lower extremities  Skin: No petechiae, ecchymosis or other stigmata for dermatology disease.   Outpatient Follow-Up  Future Appointments   Date Time Provider Department Center   12/11/2024  8:00 AM Meryl Houston, PharmD [id number]     Follow-up  with Dr. Nuno in 3 weeks  Follow-up with PCP in 1 week    Time spent discharging the patient 39 minutes.    Marcos Amaya MD

## 2024-12-11 ENCOUNTER — APPOINTMENT (OUTPATIENT)
Dept: PHARMACY | Facility: HOSPITAL | Age: 79
End: 2024-12-11
Payer: MEDICARE

## 2024-12-12 ENCOUNTER — TELEPHONE (OUTPATIENT)
Dept: HOME HEALTH SERVICES | Facility: HOME HEALTH | Age: 79
End: 2024-12-12
Payer: MEDICARE

## 2024-12-12 ENCOUNTER — PATIENT OUTREACH (OUTPATIENT)
Dept: PRIMARY CARE | Facility: CLINIC | Age: 79
End: 2024-12-12
Payer: MEDICARE

## 2024-12-12 NOTE — PROGRESS NOTES
"Discharge Facility: [facility]  Discharge Diagnosis:  syncope and collapse  Admission Date:  12/4/24  Discharge Date:   12/10/24    PCP Appointment Date:  TBD - Pt reports his daughter will need to schedule  Specialist Appointment Date:  D  Hospital Encounter and Summary Linked: Yes  See discharge assessment below for further details     Engagement  Call Start Time: 1125 (12/12/2024 11:31 AM)    Medications  Medications reviewed with patient/caregiver?: Yes (12/12/2024 11:31 AM)  Is the patient having any side effects they believe may be caused by any medication additions or changes?: No (12/12/2024 11:31 AM)  Does the patient have all medications ordered at discharge?: Yes (12/12/2024 11:31 AM)  Care Management Interventions: No intervention needed (12/12/2024 11:31 AM)  Prescription Comments: START taking these medications    o amLODIPine 5 mg tablet; Commonly known as: Norvasc; Take 1 tablet (5 mg)   by mouth once daily.     CHANGE how you take these medications    o hydrOXYzine pamoate 25 mg capsule; Commonly known as: Vistaril; Take 1   capsule (25 mg) by mouth 3 times a day as needed for anxiety.; What   changed: how much to take, how to take this, when to take this, reasons to   take this  o insulin lispro 100 unit/mL injection; Commonly known as: HumaLOG; Inject   10 Units under the skin early in the morning.. Inject 10 units   subcutaneous daily.  Please check blood glucose level before injection.;   What changed: how much to take, how to take this, when to take this,   additional instructions    STOP taking these medications    o hydrOXYzine HCL 25 mg tablet; Commonly known as: Atarax  o spironolactone 25 mg tablet; Commonly known as: Aldactone (12/12/2024 11:31 AM)  Is the patient taking all medications as directed (includes completed medication regime)?: Yes (12/12/2024 11:31 AM)  Medication Comments: Pt states his daughter takes care of his medications and that \"all of that is fine\". (12/12/2024 " 11:31 AM)    Appointments  Does the patient have a primary care provider?: Yes (12/12/2024 11:31 AM)  Care Management Interventions: Advised patient to make appointment (12/12/2024 11:31 AM)  Has the patient kept scheduled appointments due by today?: Not applicable (12/12/2024 11:31 AM)    Self Management  What is the home health agency?: Patient declined HH (12/12/2024 11:31 AM)  Has home health visited the patient within 72 hours of discharge?: Not applicable (12/12/2024 11:31 AM)  What Durable Medical Equipment (DME) was ordered?: n/a (12/12/2024 11:31 AM)    Wrap Up  Wrap Up Additional Comments: Successful transition of care outreach with patient. Patient relayed that he had a bad experience in the hospital and does not want to receive any further calls. I let him know that I would pass this information on to Dr. D'Amico and explained calling on behalf of his PCP. Patient then more open to speaking with me and was able to confirm he has received new Rxs and is taking his medications as prescribed.  Patient agreed to hospital follow up with Dr. D'Amico, but states his daughter will need to schedule it because she handles his appts.  He will have her call the office for an appt. Patient denies further discharge questions/concerns/needs at time of outreach call.  Encouraged him to call if he has any questions or concerns. (12/12/2024 11:31 AM)

## 2024-12-13 ENCOUNTER — DOCUMENTATION (OUTPATIENT)
Dept: CARDIAC REHAB | Facility: HOSPITAL | Age: 79
End: 2024-12-13
Payer: MEDICARE

## 2024-12-13 NOTE — TELEPHONE ENCOUNTER
Good evening Dr. D'Amico  - I wanted to inform you that your patient has declined homecare services at this time. If patient changes their mind and would like homecare, a new referral can be sent in at that time. Please let me know if there are any questions or concerns. Thank you!

## 2025-01-01 ENCOUNTER — APPOINTMENT (OUTPATIENT)
Dept: RADIOLOGY | Facility: HOSPITAL | Age: 80
DRG: 069 | End: 2025-01-01
Payer: MEDICARE

## 2025-01-01 ENCOUNTER — APPOINTMENT (OUTPATIENT)
Dept: CARDIOLOGY | Facility: HOSPITAL | Age: 80
DRG: 069 | End: 2025-01-01
Payer: MEDICARE

## 2025-01-01 ENCOUNTER — HOSPITAL ENCOUNTER (INPATIENT)
Facility: HOSPITAL | Age: 80
LOS: 5 days | End: 2025-02-02
Attending: EMERGENCY MEDICINE | Admitting: INTERNAL MEDICINE
Payer: MEDICARE

## 2025-01-01 ENCOUNTER — APPOINTMENT (OUTPATIENT)
Dept: CARDIOLOGY | Facility: HOSPITAL | Age: 80
End: 2025-01-01
Payer: MEDICARE

## 2025-01-01 ENCOUNTER — HOSPITAL ENCOUNTER (OUTPATIENT)
Dept: CARDIOLOGY | Facility: HOSPITAL | Age: 80
Discharge: HOME | DRG: 069 | End: 2025-01-31
Payer: MEDICARE

## 2025-01-01 VITALS
SYSTOLIC BLOOD PRESSURE: 118 MMHG | HEART RATE: 60 BPM | OXYGEN SATURATION: 80 % | WEIGHT: 141.76 LBS | DIASTOLIC BLOOD PRESSURE: 68 MMHG | HEIGHT: 68 IN | TEMPERATURE: 97.5 F | BODY MASS INDEX: 21.48 KG/M2 | RESPIRATION RATE: 22 BRPM

## 2025-01-01 DIAGNOSIS — R79.89 ELEVATED TROPONIN: ICD-10-CM

## 2025-01-01 DIAGNOSIS — I65.23 BILATERAL CAROTID ARTERY STENOSIS: ICD-10-CM

## 2025-01-01 DIAGNOSIS — R42 DIZZINESS: ICD-10-CM

## 2025-01-01 DIAGNOSIS — I63.9 CEREBROVASCULAR ACCIDENT (CVA), UNSPECIFIED MECHANISM (MULTI): Primary | ICD-10-CM

## 2025-01-01 LAB
ALBUMIN SERPL BCP-MCNC: 3.3 G/DL (ref 3.4–5)
ALBUMIN SERPL BCP-MCNC: 3.4 G/DL (ref 3.4–5)
ALBUMIN SERPL BCP-MCNC: 3.5 G/DL (ref 3.4–5)
ALBUMIN SERPL BCP-MCNC: 3.6 G/DL (ref 3.4–5)
ALBUMIN SERPL BCP-MCNC: 3.8 G/DL (ref 3.4–5)
ALP SERPL-CCNC: 60 U/L (ref 33–136)
ALP SERPL-CCNC: 62 U/L (ref 33–136)
ALT SERPL W P-5'-P-CCNC: 15 U/L (ref 10–52)
ALT SERPL W P-5'-P-CCNC: 7 U/L (ref 10–52)
ANION GAP BLDA CALCULATED.4IONS-SCNC: 20 MMO/L (ref 10–25)
ANION GAP SERPL CALCULATED.3IONS-SCNC: 11 MMOL/L (ref 10–20)
ANION GAP SERPL CALCULATED.3IONS-SCNC: 12 MMOL/L (ref 10–20)
ANION GAP SERPL CALCULATED.3IONS-SCNC: 13 MMOL/L (ref 10–20)
ANION GAP SERPL CALCULATED.3IONS-SCNC: 14 MMOL/L (ref 10–20)
ANION GAP SERPL CALCULATED.3IONS-SCNC: 14 MMOL/L (ref 10–20)
APPARATUS: ABNORMAL
APTT PPP: 32.5 SECONDS (ref 22–32.5)
APTT PPP: 34.2 SECONDS (ref 22–32.5)
ARTERIAL PATENCY WRIST A: POSITIVE
AST SERPL W P-5'-P-CCNC: 11 U/L (ref 9–39)
AST SERPL W P-5'-P-CCNC: 17 U/L (ref 9–39)
BASE EXCESS BLDA CALC-SCNC: -10.6 MMOL/L (ref -2–3)
BASOPHILS # BLD AUTO: 0.02 X10*3/UL (ref 0–0.1)
BASOPHILS # BLD AUTO: 0.03 X10*3/UL (ref 0–0.1)
BASOPHILS # BLD AUTO: 0.03 X10*3/UL (ref 0–0.1)
BASOPHILS # BLD AUTO: 0.04 X10*3/UL (ref 0–0.1)
BASOPHILS NFR BLD AUTO: 0.3 %
BASOPHILS NFR BLD AUTO: 0.3 %
BASOPHILS NFR BLD AUTO: 0.5 %
BASOPHILS NFR BLD AUTO: 0.5 %
BILIRUB SERPL-MCNC: 1.2 MG/DL (ref 0–1.2)
BILIRUB SERPL-MCNC: 2 MG/DL (ref 0–1.2)
BNP SERPL-MCNC: 1961 PG/ML (ref 0–99)
BODY TEMPERATURE: 37 DEGREES CELSIUS
BUN SERPL-MCNC: 22 MG/DL (ref 6–23)
BUN SERPL-MCNC: 24 MG/DL (ref 6–23)
BUN SERPL-MCNC: 30 MG/DL (ref 6–23)
BUN SERPL-MCNC: 31 MG/DL (ref 6–23)
BUN SERPL-MCNC: 36 MG/DL (ref 6–23)
CA-I BLDA-SCNC: 1.18 MMOL/L (ref 1.1–1.33)
CALCIUM SERPL-MCNC: 8.2 MG/DL (ref 8.6–10.3)
CALCIUM SERPL-MCNC: 8.3 MG/DL (ref 8.6–10.3)
CALCIUM SERPL-MCNC: 8.4 MG/DL (ref 8.6–10.3)
CALCIUM SERPL-MCNC: 8.7 MG/DL (ref 8.6–10.3)
CALCIUM SERPL-MCNC: 8.7 MG/DL (ref 8.6–10.3)
CARDIAC TROPONIN I PNL SERPL HS: 460 NG/L (ref 0–20)
CARDIAC TROPONIN I PNL SERPL HS: 465 NG/L (ref 0–20)
CARDIAC TROPONIN I PNL SERPL HS: 615 NG/L (ref 0–20)
CARDIAC TROPONIN I PNL SERPL HS: 661 NG/L (ref 0–20)
CARDIAC TROPONIN I PNL SERPL HS: 767 NG/L (ref 0–20)
CARDIAC TROPONIN I PNL SERPL HS: 842 NG/L (ref 0–20)
CHLORIDE BLDA-SCNC: 101 MMOL/L (ref 98–107)
CHLORIDE SERPL-SCNC: 100 MMOL/L (ref 98–107)
CHLORIDE SERPL-SCNC: 100 MMOL/L (ref 98–107)
CHLORIDE SERPL-SCNC: 101 MMOL/L (ref 98–107)
CHLORIDE SERPL-SCNC: 101 MMOL/L (ref 98–107)
CHLORIDE SERPL-SCNC: 104 MMOL/L (ref 98–107)
CHOLEST SERPL-MCNC: 71 MG/DL (ref 0–199)
CHOLEST/HDLC SERPL: 3 {RATIO}
CO2 SERPL-SCNC: 24 MMOL/L (ref 21–32)
CO2 SERPL-SCNC: 25 MMOL/L (ref 21–32)
CO2 SERPL-SCNC: 26 MMOL/L (ref 21–32)
CREAT SERPL-MCNC: 1.32 MG/DL (ref 0.5–1.3)
CREAT SERPL-MCNC: 1.64 MG/DL (ref 0.5–1.3)
CREAT SERPL-MCNC: 1.81 MG/DL (ref 0.5–1.3)
CREAT SERPL-MCNC: 1.96 MG/DL (ref 0.5–1.3)
CREAT SERPL-MCNC: 2.05 MG/DL (ref 0.5–1.3)
CRITICAL CALL TIME: 1402
CRITICAL CALLED BY: ABNORMAL
CRITICAL CALLED TO: ABNORMAL
CRITICAL NOTE: ABNORMAL
CRITICAL READ BACK: ABNORMAL
EGFRCR SERPLBLD CKD-EPI 2021: 32 ML/MIN/1.73M*2
EGFRCR SERPLBLD CKD-EPI 2021: 34 ML/MIN/1.73M*2
EGFRCR SERPLBLD CKD-EPI 2021: 37 ML/MIN/1.73M*2
EGFRCR SERPLBLD CKD-EPI 2021: 42 ML/MIN/1.73M*2
EGFRCR SERPLBLD CKD-EPI 2021: 55 ML/MIN/1.73M*2
EOSINOPHIL # BLD AUTO: 0.02 X10*3/UL (ref 0–0.4)
EOSINOPHIL # BLD AUTO: 0.05 X10*3/UL (ref 0–0.4)
EOSINOPHIL # BLD AUTO: 0.09 X10*3/UL (ref 0–0.4)
EOSINOPHIL # BLD AUTO: 0.16 X10*3/UL (ref 0–0.4)
EOSINOPHIL NFR BLD AUTO: 0.2 %
EOSINOPHIL NFR BLD AUTO: 0.7 %
EOSINOPHIL NFR BLD AUTO: 1.1 %
EOSINOPHIL NFR BLD AUTO: 2.6 %
ERYTHROCYTE [DISTWIDTH] IN BLOOD BY AUTOMATED COUNT: 16.8 % (ref 11.5–14.5)
ERYTHROCYTE [DISTWIDTH] IN BLOOD BY AUTOMATED COUNT: 16.8 % (ref 11.5–14.5)
ERYTHROCYTE [DISTWIDTH] IN BLOOD BY AUTOMATED COUNT: 17 % (ref 11.5–14.5)
ERYTHROCYTE [DISTWIDTH] IN BLOOD BY AUTOMATED COUNT: 17 % (ref 11.5–14.5)
ERYTHROCYTE [DISTWIDTH] IN BLOOD BY AUTOMATED COUNT: 17.1 % (ref 11.5–14.5)
EST. AVERAGE GLUCOSE BLD GHB EST-MCNC: 88 MG/DL
FLUAV RNA RESP QL NAA+PROBE: NOT DETECTED
FLUBV RNA RESP QL NAA+PROBE: NOT DETECTED
GLUCOSE BLD MANUAL STRIP-MCNC: 120 MG/DL (ref 74–99)
GLUCOSE BLD MANUAL STRIP-MCNC: 134 MG/DL (ref 74–99)
GLUCOSE BLD MANUAL STRIP-MCNC: 135 MG/DL (ref 74–99)
GLUCOSE BLD MANUAL STRIP-MCNC: 141 MG/DL (ref 74–99)
GLUCOSE BLD MANUAL STRIP-MCNC: 160 MG/DL (ref 74–99)
GLUCOSE BLD MANUAL STRIP-MCNC: 162 MG/DL (ref 74–99)
GLUCOSE BLD MANUAL STRIP-MCNC: 169 MG/DL (ref 74–99)
GLUCOSE BLD MANUAL STRIP-MCNC: 179 MG/DL (ref 74–99)
GLUCOSE BLD MANUAL STRIP-MCNC: 199 MG/DL (ref 74–99)
GLUCOSE BLD MANUAL STRIP-MCNC: 201 MG/DL (ref 74–99)
GLUCOSE BLD MANUAL STRIP-MCNC: 205 MG/DL (ref 74–99)
GLUCOSE BLD MANUAL STRIP-MCNC: 210 MG/DL (ref 74–99)
GLUCOSE BLD MANUAL STRIP-MCNC: 228 MG/DL (ref 74–99)
GLUCOSE BLD MANUAL STRIP-MCNC: 241 MG/DL (ref 74–99)
GLUCOSE BLD MANUAL STRIP-MCNC: 249 MG/DL (ref 74–99)
GLUCOSE BLDA-MCNC: 369 MG/DL (ref 74–99)
GLUCOSE SERPL-MCNC: 117 MG/DL (ref 74–99)
GLUCOSE SERPL-MCNC: 140 MG/DL (ref 74–99)
GLUCOSE SERPL-MCNC: 219 MG/DL (ref 74–99)
GLUCOSE SERPL-MCNC: 226 MG/DL (ref 74–99)
GLUCOSE SERPL-MCNC: 89 MG/DL (ref 74–99)
HBA1C MFR BLD: 4.7 %
HCO3 BLDA-SCNC: 13.3 MMOL/L (ref 22–26)
HCT VFR BLD AUTO: 26.3 % (ref 41–52)
HCT VFR BLD AUTO: 26.3 % (ref 41–52)
HCT VFR BLD AUTO: 27.1 % (ref 41–52)
HCT VFR BLD AUTO: 27.2 % (ref 41–52)
HCT VFR BLD AUTO: 28.2 % (ref 41–52)
HCT VFR BLD EST: 26 % (ref 41–52)
HDLC SERPL-MCNC: 23.9 MG/DL
HGB BLD-MCNC: 8.3 G/DL (ref 13.5–17.5)
HGB BLD-MCNC: 8.6 G/DL (ref 13.5–17.5)
HGB BLD-MCNC: 8.7 G/DL (ref 13.5–17.5)
HGB BLD-MCNC: 8.7 G/DL (ref 13.5–17.5)
HGB BLD-MCNC: 9.1 G/DL (ref 13.5–17.5)
HGB BLDA-MCNC: 8.8 G/DL (ref 13.5–17.5)
IMM GRANULOCYTES # BLD AUTO: 0.03 X10*3/UL (ref 0–0.5)
IMM GRANULOCYTES # BLD AUTO: 0.06 X10*3/UL (ref 0–0.5)
IMM GRANULOCYTES # BLD AUTO: 0.07 X10*3/UL (ref 0–0.5)
IMM GRANULOCYTES # BLD AUTO: 0.07 X10*3/UL (ref 0–0.5)
IMM GRANULOCYTES NFR BLD AUTO: 0.5 % (ref 0–0.9)
IMM GRANULOCYTES NFR BLD AUTO: 0.7 % (ref 0–0.9)
IMM GRANULOCYTES NFR BLD AUTO: 0.8 % (ref 0–0.9)
IMM GRANULOCYTES NFR BLD AUTO: 0.8 % (ref 0–0.9)
INHALED O2 CONCENTRATION: 100 %
INR PPP: 1.1 (ref 0.9–1.2)
INR PPP: 1.2 (ref 0.9–1.2)
LACTATE BLDA-SCNC: 8.8 MMOL/L (ref 0.4–2)
LACTATE SERPL-SCNC: 2.1 MMOL/L (ref 0.4–2)
LACTATE SERPL-SCNC: 2.1 MMOL/L (ref 0.4–2)
LDLC SERPL CALC-MCNC: 32 MG/DL
LYMPHOCYTES # BLD AUTO: 0.22 X10*3/UL (ref 0.8–3)
LYMPHOCYTES # BLD AUTO: 0.27 X10*3/UL (ref 0.8–3)
LYMPHOCYTES # BLD AUTO: 0.48 X10*3/UL (ref 0.8–3)
LYMPHOCYTES # BLD AUTO: 0.53 X10*3/UL (ref 0.8–3)
LYMPHOCYTES NFR BLD AUTO: 2.7 %
LYMPHOCYTES NFR BLD AUTO: 2.9 %
LYMPHOCYTES NFR BLD AUTO: 6.3 %
LYMPHOCYTES NFR BLD AUTO: 7.9 %
MAGNESIUM SERPL-MCNC: 1.95 MG/DL (ref 1.6–2.4)
MAGNESIUM SERPL-MCNC: 2.17 MG/DL (ref 1.6–2.4)
MAGNESIUM SERPL-MCNC: 2.22 MG/DL (ref 1.6–2.4)
MAGNESIUM SERPL-MCNC: 2.24 MG/DL (ref 1.6–2.4)
MCH RBC QN AUTO: 29.1 PG (ref 26–34)
MCH RBC QN AUTO: 29.5 PG (ref 26–34)
MCH RBC QN AUTO: 29.8 PG (ref 26–34)
MCH RBC QN AUTO: 29.9 PG (ref 26–34)
MCH RBC QN AUTO: 30 PG (ref 26–34)
MCHC RBC AUTO-ENTMCNC: 31.6 G/DL (ref 32–36)
MCHC RBC AUTO-ENTMCNC: 32 G/DL (ref 32–36)
MCHC RBC AUTO-ENTMCNC: 32.1 G/DL (ref 32–36)
MCHC RBC AUTO-ENTMCNC: 32.3 G/DL (ref 32–36)
MCHC RBC AUTO-ENTMCNC: 32.7 G/DL (ref 32–36)
MCV RBC AUTO: 92 FL (ref 80–100)
MCV RBC AUTO: 93 FL (ref 80–100)
MCV RBC AUTO: 93 FL (ref 80–100)
MONOCYTES # BLD AUTO: 0.41 X10*3/UL (ref 0.05–0.8)
MONOCYTES # BLD AUTO: 0.45 X10*3/UL (ref 0.05–0.8)
MONOCYTES # BLD AUTO: 0.55 X10*3/UL (ref 0.05–0.8)
MONOCYTES # BLD AUTO: 0.72 X10*3/UL (ref 0.05–0.8)
MONOCYTES NFR BLD AUTO: 5.5 %
MONOCYTES NFR BLD AUTO: 6 %
MONOCYTES NFR BLD AUTO: 6.8 %
MONOCYTES NFR BLD AUTO: 8.6 %
NEUTROPHILS # BLD AUTO: 4.94 X10*3/UL (ref 1.6–5.5)
NEUTROPHILS # BLD AUTO: 6.7 X10*3/UL (ref 1.6–5.5)
NEUTROPHILS # BLD AUTO: 6.9 X10*3/UL (ref 1.6–5.5)
NEUTROPHILS # BLD AUTO: 9.13 X10*3/UL (ref 1.6–5.5)
NEUTROPHILS NFR BLD AUTO: 81.7 %
NEUTROPHILS NFR BLD AUTO: 82.7 %
NEUTROPHILS NFR BLD AUTO: 89.3 %
NEUTROPHILS NFR BLD AUTO: 90.6 %
NON HDL CHOLESTEROL: 47 MG/DL (ref 0–149)
NRBC BLD-RTO: 0 /100 WBCS (ref 0–0)
OXYHGB MFR BLDA: 96.3 % (ref 94–98)
PCO2 BLDA: 23 MM HG (ref 38–42)
PH BLDA: 7.37 PH (ref 7.38–7.42)
PHOSPHATE SERPL-MCNC: 3.3 MG/DL (ref 2.5–4.9)
PHOSPHATE SERPL-MCNC: 3.9 MG/DL (ref 2.5–4.9)
PHOSPHATE SERPL-MCNC: 3.9 MG/DL (ref 2.5–4.9)
PHOSPHATE SERPL-MCNC: 4 MG/DL (ref 2.5–4.9)
PLATELET # BLD AUTO: 124 X10*3/UL (ref 150–450)
PLATELET # BLD AUTO: 132 X10*3/UL (ref 150–450)
PLATELET # BLD AUTO: 135 X10*3/UL (ref 150–450)
PLATELET # BLD AUTO: 147 X10*3/UL (ref 150–450)
PLATELET # BLD AUTO: 151 X10*3/UL (ref 150–450)
PO2 BLDA: 306 MM HG (ref 85–95)
POTASSIUM BLDA-SCNC: 5.3 MMOL/L (ref 3.5–5.3)
POTASSIUM SERPL-SCNC: 4 MMOL/L (ref 3.5–5.3)
POTASSIUM SERPL-SCNC: 4.2 MMOL/L (ref 3.5–5.3)
POTASSIUM SERPL-SCNC: 4.6 MMOL/L (ref 3.5–5.3)
POTASSIUM SERPL-SCNC: 5 MMOL/L (ref 3.5–5.3)
POTASSIUM SERPL-SCNC: 5 MMOL/L (ref 3.5–5.3)
PROT SERPL-MCNC: 7.1 G/DL (ref 6.4–8.2)
PROT SERPL-MCNC: 7.1 G/DL (ref 6.4–8.2)
PROTHROMBIN TIME: 11.3 SECONDS (ref 9.3–12.7)
PROTHROMBIN TIME: 12.3 SECONDS (ref 9.3–12.7)
Q ONSET: 215 MS
QRS COUNT: 16 BEATS
QRS DURATION: 160 MS
QT INTERVAL: 384 MS
QTC CALCULATION(BAZETT): 497 MS
QTC FREDERICIA: 456 MS
R AXIS: -10 DEGREES
RBC # BLD AUTO: 2.85 X10*6/UL (ref 4.5–5.9)
RBC # BLD AUTO: 2.87 X10*6/UL (ref 4.5–5.9)
RBC # BLD AUTO: 2.91 X10*6/UL (ref 4.5–5.9)
RBC # BLD AUTO: 2.92 X10*6/UL (ref 4.5–5.9)
RBC # BLD AUTO: 3.08 X10*6/UL (ref 4.5–5.9)
SAO2 % BLDA: 99 % (ref 94–100)
SARS-COV-2 RNA RESP QL NAA+PROBE: NOT DETECTED
SODIUM BLDA-SCNC: 129 MMOL/L (ref 136–145)
SODIUM SERPL-SCNC: 133 MMOL/L (ref 136–145)
SODIUM SERPL-SCNC: 135 MMOL/L (ref 136–145)
SODIUM SERPL-SCNC: 136 MMOL/L (ref 136–145)
SPECIMEN DRAWN FROM PATIENT: ABNORMAL
T AXIS: 167 DEGREES
T OFFSET: 407 MS
TRIGL SERPL-MCNC: 78 MG/DL (ref 0–149)
VENTRICULAR RATE: 101 BPM
VLDL: 16 MG/DL (ref 0–40)
WBC # BLD AUTO: 10.1 X10*3/UL (ref 4.4–11.3)
WBC # BLD AUTO: 6.1 X10*3/UL (ref 4.4–11.3)
WBC # BLD AUTO: 6.2 X10*3/UL (ref 4.4–11.3)
WBC # BLD AUTO: 7.5 X10*3/UL (ref 4.4–11.3)
WBC # BLD AUTO: 8.4 X10*3/UL (ref 4.4–11.3)

## 2025-01-01 PROCEDURE — 85025 COMPLETE CBC W/AUTO DIFF WBC: CPT

## 2025-01-01 PROCEDURE — 36556 INSERT NON-TUNNEL CV CATH: CPT

## 2025-01-01 PROCEDURE — 99233 SBSQ HOSP IP/OBS HIGH 50: CPT | Performed by: NURSE PRACTITIONER

## 2025-01-01 PROCEDURE — 99223 1ST HOSP IP/OBS HIGH 75: CPT | Performed by: STUDENT IN AN ORGANIZED HEALTH CARE EDUCATION/TRAINING PROGRAM

## 2025-01-01 PROCEDURE — 99292 CRITICAL CARE ADDL 30 MIN: CPT

## 2025-01-01 PROCEDURE — 2500000002 HC RX 250 W HCPCS SELF ADMINISTERED DRUGS (ALT 637 FOR MEDICARE OP, ALT 636 FOR OP/ED): Performed by: INTERNAL MEDICINE

## 2025-01-01 PROCEDURE — 2500000005 HC RX 250 GENERAL PHARMACY W/O HCPCS

## 2025-01-01 PROCEDURE — 80069 RENAL FUNCTION PANEL: CPT

## 2025-01-01 PROCEDURE — 2500000004 HC RX 250 GENERAL PHARMACY W/ HCPCS (ALT 636 FOR OP/ED)

## 2025-01-01 PROCEDURE — 85610 PROTHROMBIN TIME: CPT

## 2025-01-01 PROCEDURE — 31500 INSERT EMERGENCY AIRWAY: CPT

## 2025-01-01 PROCEDURE — 94003 VENT MGMT INPAT SUBQ DAY: CPT

## 2025-01-01 PROCEDURE — 2020000001 HC ICU ROOM DAILY

## 2025-01-01 PROCEDURE — 82947 ASSAY GLUCOSE BLOOD QUANT: CPT

## 2025-01-01 PROCEDURE — 70498 CT ANGIOGRAPHY NECK: CPT | Performed by: RADIOLOGY

## 2025-01-01 PROCEDURE — 80053 COMPREHEN METABOLIC PANEL: CPT | Performed by: EMERGENCY MEDICINE

## 2025-01-01 PROCEDURE — 82435 ASSAY OF BLOOD CHLORIDE: CPT | Performed by: INTERNAL MEDICINE

## 2025-01-01 PROCEDURE — 2500000004 HC RX 250 GENERAL PHARMACY W/ HCPCS (ALT 636 FOR OP/ED): Performed by: INTERNAL MEDICINE

## 2025-01-01 PROCEDURE — 97161 PT EVAL LOW COMPLEX 20 MIN: CPT | Mod: GP | Performed by: PHYSICAL THERAPIST

## 2025-01-01 PROCEDURE — 84100 ASSAY OF PHOSPHORUS: CPT

## 2025-01-01 PROCEDURE — 99233 SBSQ HOSP IP/OBS HIGH 50: CPT | Performed by: INTERNAL MEDICINE

## 2025-01-01 PROCEDURE — 2500000001 HC RX 250 WO HCPCS SELF ADMINISTERED DRUGS (ALT 637 FOR MEDICARE OP): Performed by: INTERNAL MEDICINE

## 2025-01-01 PROCEDURE — 2500000001 HC RX 250 WO HCPCS SELF ADMINISTERED DRUGS (ALT 637 FOR MEDICARE OP): Performed by: EMERGENCY MEDICINE

## 2025-01-01 PROCEDURE — 2500000002 HC RX 250 W HCPCS SELF ADMINISTERED DRUGS (ALT 637 FOR MEDICARE OP, ALT 636 FOR OP/ED)

## 2025-01-01 PROCEDURE — 2500000001 HC RX 250 WO HCPCS SELF ADMINISTERED DRUGS (ALT 637 FOR MEDICARE OP)

## 2025-01-01 PROCEDURE — 70496 CT ANGIOGRAPHY HEAD: CPT | Performed by: RADIOLOGY

## 2025-01-01 PROCEDURE — 2550000001 HC RX 255 CONTRASTS: Performed by: FAMILY MEDICINE

## 2025-01-01 PROCEDURE — 99291 CRITICAL CARE FIRST HOUR: CPT

## 2025-01-01 PROCEDURE — 70551 MRI BRAIN STEM W/O DYE: CPT

## 2025-01-01 PROCEDURE — 70547 MR ANGIOGRAPHY NECK W/O DYE: CPT | Performed by: STUDENT IN AN ORGANIZED HEALTH CARE EDUCATION/TRAINING PROGRAM

## 2025-01-01 PROCEDURE — 99232 SBSQ HOSP IP/OBS MODERATE 35: CPT | Performed by: INTERNAL MEDICINE

## 2025-01-01 PROCEDURE — 84484 ASSAY OF TROPONIN QUANT: CPT

## 2025-01-01 PROCEDURE — 70544 MR ANGIOGRAPHY HEAD W/O DYE: CPT

## 2025-01-01 PROCEDURE — 36415 COLL VENOUS BLD VENIPUNCTURE: CPT

## 2025-01-01 PROCEDURE — G0427 INPT/ED TELECONSULT70: HCPCS | Performed by: STUDENT IN AN ORGANIZED HEALTH CARE EDUCATION/TRAINING PROGRAM

## 2025-01-01 PROCEDURE — 71045 X-RAY EXAM CHEST 1 VIEW: CPT | Performed by: RADIOLOGY

## 2025-01-01 PROCEDURE — 87636 SARSCOV2 & INF A&B AMP PRB: CPT | Performed by: EMERGENCY MEDICINE

## 2025-01-01 PROCEDURE — 2060000001 HC INTERMEDIATE ICU ROOM DAILY

## 2025-01-01 PROCEDURE — 85610 PROTHROMBIN TIME: CPT | Performed by: EMERGENCY MEDICINE

## 2025-01-01 PROCEDURE — 70498 CT ANGIOGRAPHY NECK: CPT

## 2025-01-01 PROCEDURE — 93005 ELECTROCARDIOGRAM TRACING: CPT

## 2025-01-01 PROCEDURE — 99232 SBSQ HOSP IP/OBS MODERATE 35: CPT | Performed by: STUDENT IN AN ORGANIZED HEALTH CARE EDUCATION/TRAINING PROGRAM

## 2025-01-01 PROCEDURE — 2500000004 HC RX 250 GENERAL PHARMACY W/ HCPCS (ALT 636 FOR OP/ED): Performed by: FAMILY MEDICINE

## 2025-01-01 PROCEDURE — 70450 CT HEAD/BRAIN W/O DYE: CPT | Performed by: RADIOLOGY

## 2025-01-01 PROCEDURE — 94762 N-INVAS EAR/PLS OXIMTRY CONT: CPT

## 2025-01-01 PROCEDURE — 70544 MR ANGIOGRAPHY HEAD W/O DYE: CPT | Performed by: STUDENT IN AN ORGANIZED HEALTH CARE EDUCATION/TRAINING PROGRAM

## 2025-01-01 PROCEDURE — 80069 RENAL FUNCTION PANEL: CPT | Performed by: INTERNAL MEDICINE

## 2025-01-01 PROCEDURE — 99239 HOSP IP/OBS DSCHRG MGMT >30: CPT

## 2025-01-01 PROCEDURE — 5A12012 PERFORMANCE OF CARDIAC OUTPUT, SINGLE, MANUAL: ICD-10-PCS | Performed by: STUDENT IN AN ORGANIZED HEALTH CARE EDUCATION/TRAINING PROGRAM

## 2025-01-01 PROCEDURE — 85730 THROMBOPLASTIN TIME PARTIAL: CPT

## 2025-01-01 PROCEDURE — 80061 LIPID PANEL: CPT | Performed by: INTERNAL MEDICINE

## 2025-01-01 PROCEDURE — 70450 CT HEAD/BRAIN W/O DYE: CPT

## 2025-01-01 PROCEDURE — 84132 ASSAY OF SERUM POTASSIUM: CPT

## 2025-01-01 PROCEDURE — 05HN33Z INSERTION OF INFUSION DEVICE INTO LEFT INTERNAL JUGULAR VEIN, PERCUTANEOUS APPROACH: ICD-10-PCS

## 2025-01-01 PROCEDURE — 83605 ASSAY OF LACTIC ACID: CPT

## 2025-01-01 PROCEDURE — 36415 COLL VENOUS BLD VENIPUNCTURE: CPT | Performed by: EMERGENCY MEDICINE

## 2025-01-01 PROCEDURE — 93880 EXTRACRANIAL BILAT STUDY: CPT | Performed by: SURGERY

## 2025-01-01 PROCEDURE — 36600 WITHDRAWAL OF ARTERIAL BLOOD: CPT

## 2025-01-01 PROCEDURE — 97166 OT EVAL MOD COMPLEX 45 MIN: CPT | Mod: GO

## 2025-01-01 PROCEDURE — 85730 THROMBOPLASTIN TIME PARTIAL: CPT | Performed by: EMERGENCY MEDICINE

## 2025-01-01 PROCEDURE — 99291 CRITICAL CARE FIRST HOUR: CPT | Mod: 25 | Performed by: EMERGENCY MEDICINE

## 2025-01-01 PROCEDURE — 99232 SBSQ HOSP IP/OBS MODERATE 35: CPT | Performed by: FAMILY MEDICINE

## 2025-01-01 PROCEDURE — 71045 X-RAY EXAM CHEST 1 VIEW: CPT

## 2025-01-01 PROCEDURE — 70551 MRI BRAIN STEM W/O DYE: CPT | Performed by: STUDENT IN AN ORGANIZED HEALTH CARE EDUCATION/TRAINING PROGRAM

## 2025-01-01 PROCEDURE — 83735 ASSAY OF MAGNESIUM: CPT

## 2025-01-01 PROCEDURE — 83036 HEMOGLOBIN GLYCOSYLATED A1C: CPT | Mod: WESLAB | Performed by: INTERNAL MEDICINE

## 2025-01-01 PROCEDURE — 93010 ELECTROCARDIOGRAM REPORT: CPT | Performed by: INTERNAL MEDICINE

## 2025-01-01 PROCEDURE — 99221 1ST HOSP IP/OBS SF/LOW 40: CPT | Performed by: INTERNAL MEDICINE

## 2025-01-01 PROCEDURE — 3E033XZ INTRODUCTION OF VASOPRESSOR INTO PERIPHERAL VEIN, PERCUTANEOUS APPROACH: ICD-10-PCS | Performed by: EMERGENCY MEDICINE

## 2025-01-01 PROCEDURE — 97530 THERAPEUTIC ACTIVITIES: CPT | Mod: GP | Performed by: PHYSICAL THERAPIST

## 2025-01-01 PROCEDURE — 2500000004 HC RX 250 GENERAL PHARMACY W/ HCPCS (ALT 636 FOR OP/ED): Performed by: REGISTERED NURSE

## 2025-01-01 PROCEDURE — 83735 ASSAY OF MAGNESIUM: CPT | Performed by: INTERNAL MEDICINE

## 2025-01-01 PROCEDURE — 83880 ASSAY OF NATRIURETIC PEPTIDE: CPT

## 2025-01-01 PROCEDURE — 84484 ASSAY OF TROPONIN QUANT: CPT | Performed by: EMERGENCY MEDICINE

## 2025-01-01 PROCEDURE — 70547 MR ANGIOGRAPHY NECK W/O DYE: CPT

## 2025-01-01 PROCEDURE — 85025 COMPLETE CBC W/AUTO DIFF WBC: CPT | Performed by: EMERGENCY MEDICINE

## 2025-01-01 PROCEDURE — 99233 SBSQ HOSP IP/OBS HIGH 50: CPT | Performed by: FAMILY MEDICINE

## 2025-01-01 PROCEDURE — 93880 EXTRACRANIAL BILAT STUDY: CPT

## 2025-01-01 PROCEDURE — 99223 1ST HOSP IP/OBS HIGH 75: CPT | Performed by: INTERNAL MEDICINE

## 2025-01-01 PROCEDURE — 99223 1ST HOSP IP/OBS HIGH 75: CPT | Performed by: NURSE PRACTITIONER

## 2025-01-01 PROCEDURE — 36415 COLL VENOUS BLD VENIPUNCTURE: CPT | Performed by: INTERNAL MEDICINE

## 2025-01-01 PROCEDURE — 0BH17EZ INSERTION OF ENDOTRACHEAL AIRWAY INTO TRACHEA, VIA NATURAL OR ARTIFICIAL OPENING: ICD-10-PCS

## 2025-01-01 PROCEDURE — 99233 SBSQ HOSP IP/OBS HIGH 50: CPT

## 2025-01-01 PROCEDURE — 5A1935Z RESPIRATORY VENTILATION, LESS THAN 24 CONSECUTIVE HOURS: ICD-10-PCS | Performed by: EMERGENCY MEDICINE

## 2025-01-01 PROCEDURE — 85027 COMPLETE CBC AUTOMATED: CPT | Performed by: INTERNAL MEDICINE

## 2025-01-01 RX ORDER — AMOXICILLIN 250 MG
1 CAPSULE ORAL NIGHTLY
Status: DISPENSED | OUTPATIENT
Start: 2025-01-01

## 2025-01-01 RX ORDER — PANTOPRAZOLE SODIUM 40 MG/1
40 TABLET, DELAYED RELEASE ORAL
Status: DISPENSED | OUTPATIENT
Start: 2025-01-01

## 2025-01-01 RX ORDER — DOPAMINE HYDROCHLORIDE 160 MG/100ML
5 INJECTION, SOLUTION INTRAVENOUS CONTINUOUS
Status: DISCONTINUED | OUTPATIENT
Start: 2025-01-01 | End: 2025-01-01

## 2025-01-01 RX ORDER — DOPAMINE HYDROCHLORIDE 160 MG/100ML
7.5 INJECTION, SOLUTION INTRAVENOUS CONTINUOUS
Status: DISCONTINUED | OUTPATIENT
Start: 2025-01-01 | End: 2025-01-01

## 2025-01-01 RX ORDER — NAPROXEN SODIUM 220 MG/1
324 TABLET, FILM COATED ORAL ONCE
Status: COMPLETED | OUTPATIENT
Start: 2025-01-01 | End: 2025-01-01

## 2025-01-01 RX ORDER — ATORVASTATIN CALCIUM 80 MG/1
80 TABLET, FILM COATED ORAL NIGHTLY
Status: DISPENSED | OUTPATIENT
Start: 2025-01-01

## 2025-01-01 RX ORDER — ISOSORBIDE MONONITRATE 30 MG/1
30 TABLET, EXTENDED RELEASE ORAL DAILY
Status: ACTIVE | OUTPATIENT
Start: 2025-01-01

## 2025-01-01 RX ORDER — TAMSULOSIN HYDROCHLORIDE 0.4 MG/1
0.4 CAPSULE ORAL NIGHTLY
Status: DISPENSED | OUTPATIENT
Start: 2025-01-01

## 2025-01-01 RX ORDER — PROPOFOL 10 MG/ML
0-50 INJECTION, EMULSION INTRAVENOUS CONTINUOUS
Status: ACTIVE | OUTPATIENT
Start: 2025-01-01

## 2025-01-01 RX ORDER — MIDAZOLAM HYDROCHLORIDE 5 MG/ML
INJECTION INTRAMUSCULAR; INTRAVENOUS CODE/TRAUMA/SEDATION MEDICATION
Status: COMPLETED | OUTPATIENT
Start: 2025-01-01 | End: 2025-01-01

## 2025-01-01 RX ORDER — ATROPINE SULFATE 0.1 MG/ML
INJECTION INTRAVENOUS CODE/TRAUMA/SEDATION MEDICATION
Status: COMPLETED | OUTPATIENT
Start: 2025-01-01 | End: 2025-01-01

## 2025-01-01 RX ORDER — EPINEPHRINE 1 MG/ML
INJECTION INTRAMUSCULAR; INTRAVENOUS; SUBCUTANEOUS CODE/TRAUMA/SEDATION MEDICATION
Status: COMPLETED | OUTPATIENT
Start: 2025-01-01 | End: 2025-01-01

## 2025-01-01 RX ORDER — ISOSORBIDE MONONITRATE 60 MG/1
60 TABLET, EXTENDED RELEASE ORAL DAILY
Status: DISCONTINUED | OUTPATIENT
Start: 2025-01-01 | End: 2025-01-01

## 2025-01-01 RX ORDER — NITROGLYCERIN 0.4 MG/1
0.4 TABLET SUBLINGUAL EVERY 5 MIN PRN
Status: DISCONTINUED | OUTPATIENT
Start: 2025-01-01 | End: 2025-01-01

## 2025-01-01 RX ORDER — MORPHINE SULFATE 2 MG/ML
2 INJECTION, SOLUTION INTRAMUSCULAR; INTRAVENOUS EVERY 4 HOURS PRN
Status: ACTIVE | OUTPATIENT
Start: 2025-01-01

## 2025-01-01 RX ORDER — EPINEPHRINE 0.1 MG/ML
INJECTION INTRACARDIAC; INTRAVENOUS CODE/TRAUMA/SEDATION MEDICATION
Status: COMPLETED | OUTPATIENT
Start: 2025-01-01 | End: 2025-01-01

## 2025-01-01 RX ORDER — INSULIN LISPRO 100 [IU]/ML
0-5 INJECTION, SOLUTION INTRAVENOUS; SUBCUTANEOUS
Status: DISPENSED | OUTPATIENT
Start: 2025-01-01

## 2025-01-01 RX ORDER — POLYETHYLENE GLYCOL 3350 17 G/17G
17 POWDER, FOR SOLUTION ORAL DAILY PRN
Status: DISPENSED | OUTPATIENT
Start: 2025-01-01

## 2025-01-01 RX ORDER — MORPHINE SULFATE 2 MG/ML
1 INJECTION, SOLUTION INTRAMUSCULAR; INTRAVENOUS
Status: DISPENSED | OUTPATIENT
Start: 2025-01-01

## 2025-01-01 RX ORDER — METOPROLOL SUCCINATE 50 MG/1
50 TABLET, EXTENDED RELEASE ORAL DAILY
Status: ACTIVE | OUTPATIENT
Start: 2025-01-01

## 2025-01-01 RX ORDER — DOPAMINE HYDROCHLORIDE 160 MG/100ML
0-20 INJECTION, SOLUTION INTRAVENOUS CONTINUOUS
Status: ACTIVE | OUTPATIENT
Start: 2025-01-01

## 2025-01-01 RX ORDER — HYDROXYZINE PAMOATE 25 MG/1
25 CAPSULE ORAL NIGHTLY PRN
Status: ACTIVE | OUTPATIENT
Start: 2025-01-01

## 2025-01-01 RX ORDER — NOREPINEPHRINE BITARTRATE/D5W 8 MG/250ML
0-.5 PLASTIC BAG, INJECTION (ML) INTRAVENOUS CONTINUOUS
Status: DISPENSED | OUTPATIENT
Start: 2025-01-01

## 2025-01-01 RX ORDER — ETOMIDATE 2 MG/ML
INJECTION INTRAVENOUS CODE/TRAUMA/SEDATION MEDICATION
Status: COMPLETED | OUTPATIENT
Start: 2025-01-01 | End: 2025-01-01

## 2025-01-01 RX ORDER — FENTANYL CITRATE 50 UG/ML
50 INJECTION, SOLUTION INTRAMUSCULAR; INTRAVENOUS ONCE
Status: COMPLETED | OUTPATIENT
Start: 2025-01-01 | End: 2025-01-01

## 2025-01-01 RX ORDER — CLOPIDOGREL BISULFATE 75 MG/1
75 TABLET ORAL DAILY
Status: DISPENSED | OUTPATIENT
Start: 2025-01-01

## 2025-01-01 RX ORDER — POLYETHYLENE GLYCOL 3350 17 G/17G
17 POWDER, FOR SOLUTION ORAL DAILY
Status: DISPENSED | OUTPATIENT
Start: 2025-01-01

## 2025-01-01 RX ORDER — ACETAMINOPHEN 160 MG/5ML
650 SOLUTION ORAL EVERY 4 HOURS PRN
Status: ACTIVE | OUTPATIENT
Start: 2025-01-01

## 2025-01-01 RX ORDER — RANOLAZINE 500 MG/1
1000 TABLET, EXTENDED RELEASE ORAL 2 TIMES DAILY
Status: DISPENSED | OUTPATIENT
Start: 2025-01-01

## 2025-01-01 RX ORDER — ATROPINE SULFATE 0.1 MG/ML
1 INJECTION INTRAVENOUS ONCE
Status: COMPLETED | OUTPATIENT
Start: 2025-01-01 | End: 2025-01-01

## 2025-01-01 RX ORDER — ACETAMINOPHEN 325 MG/1
650 TABLET ORAL EVERY 4 HOURS PRN
Status: DISPENSED | OUTPATIENT
Start: 2025-01-01

## 2025-01-01 RX ORDER — METOPROLOL SUCCINATE 25 MG/1
25 TABLET, EXTENDED RELEASE ORAL DAILY
Status: DISCONTINUED | OUTPATIENT
Start: 2025-01-01 | End: 2025-01-01

## 2025-01-01 RX ORDER — ENOXAPARIN SODIUM 100 MG/ML
1 INJECTION SUBCUTANEOUS EVERY 12 HOURS SCHEDULED
Status: DISPENSED | OUTPATIENT
Start: 2025-01-01

## 2025-01-01 RX ORDER — FENTANYL CITRATE 50 UG/ML
INJECTION, SOLUTION INTRAMUSCULAR; INTRAVENOUS CODE/TRAUMA/SEDATION MEDICATION
Status: COMPLETED | OUTPATIENT
Start: 2025-01-01 | End: 2025-01-01

## 2025-01-01 RX ORDER — LABETALOL HYDROCHLORIDE 5 MG/ML
10 INJECTION, SOLUTION INTRAVENOUS EVERY 10 MIN PRN
Status: ACTIVE | OUTPATIENT
Start: 2025-01-01 | End: 2025-01-01

## 2025-01-01 RX ORDER — NAPROXEN SODIUM 220 MG/1
81 TABLET, FILM COATED ORAL DAILY
Status: DISPENSED | OUTPATIENT
Start: 2025-01-01

## 2025-01-01 RX ORDER — ACETAMINOPHEN 650 MG/1
650 SUPPOSITORY RECTAL EVERY 4 HOURS PRN
Status: ACTIVE | OUTPATIENT
Start: 2025-01-01

## 2025-01-01 RX ORDER — LEVOTHYROXINE SODIUM 50 UG/1
50 TABLET ORAL DAILY
Status: DISPENSED | OUTPATIENT
Start: 2025-01-01

## 2025-01-01 RX ORDER — HEPARIN SODIUM 5000 [USP'U]/ML
5000 INJECTION, SOLUTION INTRAVENOUS; SUBCUTANEOUS EVERY 8 HOURS
Status: DISCONTINUED | OUTPATIENT
Start: 2025-01-01 | End: 2025-01-01

## 2025-01-01 RX ORDER — DOPAMINE HYDROCHLORIDE 160 MG/100ML
5-20 INJECTION, SOLUTION INTRAVENOUS CONTINUOUS
Status: DISCONTINUED | OUTPATIENT
Start: 2025-01-01 | End: 2025-01-01

## 2025-01-01 RX ORDER — BUSPIRONE HYDROCHLORIDE 5 MG/1
7.5 TABLET ORAL 2 TIMES DAILY
Status: DISPENSED | OUTPATIENT
Start: 2025-01-01

## 2025-01-01 RX ORDER — HYDRALAZINE HYDROCHLORIDE 20 MG/ML
10 INJECTION INTRAMUSCULAR; INTRAVENOUS
Status: ACTIVE | OUTPATIENT
Start: 2025-01-01 | End: 2025-01-01

## 2025-01-01 RX ORDER — ATROPINE SULFATE 0.1 MG/ML
1 INJECTION INTRAVENOUS ONCE
Status: ACTIVE | OUTPATIENT
Start: 2025-01-01

## 2025-01-01 RX ORDER — ONDANSETRON HYDROCHLORIDE 2 MG/ML
4 INJECTION, SOLUTION INTRAVENOUS EVERY 8 HOURS PRN
Status: DISPENSED | OUTPATIENT
Start: 2025-01-01

## 2025-01-01 RX ORDER — HYDRALAZINE HYDROCHLORIDE 25 MG/1
25 TABLET, FILM COATED ORAL EVERY 6 HOURS PRN
Status: ACTIVE | OUTPATIENT
Start: 2025-01-01

## 2025-01-01 RX ADMIN — CLOPIDOGREL BISULFATE 75 MG: 75 TABLET ORAL at 17:14

## 2025-01-01 RX ADMIN — CLOPIDOGREL BISULFATE 75 MG: 75 TABLET ORAL at 08:04

## 2025-01-01 RX ADMIN — ATORVASTATIN CALCIUM 80 MG: 80 TABLET, FILM COATED ORAL at 22:12

## 2025-01-01 RX ADMIN — ATORVASTATIN CALCIUM 80 MG: 80 TABLET, FILM COATED ORAL at 21:51

## 2025-01-01 RX ADMIN — ETOMIDATE 20 MG: 2 INJECTION, SOLUTION INTRAVENOUS at 17:30

## 2025-01-01 RX ADMIN — BUSPIRONE HYDROCHLORIDE 7.5 MG: 5 TABLET ORAL at 22:00

## 2025-01-01 RX ADMIN — RANOLAZINE 1000 MG: 500 TABLET, EXTENDED RELEASE ORAL at 08:44

## 2025-01-01 RX ADMIN — ASPIRIN 81 MG: 81 TABLET, CHEWABLE ORAL at 08:58

## 2025-01-01 RX ADMIN — ATORVASTATIN CALCIUM 80 MG: 80 TABLET, FILM COATED ORAL at 22:00

## 2025-01-01 RX ADMIN — LEVOTHYROXINE SODIUM 50 MCG: 0.05 TABLET ORAL at 05:57

## 2025-01-01 RX ADMIN — ATROPINE SULFATE 1 MG: 0.1 INJECTION INTRAVENOUS at 18:28

## 2025-01-01 RX ADMIN — FENTANYL CITRATE 50 MCG: 50 INJECTION INTRAMUSCULAR; INTRAVENOUS at 17:29

## 2025-01-01 RX ADMIN — BUSPIRONE HYDROCHLORIDE 7.5 MG: 5 TABLET ORAL at 12:33

## 2025-01-01 RX ADMIN — DOPAMINE HYDROCHLORIDE 5 MCG/KG/MIN: 160 INJECTION, SOLUTION INTRAVENOUS at 18:36

## 2025-01-01 RX ADMIN — CLOPIDOGREL BISULFATE 75 MG: 75 TABLET ORAL at 08:43

## 2025-01-01 RX ADMIN — INSULIN LISPRO 2 UNITS: 100 INJECTION, SOLUTION INTRAVENOUS; SUBCUTANEOUS at 18:00

## 2025-01-01 RX ADMIN — PANTOPRAZOLE SODIUM 40 MG: 40 TABLET, DELAYED RELEASE ORAL at 06:41

## 2025-01-01 RX ADMIN — SODIUM CHLORIDE 500 ML: 900 INJECTION, SOLUTION INTRAVENOUS at 15:07

## 2025-01-01 RX ADMIN — ATORVASTATIN CALCIUM 80 MG: 80 TABLET, FILM COATED ORAL at 02:59

## 2025-01-01 RX ADMIN — ASPIRIN 81 MG: 81 TABLET, CHEWABLE ORAL at 10:00

## 2025-01-01 RX ADMIN — DOCUSATE SODIUM 50MG AND SENNOSIDES 8.6MG 1 TABLET: 8.6; 5 TABLET, FILM COATED ORAL at 22:00

## 2025-01-01 RX ADMIN — TAMSULOSIN HYDROCHLORIDE 0.4 MG: 0.4 CAPSULE ORAL at 21:50

## 2025-01-01 RX ADMIN — POLYETHYLENE GLYCOL 3350 17 G: 17 POWDER, FOR SOLUTION ORAL at 08:06

## 2025-01-01 RX ADMIN — MIDAZOLAM HYDROCHLORIDE 2 MG: 5 INJECTION, SOLUTION INTRAMUSCULAR; INTRAVENOUS at 17:30

## 2025-01-01 RX ADMIN — MORPHINE SULFATE 1 MG: 2 INJECTION, SOLUTION INTRAMUSCULAR; INTRAVENOUS at 16:55

## 2025-01-01 RX ADMIN — BUSPIRONE HYDROCHLORIDE 7.5 MG: 5 TABLET ORAL at 08:04

## 2025-01-01 RX ADMIN — FENTANYL CITRATE 50 MCG: 0.05 INJECTION, SOLUTION INTRAMUSCULAR; INTRAVENOUS at 17:30

## 2025-01-01 RX ADMIN — RANOLAZINE 1000 MG: 500 TABLET, EXTENDED RELEASE ORAL at 03:00

## 2025-01-01 RX ADMIN — INSULIN LISPRO 1 UNITS: 100 INJECTION, SOLUTION INTRAVENOUS; SUBCUTANEOUS at 15:49

## 2025-01-01 RX ADMIN — BUSPIRONE HYDROCHLORIDE 7.5 MG: 5 TABLET ORAL at 02:59

## 2025-01-01 RX ADMIN — ISOSORBIDE MONONITRATE 60 MG: 60 TABLET, EXTENDED RELEASE ORAL at 08:44

## 2025-01-01 RX ADMIN — EPINEPHRINE 1 MG: 1 INJECTION INTRAMUSCULAR; INTRAVENOUS; SUBCUTANEOUS at 17:19

## 2025-01-01 RX ADMIN — EPINEPHRINE 1 MG: 0.1 INJECTION INTRACARDIAC; INTRAVENOUS at 17:41

## 2025-01-01 RX ADMIN — RANOLAZINE 1000 MG: 500 TABLET, EXTENDED RELEASE ORAL at 10:00

## 2025-01-01 RX ADMIN — Medication 6 L/MIN: at 15:00

## 2025-01-01 RX ADMIN — CLOPIDOGREL BISULFATE 75 MG: 75 TABLET ORAL at 10:00

## 2025-01-01 RX ADMIN — ASPIRIN 324 MG: 81 TABLET, CHEWABLE ORAL at 08:44

## 2025-01-01 RX ADMIN — ASPIRIN 81 MG: 81 TABLET, CHEWABLE ORAL at 08:41

## 2025-01-01 RX ADMIN — ONDANSETRON 4 MG: 2 INJECTION INTRAMUSCULAR; INTRAVENOUS at 12:19

## 2025-01-01 RX ADMIN — DOPAMINE HYDROCHLORIDE 5 MCG/KG/MIN: 160 INJECTION, SOLUTION INTRAVENOUS at 11:04

## 2025-01-01 RX ADMIN — CLOPIDOGREL BISULFATE 75 MG: 75 TABLET ORAL at 08:57

## 2025-01-01 RX ADMIN — PANTOPRAZOLE SODIUM 40 MG: 40 TABLET, DELAYED RELEASE ORAL at 06:52

## 2025-01-01 RX ADMIN — POLYETHYLENE GLYCOL 3350 17 G: 17 POWDER, FOR SOLUTION ORAL at 08:57

## 2025-01-01 RX ADMIN — ASPIRIN 81 MG: 81 TABLET, CHEWABLE ORAL at 08:04

## 2025-01-01 RX ADMIN — LEVOTHYROXINE SODIUM 50 MCG: 0.05 TABLET ORAL at 06:41

## 2025-01-01 RX ADMIN — BUSPIRONE HYDROCHLORIDE 7.5 MG: 5 TABLET ORAL at 08:57

## 2025-01-01 RX ADMIN — RANOLAZINE 1000 MG: 500 TABLET, EXTENDED RELEASE ORAL at 21:51

## 2025-01-01 RX ADMIN — DOCUSATE SODIUM 50MG AND SENNOSIDES 8.6MG 1 TABLET: 8.6; 5 TABLET, FILM COATED ORAL at 21:51

## 2025-01-01 RX ADMIN — LEVOTHYROXINE SODIUM 50 MCG: 0.05 TABLET ORAL at 06:52

## 2025-01-01 RX ADMIN — BUSPIRONE HYDROCHLORIDE 7.5 MG: 5 TABLET ORAL at 08:45

## 2025-01-01 RX ADMIN — BUSPIRONE HYDROCHLORIDE 7.5 MG: 5 TABLET ORAL at 21:51

## 2025-01-01 RX ADMIN — ASPIRIN 81 MG: 81 TABLET, CHEWABLE ORAL at 12:33

## 2025-01-01 RX ADMIN — BUSPIRONE HYDROCHLORIDE 7.5 MG: 5 TABLET ORAL at 22:12

## 2025-01-01 RX ADMIN — ISOSORBIDE MONONITRATE 60 MG: 60 TABLET, EXTENDED RELEASE ORAL at 12:34

## 2025-01-01 RX ADMIN — INSULIN LISPRO 2 UNITS: 100 INJECTION, SOLUTION INTRAVENOUS; SUBCUTANEOUS at 09:05

## 2025-01-01 RX ADMIN — ONDANSETRON 4 MG: 2 INJECTION INTRAMUSCULAR; INTRAVENOUS at 16:17

## 2025-01-01 RX ADMIN — METOPROLOL SUCCINATE 25 MG: 25 TABLET, FILM COATED, EXTENDED RELEASE ORAL at 08:06

## 2025-01-01 RX ADMIN — RANOLAZINE 1000 MG: 500 TABLET, EXTENDED RELEASE ORAL at 08:57

## 2025-01-01 RX ADMIN — PANTOPRAZOLE SODIUM 40 MG: 40 TABLET, DELAYED RELEASE ORAL at 05:16

## 2025-01-01 RX ADMIN — PANTOPRAZOLE SODIUM 40 MG: 40 TABLET, DELAYED RELEASE ORAL at 06:08

## 2025-01-01 RX ADMIN — RANOLAZINE 1000 MG: 500 TABLET, EXTENDED RELEASE ORAL at 22:12

## 2025-01-01 RX ADMIN — TAMSULOSIN HYDROCHLORIDE 0.4 MG: 0.4 CAPSULE ORAL at 22:12

## 2025-01-01 RX ADMIN — IOHEXOL 75 ML: 350 INJECTION, SOLUTION INTRAVENOUS at 13:44

## 2025-01-01 RX ADMIN — PANTOPRAZOLE SODIUM 40 MG: 40 TABLET, DELAYED RELEASE ORAL at 06:00

## 2025-01-01 RX ADMIN — HEPARIN SODIUM 5000 UNITS: 5000 INJECTION, SOLUTION INTRAVENOUS; SUBCUTANEOUS at 03:01

## 2025-01-01 RX ADMIN — CLOPIDOGREL BISULFATE 75 MG: 75 TABLET ORAL at 12:34

## 2025-01-01 RX ADMIN — RANOLAZINE 1000 MG: 500 TABLET, EXTENDED RELEASE ORAL at 22:00

## 2025-01-01 RX ADMIN — LEVOTHYROXINE SODIUM 50 MCG: 0.05 TABLET ORAL at 06:08

## 2025-01-01 RX ADMIN — DOCUSATE SODIUM 50MG AND SENNOSIDES 8.6MG 1 TABLET: 8.6; 5 TABLET, FILM COATED ORAL at 03:01

## 2025-01-01 RX ADMIN — HEPARIN SODIUM 5000 UNITS: 5000 INJECTION, SOLUTION INTRAVENOUS; SUBCUTANEOUS at 17:14

## 2025-01-01 RX ADMIN — RANOLAZINE 1000 MG: 500 TABLET, EXTENDED RELEASE ORAL at 08:04

## 2025-01-01 RX ADMIN — NOREPINEPHRINE BITARTRATE 0.1 MCG/KG/MIN: 8 INJECTION, SOLUTION INTRAVENOUS at 17:51

## 2025-01-01 RX ADMIN — INSULIN LISPRO 2 UNITS: 100 INJECTION, SOLUTION INTRAVENOUS; SUBCUTANEOUS at 11:59

## 2025-01-01 RX ADMIN — Medication 100 PERCENT: at 17:38

## 2025-01-01 RX ADMIN — METOPROLOL SUCCINATE 25 MG: 25 TABLET, FILM COATED, EXTENDED RELEASE ORAL at 10:00

## 2025-01-01 RX ADMIN — BUSPIRONE HYDROCHLORIDE 7.5 MG: 5 TABLET ORAL at 10:00

## 2025-01-01 RX ADMIN — ISOSORBIDE MONONITRATE 60 MG: 60 TABLET, EXTENDED RELEASE ORAL at 08:06

## 2025-01-01 RX ADMIN — RANOLAZINE 1000 MG: 500 TABLET, EXTENDED RELEASE ORAL at 12:33

## 2025-01-01 RX ADMIN — ACETAMINOPHEN 650 MG: 325 TABLET ORAL at 15:31

## 2025-01-01 RX ADMIN — POLYETHYLENE GLYCOL 3350 17 G: 17 POWDER, FOR SOLUTION ORAL at 01:41

## 2025-01-01 RX ADMIN — LEVOTHYROXINE SODIUM 50 MCG: 0.05 TABLET ORAL at 05:16

## 2025-01-01 RX ADMIN — EPINEPHRINE 1 MG: 0.1 INJECTION INTRACARDIAC; INTRAVENOUS at 17:45

## 2025-01-01 RX ADMIN — DOCUSATE SODIUM 50MG AND SENNOSIDES 8.6MG 1 TABLET: 8.6; 5 TABLET, FILM COATED ORAL at 22:12

## 2025-01-01 RX ADMIN — POLYETHYLENE GLYCOL 3350 17 G: 17 POWDER, FOR SOLUTION ORAL at 12:36

## 2025-01-01 RX ADMIN — TAMSULOSIN HYDROCHLORIDE 0.4 MG: 0.4 CAPSULE ORAL at 22:00

## 2025-01-01 RX ADMIN — ATROPINE SULFATE 1 MG: 0.1 INJECTION, SOLUTION ENDOTRACHEAL; INTRAMUSCULAR; INTRAVENOUS; SUBCUTANEOUS at 13:38

## 2025-01-01 RX ADMIN — SODIUM CHLORIDE 500 ML: 900 INJECTION, SOLUTION INTRAVENOUS at 16:25

## 2025-01-01 RX ADMIN — ISOSORBIDE MONONITRATE 60 MG: 60 TABLET, EXTENDED RELEASE ORAL at 10:00

## 2025-01-01 RX ADMIN — POLYETHYLENE GLYCOL 3350 17 G: 17 POWDER, FOR SOLUTION ORAL at 08:44

## 2025-01-01 SDOH — SOCIAL STABILITY: SOCIAL INSECURITY: WERE YOU ABLE TO COMPLETE ALL THE BEHAVIORAL HEALTH SCREENINGS?: YES

## 2025-01-01 SDOH — HEALTH STABILITY: MENTAL HEALTH
DO YOU FEEL STRESS - TENSE, RESTLESS, NERVOUS, OR ANXIOUS, OR UNABLE TO SLEEP AT NIGHT BECAUSE YOUR MIND IS TROUBLED ALL THE TIME - THESE DAYS?: TO SOME EXTENT

## 2025-01-01 SDOH — ECONOMIC STABILITY: FOOD INSECURITY: WITHIN THE PAST 12 MONTHS, YOU WORRIED THAT YOUR FOOD WOULD RUN OUT BEFORE YOU GOT THE MONEY TO BUY MORE.: NEVER TRUE

## 2025-01-01 SDOH — SOCIAL STABILITY: SOCIAL INSECURITY: HAS ANYONE EVER THREATENED TO HURT YOUR FAMILY OR YOUR PETS?: NO

## 2025-01-01 SDOH — ECONOMIC STABILITY: INCOME INSECURITY: IN THE PAST 12 MONTHS HAS THE ELECTRIC, GAS, OIL, OR WATER COMPANY THREATENED TO SHUT OFF SERVICES IN YOUR HOME?: NO

## 2025-01-01 SDOH — SOCIAL STABILITY: SOCIAL INSECURITY: ARE YOU MARRIED, WIDOWED, DIVORCED, SEPARATED, NEVER MARRIED, OR LIVING WITH A PARTNER?: WIDOWED

## 2025-01-01 SDOH — ECONOMIC STABILITY: HOUSING INSECURITY: IN THE LAST 12 MONTHS, WAS THERE A TIME WHEN YOU WERE NOT ABLE TO PAY THE MORTGAGE OR RENT ON TIME?: NO

## 2025-01-01 SDOH — HEALTH STABILITY: PHYSICAL HEALTH
HOW OFTEN DO YOU NEED TO HAVE SOMEONE HELP YOU WHEN YOU READ INSTRUCTIONS, PAMPHLETS, OR OTHER WRITTEN MATERIAL FROM YOUR DOCTOR OR PHARMACY?: NEVER

## 2025-01-01 SDOH — SOCIAL STABILITY: SOCIAL INSECURITY: WITHIN THE LAST YEAR, HAVE YOU BEEN AFRAID OF YOUR PARTNER OR EX-PARTNER?: NO

## 2025-01-01 SDOH — ECONOMIC STABILITY: FOOD INSECURITY: HOW HARD IS IT FOR YOU TO PAY FOR THE VERY BASICS LIKE FOOD, HOUSING, MEDICAL CARE, AND HEATING?: NOT HARD AT ALL

## 2025-01-01 SDOH — HEALTH STABILITY: PHYSICAL HEALTH: ON AVERAGE, HOW MANY MINUTES DO YOU ENGAGE IN EXERCISE AT THIS LEVEL?: 0 MIN

## 2025-01-01 SDOH — SOCIAL STABILITY: SOCIAL INSECURITY: HAVE YOU HAD THOUGHTS OF HARMING ANYONE ELSE?: NO

## 2025-01-01 SDOH — SOCIAL STABILITY: SOCIAL INSECURITY: WITHIN THE LAST YEAR, HAVE YOU BEEN HUMILIATED OR EMOTIONALLY ABUSED IN OTHER WAYS BY YOUR PARTNER OR EX-PARTNER?: NO

## 2025-01-01 SDOH — SOCIAL STABILITY: SOCIAL INSECURITY: DOES ANYONE TRY TO KEEP YOU FROM HAVING/CONTACTING OTHER FRIENDS OR DOING THINGS OUTSIDE YOUR HOME?: NO

## 2025-01-01 SDOH — HEALTH STABILITY: MENTAL HEALTH: HOW OFTEN DO YOU HAVE SIX OR MORE DRINKS ON ONE OCCASION?: NEVER

## 2025-01-01 SDOH — SOCIAL STABILITY: SOCIAL NETWORK
DO YOU BELONG TO ANY CLUBS OR ORGANIZATIONS SUCH AS CHURCH GROUPS, UNIONS, FRATERNAL OR ATHLETIC GROUPS, OR SCHOOL GROUPS?: NO

## 2025-01-01 SDOH — ECONOMIC STABILITY: HOUSING INSECURITY: AT ANY TIME IN THE PAST 12 MONTHS, WERE YOU HOMELESS OR LIVING IN A SHELTER (INCLUDING NOW)?: NO

## 2025-01-01 SDOH — ECONOMIC STABILITY: HOUSING INSECURITY: IN THE PAST 12 MONTHS, HOW MANY TIMES HAVE YOU MOVED WHERE YOU WERE LIVING?: 0

## 2025-01-01 SDOH — SOCIAL STABILITY: SOCIAL INSECURITY: HAVE YOU HAD ANY THOUGHTS OF HARMING ANYONE ELSE?: NO

## 2025-01-01 SDOH — ECONOMIC STABILITY: TRANSPORTATION INSECURITY: IN THE PAST 12 MONTHS, HAS LACK OF TRANSPORTATION KEPT YOU FROM MEDICAL APPOINTMENTS OR FROM GETTING MEDICATIONS?: NO

## 2025-01-01 SDOH — HEALTH STABILITY: MENTAL HEALTH: HOW MANY DRINKS CONTAINING ALCOHOL DO YOU HAVE ON A TYPICAL DAY WHEN YOU ARE DRINKING?: PATIENT DOES NOT DRINK

## 2025-01-01 SDOH — SOCIAL STABILITY: SOCIAL INSECURITY: DO YOU FEEL ANYONE HAS EXPLOITED OR TAKEN ADVANTAGE OF YOU FINANCIALLY OR OF YOUR PERSONAL PROPERTY?: NO

## 2025-01-01 SDOH — HEALTH STABILITY: PHYSICAL HEALTH: ON AVERAGE, HOW MANY DAYS PER WEEK DO YOU ENGAGE IN MODERATE TO STRENUOUS EXERCISE (LIKE A BRISK WALK)?: 0 DAYS

## 2025-01-01 SDOH — SOCIAL STABILITY: SOCIAL NETWORK: HOW OFTEN DO YOU ATTEND MEETINGS OF THE CLUBS OR ORGANIZATIONS YOU BELONG TO?: NEVER

## 2025-01-01 SDOH — HEALTH STABILITY: MENTAL HEALTH: HOW OFTEN DO YOU HAVE A DRINK CONTAINING ALCOHOL?: NEVER

## 2025-01-01 SDOH — SOCIAL STABILITY: SOCIAL INSECURITY: DO YOU FEEL UNSAFE GOING BACK TO THE PLACE WHERE YOU ARE LIVING?: NO

## 2025-01-01 SDOH — ECONOMIC STABILITY: FOOD INSECURITY: WITHIN THE PAST 12 MONTHS, THE FOOD YOU BOUGHT JUST DIDN'T LAST AND YOU DIDN'T HAVE MONEY TO GET MORE.: NEVER TRUE

## 2025-01-01 SDOH — SOCIAL STABILITY: SOCIAL NETWORK: HOW OFTEN DO YOU GET TOGETHER WITH FRIENDS OR RELATIVES?: MORE THAN THREE TIMES A WEEK

## 2025-01-01 SDOH — SOCIAL STABILITY: SOCIAL NETWORK
IN A TYPICAL WEEK, HOW MANY TIMES DO YOU TALK ON THE PHONE WITH FAMILY, FRIENDS, OR NEIGHBORS?: MORE THAN THREE TIMES A WEEK

## 2025-01-01 SDOH — SOCIAL STABILITY: SOCIAL INSECURITY: ABUSE: ADULT

## 2025-01-01 SDOH — SOCIAL STABILITY: SOCIAL INSECURITY: ARE THERE ANY APPARENT SIGNS OF INJURIES/BEHAVIORS THAT COULD BE RELATED TO ABUSE/NEGLECT?: NO

## 2025-01-01 SDOH — SOCIAL STABILITY: SOCIAL NETWORK: HOW OFTEN DO YOU ATTEND CHURCH OR RELIGIOUS SERVICES?: NEVER

## 2025-01-01 SDOH — SOCIAL STABILITY: SOCIAL INSECURITY: ARE YOU OR HAVE YOU BEEN THREATENED OR ABUSED PHYSICALLY, EMOTIONALLY, OR SEXUALLY BY ANYONE?: NO

## 2025-01-01 ASSESSMENT — PAIN SCALES - WONG BAKER
WONGBAKER_NUMERICALRESPONSE: NO HURT

## 2025-01-01 ASSESSMENT — PAIN SCALES - GENERAL
PAINLEVEL_OUTOF10: 1
PAINLEVEL_OUTOF10: 0 - NO PAIN
PAINLEVEL_OUTOF10: 0 - NO PAIN
PAINLEVEL_OUTOF10: 1
PAINLEVEL_OUTOF10: 0 - NO PAIN
PAINLEVEL_OUTOF10: 4
PAINLEVEL_OUTOF10: 0 - NO PAIN
PAINLEVEL_OUTOF10: 4
PAINLEVEL_OUTOF10: 6
PAINLEVEL_OUTOF10: 0 - NO PAIN
PAINLEVEL_OUTOF10: 3
PAINLEVEL_OUTOF10: 0 - NO PAIN
PAINLEVEL_OUTOF10: 0 - NO PAIN

## 2025-01-01 ASSESSMENT — LIFESTYLE VARIABLES
AUDIT-C TOTAL SCORE: 0
HAVE YOU EVER FELT YOU SHOULD CUT DOWN ON YOUR DRINKING: NO
HOW MANY STANDARD DRINKS CONTAINING ALCOHOL DO YOU HAVE ON A TYPICAL DAY: PATIENT DOES NOT DRINK
TOTAL SCORE: 0
HOW OFTEN DO YOU HAVE 6 OR MORE DRINKS ON ONE OCCASION: NEVER
EVER HAD A DRINK FIRST THING IN THE MORNING TO STEADY YOUR NERVES TO GET RID OF A HANGOVER: NO
AUDIT-C TOTAL SCORE: 0
EVER FELT BAD OR GUILTY ABOUT YOUR DRINKING: NO
SKIP TO QUESTIONS 9-10: 1
SKIP TO QUESTIONS 9-10: 1
AUDIT-C TOTAL SCORE: 0
HAVE PEOPLE ANNOYED YOU BY CRITICIZING YOUR DRINKING: NO
HOW OFTEN DO YOU HAVE A DRINK CONTAINING ALCOHOL: NEVER

## 2025-01-01 ASSESSMENT — COGNITIVE AND FUNCTIONAL STATUS - GENERAL
MOVING TO AND FROM BED TO CHAIR: A LITTLE
DAILY ACTIVITIY SCORE: 21
DRESSING REGULAR LOWER BODY CLOTHING: A LITTLE
EATING MEALS: A LITTLE
TURNING FROM BACK TO SIDE WHILE IN FLAT BAD: A LITTLE
MOVING TO AND FROM BED TO CHAIR: A LOT
TOILETING: A LITTLE
WALKING IN HOSPITAL ROOM: A LOT
HELP NEEDED FOR BATHING: A LOT
MOBILITY SCORE: 16
MOBILITY SCORE: 17
HELP NEEDED FOR BATHING: A LITTLE
PATIENT BASELINE BEDBOUND: NO
TURNING FROM BACK TO SIDE WHILE IN FLAT BAD: A LITTLE
TOILETING: A LITTLE
PERSONAL GROOMING: A LITTLE
TOILETING: A LOT
CLIMB 3 TO 5 STEPS WITH RAILING: A LOT
DAILY ACTIVITIY SCORE: 16
STANDING UP FROM CHAIR USING ARMS: A LITTLE
TURNING FROM BACK TO SIDE WHILE IN FLAT BAD: A LITTLE
DRESSING REGULAR LOWER BODY CLOTHING: A LITTLE
MOVING TO AND FROM BED TO CHAIR: A LITTLE
MOVING FROM LYING ON BACK TO SITTING ON SIDE OF FLAT BED WITH BEDRAILS: A LITTLE
WALKING IN HOSPITAL ROOM: A LITTLE
MOBILITY SCORE: 17
CLIMB 3 TO 5 STEPS WITH RAILING: A LOT
DAILY ACTIVITIY SCORE: 21
STANDING UP FROM CHAIR USING ARMS: A LITTLE
STANDING UP FROM CHAIR USING ARMS: A LITTLE
DRESSING REGULAR LOWER BODY CLOTHING: A LITTLE
HELP NEEDED FOR BATHING: A LITTLE
DRESSING REGULAR UPPER BODY CLOTHING: A LITTLE
WALKING IN HOSPITAL ROOM: A LOT
CLIMB 3 TO 5 STEPS WITH RAILING: A LOT

## 2025-01-01 ASSESSMENT — ACTIVITIES OF DAILY LIVING (ADL)
HEARING - RIGHT EAR: FUNCTIONAL
WALKS IN HOME: NEEDS ASSISTANCE
DRESSING YOURSELF: NEEDS ASSISTANCE
BATHING_ASSISTANCE: MODERATE
ADEQUATE_TO_COMPLETE_ADL: NO
BATHING: NEEDS ASSISTANCE
LACK_OF_TRANSPORTATION: NO
ASSISTIVE_DEVICE: WHEELCHAIR
ADL_ASSISTANCE: INDEPENDENT
TOILETING: NEEDS ASSISTANCE
GROOMING: INDEPENDENT
FEEDING YOURSELF: INDEPENDENT
LACK_OF_TRANSPORTATION: NO
ADL_ASSISTANCE: INDEPENDENT
JUDGMENT_ADEQUATE_SAFELY_COMPLETE_DAILY_ACTIVITIES: YES
HEARING - LEFT EAR: FUNCTIONAL
PATIENT'S MEMORY ADEQUATE TO SAFELY COMPLETE DAILY ACTIVITIES?: YES
LACK_OF_TRANSPORTATION: NO

## 2025-01-01 ASSESSMENT — PAIN DESCRIPTION - DESCRIPTORS
DESCRIPTORS: PRESSURE
DESCRIPTORS: ACHING

## 2025-01-01 ASSESSMENT — PATIENT HEALTH QUESTIONNAIRE - PHQ9
SUM OF ALL RESPONSES TO PHQ9 QUESTIONS 1 & 2: 0
2. FEELING DOWN, DEPRESSED OR HOPELESS: NOT AT ALL
1. LITTLE INTEREST OR PLEASURE IN DOING THINGS: NOT AT ALL

## 2025-01-01 ASSESSMENT — PAIN DESCRIPTION - LOCATION: LOCATION: HEAD

## 2025-01-01 ASSESSMENT — PAIN - FUNCTIONAL ASSESSMENT
PAIN_FUNCTIONAL_ASSESSMENT: 0-10
PAIN_FUNCTIONAL_ASSESSMENT: FLACC (FACE, LEGS, ACTIVITY, CRY, CONSOLABILITY)
PAIN_FUNCTIONAL_ASSESSMENT: 0-10
PAIN_FUNCTIONAL_ASSESSMENT: FLACC (FACE, LEGS, ACTIVITY, CRY, CONSOLABILITY)
PAIN_FUNCTIONAL_ASSESSMENT: 0-10
PAIN_FUNCTIONAL_ASSESSMENT: 0-10

## 2025-01-09 DIAGNOSIS — F41.9 ANXIETY: ICD-10-CM

## 2025-01-09 RX ORDER — BUSPIRONE HYDROCHLORIDE 15 MG/1
TABLET ORAL
Qty: 90 TABLET | Refills: 0 | Status: SHIPPED | OUTPATIENT
Start: 2025-01-09

## 2025-01-13 ENCOUNTER — APPOINTMENT (OUTPATIENT)
Dept: CARDIOLOGY | Facility: HOSPITAL | Age: 80
End: 2025-01-13
Payer: MEDICARE

## 2025-01-13 ENCOUNTER — APPOINTMENT (OUTPATIENT)
Dept: URGENT CARE | Age: 80
End: 2025-01-13
Payer: MEDICARE

## 2025-01-13 ENCOUNTER — HOSPITAL ENCOUNTER (INPATIENT)
Facility: HOSPITAL | Age: 80
LOS: 1 days | Discharge: ANOTHER HOSPITAL | End: 2025-01-14
Admitting: STUDENT IN AN ORGANIZED HEALTH CARE EDUCATION/TRAINING PROGRAM
Payer: MEDICARE

## 2025-01-13 ENCOUNTER — APPOINTMENT (OUTPATIENT)
Dept: RADIOLOGY | Facility: HOSPITAL | Age: 80
End: 2025-01-13
Payer: MEDICARE

## 2025-01-13 DIAGNOSIS — R79.89 ELEVATED TROPONIN: ICD-10-CM

## 2025-01-13 DIAGNOSIS — R07.9 CHEST PAIN, UNSPECIFIED TYPE: Primary | ICD-10-CM

## 2025-01-13 DIAGNOSIS — L03.116 CELLULITIS OF LEG, LEFT: ICD-10-CM

## 2025-01-13 PROBLEM — Z79.4 TYPE 2 DIABETES MELLITUS, WITH LONG-TERM CURRENT USE OF INSULIN: Status: ACTIVE | Noted: 2022-02-12

## 2025-01-13 LAB
ALBUMIN SERPL BCP-MCNC: 4.2 G/DL (ref 3.4–5)
ALP SERPL-CCNC: 70 U/L (ref 33–136)
ALT SERPL W P-5'-P-CCNC: 12 U/L (ref 10–52)
ANION GAP SERPL CALCULATED.3IONS-SCNC: 11 MMOL/L (ref 10–20)
APTT PPP: 31 SECONDS (ref 22–32.5)
AST SERPL W P-5'-P-CCNC: 13 U/L (ref 9–39)
BASOPHILS # BLD AUTO: 0.03 X10*3/UL (ref 0–0.1)
BASOPHILS NFR BLD AUTO: 0.5 %
BILIRUB SERPL-MCNC: 1.8 MG/DL (ref 0–1.2)
BNP SERPL-MCNC: 375 PG/ML (ref 0–99)
BUN SERPL-MCNC: 17 MG/DL (ref 6–23)
CALCIUM SERPL-MCNC: 9.3 MG/DL (ref 8.6–10.3)
CARDIAC TROPONIN I PNL SERPL HS: 17 NG/L (ref 0–20)
CARDIAC TROPONIN I PNL SERPL HS: 66 NG/L (ref 0–20)
CHLORIDE SERPL-SCNC: 104 MMOL/L (ref 98–107)
CO2 SERPL-SCNC: 25 MMOL/L (ref 21–32)
CREAT SERPL-MCNC: 1.18 MG/DL (ref 0.5–1.3)
EGFRCR SERPLBLD CKD-EPI 2021: 62 ML/MIN/1.73M*2
EOSINOPHIL # BLD AUTO: 0.08 X10*3/UL (ref 0–0.4)
EOSINOPHIL NFR BLD AUTO: 1.4 %
ERYTHROCYTE [DISTWIDTH] IN BLOOD BY AUTOMATED COUNT: 14.6 % (ref 11.5–14.5)
FLUAV RNA RESP QL NAA+PROBE: NOT DETECTED
FLUBV RNA RESP QL NAA+PROBE: NOT DETECTED
GLUCOSE BLD MANUAL STRIP-MCNC: 111 MG/DL (ref 74–99)
GLUCOSE BLD MANUAL STRIP-MCNC: 98 MG/DL (ref 74–99)
GLUCOSE SERPL-MCNC: 154 MG/DL (ref 74–99)
HCT VFR BLD AUTO: 34.3 % (ref 41–52)
HGB BLD-MCNC: 11.4 G/DL (ref 13.5–17.5)
IMM GRANULOCYTES # BLD AUTO: 0.03 X10*3/UL (ref 0–0.5)
IMM GRANULOCYTES NFR BLD AUTO: 0.5 % (ref 0–0.9)
LACTATE SERPL-SCNC: 1 MMOL/L (ref 0.4–2)
LIPASE SERPL-CCNC: 11 U/L (ref 9–82)
LYMPHOCYTES # BLD AUTO: 0.39 X10*3/UL (ref 0.8–3)
LYMPHOCYTES NFR BLD AUTO: 6.9 %
MAGNESIUM SERPL-MCNC: 1.77 MG/DL (ref 1.6–2.4)
MCH RBC QN AUTO: 28.6 PG (ref 26–34)
MCHC RBC AUTO-ENTMCNC: 33.2 G/DL (ref 32–36)
MCV RBC AUTO: 86 FL (ref 80–100)
MONOCYTES # BLD AUTO: 0.39 X10*3/UL (ref 0.05–0.8)
MONOCYTES NFR BLD AUTO: 6.9 %
NEUTROPHILS # BLD AUTO: 4.76 X10*3/UL (ref 1.6–5.5)
NEUTROPHILS NFR BLD AUTO: 83.8 %
NRBC BLD-RTO: 0 /100 WBCS (ref 0–0)
PLATELET # BLD AUTO: 128 X10*3/UL (ref 150–450)
POTASSIUM SERPL-SCNC: 4.4 MMOL/L (ref 3.5–5.3)
PROT SERPL-MCNC: 7.6 G/DL (ref 6.4–8.2)
RBC # BLD AUTO: 3.98 X10*6/UL (ref 4.5–5.9)
SARS-COV-2 RNA RESP QL NAA+PROBE: NOT DETECTED
SODIUM SERPL-SCNC: 136 MMOL/L (ref 136–145)
WBC # BLD AUTO: 5.7 X10*3/UL (ref 4.4–11.3)

## 2025-01-13 PROCEDURE — 83880 ASSAY OF NATRIURETIC PEPTIDE: CPT | Performed by: CLINICAL NURSE SPECIALIST

## 2025-01-13 PROCEDURE — 71045 X-RAY EXAM CHEST 1 VIEW: CPT | Performed by: RADIOLOGY

## 2025-01-13 PROCEDURE — 83605 ASSAY OF LACTIC ACID: CPT | Performed by: CLINICAL NURSE SPECIALIST

## 2025-01-13 PROCEDURE — 96375 TX/PRO/DX INJ NEW DRUG ADDON: CPT

## 2025-01-13 PROCEDURE — 2060000001 HC INTERMEDIATE ICU ROOM DAILY

## 2025-01-13 PROCEDURE — 2500000002 HC RX 250 W HCPCS SELF ADMINISTERED DRUGS (ALT 637 FOR MEDICARE OP, ALT 636 FOR OP/ED): Performed by: STUDENT IN AN ORGANIZED HEALTH CARE EDUCATION/TRAINING PROGRAM

## 2025-01-13 PROCEDURE — 74174 CTA ABD&PLVS W/CONTRAST: CPT

## 2025-01-13 PROCEDURE — 2550000001 HC RX 255 CONTRASTS: Performed by: STUDENT IN AN ORGANIZED HEALTH CARE EDUCATION/TRAINING PROGRAM

## 2025-01-13 PROCEDURE — 80053 COMPREHEN METABOLIC PANEL: CPT | Performed by: CLINICAL NURSE SPECIALIST

## 2025-01-13 PROCEDURE — 93971 EXTREMITY STUDY: CPT | Performed by: RADIOLOGY

## 2025-01-13 PROCEDURE — 85730 THROMBOPLASTIN TIME PARTIAL: CPT | Performed by: STUDENT IN AN ORGANIZED HEALTH CARE EDUCATION/TRAINING PROGRAM

## 2025-01-13 PROCEDURE — 2500000004 HC RX 250 GENERAL PHARMACY W/ HCPCS (ALT 636 FOR OP/ED): Performed by: STUDENT IN AN ORGANIZED HEALTH CARE EDUCATION/TRAINING PROGRAM

## 2025-01-13 PROCEDURE — 84484 ASSAY OF TROPONIN QUANT: CPT | Performed by: CLINICAL NURSE SPECIALIST

## 2025-01-13 PROCEDURE — 93005 ELECTROCARDIOGRAM TRACING: CPT

## 2025-01-13 PROCEDURE — 96366 THER/PROPH/DIAG IV INF ADDON: CPT

## 2025-01-13 PROCEDURE — 83735 ASSAY OF MAGNESIUM: CPT | Performed by: CLINICAL NURSE SPECIALIST

## 2025-01-13 PROCEDURE — 74174 CTA ABD&PLVS W/CONTRAST: CPT | Performed by: STUDENT IN AN ORGANIZED HEALTH CARE EDUCATION/TRAINING PROGRAM

## 2025-01-13 PROCEDURE — 82947 ASSAY GLUCOSE BLOOD QUANT: CPT

## 2025-01-13 PROCEDURE — 87070 CULTURE OTHR SPECIMN AEROBIC: CPT | Mod: WESLAB | Performed by: CLINICAL NURSE SPECIALIST

## 2025-01-13 PROCEDURE — 93971 EXTREMITY STUDY: CPT

## 2025-01-13 PROCEDURE — 36415 COLL VENOUS BLD VENIPUNCTURE: CPT | Performed by: CLINICAL NURSE SPECIALIST

## 2025-01-13 PROCEDURE — 99291 CRITICAL CARE FIRST HOUR: CPT | Performed by: CLINICAL NURSE SPECIALIST

## 2025-01-13 PROCEDURE — 87040 BLOOD CULTURE FOR BACTERIA: CPT | Mod: WESLAB | Performed by: CLINICAL NURSE SPECIALIST

## 2025-01-13 PROCEDURE — 71275 CT ANGIOGRAPHY CHEST: CPT | Performed by: STUDENT IN AN ORGANIZED HEALTH CARE EDUCATION/TRAINING PROGRAM

## 2025-01-13 PROCEDURE — 87636 SARSCOV2 & INF A&B AMP PRB: CPT | Performed by: CLINICAL NURSE SPECIALIST

## 2025-01-13 PROCEDURE — 71045 X-RAY EXAM CHEST 1 VIEW: CPT

## 2025-01-13 PROCEDURE — 2500000004 HC RX 250 GENERAL PHARMACY W/ HCPCS (ALT 636 FOR OP/ED): Performed by: CLINICAL NURSE SPECIALIST

## 2025-01-13 PROCEDURE — 73564 X-RAY EXAM KNEE 4 OR MORE: CPT | Mod: LEFT SIDE | Performed by: RADIOLOGY

## 2025-01-13 PROCEDURE — 2500000001 HC RX 250 WO HCPCS SELF ADMINISTERED DRUGS (ALT 637 FOR MEDICARE OP): Performed by: STUDENT IN AN ORGANIZED HEALTH CARE EDUCATION/TRAINING PROGRAM

## 2025-01-13 PROCEDURE — 85025 COMPLETE CBC W/AUTO DIFF WBC: CPT | Performed by: CLINICAL NURSE SPECIALIST

## 2025-01-13 PROCEDURE — 99223 1ST HOSP IP/OBS HIGH 75: CPT | Performed by: STUDENT IN AN ORGANIZED HEALTH CARE EDUCATION/TRAINING PROGRAM

## 2025-01-13 PROCEDURE — 96365 THER/PROPH/DIAG IV INF INIT: CPT

## 2025-01-13 PROCEDURE — 73564 X-RAY EXAM KNEE 4 OR MORE: CPT | Mod: LT

## 2025-01-13 PROCEDURE — 83690 ASSAY OF LIPASE: CPT | Performed by: CLINICAL NURSE SPECIALIST

## 2025-01-13 RX ORDER — DICYCLOMINE HYDROCHLORIDE 10 MG/1
10 CAPSULE ORAL 2 TIMES DAILY PRN
Status: DISCONTINUED | OUTPATIENT
Start: 2025-01-13 | End: 2025-01-14 | Stop reason: HOSPADM

## 2025-01-13 RX ORDER — AMLODIPINE BESYLATE 5 MG/1
5 TABLET ORAL DAILY
Status: DISCONTINUED | OUTPATIENT
Start: 2025-01-13 | End: 2025-01-14 | Stop reason: HOSPADM

## 2025-01-13 RX ORDER — ATORVASTATIN CALCIUM 40 MG/1
40 TABLET, FILM COATED ORAL NIGHTLY
Status: DISCONTINUED | OUTPATIENT
Start: 2025-01-13 | End: 2025-01-14 | Stop reason: HOSPADM

## 2025-01-13 RX ORDER — DEXTROSE 50 % IN WATER (D50W) INTRAVENOUS SYRINGE
12.5
Status: DISCONTINUED | OUTPATIENT
Start: 2025-01-13 | End: 2025-01-14 | Stop reason: HOSPADM

## 2025-01-13 RX ORDER — ONDANSETRON 4 MG/1
4 TABLET, FILM COATED ORAL EVERY 8 HOURS PRN
Status: DISCONTINUED | OUTPATIENT
Start: 2025-01-13 | End: 2025-01-14 | Stop reason: HOSPADM

## 2025-01-13 RX ORDER — LEVOTHYROXINE SODIUM 50 UG/1
50 TABLET ORAL DAILY
Status: DISCONTINUED | OUTPATIENT
Start: 2025-01-13 | End: 2025-01-14 | Stop reason: HOSPADM

## 2025-01-13 RX ORDER — FENTANYL CITRATE 50 UG/ML
50 INJECTION, SOLUTION INTRAMUSCULAR; INTRAVENOUS ONCE
Status: COMPLETED | OUTPATIENT
Start: 2025-01-13 | End: 2025-01-13

## 2025-01-13 RX ORDER — BUSPIRONE HYDROCHLORIDE 15 MG/1
15 TABLET ORAL NIGHTLY
Status: DISCONTINUED | OUTPATIENT
Start: 2025-01-13 | End: 2025-01-14 | Stop reason: HOSPADM

## 2025-01-13 RX ORDER — INSULIN GLARGINE 100 [IU]/ML
16 INJECTION, SOLUTION SUBCUTANEOUS NIGHTLY
Status: DISCONTINUED | OUTPATIENT
Start: 2025-01-14 | End: 2025-01-14 | Stop reason: HOSPADM

## 2025-01-13 RX ORDER — FAMOTIDINE 10 MG/ML
40 INJECTION INTRAVENOUS ONCE
Status: COMPLETED | OUTPATIENT
Start: 2025-01-13 | End: 2025-01-13

## 2025-01-13 RX ORDER — RANOLAZINE 500 MG/1
500 TABLET, EXTENDED RELEASE ORAL 2 TIMES DAILY
Status: DISCONTINUED | OUTPATIENT
Start: 2025-01-13 | End: 2025-01-14 | Stop reason: HOSPADM

## 2025-01-13 RX ORDER — PANTOPRAZOLE SODIUM 40 MG/10ML
40 INJECTION, POWDER, LYOPHILIZED, FOR SOLUTION INTRAVENOUS DAILY
Status: DISCONTINUED | OUTPATIENT
Start: 2025-01-13 | End: 2025-01-14 | Stop reason: HOSPADM

## 2025-01-13 RX ORDER — SPIRONOLACTONE 25 MG/1
25 TABLET ORAL EVERY MORNING
COMMUNITY
End: 2025-01-25 | Stop reason: HOSPADM

## 2025-01-13 RX ORDER — ONDANSETRON HYDROCHLORIDE 2 MG/ML
4 INJECTION, SOLUTION INTRAVENOUS EVERY 8 HOURS PRN
Status: DISCONTINUED | OUTPATIENT
Start: 2025-01-13 | End: 2025-01-14 | Stop reason: HOSPADM

## 2025-01-13 RX ORDER — ASPIRIN 81 MG/1
81 TABLET ORAL DAILY
Status: DISCONTINUED | OUTPATIENT
Start: 2025-01-13 | End: 2025-01-14 | Stop reason: HOSPADM

## 2025-01-13 RX ORDER — ONDANSETRON HYDROCHLORIDE 2 MG/ML
4 INJECTION, SOLUTION INTRAVENOUS ONCE
Status: COMPLETED | OUTPATIENT
Start: 2025-01-13 | End: 2025-01-13

## 2025-01-13 RX ORDER — HYDROXYZINE PAMOATE 25 MG/1
25 CAPSULE ORAL 3 TIMES DAILY PRN
Status: DISCONTINUED | OUTPATIENT
Start: 2025-01-13 | End: 2025-01-14 | Stop reason: HOSPADM

## 2025-01-13 RX ORDER — METOPROLOL SUCCINATE 25 MG/1
25 TABLET, EXTENDED RELEASE ORAL DAILY
Status: DISCONTINUED | OUTPATIENT
Start: 2025-01-13 | End: 2025-01-14 | Stop reason: HOSPADM

## 2025-01-13 RX ORDER — INSULIN GLARGINE 100 [IU]/ML
16 INJECTION, SOLUTION SUBCUTANEOUS 2 TIMES DAILY
Status: DISCONTINUED | OUTPATIENT
Start: 2025-01-13 | End: 2025-01-13

## 2025-01-13 RX ORDER — PANTOPRAZOLE SODIUM 40 MG/1
40 TABLET, DELAYED RELEASE ORAL DAILY
Status: DISCONTINUED | OUTPATIENT
Start: 2025-01-13 | End: 2025-01-14 | Stop reason: HOSPADM

## 2025-01-13 RX ORDER — MIRTAZAPINE 15 MG/1
15 TABLET, FILM COATED ORAL NIGHTLY
Status: DISCONTINUED | OUTPATIENT
Start: 2025-01-13 | End: 2025-01-14 | Stop reason: HOSPADM

## 2025-01-13 RX ORDER — INSULIN LISPRO 100 [IU]/ML
0-10 INJECTION, SOLUTION INTRAVENOUS; SUBCUTANEOUS
Status: DISCONTINUED | OUTPATIENT
Start: 2025-01-13 | End: 2025-01-14 | Stop reason: HOSPADM

## 2025-01-13 RX ORDER — CEPHALEXIN 500 MG/1
500 CAPSULE ORAL EVERY 6 HOURS SCHEDULED
Status: DISCONTINUED | OUTPATIENT
Start: 2025-01-13 | End: 2025-01-14 | Stop reason: HOSPADM

## 2025-01-13 RX ORDER — VANCOMYCIN 1.25 G/250ML
1.75 INJECTION, SOLUTION INTRAVENOUS ONCE
Status: COMPLETED | OUTPATIENT
Start: 2025-01-13 | End: 2025-01-13

## 2025-01-13 RX ORDER — POLYETHYLENE GLYCOL 3350 17 G/17G
17 POWDER, FOR SOLUTION ORAL DAILY
Status: DISCONTINUED | OUTPATIENT
Start: 2025-01-13 | End: 2025-01-14 | Stop reason: HOSPADM

## 2025-01-13 RX ORDER — PRASUGREL 10 MG/1
10 TABLET, FILM COATED ORAL DAILY
Status: DISCONTINUED | OUTPATIENT
Start: 2025-01-13 | End: 2025-01-14 | Stop reason: HOSPADM

## 2025-01-13 RX ORDER — HEPARIN SODIUM 5000 [USP'U]/ML
INJECTION, SOLUTION INTRAVENOUS; SUBCUTANEOUS AS NEEDED
Status: DISCONTINUED | OUTPATIENT
Start: 2025-01-13 | End: 2025-01-14 | Stop reason: HOSPADM

## 2025-01-13 RX ORDER — HEPARIN SODIUM 5000 [USP'U]/ML
4000 INJECTION, SOLUTION INTRAVENOUS; SUBCUTANEOUS ONCE
Status: COMPLETED | OUTPATIENT
Start: 2025-01-13 | End: 2025-01-13

## 2025-01-13 RX ORDER — DEXTROSE 50 % IN WATER (D50W) INTRAVENOUS SYRINGE
25
Status: DISCONTINUED | OUTPATIENT
Start: 2025-01-13 | End: 2025-01-14 | Stop reason: HOSPADM

## 2025-01-13 RX ORDER — HEPARIN SODIUM 10000 [USP'U]/100ML
0-4000 INJECTION, SOLUTION INTRAVENOUS CONTINUOUS
Status: DISCONTINUED | OUTPATIENT
Start: 2025-01-13 | End: 2025-01-14 | Stop reason: HOSPADM

## 2025-01-13 RX ORDER — MORPHINE SULFATE 4 MG/ML
4 INJECTION, SOLUTION INTRAMUSCULAR; INTRAVENOUS EVERY 4 HOURS PRN
Status: DISCONTINUED | OUTPATIENT
Start: 2025-01-13 | End: 2025-01-14 | Stop reason: HOSPADM

## 2025-01-13 RX ORDER — ENOXAPARIN SODIUM 100 MG/ML
40 INJECTION SUBCUTANEOUS EVERY 24 HOURS
Status: DISCONTINUED | OUTPATIENT
Start: 2025-01-13 | End: 2025-01-13

## 2025-01-13 RX ADMIN — FENTANYL CITRATE 50 MCG: 50 INJECTION INTRAMUSCULAR; INTRAVENOUS at 11:27

## 2025-01-13 RX ADMIN — ONDANSETRON 4 MG: 2 INJECTION INTRAMUSCULAR; INTRAVENOUS at 11:27

## 2025-01-13 RX ADMIN — HEPARIN SODIUM 4000 UNITS: 5000 INJECTION, SOLUTION INTRAVENOUS; SUBCUTANEOUS at 17:22

## 2025-01-13 RX ADMIN — ATORVASTATIN CALCIUM 40 MG: 40 TABLET, FILM COATED ORAL at 21:21

## 2025-01-13 RX ADMIN — AMLODIPINE BESYLATE 5 MG: 5 TABLET ORAL at 17:22

## 2025-01-13 RX ADMIN — ONDANSETRON HYDROCHLORIDE 4 MG: 4 TABLET, FILM COATED ORAL at 17:22

## 2025-01-13 RX ADMIN — RANOLAZINE 500 MG: 500 TABLET, EXTENDED RELEASE ORAL at 21:53

## 2025-01-13 RX ADMIN — FAMOTIDINE 40 MG: 10 INJECTION, SOLUTION INTRAVENOUS at 11:27

## 2025-01-13 RX ADMIN — MIRTAZAPINE 15 MG: 15 TABLET, FILM COATED ORAL at 21:21

## 2025-01-13 RX ADMIN — IOHEXOL 75 ML: 350 INJECTION, SOLUTION INTRAVENOUS at 16:38

## 2025-01-13 RX ADMIN — PRASUGREL 10 MG: 10 TABLET, FILM COATED ORAL at 17:23

## 2025-01-13 RX ADMIN — HEPARIN SODIUM 800 UNITS/HR: 10000 INJECTION, SOLUTION INTRAVENOUS at 17:22

## 2025-01-13 RX ADMIN — METOPROLOL SUCCINATE 25 MG: 25 TABLET, FILM COATED, EXTENDED RELEASE ORAL at 17:22

## 2025-01-13 RX ADMIN — LEVOTHYROXINE SODIUM 50 MCG: 0.05 TABLET ORAL at 17:22

## 2025-01-13 RX ADMIN — CEPHALEXIN 500 MG: 500 CAPSULE ORAL at 17:22

## 2025-01-13 RX ADMIN — PANTOPRAZOLE SODIUM 40 MG: 40 TABLET, DELAYED RELEASE ORAL at 17:22

## 2025-01-13 RX ADMIN — PIPERACILLIN SODIUM AND TAZOBACTAM SODIUM 4.5 G: 4; .5 INJECTION, SOLUTION INTRAVENOUS at 15:18

## 2025-01-13 RX ADMIN — ASPIRIN 81 MG: 81 TABLET, COATED ORAL at 17:22

## 2025-01-13 RX ADMIN — VANCOMYCIN 1.75 G: 1.25 INJECTION, SOLUTION INTRAVENOUS at 15:19

## 2025-01-13 RX ADMIN — CEPHALEXIN 500 MG: 500 CAPSULE ORAL at 23:39

## 2025-01-13 RX ADMIN — BUSPIRONE HYDROCHLORIDE 15 MG: 15 TABLET ORAL at 21:21

## 2025-01-13 ASSESSMENT — LIFESTYLE VARIABLES
EVER FELT BAD OR GUILTY ABOUT YOUR DRINKING: NO
TOTAL SCORE: 0
EVER HAD A DRINK FIRST THING IN THE MORNING TO STEADY YOUR NERVES TO GET RID OF A HANGOVER: NO
HAVE YOU EVER FELT YOU SHOULD CUT DOWN ON YOUR DRINKING: NO
HAVE PEOPLE ANNOYED YOU BY CRITICIZING YOUR DRINKING: NO

## 2025-01-13 ASSESSMENT — ENCOUNTER SYMPTOMS
BACK PAIN: 0
WOUND: 1
NAUSEA: 1
CHILLS: 0
ABDOMINAL PAIN: 0
CONSTIPATION: 0
SHORTNESS OF BREATH: 0
DIFFICULTY URINATING: 0
CONFUSION: 0
TROUBLE SWALLOWING: 0
DIARRHEA: 0
FEVER: 0
VOMITING: 0

## 2025-01-13 ASSESSMENT — PAIN SCALES - GENERAL
PAINLEVEL_OUTOF10: 6
PAINLEVEL_OUTOF10: 2
PAINLEVEL_OUTOF10: 7
PAINLEVEL_OUTOF10: 2

## 2025-01-13 ASSESSMENT — HEART SCORE
HISTORY: MODERATELY SUSPICIOUS
HEART SCORE: 9
AGE: 65+
TROPONIN: GREATER THAN OR EQUAL TO 3 TIMES NORMAL LIMIT
ECG: SIGNIFICANT ST-DEPRESSION
RISK FACTORS: >2 RISK FACTORS OR HX OF ATHEROSCLEROTIC DISEASE

## 2025-01-13 ASSESSMENT — PAIN DESCRIPTION - DESCRIPTORS
DESCRIPTORS: PRESSURE;CRUSHING
DESCRIPTORS: ACHING
DESCRIPTORS: ACHING

## 2025-01-13 ASSESSMENT — COLUMBIA-SUICIDE SEVERITY RATING SCALE - C-SSRS
6. HAVE YOU EVER DONE ANYTHING, STARTED TO DO ANYTHING, OR PREPARED TO DO ANYTHING TO END YOUR LIFE?: NO
2. HAVE YOU ACTUALLY HAD ANY THOUGHTS OF KILLING YOURSELF?: NO
1. IN THE PAST MONTH, HAVE YOU WISHED YOU WERE DEAD OR WISHED YOU COULD GO TO SLEEP AND NOT WAKE UP?: NO

## 2025-01-13 ASSESSMENT — PAIN - FUNCTIONAL ASSESSMENT: PAIN_FUNCTIONAL_ASSESSMENT: 0-10

## 2025-01-13 ASSESSMENT — PAIN DESCRIPTION - LOCATION: LOCATION: CHEST

## 2025-01-13 ASSESSMENT — PAIN DESCRIPTION - PAIN TYPE: TYPE: ACUTE PAIN

## 2025-01-13 ASSESSMENT — PAIN DESCRIPTION - ORIENTATION: ORIENTATION: MID

## 2025-01-13 NOTE — ED PROVIDER NOTES
THIS IS MY [facility] SUPERVISORY AND SHARED VISIT NOTE:    I personally saw the patient and made/approved the management plan and take responsibility for the patient management.    History: Patient is an 79-year-old male presents today with a chief complaint of chest pain.  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  Primus, patient did receive aspirin nitro, the nitro did cause a acute drop of blood pressure in the ED, had a cath in December with a RCA lesion, and EF of 40-45 December, patient had no cough or congestion, no runny nose or sore throat, no nausea or vomiting, no abdominal pain, no dizziness or blurred vision, no other acute complaints noted at this time.    Exam: GENERAL APPEARANCE: Awake and alert.  Ill-appearing    HEENT: Normocephalic, atraumatic. Extraocular muscles are intact. Pupils equal round and reactive to light.  CHEST: Nontender to palpation. Clear to auscultation bilaterally. No rales, rhonchi, or wheezing.   HEART: S1, S2. Regular rate and rhythm. No murmurs, gallops or rubs.  Strong and equal pulses in the extremities.   ABDOMEN: Soft,.  non-tender.  No rebound or guarding, bowel sounds normal x 4 quadrants  NEUROLOGICAL: Awake, alert and oriented x 3.       MDM: Patient seen and evaluated at bedside, patient is in no acute distress.  I will order a CBC, CMP, blood cultures, troponin, BNP, COVID, flu, lactate, coags, CMP, magnesium, CT angio abdomen pelvis, x-ray knee, x-ray chest, lower extremity ultrasound, give the patient Advil and Zofran,. Differential diagnosis includes but is not limited to ACS, pneumonia, AAA, aortic dissection, COVID, flu.  Patient's laboratory evaluation does show a significant jump of troponin once again, BNP elevated at 375, COVID and flu negative, hemoglobin 11.4 hematocrit 34.3, patient patient's symptoms, patient was given  broad-spectrum antibiotics for potential infection of his wound, patient admitted to the general medicine team for further evaluation and treatment.    Diagnosis: Chest pain, elevated troponin,  CT angio chest abdomen pelvis   Final Result   CHEST   1.  No evidence of aortic aneurysm, dissection, or acute intramural   hematoma. Moderate calcified and noncalcified atherosclerotic plaque   of the thoracic and abdominal aorta. Patent left renal artery stent   and partially visualized bilateral superficial femoral artery stents.   Sternotomy changes. Coronary artery atherosclerotic calcifications.   2. No evidence of consolidation or pleural effusion. Moderate   centrilobular and paraseptal emphysema as well as nonspecific   pulmonary fibrotic changes. Asymmetric scarring in the left lung apex   is new since 2010 and consider follow-up chest CT in 6 months to   confirm stability.        ABDOMEN - PELVIS   1.  No acute abnormality in the abdomen/pelvis. Mild bilateral renal   cortical thinning.   2. Unchanged chronic compression deformity of L4 vertebral body.             Signed by: Andrez Veloz 1/13/2025 5:49 PM   Dictation workstation:   [id number]      XR knee left 4+ views   Final Result   No acute findings status post left below-the-knee amputation.        Signed by: Leland Murphy 1/13/2025 2:50 PM   Dictation workstation:   [id number]      Lower extremity venous duplex left   Final Result   1. No evidence of deep venous thrombus in the evaluated veins of the   left leg from the inguinal ligament to the popliteal fossa.   Below-knee amputation present.        Signed by: Kishor Andrade 1/13/2025 1:38 PM   Dictation workstation:   [id number]      XR chest 1 view   Final Result   1.  Mild bibasilar atelectasis. No localized consolidation.                  MACRO:   None.        Signed by: Duane Chou 1/13/2025 11:40 AM   Dictation workstation:   [id number]        Results for orders placed or performed during  the hospital encounter of 01/13/25   ECG 12 lead    Collection Time: 01/13/25 11:09 AM   Result Value Ref Range    Ventricular Rate 76 BPM    Atrial Rate 76 BPM    SD Interval 236 ms    QRS Duration 160 ms    QT Interval 440 ms    QTC Calculation(Bazett) 495 ms    P Axis 69 degrees    R Axis -21 degrees    T Axis 128 degrees    QRS Count 13 beats    Q Onset 211 ms    P Onset 93 ms    P Offset 152 ms    T Offset 431 ms    QTC Fredericia 475 ms   ECG 12 lead    Collection Time: 01/13/25 11:15 AM   Result Value Ref Range    Ventricular Rate 92 BPM    Atrial Rate 92 BPM    SD Interval 150 ms    QRS Duration 160 ms    QT Interval 404 ms    QTC Calculation(Bazett) 499 ms    R Axis -43 degrees    T Axis 142 degrees    QRS Count 15 beats    Q Onset 213 ms    P Onset 138 ms    P Offset 174 ms    T Offset 415 ms    QTC Fredericia 465 ms   CBC and Auto Differential    Collection Time: 01/13/25 11:20 AM   Result Value Ref Range    WBC 5.7 4.4 - 11.3 x10*3/uL    nRBC 0.0 0.0 - 0.0 /100 WBCs    RBC 3.98 (L) 4.50 - 5.90 x10*6/uL    Hemoglobin 11.4 (L) 13.5 - 17.5 g/dL    Hematocrit 34.3 (L) 41.0 - 52.0 %    MCV 86 80 - 100 fL    MCH 28.6 26.0 - 34.0 pg    MCHC 33.2 32.0 - 36.0 g/dL    RDW 14.6 (H) 11.5 - 14.5 %    Platelets 128 (L) 150 - 450 x10*3/uL    Neutrophils % 83.8 40.0 - 80.0 %    Immature Granulocytes %, Automated 0.5 0.0 - 0.9 %    Lymphocytes % 6.9 13.0 - 44.0 %    Monocytes % 6.9 2.0 - 10.0 %    Eosinophils % 1.4 0.0 - 6.0 %    Basophils % 0.5 0.0 - 2.0 %    Neutrophils Absolute 4.76 1.60 - 5.50 x10*3/uL    Immature Granulocytes Absolute, Automated 0.03 0.00 - 0.50 x10*3/uL    Lymphocytes Absolute 0.39 (L) 0.80 - 3.00 x10*3/uL    Monocytes Absolute 0.39 0.05 - 0.80 x10*3/uL    Eosinophils Absolute 0.08 0.00 - 0.40 x10*3/uL    Basophils Absolute 0.03 0.00 - 0.10 x10*3/uL   Comprehensive Metabolic Panel    Collection Time: 01/13/25 11:20 AM   Result Value Ref Range    Glucose 154 (H) 74 - 99 mg/dL    Sodium 136 136 - 145  mmol/L    Potassium 4.4 3.5 - 5.3 mmol/L    Chloride 104 98 - 107 mmol/L    Bicarbonate 25 21 - 32 mmol/L    Anion Gap 11 10 - 20 mmol/L    Urea Nitrogen 17 6 - 23 mg/dL    Creatinine 1.18 0.50 - 1.30 mg/dL    eGFR 62 >60 mL/min/1.73m*2    Calcium 9.3 8.6 - 10.3 mg/dL    Albumin 4.2 3.4 - 5.0 g/dL    Alkaline Phosphatase 70 33 - 136 U/L    Total Protein 7.6 6.4 - 8.2 g/dL    AST 13 9 - 39 U/L    Bilirubin, Total 1.8 (H) 0.0 - 1.2 mg/dL    ALT 12 10 - 52 U/L   Magnesium    Collection Time: 01/13/25 11:20 AM   Result Value Ref Range    Magnesium 1.77 1.60 - 2.40 mg/dL   Lipase    Collection Time: 01/13/25 11:20 AM   Result Value Ref Range    Lipase 11 9 - 82 U/L   B-type natriuretic peptide    Collection Time: 01/13/25 11:20 AM   Result Value Ref Range     (H) 0 - 99 pg/mL   Troponin I, High Sensitivity, Initial    Collection Time: 01/13/25 11:20 AM   Result Value Ref Range    Troponin I, High Sensitivity 17 0 - 20 ng/L   Sars-CoV-2 and Influenza A/B PCR    Collection Time: 01/13/25 11:35 AM   Result Value Ref Range    Flu A Result Not Detected Not Detected    Flu B Result Not Detected Not Detected    Coronavirus 2019, PCR Not Detected Not Detected   Troponin, High Sensitivity, 1 Hour    Collection Time: 01/13/25  2:01 PM   Result Value Ref Range    Troponin I, High Sensitivity 66 (HH) 0 - 20 ng/L   Blood Culture    Collection Time: 01/13/25  3:18 PM    Specimen: Peripheral Venipuncture; Blood culture   Result Value Ref Range    Blood Culture Loaded on Instrument - Culture in progress    Blood Culture    Collection Time: 01/13/25  3:18 PM    Specimen: Peripheral Venipuncture; Blood culture   Result Value Ref Range    Blood Culture Loaded on Instrument - Culture in progress    Lactate    Collection Time: 01/13/25  3:18 PM   Result Value Ref Range    Lactate 1.0 0.4 - 2.0 mmol/L   aPTT    Collection Time: 01/13/25  5:28 PM   Result Value Ref Range    aPTT 31.0 22.0 - 32.5 seconds   POCT GLUCOSE    Collection  Time: 01/13/25  5:41 PM   Result Value Ref Range    POCT Glucose 111 (H) 74 - 99 mg/dL   POCT GLUCOSE    Collection Time: 01/13/25  9:12 PM   Result Value Ref Range    POCT Glucose 98 74 - 99 mg/dL     .  Critical Care Time: 35 minutes excluding time spent performing procedures, treating other patients, and teaching time.    Critical Concern/Vital Organ System Affected: Cardiovascular, symptoms,    Critical Intervention: Consultation with cardiology, starting broad-spectrum antibiotics, reassessment of EKGs.  Please see DELISA note for further details    Sections of this report were created using voice-to-text technology and may contain errors in translation    Rubin Vivar DO  Emergency Medicine       Rubin Vivar,   01/13/25 2136       Rubin Vivar DO  01/13/25 2130

## 2025-01-13 NOTE — ED PROVIDER NOTES
Department of Emergency Medicine   ED  Provider Note  Admit Date/RoomTime: 1/13/2025 10:55 AM  ED Room: 12/12        History of Present Illness:  Chief Complaint   Patient presents with    Chest Pain         Chavez Llamas is a 79 y.o. male history of myocardial infarction, status post stent most recent in December  Anxiety, hypertension, diabetes, thrombocytopenia, depression, reflux, congestive heart failure.  Woke up this morning with belching nausea vomiting then developed midsternal chest pain became diaphoretic.  Similar to previous heart attack.  EMS was initiated patient received 1 nitro and blood pressure systolic went from 150-70.  Patient states he overall does not feel well.  Patient cardiac cath with lesion in the RCA with previous stenting in December.  Echo performed showed he had a EF of 40-45 percent in December.  Patient denies any cough congestion runny nose sore throat.  Reports he felt fine last night.  No increased swelling.  No fever or chills no abdominal pain  Cardiac risk factors  For coronary artery disease, diabetes, hypertension, remote smoker  Review of Systems:   Pertinent positives and negatives are stated within HPI, all other systems reviewed and are negative.        --------------------------------------------- PAST HISTORY ---------------------------------------------  Past Medical History:  has a past medical history of Old myocardial infarction (02/01/2017).  Past Surgical History:  has a past surgical history that includes Coronary artery bypass graft (04/14/2015); MR angio head wo IV contrast (2/23/2022); and Cardiac catheterization (N/A, 12/6/2024).  Social History:  reports that he has quit smoking. His smoking use included cigarettes. He has never used smokeless tobacco.  Family History: family history includes Cerebrovascular Accident in his father; Diabetes in his mother.. Unless otherwise noted, family history is non contributory  The patient’s home medications  "have been reviewed.  Allergies: Patient has no known allergies.        ---------------------------------------------------PHYSICAL EXAM--------------------------------------    GENERAL APPEARANCE: Awake and alert.  Ill-appearing  VITAL SIGNS: As per the nurses' triage record.   HEENT: Normocephalic, atraumatic. Extraocular muscles are intact. Pupils equal round and reactive to light. Conjunctiva are pink. Negative scleral icterus. Mucous membranes are moist. Tongue in the midline. Pharynx was without erythema or exudates, uvula midline  NECK: Soft Nontender and supple, full gross ROM, no meningeal signs.  CHEST: Nontender to palpation. Clear to auscultation bilaterally. No rales, rhonchi, or wheezing.   HEART: S1, S2. Regular rate and rhythm. No murmurs, gallops or rubs.  Strong and equal pulses in the extremities.   ABDOMEN: Soft, nontender, nondistended, positive bowel sounds, no palpable masses.  MUSCULCSKELETAL: Left BKA right lower extremity.  Erythema small pustule with surrounding erythema noncircumferential .  Warm to touch no fluctuance.  Proximal to the amputation suture line intact no redness or warmth.  Right lower peripheral pulse intact no peripheral edema  NEUROLOGICAL: Awake, alert and oriented x 3. Power intact in the upper and lower extremities. Sensation is intact to light touch in the upper and lower extremities.   IMMUNOLOGICAL: No lymphatic streaking noted   DERM: Pale cool to touch no petechiae, rashes, or ecchymoses.          ------------------------- NURSING NOTES AND VITALS REVIEWED ---------------------------  The nursing notes within the ED encounter and vital signs as below have been reviewed by myself  /65   Pulse 79   Temp 36.6 °C (97.9 °F)   Resp 10   Ht 1.727 m (5' 8\")   Wt 68 kg (150 lb)   SpO2 99%   BMI 22.81 kg/m²     Oxygen Saturation Interpretation: 100% room air    The cardiac monitor revealed sinus rhythm with a heart rate in the 95s as interpreted by me. The " cardiac monitor was ordered secondary to the patient's heart rate and to monitor the patient for dysrhythmia.       The patient’s available past medical records and past encounters were reviewed.          -----------------------DIAGNOSTIC RESULTS------------------------  LABS:    Labs Reviewed   CBC WITH AUTO DIFFERENTIAL - Abnormal       Result Value    WBC 5.7      nRBC 0.0      RBC 3.98 (*)     Hemoglobin 11.4 (*)     Hematocrit 34.3 (*)     MCV 86      MCH 28.6      MCHC 33.2      RDW 14.6 (*)     Platelets 128 (*)     Neutrophils % 83.8      Immature Granulocytes %, Automated 0.5      Lymphocytes % 6.9      Monocytes % 6.9      Eosinophils % 1.4      Basophils % 0.5      Neutrophils Absolute 4.76      Immature Granulocytes Absolute, Automated 0.03      Lymphocytes Absolute 0.39 (*)     Monocytes Absolute 0.39      Eosinophils Absolute 0.08      Basophils Absolute 0.03     COMPREHENSIVE METABOLIC PANEL - Abnormal    Glucose 154 (*)     Sodium 136      Potassium 4.4      Chloride 104      Bicarbonate 25      Anion Gap 11      Urea Nitrogen 17      Creatinine 1.18      eGFR 62      Calcium 9.3      Albumin 4.2      Alkaline Phosphatase 70      Total Protein 7.6      AST 13      Bilirubin, Total 1.8 (*)     ALT 12     B-TYPE NATRIURETIC PEPTIDE - Abnormal     (*)     Narrative:        <100 pg/mL - Heart failure unlikely  100-299 pg/mL - Intermediate probability of acute heart                  failure exacerbation. Correlate with clinical                  context and patient history.    >=300 pg/mL - Heart Failure likely. Correlate with clinical                  context and patient history.    BNP testing is performed using different testing methodology at [facility] than at other [facility] hospitals. Direct result comparisons should only be made within the same method.      SERIAL TROPONIN, 1 HOUR - Abnormal    Troponin I, High Sensitivity 66 (*)     Narrative:     Less than 99th percentile of  normal range cutoff-  Female and children under 18 years old <14 ng/L; Male <21 ng/L: Negative  Repeat testing should be performed if clinically indicated.     Female and children under 18 years old 14-50 ng/L; Male 21-50 ng/L:  Consistent with possible cardiac damage and possible increased clinical   risk. Serial measurements may help to assess extent of myocardial damage.     >50 ng/L: Consistent with cardiac damage, increased clinical risk and  myocardial infarction. Serial measurements may help assess extent of   myocardial damage.      NOTE: Children less than 1 year old may have higher baseline troponin   levels and results should be interpreted in conjunction with the overall   clinical context.     NOTE: Troponin I testing is performed using a different   testing methodology at [facility] than at other   [facility]. Direct result comparisons should only   be made within the same method.   POCT GLUCOSE - Abnormal    POCT Glucose 111 (*)    BLOOD CULTURE - Normal    Blood Culture Loaded on Instrument - Culture in progress     BLOOD CULTURE - Normal    Blood Culture Loaded on Instrument - Culture in progress     MAGNESIUM - Normal    Magnesium 1.77     SARS-COV-2 AND INFLUENZA A/B PCR - Normal    Flu A Result Not Detected      Flu B Result Not Detected      Coronavirus 2019, PCR Not Detected      Narrative:     This assay has received FDA Emergency Use Authorization (EUA) and  is only authorized for the duration of time that circumstances exist to justify the authorization of the emergency use of in vitro diagnostic tests for the detection of SARS-CoV-2 virus and/or diagnosis of COVID-19 infection under section 564(b)(1) of the Act, 21 U.S.C. 360bbb-3(b)(1). Testing for SARS-CoV-2 is only recommended for patients who meet current clinical and/or epidemiological criteria as defined by federal, state, or local public health directives. This assay is an in vitro diagnostic nucleic acid  amplification test for the qualitative detection of SARS-CoV-2, Influenza A, and Influenza B from nasopharyngeal specimens and has been validated for use at [facility]. Negative results do not preclude COVID-19 infections or Influenza A/B infections, and should not be used as the sole basis for diagnosis, treatment, or other management decisions. If Influenza A/B and RSV PCR results are negative, testing for Parainfluenza virus, Adenovirus and Metapneumovirus is routinely performed for [facility] pediatric oncology and intensive care inpatients, and is available on other patients by placing an add-on request.    LIPASE - Normal    Lipase 11      Narrative:     Venipuncture immediately after or during the administration of Metamizole may lead to falsely low results. Testing should be performed immediately prior to Metamizole dosing.   SERIAL TROPONIN-INITIAL - Normal    Troponin I, High Sensitivity 17      Narrative:     Less than 99th percentile of normal range cutoff-  Female and children under 18 years old <14 ng/L; Male <21 ng/L: Negative  Repeat testing should be performed if clinically indicated.     Female and children under 18 years old 14-50 ng/L; Male 21-50 ng/L:  Consistent with possible cardiac damage and possible increased clinical   risk. Serial measurements may help to assess extent of myocardial damage.     >50 ng/L: Consistent with cardiac damage, increased clinical risk and  myocardial infarction. Serial measurements may help assess extent of   myocardial damage.      NOTE: Children less than 1 year old may have higher baseline troponin   levels and results should be interpreted in conjunction with the overall   clinical context.     NOTE: Troponin I testing is performed using a different   testing methodology at [facility]] than at other   [facility]. Direct result comparisons should only   be made within the same method.   LACTATE - Normal    Lactate 1.0       Narrative:     Venipuncture immediately after or during the administration of Metamizole may lead to falsely low results. Testing should be performed immediately prior to Metamizole dosing.   APTT - Normal    aPTT 31.0     TISSUE/WOUND CULTURE/SMEAR   TROPONIN SERIES- (INITIAL, 1 HR)    Narrative:     The following orders were created for panel order Troponin I Series, High Sensitivity (0, 1 HR).  Procedure                               Abnormality         Status                     ---------                               -----------         ------                     Troponin I, High Sensiti...[[id number]]  Normal              Final result               Troponin, High Sensitivi...[[id number]]  Abnormal            Final result                 Please view results for these tests on the individual orders.   TROPONIN SERIES- (INITIAL, 1 HR)    Narrative:     The following orders were created for panel order Troponin Series, (0, 1 HR).  Procedure                               Abnormality         Status                     ---------                               -----------         ------                     Troponin I, High Sensiti...[[id number]]                                                   Please view results for these tests on the individual orders.   SERIAL TROPONIN-INITIAL   POCT GLUCOSE METER   POCT GLUCOSE METER       As interpreted by me, the above displayed labs are abnormal. All other labs obtained during this visit were within normal range or not returned as of this dictation.      EKG Interpretation  Multiple EKGs due to recurrent chest pain please see workflow interpreted by attending physician      CT angio chest abdomen pelvis   Final Result   CHEST   1.  No evidence of aortic aneurysm, dissection, or acute intramural   hematoma. Moderate calcified and noncalcified atherosclerotic plaque   of the thoracic and abdominal aorta. Patent left renal artery stent   and partially visualized bilateral superficial  femoral artery stents.   Sternotomy changes. Coronary artery atherosclerotic calcifications.   2. No evidence of consolidation or pleural effusion. Moderate   centrilobular and paraseptal emphysema as well as nonspecific   pulmonary fibrotic changes. Asymmetric scarring in the left lung apex   is new since 2010 and consider follow-up chest CT in 6 months to   confirm stability.        ABDOMEN - PELVIS   1.  No acute abnormality in the abdomen/pelvis. Mild bilateral renal   cortical thinning.   2. Unchanged chronic compression deformity of L4 vertebral body.             Signed by: Andrez Veloz 1/13/2025 5:49 PM   Dictation workstation:   [id number]      XR knee left 4+ views   Final Result   No acute findings status post left below-the-knee amputation.        Signed by: Leland Murphy 1/13/2025 2:50 PM   Dictation workstation:   [id number]      Lower extremity venous duplex left   Final Result   1. No evidence of deep venous thrombus in the evaluated veins of the   left leg from the inguinal ligament to the popliteal fossa.   Below-knee amputation present.        Signed by: Kishor Andrade 1/13/2025 1:38 PM   Dictation workstation:   [id number]      XR chest 1 view   Final Result   1.  Mild bibasilar atelectasis. No localized consolidation.                  MACRO:   None.        Signed by: Duane Chou 1/13/2025 11:40 AM   Dictation workstation:   [id number]              CT angio chest abdomen pelvis   Final Result   CHEST   1.  No evidence of aortic aneurysm, dissection, or acute intramural   hematoma. Moderate calcified and noncalcified atherosclerotic plaque   of the thoracic and abdominal aorta. Patent left renal artery stent   and partially visualized bilateral superficial femoral artery stents.   Sternotomy changes. Coronary artery atherosclerotic calcifications.   2. No evidence of consolidation or pleural effusion. Moderate   centrilobular and paraseptal emphysema as well as nonspecific   pulmonary  fibrotic changes. Asymmetric scarring in the left lung apex   is new since 2010 and consider follow-up chest CT in 6 months to   confirm stability.        ABDOMEN - PELVIS   1.  No acute abnormality in the abdomen/pelvis. Mild bilateral renal   cortical thinning.   2. Unchanged chronic compression deformity of L4 vertebral body.             Signed by: Andrez Veloz 1/13/2025 5:49 PM   Dictation workstation:   [id number]      XR knee left 4+ views   Final Result   No acute findings status post left below-the-knee amputation.        Signed by: Leland Murphy 1/13/2025 2:50 PM   Dictation workstation:   [id number]      Lower extremity venous duplex left   Final Result   1. No evidence of deep venous thrombus in the evaluated veins of the   left leg from the inguinal ligament to the popliteal fossa.   Below-knee amputation present.        Signed by: Kishor Andrade 1/13/2025 1:38 PM   Dictation workstation:   [id number]      XR chest 1 view   Final Result   1.  Mild bibasilar atelectasis. No localized consolidation.                  MACRO:   None.        Signed by: Duane Chou 1/13/2025 11:40 AM   Dictation workstation:   [id number]              ------------------------------ ED COURSE/MEDICAL DECISION MAKING----------------------  Medical Decision Making:   Exam: A medically appropriate exam performed, outlined above, given the known history and presentation.    History obtained from: Review medical record nursing notes patient      Social Determinants of Health considered during this visit: Consider himself at home      PAST MEDICAL HISTORY/Chronic Conditions Affecting Care     has a past medical history of Old myocardial infarction (02/01/2017).       CC/HPI Summary, Social Determinants of health, Records Reviewed, DDx, testing done/not done, ED Course, Reassessment, disposition considerations/shared decision making with patient, consults, disposition:   Presents with chest pain belching nausea  vomiting.  Plan  EKG  Chest x-ray 1. Mild bibasilar atelectasis. No localized consolidation   1. Mild bibasilar atelectasis. No localized consolidation  CT angio Chest ABD Pelvis  1.  No evidence of aortic aneurysm, dissection, or acute intramural  hematoma. Moderate calcified and noncalcified atherosclerotic plaque  of the thoracic and abdominal aorta. Patent left renal artery stent  and partially visualized bilateral superficial femoral artery stents.  Sternotomy changes. Coronary artery atherosclerotic calcifications.  2. No evidence of consolidation or pleural effusion. Moderate  centrilobular and paraseptal emphysema as well as nonspecific  pulmonary fibrotic changes. Asymmetric scarring in the left lung apex  is new since 2010 and consider follow-up chest CT in 6 months to  confirm stability.      ABDOMEN - PELVIS  1.  No acute abnormality in the abdomen/pelvis. Mild bilateral renal  cortical thinning.  2. Unchanged chronic compression deformity of L4 vertebral body.    Xray left knee :  No acute findings status post left below-the-knee amputation.     Doppler Left lower   1. No evidence of deep venous thrombus in the evaluated veins of the  left leg from the inguinal ligament to the popliteal fossa.  Below-knee amputation present.   Chest X ray :  1. Mild bibasilar atelectasis. No localized consolidation.   CBC  CMP  Magnesium  COVID flu  Troponin  Cardiac monitoring  Pepcid  Fentanyl  Zofran  BMP  Lipase  Medical Decision Making/Differential Diagnosis:  Differentials include not limited to acute coronary syndrome versus angina versus viral illness versus COVID versus flu versus pneumonia versus CHF versus electrolyte abnormality versus dehydration versus anemia  Review  Lactate 1    Lipase 11  White blood cell count 5.7  Hemoglobin 11.4 improved from baseline  COVID flu negative  Glucose 154  Electrolytes unremarkable  Normal renal function  Normal LFTs bili total slightly elevated patient is not  jaundiced  Troponin 17 repeat troponin 66  Magnesium 1.77  Wound culture pending  Heart score 9  Patient presented with severe chest pain sweating nausea vomiting and belching.  Heart score 9 EKG per attending note sinus rhythm with sinus tachycardia with left bundle branch block no ST elevation initial troponin 17 repeat troponin 66 consultation to cardiology recommend admission and cardiology will see in consult.  Chest x-ray showed atelectasis no focal infiltrate.  Patient has elevated heart score of 9 chest pain initially in 10 now to require admission to the hospital discussed case with hospitalist team is agreed to admit the patient under their service for further evaluation and treatment.  Patient was being admitted by the hospitalist team developed severe pain was given pain medication requested a CT angio of the chest abdomen pelvis to rule out dissection.  CT showed no evidence of aortic aneurysm dissection or acute intramural hematoma.  Moderate calcified and noncalcified atherosclerotic plaque of the thoracic and abdominal aorta.  Patent left renal artery stent and a partially visualized bilateral superficial femoral artery stents sternotomy changes noted.  Coronary artery atherosclerotic calcifications noted.  No evidence of consolidation or pleural effusion.Moderate centrilobular and paraseptal emphysema as well as nonspecific pulmonary fibrotic changes. Asymmetric scarring in the left lung apex is new since 2010.  No acute abnormality in the abdomen and pelvis mild bilateral renal cortical thinning noted.  Unchanged chronic compression deformity of L4 vertebral body  Patient resting position of comfort at this time pain is back to 2.  Notified the hospitalist team of CT results no further orders or recommendations at this time we will continue with plan for admission to hospital service    Patient family also reported that he has an area to his left leg where the prosthesis is rubbing it is red and  warm to touch noncircumferential no drainable abscess indurated.  Wound culture was obtained.  No elevation white blood cell count anemia with history of the same improved from baseline blood cultures are pending patient received antibiotic therapy Zosyn and vancomycin.  Electrolytes unremarkable blood sugar is 154 no signs of DKA LFTs unremarkable magnesium normal x-ray showed no osteomyelitis or fracture Doppler study showed no DVT    Based on patient's clinical presentation history and symptoms consistent with chest pain  Cellulitis left leg  Patient seen evaluated with attending physician Dr. Vivar   Patient family amenable to plan amenable to admission  PROCEDURES  Unless otherwise noted below, none      CONSULTS:   IP CONSULT TO CARDIOLOGY      ED Course as of 01/13/25 1816   Mon Jan 13, 2025   1022 Prehospital EKG interpreted by myself independently, EKG shows a sinus tachycardia, rate of 100 bpm, OR interval 154, , , QTc 450, patient does have an incomplete left bundle branch block, no ST elevation or depression, negative for acute MI. [RACHAEL]   1110 EKG interpreted by myself independently, EKG shows a normal sinus rhythm, rate of 92 bpm, OR interval 150, , , QTc 499, patient does have a left bundle branch block, negative for ST elevation or depression, negative for acute MI. [RACHAEL]   1230 Patient family present reporting that the patient has a wound to the back of his left leg where his prosthesis is rubbing.  Has a small pustule is tender to touch red warm.  She has an appointment in urgent care to get this evaluated but canceled due to him coming in for chest pain. [TB]   1350 EKG interpreted by myself independently, EKG shows a sinus rhythm with first-degree AV block, rate of 76 bpm, OR interval 236, , , QTc 495, patient does have a left bundle branch block, no ST elevation or depression, negative for acute MI. [RACHAEL]   1505 Repeat  troponin went from 17 to 66.  Ordered  "repeat EKG consult cardiology [TB]   1513 Patient reports he is currently at the least amount of pain but still having chest pain in the left side of his chest.  Nonradiating no further nausea or belching.  Patient states \"I know I have had a heart attack\" [TB]   1518 Repeat EKG obtained  1516 reviewed 1518  Sinus rhythm first-degree AV block left bundle branch block rate 88   QTc 496 nonspecific ST changes  Grossly unchanged EKG obtained earlier today  Per my independent interpretation [SL]   1550 Discussed case with cardiology Dr. Burks advised to admit the patient for further evaluation and treatment of chest pain they will see the patient in consult.  Patient reported that his pain is a to the best that he is felt the entire day.  Smiling talking with family [TB]   1556 Discussed case with hospitalist Dr. Pacheco who is agreed to admit the patient under her service for further evaluation and treatment stepdown chest pain/elevated troponin / cellulitis left leg [TB]   1612 Hospitalist down to see patient.  Patient started experiencing the worst \" states the pain Bad   Had tears.  CT angio of the chest abdomen pelvis for dissection ordered. [TB]      ED Course User Index  [RACHAEL] Rubin Vivar DO  [SL] Marlon Smallwood DO  [TB] MARILY Lepe         Diagnoses as of 01/13/25 1816   Cellulitis of leg, left   Chest pain, unspecified type   Elevated troponin         This patient has remained hemodynamically stable during their ED course.      Critical Care: 31  Critical Care    Performed by: MARILY Lepe  Authorized by: Rubin Vivar DO    Critical care provider statement:     Critical care time (minutes):  31    Critical care time was exclusive of:  Separately billable procedures and treating other patients and teaching time    Critical care was necessary to treat or prevent imminent or life-threatening deterioration of the following conditions:  Cardiac failure    Critical care was " time spent personally by me on the following activities:  Development of treatment plan with patient or surrogate, discussions with consultants, evaluation of patient's response to treatment, examination of patient, ordering and review of laboratory studies, ordering and review of radiographic studies, pulse oximetry, ordering and performing treatments and interventions, re-evaluation of patient's condition, review of old charts and interpretation of cardiac output measurements    Care discussed with: admitting provider        Critical care time secondary to chest pain change in troponins requiring consultation to cardiology close monitoring advanced testing    Counseling:  The emergency provider has spoken with the patient and family and discussed today’s results, in addition to providing specific details for the plan of care and counseling regarding the diagnosis and prognosis.  Questions are answered at this time and they are agreeable with the plan.         --------------------------------- IMPRESSION AND DISPOSITION ---------------------------------    IMPRESSION  1. Chest pain, unspecified type    2. Cellulitis of leg, left    3. Elevated troponin        DISPOSITION  Disposition: Admit hospitalist service stepdown  Patient condition is stable        NOTE: This report was transcribed using voice recognition software. Every effort was made to ensure accuracy; however, inadvertent computerized transcription errors may be present      MARILY Lepe  01/13/25 1815       MARILY Lepe  01/13/25 1816

## 2025-01-13 NOTE — H&P
History Of Present Illness  Chavez Llamas is a 79 y.o. male hx of CAD s/p PCI, HTN, T2DM, s/p left BKA, hypothyroidism, COPD, CHF presenting with chest pain. Patient states he started having sudden onset substernal chest pain that woke him up out of sleep this morning. Also had associated nausea and cold sweats. States this feels like the type of chest pain he has had in the past when he had prior heart attacks. Initial episode of chest pain lasted for at least 30 minutes. Received 4 aspirin and 1 nitroglycerin from EMS. States the nitroglycerin gave him a severe headache. Pain finally subsided after receiving a dose of IV fentanyl in the ED. Also endorses a wound on the left lower extremity under the thigh proximal to the knee. States it is where the prosthetic rubs on his leg. Denies purulence or drainage from the wound.     In the ED, VSS. Labs showing , troponin 17 -> 66. EKG showing first degree heart block and LBBB similar in comparison to prior EKGs. CXR showing mild bibasilar atelectasis. LE US negative for DVT. XR left knee negative for signs of osteomyelitis.     Patient had another episode of very severe chest pain while I was evaluating him. Repeat EKG obtained which showed sinus rhythm with first degree heart block and LBBB, unchanged from prior studies. CTA C/A/P ordered to r/o dissection. Stat troponins ordered as well.     Code status discussed with patient - FULL CODE.      Past Medical History  He has a past medical history of Old myocardial infarction (02/01/2017).    Surgical History  He has a past surgical history that includes Coronary artery bypass graft (04/14/2015); MR angio head wo IV contrast (2/23/2022); and Cardiac catheterization (N/A, 12/6/2024).     Social History  He reports that he has quit smoking. His smoking use included cigarettes. He has never used smokeless tobacco. No history on file for alcohol use and drug use.    Family History  Family History   Problem Relation  Name Age of Onset    Diabetes Mother      Other (Cerebrovascular Accident) Father          Allergies  Patient has no known allergies.    Review of Systems   Constitutional:  Negative for chills and fever.   HENT:  Negative for congestion and trouble swallowing.    Respiratory:  Negative for shortness of breath.    Cardiovascular:  Positive for chest pain. Negative for leg swelling.   Gastrointestinal:  Positive for nausea. Negative for abdominal pain, constipation, diarrhea and vomiting.   Genitourinary:  Negative for difficulty urinating.   Musculoskeletal:  Negative for back pain.   Skin:  Positive for wound.   Psychiatric/Behavioral:  Negative for confusion.         Physical Exam  HENT:      Head: Normocephalic.      Mouth/Throat:      Pharynx: Oropharynx is clear.   Eyes:      General: No scleral icterus.  Cardiovascular:      Rate and Rhythm: Normal rate.   Pulmonary:      Effort: No respiratory distress.      Breath sounds: No wheezing.   Abdominal:      General: There is no distension.      Palpations: Abdomen is soft.   Musculoskeletal:      Right lower leg: No edema.      Left lower leg: No edema.   Skin:     Comments: Small wound on left lower extremity under the thigh proximal to the knee that is very tender to palpation. No obvious drainage noted but does appear to be mildly swollen   Neurological:      Mental Status: He is alert and oriented to person, place, and time.          Last Recorded Vitals  /74   Pulse 87   Temp 36.6 °C (97.9 °F)   Resp 20   Wt 68 kg (150 lb)   SpO2 100%     Relevant Results           Assessment/Plan   Assessment & Plan  Chest pain, unspecified type  Concerning for angina given significant cardiac history  Consult cardiology  Discussed with Dago Jo CNP from cardiology who recommended start ranexa  Unable to tolerate nitroglycerin due to hypotension and headache  NSTEMI (non-ST elevated myocardial infarction) (Multi)  Troponin 17 -> 66  Will repeat troponin  given severe chest pain down in the ED  Discussed with Dago Bradley CNP from cardiology who recommended starting heparin gtt  Chronic systolic heart failure  Continue home meds  Not in exacerbation   Cellulitis of leg, left  Small wound on posterior left leg proximal to the knee, no obvious drainage noted  Start keflex   Type 2 diabetes mellitus, with long-term current use of insulin  Continue lantus and SSI  Hypoglycemia protocol   Hypothyroidism  Continue synthroid     Plan:  Admit to RNF  Having severe chest pain, low suspicion for dissection but will order CTA C/A/P to evaluate  Consult cardiology   Case discussed with Dago Bradley CNP -> recommending heparin gtt and ranexa  IV morphine PRN for chest pain (does not tolerate nitroglycerin)  Keflex for LLE cellulitis  FULL CODE       Casandra Pacheco MD

## 2025-01-13 NOTE — ASSESSMENT & PLAN NOTE
Concerning for angina given significant cardiac history  Consult cardiology  Discussed with Dago Jo CNP from cardiology who recommended start ranexa  Unable to tolerate nitroglycerin due to hypotension and headache

## 2025-01-13 NOTE — ASSESSMENT & PLAN NOTE
Troponin 17 -> 66  Will repeat troponin given severe chest pain down in the ED  Discussed with Dago Bradley CNP from cardiology who recommended starting heparin gtt

## 2025-01-13 NOTE — ED TRIAGE NOTES
"Arrived from home via EMS to ER with c/o CP, nausea. \"Woke up with nausea\" per pt. Pt belching currently . Hx of \"several MI\" per pt. Last admit 12/24 with NSTEMI/Covid.   "

## 2025-01-14 ENCOUNTER — APPOINTMENT (OUTPATIENT)
Dept: CARDIOLOGY | Facility: HOSPITAL | Age: 80
End: 2025-01-14
Payer: MEDICARE

## 2025-01-14 ENCOUNTER — HOSPITAL ENCOUNTER (INPATIENT)
Facility: HOSPITAL | Age: 80
End: 2025-01-14
Attending: HOSPITALIST | Admitting: HOSPITALIST
Payer: MEDICARE

## 2025-01-14 ENCOUNTER — APPOINTMENT (OUTPATIENT)
Dept: RADIOLOGY | Facility: HOSPITAL | Age: 80
End: 2025-01-14
Payer: MEDICARE

## 2025-01-14 VITALS
OXYGEN SATURATION: 100 % | RESPIRATION RATE: 17 BRPM | DIASTOLIC BLOOD PRESSURE: 69 MMHG | BODY MASS INDEX: 22.73 KG/M2 | TEMPERATURE: 97.7 F | HEART RATE: 65 BPM | WEIGHT: 150 LBS | SYSTOLIC BLOOD PRESSURE: 108 MMHG | HEIGHT: 68 IN

## 2025-01-14 DIAGNOSIS — S55.191A: ICD-10-CM

## 2025-01-14 DIAGNOSIS — E11.9 TYPE 2 DIABETES MELLITUS WITHOUT COMPLICATION, WITH LONG-TERM CURRENT USE OF INSULIN (MULTI): ICD-10-CM

## 2025-01-14 DIAGNOSIS — K59.00 CONSTIPATION, UNSPECIFIED CONSTIPATION TYPE: ICD-10-CM

## 2025-01-14 DIAGNOSIS — I25.119 ATHEROSCLEROSIS OF NATIVE CORONARY ARTERY OF NATIVE HEART WITH ANGINA PECTORIS: ICD-10-CM

## 2025-01-14 DIAGNOSIS — N40.1 BENIGN PROSTATIC HYPERPLASIA WITH INCOMPLETE BLADDER EMPTYING: ICD-10-CM

## 2025-01-14 DIAGNOSIS — S55.891A: ICD-10-CM

## 2025-01-14 DIAGNOSIS — R11.11 VOMITING WITHOUT NAUSEA, UNSPECIFIED VOMITING TYPE: ICD-10-CM

## 2025-01-14 DIAGNOSIS — Z98.890 HISTORY OF LEFT HEART CATHETERIZATION: ICD-10-CM

## 2025-01-14 DIAGNOSIS — I21.4 NSTEMI (NON-ST ELEVATED MYOCARDIAL INFARCTION) (MULTI): ICD-10-CM

## 2025-01-14 DIAGNOSIS — R60.0 LOCALIZED EDEMA: ICD-10-CM

## 2025-01-14 DIAGNOSIS — R07.9 CHEST PAIN, UNSPECIFIED TYPE: ICD-10-CM

## 2025-01-14 DIAGNOSIS — R79.89 ELEVATED TROPONIN: ICD-10-CM

## 2025-01-14 DIAGNOSIS — I25.10 CAD, MULTIPLE VESSEL: Primary | ICD-10-CM

## 2025-01-14 DIAGNOSIS — D64.9 ANEMIA, UNSPECIFIED TYPE: ICD-10-CM

## 2025-01-14 DIAGNOSIS — I25.10 CORONARY ARTERY DISEASE INVOLVING NATIVE HEART, UNSPECIFIED VESSEL OR LESION TYPE, UNSPECIFIED WHETHER ANGINA PRESENT: ICD-10-CM

## 2025-01-14 DIAGNOSIS — Z79.4 TYPE 2 DIABETES MELLITUS WITHOUT COMPLICATION, WITH LONG-TERM CURRENT USE OF INSULIN (MULTI): ICD-10-CM

## 2025-01-14 DIAGNOSIS — I25.10 CORONARY ARTERY DISEASE, UNSPECIFIED VESSEL OR LESION TYPE, UNSPECIFIED WHETHER ANGINA PRESENT, UNSPECIFIED WHETHER NATIVE OR TRANSPLANTED HEART: ICD-10-CM

## 2025-01-14 DIAGNOSIS — K21.9 GASTROESOPHAGEAL REFLUX DISEASE, UNSPECIFIED WHETHER ESOPHAGITIS PRESENT: ICD-10-CM

## 2025-01-14 DIAGNOSIS — I25.112 CORONARY ARTERY DISEASE INVOLVING NATIVE HEART WITH REFRACTORY ANGINA PECTORIS, UNSPECIFIED VESSEL OR LESION TYPE: ICD-10-CM

## 2025-01-14 DIAGNOSIS — I20.9 ANGINA PECTORIS, UNSPECIFIED: ICD-10-CM

## 2025-01-14 DIAGNOSIS — I25.9 CHEST PAIN DUE TO MYOCARDIAL ISCHEMIA, UNSPECIFIED ISCHEMIC CHEST PAIN TYPE: ICD-10-CM

## 2025-01-14 DIAGNOSIS — Z95.5 S/P DRUG ELUTING CORONARY STENT PLACEMENT: ICD-10-CM

## 2025-01-14 DIAGNOSIS — K58.9 IRRITABLE BOWEL SYNDROME, UNSPECIFIED TYPE: ICD-10-CM

## 2025-01-14 DIAGNOSIS — R39.14 BENIGN PROSTATIC HYPERPLASIA WITH INCOMPLETE BLADDER EMPTYING: ICD-10-CM

## 2025-01-14 PROBLEM — N18.9 ACUTE KIDNEY INJURY SUPERIMPOSED ON CHRONIC KIDNEY DISEASE (CMS-HCC): Status: ACTIVE | Noted: 2022-02-12

## 2025-01-14 LAB
ALBUMIN SERPL BCP-MCNC: 3.9 G/DL (ref 3.4–5)
ALP SERPL-CCNC: 70 U/L (ref 33–136)
ALT SERPL W P-5'-P-CCNC: 11 U/L (ref 10–52)
ANION GAP BLDV CALCULATED.4IONS-SCNC: 11 MMOL/L (ref 10–25)
ANION GAP SERPL CALC-SCNC: 15 MMOL/L (ref 10–20)
ANION GAP SERPL CALCULATED.3IONS-SCNC: 10 MMOL/L (ref 10–20)
APTT PPP: 122.3 SECONDS (ref 22–32.5)
APTT PPP: 34.6 SECONDS (ref 22–32.5)
APTT PPP: 41 SECONDS (ref 27–38)
APTT PPP: 73.7 SECONDS (ref 22–32.5)
AST SERPL W P-5'-P-CCNC: 10 U/L (ref 9–39)
BASE EXCESS BLDV CALC-SCNC: -0.5 MMOL/L (ref -2–3)
BASOPHILS # BLD AUTO: 0.02 X10*3/UL (ref 0–0.1)
BASOPHILS NFR BLD AUTO: 0.4 %
BILIRUB SERPL-MCNC: 1.5 MG/DL (ref 0–1.2)
BNP SERPL-MCNC: 275 PG/ML (ref 0–99)
BODY TEMPERATURE: 37 DEGREES CELSIUS
BUN SERPL-MCNC: 18 MG/DL (ref 6–23)
BUN SERPL-MCNC: 18 MG/DL (ref 6–23)
CA-I BLDV-SCNC: 1.2 MMOL/L (ref 1.1–1.33)
CALCIUM SERPL-MCNC: 8.5 MG/DL (ref 8.6–10.3)
CALCIUM SERPL-MCNC: 9 MG/DL (ref 8.6–10.6)
CARDIAC TROPONIN I PNL SERPL HS: 212 NG/L (ref 0–53)
CHLORIDE BLDV-SCNC: 100 MMOL/L (ref 98–107)
CHLORIDE SERPL-SCNC: 104 MMOL/L (ref 98–107)
CHLORIDE SERPL-SCNC: 106 MMOL/L (ref 98–107)
CO2 SERPL-SCNC: 23 MMOL/L (ref 21–32)
CO2 SERPL-SCNC: 25 MMOL/L (ref 21–32)
CREAT SERPL-MCNC: 1.59 MG/DL (ref 0.5–1.3)
CREAT SERPL-MCNC: 1.7 MG/DL (ref 0.5–1.3)
EGFRCR SERPLBLD CKD-EPI 2021: 40 ML/MIN/1.73M*2
EGFRCR SERPLBLD CKD-EPI 2021: 44 ML/MIN/1.73M*2
EOSINOPHIL # BLD AUTO: 0.14 X10*3/UL (ref 0–0.4)
EOSINOPHIL NFR BLD AUTO: 2.7 %
ERYTHROCYTE [DISTWIDTH] IN BLOOD BY AUTOMATED COUNT: 14.7 % (ref 11.5–14.5)
ERYTHROCYTE [DISTWIDTH] IN BLOOD BY AUTOMATED COUNT: 14.7 % (ref 11.5–14.5)
GLUCOSE BLD MANUAL STRIP-MCNC: 139 MG/DL (ref 74–99)
GLUCOSE BLD MANUAL STRIP-MCNC: 142 MG/DL (ref 74–99)
GLUCOSE BLD MANUAL STRIP-MCNC: 162 MG/DL (ref 74–99)
GLUCOSE BLD MANUAL STRIP-MCNC: 192 MG/DL (ref 74–99)
GLUCOSE BLDV-MCNC: 156 MG/DL (ref 74–99)
GLUCOSE SERPL-MCNC: 147 MG/DL (ref 74–99)
GLUCOSE SERPL-MCNC: 164 MG/DL (ref 74–99)
HCO3 BLDV-SCNC: 25.9 MMOL/L (ref 22–26)
HCT VFR BLD AUTO: 28.2 % (ref 41–52)
HCT VFR BLD AUTO: 32.2 % (ref 41–52)
HCT VFR BLD EST: 62 % (ref 41–52)
HGB BLD-MCNC: 10.6 G/DL (ref 13.5–17.5)
HGB BLD-MCNC: 9.6 G/DL (ref 13.5–17.5)
HGB BLDV-MCNC: 20.8 G/DL (ref 13.5–17.5)
IMM GRANULOCYTES # BLD AUTO: 0.03 X10*3/UL (ref 0–0.5)
IMM GRANULOCYTES NFR BLD AUTO: 0.6 % (ref 0–0.9)
INHALED O2 CONCENTRATION: 21 %
INR PPP: 1.2 (ref 0.9–1.1)
LACTATE BLDV-SCNC: 1.4 MMOL/L (ref 0.4–2)
LYMPHOCYTES # BLD AUTO: 0.38 X10*3/UL (ref 0.8–3)
LYMPHOCYTES NFR BLD AUTO: 7.2 %
MAGNESIUM SERPL-MCNC: 1.97 MG/DL (ref 1.6–2.4)
MCH RBC QN AUTO: 29 PG (ref 26–34)
MCH RBC QN AUTO: 29.4 PG (ref 26–34)
MCHC RBC AUTO-ENTMCNC: 32.9 G/DL (ref 32–36)
MCHC RBC AUTO-ENTMCNC: 34 G/DL (ref 32–36)
MCV RBC AUTO: 86 FL (ref 80–100)
MCV RBC AUTO: 88 FL (ref 80–100)
MONOCYTES # BLD AUTO: 0.42 X10*3/UL (ref 0.05–0.8)
MONOCYTES NFR BLD AUTO: 8 %
NEUTROPHILS # BLD AUTO: 4.28 X10*3/UL (ref 1.6–5.5)
NEUTROPHILS NFR BLD AUTO: 81.1 %
NRBC BLD-RTO: 0 /100 WBCS (ref 0–0)
NRBC BLD-RTO: 0 /100 WBCS (ref 0–0)
OXYHGB MFR BLDV: 28.8 % (ref 45–75)
PCO2 BLDV: 47 MM HG (ref 41–51)
PH BLDV: 7.35 PH (ref 7.33–7.43)
PHOSPHATE SERPL-MCNC: 3.4 MG/DL (ref 2.5–4.9)
PLATELET # BLD AUTO: 102 X10*3/UL (ref 150–450)
PLATELET # BLD AUTO: 131 X10*3/UL (ref 150–450)
PO2 BLDV: 24 MM HG (ref 35–45)
POTASSIUM BLDV-SCNC: 4.5 MMOL/L (ref 3.5–5.3)
POTASSIUM SERPL-SCNC: 4.1 MMOL/L (ref 3.5–5.3)
POTASSIUM SERPL-SCNC: 4.5 MMOL/L (ref 3.5–5.3)
PROT SERPL-MCNC: 6.9 G/DL (ref 6.4–8.2)
PROTHROMBIN TIME: 13.4 SECONDS (ref 9.8–12.8)
RBC # BLD AUTO: 3.27 X10*6/UL (ref 4.5–5.9)
RBC # BLD AUTO: 3.66 X10*6/UL (ref 4.5–5.9)
SAO2 % BLDV: 29 % (ref 45–75)
SODIUM BLDV-SCNC: 132 MMOL/L (ref 136–145)
SODIUM SERPL-SCNC: 137 MMOL/L (ref 136–145)
SODIUM SERPL-SCNC: 137 MMOL/L (ref 136–145)
UFH PPP CHRO-ACNC: <0.1 IU/ML
WBC # BLD AUTO: 4.5 X10*3/UL (ref 4.4–11.3)
WBC # BLD AUTO: 5.3 X10*3/UL (ref 4.4–11.3)

## 2025-01-14 PROCEDURE — 93005 ELECTROCARDIOGRAM TRACING: CPT

## 2025-01-14 PROCEDURE — 84132 ASSAY OF SERUM POTASSIUM: CPT

## 2025-01-14 PROCEDURE — 2500000001 HC RX 250 WO HCPCS SELF ADMINISTERED DRUGS (ALT 637 FOR MEDICARE OP)

## 2025-01-14 PROCEDURE — 85610 PROTHROMBIN TIME: CPT

## 2025-01-14 PROCEDURE — 80053 COMPREHEN METABOLIC PANEL: CPT | Performed by: STUDENT IN AN ORGANIZED HEALTH CARE EDUCATION/TRAINING PROGRAM

## 2025-01-14 PROCEDURE — 83880 ASSAY OF NATRIURETIC PEPTIDE: CPT

## 2025-01-14 PROCEDURE — 85025 COMPLETE CBC W/AUTO DIFF WBC: CPT

## 2025-01-14 PROCEDURE — 71045 X-RAY EXAM CHEST 1 VIEW: CPT

## 2025-01-14 PROCEDURE — 82947 ASSAY GLUCOSE BLOOD QUANT: CPT

## 2025-01-14 PROCEDURE — 2500000004 HC RX 250 GENERAL PHARMACY W/ HCPCS (ALT 636 FOR OP/ED): Performed by: INTERNAL MEDICINE

## 2025-01-14 PROCEDURE — 85027 COMPLETE CBC AUTOMATED: CPT | Performed by: STUDENT IN AN ORGANIZED HEALTH CARE EDUCATION/TRAINING PROGRAM

## 2025-01-14 PROCEDURE — 2500000001 HC RX 250 WO HCPCS SELF ADMINISTERED DRUGS (ALT 637 FOR MEDICARE OP): Performed by: INTERNAL MEDICINE

## 2025-01-14 PROCEDURE — 84484 ASSAY OF TROPONIN QUANT: CPT

## 2025-01-14 PROCEDURE — 99239 HOSP IP/OBS DSCHRG MGMT >30: CPT | Performed by: STUDENT IN AN ORGANIZED HEALTH CARE EDUCATION/TRAINING PROGRAM

## 2025-01-14 PROCEDURE — 2500000004 HC RX 250 GENERAL PHARMACY W/ HCPCS (ALT 636 FOR OP/ED): Performed by: STUDENT IN AN ORGANIZED HEALTH CARE EDUCATION/TRAINING PROGRAM

## 2025-01-14 PROCEDURE — 2500000002 HC RX 250 W HCPCS SELF ADMINISTERED DRUGS (ALT 637 FOR MEDICARE OP, ALT 636 FOR OP/ED): Performed by: STUDENT IN AN ORGANIZED HEALTH CARE EDUCATION/TRAINING PROGRAM

## 2025-01-14 PROCEDURE — 1100000001 HC PRIVATE ROOM DAILY

## 2025-01-14 PROCEDURE — 36415 COLL VENOUS BLD VENIPUNCTURE: CPT | Performed by: STUDENT IN AN ORGANIZED HEALTH CARE EDUCATION/TRAINING PROGRAM

## 2025-01-14 PROCEDURE — 2500000001 HC RX 250 WO HCPCS SELF ADMINISTERED DRUGS (ALT 637 FOR MEDICARE OP): Performed by: STUDENT IN AN ORGANIZED HEALTH CARE EDUCATION/TRAINING PROGRAM

## 2025-01-14 PROCEDURE — 71045 X-RAY EXAM CHEST 1 VIEW: CPT | Performed by: RADIOLOGY

## 2025-01-14 PROCEDURE — 2500000004 HC RX 250 GENERAL PHARMACY W/ HCPCS (ALT 636 FOR OP/ED)

## 2025-01-14 PROCEDURE — 84100 ASSAY OF PHOSPHORUS: CPT

## 2025-01-14 PROCEDURE — 83735 ASSAY OF MAGNESIUM: CPT

## 2025-01-14 PROCEDURE — 85730 THROMBOPLASTIN TIME PARTIAL: CPT | Performed by: STUDENT IN AN ORGANIZED HEALTH CARE EDUCATION/TRAINING PROGRAM

## 2025-01-14 PROCEDURE — 85520 HEPARIN ASSAY: CPT

## 2025-01-14 RX ORDER — INSULIN LISPRO 100 [IU]/ML
0-5 INJECTION, SOLUTION INTRAVENOUS; SUBCUTANEOUS
Status: DISCONTINUED | OUTPATIENT
Start: 2025-01-15 | End: 2025-01-25 | Stop reason: HOSPADM

## 2025-01-14 RX ORDER — AMLODIPINE BESYLATE 2.5 MG/1
5 TABLET ORAL DAILY
Status: DISCONTINUED | OUTPATIENT
Start: 2025-01-15 | End: 2025-01-21

## 2025-01-14 RX ORDER — ONDANSETRON HYDROCHLORIDE 2 MG/ML
4 INJECTION, SOLUTION INTRAVENOUS ONCE
Status: COMPLETED | OUTPATIENT
Start: 2025-01-14 | End: 2025-01-14

## 2025-01-14 RX ORDER — CEPHALEXIN 500 MG/1
500 CAPSULE ORAL EVERY 6 HOURS SCHEDULED
Status: DISCONTINUED | OUTPATIENT
Start: 2025-01-15 | End: 2025-01-14

## 2025-01-14 RX ORDER — CEPHALEXIN 500 MG/1
500 CAPSULE ORAL EVERY 8 HOURS SCHEDULED
Status: COMPLETED | OUTPATIENT
Start: 2025-01-14 | End: 2025-01-17

## 2025-01-14 RX ORDER — DEXTROSE 50 % IN WATER (D50W) INTRAVENOUS SYRINGE
12.5
Status: DISCONTINUED | OUTPATIENT
Start: 2025-01-14 | End: 2025-01-25 | Stop reason: HOSPADM

## 2025-01-14 RX ORDER — ONDANSETRON HYDROCHLORIDE 2 MG/ML
INJECTION, SOLUTION INTRAVENOUS
Status: COMPLETED
Start: 2025-01-14 | End: 2025-01-14

## 2025-01-14 RX ORDER — ATORVASTATIN CALCIUM 40 MG/1
40 TABLET, FILM COATED ORAL NIGHTLY
Status: DISCONTINUED | OUTPATIENT
Start: 2025-01-14 | End: 2025-01-25 | Stop reason: HOSPADM

## 2025-01-14 RX ORDER — METOPROLOL SUCCINATE 25 MG/1
25 TABLET, EXTENDED RELEASE ORAL DAILY
Status: DISCONTINUED | OUTPATIENT
Start: 2025-01-15 | End: 2025-01-25 | Stop reason: HOSPADM

## 2025-01-14 RX ORDER — RANOLAZINE 500 MG/1
500 TABLET, EXTENDED RELEASE ORAL 2 TIMES DAILY
Status: DISCONTINUED | OUTPATIENT
Start: 2025-01-14 | End: 2025-01-22

## 2025-01-14 RX ORDER — LEVOTHYROXINE SODIUM 50 UG/1
50 TABLET ORAL DAILY
Status: DISCONTINUED | OUTPATIENT
Start: 2025-01-15 | End: 2025-01-25 | Stop reason: HOSPADM

## 2025-01-14 RX ORDER — SPIRONOLACTONE 25 MG/1
25 TABLET ORAL EVERY MORNING
Status: DISCONTINUED | OUTPATIENT
Start: 2025-01-15 | End: 2025-01-25 | Stop reason: HOSPADM

## 2025-01-14 RX ORDER — ASPIRIN 81 MG/1
81 TABLET ORAL DAILY
Status: DISCONTINUED | OUTPATIENT
Start: 2025-01-15 | End: 2025-01-25 | Stop reason: HOSPADM

## 2025-01-14 RX ORDER — BUSPIRONE HYDROCHLORIDE 15 MG/1
7.5 TABLET ORAL 2 TIMES DAILY
Status: DISCONTINUED | OUTPATIENT
Start: 2025-01-14 | End: 2025-01-25 | Stop reason: HOSPADM

## 2025-01-14 RX ORDER — ACETAMINOPHEN 325 MG/1
650 TABLET ORAL EVERY 6 HOURS PRN
Status: DISCONTINUED | OUTPATIENT
Start: 2025-01-14 | End: 2025-01-19

## 2025-01-14 RX ORDER — HEPARIN SODIUM 10000 [USP'U]/100ML
0-4000 INJECTION, SOLUTION INTRAVENOUS CONTINUOUS
Status: ACTIVE | OUTPATIENT
Start: 2025-01-14 | End: 2025-01-15

## 2025-01-14 RX ORDER — HEPARIN SODIUM 5000 [USP'U]/ML
5000 INJECTION, SOLUTION INTRAVENOUS; SUBCUTANEOUS EVERY 8 HOURS
Status: DISCONTINUED | OUTPATIENT
Start: 2025-01-14 | End: 2025-01-14

## 2025-01-14 RX ORDER — INSULIN GLARGINE 100 [IU]/ML
12 INJECTION, SOLUTION SUBCUTANEOUS NIGHTLY
Status: DISCONTINUED | OUTPATIENT
Start: 2025-01-14 | End: 2025-01-25 | Stop reason: HOSPADM

## 2025-01-14 RX ORDER — PRASUGREL 10 MG/1
10 TABLET, FILM COATED ORAL DAILY
Status: DISCONTINUED | OUTPATIENT
Start: 2025-01-15 | End: 2025-01-20

## 2025-01-14 RX ORDER — MIRTAZAPINE 15 MG/1
15 TABLET, FILM COATED ORAL NIGHTLY
Status: DISCONTINUED | OUTPATIENT
Start: 2025-01-14 | End: 2025-01-25 | Stop reason: HOSPADM

## 2025-01-14 RX ORDER — DEXTROSE 50 % IN WATER (D50W) INTRAVENOUS SYRINGE
25
Status: DISCONTINUED | OUTPATIENT
Start: 2025-01-14 | End: 2025-01-25 | Stop reason: HOSPADM

## 2025-01-14 RX ORDER — HEPARIN SODIUM 10000 [USP'U]/100ML
INJECTION, SOLUTION INTRAVENOUS
Status: COMPLETED
Start: 2025-01-14 | End: 2025-01-14

## 2025-01-14 RX ADMIN — RANOLAZINE 500 MG: 500 TABLET, EXTENDED RELEASE ORAL at 20:21

## 2025-01-14 RX ADMIN — METOPROLOL SUCCINATE 25 MG: 25 TABLET, FILM COATED, EXTENDED RELEASE ORAL at 08:32

## 2025-01-14 RX ADMIN — LEVOTHYROXINE SODIUM 50 MCG: 0.05 TABLET ORAL at 06:15

## 2025-01-14 RX ADMIN — HEPARIN SODIUM 500 UNITS/HR: 10000 INJECTION, SOLUTION INTRAVENOUS at 20:22

## 2025-01-14 RX ADMIN — CEPHALEXIN 500 MG: 500 CAPSULE ORAL at 12:02

## 2025-01-14 RX ADMIN — ONDANSETRON HYDROCHLORIDE 4 MG: 2 INJECTION, SOLUTION INTRAVENOUS at 23:15

## 2025-01-14 RX ADMIN — RANOLAZINE 500 MG: 500 TABLET, EXTENDED RELEASE ORAL at 08:32

## 2025-01-14 RX ADMIN — BUSPIRONE HYDROCHLORIDE 7.5 MG: 15 TABLET ORAL at 20:21

## 2025-01-14 RX ADMIN — CEPHALEXIN 500 MG: 500 CAPSULE ORAL at 06:15

## 2025-01-14 RX ADMIN — HEPARIN SODIUM 500 UNITS/HR: 10000 INJECTION, SOLUTION INTRAVENOUS at 14:21

## 2025-01-14 RX ADMIN — SODIUM CHLORIDE 500 ML: 900 INJECTION, SOLUTION INTRAVENOUS at 03:37

## 2025-01-14 RX ADMIN — ASPIRIN 81 MG: 81 TABLET, COATED ORAL at 08:32

## 2025-01-14 RX ADMIN — PRASUGREL 10 MG: 10 TABLET, FILM COATED ORAL at 08:32

## 2025-01-14 RX ADMIN — AMLODIPINE BESYLATE 5 MG: 5 TABLET ORAL at 08:32

## 2025-01-14 RX ADMIN — ATORVASTATIN CALCIUM 40 MG: 40 TABLET, FILM COATED ORAL at 20:21

## 2025-01-14 RX ADMIN — MIRTAZAPINE 15 MG: 15 TABLET, FILM COATED ORAL at 20:21

## 2025-01-14 RX ADMIN — ACETAMINOPHEN 650 MG: 325 TABLET ORAL at 20:21

## 2025-01-14 RX ADMIN — PANTOPRAZOLE SODIUM 40 MG: 40 TABLET, DELAYED RELEASE ORAL at 08:32

## 2025-01-14 RX ADMIN — ONDANSETRON 4 MG: 2 INJECTION INTRAMUSCULAR; INTRAVENOUS at 23:15

## 2025-01-14 SDOH — SOCIAL STABILITY: SOCIAL INSECURITY: DOES ANYONE TRY TO KEEP YOU FROM HAVING/CONTACTING OTHER FRIENDS OR DOING THINGS OUTSIDE YOUR HOME?: NO

## 2025-01-14 SDOH — SOCIAL STABILITY: SOCIAL INSECURITY: ARE THERE ANY APPARENT SIGNS OF INJURIES/BEHAVIORS THAT COULD BE RELATED TO ABUSE/NEGLECT?: NO

## 2025-01-14 SDOH — SOCIAL STABILITY: SOCIAL INSECURITY: ARE YOU OR HAVE YOU BEEN THREATENED OR ABUSED PHYSICALLY, EMOTIONALLY, OR SEXUALLY BY ANYONE?: NO

## 2025-01-14 SDOH — SOCIAL STABILITY: SOCIAL INSECURITY: HAVE YOU HAD THOUGHTS OF HARMING ANYONE ELSE?: NO

## 2025-01-14 SDOH — SOCIAL STABILITY: SOCIAL INSECURITY: DO YOU FEEL UNSAFE GOING BACK TO THE PLACE WHERE YOU ARE LIVING?: NO

## 2025-01-14 SDOH — SOCIAL STABILITY: SOCIAL INSECURITY: WERE YOU ABLE TO COMPLETE ALL THE BEHAVIORAL HEALTH SCREENINGS?: YES

## 2025-01-14 SDOH — SOCIAL STABILITY: SOCIAL INSECURITY: HAS ANYONE EVER THREATENED TO HURT YOUR FAMILY OR YOUR PETS?: NO

## 2025-01-14 SDOH — SOCIAL STABILITY: SOCIAL INSECURITY: DO YOU FEEL ANYONE HAS EXPLOITED OR TAKEN ADVANTAGE OF YOU FINANCIALLY OR OF YOUR PERSONAL PROPERTY?: NO

## 2025-01-14 SDOH — SOCIAL STABILITY: SOCIAL INSECURITY: ABUSE: ADULT

## 2025-01-14 ASSESSMENT — COGNITIVE AND FUNCTIONAL STATUS - GENERAL
PATIENT BASELINE BEDBOUND: NO
MOBILITY SCORE: 20
DAILY ACTIVITIY SCORE: 24
WALKING IN HOSPITAL ROOM: A LOT
CLIMB 3 TO 5 STEPS WITH RAILING: A LOT

## 2025-01-14 ASSESSMENT — PAIN SCALES - GENERAL
PAINLEVEL_OUTOF10: 0 - NO PAIN
PAINLEVEL_OUTOF10: 2
PAINLEVEL_OUTOF10: 0 - NO PAIN
PAINLEVEL_OUTOF10: 2

## 2025-01-14 ASSESSMENT — PAIN - FUNCTIONAL ASSESSMENT
PAIN_FUNCTIONAL_ASSESSMENT: 0-10

## 2025-01-14 ASSESSMENT — ACTIVITIES OF DAILY LIVING (ADL)
FEEDING YOURSELF: INDEPENDENT
GROOMING: INDEPENDENT
PATIENT'S MEMORY ADEQUATE TO SAFELY COMPLETE DAILY ACTIVITIES?: YES
BATHING: INDEPENDENT
TOILETING: INDEPENDENT
JUDGMENT_ADEQUATE_SAFELY_COMPLETE_DAILY_ACTIVITIES: YES
ASSISTIVE_DEVICE: WHEELCHAIR
ADEQUATE_TO_COMPLETE_ADL: YES
WALKS IN HOME: INDEPENDENT
LACK_OF_TRANSPORTATION: NO
HEARING - RIGHT EAR: FUNCTIONAL
HEARING - LEFT EAR: FUNCTIONAL
DRESSING YOURSELF: INDEPENDENT

## 2025-01-14 ASSESSMENT — LIFESTYLE VARIABLES
HOW OFTEN DO YOU HAVE 6 OR MORE DRINKS ON ONE OCCASION: NEVER
HOW MANY STANDARD DRINKS CONTAINING ALCOHOL DO YOU HAVE ON A TYPICAL DAY: PATIENT DOES NOT DRINK
AUDIT-C TOTAL SCORE: 0
HOW OFTEN DO YOU HAVE A DRINK CONTAINING ALCOHOL: NEVER
AUDIT-C TOTAL SCORE: 0
SKIP TO QUESTIONS 9-10: 1

## 2025-01-14 ASSESSMENT — PATIENT HEALTH QUESTIONNAIRE - PHQ9
SUM OF ALL RESPONSES TO PHQ9 QUESTIONS 1 & 2: 0
1. LITTLE INTEREST OR PLEASURE IN DOING THINGS: NOT AT ALL
2. FEELING DOWN, DEPRESSED OR HOPELESS: NOT AT ALL

## 2025-01-14 ASSESSMENT — COLUMBIA-SUICIDE SEVERITY RATING SCALE - C-SSRS
2. HAVE YOU ACTUALLY HAD ANY THOUGHTS OF KILLING YOURSELF?: NO
6. HAVE YOU EVER DONE ANYTHING, STARTED TO DO ANYTHING, OR PREPARED TO DO ANYTHING TO END YOUR LIFE?: NO
1. IN THE PAST MONTH, HAVE YOU WISHED YOU WERE DEAD OR WISHED YOU COULD GO TO SLEEP AND NOT WAKE UP?: NO

## 2025-01-14 NOTE — ASSESSMENT & PLAN NOTE
Suspect likely due to decreased PO intake from nausea yesterday as well as IV contrast   Encouraged PO intake  Consult nephrology

## 2025-01-14 NOTE — ED NOTES
Chavez Llamas is a 79 y.o. male hx of CAD s/p PCI, HTN, T2DM, s/p left BKA, hypothyroidism, COPD, CHF presenting with chest pain. Patient states he started having sudden onset substernal chest pain that woke him up out of sleep this morning. Also had associated nausea and cold sweats. States this feels like the type of chest pain he has had in the past when he had prior heart attacks. Initial episode of chest pain lasted for at least 30 minutes. Received 4 aspirin and 1 nitroglycerin from EMS. States the nitroglycerin gave him a severe headache. Pain finally subsided after receiving a dose of IV fentanyl in the ED.       PMHX:  Past Medical History:   Diagnosis Date    Old myocardial infarction 02/01/2017    Past heart attack        No Known Allergies      LABS:   Latest Reference Range & Units 01/14/25 06:16   GLUCOSE 74 - 99 mg/dL 164 (H)   SODIUM 136 - 145 mmol/L 137   POTASSIUM 3.5 - 5.3 mmol/L 4.1   CHLORIDE 98 - 107 mmol/L 106   Bicarbonate 21 - 32 mmol/L 25   Anion Gap 10 - 20 mmol/L 10   Blood Urea Nitrogen 6 - 23 mg/dL 18   Creatinine 0.50 - 1.30 mg/dL 1.59 (H)   EGFR >60 mL/min/1.73m*2 44 (L)   Calcium 8.6 - 10.3 mg/dL 8.5 (L)   aPTT 22.0 - 32.5 seconds 73.7 (H)   WBC 4.4 - 11.3 x10*3/uL 4.5   nRBC 0.0 - 0.0 /100 WBCs 0.0   RBC 4.50 - 5.90 x10*6/uL 3.27 (L)   HEMOGLOBIN 13.5 - 17.5 g/dL 9.6 (L)   HEMATOCRIT 41.0 - 52.0 % 28.2 (L)   MCV 80 - 100 fL 86   MCH 26.0 - 34.0 pg 29.4   MCHC 32.0 - 36.0 g/dL 34.0   RED CELL DISTRIBUTION WIDTH 11.5 - 14.5 % 14.7 (H)   Platelets 150 - 450 x10*3/uL 102 (L)   (H): Data is abnormally high  (L): Data is abnormally low        PLAN: ADMIT DR JOISAH Romero, CORWIN  01/14/25 0562

## 2025-01-14 NOTE — CONSULTS
Inpatient consult to Cardiology  Consult performed by: YANELI Jiménez-CNP  Consult ordered by: Casandra Pacheco MD  Reason for consult: Chest pain        Please see consult note by Dr. Burks on 1/14/2025.

## 2025-01-14 NOTE — Clinical Note
Angioplasty of the right coronary artery lesion. Inflation 1: Pressure = 20 manuel; Duration = 20 sec.

## 2025-01-14 NOTE — Clinical Note
Angioplasty of the proximal right coronary artery lesion. Inflation 1: Pressure = 16 manuel; Duration = 180 sec.

## 2025-01-14 NOTE — PROGRESS NOTES
Medication Education     Medication education for Chavez Llamas was provided to the patient and family for the following medication(s):    Ranolazine    Medication education provided by a Pharmacist:  ADR Counseling Dose, frequency, storage How the medication works and benefits of taking it Any drug interactions (including OTCs and herbvals) and importance of notifying a healthcare provider of any medication changes    Identified potential barriers to education:  None    Method(s) of Education:  Verbal Written materials provided and reviewed    An opportunity to ask questions and receive answers was provided.     Assessment of understanding the patient and family:  2= meets goals/outcomes    Additional Notes (if applicable): Instructed patient to avoid grapefruit and grapefruit juice     Radha Deal, PharmD

## 2025-01-14 NOTE — ASSESSMENT & PLAN NOTE
Concerning for angina given significant cardiac history  Consult cardiology  Unable to tolerate nitroglycerin due to hypotension and headache  Started on ranexa

## 2025-01-14 NOTE — Clinical Note
Angioplasty of the right coronary artery lesion. Inflation 1: Pressure = 16 manuel; Duration = 20 sec. Inflation 2: Pressure = 20 manuel; Duration = 20 sec. Inflation 3: Pressure = 20 manuel; Duration = 24 sec.

## 2025-01-14 NOTE — CONSULTS
CONSULT: Nephrology SERVICE    SERVICE DATE: 1/14/2025   SERVICE TIME:  12:09 PM    REASON FOR CONSULT: Acute renal failure  REQUESTING PHYSICIAN: Dr. Pacheco  PRIMARY CARE PHYSICIAN: Christopher D'Amico, DO    ASSESSMENT AND PLAN  79-year-old man with extensive coronary artery disease admitted with chest pain, awaiting transfer downtown for high risk PCI.  1.  Acute renal failure  2.  Chronic kidney disease stage IIIa  3.  Essential hypertension    Acute renal failure likely from contrast nephropathy.  The serum creatinine was below the baseline at admission.  He is at elevated risk for worsening renal dysfunction if another contrast load is given.  It would be nice to trend his labs and see where the creatinine is tomorrow.  We could consider a short course of IV fluids of a PCI will be done.  Again, seems like he will transfer downtown for continued care.  Fortunately there are no major electrolyte abnormalities and he has no history to suggest any kind of obstruction.  I see no other nephrotoxins.  Hold the spironolactone.   Trend daily labs, continue supportive care.  Case discussed with Dr. Pacheco.  I will follow this patient if he remains here.  Thank you for the consultation.    SUBJECTIVE  Mr. Llamas is a 79 y.o. man with significant cardiac medical history (see cardiology note for details) admitted to the hospital with chest discomfort and troponin elevation, consulted for acute renal failure.  The patient has a baseline serum creatinine in the low to mid ones.  He does not follow with a nephrologist.  The creatinine was 1.2 at admission, but nu to 1.6 on today's labs.  This is in the context of a troponin bump.  He had considerable chest discomfort, but denies dyspnea.  There is no cough.  At the time of this transcription, he awaits transfer downtown for high risk PCI.  At presentation, he received an iodine-based CT scan that did not demonstrate a PE.  Vital signs been stable.  He is nonoliguric.  There  is no nausea or diarrhea prior to coming in.  He had a great appetite prior to coming in.  Looks like he was supposed to be on spironolactone in the outpatient setting.  I see no other diuretics, renin-angiotensin-aldosterone inhibitors, or SGLT2 inhibitors.    PAST MEDICAL HISTORY:   Past Medical History:   Diagnosis Date    Old myocardial infarction 02/01/2017    Past heart attack     PAST SURGICAL HISTORY:   Past Surgical History:   Procedure Laterality Date    CARDIAC CATHETERIZATION N/A 12/6/2024    Procedure: Left Heart Cath;  Surgeon: Chaim Soriano MD;  Location: [facility] Cardiac Cath Lab;  Service: Cardiovascular;  Laterality: N/A;    CORONARY ARTERY BYPASS GRAFT  04/14/2015    Coronary Artery Surgery    MR HEAD ANGIO WO IV CONTRAST  2/23/2022    MR HEAD ANGIO WO IV CONTRAST [facility]     FAMILY HISTORY:   Family History   Problem Relation Name Age of Onset    Diabetes Mother      Other (Cerebrovascular Accident) Father       SOCIAL HISTORY:   Social History     Tobacco Use    Smoking status: Former     Types: Cigarettes    Smokeless tobacco: Never   Vaping Use    Vaping status: Never Used     MEDICATIONS:  amLODIPine, 5 mg, oral, Daily  aspirin, 81 mg, oral, Daily  atorvastatin, 40 mg, oral, Nightly  busPIRone, 15 mg, oral, Nightly  cephalexin, 500 mg, oral, q6h JENNIFER  insulin glargine, 16 Units, subcutaneous, Nightly  insulin lispro, 0-10 Units, subcutaneous, TID AC  levothyroxine, 50 mcg, oral, Daily  metoprolol succinate XL, 25 mg, oral, Daily  mirtazapine, 15 mg, oral, Nightly  pantoprazole, 40 mg, oral, Daily   Or  pantoprazole, 40 mg, intravenous, Daily  polyethylene glycol, 17 g, oral, Daily  prasugrel, 10 mg, oral, Daily  ranolazine, 500 mg, oral, BID       heparin, 0-4,000 Units/hr, Last Rate: 400 Units/hr (01/14/25 0657)       PRN medications: dextrose, dextrose, dicyclomine, glucagon, glucagon, heparin (porcine), hydrOXYzine pamoate, morphine, ondansetron **OR** ondansetron   CURRENT  ALLERGIES:   Allergies as of 01/13/2025    (No Known Allergies)       COMPLETE REVIEW OF SYSTEMS:    Full ROS was negative unless mentioned above    OBJECTIVE  PHYSICAL EXAM  Heart Rate:  [48-88]   Respirations:  []   BP: ()/(53-96)   Pulse Ox:  [95 %-100 %]    Body mass index is 22.81 kg/m².  This is a chronically ill-appearing man, seems older than his known age  Thenar wasting present  Somewhat pale skin  Hearing intact  Phonation intact  Moist mucosa  Normal S1/normal S2  Lungs are clear to auscultation bilaterally  Abdomen is soft, nondistended, nontender, positive bowel sounds  No Ayers catheter in place, no suprapubic tenderness to palpation  No extremity edema, left-sided BKA  Moves 4 limbs spontaneously  No obvious joint deformities  No lymphadenopathy    DATA:   Labs:  Results for orders placed or performed during the hospital encounter of 01/13/25 (from the past 96 hours)   ECG 12 lead   Result Value Ref Range    Ventricular Rate 76 BPM    Atrial Rate 76 BPM    VT Interval 236 ms    QRS Duration 160 ms    QT Interval 440 ms    QTC Calculation(Bazett) 495 ms    P Axis 69 degrees    R Axis -21 degrees    T Axis 128 degrees    QRS Count 13 beats    Q Onset 211 ms    P Onset 93 ms    P Offset 152 ms    T Offset 431 ms    QTC Fredericia 475 ms   ECG 12 lead   Result Value Ref Range    Ventricular Rate 92 BPM    Atrial Rate 92 BPM    VT Interval 150 ms    QRS Duration 160 ms    QT Interval 404 ms    QTC Calculation(Bazett) 499 ms    R Axis -43 degrees    T Axis 142 degrees    QRS Count 15 beats    Q Onset 213 ms    P Onset 138 ms    P Offset 174 ms    T Offset 415 ms    QTC Fredericia 465 ms   CBC and Auto Differential   Result Value Ref Range    WBC 5.7 4.4 - 11.3 x10*3/uL    nRBC 0.0 0.0 - 0.0 /100 WBCs    RBC 3.98 (L) 4.50 - 5.90 x10*6/uL    Hemoglobin 11.4 (L) 13.5 - 17.5 g/dL    Hematocrit 34.3 (L) 41.0 - 52.0 %    MCV 86 80 - 100 fL    MCH 28.6 26.0 - 34.0 pg    MCHC 33.2 32.0 - 36.0 g/dL     RDW 14.6 (H) 11.5 - 14.5 %    Platelets 128 (L) 150 - 450 x10*3/uL    Neutrophils % 83.8 40.0 - 80.0 %    Immature Granulocytes %, Automated 0.5 0.0 - 0.9 %    Lymphocytes % 6.9 13.0 - 44.0 %    Monocytes % 6.9 2.0 - 10.0 %    Eosinophils % 1.4 0.0 - 6.0 %    Basophils % 0.5 0.0 - 2.0 %    Neutrophils Absolute 4.76 1.60 - 5.50 x10*3/uL    Immature Granulocytes Absolute, Automated 0.03 0.00 - 0.50 x10*3/uL    Lymphocytes Absolute 0.39 (L) 0.80 - 3.00 x10*3/uL    Monocytes Absolute 0.39 0.05 - 0.80 x10*3/uL    Eosinophils Absolute 0.08 0.00 - 0.40 x10*3/uL    Basophils Absolute 0.03 0.00 - 0.10 x10*3/uL   Comprehensive Metabolic Panel   Result Value Ref Range    Glucose 154 (H) 74 - 99 mg/dL    Sodium 136 136 - 145 mmol/L    Potassium 4.4 3.5 - 5.3 mmol/L    Chloride 104 98 - 107 mmol/L    Bicarbonate 25 21 - 32 mmol/L    Anion Gap 11 10 - 20 mmol/L    Urea Nitrogen 17 6 - 23 mg/dL    Creatinine 1.18 0.50 - 1.30 mg/dL    eGFR 62 >60 mL/min/1.73m*2    Calcium 9.3 8.6 - 10.3 mg/dL    Albumin 4.2 3.4 - 5.0 g/dL    Alkaline Phosphatase 70 33 - 136 U/L    Total Protein 7.6 6.4 - 8.2 g/dL    AST 13 9 - 39 U/L    Bilirubin, Total 1.8 (H) 0.0 - 1.2 mg/dL    ALT 12 10 - 52 U/L   Magnesium   Result Value Ref Range    Magnesium 1.77 1.60 - 2.40 mg/dL   Lipase   Result Value Ref Range    Lipase 11 9 - 82 U/L   B-type natriuretic peptide   Result Value Ref Range     (H) 0 - 99 pg/mL   Troponin I, High Sensitivity, Initial   Result Value Ref Range    Troponin I, High Sensitivity 17 0 - 20 ng/L   Sars-CoV-2 and Influenza A/B PCR   Result Value Ref Range    Flu A Result Not Detected Not Detected    Flu B Result Not Detected Not Detected    Coronavirus 2019, PCR Not Detected Not Detected   Troponin, High Sensitivity, 1 Hour   Result Value Ref Range    Troponin I, High Sensitivity 66 (HH) 0 - 20 ng/L   Tissue/Wound Culture/Smear    Specimen: Skin/Superficial Abscess; Tissue/Biopsy   Result Value Ref Range    Tissue/Wound  Culture/Smear No growth to date     Gram Stain No polymorphonuclear leukocytes seen     Gram Stain No organisms seen    Blood Culture    Specimen: Peripheral Venipuncture; Blood culture   Result Value Ref Range    Blood Culture Loaded on Instrument - Culture in progress    Blood Culture    Specimen: Peripheral Venipuncture; Blood culture   Result Value Ref Range    Blood Culture Loaded on Instrument - Culture in progress    Lactate   Result Value Ref Range    Lactate 1.0 0.4 - 2.0 mmol/L   ECG 12 lead   Result Value Ref Range    Ventricular Rate 88 BPM    Atrial Rate 88 BPM    WA Interval 210 ms    QRS Duration 158 ms    QT Interval 410 ms    QTC Calculation(Bazett) 496 ms    P Axis 83 degrees    R Axis 65 degrees    T Axis 216 degrees    QRS Count 15 beats    Q Onset 215 ms    P Onset 110 ms    P Offset 170 ms    T Offset 420 ms    QTC Fredericia 466 ms   aPTT   Result Value Ref Range    aPTT 31.0 22.0 - 32.5 seconds   POCT GLUCOSE   Result Value Ref Range    POCT Glucose 111 (H) 74 - 99 mg/dL   POCT GLUCOSE   Result Value Ref Range    POCT Glucose 98 74 - 99 mg/dL   APTT   Result Value Ref Range    aPTT 122.3 (HH) 22.0 - 32.5 seconds   Basic metabolic panel   Result Value Ref Range    Glucose 164 (H) 74 - 99 mg/dL    Sodium 137 136 - 145 mmol/L    Potassium 4.1 3.5 - 5.3 mmol/L    Chloride 106 98 - 107 mmol/L    Bicarbonate 25 21 - 32 mmol/L    Anion Gap 10 10 - 20 mmol/L    Urea Nitrogen 18 6 - 23 mg/dL    Creatinine 1.59 (H) 0.50 - 1.30 mg/dL    eGFR 44 (L) >60 mL/min/1.73m*2    Calcium 8.5 (L) 8.6 - 10.3 mg/dL   CBC   Result Value Ref Range    WBC 4.5 4.4 - 11.3 x10*3/uL    nRBC 0.0 0.0 - 0.0 /100 WBCs    RBC 3.27 (L) 4.50 - 5.90 x10*6/uL    Hemoglobin 9.6 (L) 13.5 - 17.5 g/dL    Hematocrit 28.2 (L) 41.0 - 52.0 %    MCV 86 80 - 100 fL    MCH 29.4 26.0 - 34.0 pg    MCHC 34.0 32.0 - 36.0 g/dL    RDW 14.7 (H) 11.5 - 14.5 %    Platelets 102 (L) 150 - 450 x10*3/uL   APTT   Result Value Ref Range    aPTT 73.7 (H)  22.0 - 32.5 seconds   POCT GLUCOSE   Result Value Ref Range    POCT Glucose 162 (H) 74 - 99 mg/dL   ECG 12 lead   Result Value Ref Range    Ventricular Rate 87 BPM    Atrial Rate 87 BPM    NJ Interval 210 ms    QRS Duration 158 ms    QT Interval 406 ms    QTC Calculation(Bazett) 488 ms    P Axis 78 degrees    R Axis 49 degrees    T Axis 195 degrees    QRS Count 14 beats    Q Onset 218 ms    P Onset 113 ms    P Offset 171 ms    T Offset 421 ms    QTC Fredericia 459 ms   POCT GLUCOSE   Result Value Ref Range    POCT Glucose 192 (H) 74 - 99 mg/dL       SIGNATURE: Adama Dangelo MD PATIENT NAME: Chavez Llamas   DATE: January 14, 2025 MRN: [id number]   TIME: 12:09 PM PAGER: [phone]

## 2025-01-14 NOTE — HOSPITAL COURSE
79yM hx of CAD s/p PCI, HTN, T2DM, who presented with chest pain. Of note, patient has extensive cardiac history. Recently admitted at [facility] from 12/4/24-12/10/24 with NSTEMI. Underwent cardiac catheterization which showed moderate obstruction in the LAD after his LIMA graft, and severe in-stent restenosis of his right coronary artery at the vessel and a difficult intervention resulted in PTCA of the 90% mid right coronary obstruction reduced to 50 to 70%, but no additional stenting was possible due to the inability to negotiate the previously stented mid RCA.     Returned to [facility] with complaints of severe substernal chest pain. Initial troponin negative but did have elevation in second troponin. CTA chest negative for PE and aortic dissection. Cardiology consulted. Started on heparin gtt and ranexa. Hospital course complicated by TEODORO. Nephrology consulted, suspect due to use of IV contrast. Cardiology recommending transfer to [facility] to undergo high risk PCI. Accepted for transfer to [facility] CICU by Dr. Evangelina Santiago.

## 2025-01-14 NOTE — DISCHARGE SUMMARY
"Discharge Diagnosis  Chest pain, unspecified type    Issues Requiring Follow-Up  Chest pain, CAD   TEODORO  LLE cellulitis/wound     Discharge Meds     Medication List      CONTINUE taking these medications     lancets 33 gauge misc; Commonly known as: BD Ultra Fine Lancets; Testing   T.I.D   pen needle, diabetic 31 gauge x 3/16\" needle; Use as directed     ASK your doctor about these medications     amLODIPine 5 mg tablet; Commonly known as: Norvasc; Take 1 tablet (5 mg)   by mouth once daily.   aspirin 81 mg EC tablet; Take 1 tablet (81 mg) by mouth once daily.   atorvastatin 40 mg tablet; Commonly known as: Lipitor; Take 1 tablet (40   mg) by mouth once daily at bedtime.   busPIRone 15 mg tablet; Commonly known as: Buspar; TAKE 1/2 (ONE-HALF)   TABLET BY MOUTH IN THE MORNING AND AT BEDTIME   dicyclomine 10 mg capsule; Commonly known as: Bentyl; TAKE 1 CAPSULE BY   MOUTH TWICE DAILY AS NEEDED FOR  IBS   hydrOXYzine pamoate 25 mg capsule; Commonly known as: Vistaril; Take 1   capsule (25 mg) by mouth 3 times a day as needed for anxiety.   insulin lispro 100 unit/mL injection; Commonly known as: HumaLOG; Inject   10 Units under the skin early in the morning.. Inject 10 units   subcutaneous daily.  Please check blood glucose level before injection.   Lantus Solostar U-100 Insulin 100 unit/mL (3 mL) pen; Generic drug:   insulin glargine; INJECT 20 UNITS SUBCUTANEOUSLY TWICE DAILY.   levothyroxine 50 mcg tablet; Commonly known as: Synthroid, Levoxyl; TAKE   1 TABLET BY MOUTH ONCE DAILY IN THE MORNING BEFORE MEAL(S) ON AN EMPTY   STOMACH   lubricating eye drops ophthalmic solution   metoprolol succinate XL 25 mg 24 hr tablet; Commonly known as:   Toprol-XL; Take 1 tablet (25 mg) by mouth once daily.   mirtazapine 15 mg tablet; Commonly known as: Remeron; TAKE 1 TABLET BY   MOUTH ONCE DAILY AT BEDTIME   prasugrel 10 mg tablet; Commonly known as: Effient; Take 1 tablet (10   mg) by mouth once daily.   spironolactone 25 mg " tablet; Commonly known as: Aldactone       Test Results Pending At Discharge  Pending Labs       Order Current Status    Blood Culture Preliminary result    Blood Culture Preliminary result    Tissue/Wound Culture/Smear Preliminary result            Hospital Course  79yM hx of CAD s/p PCI, HTN, T2DM, who presented with chest pain. Of note, patient has extensive cardiac history. Recently admitted at [facility] from 12/4/24-12/10/24 with NSTEMI. Underwent cardiac catheterization which showed moderate obstruction in the LAD after his LIMA graft, and severe in-stent restenosis of his right coronary artery at the vessel and a difficult intervention resulted in PTCA of the 90% mid right coronary obstruction reduced to 50 to 70%, but no additional stenting was possible due to the inability to negotiate the previously stented mid RCA.     Returned to [facility] with complaints of severe substernal chest pain. Initial troponin negative but did have elevation in second troponin. CTA chest negative for PE and aortic dissection. Cardiology consulted. Started on heparin gtt and ranexa. Hospital course complicated by TEODORO. Nephrology consulted, suspect due to use of IV contrast. Cardiology recommending transfer to [facility] to undergo high risk PCI. Accepted for transfer to [facility] CICU by Dr. Evangelina Santiago.     Pertinent Physical Exam At Time of Discharge  Physical Exam  Constitutional:       General: He is not in acute distress.     Appearance: He is not toxic-appearing.   HENT:      Head: Normocephalic.      Mouth/Throat:      Pharynx: Oropharynx is clear.   Eyes:      General: No scleral icterus.  Cardiovascular:      Rate and Rhythm: Normal rate.   Pulmonary:      Effort: No respiratory distress.      Breath sounds: No wheezing.   Abdominal:      General: There is no distension.      Palpations: Abdomen is soft.   Musculoskeletal:         General: Deformity (L BKA) present.      Right lower leg: No edema.   Skin:     Comments: Small  wound/lesion on the inner distal posterior left thigh with surrounding erythema. No purulence or drainage present. Mildly tender to palpation. Improved from yesterday   Neurological:      Mental Status: He is alert.         Outpatient Follow-Up  No future appointments.    Time spent on discharge: 35 minutes    Casandra Pacheco MD

## 2025-01-14 NOTE — Clinical Note
Angioplasty of the right coronary artery lesion. Inflation 1: Pressure = 16 manuel; Duration = 20 sec.

## 2025-01-14 NOTE — CONSULTS
Consults  History Of Present Illness:    Chavez Llamas is a 79 y.o. male presenting with retrosternal chest pain in the setting of normal troponin initially and second troponin elevated at 66.  Third troponin is pending.  He has a long history of coronary disease with revascularization and PCI intervention and this was outlined in a consult by Dr. Slick Nuno December 2024    Per Dr. Nuno...The patient has a longstanding history of coronary artery disease and underwent a CABG x 2 with an LIMA to the LAD and SVG to unknown target in the 1990s at [facility]. The patient may have had a PCI procedure at the [facility] subsequent to that. On 3/12/2010 the patient underwent cardiac catheterization [facility] which showed a 70% distal stenosis of the LMCA and 100% occlusion of the proximal LAD and proximal LCx. There was a 70% stenosis of the proximal ramus intermedius branch widely patent LIMA graft to the LAD, totally occluded saphenous vein graft to unknown target and moderate LV dysfunction ejection fraction 35-40%. The patient underwent a PCI and bare-metal stent deployment to the mid RCA at that time. The patient was subsequently underwent a repeat cardiac cath and PCI in 12/1/2020 at [facility]. At that time the patient was noted to have an 80 to 85% stenosis within the previously deployed mid RCA stent with an additional 80% stenosis at the distal edge of the stent. The distal RCA had 3 tandem lesions ranging between 80 to 95%. The LIMA to the LAD was widely patent but there was a 50% stenosis of the mid LAD beyond the anastomotic site. The patient subsequently underwent PCI and stent deployment x 3 to the RCA. The patient returned to [facility] with accelerated angina acute coronary syndrome for a delayed in-stent thrombosis and on 3/13/2021 the patient had successful balloon angioplasty of the proximal segment of the PDA RCA with a 12 mm balloon and balloon  angioplasty of the distal mid and proximal RCA. Of note the patient required a temporary transvenous pacemaker at that time for transient third-degree AV block. 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 The patient has a longstanding history of coronary artery disease and underwent a CABG x 2 with an LIMA to the LAD and SVG to unknown target in the 1990s at [facility]. The patient may have had a PCI procedure at the [facility] subsequent to that. On 3/12/2010 the patient underwent cardiac catheterization [facility] which showed a 70% distal stenosis of the LMCA and 100% occlusion of the proximal LAD and proximal LCx. There was a 70% stenosis of the proximal ramus intermedius branch widely patent LIMA graft to the LAD totally occluded saphenous vein graft to unknown target and moderate LV dysfunction ejection fraction 35-40%. The patient underwent a PCI and bare-metal stent deployment to the mid RCA at that time. The patient was subsequently underwent a repeat cardiac cath and PCI in 12/1/2020 at [facility]. At that time the patient was noted to have an 80 to 85% stenosis within the previously deployed mid RCA stent with an additional 80% stenosis at the distal edge of the stent. The distal RCA had 3 tandem lesions ranging between 80 to 95%. The LIMA to the LAD was widely patent but there was a 50% stenosis of the mid LAD beyond the anastomotic site. The patient subsequently underwent PCI and stent deployment x 3 to the RCA. The patient returned to [facility] with accelerated angina acute coronary syndrome for a delayed in-stent thrombosis and on 3/13/2021 the patient had successful balloon angioplasty of the proximal segment of the PDA RCA with a 12 mm balloon and balloon angioplasty of the distal mid and proximal RCA. Of note the patient required a temporary transvenous pacemaker at  that time for transient third-degree AV block. 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     He had readmission with a non-STEMI at [facility] of 2024 and cardiac catheterization showed moderate obstruction in the LAD after his LIMA graft, and severe in-stent restenosis of his right coronary artery at the vessel and a difficult intervention resulted in PTCA of the 90% mid right coronary obstruction reduced to 50 to 70%, but no additional stenting was possible due to the inability to negotiate the previously stented mid RCA.     Last Recorded Vitals:  Vitals:    01/14/25 0530 01/14/25 0600 01/14/25 0720 01/14/25 0855   BP: 97/53 105/63 113/56 113/56   BP Location: Left arm Left arm     Patient Position: Lying Lying     Pulse: 58 59 65 69   Resp: 16 17 15 (!) 158   Temp:       SpO2: 99% 99% 95% 95%   Weight:       Height:           Last Labs:  CBC - 1/14/2025:  6:16 AM  4.5 9.6 102    28.2      CMP - 1/14/2025:  6:16 AM  8.5 7.6 13 --- 1.8   5.4 4.2 12 70      PTT - 1/14/2025:  6:16 AM  _   _ 73.7     Troponin I, High Sensitivity   Date/Time Value Ref Range Status   01/13/2025 02:01 PM 66 (HH) 0 - 20 ng/L Final   01/13/2025 11:20 AM 17 0 - 20 ng/L Final   12/07/2024 04:17 AM 2,068 (HH) 0 - 20 ng/L Final     BNP   Date/Time Value Ref Range Status   01/13/2025 11:20  (H) 0 - 99 pg/mL Final   12/04/2024 04:23  (H) 0 - 99 pg/mL Final     Hemoglobin A1C   Date/Time Value Ref Range Status   12/05/2024 05:05 AM 5.5 See comment % Final   06/11/2024 09:29 AM 5.3 see below % Final     LDL Calculated   Date/Time Value Ref Range Status   12/04/2024 11:47 PM 34 <=99 mg/dL Final     Comment:                                 Near   Borderline      AGE      Desirable  Optimal    High     High     Very High     0-19 Y     0 - 109     ---    110-129   >/= 130     ----    20-24 Y     0 - 119     ---    120-159   >/= 160      ----      >24 Y     0 -  99   100-129  130-159   160-189     >/=190     06/11/2024 09:29 AM 23 <=99 mg/dL Final     Comment:                                 Near   Borderline      AGE      Desirable  Optimal    High     High     Very High     0-19 Y     0 - 109     ---    110-129   >/= 130     ----    20-24 Y     0 - 119     ---    120-159   >/= 160     ----      >24 Y     0 -  99   100-129  130-159   160-189     >/=190     02/23/2022 05:15 AM 22 (L) 65 - 130 MG/DL Final   03/14/2021 04:17 AM 22 (L) 65 - 130 MG/DL Final   12/02/2020 03:37 AM 51 (L) 65 - 130 MG/DL Final     VLDL   Date/Time Value Ref Range Status   12/04/2024 11:47 PM 18 0 - 40 mg/dL Final   06/11/2024 09:29 AM 19 0 - 40 mg/dL Final   06/05/2023 10:02 AM 19 0 - 40 mg/dL Final   09/21/2022 08:32 AM 13 0 - 40 mg/dL Final   09/03/2021 08:50 AM 15 0 - 40 mg/dL Final      Last I/O:  I/O last 3 completed shifts:  In: 100 (1.5 mL/kg) [IV Piggyback:100]  Out: - (0 mL/kg)   Weight: 68 kg     Past Cardiology Tests (Last 3 Years):  EKG:  ECG 12 lead 01/14/2025 (Preliminary)      ECG 12 lead 01/13/2025 (Preliminary)      ECG 12 lead 01/13/2025 (Preliminary)      ECG 12 lead 01/13/2025 (Preliminary)      ECG 12 lead 12/04/2024      ECG 12 lead 12/04/2024    Echo:  Transthoracic Echo (TTE) Complete 12/06/2024    Ejection Fractions:  EF   Date/Time Value Ref Range Status   12/06/2024 08:59 AM 43 %      Cath:  Cardiac Catheterization Procedure 12/06/2024    Stress Test:  No results found for this or any previous visit from the past 1095 days.    Cardiac Imaging:  No results found for this or any previous visit from the past 1095 days.      Past Medical History:  He has a past medical history of Old myocardial infarction (02/01/2017).    Past Surgical History:  He has a past surgical history that includes Coronary artery bypass graft (04/14/2015); MR angio head wo IV contrast (2/23/2022); and Cardiac catheterization (N/A, 12/6/2024).      Social History:  He reports  that he has quit smoking. His smoking use included cigarettes. He has never used smokeless tobacco. No history on file for alcohol use and drug use.    Family History:  Family History   Problem Relation Name Age of Onset    Diabetes Mother      Other (Cerebrovascular Accident) Father          Allergies:  Patient has no known allergies.    Inpatient Medications:  Scheduled medications   Medication Dose Route Frequency    amLODIPine  5 mg oral Daily    aspirin  81 mg oral Daily    atorvastatin  40 mg oral Nightly    busPIRone  15 mg oral Nightly    cephalexin  500 mg oral q6h JENNIFER    insulin glargine  16 Units subcutaneous Nightly    insulin lispro  0-10 Units subcutaneous TID AC    levothyroxine  50 mcg oral Daily    metoprolol succinate XL  25 mg oral Daily    mirtazapine  15 mg oral Nightly    pantoprazole  40 mg oral Daily    Or    pantoprazole  40 mg intravenous Daily    polyethylene glycol  17 g oral Daily    prasugrel  10 mg oral Daily    ranolazine  500 mg oral BID     PRN medications   Medication    dextrose    dextrose    dicyclomine    glucagon    glucagon    heparin (porcine)    hydrOXYzine pamoate    morphine    ondansetron    Or    ondansetron     Continuous Medications   Medication Dose Last Rate    heparin  0-4,000 Units/hr 400 Units/hr (01/14/25 0657)     Outpatient Medications:  Current Outpatient Medications   Medication Instructions    amLODIPine (NORVASC) 5 mg, oral, Daily    aspirin 81 mg, oral, Daily    atorvastatin (LIPITOR) 40 mg, oral, Nightly    busPIRone (Buspar) 15 mg tablet TAKE 1/2 (ONE-HALF) TABLET BY MOUTH IN THE MORNING AND AT BEDTIME    dicyclomine (Bentyl) 10 mg capsule TAKE 1 CAPSULE BY MOUTH TWICE DAILY AS NEEDED FOR  IBS    hydrOXYzine pamoate (VISTARIL) 25 mg, oral, 3 times daily PRN    insulin lispro (HUMALOG) 10 Units, subcutaneous, Daily, Inject 10 units subcutaneous daily.  Please check blood glucose level before injection.    lancets (BD Ultra Fine Lancets) 33 gauge misc  "Testing T.I.D    Lantus Solostar U-100 Insulin 100 unit/mL (3 mL) pen INJECT 20 UNITS SUBCUTANEOUSLY TWICE DAILY.    levothyroxine (Synthroid, Levoxyl) 50 mcg tablet TAKE 1 TABLET BY MOUTH ONCE DAILY IN THE MORNING BEFORE MEAL(S) ON AN EMPTY STOMACH    lubricating eye drops ophthalmic solution 1 drop, As needed    metoprolol succinate XL (TOPROL-XL) 25 mg, oral, Daily    mirtazapine (REMERON) 15 mg, oral, Nightly    pen needle, diabetic 31 gauge x 3/16\" needle Use as directed    prasugrel (EFFIENT) 10 mg, oral, Daily    spironolactone (ALDACTONE) 25 mg, Every morning       Physical Exam:  Neck:  Normal jugular venous pressure, carotid upstrokes brisk with no bruits  Lungs:  Clear to auscultation without wheezing or rhonchi or rales  Heart:  Regular rate and rhythm normal S1 and S2, no murmurs gallops rubs or clicks, PMI normal, no RV lift  Abdomen:  Soft, good bowel sounds, nontender, no hepatosplenomegaly, no pulsatile mass or bruits.  Extremities: BKA left leg, good  femoral  pulses without pretibial edema of right leg  Neurological:  No focal sensory or motor deficits, pupils equal and reactive     Assessment/Plan   #1  NSTEMI  2.  Coronary artery disease    3 remote CABG with LIMA to the LAD patent, SVG to an unknown vessel chronically occluded    4.  Multiple interventions with 5 stents placed in the proximal and mid right coronary artery with severe in-stent restenosis    5.  Right coronary artery PCI with PTCA but no stenting due to severe in-stent restenosis    6. Peripheral vascular disease with left below the knee amputation.    1/14: Increased beta-blocker therapy, added Ranexa for coronary ischemia, patient is not a candidate for intravenous nitrate therapy due to severe hypotension with that medication.  He is on intravenous heparin and oral antiplatelet therapy.  I spoke with the patient about the option of repeat RCA risk intervention under the supervision of Dr. Weston and Dr. Win at [facility] and he " is agreeable to proceed.  He has been accepted for transfer.  Also discussed this with his daughter-in-law, Jelena at his request.      Code Status:  Full Code          Jayson Burks, DO

## 2025-01-14 NOTE — PROGRESS NOTES
Chavez Llamas is a 79 y.o. male on day 1 of admission presenting with Chest pain, unspecified type.      Subjective   Continues to endorse chest pain but states it is mild, around 1-2/10 right now. Otherwise denies any complaints. Appetite is improving now that chest pain seems to be subsiding.        Objective     Last Recorded Vitals  /56   Pulse 69   Temp 36.6 °C (97.9 °F)   Resp (!) 158   Wt 68 kg (150 lb)   SpO2 95%   Intake/Output last 3 Shifts:    Intake/Output Summary (Last 24 hours) at 1/14/2025 0926  Last data filed at 1/13/2025 1618  Gross per 24 hour   Intake 100 ml   Output --   Net 100 ml       Admission Weight  Weight: 68 kg (150 lb) (01/13/25 1103)    Daily Weight  01/13/25 : 68 kg (150 lb)    Image Results  CT angio chest abdomen pelvis  Narrative: Interpreted By:  Andrez Veloz,   STUDY:  CT ANGIO CHEST ABDOMEN PELVIS;  1/13/2025 5:00 pm      INDICATION:  Signs/Symptoms:Dissection protocol      COMPARISON:  CTA chest abdomen pelvis dated 12/04/2024.      ACCESSION NUMBER(S):  [id number]      ORDERING CLINICIAN:  RAINER FAULKNER      TECHNIQUE:  CT of the chest, abdomen, and pelvis was performed was obtained  before and after administration of intravenous contrast in the  arterial phase, as part of CT angiography protocol. Contiguous axial  images were obtained at  5 mm slice thickness through the chest, and  at  3 mm through the abdomen and pelvis. Coronal and sagittal  reconstructions at  3 mm slice thickness were performed.  75 ml of contrast material Omnipaque 350 were administered  intravenously without immediate complication.      3D volume rendering of the aorta was obtained on a separate  workstation and also reviewed.      FINDINGS:  POTENTIAL LIMITATIONS OF THE STUDY: Motion and mixing artifact which  limits evaluation of the distal branch vessels.      The visualized thyroid gland is within normal limits.      HEART AND VESSELS:  No discrete filling defects within the main  pulmonary artery or its  branches.      Main pulmonary artery and its branches are normal in caliber.      The thoracic aorta is of normal contour without aneurysm. No evidence  of acute intramural hematoma or dissection. Moderate calcified and  noncalcified atherosclerotic plaque of the thoracic aorta with  noncalcified atherosclerotic plaque noted in the anterior surface of  aortic arch, axial image 87, similar to prior.      Severe coronary artery atherosclerotic calcifications versus  stents..The study is not optimized for evaluation of coronary  arteries.      The cardiac chambers are not enlarged. There are sternotomy changes.      No evidence of pericardial effusion.      MEDIASTINUM AND GABRIELLA, AND AXILLA:  No evidence of thoracic lymphadenopathy by CT criteria.      No mediastinal masses.      LUNGS AND AIRWAYS:  The trachea and central airways are patent. No endobronchial lesion.      There is moderate upper lobe predominant paraseptal and centrilobular  emphysema. There is also peripheral nonspecific interstitial scarring  with more asymmetric scarring and traction bronchiectasis in the left  lung apex. There is no pleural effusion or pneumothorax.      ABDOMEN/PELVIS:  Liver: The liver is normal in size and contour. There are no focal  hepatic lesions.      Gallbladder/biliary system: There are no radiopaque gallstones. There  is no intrahepatic or extrahepatic biliary dilatation.      Spleen: Normal in size.      Pancreas: Not enlarged. No peripancreatic edema or ductal dilatation.  No pancreatic mass is identified.      Adrenals: Within normal limits.      Kidneys: Mild bilateral renal cortical thinning. Bilateral intrarenal  vascular atherosclerotic calcifications noted.  There is no  hydronephrosis. There is no nephrolithiasis. 1.6 cm right lower pole  renal cyst.      Urinary bladder: Grossly normal. Prostate is mildly enlarged.      Bowel: Stomach is partially collapsed without gross abnormality.  No  bowel dilatation or obstruction. Normal appendix. Formed stool  throughout the colon without wall thickening or acute inflammatory  change.      Vessels: The abdominal aorta is normal in caliber. The celiac,  superior, and inferior mesenteric arteries are patent. There is a  patent left renal artery origin stent. Moderate atherosclerotic  plaque of the abdominal aorta and its branches. Partially visualized  bilateral superficial femoral artery stents.      Lymph nodes: No lymphadenopathy in the abdomen or pelvis.      Peritoneal/retroperitoneum: There is no evidence of intraperitoneal  free air. There is no retroperitoneal hematoma. There are no pelvic  or mesenteric masses identified.      OSSEOUS STRUCTURES:  There are no suspicious osseous lesions. There is osteopenia.  Unchanged mild chronic compression deformity of L4 vertebral body.      Impression: CHEST  1.  No evidence of aortic aneurysm, dissection, or acute intramural  hematoma. Moderate calcified and noncalcified atherosclerotic plaque  of the thoracic and abdominal aorta. Patent left renal artery stent  and partially visualized bilateral superficial femoral artery stents.  Sternotomy changes. Coronary artery atherosclerotic calcifications.  2. No evidence of consolidation or pleural effusion. Moderate  centrilobular and paraseptal emphysema as well as nonspecific  pulmonary fibrotic changes. Asymmetric scarring in the left lung apex  is new since 2010 and consider follow-up chest CT in 6 months to  confirm stability.      ABDOMEN - PELVIS  1.  No acute abnormality in the abdomen/pelvis. Mild bilateral renal  cortical thinning.  2. Unchanged chronic compression deformity of L4 vertebral body.          Signed by: Andrez Veloz 1/13/2025 5:49 PM  Dictation workstation:   [id number]  XR knee left 4+ views  Narrative: Interpreted By:  Leland Murphy,   STUDY:  XR KNEE LEFT 4+ VIEWS      INDICATION:  Signs/Symptoms:Cellulitis.      COMPARISON:  None       ACCESSION NUMBER(S):  [id number]      ORDERING CLINICIAN:  RAINER FAULKNER      FINDINGS:  Below-the-knee amputation. Surgical margins left lower leg and fibula  satisfactory. Mild osteoarthritis left knee. No acute findings.      Impression: No acute findings status post left below-the-knee amputation.      Signed by: Leland Murphy 1/13/2025 2:50 PM  Dictation workstation:   [id number]  ECG 12 lead  Sinus rhythm with 1st degree AV block with Premature supraventricular complexes and Premature ventricular complexes or Fusion complexes  Left bundle branch block  Abnormal ECG  When compared with ECG of 13-JAN-2025 11:09, (unconfirmed)  Fusion complexes are now Present  Premature ventricular complexes are now Present  Premature supraventricular complexes are now Present  NJ interval has increased  Lower extremity venous duplex left  Narrative: Interpreted By:  Kishor Andrade,   STUDY:  [facility] LOWER EXTREMITY VENOUS DUPLEX LEFT; 1/13/2025 1:36 pm      INDICATION:  Pain cellulitis      COMPARISON:  None      ACCESSION NUMBER(S):  [id number]      ORDERING CLINICIAN:  RAINER FAULKNER      TECHNIQUE:  Static grayscale, color Doppler, and spectral Doppler sonographic  images of the bilateral lower extremities were obtained for duplex  evaluation.      FINDINGS:  LEFT LOWER EXTREMITY:  There is no evidence of deep venous thrombus in the visualized  portions of the common femoral vein, femoral vein, or popliteal vein.  Below-knee amputation present.      Impression: 1. No evidence of deep venous thrombus in the evaluated veins of the  left leg from the inguinal ligament to the popliteal fossa.  Below-knee amputation present.      Signed by: Kishor Andrade 1/13/2025 1:38 PM  Dictation workstation:   [id number]  ECG 12 lead  Normal sinus rhythm  Left axis deviation  Left bundle branch block  Abnormal ECG  When compared with ECG of 04-DEC-2024 20:58,  NJ interval has decreased  XR chest 1 view  Narrative: Interpreted By:  Effie  Duane,   STUDY:  XR CHEST 1 VIEW;  1/13/2025 11:34 am      INDICATION:  Signs/Symptoms:pain.          COMPARISON:  12/08/2024.      ACCESSION NUMBER(S):  [id number]      ORDERING CLINICIAN:  RAINER FAULKNER      FINDINGS:  CARDIOMEDIASTINAL SILHOUETTE:  Borderline size of the cardiac silhouette, aortic calcifications and  postoperative changes of the mediastinum are similar to prior.      LUNGS:  Mild bibasilar atelectasis is present. No localized consolidation. No  definite pleural effusion. No appreciable pneumothorax.      ABDOMEN:  No remarkable upper abdominal findings.      BONES:  No acute osseous changes.      Impression: 1.  Mild bibasilar atelectasis. No localized consolidation.              MACRO:  None.      Signed by: Duane Chou 1/13/2025 11:40 AM  Dictation workstation:   [id number]      Physical Exam  Constitutional:       General: He is not in acute distress.     Appearance: He is not toxic-appearing.   HENT:      Head: Normocephalic.      Mouth/Throat:      Pharynx: Oropharynx is clear.   Eyes:      General: No scleral icterus.  Cardiovascular:      Rate and Rhythm: Normal rate.   Pulmonary:      Effort: No respiratory distress.      Breath sounds: No wheezing.   Abdominal:      General: There is no distension.      Palpations: Abdomen is soft.   Musculoskeletal:         General: Deformity (L BKA) present.      Right lower leg: No edema.   Skin:     Comments: Small wound/lesion on the inner distal posterior left thigh with surrounding erythema. No purulence or drainage present. Mildly tender to palpation. Improved from yesterday   Neurological:      Mental Status: He is alert and oriented to person, place, and time.         Relevant Results               Assessment/Plan          Assessment & Plan  Chest pain, unspecified type  Concerning for angina given significant cardiac history  Consult cardiology  Unable to tolerate nitroglycerin due to hypotension and headache  Started on  ranexa  NSTEMI (non-ST elevated myocardial infarction) (Multi)  Troponin 17 -> 66  Continue heparin gtt per cardiology   Chronic systolic heart failure  Continue home meds  Not in exacerbation   Cellulitis of leg, left  Small wound on posterior left leg proximal to the knee, no obvious drainage noted  Start keflex   Type 2 diabetes mellitus, with long-term current use of insulin  Continue lantus and SSI  Hypoglycemia protocol   Hypothyroidism  Continue synthroid   Acute kidney injury superimposed on chronic kidney disease (CMS-HCC)  Suspect likely due to decreased PO intake from nausea yesterday as well as IV contrast   Encouraged PO intake  Consult nephrology     Case discussed with Dago Bradley CNP from cardiology service    Plan:  Consult nephrology for TEODORO  Discussed with cardiology - will plan for transfer to [facility] for possible high risk PCI on 1/16/25  Awaiting cardiology eval/clearance for transfer   Continue heparin gtt  Check daily labs    Addendum 10:00 - case discussed with Dr. Dangelo, Dr. Burks, and Dr. Evangelina Santiago. Patient was medically cleared for transfer to [facility] by Dr. Burks. Dr. Santiago accepted patient for transfer to [facility] CICU. Transfer initiated.             Casandra Pacheco MD

## 2025-01-14 NOTE — PROGRESS NOTES
Chavez Llamas is a 79 y.o. male admitted for Chest pain, unspecified type. Pharmacy reviewed the patient's [id number] medications and allergies for accuracy.    Medications ADDED:  lubricating eye drops ophthalmic solution ( 1 drop both eyes daily prn)  spironolactone (Aldactone) 25 mg tablet (QAM)  Medications CHANGED:  hydrOXYzine pamoate (Vistaril) 25 mg capsule (nightly)  Lantus Solostar U-100 Insulin 100 unit/mL (3 mL) pen (once daily)  insulin lispro (HumaLOG) 100 unit/mL injection (5 units daily with food)  Medications REMOVED:   dicyclomine (Bentyl) 10 mg capsule       The list below reflects the updated PTA list. Comments regarding how patient may be taking medications differently can be found in the Admit Orders Activity  Prior to Admission Medications   Prescriptions Last Dose Informant   Lantus Solostar U-100 Insulin 100 unit/mL (3 mL) pen 1/12/2025 Child   Sig: INJECT 20 UNITS SUBCUTANEOUSLY ONCE DAILY.   amLODIPine (Norvasc) 5 mg tablet 1/12/2025 Morning Child   Sig: Take 1 tablet (5 mg) by mouth once daily.   aspirin 81 mg EC tablet 1/12/2025 Morning Child   Sig: Take 1 tablet (81 mg) by mouth once daily.   atorvastatin (Lipitor) 40 mg tablet 1/12/2025 Bedtime Child   Sig: Take 1 tablet (40 mg) by mouth once daily at bedtime.   busPIRone (Buspar) 15 mg tablet 1/12/2025 Bedtime Child   Sig: TAKE 1/2 (ONE-HALF) TABLET BY MOUTH IN THE   MORNING AND AT BEDTIME      hydrOXYzine pamoate (Vistaril) 25 mg capsule 1/12/2025 Bedtime Child   Sig: Take 1 capsule (25 mg) by mouth at bedtime as   needed for anxiety.   insulin lispro (HumaLOG) 100 unit/mL injection 1/12/2025 Child   Sig: Inject 5 Units under the skin once daily. Take with food.   Please check blood glucose level before injection      levothyroxine (Synthroid, Levoxyl) 50 mcg tablet 1/12/2025 Morning Child   Sig: TAKE 1 TABLET BY MOUTH ONCE DAILY IN THE   MORNING BEFORE MEAL(S) ON AN EMPTY STOMACH   lubricating eye drops ophthalmic  solution 1/12/2025 Child   Sig: Administer 1 drop into both eyes if needed for dry   eyes or other.   metoprolol succinate XL (Toprol-XL) 25 mg 24 hr tablet 1/12/2025 Morning Child   Sig: Take 1 tablet (25 mg) by mouth once daily.   mirtazapine (Remeron) 15 mg tablet 1/12/2025 Bedtime Child   Sig: TAKE 1 TABLET BY MOUTH ONCE DAILY AT BEDTIME      prasugrel (Effient) 10 mg tablet 1/12/2025 Morning Child   Sig: Take 1 tablet (10 mg) by mouth once daily.   spironolactone (Aldactone) 25 mg tablet 1/12/2025 Morning Child   Sig: Take 1 tablet (25 mg) by mouth once daily in the morning.      Facility-Administered Medications: None       The list below reflects the updated allergy list. Please review each documented allergy for additional clarification and justification.  Allergies  Reviewed by Juan Diego Noble on 1/13/2025   No Known Allergies         Pharmacy has been updated to [facility].    Sources used to complete the med history include:   Patient daughter interviewed about medications, dispense report, care everywhere, chart review history    Below are additional concerns with the patient's PTA list.  None    Juan Diego Noble,Robin  Please reach out via [employer] Secure Chat for questions

## 2025-01-14 NOTE — Clinical Note
Angioplasty of the middle right coronary artery lesion. Inflation 1: Pressure = 16 manuel; Duration = 20 sec. Inflation 2: Pressure = 18 manuel; Duration = 20 sec.

## 2025-01-14 NOTE — Clinical Note
Angioplasty of the proximal right coronary artery lesion. Inflation 1: Pressure = 10 manuel; Duration = 22 sec. Inflation 2: Pressure = 10 manuel; Duration = 15 sec.

## 2025-01-14 NOTE — Clinical Note
Angioplasty of the proximal right coronary artery lesion. Inflation 1: Pressure = 16 manuel; Duration = 12 sec. Inflation 2: Pressure = 14 manuel; Duration = 15 sec.

## 2025-01-14 NOTE — Clinical Note
Angioplasty of the proximal right coronary artery lesion. Inflation 1: Pressure = 16 manuel; Duration = 10 sec.

## 2025-01-14 NOTE — Clinical Note
Angioplasty of the ostium right coronary artery lesion. Inflation 1: Pressure = 14 manuel; Duration = 20 sec.

## 2025-01-14 NOTE — ED NOTES
EMTTYE COMPLETED BY DR RAHMAN UNDER THIS NURSES COMPUTER ACCESS BECAUSE SHE DOES NOT HAVE ACCESS.     Clarisa Romero, RN  01/14/25 5297

## 2025-01-14 NOTE — CARE PLAN
"Pt said that he does not want to answer questions; family at bedside and said, \"we have everything covered\"  Pt to be transferred to  MAIN      "

## 2025-01-15 ENCOUNTER — APPOINTMENT (OUTPATIENT)
Dept: CARDIOLOGY | Facility: HOSPITAL | Age: 80
DRG: 325 | End: 2025-01-15
Payer: MEDICARE

## 2025-01-15 PROBLEM — R79.89 ELEVATED TROPONIN: Status: ACTIVE | Noted: 2025-01-13

## 2025-01-15 LAB
ABO GROUP (TYPE) IN BLOOD: NORMAL
ALBUMIN SERPL BCP-MCNC: 2.5 G/DL (ref 3.4–5)
ALBUMIN SERPL BCP-MCNC: 3.3 G/DL (ref 3.4–5)
ANION GAP BLDV CALCULATED.4IONS-SCNC: 10 MMOL/L (ref 10–25)
ANION GAP SERPL CALC-SCNC: 12 MMOL/L (ref 10–20)
ANION GAP SERPL CALC-SCNC: 19 MMOL/L (ref 10–20)
ANTIBODY SCREEN: NORMAL
APPEARANCE UR: CLEAR
ATRIAL RATE: 87 BPM
BACTERIA SPEC CULT: NORMAL
BASE EXCESS BLDV CALC-SCNC: -1.7 MMOL/L (ref -2–3)
BILIRUB UR STRIP.AUTO-MCNC: NEGATIVE MG/DL
BODY TEMPERATURE: 37 DEGREES CELSIUS
BUN SERPL-MCNC: 15 MG/DL (ref 6–23)
BUN SERPL-MCNC: 20 MG/DL (ref 6–23)
CA-I BLDV-SCNC: 1.17 MMOL/L (ref 1.1–1.33)
CALCIUM SERPL-MCNC: 7.8 MG/DL (ref 8.6–10.6)
CALCIUM SERPL-MCNC: 8.5 MG/DL (ref 8.6–10.6)
CARDIAC TROPONIN I PNL SERPL HS: 170 NG/L (ref 0–53)
CHLORIDE BLDV-SCNC: 106 MMOL/L (ref 98–107)
CHLORIDE SERPL-SCNC: 106 MMOL/L (ref 98–107)
CHLORIDE SERPL-SCNC: 106 MMOL/L (ref 98–107)
CHLORIDE UR-SCNC: 97 MMOL/L
CHLORIDE/CREATININE (MMOL/G) IN URINE: 83 MMOL/G CREAT (ref 23–275)
CO2 SERPL-SCNC: 14 MMOL/L (ref 21–32)
CO2 SERPL-SCNC: 23 MMOL/L (ref 21–32)
COLOR UR: YELLOW
CREAT SERPL-MCNC: 1.22 MG/DL (ref 0.5–1.3)
CREAT SERPL-MCNC: 1.78 MG/DL (ref 0.5–1.3)
CREAT UR-MCNC: 117.2 MG/DL (ref 20–370)
EGFRCR SERPLBLD CKD-EPI 2021: 38 ML/MIN/1.73M*2
EGFRCR SERPLBLD CKD-EPI 2021: 60 ML/MIN/1.73M*2
EJECTION FRACTION APICAL 4 CHAMBER: 56.7
EJECTION FRACTION: 48 %
ERYTHROCYTE [DISTWIDTH] IN BLOOD BY AUTOMATED COUNT: 14.8 % (ref 11.5–14.5)
GLUCOSE BLD MANUAL STRIP-MCNC: 139 MG/DL (ref 74–99)
GLUCOSE BLD MANUAL STRIP-MCNC: 141 MG/DL (ref 74–99)
GLUCOSE BLD MANUAL STRIP-MCNC: 142 MG/DL (ref 74–99)
GLUCOSE BLD MANUAL STRIP-MCNC: 154 MG/DL (ref 74–99)
GLUCOSE BLD MANUAL STRIP-MCNC: 167 MG/DL (ref 74–99)
GLUCOSE BLDV-MCNC: 172 MG/DL (ref 74–99)
GLUCOSE SERPL-MCNC: 134 MG/DL (ref 74–99)
GLUCOSE SERPL-MCNC: 168 MG/DL (ref 74–99)
GLUCOSE UR STRIP.AUTO-MCNC: NORMAL MG/DL
GRAM STN SPEC: NORMAL
GRAM STN SPEC: NORMAL
HCO3 BLDV-SCNC: 23.7 MMOL/L (ref 22–26)
HCT VFR BLD AUTO: 28.7 % (ref 41–52)
HCT VFR BLD EST: 29 % (ref 41–52)
HGB BLD-MCNC: 9.6 G/DL (ref 13.5–17.5)
HGB BLDV-MCNC: 9.7 G/DL (ref 13.5–17.5)
HOLD SPECIMEN: NORMAL
HYALINE CASTS #/AREA URNS AUTO: ABNORMAL /LPF
INHALED O2 CONCENTRATION: 21 %
KETONES UR STRIP.AUTO-MCNC: NEGATIVE MG/DL
LACTATE BLDV-SCNC: 1.3 MMOL/L (ref 0.4–2)
LEUKOCYTE ESTERASE UR QL STRIP.AUTO: NEGATIVE
MAGNESIUM SERPL-MCNC: 1.89 MG/DL (ref 1.6–2.4)
MAGNESIUM SERPL-MCNC: 2.13 MG/DL (ref 1.6–2.4)
MCH RBC QN AUTO: 29 PG (ref 26–34)
MCHC RBC AUTO-ENTMCNC: 33.4 G/DL (ref 32–36)
MCV RBC AUTO: 87 FL (ref 80–100)
MUCOUS THREADS #/AREA URNS AUTO: ABNORMAL /LPF
NITRITE UR QL STRIP.AUTO: NEGATIVE
NRBC BLD-RTO: 0 /100 WBCS (ref 0–0)
OXYHGB MFR BLDV: 56 % (ref 45–75)
P AXIS: 78 DEGREES
P OFFSET: 171 MS
P ONSET: 113 MS
PCO2 BLDV: 42 MM HG (ref 41–51)
PH BLDV: 7.36 PH (ref 7.33–7.43)
PH UR STRIP.AUTO: 5 [PH]
PHOSPHATE SERPL-MCNC: 2.8 MG/DL (ref 2.5–4.9)
PHOSPHATE SERPL-MCNC: 3.6 MG/DL (ref 2.5–4.9)
PLATELET # BLD AUTO: 116 X10*3/UL (ref 150–450)
PO2 BLDV: 38 MM HG (ref 35–45)
POTASSIUM BLDV-SCNC: 5.6 MMOL/L (ref 3.5–5.3)
POTASSIUM SERPL-SCNC: 4.1 MMOL/L (ref 3.5–5.3)
POTASSIUM SERPL-SCNC: 4.3 MMOL/L (ref 3.5–5.3)
POTASSIUM UR-SCNC: 42 MMOL/L
POTASSIUM/CREAT UR-RTO: 36 MMOL/G CREAT
PR INTERVAL: 210 MS
PROT UR STRIP.AUTO-MCNC: NORMAL MG/DL
Q ONSET: 218 MS
QRS COUNT: 14 BEATS
QRS DURATION: 158 MS
QT INTERVAL: 406 MS
QTC CALCULATION(BAZETT): 488 MS
QTC FREDERICIA: 459 MS
R AXIS: 49 DEGREES
RBC # BLD AUTO: 3.31 X10*6/UL (ref 4.5–5.9)
RBC # UR STRIP.AUTO: NEGATIVE /UL
RBC #/AREA URNS AUTO: ABNORMAL /HPF
RH FACTOR (ANTIGEN D): NORMAL
RIGHT VENTRICLE PEAK SYSTOLIC PRESSURE: 25.3 MMHG
SAO2 % BLDV: 57 % (ref 45–75)
SODIUM BLDV-SCNC: 134 MMOL/L (ref 136–145)
SODIUM SERPL-SCNC: 135 MMOL/L (ref 136–145)
SODIUM SERPL-SCNC: 137 MMOL/L (ref 136–145)
SODIUM UR-SCNC: 99 MMOL/L
SODIUM/CREAT UR-RTO: 84 MMOL/G CREAT
SP GR UR STRIP.AUTO: 1.02
T AXIS: 195 DEGREES
T OFFSET: 421 MS
UFH PPP CHRO-ACNC: 0.2 IU/ML
UFH PPP CHRO-ACNC: 0.2 IU/ML
UFH PPP CHRO-ACNC: 0.3 IU/ML
UROBILINOGEN UR STRIP.AUTO-MCNC: NORMAL MG/DL
VENTRICULAR RATE: 87 BPM
WBC # BLD AUTO: 4.4 X10*3/UL (ref 4.4–11.3)
WBC #/AREA URNS AUTO: ABNORMAL /HPF

## 2025-01-15 PROCEDURE — 93306 TTE W/DOPPLER COMPLETE: CPT

## 2025-01-15 PROCEDURE — 81001 URINALYSIS AUTO W/SCOPE: CPT

## 2025-01-15 PROCEDURE — 85520 HEPARIN ASSAY: CPT

## 2025-01-15 PROCEDURE — 86901 BLOOD TYPING SEROLOGIC RH(D): CPT

## 2025-01-15 PROCEDURE — 80069 RENAL FUNCTION PANEL: CPT

## 2025-01-15 PROCEDURE — 2500000001 HC RX 250 WO HCPCS SELF ADMINISTERED DRUGS (ALT 637 FOR MEDICARE OP)

## 2025-01-15 PROCEDURE — 93010 ELECTROCARDIOGRAM REPORT: CPT | Performed by: INTERNAL MEDICINE

## 2025-01-15 PROCEDURE — 2500000004 HC RX 250 GENERAL PHARMACY W/ HCPCS (ALT 636 FOR OP/ED)

## 2025-01-15 PROCEDURE — 83735 ASSAY OF MAGNESIUM: CPT | Performed by: HOSPITALIST

## 2025-01-15 PROCEDURE — 82947 ASSAY GLUCOSE BLOOD QUANT: CPT

## 2025-01-15 PROCEDURE — 93306 TTE W/DOPPLER COMPLETE: CPT | Performed by: INTERNAL MEDICINE

## 2025-01-15 PROCEDURE — 36415 COLL VENOUS BLD VENIPUNCTURE: CPT

## 2025-01-15 PROCEDURE — 1100000001 HC PRIVATE ROOM DAILY

## 2025-01-15 PROCEDURE — 2500000002 HC RX 250 W HCPCS SELF ADMINISTERED DRUGS (ALT 637 FOR MEDICARE OP, ALT 636 FOR OP/ED)

## 2025-01-15 PROCEDURE — 83735 ASSAY OF MAGNESIUM: CPT

## 2025-01-15 PROCEDURE — 99223 1ST HOSP IP/OBS HIGH 75: CPT

## 2025-01-15 PROCEDURE — 80061 LIPID PANEL: CPT

## 2025-01-15 PROCEDURE — 82436 ASSAY OF URINE CHLORIDE: CPT

## 2025-01-15 PROCEDURE — 85027 COMPLETE CBC AUTOMATED: CPT

## 2025-01-15 RX ORDER — MAGNESIUM SULFATE HEPTAHYDRATE 40 MG/ML
2 INJECTION, SOLUTION INTRAVENOUS ONCE
Status: COMPLETED | OUTPATIENT
Start: 2025-01-15 | End: 2025-01-15

## 2025-01-15 RX ADMIN — BUSPIRONE HYDROCHLORIDE 7.5 MG: 15 TABLET ORAL at 08:34

## 2025-01-15 RX ADMIN — LEVOTHYROXINE SODIUM 50 MCG: 0.05 TABLET ORAL at 06:39

## 2025-01-15 RX ADMIN — HEPARIN SODIUM 900 UNITS/HR: 10000 INJECTION, SOLUTION INTRAVENOUS at 13:49

## 2025-01-15 RX ADMIN — RANOLAZINE 500 MG: 500 TABLET, EXTENDED RELEASE ORAL at 20:24

## 2025-01-15 RX ADMIN — BUSPIRONE HYDROCHLORIDE 7.5 MG: 15 TABLET ORAL at 20:24

## 2025-01-15 RX ADMIN — ATORVASTATIN CALCIUM 40 MG: 40 TABLET, FILM COATED ORAL at 20:24

## 2025-01-15 RX ADMIN — METOPROLOL SUCCINATE 25 MG: 25 TABLET, EXTENDED RELEASE ORAL at 10:02

## 2025-01-15 RX ADMIN — CEPHALEXIN 500 MG: 500 CAPSULE ORAL at 08:27

## 2025-01-15 RX ADMIN — AMLODIPINE BESYLATE 5 MG: 5 TABLET ORAL at 08:28

## 2025-01-15 RX ADMIN — RANOLAZINE 500 MG: 500 TABLET, EXTENDED RELEASE ORAL at 08:29

## 2025-01-15 RX ADMIN — CEPHALEXIN 500 MG: 500 CAPSULE ORAL at 23:41

## 2025-01-15 RX ADMIN — ASPIRIN 81 MG: 81 TABLET, COATED ORAL at 08:29

## 2025-01-15 RX ADMIN — MIRTAZAPINE 15 MG: 15 TABLET, FILM COATED ORAL at 20:24

## 2025-01-15 RX ADMIN — CEPHALEXIN 500 MG: 500 CAPSULE ORAL at 00:12

## 2025-01-15 RX ADMIN — CEPHALEXIN 500 MG: 500 CAPSULE ORAL at 15:45

## 2025-01-15 RX ADMIN — SODIUM CHLORIDE, POTASSIUM CHLORIDE, SODIUM LACTATE AND CALCIUM CHLORIDE 500 ML: 600; 310; 30; 20 INJECTION, SOLUTION INTRAVENOUS at 10:21

## 2025-01-15 RX ADMIN — PRASUGREL 10 MG: 10 TABLET, FILM COATED ORAL at 08:29

## 2025-01-15 RX ADMIN — MAGNESIUM SULFATE HEPTAHYDRATE 2 G: 40 INJECTION, SOLUTION INTRAVENOUS at 09:15

## 2025-01-15 SDOH — ECONOMIC STABILITY: INCOME INSECURITY: IN THE PAST 12 MONTHS HAS THE ELECTRIC, GAS, OIL, OR WATER COMPANY THREATENED TO SHUT OFF SERVICES IN YOUR HOME?: NO

## 2025-01-15 SDOH — ECONOMIC STABILITY: FOOD INSECURITY: WITHIN THE PAST 12 MONTHS, YOU WORRIED THAT YOUR FOOD WOULD RUN OUT BEFORE YOU GOT THE MONEY TO BUY MORE.: NEVER TRUE

## 2025-01-15 SDOH — ECONOMIC STABILITY: HOUSING INSECURITY: IN THE PAST 12 MONTHS, HOW MANY TIMES HAVE YOU MOVED WHERE YOU WERE LIVING?: 0

## 2025-01-15 SDOH — ECONOMIC STABILITY: FOOD INSECURITY: WITHIN THE PAST 12 MONTHS, THE FOOD YOU BOUGHT JUST DIDN'T LAST AND YOU DIDN'T HAVE MONEY TO GET MORE.: NEVER TRUE

## 2025-01-15 SDOH — ECONOMIC STABILITY: FOOD INSECURITY: HOW HARD IS IT FOR YOU TO PAY FOR THE VERY BASICS LIKE FOOD, HOUSING, MEDICAL CARE, AND HEATING?: NOT VERY HARD

## 2025-01-15 SDOH — ECONOMIC STABILITY: HOUSING INSECURITY: IN THE LAST 12 MONTHS, WAS THERE A TIME WHEN YOU WERE NOT ABLE TO PAY THE MORTGAGE OR RENT ON TIME?: NO

## 2025-01-15 SDOH — ECONOMIC STABILITY: HOUSING INSECURITY: AT ANY TIME IN THE PAST 12 MONTHS, WERE YOU HOMELESS OR LIVING IN A SHELTER (INCLUDING NOW)?: NO

## 2025-01-15 SDOH — ECONOMIC STABILITY: TRANSPORTATION INSECURITY: IN THE PAST 12 MONTHS, HAS LACK OF TRANSPORTATION KEPT YOU FROM MEDICAL APPOINTMENTS OR FROM GETTING MEDICATIONS?: NO

## 2025-01-15 ASSESSMENT — COGNITIVE AND FUNCTIONAL STATUS - GENERAL
DAILY ACTIVITIY SCORE: 24
MOBILITY SCORE: 19
STANDING UP FROM CHAIR USING ARMS: A LITTLE
CLIMB 3 TO 5 STEPS WITH RAILING: A LOT
WALKING IN HOSPITAL ROOM: A LOT

## 2025-01-15 ASSESSMENT — PAIN SCALES - GENERAL
PAINLEVEL_OUTOF10: 0 - NO PAIN
PAINLEVEL_OUTOF10: 0 - NO PAIN
PAINLEVEL_OUTOF10: 2
PAINLEVEL_OUTOF10: 0 - NO PAIN

## 2025-01-15 ASSESSMENT — ACTIVITIES OF DAILY LIVING (ADL): LACK_OF_TRANSPORTATION: NO

## 2025-01-15 ASSESSMENT — PAIN - FUNCTIONAL ASSESSMENT
PAIN_FUNCTIONAL_ASSESSMENT: 0-10

## 2025-01-15 NOTE — PROGRESS NOTES
Physical Therapy                 Therapy Communication Note    Patient Name: Chavez Llamas  MRN: [id number]  Department: [facility]  Room: 08/08-A  Today's Date: 1/15/2025     Discipline: Physical Therapy    PT Missed Visit: Yes     Missed Visit Reason: Missed Visit Reason:  (Pt pleasantly refused as he did not have his tennis shoe for R ankle support and no prosthetic. Pt was uncomfortable at this time transferring without these.)    Missed Time: Attempt    Comment:

## 2025-01-15 NOTE — CARE PLAN
The patient's goals for the shift include    Problem: Skin  Goal: Decreased wound size/increased tissue granulation at next dressing change  Outcome: Progressing  Flowsheets (Taken 1/15/2025 1047)  Decreased wound size/increased tissue granulation at next dressing change:   Promote sleep for wound healing   Protective dressings over bony prominences  Goal: Participates in plan/prevention/treatment measures  Outcome: Progressing  Flowsheets (Taken 1/15/2025 1047)  Participates in plan/prevention/treatment measures:   Discuss with provider PT/OT consult   Elevate heels  Goal: Prevent/manage excess moisture  Outcome: Progressing  Flowsheets (Taken 1/15/2025 1047)  Prevent/manage excess moisture:   Cleanse incontinence/protect with barrier cream   Monitor for/manage infection if present  Goal: Prevent/minimize sheer/friction injuries  Outcome: Progressing  Flowsheets (Taken 1/15/2025 1047)  Prevent/minimize sheer/friction injuries:   HOB 30 degrees or less   Use pull sheet  Goal: Promote/optimize nutrition  Outcome: Progressing  Flowsheets (Taken 1/15/2025 1047)  Promote/optimize nutrition: Monitor/record intake including meals  Goal: Promote skin healing  Outcome: Progressing  Flowsheets (Taken 1/15/2025 1047)  Promote skin healing:   Assess skin/pad under line(s)/device(s)   Ensure correct size (line/device) and apply per  instructions     Problem: Fall/Injury  Goal: Not fall by end of shift  Outcome: Progressing     Problem: Pain  Goal: Turns in bed with improved pain control throughout the shift  Outcome: Progressing       The clinical goals for the shift include remain HDS throughout shift and chest pain free    Over the shift, the patient did make progress toward the following goals.

## 2025-01-15 NOTE — SIGNIFICANT EVENT
Brief Cath Lab preparation note    Referring: BRITTNI Lacey (Jack)     HPI: 79-year-old male status post CABG in 1990s (LIMA-LAD, SVG-***) and multiple PCI, heart failure with mildly reduced ejection fraction (40-45%), hypertension, T2DM, PAD status post left BKA who presented to [facility] on 9/13 with nausea vomiting chest pain and ruled in for an NSTEMI.  He underwent cardiac catheterization that demonstrated a severe stenosis in the distal LAD distal to the touchdown of the LIMA graft, severe in-stent restenosis of the RCA.  He underwent balloon angioplasty of the mid right coronary artery with no stenting placed.  He then returned to [facility] with substernal chest pain and ruled in for ACS NSTEMI.  On presentation he had an TEODORO (serum creatinine 1.7 presentation, was 1.18 on 1/13/2025).  He was then transferred to [facility] for consideration of high risk PCI.      TTE:  - LVEF 45 to 50%  - Basal and mid inferior hypokinesis  - Ao 3.7 cm      Serum creatinine: 1.78 (trend 1.18, 1.59, 1.7, 1.22, 1.78)  Hemoglobin: 9.6 (trend 11.4, 9.6, 10.6, 9.6)  Platelets: 116

## 2025-01-15 NOTE — PROGRESS NOTES
Hospital course:  Chavez Llamas is a 79yM with PMHx of CAD s/p CABG 1990s (LIMA to LAD and SVG to unknown?) and multiple PCIs (most recent [facility] 12/2024), HFmrEF (40-45% 12/4/2024), HTN, T2DM (A1C 5.5 12/5/2025), PAD s/p left BKA who presented to [facility] 1/13 in the setting of nausea, vomiting, chest pain, and wound on LLE.     Recently admitted at [facility] from 12/4/24-12/10/24 with NSTEMI. Underwent cardiac catheterization which showed moderate obstruction in the LAD after his LIMA graft, and severe in-stent restenosis of his right coronary artery at the vessel and a difficult intervention resulted in PTCA of the 90% mid right coronary obstruction reduced to 50 to 70%, but no additional stenting was possible due to the inability to negotiate the previously stented mid RCA.   Returned to [facility] with complaints of severe substernal chest pain. Per EMS, patient received 1 nitroglycerin, 4 aspirin and BPs decreased from 150 systolic to 70 and patient with complaint of severe headache which resolved with IV fentanyl. At the OSH, labs notable CBC Hgb 9.6, Plts 102. RFP with Cr 1.59. Troponin (17<66), . EKG showing first degree heart block and LBBB similar in comparison to prior EKGs. CTA chest negative for PE and aortic dissection. Started on heparin gtt and ranexa for NSTEMI. Hospital course complicated by TEODORO. Nephrology consulted, suspect due to use of IV contrast. Cardiology was consulted and recommended transfer to [facility] to undergo high risk PCI.     On presentation to CICU, patient without chest pain. Troponin peaked at 212. Planned to undergo high risk PCI on 1/16.    Items to follow up:  []Will stop heparin gtt around 1700 (48hr)  []Ulytes and UA for TEODORO  []NPO at [facility] for PCI 1/16  []Fup TTE  []Monitor cellulitis, abx course    Subjective     Interval events:  Per nursing, pt had syncopal event overnight. No events on telemetry at that time. This am, pt denies any chest pain, palpitations, or  other complaints.  Telemetry with 5 beat run NSVT, ectopy, and Mobitz 1.        Objective     Vitals:    24 Hour Vitals  Temp:  [35.9 °C (96.6 °F)-36.5 °C (97.7 °F)] 36.2 °C (97.2 °F)  Heart Rate:  [32-73] 72  Resp:  [15-21] 19  BP: ()/(38-95) 143/59    Temp (24hrs), Av.2 °C (97.1 °F), Min:35.9 °C (96.6 °F), Max:36.5 °C (97.7 °F)     24 hour Intake/Output    Intake/Output Summary (Last 24 hours) at 1/15/2025 1051  Last data filed at 1/15/2025 1000  Gross per 24 hour   Intake 360.44 ml   Output --   Net 360.44 ml        Physical exam:  Constitutional: Well-developed male in no acute distress.  HEENT: NCAT. EOMI. No JVD  Respiratory: CTAB. No wheezes, crackles, or rhonchi. Normal respiratory effort on RA.  Cardiovascular: RRR, normal S1/S2, No murmurs, rubs, or gallops.   Abdominal: Soft, nondistended, nontender to palpation. No rebound or guarding  Neuro: CN II-XII grossly intact. Moving all extremities with no focal deficits  MSK: WWP, no peripheral edema.   Skin: Small 4-5cm red circumscribed lesion on posterior of L. Knee. Erythematous and warm to touch.  Psych: Appropriate mood and affect.      Medications   Scheduled Medications  amLODIPine, 5 mg, oral, Daily  aspirin, 81 mg, oral, Daily  atorvastatin, 40 mg, oral, Nightly  busPIRone, 7.5 mg, oral, BID  cephalexin, 500 mg, oral, q8h JENNIFER  insulin glargine, 12 Units, subcutaneous, Nightly  insulin lispro, 0-5 Units, subcutaneous, TID AC  lactated Ringer's, 500 mL, intravenous, Once  levothyroxine, 50 mcg, oral, Daily  magnesium sulfate, 2 g, intravenous, Once  metoprolol succinate XL, 25 mg, oral, Daily  mirtazapine, 15 mg, oral, Nightly  prasugrel, 10 mg, oral, Daily  ranolazine, 500 mg, oral, BID  [Held by provider] spironolactone, 25 mg, oral, q AM     Continuous Medications  heparin, 0-4,000 Units/hr, Last Rate: 800 Units/hr (01/15/25 0908)     PRN Medications  PRN medications: acetaminophen, dextrose, dextrose, glucagon, glucagon, heparin        Labs  CBC  Results from last 72 hours   Lab Units 01/15/25  0333 01/14/25  1859 01/14/25  0616   WBC AUTO x10*3/uL 4.4 5.3 4.5   HEMOGLOBIN g/dL 9.6* 10.6* 9.6*   HEMATOCRIT % 28.7* 32.2* 28.2*   PLATELETS AUTO x10*3/uL 116* 131* 102*        BMP  Results from last 72 hours   Lab Units 01/15/25  0333 01/14/25  2329 01/14/25  1859 01/14/25  0616 01/13/25  1120   SODIUM mmol/L 137 135* 137   < > 136   POTASSIUM mmol/L 4.1 4.3 4.5   < > 4.4   CHLORIDE mmol/L 106 106 104   < > 104   BUN mg/dL 20 15 18   < > 17   CREATININE mg/dL 1.78* 1.22 1.70*   < > 1.18   MAGNESIUM mg/dL 1.89  --  1.97  --  1.77   PHOSPHORUS mg/dL 3.6 2.8 3.4  --   --     < > = values in this interval not displayed.       Imaging:     === 01/13/25 ===    CT ANGIO CHEST ABDOMEN PELVIS    - Impression -  CHEST  1.  No evidence of aortic aneurysm, dissection, or acute intramural  hematoma. Moderate calcified and noncalcified atherosclerotic plaque  of the thoracic and abdominal aorta. Patent left renal artery stent  and partially visualized bilateral superficial femoral artery stents.  Sternotomy changes. Coronary artery atherosclerotic calcifications.  2. No evidence of consolidation or pleural effusion. Moderate  centrilobular and paraseptal emphysema as well as nonspecific  pulmonary fibrotic changes. Asymmetric scarring in the left lung apex  is new since 2010 and consider follow-up chest CT in 6 months to  confirm stability.    ABDOMEN - PELVIS  1.  No acute abnormality in the abdomen/pelvis. Mild bilateral renal  cortical thinning.  2. Unchanged chronic compression deformity of L4 vertebral body.      Signed by: Andrez Veloz 1/13/2025 5:49 PM  Dictation workstation:   [id number]          Assessment and plan:  Chavez Llamas is a 79yM with PMHx of CAD s/p CABG 1990s (LIMA to LAD and SVG to unknown?) s/p multiple PCIs (most recent [facility] 12/2024), HFmrEF (40-45% 12/4/2024), HTN, T2DM (A1C 5.5 12/5/2025), PAD s/p left BKA who presented to  [facility] 1/13 in the setting of nausea, vomiting, chest pain, and wound on LLE. Found to have NSTEMI on admission. Transferred to [facility] for high risk PCI. Pt currently without chest pain.      NEURO  #Anxiety  -Continue home Buspirone 7.5mg, mirtazapine 15mg     Cardiac:   #NSTEMI  #CAD s/p CABG (1990), multiple PCIs  #HTN #DLD  #HFmrEF (40-45% 12/4/2024)  ::CABG with LIMA to the LAD patent, SVG to an unknown vessel chronically occluded; S/p multiple PCIs with 5 stents placed to prox and mid RCA with severe ISR  ::[facility] 12/2024- severe 3v disease, patent LIMA-LAD but mod tubular lesion after the anastomosis (stable for many years), distal LM of 90% giving flow to ramus (stable), severe diffuse RCA disease with severe midsegment ISR, s/p balloon angioplasty of RCA ISR (stenosis improved to 50% from 99%) with CIERRA 3 flow.   -Plan for high risk PCI 1/16, NPO at [facility]  -Continue Aspirin 81mg, pasugrel 10mg, atorvastatin 40mg,   -Continue home amlodipine 5mg, metoprolol succinate 25mg  -Hold spironolactone 25mg iso TEODORO   -Continue heparin gtt x48hrs, will dc at 1700  -Continue ranolazine 500mg bid (started at OSH)   -Continue to trend trops: 17<66<212<170  -F/up TTE   -Note: Pt cannot tolerate nitrates (severe hypotension/headaches)      Pulm:   #Empysema on CTA  -No PFTs on file   -No home inhalers   -Outpatient repeat CT in 6 months to follow up assymetric L lung scarring      GI:  -TOM     Renal:   #TEODORO on CKD3a  ::Baseline Cr 1.3?  ::Nephrology consulted at OSH, feel TEODORO iso contrast. No hydronephrosis on CT 1/13.   -Renally dose meds and avoid nephrotoxins   -F/up UA and urine electrolytes   -gentle hydration with 500cc LR bolus     Heme/Onc:  #Chronic anemia (baseline Hgb ~10)  #Chronic thrombocytopenia (baseline 110)  -CTM     Endo:  #T2DM (A1c 5.5% 12/2024)   #Hypothyroidism  -Lantus 12U and SSI  -Hypoglycemia protocol  -Continue synthroid 50 mcg      ID:  #Concern for LLE cellulitis   ::XR knee 1/13/2025: No acute  findings status post left below-the-knee amputation.  ::Vascular US LE: No evidence of deep venous thrombus in the evaluated veins of the left leg from the inguinal ligament to the popliteal fossa.  ::s/p Vanc/Zosyn (1/13)  -F/up Bcx/wound cx 1/13  -Continue Keflex x5d (1/13-1/17)        F: PRN  E: PRN  N: Cardiac  G: none  A: PIV  DVT: heparin gtt  CODE: FULL (confirmed on admission)   NOK: Rianna (child) [phone]; Jelena (child) [phone]     Patient seen and discussed with attending physician.    Yolande Orantes MD  PGY2 Internal Medicine

## 2025-01-15 NOTE — H&P
History Of Present Illness  Chavez Llamas is a 79yM with PMHx of CAD s/p CABG 1990s (LIMA to LAD and SVG to unknown?) and multiple PCIs (most recent [facility] 12/2024), HFmrEF (40-45% 12/4/2024), HTN, T2DM (A1C 5.5 12/5/2025), PAD s/p left BKA who presented to [facility] 1/13 in the setting of nausea, vomiting, chest pain, and wound on LLE.   Recently admitted at [facility] from 12/4/24-12/10/24 with NSTEMI. Underwent cardiac catheterization which showed moderate obstruction in the LAD after his LIMA graft, and severe in-stent restenosis of his right coronary artery at the vessel and a difficult intervention resulted in PTCA of the 90% mid right coronary obstruction reduced to 50 to 70%, but no additional stenting was possible due to the inability to negotiate the previously stented mid RCA.   Returned to [facility] with complaints of severe substernal chest pain. Per EMS, patient received 1 nitroglycerin, 4 aspirin and BPs decreased from 150 systolic to 70 and patient with complaint of severe headache which resolved with IV fentanyl. At the OSH, labs notable CBC Hgb 9.6, Plts 102. RFP with Cr 1.59. Troponin (17<66), . EKG showing first degree heart block and LBBB similar in comparison to prior EKGs. CTA chest negative for PE and aortic dissection. Started on heparin gtt and ranexa for NSTEMI. Hospital course complicated by TEODORO. Nephrology consulted, suspect due to use of IV contrast. Cardiology was consulted and recommended transfer to [facility] to undergo high risk PCI.    On presentation to CICU, patient reports feeling well. He currently denies any chest pain, palpitations, sob. Also denies headaches, fevers, chills, abdominal pain, n/v, diarrhea, urinary sx, LE edema.     CARDIAC HISTORY:  -CABG 1990s CABG x 2 with an LIMA to the LAD and SVG to unknown target in the 1990s at [facility].    -3/2010 [facility] 70% distal stenosis of the LMCA and 100% occlusion of the proximal LAD and proximal LCx. 70% stenosis  of the proximal ramus intermedius branch, widely patent LIMA graft to the LAD, totally occluded saphenous vein graft to unknown target and moderate LV dysfunction ejection fraction 35-40%. The patient underwent a PCI and bare-metal stent deployment to the mid RCA at that time.    -Cardiac cath and PCI 12/1/2020 [facility] patient was noted to have an 80 to 85% stenosis within the previously deployed mid RCA stent with an additional 80% stenosis at the distal edge of the stent. The distal RCA had 3 tandem lesions ranging between 80 to 95%. The LIMA to the LAD was widely patent but there was a 50% stenosis of the mid LAD beyond the anastomotic site. The patient subsequently underwent PCI and stent deployment x 3 to the RCA.    -The patient returned to [facility] with accelerated angina acute coronary syndrome for a delayed in-stent thrombosis and on 3/13/2021 the patient had successful balloon angioplasty of the proximal segment of the PDA RCA with a 12 mm balloon and balloon angioplasty of the distal mid and proximal RCA. Of note the patient required a temporary transvenous pacemaker at that time for transient third-degree AV block.    -He had readmission with a non-STEMI at [facility] 12/2024. Cath as below:  1. Severe three-vessel coronary artery disease.  2. Patent LIMA to LAD but after the anastomosis there is moderate tubular lesion that has been stable for long time.  3. Distal left main of 90% that is giving flow to midsize ramus. Has been stable for many years.  4. Severe diffuse right coronary artery disease was 2 areas of underexpanded stents and severe in-stent restenosis of 90% in the midsegment.  5. Balloon angioplasty was attempted we were unable to expand the mid RCA stent. There was still significant in-stent restenosis of 50% after balloon angioplasty. Improved from 99%. CIERRA-3 flow.    TTE 12/2024:   1. The left ventricular systolic function is mildly decreased, with a visually estimated  ejection fraction of 40-45%.   2. Abnormal wall motion.   3. Spectral Doppler shows an abnormal pattern of left ventricular diastolic filling.   4. Abnormal septal motion consistent with post-operative status and abnormal septal motion consistent with left bundle branch block.   5. There is severe hypokinesis of the basal inferior wall.   6. There is normal right ventricular global systolic function.   7. Mild to moderate mitral valve regurgitation.   8. Trace to mild tricuspid regurgitation.   9. Aortic valve sclerosis.  10. Mild aortic valve regurgitation.  11. Atrial septal aneurysm present.  12. There is plaque visualized in the ascending aorta.    Past Medical History  Past Medical History:   Diagnosis Date    Old myocardial infarction 02/01/2017    Past heart attack       Surgical History  Past Surgical History:   Procedure Laterality Date    CARDIAC CATHETERIZATION N/A 12/6/2024    Procedure: Left Heart Cath;  Surgeon: Chaim Soriano MD;  Location: [facility] Cardiac Cath Lab;  Service: Cardiovascular;  Laterality: N/A;    CORONARY ARTERY BYPASS GRAFT  04/14/2015    Coronary Artery Surgery    MR HEAD ANGIO WO IV CONTRAST  2/23/2022    MR HEAD ANGIO WO IV CONTRAST [facility]        Social History  Prior smoker (smoked 1-1.5 ppd/25 yrs), denies alcohol or illicit drug use. Lives alone and independent with ADLs (modified d/t BKA).     Family History  Family History   Problem Relation Name Age of Onset    Diabetes Mother      Other (Cerebrovascular Accident) Father          Allergies  Patient has no known allergies.    Current Outpatient Medications   Medication Instructions    amLODIPine (NORVASC) 5 mg, oral, Daily    aspirin 81 mg, oral, Daily    atorvastatin (LIPITOR) 40 mg, oral, Nightly    busPIRone (Buspar) 15 mg tablet TAKE 1/2 (ONE-HALF) TABLET BY MOUTH IN THE MORNING AND AT BEDTIME    dicyclomine (Bentyl) 10 mg capsule TAKE 1 CAPSULE BY MOUTH TWICE DAILY AS NEEDED FOR  IBS    hydrOXYzine pamoate  "(VISTARIL) 25 mg, oral, 3 times daily PRN    insulin lispro (HUMALOG) 10 Units, subcutaneous, Daily, Inject 10 units subcutaneous daily.  Please check blood glucose level before injection.    lancets (BD Ultra Fine Lancets) 33 gauge misc Testing T.I.D    Lantus Solostar U-100 Insulin 100 unit/mL (3 mL) pen INJECT 20 UNITS SUBCUTANEOUSLY TWICE DAILY.    levothyroxine (Synthroid, Levoxyl) 50 mcg tablet TAKE 1 TABLET BY MOUTH ONCE DAILY IN THE MORNING BEFORE MEAL(S) ON AN EMPTY STOMACH    lubricating eye drops ophthalmic solution 1 drop, As needed    metoprolol succinate XL (TOPROL-XL) 25 mg, oral, Daily    mirtazapine (REMERON) 15 mg, oral, Nightly    pen needle, diabetic 31 gauge x 3/16\" needle Use as directed    prasugrel (EFFIENT) 10 mg, oral, Daily    spironolactone (ALDACTONE) 25 mg, Every morning         Physical Exam  General: NAD, appears comfortable  HEENT: NCAT, no scleral icterus, EOMI, MMM  Heart: RRR, no m/r/g  Lungs: CTAB, no rales/ronchi/wheezing  Abdomen: soft, NT, ND, +BS  Ext: no RLE edema, LLE BKA   Neuro: Aox4, NFND  Psych: mood and affect appropriate     Last Recorded Vitals      1/14/2025     8:55 AM 1/14/2025    10:40 AM 1/14/2025     1:02 PM 1/14/2025     2:56 PM 1/14/2025     4:38 PM 1/14/2025     6:39 PM 1/14/2025     8:25 PM   Vitals   Systolic 113 129 117 119 108 135    Diastolic 56 62 52 57 69 66    Heart Rate 69 62 70 71 65 73    Temp     36.5 °C (97.7 °F) 36 °C (96.8 °F) 36.2 °C (97.2 °F)   Resp 158 15 15 15 17 18    Height      1.727 m (5' 8\")    Weight (lb)      149.47    BMI      22.73 kg/m2    BSA (m2)      1.8 m2       Relevant Results  Results for orders placed or performed during the hospital encounter of 01/14/25 (from the past 24 hours)   CBC and Auto Differential   Result Value Ref Range    WBC 5.3 4.4 - 11.3 x10*3/uL    nRBC 0.0 0.0 - 0.0 /100 WBCs    RBC 3.66 (L) 4.50 - 5.90 x10*6/uL    Hemoglobin 10.6 (L) 13.5 - 17.5 g/dL    Hematocrit 32.2 (L) 41.0 - 52.0 %    MCV 88 80 - " 100 fL    MCH 29.0 26.0 - 34.0 pg    MCHC 32.9 32.0 - 36.0 g/dL    RDW 14.7 (H) 11.5 - 14.5 %    Platelets 131 (L) 150 - 450 x10*3/uL    Neutrophils % 81.1 40.0 - 80.0 %    Immature Granulocytes %, Automated 0.6 0.0 - 0.9 %    Lymphocytes % 7.2 13.0 - 44.0 %    Monocytes % 8.0 2.0 - 10.0 %    Eosinophils % 2.7 0.0 - 6.0 %    Basophils % 0.4 0.0 - 2.0 %    Neutrophils Absolute 4.28 1.60 - 5.50 x10*3/uL    Immature Granulocytes Absolute, Automated 0.03 0.00 - 0.50 x10*3/uL    Lymphocytes Absolute 0.38 (L) 0.80 - 3.00 x10*3/uL    Monocytes Absolute 0.42 0.05 - 0.80 x10*3/uL    Eosinophils Absolute 0.14 0.00 - 0.40 x10*3/uL    Basophils Absolute 0.02 0.00 - 0.10 x10*3/uL   Comprehensive metabolic panel   Result Value Ref Range    Glucose 147 (H) 74 - 99 mg/dL    Sodium 137 136 - 145 mmol/L    Potassium 4.5 3.5 - 5.3 mmol/L    Chloride 104 98 - 107 mmol/L    Bicarbonate 23 21 - 32 mmol/L    Anion Gap 15 10 - 20 mmol/L    Urea Nitrogen 18 6 - 23 mg/dL    Creatinine 1.70 (H) 0.50 - 1.30 mg/dL    eGFR 40 (L) >60 mL/min/1.73m*2    Calcium 9.0 8.6 - 10.6 mg/dL    Albumin 3.9 3.4 - 5.0 g/dL    Alkaline Phosphatase 70 33 - 136 U/L    Total Protein 6.9 6.4 - 8.2 g/dL    AST 10 9 - 39 U/L    Bilirubin, Total 1.5 (H) 0.0 - 1.2 mg/dL    ALT 11 10 - 52 U/L   Magnesium   Result Value Ref Range    Magnesium 1.97 1.60 - 2.40 mg/dL   Troponin I, High Sensitivity   Result Value Ref Range    Troponin I, High Sensitivity (CMC) 212 (HH) 0 - 53 ng/L   Blood Gas Venous Full Panel   Result Value Ref Range    POCT pH, Venous 7.35 7.33 - 7.43 pH    POCT pCO2, Venous 47 41 - 51 mm Hg    POCT pO2, Venous 24 (L) 35 - 45 mm Hg    POCT SO2, Venous 29 (L) 45 - 75 %    POCT Oxy Hemoglobin, Venous 28.8 (L) 45.0 - 75.0 %    POCT Hematocrit Calculated, Venous 62.0 (H) 41.0 - 52.0 %    POCT Sodium, Venous 132 (L) 136 - 145 mmol/L    POCT Potassium, Venous 4.5 3.5 - 5.3 mmol/L    POCT Chloride, Venous 100 98 - 107 mmol/L    POCT Ionized Calicum, Venous 1.20  1.10 - 1.33 mmol/L    POCT Glucose, Venous 156 (H) 74 - 99 mg/dL    POCT Lactate, Venous 1.4 0.4 - 2.0 mmol/L    POCT Base Excess, Venous -0.5 -2.0 - 3.0 mmol/L    POCT HCO3 Calculated, Venous 25.9 22.0 - 26.0 mmol/L    POCT Hemoglobin, Venous 20.8 (H) 13.5 - 17.5 g/dL    POCT Anion Gap, Venous 11.0 10.0 - 25.0 mmol/L    Patient Temperature 37.0 degrees Celsius    FiO2 21 %   Phosphorus   Result Value Ref Range    Phosphorus 3.4 2.5 - 4.9 mg/dL   B-Type Natriuretic Peptide   Result Value Ref Range     (H) 0 - 99 pg/mL   POCT GLUCOSE   Result Value Ref Range    POCT Glucose 139 (H) 74 - 99 mg/dL          Assessment/Plan   Chavez Llamas is a 79yM with PMHx of CAD s/p CABG 1990s (LIMA to LAD and SVG to unknown?) s/p multiple PCIs (most recent [facility] 12/2024), HFmrEF (40-45% 12/4/2024), HTN, T2DM (A1C 5.5 12/5/2025), PAD s/p left BKA who presented to [facility], operated by [facility] 1/13 in the setting of nausea, vomiting, chest pain, and wound on LLE. Found to have NSTEMI on admission. Transferred to [facility] for high risk PCI.     NEURO  #Anxiety  -Continue home Buspirone 7.5mg, mirtazapine 15mg    Cardiac:   #NSTEMI  #CAD s/p CABG (1990), multiple PCIs  #HTN #DLD  #HFmrEF (40-45% 12/4/2024)  ::CABG with LIMA to the LAD patent, SVG to an unknown vessel chronically occluded; S/p multiple PCIs with 5 stents placed to prox and mid RCA with severe ISR  ::[facility] 12/2024- severe 3v disease, patent LIMA-LAD but mod tubular lesion after the anastomosis (stable for many years), distal LM of 90% giving flow to ramus (stable), severe diffuse RCA disease with severe midsegment ISR, s/p balloon angioplasty of RCA ISR (stenosis improved to 50% from 99%) with CIERRA 3 flow.   -Plan for high risk PCI 1/16  -Continue Aspirin 81mg, pasugrel 10mg, atorvastatin 40mg,   -Continue home amlodipine 5mg, metoprolol succinate 25mg  -Hold spironolactone 25mg iso TEODORO   -Continue heparin gtt   -Continue ranolazine 500mg bid (started at OSH)   -Continue to trend trops:  17<66<212<  -F/up TTE   -Note: Pt cannot tolerate nitrates (severe hypotension/headaches)     Pulm:   #Empysema on CTA  -No PFTs on file   -No home inhalers   -Outpatient repeat CT in 6 months to follow up assymetric L lung scarring     GI:  -TOM    Renal:   #TEODORO on CKD3a  ::Baseline Cr 1.3?  ::Nephrology consulted at OSH, feel TEODORO iso contrast. No hydronephrosis on CT 1/13.   -Renally dose meds and avoid nephrotoxins   -F/up UA and urine electrolytes     Heme/Onc:  #Chronic anemia (baseline Hgb ~10)  #Chronic thrombocytopenia (baseline 110)  -CTM    Endo:  #T2DM (A1c 5.5% 12/2024)   #Hypothyroidism  -Lantus 12U and SSI  -Hypoglycemia protocol  -Continue synthroid 50 mcg     ID:  #Concern for LLE cellulitis   ::XR knee 1/13/2025: No acute findings status post left below-the-knee amputation.  ::Vascular US LE: No evidence of deep venous thrombus in the evaluated veins of the left leg from the inguinal ligament to the popliteal fossa.  ::s/p Vanc/Zosyn (1/13)  -F/up Bcx/wound cx 1/13  -Continue Keflex x5d (1/13-1/17)      F: PRN  E: PRN  N: Cardiac  G: none  A: PIV  DVT: heparin gtt  CODE: FULL (confirmed on admission)   NOK: Rianna (child) [phone]; Jelena (child) [phone]     Patient and plan to be discussed with attending physician in the AM.    Haydee Flynn MD  Internal Medicine, PGY3

## 2025-01-16 LAB
ALBUMIN SERPL BCP-MCNC: 3.3 G/DL (ref 3.4–5)
ALBUMIN SERPL BCP-MCNC: 3.6 G/DL (ref 3.4–5)
ANION GAP SERPL CALC-SCNC: 13 MMOL/L (ref 10–20)
ANION GAP SERPL CALC-SCNC: 19 MMOL/L (ref 10–20)
ATRIAL RATE: 71 BPM
BUN SERPL-MCNC: 18 MG/DL (ref 6–23)
BUN SERPL-MCNC: 20 MG/DL (ref 6–23)
CALCIUM SERPL-MCNC: 8.7 MG/DL (ref 8.6–10.6)
CALCIUM SERPL-MCNC: 8.8 MG/DL (ref 8.6–10.6)
CHLORIDE SERPL-SCNC: 105 MMOL/L (ref 98–107)
CHLORIDE SERPL-SCNC: 107 MMOL/L (ref 98–107)
CHOLEST SERPL-MCNC: 63 MG/DL (ref 0–199)
CHOLESTEROL/HDL RATIO: 2.5
CO2 SERPL-SCNC: 19 MMOL/L (ref 21–32)
CO2 SERPL-SCNC: 24 MMOL/L (ref 21–32)
CREAT SERPL-MCNC: 1.62 MG/DL (ref 0.5–1.3)
CREAT SERPL-MCNC: 1.79 MG/DL (ref 0.5–1.3)
EGFRCR SERPLBLD CKD-EPI 2021: 38 ML/MIN/1.73M*2
EGFRCR SERPLBLD CKD-EPI 2021: 43 ML/MIN/1.73M*2
ERYTHROCYTE [DISTWIDTH] IN BLOOD BY AUTOMATED COUNT: 14.8 % (ref 11.5–14.5)
ERYTHROCYTE [DISTWIDTH] IN BLOOD BY AUTOMATED COUNT: 15 % (ref 11.5–14.5)
GLUCOSE BLD MANUAL STRIP-MCNC: 123 MG/DL (ref 74–99)
GLUCOSE BLD MANUAL STRIP-MCNC: 155 MG/DL (ref 74–99)
GLUCOSE BLD MANUAL STRIP-MCNC: 236 MG/DL (ref 74–99)
GLUCOSE SERPL-MCNC: 135 MG/DL (ref 74–99)
GLUCOSE SERPL-MCNC: 164 MG/DL (ref 74–99)
HCT VFR BLD AUTO: 28.6 % (ref 41–52)
HCT VFR BLD AUTO: 30.3 % (ref 41–52)
HDLC SERPL-MCNC: 24.8 MG/DL
HGB BLD-MCNC: 10.1 G/DL (ref 13.5–17.5)
HGB BLD-MCNC: 9.6 G/DL (ref 13.5–17.5)
LDLC SERPL CALC-MCNC: 21 MG/DL
MAGNESIUM SERPL-MCNC: 2.08 MG/DL (ref 1.6–2.4)
MCH RBC QN AUTO: 28.9 PG (ref 26–34)
MCH RBC QN AUTO: 29 PG (ref 26–34)
MCHC RBC AUTO-ENTMCNC: 33.3 G/DL (ref 32–36)
MCHC RBC AUTO-ENTMCNC: 33.6 G/DL (ref 32–36)
MCV RBC AUTO: 86 FL (ref 80–100)
MCV RBC AUTO: 87 FL (ref 80–100)
NON HDL CHOLESTEROL: 38 MG/DL (ref 0–149)
NRBC BLD-RTO: 0 /100 WBCS (ref 0–0)
NRBC BLD-RTO: 0 /100 WBCS (ref 0–0)
P AXIS: 69 DEGREES
PHOSPHATE SERPL-MCNC: 3.2 MG/DL (ref 2.5–4.9)
PHOSPHATE SERPL-MCNC: 3.7 MG/DL (ref 2.5–4.9)
PLATELET # BLD AUTO: 122 X10*3/UL (ref 150–450)
PLATELET # BLD AUTO: 123 X10*3/UL (ref 150–450)
POTASSIUM SERPL-SCNC: 4 MMOL/L (ref 3.5–5.3)
POTASSIUM SERPL-SCNC: 4.5 MMOL/L (ref 3.5–5.3)
PR INTERVAL: 280 MS
Q ONSET: 213 MS
QRS COUNT: 11 BEATS
QRS DURATION: 160 MS
QT INTERVAL: 456 MS
QTC CALCULATION(BAZETT): 495 MS
QTC FREDERICIA: 482 MS
R AXIS: -13 DEGREES
RBC # BLD AUTO: 3.31 X10*6/UL (ref 4.5–5.9)
RBC # BLD AUTO: 3.49 X10*6/UL (ref 4.5–5.9)
SODIUM SERPL-SCNC: 138 MMOL/L (ref 136–145)
SODIUM SERPL-SCNC: 140 MMOL/L (ref 136–145)
T AXIS: 208 DEGREES
T OFFSET: 441 MS
TRIGL SERPL-MCNC: 84 MG/DL (ref 0–149)
VENTRICULAR RATE: 71 BPM
VLDL: 17 MG/DL (ref 0–40)
WBC # BLD AUTO: 4.4 X10*3/UL (ref 4.4–11.3)
WBC # BLD AUTO: 5.1 X10*3/UL (ref 4.4–11.3)

## 2025-01-16 PROCEDURE — 82172 ASSAY OF APOLIPOPROTEIN: CPT

## 2025-01-16 PROCEDURE — 99223 1ST HOSP IP/OBS HIGH 75: CPT | Performed by: INTERNAL MEDICINE

## 2025-01-16 PROCEDURE — 36415 COLL VENOUS BLD VENIPUNCTURE: CPT

## 2025-01-16 PROCEDURE — 2500000001 HC RX 250 WO HCPCS SELF ADMINISTERED DRUGS (ALT 637 FOR MEDICARE OP)

## 2025-01-16 PROCEDURE — 82947 ASSAY GLUCOSE BLOOD QUANT: CPT

## 2025-01-16 PROCEDURE — 1100000001 HC PRIVATE ROOM DAILY

## 2025-01-16 PROCEDURE — 85027 COMPLETE CBC AUTOMATED: CPT

## 2025-01-16 PROCEDURE — 83735 ASSAY OF MAGNESIUM: CPT

## 2025-01-16 PROCEDURE — 2500000002 HC RX 250 W HCPCS SELF ADMINISTERED DRUGS (ALT 637 FOR MEDICARE OP, ALT 636 FOR OP/ED)

## 2025-01-16 PROCEDURE — 80069 RENAL FUNCTION PANEL: CPT

## 2025-01-16 RX ADMIN — ATORVASTATIN CALCIUM 40 MG: 40 TABLET, FILM COATED ORAL at 21:51

## 2025-01-16 RX ADMIN — METOPROLOL SUCCINATE 25 MG: 25 TABLET, EXTENDED RELEASE ORAL at 08:11

## 2025-01-16 RX ADMIN — BUSPIRONE HYDROCHLORIDE 7.5 MG: 15 TABLET ORAL at 21:51

## 2025-01-16 RX ADMIN — MIRTAZAPINE 15 MG: 15 TABLET, FILM COATED ORAL at 21:52

## 2025-01-16 RX ADMIN — RANOLAZINE 500 MG: 500 TABLET, EXTENDED RELEASE ORAL at 21:52

## 2025-01-16 RX ADMIN — AMLODIPINE BESYLATE 5 MG: 5 TABLET ORAL at 08:10

## 2025-01-16 RX ADMIN — CEPHALEXIN 500 MG: 500 CAPSULE ORAL at 08:11

## 2025-01-16 RX ADMIN — INSULIN LISPRO 1 UNITS: 100 INJECTION, SOLUTION INTRAVENOUS; SUBCUTANEOUS at 18:25

## 2025-01-16 RX ADMIN — CEPHALEXIN 500 MG: 500 CAPSULE ORAL at 16:55

## 2025-01-16 RX ADMIN — RANOLAZINE 500 MG: 500 TABLET, EXTENDED RELEASE ORAL at 08:10

## 2025-01-16 RX ADMIN — PRASUGREL 10 MG: 10 TABLET, FILM COATED ORAL at 11:12

## 2025-01-16 RX ADMIN — BUSPIRONE HYDROCHLORIDE 7.5 MG: 15 TABLET ORAL at 08:11

## 2025-01-16 RX ADMIN — LEVOTHYROXINE SODIUM 50 MCG: 0.05 TABLET ORAL at 05:57

## 2025-01-16 RX ADMIN — ASPIRIN 81 MG: 81 TABLET, COATED ORAL at 08:12

## 2025-01-16 ASSESSMENT — COGNITIVE AND FUNCTIONAL STATUS - GENERAL
EATING MEALS: A LITTLE
WALKING IN HOSPITAL ROOM: A LOT
DAILY ACTIVITIY SCORE: 22
MOBILITY SCORE: 18
DAILY ACTIVITIY SCORE: 22
TOILETING: A LITTLE
DAILY ACTIVITIY SCORE: 23
MOBILITY SCORE: 19
CLIMB 3 TO 5 STEPS WITH RAILING: A LOT
MOVING TO AND FROM BED TO CHAIR: A LITTLE
DAILY ACTIVITIY SCORE: 22
CLIMB 3 TO 5 STEPS WITH RAILING: A LOT
MOVING TO AND FROM BED TO CHAIR: A LITTLE
CLIMB 3 TO 5 STEPS WITH RAILING: A LOT
WALKING IN HOSPITAL ROOM: A LOT
MOBILITY SCORE: 18
STANDING UP FROM CHAIR USING ARMS: A LITTLE
TOILETING: A LITTLE
STANDING UP FROM CHAIR USING ARMS: A LITTLE
MOVING TO AND FROM BED TO CHAIR: A LITTLE
HELP NEEDED FOR BATHING: A LITTLE
HELP NEEDED FOR BATHING: A LITTLE
TOILETING: A LITTLE
MOBILITY SCORE: 18
CLIMB 3 TO 5 STEPS WITH RAILING: A LOT
STANDING UP FROM CHAIR USING ARMS: A LITTLE
HELP NEEDED FOR BATHING: A LITTLE
STANDING UP FROM CHAIR USING ARMS: A LITTLE

## 2025-01-16 ASSESSMENT — PAIN - FUNCTIONAL ASSESSMENT: PAIN_FUNCTIONAL_ASSESSMENT: 0-10

## 2025-01-16 ASSESSMENT — PAIN SCALES - GENERAL
PAINLEVEL_OUTOF10: 0 - NO PAIN
PAINLEVEL_OUTOF10: 0 - NO PAIN

## 2025-01-16 ASSESSMENT — ACTIVITIES OF DAILY LIVING (ADL): LACK_OF_TRANSPORTATION: NO

## 2025-01-16 NOTE — PROGRESS NOTES
01/16/25 1013   Discharge Planning   Living Arrangements Alone   Support Systems Children   Assistance Needed needs assist   Type of Residence Private residence   Number of Stairs to Enter Residence 2   Number of Stairs Within Residence 12   Do you have animals or pets at home? No   Who is requesting discharge planning? Provider   Home or Post Acute Services None   Expected Discharge Disposition Home   Financial Resource Strain   How hard is it for you to pay for the very basics like food, housing, medical care, and heating? Not very   Housing Stability   In the last 12 months, was there a time when you were not able to pay the mortgage or rent on time? N   In the past 12 months, how many times have you moved where you were living? 1   At any time in the past 12 months, were you homeless or living in a shelter (including now)? N   Transportation Needs   In the past 12 months, has lack of transportation kept you from medical appointments or from getting medications? no   In the past 12 months, has lack of transportation kept you from meetings, work, or from getting things needed for daily living? No   Patient Choice   Provider Choice list and CMS website (https://medicare.gov/care-compare#search) for post-acute Quality and Resource Measure Data were provided and reviewed with: Patient   Patient / Family choosing to utilize agency / facility established prior to hospitalization No   Stroke Family Assessment   Stroke Family Assessment Needed No     Transitional Care Coordination Progress Note:  Patient discussed during interdisciplinary rounds.   Team members present: CORWIN FERRARI MD   Plan per Medical/Surgical team:  pending PCI  1-  Payor: MEDICARE   Discharge disposition: Home   Potential Barriers: None   ADOD: 1-      Previous Home Care: None   DME: None   Pharmacy: [facility]  Falls: None   PCP:  CHRISTOPHER D'AMICO   Dialysis: None

## 2025-01-16 NOTE — CARE PLAN
Problem: Skin  Goal: Decreased wound size/increased tissue granulation at next dressing change  Outcome: Progressing  Flowsheets (Taken 1/16/2025 1812)  Decreased wound size/increased tissue granulation at next dressing change:   Promote sleep for wound healing   Protective dressings over bony prominences  Goal: Participates in plan/prevention/treatment measures  Outcome: Progressing  Flowsheets (Taken 1/16/2025 1812)  Participates in plan/prevention/treatment measures:   Discuss with provider PT/OT consult   Elevate heels  Goal: Prevent/manage excess moisture  Outcome: Progressing  Flowsheets (Taken 1/16/2025 1812)  Prevent/manage excess moisture:   Cleanse incontinence/protect with barrier cream   Monitor for/manage infection if present   Moisturize dry skin  Goal: Prevent/minimize sheer/friction injuries  Outcome: Progressing  Flowsheets (Taken 1/16/2025 1812)  Prevent/minimize sheer/friction injuries:   Complete micro-shifts as needed if patient unable. Adjust patient position to relieve pressure points, not a full turn   HOB 30 degrees or less   Increase activity/out of bed for meals   Use pull sheet  Goal: Promote/optimize nutrition  Outcome: Progressing  Flowsheets (Taken 1/16/2025 1812)  Promote/optimize nutrition:   Assist with feeding   Consume > 50% meals/supplements   Discuss with provider if NPO > 2 days   Monitor/record intake including meals  Goal: Promote skin healing  Outcome: Progressing  Flowsheets (Taken 1/16/2025 1812)  Promote skin healing:   Assess skin/pad under line(s)/device(s)   Protective dressings over bony prominences     Problem: Fall/Injury  Goal: Not fall by end of shift  Outcome: Progressing  Goal: Be free from injury by end of the shift  Outcome: Progressing  Goal: Verbalize understanding of personal risk factors for fall in the hospital  Outcome: Progressing  Goal: Verbalize understanding of risk factor reduction measures to prevent injury from fall in the home  Outcome:  Progressing  Goal: Use assistive devices by end of the shift  Outcome: Progressing  Goal: Pace activities to prevent fatigue by end of the shift  Outcome: Progressing     Problem: Pain  Goal: Takes deep breaths with improved pain control throughout the shift  Outcome: Progressing  Goal: Turns in bed with improved pain control throughout the shift  Outcome: Progressing  Goal: Walks with improved pain control throughout the shift  Outcome: Progressing  Goal: Performs ADL's with improved pain control throughout shift  Outcome: Progressing  Goal: Participates in PT with improved pain control throughout the shift  Outcome: Progressing  Goal: Free from opioid side effects throughout the shift  Outcome: Progressing  Goal: Free from acute confusion related to pain meds throughout the shift  Outcome: Progressing      The clinical goals for the shift include Patient will remain HDS

## 2025-01-16 NOTE — PROGRESS NOTES
Chavez Llamas is a 79 y.o. male on day 1 of admission presenting with CAD, multiple vessel.      Subjective   Chavez Llamas is a 79yM with PMHx of CAD s/p CABG 1990s (LIMA to LAD and SVG to unknown?) and multiple PCIs (most recent [facility] 12/2024), HFmrEF (40-45% 12/4/2024), HTN, T2DM (A1C 5.5 12/5/2025), PAD s/p left BKA who presented to [facility] 1/13 in the setting of nausea, vomiting, chest pain, and wound on LLE. Found to have NSTEMI and transferred to [facility] for high risk PCI.      Recently admitted at [facility] from 12/4/24-12/10/24 with NSTEMI. Underwent cardiac catheterization which showed moderate obstruction in the LAD after his LIMA graft, and severe in-stent restenosis of his right coronary artery at the vessel and a difficult intervention resulted in PTCA of the 90% mid right coronary obstruction reduced to 50 to 70%, but no additional stenting was possible due to the inability to negotiate the previously stented mid RCA.     On presentation to [facility], reporting complaints of severe substernal chest pain. Per EMS, patient received 1 nitroglycerin, 4 aspirin and BPs decreased from 150 systolic to 70 and patient with complaint of severe headache which resolved with IV fentanyl. At the OSH, labs notable CBC Hgb 9.6, Plts 102. RFP with Cr 1.59. Troponin (17<66), . EKG showing first degree heart block and LBBB similar in comparison to prior EKGs. CTA chest negative for PE and aortic dissection. Started on heparin gtt and ranexa for NSTEMI. Hospital course complicated by TEODORO. Nephrology consulted, suspect due to use of IV contrast. Cardiology was consulted and recommended transfer to [facility] to undergo high risk PCI. Patient was admitted to the CICU for NSTEMI managed with heparin gtt, ASA and statin. Troponin peaked at 212. Plan to undergo high risk PCI on 1/16 and transferred to the floor for further management. Course also c/b concern for LLE cellulitis and was managed with vanc/zosyn at OSH,  now transitioned to Keflex (1/13-1/17).     Patient denies any acute chest pain or SOB at bedside. Reports pain in the LLE improving with antibiotics.     Cardiac Hx:  -CABG 1990s CABG x 2 with an LIMA to the LAD and SVG to unknown target in the 1990s at [facility].     -3/2010 [facility] 70% distal stenosis of the LMCA and 100% occlusion of the proximal LAD and proximal LCx. 70% stenosis of the proximal ramus intermedius branch, widely patent LIMA graft to the LAD, totally occluded saphenous vein graft to unknown target and moderate LV dysfunction ejection fraction 35-40%. The patient underwent a PCI and bare-metal stent deployment to the mid RCA at that time.     -Cardiac cath and PCI 12/1/2020 [facility] patient was noted to have an 80 to 85% stenosis within the previously deployed mid RCA stent with an additional 80% stenosis at the distal edge of the stent. The distal RCA had 3 tandem lesions ranging between 80 to 95%. The LIMA to the LAD was widely patent but there was a 50% stenosis of the mid LAD beyond the anastomotic site. The patient subsequently underwent PCI and stent deployment x 3 to the RCA.     -The patient returned to [facility] with accelerated angina acute coronary syndrome for a delayed in-stent thrombosis and on 3/13/2021 the patient had successful balloon angioplasty of the proximal segment of the PDA RCA with a 12 mm balloon and balloon angioplasty of the distal mid and proximal RCA. Of note the patient required a temporary transvenous pacemaker at that time for transient third-degree AV block.     -He had readmission with a non-STEMI at [facility] 12/2024. Cath as below:  1. Severe three-vessel coronary artery disease.  2. Patent LIMA to LAD but after the anastomosis there is moderate tubular lesion that has been stable for long time.  3. Distal left main of 90% that is giving flow to midsize ramus. Has been stable for many years.  4. Severe diffuse right coronary artery disease  was 2 areas of underexpanded stents and severe in-stent restenosis of 90% in the midsegment.  5. Balloon angioplasty was attempted we were unable to expand the mid RCA stent. There was still significant in-stent restenosis of 50% after balloon angioplasty. Improved from 99%. CIERRA-3 flow.     TTE 12/2024:   1. The left ventricular systolic function is mildly decreased, with a visually estimated ejection fraction of 40-45%.   2. Abnormal wall motion.   3. Spectral Doppler shows an abnormal pattern of left ventricular diastolic filling.   4. Abnormal septal motion consistent with post-operative status and abnormal septal motion consistent with left bundle branch block.   5. There is severe hypokinesis of the basal inferior wall.   6. There is normal right ventricular global systolic function.   7. Mild to moderate mitral valve regurgitation.   8. Trace to mild tricuspid regurgitation.   9. Aortic valve sclerosis.  10. Mild aortic valve regurgitation.  11. Atrial septal aneurysm present.  12. There is plaque visualized in the ascending aorta.       Objective     Last Recorded Vitals  /59   Pulse 59   Temp 36.7 °C (98.1 °F) (Temporal)   Resp 11   Wt 66.3 kg (146 lb 2.6 oz)   SpO2 100%   Intake/Output last 3 Shifts:    Intake/Output Summary (Last 24 hours) at 1/15/2025 2314  Last data filed at 1/15/2025 2200  Gross per 24 hour   Intake 704.45 ml   Output 575 ml   Net 129.45 ml     Admission Weight  Weight: 67.8 kg (149 lb 7.6 oz) (01/14/25 1839)    Daily Weight  01/15/25 : 66.3 kg (146 lb 2.6 oz)    PE:  General: well appearing, NAD, pleasant  HEENT: NCAT, MMM  CV: RRR, no m/r/g  PULM: CTAB, non-labored respirations   ABD: soft, NT, ND, + bowel sounds   EXT: LLE BKA, small ulcer on the posterior aspect of L thigh without drainage, no edema in the RLE   SKIN: no rashes noted   NEURO: A&Ox4, no gross motor or sensory deficits, normal gait  PSYCH: pleasant mood, appropriate affect        Labs    Results from last  7 days   Lab Units 01/15/25  0333 01/14/25  1859 01/14/25  0616 01/13/25  1120   WBC AUTO x10*3/uL 4.4 5.3 4.5 5.7   HEMOGLOBIN g/dL 9.6* 10.6* 9.6* 11.4*   HEMATOCRIT % 28.7* 32.2* 28.2* 34.3*   PLATELETS AUTO x10*3/uL 116* 131* 102* 128*            Results from last 7 days   Lab Units 01/15/25  1306 01/15/25  0333 01/14/25  2329 01/14/25  1859 01/14/25  0616 01/13/25  1120   SODIUM mmol/L  --  137 135* 137 137 136   POTASSIUM mmol/L  --  4.1 4.3 4.5 4.1 4.4   CHLORIDE mmol/L  --  106 106 104 106 104   CO2 mmol/L  --  23 14* 23 25 25   BUN mg/dL  --  20 15 18 18 17   CREATININE mg/dL  --  1.78* 1.22 1.70* 1.59* 1.18   CALCIUM mg/dL  --  8.5* 7.8* 9.0 8.5* 9.3   MAGNESIUM mg/dL 2.13 1.89  --  1.97  --  1.77     Results from last 7 days   Lab Units 01/14/25  1859 01/13/25  1120   ALK PHOS U/L 70 70   BILIRUBIN TOTAL mg/dL 1.5* 1.8*   PROTEIN TOTAL g/dL 6.9 7.6   ALT U/L 11 12   AST U/L 10 13      Results from last 7 days   Lab Units 01/14/25 2021 01/14/25  1310 01/14/25  0616 01/13/25  2341 01/13/25  1728   APTT seconds 41* 34.6* 73.7* 122.3* 31.0   INR  1.2*  --   --   --   --         Image Results  ECG 12 lead  Sinus rhythm with 1st degree AV block  Left bundle branch block  Abnormal ECG  When compared with ECG of 13-JAN-2025 15:16, (unconfirmed)  No significant change was found  Confirmed by Sanford Lassiter ([id number]) on 1/15/2025 10:56:59 AM  XR chest 1 view  Narrative: Interpreted By:  Raji, Ashish,  and Lamont Margret   STUDY:  XR CHEST 1 VIEW;  1/14/2025 11:45 pm      INDICATION:  Signs/Symptoms:Chest pain.      COMPARISON:  Chest radiograph 01/13/2025      ACCESSION NUMBER(S):  [id number]      ORDERING CLINICIAN:  KEYA BIRCH      FINDINGS:  AP radiograph of the chest was provided.      Postsurgical changes of median sternotomy with intact cerclage wires.  Postsurgical changes of CABG.      CARDIOMEDIASTINAL SILHOUETTE:  Cardiomediastinal silhouette is stable in size and configuration.       LUNGS:  Mild bibasilar atelectasis. No focal consolidation, effusion, or  pneumothorax. Pleural apical thickening on the left and linear  parenchymal changes consistent with scarring and postsurgical change.  There are metallic clips adjacent to the lateral aspect of the  superior mediastinum on the left.      ABDOMEN:  No remarkable upper abdominal findings.      BONES:  No acute osseous abnormality.      Impression: 1. Similar mild bibasilar pulmonary atelectasis.  2. Pleural apical thickening on the left with increased linear  densities at the left apex, noted on the CT of January 13, 2025 and a  follow-up CT has been suggested in 6 months.      I personally reviewed the image(s)/study and resident interpretation  as stated by Dr. Margret Miguel MD. I agree with the findings as  stated. This study was interpreted at [facility], [place], [place].      MACRO:  None      Signed by: Ashish Alegria 1/15/2025 9:40 AM  Dictation workstation:   [id number]  Transthoracic Echo (TTE) [facility], [address] [place] [address]                 Tel [phone] and Fax [phone]    TRANSTHORACIC ECHOCARDIOGRAM REPORT       Patient Name:       ORLANDO VICTORIA   Reading Physician:    [id number] Steven Terrell MD  Study Date:         1/15/2025           Ordering Provider:    [id number] BIRCH  MRN/PID:            [id number]            Fellow:  Accession#:         [id number]        Nurse:  Date of Birth/Age:  1945 / 79     Sonographer:          Nick nelson                                     KELLY  Gender assigned at  M                   Additional Staff:  Birth:  Height:             147.32 cm           Admit Date:           1/14/2025  Weight:             66.23 kg            Admission Status:      Inpatient - STAT  BSA / BMI:          1.59 m2 / 30.51     Encounter#:           [id number]                      kg/m2  Blood Pressure:     118/55 mmHg         Department Location:  [facility]    Study Type:    TRANSTHORACIC ECHO (TTE) COMPLETE  Diagnosis/ICD: Atherosclerotic heart disease of native coronary artery without                 angina pectoris-I25.10  Indication:    CAD  CPT Code:      Echo Limited-95324; Doppler Limited-18451; Color Doppler-53699    Patient History:  Pertinent History: CAD, Hyperlipidemia and HTN. CABG in 1990s and multiple PCIs.    Study Detail: The following Echo studies were performed: 2D, Doppler and color                flow. Technically challenging study due to patient lying in supine                position. Agitated saline used as a contrast agent for intraseptal                flow evaluation.       PHYSICIAN INTERPRETATION:  Left Ventricle: Left ventricular ejection fraction is mildly decreased, by visual estimate at 45-50%. The left ventricular cavity size is mild to moderately dilated. Abnormal (paradoxical) septal motion consistent with post-operative status. Spectral Doppler shows a Grade I (impaired relaxation pattern) of left ventricular diastolic filling with normal left atrial filling pressure.  LV Wall Scoring:  The basal and mid inferior wall is akinetic.    Left Atrium: The left atrium is enlarged. There is no evidence of a patent foramen ovale. A bubble study using agitated saline was performed. Bubble study is negative. 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  Right Ventricle: The right ventricle is normal in size. There is mildly reduced right ventricular systolic function.  Right Atrium: The right atrium is normal in size.  Aortic Valve: The aortic valve was not well visualized. There is mild to moderate aortic valve cusp calcification. There is trace to mild aortic valve regurgitation. Apparent aortic stenosis.  Mitral Valve: The mitral valve is  minimally calcified. There is mild mitral annular calcification. There is mild mitral valve regurgitation.  Tricuspid Valve: The tricuspid valve is structurally normal. There is mild tricuspid regurgitation.  Pulmonic Valve: The pulmonic valve is not well visualized. There is trace pulmonic valve regurgitation.  Pericardium: Trivial pericardial effusion.  Aorta: The aortic root is normal. The aortic root is at the upper limits of normal size.  In comparison to the previous echocardiogram(s): Compared with study dated 12/6/2024, no significant change.       CONCLUSIONS:   1. Poorly visualized anatomical structures due to suboptimal image quality.   2. Left ventricular cavity size is mild to moderately dilated.   3. Left ventricular ejection fraction is mildly decreased, by visual estimate at 45-50%.   4. Basal and mid inferior wall is abnormal.   5. Spectral Doppler shows a Grade I (impaired relaxation pattern) of left ventricular diastolic filling with normal left atrial filling pressure.   6. Abnormal septal motion consistent with post-operative status.   7. There is mildly reduced right ventricular systolic function.   8. The left atrium is enlarged.   9. Atrial septal aneurysm present.  10. There is no evidence of a patent foramen ovale.    QUANTITATIVE DATA SUMMARY:     2D MEASUREMENTS:           Normal Ranges:  LVEDV Index:     128 ml/m2       AORTA MEASUREMENTS:         Normal Ranges:  Ao Sinus, d:        3.70 cm (2.1-3.5cm)       LV SYSTOLIC FUNCTION BY 2D PLANIMETRY (MOD):                       Normal Ranges:  EF-A4C View:    57 % (>=55%)  EF-A2C View:    53 %  EF-Biplane:     55 %  EF-Visual:      48 %  LV EF Reported: 48 %       TRICUSPID VALVE/RVSP:          Normal Ranges:  Peak TR Velocity:     2.36 m/s  RV Syst Pressure:     25 mmHg  (< 30mmHg)       [id number] Steven Terrell MD  Electronically signed on 1/15/2025 at 9:19:04 AM       Wall Scoring       ** Final **    Scheduled medications  amLODIPine, 5 mg,  oral, Daily  aspirin, 81 mg, oral, Daily  atorvastatin, 40 mg, oral, Nightly  busPIRone, 7.5 mg, oral, BID  cephalexin, 500 mg, oral, q8h JENNIFER  insulin glargine, 12 Units, subcutaneous, Nightly  insulin lispro, 0-5 Units, subcutaneous, TID AC  levothyroxine, 50 mcg, oral, Daily  metoprolol succinate XL, 25 mg, oral, Daily  mirtazapine, 15 mg, oral, Nightly  prasugrel, 10 mg, oral, Daily  ranolazine, 500 mg, oral, BID  [Held by provider] spironolactone, 25 mg, oral, q AM      Continuous medications     PRN medications  PRN medications: acetaminophen, dextrose, dextrose, glucagon, glucagon, [Transfer Hold] heparin       Assessment/Plan      Chavez HYDE Muriel is a 79yM with PMHx of CAD s/p CABG 1990s (LIMA to LAD and SVG to unknown?) and multiple PCIs (most recent [facility] 12/2024), HFmrEF (40-45% 12/4/2024), HTN, T2DM (A1C 5.5 12/5/2025), PAD s/p left BKA who presented to [facility] 1/13 in the setting of nausea, vomiting, chest pain, and wound on LLE. Found to have NSTEMI s/p heparin gtt and transferred to  for high risk PCI.     #NSTEMI  #CAD s/p CABG (1990), multiple PCIs  #HTN   #DLD  #HFmrEF (40-45% 12/4/2024)  ::CABG with LIMA to the LAD patent, SVG to an unknown vessel chronically occluded; S/p multiple PCIs with 5 stents placed to prox and mid RCA with severe ISR  ::[facility] 12/2024- severe 3v disease, patent LIMA-LAD but mod tubular lesion after the anastomosis (stable for many years), distal LM of 90% giving flow to ramus (stable), severe diffuse RCA disease with severe midsegment ISR, s/p balloon angioplasty of RCA ISR (stenosis improved to 50% from 99%) with CIERRA 3 flow  ::TTE notable for EF 45-50%, LV cavity mod dilated, basal and mid inferior wall abnormal, enlarged left atrium  ::Trop peak 212  -Plan for high risk PCI 1/16, NPO at [facility]   -C/w ASA 81 mg, pasugrel 10mg, atorvastatin 40mg  -s/p heparin gtt, Dc'd 1700 after 48 hrs  -Continue home amlodipine 5mg, metoprolol succinate 25mg  -Continue ranolazine 500mg bid  (started at OSH)   -Holding spironolactone 25mg iso TEODORO      #TEODORO on CKD3a  ::Baseline Cr 1.3?  ::Nephrology consulted at OSH, feel TEODORO iso contrast. No hydronephrosis on CT 1/13  -Trend Cr, FeNa 1.1%  -UA neg  -Renally dose meds and avoid nephrotoxins   -s/p 500cc LR bolus    #Empysema on CTA  -No PFTs on file   -No home inhalers   -Outpatient repeat CT in 6 months to follow up assymetric L lung scarring      #Chronic anemia (baseline Hgb ~10)  #Chronic thrombocytopenia (baseline 110)  -CTM     #T2DM (A1c 5.5% 12/2024)   #Hypothyroidism  -Lantus 12U and SSI  -Hypoglycemia protocol  -Continue synthroid 50 mcg      #Concern for LLE cellulitis   ::XR knee 1/13/2025: No acute findings status post left below-the-knee amputation.  ::Vascular US LE: No evidence of deep venous thrombus in the evaluated veins of the left leg from the inguinal ligament to the popliteal fossa.  ::s/p Vanc/Zosyn (1/13)  -F/up Bcx/wound cx 1/13  -Continue Keflex x5d (1/13-1/17)     F: PRN  E: PRN  N: Cardiac, NPO at [facility]  G: none  A: PIV  DVT: s/p heparin gtt  CODE: FULL (confirmed on admission)   NOK: Rianna (child) [phone]; Jelena (child) [phone]           Trinity Tristan MD

## 2025-01-16 NOTE — PROGRESS NOTES
Interval events:    No acute events overnight    Subjective:  Patient reports feeling well this morning, without chest pain or SOB. Eagerly awaiting cath this afternoon.    Today in brief:  - PCI with Dr. Weston cancelled this afternoon due to schedule back up  - awaiting confirmation from interventional on timing for rescheduling    Objective:  Vitals:    01/16/25 0800   BP: 118/64   Pulse: 61   Resp: 18   Temp: 35.8 °C (96.4 °F)   SpO2: 97%     Weight         1/14/2025  1839 1/15/2025  0600 1/16/2025  0358       Weight: 67.8 kg (149 lb 7.6 oz) 66.3 kg (146 lb 2.6 oz) 48.4 kg (106 lb 11.2 oz)             Intake/Output Summary (Last 24 hours) at 1/16/2025 0948  Last data filed at 1/15/2025 2200  Gross per 24 hour   Intake 365.05 ml   Output 575 ml   Net -209.95 ml     Recent Results (from the past 24 hours)   Urinalysis with Reflex Culture and Microscopic    Collection Time: 01/15/25 11:04 AM   Result Value Ref Range    Color, Urine Yellow Light-Yellow, Yellow, Dark-Yellow    Appearance, Urine Clear Clear    Specific Gravity, Urine 1.024 1.005 - 1.035    pH, Urine 5.0 5.0, 5.5, 6.0, 6.5, 7.0, 7.5, 8.0    Protein, Urine 10 (TRACE) NEGATIVE, 10 (TRACE), 20 (TRACE) mg/dL    Glucose, Urine Normal Normal mg/dL    Blood, Urine NEGATIVE NEGATIVE    Ketones, Urine NEGATIVE NEGATIVE mg/dL    Bilirubin, Urine NEGATIVE NEGATIVE    Urobilinogen, Urine Normal Normal mg/dL    Nitrite, Urine NEGATIVE NEGATIVE    Leukocyte Esterase, Urine NEGATIVE NEGATIVE   Extra Urine Gray Tube    Collection Time: 01/15/25 11:04 AM   Result Value Ref Range    Extra Tube Hold for add-ons.    Urinalysis Microscopic    Collection Time: 01/15/25 11:04 AM   Result Value Ref Range    WBC, Urine NONE 1-5, NONE /HPF    RBC, Urine 1-2 NONE, 1-2, 3-5 /HPF    Mucus, Urine FEW Reference range not established. /LPF    Hyaline Casts, Urine 2+ (A) NONE /LPF   Urine electrolytes    Collection Time: 01/15/25 11:10 AM   Result Value Ref Range    Sodium, Urine  Random 99 mmol/L    Sodium/Creatinine Ratio 84 Not established. mmol/g Creat    Potassium, Urine Random 42 mmol/L    Potassium/Creatinine Ratio 36 Not established mmol/g Creat    Chloride, Urine Random 97 mmol/L    Chloride/Creatinine Ratio 83 23 - 275 mmol/g creat    Creatinine, Urine Random 117.2 20.0 - 370.0 mg/dL   POCT GLUCOSE    Collection Time: 01/15/25 12:00 PM   Result Value Ref Range    POCT Glucose 139 (H) 74 - 99 mg/dL   Heparin Assay, UFH    Collection Time: 01/15/25  1:06 PM   Result Value Ref Range    Heparin Unfractionated 0.2 See Comment Below for Therapeutic Ranges IU/mL   Magnesium    Collection Time: 01/15/25  1:06 PM   Result Value Ref Range    Magnesium 2.13 1.60 - 2.40 mg/dL   Renal Function Panel    Collection Time: 01/15/25  1:06 PM   Result Value Ref Range    Glucose 135 (H) 74 - 99 mg/dL    Sodium 140 136 - 145 mmol/L    Potassium 4.5 3.5 - 5.3 mmol/L    Chloride 107 98 - 107 mmol/L    Bicarbonate 19 (L) 21 - 32 mmol/L    Anion Gap 19 10 - 20 mmol/L    Urea Nitrogen 18 6 - 23 mg/dL    Creatinine 1.79 (H) 0.50 - 1.30 mg/dL    eGFR 38 (L) >60 mL/min/1.73m*2    Calcium 8.7 8.6 - 10.6 mg/dL    Phosphorus 3.7 2.5 - 4.9 mg/dL    Albumin 3.3 (L) 3.4 - 5.0 g/dL   POCT GLUCOSE    Collection Time: 01/15/25  3:49 PM   Result Value Ref Range    POCT Glucose 141 (H) 74 - 99 mg/dL   POCT GLUCOSE    Collection Time: 01/15/25  8:32 PM   Result Value Ref Range    POCT Glucose 167 (H) 74 - 99 mg/dL   POCT GLUCOSE    Collection Time: 01/15/25 11:04 PM   Result Value Ref Range    POCT Glucose 154 (H) 74 - 99 mg/dL     Inpatient Medications:  Scheduled medications   Medication Dose Route Frequency    amLODIPine  5 mg oral Daily    aspirin  81 mg oral Daily    atorvastatin  40 mg oral Nightly    busPIRone  7.5 mg oral BID    cephalexin  500 mg oral q8h JENNIFER    insulin glargine  12 Units subcutaneous Nightly    insulin lispro  0-5 Units subcutaneous TID AC    levothyroxine  50 mcg oral Daily    metoprolol  succinate XL  25 mg oral Daily    mirtazapine  15 mg oral Nightly    prasugrel  10 mg oral Daily    ranolazine  500 mg oral BID    [Held by provider] spironolactone  25 mg oral q AM     PRN medications   Medication    acetaminophen    dextrose    dextrose    glucagon    glucagon    heparin     Continuous Medications   Medication Dose Last Rate     Procedures/testing:  TRANSTHORACIC ECHO (TTE) COMPLETE 1/14/2025   1. Poorly visualized anatomical structures due to suboptimal image quality.   2. Left ventricular cavity size is mild to moderately dilated.   3. Left ventricular ejection fraction is mildly decreased, by visual estimate at 45-50%.   4. Basal and mid inferior wall is abnormal.   5. Spectral Doppler shows a Grade I (impaired relaxation pattern) of left ventricular diastolic filling with normal left atrial filling pressure.   6. Abnormal septal motion consistent with post-operative status.   7. There is mildly reduced right ventricular systolic function.   8. The left atrium is enlarged.   9. Atrial septal aneurysm present.  10. There is no evidence of a patent foramen ovale.    CT ANGIO CHEST ABDOMEN PELVIS; 1/13/2025   CHEST  1.  No evidence of aortic aneurysm, dissection, or acute intramural  hematoma. Moderate calcified and noncalcified atherosclerotic plaque  of the thoracic and abdominal aorta. Patent left renal artery stent  and partially visualized bilateral superficial femoral artery stents.  Sternotomy changes. Coronary artery atherosclerotic calcifications.  2. No evidence of consolidation or pleural effusion. Moderate  centrilobular and paraseptal emphysema as well as nonspecific  pulmonary fibrotic changes. Asymmetric scarring in the left lung apex  is new since 2010 and consider follow-up chest CT in 6 months to  confirm stability.  ABDOMEN - PELVIS  1.  No acute abnormality in the abdomen/pelvis. Mild bilateral renal  cortical thinning.  2. Unchanged chronic compression deformity of L4  vertebral body.    Left Heart Cath 12/6/2024   1. Severe three-vessel coronary artery disease.   2. Patent LIMA to LAD but after the anastomosis there is moderate tubular lesion that has been stable for long time.   3. Distal left main of 90% that is giving flow to midsize ramus. Has been stable for many years.   4. Severe diffuse right coronary artery disease was 2 areas of underexpanded stents and severe in-stent restenosis of 90% in the midsegment.   5. Balloon angioplasty was attempted we were unable to expand the mid RCA stent. There was still significant in-stent restenosis of 50% after balloon angioplasty. Improved from 99%. CIERRA-3 flow.   6. Medical management for now.    Telemetry 1/16/2025 (personally reviewed): SR BBB with Mobitz type I HR 60-70s during wake windows, notable nocturnal bradycardia HR 30s    Physical exam:  General: well appearing, NAD, pleasant  HEENT: NCAT, MMM  CV: RRR, no m/r/g  PULM: CTAB, non-labored respirations   ABD: soft, NT, ND, + bowel sounds   EXT: LLE BKA, small ulcer on the posterior aspect of L thigh without drainage, no edema in the RLE   SKIN: no rashes noted   NEURO: A&Ox4, no gross motor or sensory deficits, normal gait  PSYCH: pleasant mood, appropriate affect      Assessment/Plan   Chavez Llamas is a 79yM with PMHx of CAD s/p CABG 1990s (LIMA to LAD and SVG to unknown?) and multiple PCIs (most recent [facility] [place] 12/2024), HFmrEF (40-45% 12/4/2024), HTN, T2DM (A1C 5.5 12/5/2025), PAD s/p left BKA who presented to [facility] 1/13 in the setting of nausea, vomiting, chest pain, and wound on LLE. Found to have NSTEMI s/p heparin gtt and transferred to  for high risk PCI.      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% 12/4/2024)  - CABG with LIMA to the LAD patent, SVG to an unknown vessel chronically occluded; S/p multiple PCIs with 5 stents placed to prox and mid RCA with severe ISR  - [facility] [place] 12/2024- severe 3v disease, patent LIMA-LAD but mod  tubular lesion after the anastomosis (stable for many years), distal LM of 90% giving flow to ramus (stable), severe diffuse RCA disease with severe midsegment ISR, s/p balloon angioplasty of RCA ISR (stenosis improved to 50% from 99%) with CIERRA 3 flow  - TTE notable for EF 45-50%, LV cavity mod dilated, basal and mid inferior wall abnormal, enlarged left atrium  - Trop peak 212  - PCI with Dr. Weston cancelled this afternoon due to schedule back up  - awaiting confirmation from interventional on timing for rescheduling  - C/w ASA 81 mg, pasugrel 10mg, atorvastatin 40mg  - s/p heparin gtt, Dc'd 1700 after 48 hrs  - Continue home amlodipine 5mg, metoprolol succinate 25mg  - Continue ranolazine 500mg bid (started at OSH)   - Holding spironolactone 25mg iso TEODORO      TEODORO on CKD3a  - Baseline Cr 1.3?  - Nephrology consulted at OSH, feel TEODORO iso contrast. No hydronephrosis on CT 1/13  - Cr trend: 1.79 (1.78, 1.22, 1.7, 1.59, 1.18)  - FeNa 1.1%  - UA neg  - Renally dose meds and avoid nephrotoxins   - s/p 500cc LR bolus 1/15     Emphysema on CTA  - No PFTs on file   - No home inhalers   - Outpatient repeat CT in 6 months to follow up assymetric L lung scarring      Chronic anemia (baseline Hgb ~10)  Chronic thrombocytopenia (baseline 110)  - CTM     T2DM (A1c 5.5% 12/2024)   Hypothyroidism  - Lantus 12U and SSI  - Hypoglycemia protocol  - Continue synthroid 50 mcg      Concern for LLE cellulitis   - XR knee 1/13/2025: No acute findings status post left below-the-knee amputation.  - Vascular US LE: No evidence of deep venous thrombus in the evaluated veins of the left leg from the inguinal ligament to the popliteal fossa.  - blood cx 1/13 NGTD x2 days  - wound cx 1/13 negative  - s/p Vanc/Zosyn (1/13)  - Continue Keflex x5d (1/13-1/17)  - recommend orthotics appointment as an outpatient for prosthetic fitting, suspect prosthesis not fitting properly following recent weight loss    DVT ppx: holding  DISPO: pending PCI with  Dr. Weston, yet to be rescheduled  Code status: Full Code    Patient seen and discussed with Dr. Lynch.    Vikas Perera PA-C

## 2025-01-16 NOTE — PROGRESS NOTES
Occupational Therapy                 Therapy Communication Note    Patient Name: Chavez Llamas  MRN: [id number]  Department: [place] [address]  Room: [id number]-  Today's Date: 1/16/2025     Discipline: Occupational Therapy    OT Missed Visit: Yes     Missed Visit Reason: Missed Visit Reason: Patient refused (declines all therapy reports has been in and out of hospital and not interested in it, been there done that. d/c order)    Missed Time: Attempt    Comment:

## 2025-01-16 NOTE — PROGRESS NOTES
Physical Therapy                 Therapy Communication Note    Patient Name: Chavez Llamas  MRN: [id number]  Department: [place] [address]  Room: [facility]-  Today's Date: 1/16/2025     Discipline: Physical Therapy          Missed Visit Reason: Missed Visit Reason: Other (Comment)    Missed Time: Attempt    Comment:  PT eval orders received; patient declines all therapy during his stay. Will dc PT orders.

## 2025-01-16 NOTE — INTERVAL H&P NOTE
H&P reviewed. The patient was examined and there are no changes to the H&P.  Seen and examined. Family at bedside.  Answered all questions

## 2025-01-17 ENCOUNTER — APPOINTMENT (OUTPATIENT)
Dept: CARDIOLOGY | Facility: HOSPITAL | Age: 80
End: 2025-01-17
Payer: MEDICARE

## 2025-01-17 LAB
ALBUMIN SERPL BCP-MCNC: 3.5 G/DL (ref 3.4–5)
ANION GAP SERPL CALC-SCNC: 14 MMOL/L (ref 10–20)
ATRIAL RATE: 49 BPM
ATRIAL RATE: 76 BPM
ATRIAL RATE: 88 BPM
ATRIAL RATE: 92 BPM
BACTERIA BLD CULT: NORMAL
BACTERIA BLD CULT: NORMAL
BUN SERPL-MCNC: 23 MG/DL (ref 6–23)
CALCIUM SERPL-MCNC: 8.9 MG/DL (ref 8.6–10.6)
CARDIAC TROPONIN I PNL SERPL HS: 56 NG/L (ref 0–53)
CHLORIDE SERPL-SCNC: 103 MMOL/L (ref 98–107)
CO2 SERPL-SCNC: 25 MMOL/L (ref 21–32)
CREAT SERPL-MCNC: 1.53 MG/DL (ref 0.5–1.3)
EGFRCR SERPLBLD CKD-EPI 2021: 46 ML/MIN/1.73M*2
ERYTHROCYTE [DISTWIDTH] IN BLOOD BY AUTOMATED COUNT: 14.9 % (ref 11.5–14.5)
GLUCOSE BLD MANUAL STRIP-MCNC: 160 MG/DL (ref 74–99)
GLUCOSE BLD MANUAL STRIP-MCNC: 163 MG/DL (ref 74–99)
GLUCOSE BLD MANUAL STRIP-MCNC: 185 MG/DL (ref 74–99)
GLUCOSE BLD MANUAL STRIP-MCNC: 213 MG/DL (ref 74–99)
GLUCOSE SERPL-MCNC: 180 MG/DL (ref 74–99)
HCT VFR BLD AUTO: 26.2 % (ref 41–52)
HGB BLD-MCNC: 8.7 G/DL (ref 13.5–17.5)
MAGNESIUM SERPL-MCNC: 1.95 MG/DL (ref 1.6–2.4)
MCH RBC QN AUTO: 28.8 PG (ref 26–34)
MCHC RBC AUTO-ENTMCNC: 33.2 G/DL (ref 32–36)
MCV RBC AUTO: 87 FL (ref 80–100)
NRBC BLD-RTO: 0 /100 WBCS (ref 0–0)
P AXIS: 56 DEGREES
P AXIS: 69 DEGREES
P AXIS: 83 DEGREES
P OFFSET: 152 MS
P OFFSET: 170 MS
P OFFSET: 174 MS
P ONSET: 110 MS
P ONSET: 138 MS
P ONSET: 93 MS
PHOSPHATE SERPL-MCNC: 3.8 MG/DL (ref 2.5–4.9)
PLATELET # BLD AUTO: 128 X10*3/UL (ref 150–450)
POTASSIUM SERPL-SCNC: 4.1 MMOL/L (ref 3.5–5.3)
PR INTERVAL: 150 MS
PR INTERVAL: 210 MS
PR INTERVAL: 236 MS
PR INTERVAL: 280 MS
Q ONSET: 211 MS
Q ONSET: 213 MS
Q ONSET: 214 MS
Q ONSET: 215 MS
QRS COUNT: 13 BEATS
QRS COUNT: 15 BEATS
QRS COUNT: 15 BEATS
QRS COUNT: 8 BEATS
QRS DURATION: 152 MS
QRS DURATION: 158 MS
QRS DURATION: 160 MS
QRS DURATION: 160 MS
QT INTERVAL: 404 MS
QT INTERVAL: 410 MS
QT INTERVAL: 440 MS
QT INTERVAL: 480 MS
QTC CALCULATION(BAZETT): 433 MS
QTC CALCULATION(BAZETT): 495 MS
QTC CALCULATION(BAZETT): 496 MS
QTC CALCULATION(BAZETT): 499 MS
QTC FREDERICIA: 448 MS
QTC FREDERICIA: 465 MS
QTC FREDERICIA: 466 MS
QTC FREDERICIA: 475 MS
R AXIS: -21 DEGREES
R AXIS: -22 DEGREES
R AXIS: -43 DEGREES
R AXIS: 65 DEGREES
RBC # BLD AUTO: 3.02 X10*6/UL (ref 4.5–5.9)
SODIUM SERPL-SCNC: 138 MMOL/L (ref 136–145)
T AXIS: 128 DEGREES
T AXIS: 142 DEGREES
T AXIS: 199 DEGREES
T AXIS: 216 DEGREES
T OFFSET: 415 MS
T OFFSET: 420 MS
T OFFSET: 431 MS
T OFFSET: 454 MS
UFH PPP CHRO-ACNC: 0.3 IU/ML
UFH PPP CHRO-ACNC: 0.4 IU/ML
VENTRICULAR RATE: 49 BPM
VENTRICULAR RATE: 76 BPM
VENTRICULAR RATE: 88 BPM
VENTRICULAR RATE: 92 BPM
WBC # BLD AUTO: 4.6 X10*3/UL (ref 4.4–11.3)

## 2025-01-17 PROCEDURE — 2500000001 HC RX 250 WO HCPCS SELF ADMINISTERED DRUGS (ALT 637 FOR MEDICARE OP)

## 2025-01-17 PROCEDURE — 93005 ELECTROCARDIOGRAM TRACING: CPT

## 2025-01-17 PROCEDURE — 85520 HEPARIN ASSAY: CPT

## 2025-01-17 PROCEDURE — 1100000001 HC PRIVATE ROOM DAILY

## 2025-01-17 PROCEDURE — 36415 COLL VENOUS BLD VENIPUNCTURE: CPT

## 2025-01-17 PROCEDURE — 2500000002 HC RX 250 W HCPCS SELF ADMINISTERED DRUGS (ALT 637 FOR MEDICARE OP, ALT 636 FOR OP/ED)

## 2025-01-17 PROCEDURE — 2500000004 HC RX 250 GENERAL PHARMACY W/ HCPCS (ALT 636 FOR OP/ED)

## 2025-01-17 PROCEDURE — 82947 ASSAY GLUCOSE BLOOD QUANT: CPT

## 2025-01-17 PROCEDURE — 83735 ASSAY OF MAGNESIUM: CPT

## 2025-01-17 PROCEDURE — 84484 ASSAY OF TROPONIN QUANT: CPT

## 2025-01-17 PROCEDURE — 93010 ELECTROCARDIOGRAM REPORT: CPT | Performed by: INTERNAL MEDICINE

## 2025-01-17 PROCEDURE — 85027 COMPLETE CBC AUTOMATED: CPT

## 2025-01-17 PROCEDURE — 80069 RENAL FUNCTION PANEL: CPT

## 2025-01-17 RX ORDER — NITROGLYCERIN 0.4 MG/1
0.4 TABLET SUBLINGUAL EVERY 5 MIN PRN
Status: DISCONTINUED | OUTPATIENT
Start: 2025-01-17 | End: 2025-01-25 | Stop reason: HOSPADM

## 2025-01-17 RX ORDER — HEPARIN SODIUM 10000 [USP'U]/100ML
0-4000 INJECTION, SOLUTION INTRAVENOUS CONTINUOUS
Status: DISCONTINUED | OUTPATIENT
Start: 2025-01-17 | End: 2025-01-21

## 2025-01-17 RX ORDER — ISOSORBIDE DINITRATE 10 MG/1
10 TABLET ORAL
Status: COMPLETED | OUTPATIENT
Start: 2025-01-17 | End: 2025-01-21

## 2025-01-17 RX ADMIN — ACETAMINOPHEN 650 MG: 325 TABLET ORAL at 10:53

## 2025-01-17 RX ADMIN — MIRTAZAPINE 15 MG: 15 TABLET, FILM COATED ORAL at 21:52

## 2025-01-17 RX ADMIN — ISOSORBIDE DINITRATE 10 MG: 10 TABLET ORAL at 14:55

## 2025-01-17 RX ADMIN — AMLODIPINE BESYLATE 5 MG: 5 TABLET ORAL at 08:45

## 2025-01-17 RX ADMIN — ISOSORBIDE DINITRATE 10 MG: 10 TABLET ORAL at 10:53

## 2025-01-17 RX ADMIN — CEPHALEXIN 500 MG: 500 CAPSULE ORAL at 08:45

## 2025-01-17 RX ADMIN — BUSPIRONE HYDROCHLORIDE 7.5 MG: 15 TABLET ORAL at 08:48

## 2025-01-17 RX ADMIN — HEPARIN SODIUM 800 UNITS/HR: 10000 INJECTION, SOLUTION INTRAVENOUS at 10:38

## 2025-01-17 RX ADMIN — ATORVASTATIN CALCIUM 40 MG: 40 TABLET, FILM COATED ORAL at 21:52

## 2025-01-17 RX ADMIN — ISOSORBIDE DINITRATE 10 MG: 10 TABLET ORAL at 19:08

## 2025-01-17 RX ADMIN — CEPHALEXIN 500 MG: 500 CAPSULE ORAL at 17:21

## 2025-01-17 RX ADMIN — INSULIN LISPRO 1 UNITS: 100 INJECTION, SOLUTION INTRAVENOUS; SUBCUTANEOUS at 08:40

## 2025-01-17 RX ADMIN — LEVOTHYROXINE SODIUM 50 MCG: 0.05 TABLET ORAL at 06:15

## 2025-01-17 RX ADMIN — METOPROLOL SUCCINATE 25 MG: 25 TABLET, EXTENDED RELEASE ORAL at 08:45

## 2025-01-17 RX ADMIN — ASPIRIN 81 MG: 81 TABLET, COATED ORAL at 08:45

## 2025-01-17 RX ADMIN — INSULIN LISPRO 1 UNITS: 100 INJECTION, SOLUTION INTRAVENOUS; SUBCUTANEOUS at 18:10

## 2025-01-17 RX ADMIN — PRASUGREL 10 MG: 10 TABLET, FILM COATED ORAL at 10:53

## 2025-01-17 RX ADMIN — RANOLAZINE 500 MG: 500 TABLET, EXTENDED RELEASE ORAL at 21:52

## 2025-01-17 RX ADMIN — BUSPIRONE HYDROCHLORIDE 7.5 MG: 15 TABLET ORAL at 21:52

## 2025-01-17 RX ADMIN — RANOLAZINE 500 MG: 500 TABLET, EXTENDED RELEASE ORAL at 08:45

## 2025-01-17 RX ADMIN — CEPHALEXIN 500 MG: 500 CAPSULE ORAL at 00:54

## 2025-01-17 ASSESSMENT — COGNITIVE AND FUNCTIONAL STATUS - GENERAL
HELP NEEDED FOR BATHING: A LITTLE
WALKING IN HOSPITAL ROOM: A LOT
DAILY ACTIVITIY SCORE: 22
WALKING IN HOSPITAL ROOM: A LOT
STANDING UP FROM CHAIR USING ARMS: A LITTLE
MOBILITY SCORE: 18
CLIMB 3 TO 5 STEPS WITH RAILING: A LOT
STANDING UP FROM CHAIR USING ARMS: A LITTLE
WALKING IN HOSPITAL ROOM: A LOT
HELP NEEDED FOR BATHING: A LITTLE
MOVING TO AND FROM BED TO CHAIR: A LITTLE
MOBILITY SCORE: 18
WALKING IN HOSPITAL ROOM: A LOT
TOILETING: A LITTLE
MOVING TO AND FROM BED TO CHAIR: A LITTLE
DAILY ACTIVITIY SCORE: 22
STANDING UP FROM CHAIR USING ARMS: A LITTLE
HELP NEEDED FOR BATHING: A LITTLE
MOVING TO AND FROM BED TO CHAIR: A LITTLE
CLIMB 3 TO 5 STEPS WITH RAILING: A LOT
MOVING TO AND FROM BED TO CHAIR: A LITTLE
MOVING TO AND FROM BED TO CHAIR: A LITTLE
STANDING UP FROM CHAIR USING ARMS: A LITTLE
WALKING IN HOSPITAL ROOM: A LOT
CLIMB 3 TO 5 STEPS WITH RAILING: A LOT
MOBILITY SCORE: 18
CLIMB 3 TO 5 STEPS WITH RAILING: A LOT
DAILY ACTIVITIY SCORE: 22
CLIMB 3 TO 5 STEPS WITH RAILING: A LOT
TOILETING: A LITTLE
DAILY ACTIVITIY SCORE: 22
CLIMB 3 TO 5 STEPS WITH RAILING: A LOT
WALKING IN HOSPITAL ROOM: A LOT
DAILY ACTIVITIY SCORE: 22
TOILETING: A LITTLE
HELP NEEDED FOR BATHING: A LITTLE
DAILY ACTIVITIY SCORE: 22
TOILETING: A LITTLE
MOVING TO AND FROM BED TO CHAIR: A LITTLE
TOILETING: A LITTLE
MOBILITY SCORE: 18
WALKING IN HOSPITAL ROOM: A LOT
MOVING TO AND FROM BED TO CHAIR: A LITTLE
CLIMB 3 TO 5 STEPS WITH RAILING: A LOT
STANDING UP FROM CHAIR USING ARMS: A LITTLE
TOILETING: A LITTLE
HELP NEEDED FOR BATHING: A LITTLE
MOBILITY SCORE: 18
HELP NEEDED FOR BATHING: A LITTLE
HELP NEEDED FOR BATHING: A LITTLE
DAILY ACTIVITIY SCORE: 22
STANDING UP FROM CHAIR USING ARMS: A LITTLE
TOILETING: A LITTLE
MOBILITY SCORE: 18
STANDING UP FROM CHAIR USING ARMS: A LITTLE
MOBILITY SCORE: 18

## 2025-01-17 ASSESSMENT — PAIN SCALES - GENERAL
PAINLEVEL_OUTOF10: 1
PAINLEVEL_OUTOF10: 0 - NO PAIN

## 2025-01-17 ASSESSMENT — PAIN - FUNCTIONAL ASSESSMENT
PAIN_FUNCTIONAL_ASSESSMENT: 0-10

## 2025-01-17 ASSESSMENT — PAIN DESCRIPTION - LOCATION: LOCATION: HEAD

## 2025-01-17 NOTE — CARE PLAN
Problem: Skin  Goal: Prevent/manage excess moisture  Outcome: Progressing     Problem: Skin  Goal: Prevent/minimize sheer/friction injuries  Outcome: Progressing     Problem: Fall/Injury  Goal: Be free from injury by end of the shift  Outcome: Progressing       The clinical goals for the shift include Pt's HR will remain WNL throughout shift.

## 2025-01-17 NOTE — PROGRESS NOTES
Interval events:    No acute events overnight    Subjective:  On pre rounds, patient reported feeling well. Disappointed about procedure being postponed until Monday, however he was understanding given the circumstances of being postponed.    At 9:45, patient reported onset of nausea and central chest heaviness rating about 2/10 (presenting CP rating 10/10). Confirms pain is similar in nature but much less intense. Although less intense, patient notes feeling more scared than usual. Reassurance was given at the bedside. Refusing SL nitroglycerin as he has experienced significant headaches following administration in the past.   At this time same, patient noted a new onset L sided frontal headache. Denies getting headaches at home. No vision changes. No light sensitivity.    Today in brief:  - PCI with Dr. Weston rescheduled for Monday 1/20  - episode of chest pain and nausea this morning. ECG without significant change from previous. HS trop 56 (peaked at 212 on 1/14)  - restart low intensity heparin gtt  - start Isordil 10mg TID for antianginal agent    Objective:  Vitals:    01/17/25 1128   BP: 109/61   Pulse: 69   Resp: 18   Temp: 36.2 °C (97.2 °F)   SpO2: 100%     Weight         1/14/2025  1839 1/15/2025  0600 1/16/2025  0358 1/17/2025  0408    Weight: 67.8 kg (149 lb 7.6 oz) 66.3 kg (146 lb 2.6 oz) 48.4 kg (106 lb 11.2 oz) 67 kg (147 lb 11.3 oz)            Intake/Output Summary (Last 24 hours) at 1/17/2025 1142  Last data filed at 1/17/2025 0944  Gross per 24 hour   Intake 300 ml   Output 775 ml   Net -475 ml     Recent Results (from the past 24 hours)   POCT GLUCOSE    Collection Time: 01/16/25 11:48 AM   Result Value Ref Range    POCT Glucose 123 (H) 74 - 99 mg/dL   POCT GLUCOSE    Collection Time: 01/16/25  5:47 PM   Result Value Ref Range    POCT Glucose 155 (H) 74 - 99 mg/dL   CBC    Collection Time: 01/16/25  7:24 PM   Result Value Ref Range    WBC 4.4 4.4 - 11.3 x10*3/uL    nRBC 0.0 0.0 - 0.0 /100 WBCs     RBC 3.49 (L) 4.50 - 5.90 x10*6/uL    Hemoglobin 10.1 (L) 13.5 - 17.5 g/dL    Hematocrit 30.3 (L) 41.0 - 52.0 %    MCV 87 80 - 100 fL    MCH 28.9 26.0 - 34.0 pg    MCHC 33.3 32.0 - 36.0 g/dL    RDW 15.0 (H) 11.5 - 14.5 %    Platelets 122 (L) 150 - 450 x10*3/uL   Renal Function Panel    Collection Time: 01/16/25  7:24 PM   Result Value Ref Range    Glucose 164 (H) 74 - 99 mg/dL    Sodium 138 136 - 145 mmol/L    Potassium 4.0 3.5 - 5.3 mmol/L    Chloride 105 98 - 107 mmol/L    Bicarbonate 24 21 - 32 mmol/L    Anion Gap 13 10 - 20 mmol/L    Urea Nitrogen 20 6 - 23 mg/dL    Creatinine 1.62 (H) 0.50 - 1.30 mg/dL    eGFR 43 (L) >60 mL/min/1.73m*2    Calcium 8.8 8.6 - 10.6 mg/dL    Phosphorus 3.2 2.5 - 4.9 mg/dL    Albumin 3.6 3.4 - 5.0 g/dL   Magnesium    Collection Time: 01/16/25  7:24 PM   Result Value Ref Range    Magnesium 2.08 1.60 - 2.40 mg/dL   POCT GLUCOSE    Collection Time: 01/16/25  9:00 PM   Result Value Ref Range    POCT Glucose 236 (H) 74 - 99 mg/dL   POCT GLUCOSE    Collection Time: 01/17/25  6:47 AM   Result Value Ref Range    POCT Glucose 185 (H) 74 - 99 mg/dL   Troponin I, High Sensitivity    Collection Time: 01/17/25 10:17 AM   Result Value Ref Range    Troponin I, High Sensitivity (CMC) 56 (H) 0 - 53 ng/L   POCT GLUCOSE    Collection Time: 01/17/25 11:31 AM   Result Value Ref Range    POCT Glucose 213 (H) 74 - 99 mg/dL     Inpatient Medications:  Scheduled medications   Medication Dose Route Frequency    amLODIPine  5 mg oral Daily    aspirin  81 mg oral Daily    atorvastatin  40 mg oral Nightly    busPIRone  7.5 mg oral BID    cephalexin  500 mg oral q8h JENNIFER    insulin glargine  12 Units subcutaneous Nightly    insulin lispro  0-5 Units subcutaneous TID AC    isosorbide dinitrate  10 mg oral TID    levothyroxine  50 mcg oral Daily    metoprolol succinate XL  25 mg oral Daily    mirtazapine  15 mg oral Nightly    prasugrel  10 mg oral Daily    ranolazine  500 mg oral BID    [Held by provider]  spironolactone  25 mg oral q AM     PRN medications   Medication    acetaminophen    dextrose    dextrose    glucagon    glucagon    heparin    nitroglycerin     Continuous Medications   Medication Dose Last Rate    heparin  0-4,000 Units/hr 800 Units/hr (01/17/25 1038)     Procedures/testing:  TRANSTHORACIC ECHO (TTE) COMPLETE 1/14/2025   1. Poorly visualized anatomical structures due to suboptimal image quality.   2. Left ventricular cavity size is mild to moderately dilated.   3. Left ventricular ejection fraction is mildly decreased, by visual estimate at 45-50%.   4. Basal and mid inferior wall is abnormal.   5. Spectral Doppler shows a Grade I (impaired relaxation pattern) of left ventricular diastolic filling with normal left atrial filling pressure.   6. Abnormal septal motion consistent with post-operative status.   7. There is mildly reduced right ventricular systolic function.   8. The left atrium is enlarged.   9. Atrial septal aneurysm present.  10. There is no evidence of a patent foramen ovale.    CT ANGIO CHEST ABDOMEN PELVIS; 1/13/2025   CHEST  1.  No evidence of aortic aneurysm, dissection, or acute intramural  hematoma. Moderate calcified and noncalcified atherosclerotic plaque  of the thoracic and abdominal aorta. Patent left renal artery stent  and partially visualized bilateral superficial femoral artery stents.  Sternotomy changes. Coronary artery atherosclerotic calcifications.  2. No evidence of consolidation or pleural effusion. Moderate  centrilobular and paraseptal emphysema as well as nonspecific  pulmonary fibrotic changes. Asymmetric scarring in the left lung apex  is new since 2010 and consider follow-up chest CT in 6 months to  confirm stability.  ABDOMEN - PELVIS  1.  No acute abnormality in the abdomen/pelvis. Mild bilateral renal  cortical thinning.  2. Unchanged chronic compression deformity of L4 vertebral body.    Left Heart Cath 12/6/2024   1. Severe three-vessel coronary  artery disease.   2. Patent LIMA to LAD but after the anastomosis there is moderate tubular lesion that has been stable for long time.   3. Distal left main of 90% that is giving flow to midsize ramus. Has been stable for many years.   4. Severe diffuse right coronary artery disease was 2 areas of underexpanded stents and severe in-stent restenosis of 90% in the midsegment.   5. Balloon angioplasty was attempted we were unable to expand the mid RCA stent. There was still significant in-stent restenosis of 50% after balloon angioplasty. Improved from 99%. CIERRA-3 flow.   6. Medical management for now.    Telemetry 1/17/2025 (personally reviewed): SR BBB with Mobitz type I HR 60-70s during wake windows, notable nocturnal bradycardia HR 30s    Physical exam:  General: well appearing, mild distress, pleasant  HEENT: NCAT, MMM, PERRL  CV: RRR, no m/r/g  PULM: CTAB, non-labored respirations   ABD: soft, NT, ND, + bowel sounds   EXT: LLE BKA, small ulcer on the posterior aspect of L thigh without drainage, no edema in the RLE   SKIN: no rashes noted   NEURO: A&Ox4, no gross motor or sensory deficits, normal gait  PSYCH: pleasant mood, appropriate affect      Assessment/Plan   Chavez Llamas is a 79yM with PMHx of CAD s/p CABG 1990s (LIMA to LAD and SVG to unknown?) and multiple PCIs (most recent [facility] 12/2024), HFmrEF (40-45% 12/4/2024), HTN, T2DM (A1C 5.5 12/5/2025), PAD s/p left BKA who presented to [facility] 1/13 in the setting of nausea, vomiting, chest pain, and wound on LLE. Found to have NSTEMI s/p heparin gtt and transferred to  for high risk PCI.     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% 12/4/2024)  - CABG with LIMA to the LAD patent, SVG to an unknown vessel chronically occluded; S/p multiple PCIs with 5 stents placed to prox and mid RCA with severe ISR  - [facility] 12/2024- severe 3v disease, patent LIMA-LAD but mod tubular lesion after the anastomosis (stable for many years), distal  LM of 90% giving flow to ramus (stable), severe diffuse RCA disease with severe midsegment ISR, s/p balloon angioplasty of RCA ISR (stenosis improved to 50% from 99%) with CIERRA 3 flow  - TTE notable for EF 45-50%, LV cavity mod dilated, basal and mid inferior wall abnormal, enlarged left atrium  - episode of chest pain (rating 2/10) and nausea this morning. ECG without significant change from previous.  - Trop peak 212 on 1/14 -> repeat 1/17 HS trop 56  - PCI with Dr. Weston rescheduled for Monday 1/20  - C/w ASA 81 mg, pasugrel 10mg, atorvastatin 40mg  - s/p heparin gtt dc'd after 48 hr on 1/15--> restart heparin gtt this morning iso recurrent chest pain  - start Isordil 10mg TID for antianginal agent  - Continue home amlodipine 5mg, metoprolol succinate 25mg  - Continue ranolazine 500mg bid (started at OSH)   - Holding spironolactone 25mg iso TEODORO      TEODORO on CKD3a  - Baseline Cr 1.3?  - Nephrology consulted at OSH, feel TEODORO iso contrast. No hydronephrosis on CT 1/13  - Cr trend: 1.62(1.79, 1.78, 1.22, 1.7, 1.59, 1.18)  - FeNa 1.1%  - UA neg  - Renally dose meds and avoid nephrotoxins   - s/p 500cc LR bolus 1/15     Emphysema on CTA  - No PFTs on file   - No home inhalers   - Outpatient repeat CT in 6 months to follow up assymetric L lung scarring      Chronic anemia (baseline Hgb ~10)  Chronic thrombocytopenia (baseline 110)  - CTM     T2DM (A1c 5.5% 12/2024)   Hypothyroidism  - Lantus 12U and SSI  - Hypoglycemia protocol  - Continue synthroid 50 mcg      Concern for LLE cellulitis   - XR knee 1/13/2025: No acute findings status post left below-the-knee amputation.  - Vascular US LE: No evidence of deep venous thrombus in the evaluated veins of the left leg from the inguinal ligament to the popliteal fossa.  - blood cx 1/13 NGTD x3 days  - wound cx 1/13 negative  - s/p Vanc/Zosyn (1/13)  - Continue Keflex x5d (1/13-1/17)  - recommend orthotics appointment as an outpatient for prosthetic fitting, suspect  prosthesis not fitting properly following recent weight loss    DVT ppx: heparin gtt  DISPO: pending PCI with Dr. Weston 1/20  Code status: Full Code    Patient seen and discussed with Dr. Lynch.    Vikas Perera PA-C

## 2025-01-17 NOTE — CARE PLAN
"    The clinical goals for the shift include Pat will have no worsening of chest pain    Over the shift, the patient Complained of chest \"heaviness\" and headache on the left side of his head. EKG and trop was done. Restarted on Hep Drip.   Patient's S/S improved.       Problem: Skin  Goal: Decreased wound size/increased tissue granulation at next dressing change  Outcome: Progressing  Flowsheets (Taken 1/17/2025 1740)  Decreased wound size/increased tissue granulation at next dressing change:   Promote sleep for wound healing   Protective dressings over bony prominences  Goal: Participates in plan/prevention/treatment measures  Outcome: Progressing  Flowsheets (Taken 1/17/2025 1740)  Participates in plan/prevention/treatment measures:   Discuss with provider PT/OT consult   Elevate heels  Goal: Prevent/manage excess moisture  Outcome: Progressing  Flowsheets (Taken 1/17/2025 1740)  Prevent/manage excess moisture:   Moisturize dry skin   Follow provider orders for dressing changes   Monitor for/manage infection if present   Cleanse incontinence/protect with barrier cream  Goal: Prevent/minimize sheer/friction injuries  Outcome: Progressing  Flowsheets (Taken 1/17/2025 1740)  Prevent/minimize sheer/friction injuries:   Complete micro-shifts as needed if patient unable. Adjust patient position to relieve pressure points, not a full turn   Use pull sheet   HOB 30 degrees or less  Goal: Promote/optimize nutrition  Outcome: Progressing  Flowsheets (Taken 1/17/2025 1740)  Promote/optimize nutrition:   Assist with feeding   Consume > 50% meals/supplements   Discuss with provider if NPO > 2 days   Monitor/record intake including meals   Offer water/supplements/favorite foods  Goal: Promote skin healing  Outcome: Progressing  Flowsheets (Taken 1/17/2025 1740)  Promote skin healing:   Assess skin/pad under line(s)/device(s)   Protective dressings over bony prominences     Problem: Fall/Injury  Goal: Not fall by end of " shift  Outcome: Progressing  Goal: Be free from injury by end of the shift  Outcome: Progressing  Goal: Verbalize understanding of personal risk factors for fall in the hospital  Outcome: Progressing  Goal: Verbalize understanding of risk factor reduction measures to prevent injury from fall in the home  Outcome: Progressing  Goal: Use assistive devices by end of the shift  Outcome: Progressing  Goal: Pace activities to prevent fatigue by end of the shift  Outcome: Progressing     Problem: Pain  Goal: Takes deep breaths with improved pain control throughout the shift  Outcome: Progressing  Goal: Turns in bed with improved pain control throughout the shift  Outcome: Progressing  Goal: Walks with improved pain control throughout the shift  Outcome: Progressing  Goal: Performs ADL's with improved pain control throughout shift  Outcome: Progressing  Goal: Participates in PT with improved pain control throughout the shift  Outcome: Progressing  Goal: Free from opioid side effects throughout the shift  Outcome: Progressing  Goal: Free from acute confusion related to pain meds throughout the shift  Outcome: Progressing

## 2025-01-17 NOTE — PROGRESS NOTES
[facility] Attending Shared Visit Note    This is a shared visit. Please see Advanced Practice Provider's encounter note for additional details.        Overnight events/Subjective: No major events    Exam:   Physical exam: Well appearing, no distress. Normal rate, regular rhythm, non labored breathing, clear to auscultation, abdomen non distended, left BKA, no focal neuro deficits.     A/P:   79 M with CAD s/p CABG (1990s, LIMA-LAD, SVG - ?), prior PCIs, HFmrEF (45-50%), PAD s/p left BKA who initially presented to [facility] on 1/13 for an NSTEMI. Cath showed LAD obstruction after LIMA touchdown and ISR of the RCA s/p incomplete RCA revascularization.   #CAD with NSTEMI this admnission: plan for PCI to the RCA, continue ASA, prasugrel, atorvastatin     Dispo: pending PCI    Yusuf Lynch MD    ________________________________________________________________________________  Problems:   Assessment & Plan  CAD, multiple vessel    Elevated troponin      Objective   Admit Date: 1/14/2025  Hospital Length of Stay: 2   Home: [facility] [id number]    Vitals:      1/16/2025     8:00 PM 1/16/2025     3:38 PM 1/16/2025    12:00 PM 1/16/2025     8:00 AM 1/16/2025     3:58 AM 1/16/2025    12:12 AM 1/15/2025    11:04 PM   Vitals   Systolic 114 128 125 118 125 110 112   Diastolic 64 70 59 64 71 53 61   BP Location  Left arm Left arm Left arm Left arm Left arm Left arm   Heart Rate 67 62 78 61 71 62 70   Temp 36.7 °C (98.1 °F) 36 °C (96.8 °F) 35.8 °C (96.4 °F) 35.8 °C (96.4 °F) 36.1 °C (97 °F) 36.3 °C (97.3 °F) 36.6 °C (97.9 °F)   Resp 18 20 18 18 19 18 18   Weight (lb)     106.7     BMI     16.22 kg/m2     BSA (m2)     1.52 m2       Wt Readings from Last 5 Encounters:   01/16/25 48.4 kg (106 lb 11.2 oz)   01/13/25 68 kg (150 lb)   12/07/24 68.1 kg (150 lb 2.1 oz)   06/11/24 68 kg (150 lb)   12/11/23 68 kg (150 lb)       Intake/Output Summary (Last 24 hours) at 1/16/2025 2210  Last data filed at 1/16/2025 2143  Gross per 24 hour    Intake 240 ml   Output 475 ml   Net -235 ml         MEDICATIONS  Infusions:     Scheduled:  amLODIPine, 5 mg, Daily  aspirin, 81 mg, Daily  atorvastatin, 40 mg, Nightly  busPIRone, 7.5 mg, BID  cephalexin, 500 mg, q8h JENNIFER  insulin glargine, 12 Units, Nightly  insulin lispro, 0-5 Units, TID AC  levothyroxine, 50 mcg, Daily  metoprolol succinate XL, 25 mg, Daily  mirtazapine, 15 mg, Nightly  prasugrel, 10 mg, Daily  ranolazine, 500 mg, BID  [Held by provider] spironolactone, 25 mg, q AM      PRN:  acetaminophen, 650 mg, q6h PRN  dextrose, 12.5 g, q15 min PRN  dextrose, 25 g, q15 min PRN  glucagon, 1 mg, q15 min PRN  glucagon, 1 mg, q15 min PRN      Prior to Admission Meds:  Medications Prior to Admission   Medication Sig Dispense Refill Last Dose/Taking    amLODIPine (Norvasc) 5 mg tablet Take 1 tablet (5 mg) by mouth once daily. 30 tablet 0     aspirin 81 mg EC tablet Take 1 tablet (81 mg) by mouth once daily. 90 tablet 0     atorvastatin (Lipitor) 40 mg tablet Take 1 tablet (40 mg) by mouth once daily at bedtime. 90 tablet 0     busPIRone (Buspar) 15 mg tablet TAKE 1/2 (ONE-HALF) TABLET BY MOUTH IN THE MORNING AND AT BEDTIME 90 tablet 0     dicyclomine (Bentyl) 10 mg capsule TAKE 1 CAPSULE BY MOUTH TWICE DAILY AS NEEDED FOR  IBS (Patient not taking: Reported on 1/13/2025) 180 capsule 1     hydrOXYzine pamoate (Vistaril) 25 mg capsule Take 1 capsule (25 mg) by mouth 3 times a day as needed for anxiety. (Patient taking differently: Take 1 capsule (25 mg) by mouth as needed at bedtime for anxiety.) 30 capsule 0     insulin lispro (HumaLOG) 100 unit/mL injection Inject 10 Units under the skin early in the morning.. Inject 10 units subcutaneous daily.  Please check blood glucose level before injection. (Patient taking differently: Inject 5 Units under the skin once daily. Take with food. Please check blood glucose level before injection)       lancets (BD Ultra Fine Lancets) 33 gauge misc Testing T.I.D 100 each 3      "Lantus Solostar U-100 Insulin 100 unit/mL (3 mL) pen INJECT 20 UNITS SUBCUTANEOUSLY TWICE DAILY. (Patient taking differently: Inject 20 Units under the skin once daily.) 36 mL 0     levothyroxine (Synthroid, Levoxyl) 50 mcg tablet TAKE 1 TABLET BY MOUTH ONCE DAILY IN THE MORNING BEFORE MEAL(S) ON AN EMPTY STOMACH 90 tablet 2     lubricating eye drops ophthalmic solution Administer 1 drop into both eyes if needed for dry eyes or other.       metoprolol succinate XL (Toprol-XL) 25 mg 24 hr tablet Take 1 tablet (25 mg) by mouth once daily. 90 tablet 3     mirtazapine (Remeron) 15 mg tablet TAKE 1 TABLET BY MOUTH ONCE DAILY AT BEDTIME 90 tablet 0     pen needle, diabetic 31 gauge x 3/16\" needle Use as directed 100 each 3     prasugrel (Effient) 10 mg tablet Take 1 tablet (10 mg) by mouth once daily. 90 tablet 0     spironolactone (Aldactone) 25 mg tablet Take 1 tablet (25 mg) by mouth once daily in the morning.          DATA:  CMP:  Recent Labs     01/16/25  1924 01/15/25  1306 01/15/25  0333 01/14/25  2329 01/14/25  1859 01/14/25  0616 01/13/25  1120 12/10/24  0637 12/05/24  0505 12/04/24  1623    140 137 135* 137 137 136 136   < > 136   K 4.0 4.5 4.1 4.3 4.5 4.1 4.4 3.8   < > 5.1    107 106 106 104 106 104 104   < > 98   CO2 24 19* 23 14* 23 25 25 26   < > 22   ANIONGAP 13 19 12 19 15 10 11 10   < > 21*   BUN 20 18 20 15 18 18 17 29*   < > 24*   CREATININE 1.62* 1.79* 1.78* 1.22 1.70* 1.59* 1.18 1.37*   < > 1.46*   EGFR 43* 38* 38* 60* 40* 44* 62 52*   < > 48*   MG 2.08 2.13 1.89  --  1.97  --  1.77  --   --  1.87    < > = values in this interval not displayed.     Recent Labs     01/16/25  1924 01/15/25  1306 01/15/25  0333 01/14/25  2329 01/14/25  1859 01/13/25  1120 12/06/24  0455 12/05/24  0505 12/04/24  1623 06/11/24  0929 06/11/24  0929 06/05/23  1002 09/21/22  0832 02/18/22  1433 02/18/22  0340   ALBUMIN 3.6 3.3* 3.3* 2.5* 3.9 4.2 3.2* 3.5 3.9   < > 3.5 4.0 3.5   < > 3.0*   ALT  --   --   --   --  " 11 12 20 18 14  --  9* 24 42   < > 10   AST  --   --   --   --  10 13 44* 36 12  --  10 17 22   < > 11   BILITOT  --   --   --   --  1.5* 1.8* 1.4* 1.1 1.7*  --  1.3* 1.1 0.7   < > 0.9   LIPASE  --   --   --   --   --  11  --   --   --   --   --   --   --   --  16    < > = values in this interval not displayed.     CBC:  Recent Labs     01/16/25  1924 01/16/25  1007 01/15/25  0333 01/14/25  1859 01/14/25  0616 01/13/25  1120 12/10/24  0637 12/09/24  0439   WBC 4.4 5.1 4.4 5.3 4.5 5.7 5.1 4.5   HGB 10.1* 9.6* 9.6* 10.6* 9.6* 11.4* 9.5* 9.5*   HCT 30.3* 28.6* 28.7* 32.2* 28.2* 34.3* 27.9* 27.1*   * 123* 116* 131* 102* 128* 115* 108*   MCV 87 86 87 88 86 86 85 82     COAG:   Recent Labs     01/15/25  1306 01/15/25  0812 01/15/25  0334 01/14/25 2021 02/22/22  1448 02/18/22  1433 02/08/22  1025 03/13/21  0126 12/01/20  1416   INR  --   --   --  1.2* 1.1 1.3* 1.1 1.1 1.0   HAUF 0.2 0.2 0.3 <0.1  --   --   --   --   --      ABO:   Recent Labs     01/15/25  0333   ABO O     HEME/ENDO:   Recent Labs     12/05/24  0505 06/11/24  0929 06/05/23  1002 06/01/22  1454 02/23/22  0515 02/09/22  0438 02/01/22  0525 09/03/21  0850 05/28/21  0853 05/20/21  0517   FERRITIN  --   --   --   --   --   --   --   --   --  490*   IRONSAT  --   --   --   --   --  27.1  --   --   --  15.2   TSH  --  4.44* 5.98* 2.50 3.22  --  4.61*  --  5.20*  --    FREET4  --  1.26 1.03  --   --   --  1.2  --  1.09  --    HGBA1C 5.5 5.3 6.6* 4.2  --   --  7.2*   < >  --  6.0    < > = values in this interval not displayed.     CARDIAC:   Recent Labs     01/14/25  2327 01/14/25  1859 01/13/25  1401 01/13/25  1120 12/07/24  0417 12/06/24  0455 12/05/24  0505 12/04/24  2347 12/04/24  1858 12/04/24  1623 12/01/20  1416   CKMB  --   --   --   --   --   --   --   --   --   --  8.5*   TROPHS 170* 212* 66* 17 2,068* 3,272* 4,799* 192*   < > 24*  --    BNP  --  275*  --  375*  --   --   --   --   --  848*  --     < > = values in this interval not displayed.  "    Recent Labs     01/15/25  1306 12/04/24  2347 06/11/24  0929 06/05/23  1002 09/21/22  0832 02/23/22  0515 09/03/21  0850   CHOL 63 80 60 79 73   < > 58   LDLF  --   --   --  27 32  --  20   LDLCALC 21 34 23  --   --    < >  --    HDL 24.8 27.9 18.1 32.4* 28.4*   < > 23.1*   TRIG 84 92 93 97 64   < > 77    < > = values in this interval not displayed.     TOX:No results for input(s): \"AMPHETAMINE\", \"BENZO\", \"CANNABINOID\", \"COCAI\", \"FENTANYL\", \"OPIATE\", \"OXYCODONE\", \"PCP\" in the last 82969 hours.    No lab exists for component: \"BARBSCRUR\"  MICRO:   Recent Labs     01/31/22  1126   CRP 15.3*     Susceptibility data from last 90 days.  Collected Specimen Info Organism   01/13/25 Tissue/Biopsy from Skin/Superficial Abscess Mixed Skin Microorganisms        FOLLOWUP: No future appointments.        "

## 2025-01-18 LAB
ALBUMIN SERPL BCP-MCNC: 3.3 G/DL (ref 3.4–5)
ANION GAP SERPL CALC-SCNC: 15 MMOL/L (ref 10–20)
BUN SERPL-MCNC: 23 MG/DL (ref 6–23)
CALCIUM SERPL-MCNC: 8.8 MG/DL (ref 8.6–10.6)
CHLORIDE SERPL-SCNC: 104 MMOL/L (ref 98–107)
CO2 SERPL-SCNC: 22 MMOL/L (ref 21–32)
CREAT SERPL-MCNC: 1.46 MG/DL (ref 0.5–1.3)
EGFRCR SERPLBLD CKD-EPI 2021: 48 ML/MIN/1.73M*2
ERYTHROCYTE [DISTWIDTH] IN BLOOD BY AUTOMATED COUNT: 15.1 % (ref 11.5–14.5)
GLUCOSE BLD MANUAL STRIP-MCNC: 132 MG/DL (ref 74–99)
GLUCOSE BLD MANUAL STRIP-MCNC: 151 MG/DL (ref 74–99)
GLUCOSE BLD MANUAL STRIP-MCNC: 171 MG/DL (ref 74–99)
GLUCOSE BLD MANUAL STRIP-MCNC: 180 MG/DL (ref 74–99)
GLUCOSE SERPL-MCNC: 158 MG/DL (ref 74–99)
HCT VFR BLD AUTO: 31 % (ref 41–52)
HGB BLD-MCNC: 9.5 G/DL (ref 13.5–17.5)
LPA SERPL-MCNC: 56 MG/DL
MAGNESIUM SERPL-MCNC: 2.07 MG/DL (ref 1.6–2.4)
MCH RBC QN AUTO: 28.4 PG (ref 26–34)
MCHC RBC AUTO-ENTMCNC: 30.6 G/DL (ref 32–36)
MCV RBC AUTO: 93 FL (ref 80–100)
NRBC BLD-RTO: 0 /100 WBCS (ref 0–0)
PHOSPHATE SERPL-MCNC: 4.1 MG/DL (ref 2.5–4.9)
PLATELET # BLD AUTO: 123 X10*3/UL (ref 150–450)
POTASSIUM SERPL-SCNC: 4.2 MMOL/L (ref 3.5–5.3)
RBC # BLD AUTO: 3.34 X10*6/UL (ref 4.5–5.9)
SODIUM SERPL-SCNC: 137 MMOL/L (ref 136–145)
UFH PPP CHRO-ACNC: 0.2 IU/ML
UFH PPP CHRO-ACNC: 0.4 IU/ML
UFH PPP CHRO-ACNC: 0.4 IU/ML
WBC # BLD AUTO: 4 X10*3/UL (ref 4.4–11.3)

## 2025-01-18 PROCEDURE — 36415 COLL VENOUS BLD VENIPUNCTURE: CPT

## 2025-01-18 PROCEDURE — 83735 ASSAY OF MAGNESIUM: CPT | Mod: PARLAB

## 2025-01-18 PROCEDURE — 82947 ASSAY GLUCOSE BLOOD QUANT: CPT

## 2025-01-18 PROCEDURE — 2500000002 HC RX 250 W HCPCS SELF ADMINISTERED DRUGS (ALT 637 FOR MEDICARE OP, ALT 636 FOR OP/ED)

## 2025-01-18 PROCEDURE — 99232 SBSQ HOSP IP/OBS MODERATE 35: CPT | Performed by: INTERNAL MEDICINE

## 2025-01-18 PROCEDURE — 80069 RENAL FUNCTION PANEL: CPT | Mod: PARLAB

## 2025-01-18 PROCEDURE — 85027 COMPLETE CBC AUTOMATED: CPT | Mod: PARLAB

## 2025-01-18 PROCEDURE — 2500000001 HC RX 250 WO HCPCS SELF ADMINISTERED DRUGS (ALT 637 FOR MEDICARE OP)

## 2025-01-18 PROCEDURE — 85520 HEPARIN ASSAY: CPT

## 2025-01-18 PROCEDURE — 2500000004 HC RX 250 GENERAL PHARMACY W/ HCPCS (ALT 636 FOR OP/ED)

## 2025-01-18 PROCEDURE — 1100000001 HC PRIVATE ROOM DAILY

## 2025-01-18 RX ADMIN — BUSPIRONE HYDROCHLORIDE 7.5 MG: 15 TABLET ORAL at 21:51

## 2025-01-18 RX ADMIN — HEPARIN SODIUM 900 UNITS/HR: 10000 INJECTION, SOLUTION INTRAVENOUS at 12:33

## 2025-01-18 RX ADMIN — AMLODIPINE BESYLATE 5 MG: 5 TABLET ORAL at 09:29

## 2025-01-18 RX ADMIN — LEVOTHYROXINE SODIUM 50 MCG: 0.05 TABLET ORAL at 05:41

## 2025-01-18 RX ADMIN — INSULIN LISPRO 1 UNITS: 100 INJECTION, SOLUTION INTRAVENOUS; SUBCUTANEOUS at 17:52

## 2025-01-18 RX ADMIN — INSULIN LISPRO 1 UNITS: 100 INJECTION, SOLUTION INTRAVENOUS; SUBCUTANEOUS at 12:48

## 2025-01-18 RX ADMIN — METOPROLOL SUCCINATE 25 MG: 25 TABLET, EXTENDED RELEASE ORAL at 09:31

## 2025-01-18 RX ADMIN — PRASUGREL 10 MG: 10 TABLET, FILM COATED ORAL at 09:30

## 2025-01-18 RX ADMIN — RANOLAZINE 500 MG: 500 TABLET, EXTENDED RELEASE ORAL at 09:31

## 2025-01-18 RX ADMIN — ISOSORBIDE DINITRATE 10 MG: 10 TABLET ORAL at 14:28

## 2025-01-18 RX ADMIN — ISOSORBIDE DINITRATE 10 MG: 10 TABLET ORAL at 09:30

## 2025-01-18 RX ADMIN — ISOSORBIDE DINITRATE 10 MG: 10 TABLET ORAL at 18:01

## 2025-01-18 RX ADMIN — MIRTAZAPINE 15 MG: 15 TABLET, FILM COATED ORAL at 21:51

## 2025-01-18 RX ADMIN — ATORVASTATIN CALCIUM 40 MG: 40 TABLET, FILM COATED ORAL at 21:51

## 2025-01-18 RX ADMIN — BUSPIRONE HYDROCHLORIDE 7.5 MG: 15 TABLET ORAL at 09:31

## 2025-01-18 RX ADMIN — ASPIRIN 81 MG: 81 TABLET, COATED ORAL at 09:29

## 2025-01-18 RX ADMIN — RANOLAZINE 500 MG: 500 TABLET, EXTENDED RELEASE ORAL at 21:51

## 2025-01-18 ASSESSMENT — COGNITIVE AND FUNCTIONAL STATUS - GENERAL
WALKING IN HOSPITAL ROOM: A LOT
WALKING IN HOSPITAL ROOM: A LOT
MOBILITY SCORE: 18
STANDING UP FROM CHAIR USING ARMS: A LITTLE
TOILETING: A LITTLE
STANDING UP FROM CHAIR USING ARMS: A LITTLE
MOVING TO AND FROM BED TO CHAIR: A LITTLE
HELP NEEDED FOR BATHING: A LITTLE
MOBILITY SCORE: 18
DAILY ACTIVITIY SCORE: 22
WALKING IN HOSPITAL ROOM: A LOT
STANDING UP FROM CHAIR USING ARMS: A LITTLE
MOVING TO AND FROM BED TO CHAIR: A LITTLE
STANDING UP FROM CHAIR USING ARMS: A LITTLE
TOILETING: A LITTLE
DAILY ACTIVITIY SCORE: 22
DAILY ACTIVITIY SCORE: 22
TOILETING: A LITTLE
MOVING TO AND FROM BED TO CHAIR: A LITTLE
CLIMB 3 TO 5 STEPS WITH RAILING: A LOT
HELP NEEDED FOR BATHING: A LITTLE
HELP NEEDED FOR BATHING: A LITTLE
MOBILITY SCORE: 18
DAILY ACTIVITIY SCORE: 22
HELP NEEDED FOR BATHING: A LITTLE
MOVING TO AND FROM BED TO CHAIR: A LITTLE
HELP NEEDED FOR BATHING: A LITTLE
WALKING IN HOSPITAL ROOM: A LOT
TOILETING: A LITTLE
WALKING IN HOSPITAL ROOM: A LOT
WALKING IN HOSPITAL ROOM: A LOT
MOBILITY SCORE: 18
HELP NEEDED FOR BATHING: A LITTLE
MOVING TO AND FROM BED TO CHAIR: A LITTLE
CLIMB 3 TO 5 STEPS WITH RAILING: A LOT
TOILETING: A LITTLE
MOBILITY SCORE: 18
WALKING IN HOSPITAL ROOM: A LOT
TOILETING: A LITTLE
STANDING UP FROM CHAIR USING ARMS: A LITTLE
CLIMB 3 TO 5 STEPS WITH RAILING: A LOT
HELP NEEDED FOR BATHING: A LITTLE
DAILY ACTIVITIY SCORE: 22
MOVING TO AND FROM BED TO CHAIR: A LITTLE
MOVING TO AND FROM BED TO CHAIR: A LITTLE
STANDING UP FROM CHAIR USING ARMS: A LITTLE
STANDING UP FROM CHAIR USING ARMS: A LITTLE
CLIMB 3 TO 5 STEPS WITH RAILING: A LOT
TOILETING: A LITTLE
CLIMB 3 TO 5 STEPS WITH RAILING: A LOT

## 2025-01-18 ASSESSMENT — PAIN SCALES - GENERAL
PAINLEVEL_OUTOF10: 0 - NO PAIN

## 2025-01-18 ASSESSMENT — PAIN - FUNCTIONAL ASSESSMENT
PAIN_FUNCTIONAL_ASSESSMENT: 0-10
PAIN_FUNCTIONAL_ASSESSMENT: 0-10

## 2025-01-18 NOTE — HOSPITAL COURSE
Chavez Llamas is a 79yM with PMHx of CAD s/p CABG 1990s (LIMA to LAD and SVG to unknown?) and multiple PCIs (most recent [facility] 12/2024), HFmrEF (40-45% 12/4/2024), HTN, T2DM (A1C 5.5 12/5/2025), PAD s/p left BKA who presented to [facility] 1/13 in the setting of nausea, vomiting, chest pain, and wound on LLE. Found to have NSTEMI and transferred to [facility] for high risk PCI.      Recently admitted at [facility] from 12/4/24-12/10/24 with NSTEMI. Underwent cardiac catheterization which showed moderate obstruction in the LAD after his LIMA graft, and severe in-stent restenosis of his right coronary artery at the vessel and a difficult intervention resulted in PTCA of the 90% mid right coronary obstruction reduced to 50 to 70%, but no additional stenting was possible due to the inability to negotiate the previously stented mid RCA.      On presentation to [facility], reporting complaints of severe substernal chest pain. Per EMS, patient received 1 nitroglycerin, 4 aspirin and BPs decreased from 150 systolic to 70 and patient with complaint of severe headache which resolved with IV fentanyl. At the OSH, labs notable CBC Hgb 9.6, Plts 102. RFP with Cr 1.59. Troponin (17<66), . EKG showing first degree heart block and LBBB similar in comparison to prior EKGs. CTA chest negative for PE and aortic dissection. Started on heparin gtt and ranexa for NSTEMI. Hospital course complicated by TEODORO. Nephrology consulted, suspect due to use of IV contrast. Cardiology was consulted and recommended transfer to [facility] to undergo high risk PCI. Patient was admitted to the CICU for NSTEMI managed with heparin gtt, ASA and statin. Troponin peaked at 212. Plan to undergo high risk PCI on 1/16 and transferred to the floor for further management. Course also c/b concern for LLE cellulitis and was managed with vanc/zosyn at OSH, now transitioned to Keflex (1/13-1/17).     HVI course:  Recurrent episode of chest pain and nausea 1/17, heparin  gtt restarted and Isordil 10mg TID started with resolution of symptoms. HS trop continued to downtrend.    PCI with Dr. Weston 1/20 ***    Discharge weight: *** kg    After all labs and VS were reviewed the decision was made that the patient was medically stable for discharge.  The patient was discharged in satisfactory condition.    More than 60 minutes were spent in coordinating patient discharge.     3/25/25    Anemia (baseline hgb- 10)  Admission hgb 9.6, Hgb trending down since procedure, no signs of bleeding, no changed in stool color. 1/25 Hgb  7.8. Will need to recheck CBC in 1 week.    Inpatient stay complicated by urinary retention-  Fulton catheter was replaced after patient failed voiding trial on 1/23. Per urology rec was also started on flomax 0.4 mg. Patient however was not willing to discharge home with indwelling fulton catheter so it was removed on 1/24.  Bladder scans have been continued and patient has been voiding successfully in range. Will be discharged on tamulosin (flomax) 0.4 mg. Urology follow up scheduled for 3/5/2025.     TEODORO, CKD3a improving. Baseline creat 1.3.  Nephrology from OSH suspects TEODROO due to contrast dye use for testing. 1/25 creat 1.60, admitting Creat 1.18. Will need to have RFP check in 1 week. Due to rising Cr, we elected to hold aldactone, and not initiate ACE/ARB/ ARNI for GDMT for CM. Additionally, not started on SGL2 inhibitor due to need for fulton during admission.     Emphysema and asymmetric scarring in the left lung apex is new since 2010  was found on CTA is was recommended to have a outpatient CT in 6 months to determine stability.     Patients diabetes managed at home with lantus 20 units BID and lispro 5u daily. HgbA1C 5.5 inpatient, lantus reduced to 12 units daily and Lispro Sliding Scale TID AC.    Hypothyroidism stable on synthroid 50 mcg daily.    Cellulitis concern of left low extremity has improved after completion of Keflex x 5 days ( 1/13-1/17). Referral to orthotic recommended for proper prosthetic fitting.      Discharge weight: 66.5 kg    After all labs and VS were reviewed the decision was made that the patient was medically stable for discharge.  The patient was discharged in satisfactory condition.    More than 60 minutes were spent in coordinating patient discharge.

## 2025-01-18 NOTE — CARE PLAN
Problem: Skin  Goal: Prevent/manage excess moisture  Outcome: Progressing     Problem: Skin  Goal: Prevent/minimize sheer/friction injuries  Outcome: Progressing     Problem: Fall/Injury  Goal: Be free from injury by end of the shift  Outcome: Progressing     Problem: Pain  Goal: Turns in bed with improved pain control throughout the shift  Outcome: Progressing       The clinical goals for the shift include Pt will have no c/o of CP thoughout the shift.

## 2025-01-18 NOTE — CARE PLAN
Problem: Skin  Goal: Decreased wound size/increased tissue granulation at next dressing change  1/18/2025 1819 by Angelia Young RN  Outcome: Progressing  Flowsheets (Taken 1/18/2025 1819)  Decreased wound size/increased tissue granulation at next dressing change:   Promote sleep for wound healing   Protective dressings over bony prominences  1/18/2025 1819 by Angelia Young RN  Outcome: Progressing  Flowsheets (Taken 1/18/2025 1819)  Decreased wound size/increased tissue granulation at next dressing change:   Promote sleep for wound healing   Protective dressings over bony prominences  Goal: Participates in plan/prevention/treatment measures  1/18/2025 1819 by Angelia Young RN  Outcome: Progressing  Flowsheets (Taken 1/18/2025 1819)  Participates in plan/prevention/treatment measures:   Discuss with provider PT/OT consult   Elevate heels  1/18/2025 1819 by Angelia Young RN  Outcome: Progressing  Flowsheets (Taken 1/18/2025 1819)  Participates in plan/prevention/treatment measures:   Discuss with provider PT/OT consult   Elevate heels  Goal: Prevent/manage excess moisture  1/18/2025 1819 by Angelia Young RN  Outcome: Progressing  Flowsheets (Taken 1/18/2025 1819)  Prevent/manage excess moisture:   Cleanse incontinence/protect with barrier cream   Monitor for/manage infection if present   Follow provider orders for dressing changes  1/18/2025 1819 by Angelia Young RN  Outcome: Progressing  Flowsheets (Taken 1/18/2025 1819)  Prevent/manage excess moisture:   Cleanse incontinence/protect with barrier cream   Monitor for/manage infection if present   Follow provider orders for dressing changes  Goal: Prevent/minimize sheer/friction injuries  1/18/2025 1819 by Angelia Young RN  Outcome: Progressing  Flowsheets (Taken 1/18/2025 1819)  Prevent/minimize sheer/friction injuries:   Complete micro-shifts as needed if patient unable. Adjust patient position to relieve pressure points, not a full turn   Increase  activity/out of bed for meals   Use pull sheet  1/18/2025 1819 by Angelia Young RN  Outcome: Progressing  Flowsheets (Taken 1/18/2025 1819)  Prevent/minimize sheer/friction injuries:   Complete micro-shifts as needed if patient unable. Adjust patient position to relieve pressure points, not a full turn   Increase activity/out of bed for meals   Use pull sheet  Goal: Promote/optimize nutrition  1/18/2025 1819 by Angelia Young RN  Outcome: Progressing  Flowsheets (Taken 1/18/2025 1819)  Promote/optimize nutrition:   Assist with feeding   Monitor/record intake including meals   Consume > 50% meals/supplements   Offer water/supplements/favorite foods  1/18/2025 1819 by Angelia Young RN  Outcome: Progressing  Flowsheets (Taken 1/18/2025 1819)  Promote/optimize nutrition:   Assist with feeding   Monitor/record intake including meals   Consume > 50% meals/supplements   Offer water/supplements/favorite foods  Goal: Promote skin healing  1/18/2025 1819 by Angelia Young RN  Outcome: Progressing  Flowsheets (Taken 1/18/2025 1819)  Promote skin healing:   Assess skin/pad under line(s)/device(s)   Protective dressings over bony prominences   Turn/reposition every 2 hours/use positioning/transfer devices  1/18/2025 1819 by Angelia Young RN  Outcome: Progressing  Flowsheets (Taken 1/18/2025 1819)  Promote skin healing:   Assess skin/pad under line(s)/device(s)   Protective dressings over bony prominences   Turn/reposition every 2 hours/use positioning/transfer devices     Problem: Fall/Injury  Goal: Not fall by end of shift  1/18/2025 1819 by Angelia Young RN  Outcome: Progressing  1/18/2025 1819 by Angelia Young RN  Outcome: Progressing  Goal: Be free from injury by end of the shift  1/18/2025 1819 by Angelia Young RN  Outcome: Progressing  1/18/2025 1819 by Angelia Young RN  Outcome: Progressing  Goal: Verbalize understanding of personal risk factors for fall in the hospital  1/18/2025 1819 by Angelia Young  RN  Outcome: Progressing  1/18/2025 1819 by Angelia Young, RN  Outcome: Progressing  Goal: Verbalize understanding of risk factor reduction measures to prevent injury from fall in the home  1/18/2025 1819 by Angelia Young, RN  Outcome: Progressing  1/18/2025 1819 by Angelia Young, RN  Outcome: Progressing  Goal: Use assistive devices by end of the shift  1/18/2025 1819 by Angelia Young, RN  Outcome: Progressing  1/18/2025 1819 by Angelia Young, RN  Outcome: Progressing  Goal: Pace activities to prevent fatigue by end of the shift  1/18/2025 1819 by Angelia Young, RN  Outcome: Progressing  1/18/2025 1819 by Angelia Young, RN  Outcome: Progressing     Problem: Pain  Goal: Takes deep breaths with improved pain control throughout the shift  1/18/2025 1819 by Angelia Young, RN  Outcome: Progressing  1/18/2025 1819 by Angelia Young, RN  Outcome: Progressing  Goal: Turns in bed with improved pain control throughout the shift  1/18/2025 1819 by Angelia Young, RN  Outcome: Progressing  1/18/2025 1819 by Angelia Young, RN  Outcome: Progressing  Goal: Walks with improved pain control throughout the shift  1/18/2025 1819 by Angelia Young, RN  Outcome: Progressing  1/18/2025 1819 by Angelia Young, RN  Outcome: Progressing  Goal: Performs ADL's with improved pain control throughout shift  1/18/2025 1819 by Angelia Young, RN  Outcome: Progressing  1/18/2025 1819 by Angelia Young, RN  Outcome: Progressing  Goal: Participates in PT with improved pain control throughout the shift  1/18/2025 1819 by Angelia Young, RN  Outcome: Progressing  1/18/2025 1819 by Angelia Young, RN  Outcome: Progressing  Goal: Free from opioid side effects throughout the shift  1/18/2025 1819 by Angelia Young, RN  Outcome: Progressing  1/18/2025 1819 by Angelia oYung, RN  Outcome: Progressing  Goal: Free from acute confusion related to pain meds throughout the shift  1/18/2025 1819 by Angelia Young, RN  Outcome: Progressing  1/18/2025 1819 by Angelia Young  RN  Outcome: Progressing      The clinical goals for the shift include Patient will be free of CP throughout the shift    NO CP throughout the shift. Hep therapeutic 1 @ 900 units.

## 2025-01-18 NOTE — PROGRESS NOTES
Interval events:    No acute events overnight    Subjective:  Patient reports feeling better this morning with resolution of his chest pressure and nausea from yesterday. He continues to endorse anxiety about upcoming procedure but feels reassured about his care thus far.  He denies any recurrence in headache since Isordil initiation yesterday.    Today in brief:  - PCI with Dr. Weston rescheduled for Monday 1/20  - cont heparin gtt  - cont Isordil 10mg TID    Objective:  Vitals:    01/18/25 0741   BP: 108/56   Pulse: 56   Resp: 18   Temp: 36.1 °C (97 °F)   SpO2: 98%     Weight         1/14/2025  1839 1/15/2025  0600 1/16/2025  0358 1/17/2025  0408 1/18/2025  0504    Weight: 67.8 kg (149 lb 7.6 oz) 66.3 kg (146 lb 2.6 oz) 48.4 kg (106 lb 11.2 oz) 67 kg (147 lb 11.3 oz) 67.6 kg (149 lb 0.5 oz)            Intake/Output Summary (Last 24 hours) at 1/18/2025 0909  Last data filed at 1/18/2025 0543  Gross per 24 hour   Intake 300 ml   Output 525 ml   Net -225 ml     Recent Results (from the past 24 hours)   Troponin I, High Sensitivity    Collection Time: 01/17/25 10:17 AM   Result Value Ref Range    Troponin I, High Sensitivity (CMC) 56 (H) 0 - 53 ng/L   POCT GLUCOSE    Collection Time: 01/17/25 11:31 AM   Result Value Ref Range    POCT Glucose 213 (H) 74 - 99 mg/dL   Heparin Assay, UFH    Collection Time: 01/17/25  2:50 PM   Result Value Ref Range    Heparin Unfractionated 0.4 See Comment Below for Therapeutic Ranges IU/mL   POCT GLUCOSE    Collection Time: 01/17/25  5:48 PM   Result Value Ref Range    POCT Glucose 163 (H) 74 - 99 mg/dL   CBC    Collection Time: 01/17/25  7:03 PM   Result Value Ref Range    WBC 4.6 4.4 - 11.3 x10*3/uL    nRBC 0.0 0.0 - 0.0 /100 WBCs    RBC 3.02 (L) 4.50 - 5.90 x10*6/uL    Hemoglobin 8.7 (L) 13.5 - 17.5 g/dL    Hematocrit 26.2 (L) 41.0 - 52.0 %    MCV 87 80 - 100 fL    MCH 28.8 26.0 - 34.0 pg    MCHC 33.2 32.0 - 36.0 g/dL    RDW 14.9 (H) 11.5 - 14.5 %    Platelets 128 (L) 150 - 450  x10*3/uL   Renal Function Panel    Collection Time: 01/17/25  7:03 PM   Result Value Ref Range    Glucose 180 (H) 74 - 99 mg/dL    Sodium 138 136 - 145 mmol/L    Potassium 4.1 3.5 - 5.3 mmol/L    Chloride 103 98 - 107 mmol/L    Bicarbonate 25 21 - 32 mmol/L    Anion Gap 14 10 - 20 mmol/L    Urea Nitrogen 23 6 - 23 mg/dL    Creatinine 1.53 (H) 0.50 - 1.30 mg/dL    eGFR 46 (L) >60 mL/min/1.73m*2    Calcium 8.9 8.6 - 10.6 mg/dL    Phosphorus 3.8 2.5 - 4.9 mg/dL    Albumin 3.5 3.4 - 5.0 g/dL   Magnesium    Collection Time: 01/17/25  7:03 PM   Result Value Ref Range    Magnesium 1.95 1.60 - 2.40 mg/dL   Heparin Assay, UFH    Collection Time: 01/17/25  7:03 PM   Result Value Ref Range    Heparin Unfractionated 0.3 See Comment Below for Therapeutic Ranges IU/mL   POCT GLUCOSE    Collection Time: 01/17/25  9:00 PM   Result Value Ref Range    POCT Glucose 160 (H) 74 - 99 mg/dL   POCT GLUCOSE    Collection Time: 01/18/25  6:16 AM   Result Value Ref Range    POCT Glucose 132 (H) 74 - 99 mg/dL   Heparin Assay, UFH    Collection Time: 01/18/25  7:24 AM   Result Value Ref Range    Heparin Unfractionated 0.2 See Comment Below for Therapeutic Ranges IU/mL     Inpatient Medications:  Scheduled medications   Medication Dose Route Frequency    amLODIPine  5 mg oral Daily    aspirin  81 mg oral Daily    atorvastatin  40 mg oral Nightly    busPIRone  7.5 mg oral BID    insulin glargine  12 Units subcutaneous Nightly    insulin lispro  0-5 Units subcutaneous TID AC    isosorbide dinitrate  10 mg oral TID    levothyroxine  50 mcg oral Daily    metoprolol succinate XL  25 mg oral Daily    mirtazapine  15 mg oral Nightly    prasugrel  10 mg oral Daily    ranolazine  500 mg oral BID    [Held by provider] spironolactone  25 mg oral q AM     PRN medications   Medication    acetaminophen    dextrose    dextrose    glucagon    glucagon    heparin    nitroglycerin     Continuous Medications   Medication Dose Last Rate    heparin  0-4,000 Units/hr  800 Units/hr (01/17/25 1903)     Procedures/testing:  TRANSTHORACIC ECHO (TTE) COMPLETE 1/14/2025   1. Poorly visualized anatomical structures due to suboptimal image quality.   2. Left ventricular cavity size is mild to moderately dilated.   3. Left ventricular ejection fraction is mildly decreased, by visual estimate at 45-50%.   4. Basal and mid inferior wall is abnormal.   5. Spectral Doppler shows a Grade I (impaired relaxation pattern) of left ventricular diastolic filling with normal left atrial filling pressure.   6. Abnormal septal motion consistent with post-operative status.   7. There is mildly reduced right ventricular systolic function.   8. The left atrium is enlarged.   9. Atrial septal aneurysm present.  10. There is no evidence of a patent foramen ovale.    CT ANGIO CHEST ABDOMEN PELVIS; 1/13/2025   CHEST  1.  No evidence of aortic aneurysm, dissection, or acute intramural  hematoma. Moderate calcified and noncalcified atherosclerotic plaque  of the thoracic and abdominal aorta. Patent left renal artery stent  and partially visualized bilateral superficial femoral artery stents.  Sternotomy changes. Coronary artery atherosclerotic calcifications.  2. No evidence of consolidation or pleural effusion. Moderate  centrilobular and paraseptal emphysema as well as nonspecific  pulmonary fibrotic changes. Asymmetric scarring in the left lung apex  is new since 2010 and consider follow-up chest CT in 6 months to  confirm stability.  ABDOMEN - PELVIS  1.  No acute abnormality in the abdomen/pelvis. Mild bilateral renal  cortical thinning.  2. Unchanged chronic compression deformity of L4 vertebral body.    Left Heart Cath 12/6/2024   1. Severe three-vessel coronary artery disease.   2. Patent LIMA to LAD but after the anastomosis there is moderate tubular lesion that has been stable for long time.   3. Distal left main of 90% that is giving flow to midsize ramus. Has been stable for many years.   4. Severe  diffuse right coronary artery disease was 2 areas of underexpanded stents and severe in-stent restenosis of 90% in the midsegment.   5. Balloon angioplasty was attempted we were unable to expand the mid RCA stent. There was still significant in-stent restenosis of 50% after balloon angioplasty. Improved from 99%. CIERRA-3 flow.   6. Medical management for now.    Telemetry 1/18/2025 (personally reviewed): SR BBB HR 60-70s with MFPVCs     Physical exam:  General: well appearing, NAD, pleasant  HEENT: NCAT, MMM, PERRL  CV: RRR, no m/r/g  PULM: CTAB, non-labored respirations   ABD: soft, NT, ND, + bowel sounds   EXT: LLE BKA, small ulcer on the posterior aspect of L thigh without drainage, no edema in the RLE   SKIN: no rashes noted   NEURO: A&Ox4, no gross motor or sensory deficits, normal gait  PSYCH: pleasant mood, appropriate affect      Assessment/Plan   Chavez Llamas is a 79yM with PMHx of CAD s/p CABG 1990s (LIMA to LAD and SVG to unknown?) and multiple PCIs (most recent [facility] 12/2024), HFmrEF (40-45% 12/4/2024), HTN, T2DM (A1C 5.5 12/5/2025), PAD s/p left BKA who presented to [facility] 1/13 in the setting of nausea, vomiting, chest pain, and wound on LLE. Found to have NSTEMI s/p heparin gtt and transferred to  for high risk PCI.     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% 12/4/2024)  - CABG with LIMA to the LAD patent, SVG to an unknown vessel chronically occluded; S/p multiple PCIs with 5 stents placed to prox and mid RCA with severe ISR  - [facility] 12/2024- severe 3v disease, patent LIMA-LAD but mod tubular lesion after the anastomosis (stable for many years), distal LM of 90% giving flow to ramus (stable), severe diffuse RCA disease with severe midsegment ISR, s/p balloon angioplasty of RCA ISR (stenosis improved to 50% from 99%) with CIERRA 3 flow  - TTE notable for EF 45-50%, LV cavity mod dilated, basal and mid inferior wall abnormal, enlarged left atrium  - 1/17 episode of chest  pain (rating 2/10) and nausea this morning. ECG without significant change from previous.  - Trop peak 212 on 1/14 -> repeat 1/17 HS trop 56  - PCI with Dr. Weston rescheduled for Monday 1/20  - C/w ASA 81 mg, pasugrel 10mg, atorvastatin 40mg  - s/p heparin gtt dc'd after 48 hr on 1/15-->   - 117 restarted heparin gtt iso recurrent chest pain, will continue until PCI Monday  - cont Isordil 10mg TID for antianginal agent  - Continue home amlodipine 5mg, metoprolol succinate 25mg  - Continue ranolazine 500mg bid (started at OSH)   - Holding spironolactone 25mg iso TEODORO      TEODORO on CKD3a  - Baseline Cr 1.3?  - Nephrology consulted at OSH, feel TEODORO iso contrast. No hydronephrosis on CT 1/13  - Cr trend: 1.53(1.62, 1.79, 1.78, 1.22, 1.7, 1.59, 1.18)  - FeNa 1.1%  - UA neg  - Renally dose meds and avoid nephrotoxins   - s/p 500cc LR bolus 1/15     Emphysema on CTA  - No PFTs on file   - No home inhalers   - Outpatient repeat CT in 6 months to follow up assymetric L lung scarring      Chronic anemia (baseline Hgb ~10)  Chronic thrombocytopenia (baseline 110)  - CTM     T2DM (A1c 5.5% 12/2024)   Hypothyroidism  - Lantus 12U and SSI  - Hypoglycemia protocol  - Continue synthroid 50 mcg      Concern for LLE cellulitis   - XR knee 1/13/2025: No acute findings status post left below-the-knee amputation.  - Vascular US LE: No evidence of deep venous thrombus in the evaluated veins of the left leg from the inguinal ligament to the popliteal fossa.  - blood cx 1/13 NGTD x3 days  - wound cx 1/13 negative  - s/p Vanc/Zosyn (1/13)  - completed Keflex x5d (1/13-1/17)  - recommend orthotics appointment as an outpatient for prosthetic fitting, suspect prosthesis not fitting properly following recent weight loss    DVT ppx: heparin gtt  DISPO: pending PCI with Dr. Weston 1/20  Code status: Full Code    Patient seen and discussed with Dr. Anaya.    Vikas Perera PA-C

## 2025-01-19 ENCOUNTER — APPOINTMENT (OUTPATIENT)
Dept: RADIOLOGY | Facility: HOSPITAL | Age: 80
End: 2025-01-19
Payer: MEDICARE

## 2025-01-19 VITALS
RESPIRATION RATE: 18 BRPM | HEART RATE: 102 BPM | TEMPERATURE: 97.7 F | OXYGEN SATURATION: 98 % | SYSTOLIC BLOOD PRESSURE: 102 MMHG | BODY MASS INDEX: 22.49 KG/M2 | DIASTOLIC BLOOD PRESSURE: 57 MMHG | WEIGHT: 148.37 LBS | HEIGHT: 68 IN

## 2025-01-19 LAB
ERYTHROCYTE [DISTWIDTH] IN BLOOD BY AUTOMATED COUNT: 15 % (ref 11.5–14.5)
GLUCOSE BLD MANUAL STRIP-MCNC: 136 MG/DL (ref 74–99)
GLUCOSE BLD MANUAL STRIP-MCNC: 143 MG/DL (ref 74–99)
GLUCOSE BLD MANUAL STRIP-MCNC: 159 MG/DL (ref 74–99)
GLUCOSE BLD MANUAL STRIP-MCNC: 175 MG/DL (ref 74–99)
HCT VFR BLD AUTO: 26.6 % (ref 41–52)
HGB BLD-MCNC: 9 G/DL (ref 13.5–17.5)
MCH RBC QN AUTO: 28.9 PG (ref 26–34)
MCHC RBC AUTO-ENTMCNC: 33.8 G/DL (ref 32–36)
MCV RBC AUTO: 86 FL (ref 80–100)
NRBC BLD-RTO: 0 /100 WBCS (ref 0–0)
PLATELET # BLD AUTO: 111 X10*3/UL (ref 150–450)
RBC # BLD AUTO: 3.11 X10*6/UL (ref 4.5–5.9)
WBC # BLD AUTO: 4.1 X10*3/UL (ref 4.4–11.3)

## 2025-01-19 PROCEDURE — 85027 COMPLETE CBC AUTOMATED: CPT

## 2025-01-19 PROCEDURE — 99232 SBSQ HOSP IP/OBS MODERATE 35: CPT | Performed by: INTERNAL MEDICINE

## 2025-01-19 PROCEDURE — 2500000001 HC RX 250 WO HCPCS SELF ADMINISTERED DRUGS (ALT 637 FOR MEDICARE OP)

## 2025-01-19 PROCEDURE — 36415 COLL VENOUS BLD VENIPUNCTURE: CPT

## 2025-01-19 PROCEDURE — 1100000001 HC PRIVATE ROOM DAILY

## 2025-01-19 PROCEDURE — 2500000002 HC RX 250 W HCPCS SELF ADMINISTERED DRUGS (ALT 637 FOR MEDICARE OP, ALT 636 FOR OP/ED)

## 2025-01-19 PROCEDURE — 83735 ASSAY OF MAGNESIUM: CPT

## 2025-01-19 PROCEDURE — 80069 RENAL FUNCTION PANEL: CPT

## 2025-01-19 PROCEDURE — 74018 RADEX ABDOMEN 1 VIEW: CPT

## 2025-01-19 PROCEDURE — 85520 HEPARIN ASSAY: CPT

## 2025-01-19 PROCEDURE — 82947 ASSAY GLUCOSE BLOOD QUANT: CPT

## 2025-01-19 PROCEDURE — 2500000004 HC RX 250 GENERAL PHARMACY W/ HCPCS (ALT 636 FOR OP/ED)

## 2025-01-19 PROCEDURE — 2500000004 HC RX 250 GENERAL PHARMACY W/ HCPCS (ALT 636 FOR OP/ED): Performed by: STUDENT IN AN ORGANIZED HEALTH CARE EDUCATION/TRAINING PROGRAM

## 2025-01-19 PROCEDURE — 74018 RADEX ABDOMEN 1 VIEW: CPT | Performed by: RADIOLOGY

## 2025-01-19 RX ORDER — ONDANSETRON HYDROCHLORIDE 2 MG/ML
4 INJECTION, SOLUTION INTRAVENOUS ONCE
Status: COMPLETED | OUTPATIENT
Start: 2025-01-19 | End: 2025-01-19

## 2025-01-19 RX ORDER — BISACODYL 10 MG/1
10 SUPPOSITORY RECTAL DAILY
Status: DISCONTINUED | OUTPATIENT
Start: 2025-01-19 | End: 2025-01-25 | Stop reason: HOSPADM

## 2025-01-19 RX ORDER — AMOXICILLIN 250 MG
1 CAPSULE ORAL NIGHTLY
Status: DISCONTINUED | OUTPATIENT
Start: 2025-01-19 | End: 2025-01-25 | Stop reason: HOSPADM

## 2025-01-19 RX ORDER — ACETAMINOPHEN 10 MG/ML
1000 INJECTION, SOLUTION INTRAVENOUS ONCE AS NEEDED
Status: COMPLETED | OUTPATIENT
Start: 2025-01-19 | End: 2025-01-20

## 2025-01-19 RX ADMIN — METOPROLOL SUCCINATE 25 MG: 25 TABLET, EXTENDED RELEASE ORAL at 09:57

## 2025-01-19 RX ADMIN — BISACODYL 10 MG: 10 SUPPOSITORY RECTAL at 18:25

## 2025-01-19 RX ADMIN — ISOSORBIDE DINITRATE 10 MG: 10 TABLET ORAL at 18:25

## 2025-01-19 RX ADMIN — ONDANSETRON 4 MG: 2 INJECTION INTRAMUSCULAR; INTRAVENOUS at 20:56

## 2025-01-19 RX ADMIN — ISOSORBIDE DINITRATE 10 MG: 10 TABLET ORAL at 09:57

## 2025-01-19 RX ADMIN — RANOLAZINE 500 MG: 500 TABLET, EXTENDED RELEASE ORAL at 09:57

## 2025-01-19 RX ADMIN — INSULIN LISPRO 1 UNITS: 100 INJECTION, SOLUTION INTRAVENOUS; SUBCUTANEOUS at 19:35

## 2025-01-19 RX ADMIN — BUSPIRONE HYDROCHLORIDE 7.5 MG: 15 TABLET ORAL at 09:56

## 2025-01-19 RX ADMIN — INSULIN LISPRO 1 UNITS: 100 INJECTION, SOLUTION INTRAVENOUS; SUBCUTANEOUS at 13:54

## 2025-01-19 RX ADMIN — ASPIRIN 81 MG: 81 TABLET, COATED ORAL at 09:57

## 2025-01-19 RX ADMIN — ISOSORBIDE DINITRATE 10 MG: 10 TABLET ORAL at 13:57

## 2025-01-19 RX ADMIN — HEPARIN SODIUM 900 UNITS/HR: 10000 INJECTION, SOLUTION INTRAVENOUS at 17:52

## 2025-01-19 RX ADMIN — LEVOTHYROXINE SODIUM 50 MCG: 0.05 TABLET ORAL at 05:18

## 2025-01-19 RX ADMIN — PRASUGREL 10 MG: 10 TABLET, FILM COATED ORAL at 12:38

## 2025-01-19 RX ADMIN — AMLODIPINE BESYLATE 5 MG: 5 TABLET ORAL at 09:57

## 2025-01-19 ASSESSMENT — COGNITIVE AND FUNCTIONAL STATUS - GENERAL
MOVING TO AND FROM BED TO CHAIR: A LITTLE
TOILETING: A LITTLE
WALKING IN HOSPITAL ROOM: A LOT
STANDING UP FROM CHAIR USING ARMS: A LITTLE
STANDING UP FROM CHAIR USING ARMS: A LITTLE
TOILETING: A LITTLE
MOBILITY SCORE: 18
DAILY ACTIVITIY SCORE: 22
HELP NEEDED FOR BATHING: A LITTLE
TOILETING: A LITTLE
STANDING UP FROM CHAIR USING ARMS: A LITTLE
TOILETING: A LITTLE
WALKING IN HOSPITAL ROOM: A LOT
STANDING UP FROM CHAIR USING ARMS: A LITTLE
HELP NEEDED FOR BATHING: A LITTLE
STANDING UP FROM CHAIR USING ARMS: A LITTLE
HELP NEEDED FOR BATHING: A LITTLE
CLIMB 3 TO 5 STEPS WITH RAILING: A LOT
MOVING TO AND FROM BED TO CHAIR: A LITTLE
WALKING IN HOSPITAL ROOM: A LOT
HELP NEEDED FOR BATHING: A LITTLE
CLIMB 3 TO 5 STEPS WITH RAILING: A LOT
MOVING TO AND FROM BED TO CHAIR: A LITTLE
CLIMB 3 TO 5 STEPS WITH RAILING: A LOT
MOVING TO AND FROM BED TO CHAIR: A LITTLE
MOVING TO AND FROM BED TO CHAIR: A LITTLE
DAILY ACTIVITIY SCORE: 22
TOILETING: A LITTLE
WALKING IN HOSPITAL ROOM: A LOT
HELP NEEDED FOR BATHING: A LITTLE
HELP NEEDED FOR BATHING: A LITTLE
MOBILITY SCORE: 18
MOBILITY SCORE: 18
MOVING TO AND FROM BED TO CHAIR: A LITTLE
STANDING UP FROM CHAIR USING ARMS: A LITTLE
WALKING IN HOSPITAL ROOM: A LOT
DAILY ACTIVITIY SCORE: 22
CLIMB 3 TO 5 STEPS WITH RAILING: A LOT
DAILY ACTIVITIY SCORE: 21
MOBILITY SCORE: 18
MOVING TO AND FROM BED TO CHAIR: A LITTLE
DAILY ACTIVITIY SCORE: 22
CLIMB 3 TO 5 STEPS WITH RAILING: A LOT
MOBILITY SCORE: 18
TOILETING: A LITTLE
DRESSING REGULAR LOWER BODY CLOTHING: A LITTLE
WALKING IN HOSPITAL ROOM: A LOT
MOBILITY SCORE: 18
WALKING IN HOSPITAL ROOM: A LOT
DAILY ACTIVITIY SCORE: 22
MOBILITY SCORE: 18
HELP NEEDED FOR BATHING: A LITTLE
DAILY ACTIVITIY SCORE: 22
STANDING UP FROM CHAIR USING ARMS: A LITTLE
TOILETING: A LITTLE

## 2025-01-19 ASSESSMENT — PAIN - FUNCTIONAL ASSESSMENT
PAIN_FUNCTIONAL_ASSESSMENT: 0-10
PAIN_FUNCTIONAL_ASSESSMENT: 0-10

## 2025-01-19 ASSESSMENT — PAIN SCALES - GENERAL
PAINLEVEL_OUTOF10: 0 - NO PAIN
PAINLEVEL_OUTOF10: 0 - NO PAIN
PAINLEVEL_OUTOF10: 5 - MODERATE PAIN

## 2025-01-19 NOTE — CARE PLAN
Problem: Skin  Goal: Prevent/manage excess moisture  Outcome: Progressing     Problem: Skin  Goal: Prevent/minimize sheer/friction injuries  Outcome: Progressing     Problem: Fall/Injury  Goal: Be free from injury by end of the shift  Outcome: Progressing     Problem: Pain  Goal: Turns in bed with improved pain control throughout the shift  Outcome: Progressing       The clinical goals for the shift include Pt will have no c/o of CP throughout shift.

## 2025-01-19 NOTE — PROGRESS NOTES
Interval events:    No acute events overnight    Subjective:  Patient is frustrated regarding scheduled PCI time, states he was told he would be first case in the morning. Therapeutic listening provided.   Denies any CP or nausea overnight. Did not sleep well.    Family updated on time of PCI tomorrow.    Today in brief:  - PCI with Dr. Weston rescheduled for tomorrow at 12:00 1/20, NPO at [place]  - cont with current regimen    Objective:  Vitals:    01/19/25 0728   BP: 107/60   Pulse: 69   Resp: 20   Temp: 36.2 °C (97.2 °F)   SpO2: 100%     Weight         1/15/2025  0600 1/16/2025  0358 1/17/2025  0408 1/18/2025  0504 1/19/2025  0522    Weight: 66.3 kg (146 lb 2.6 oz) 48.4 kg (106 lb 11.2 oz) 67 kg (147 lb 11.3 oz) 67.6 kg (149 lb 0.5 oz) 67.3 kg (148 lb 5.9 oz)            Intake/Output Summary (Last 24 hours) at 1/19/2025 0927  Last data filed at 1/19/2025 0710  Gross per 24 hour   Intake 240 ml   Output 200 ml   Net 40 ml     Recent Results (from the past 24 hours)   POCT GLUCOSE    Collection Time: 01/18/25 12:12 PM   Result Value Ref Range    POCT Glucose 180 (H) 74 - 99 mg/dL   Heparin Assay, UFH    Collection Time: 01/18/25  1:59 PM   Result Value Ref Range    Heparin Unfractionated 0.4 See Comment Below for Therapeutic Ranges IU/mL   POCT GLUCOSE    Collection Time: 01/18/25  4:53 PM   Result Value Ref Range    POCT Glucose 171 (H) 74 - 99 mg/dL   CBC    Collection Time: 01/18/25  5:47 PM   Result Value Ref Range    WBC 4.0 (L) 4.4 - 11.3 x10*3/uL    nRBC 0.0 0.0 - 0.0 /100 WBCs    RBC 3.34 (L) 4.50 - 5.90 x10*6/uL    Hemoglobin 9.5 (L) 13.5 - 17.5 g/dL    Hematocrit 31.0 (L) 41.0 - 52.0 %    MCV 93 80 - 100 fL    MCH 28.4 26.0 - 34.0 pg    MCHC 30.6 (L) 32.0 - 36.0 g/dL    RDW 15.1 (H) 11.5 - 14.5 %    Platelets 123 (L) 150 - 450 x10*3/uL   Renal Function Panel    Collection Time: 01/18/25  5:47 PM   Result Value Ref Range    Glucose 158 (H) 74 - 99 mg/dL    Sodium 137 136 - 145 mmol/L    Potassium 4.2 3.5 -  5.3 mmol/L    Chloride 104 98 - 107 mmol/L    Bicarbonate 22 21 - 32 mmol/L    Anion Gap 15 10 - 20 mmol/L    Urea Nitrogen 23 6 - 23 mg/dL    Creatinine 1.46 (H) 0.50 - 1.30 mg/dL    eGFR 48 (L) >60 mL/min/1.73m*2    Calcium 8.8 8.6 - 10.6 mg/dL    Phosphorus 4.1 2.5 - 4.9 mg/dL    Albumin 3.3 (L) 3.4 - 5.0 g/dL   Magnesium    Collection Time: 01/18/25  5:47 PM   Result Value Ref Range    Magnesium 2.07 1.60 - 2.40 mg/dL   Heparin Assay, UFH    Collection Time: 01/18/25  5:49 PM   Result Value Ref Range    Heparin Unfractionated 0.4 See Comment Below for Therapeutic Ranges IU/mL   POCT GLUCOSE    Collection Time: 01/18/25  9:20 PM   Result Value Ref Range    POCT Glucose 151 (H) 74 - 99 mg/dL   CBC    Collection Time: 01/19/25  1:05 AM   Result Value Ref Range    WBC 4.1 (L) 4.4 - 11.3 x10*3/uL    nRBC 0.0 0.0 - 0.0 /100 WBCs    RBC 3.11 (L) 4.50 - 5.90 x10*6/uL    Hemoglobin 9.0 (L) 13.5 - 17.5 g/dL    Hematocrit 26.6 (L) 41.0 - 52.0 %    MCV 86 80 - 100 fL    MCH 28.9 26.0 - 34.0 pg    MCHC 33.8 32.0 - 36.0 g/dL    RDW 15.0 (H) 11.5 - 14.5 %    Platelets 111 (L) 150 - 450 x10*3/uL   POCT GLUCOSE    Collection Time: 01/19/25  7:03 AM   Result Value Ref Range    POCT Glucose 136 (H) 74 - 99 mg/dL     Inpatient Medications:  Scheduled medications   Medication Dose Route Frequency    amLODIPine  5 mg oral Daily    aspirin  81 mg oral Daily    atorvastatin  40 mg oral Nightly    busPIRone  7.5 mg oral BID    insulin glargine  12 Units subcutaneous Nightly    insulin lispro  0-5 Units subcutaneous TID AC    isosorbide dinitrate  10 mg oral TID    levothyroxine  50 mcg oral Daily    metoprolol succinate XL  25 mg oral Daily    mirtazapine  15 mg oral Nightly    prasugrel  10 mg oral Daily    ranolazine  500 mg oral BID    [Held by provider] spironolactone  25 mg oral q AM     PRN medications   Medication    acetaminophen    dextrose    dextrose    glucagon    glucagon    heparin    nitroglycerin     Continuous  Medications   Medication Dose Last Rate    heparin  0-4,000 Units/hr 900 Units/hr (01/18/25 1233)     Procedures/testing:  TRANSTHORACIC ECHO (TTE) COMPLETE 1/14/2025   1. Poorly visualized anatomical structures due to suboptimal image quality.   2. Left ventricular cavity size is mild to moderately dilated.   3. Left ventricular ejection fraction is mildly decreased, by visual estimate at 45-50%.   4. Basal and mid inferior wall is abnormal.   5. Spectral Doppler shows a Grade I (impaired relaxation pattern) of left ventricular diastolic filling with normal left atrial filling pressure.   6. Abnormal septal motion consistent with post-operative status.   7. There is mildly reduced right ventricular systolic function.   8. The left atrium is enlarged.   9. Atrial septal aneurysm present.  10. There is no evidence of a patent foramen ovale.    CT ANGIO CHEST ABDOMEN PELVIS; 1/13/2025   CHEST  1.  No evidence of aortic aneurysm, dissection, or acute intramural  hematoma. Moderate calcified and noncalcified atherosclerotic plaque  of the thoracic and abdominal aorta. Patent left renal artery stent  and partially visualized bilateral superficial femoral artery stents.  Sternotomy changes. Coronary artery atherosclerotic calcifications.  2. No evidence of consolidation or pleural effusion. Moderate  centrilobular and paraseptal emphysema as well as nonspecific  pulmonary fibrotic changes. Asymmetric scarring in the left lung apex  is new since 2010 and consider follow-up chest CT in 6 months to  confirm stability.  ABDOMEN - PELVIS  1.  No acute abnormality in the abdomen/pelvis. Mild bilateral renal  cortical thinning.  2. Unchanged chronic compression deformity of L4 vertebral body.    Left Heart Cath 12/6/2024   1. Severe three-vessel coronary artery disease.   2. Patent LIMA to LAD but after the anastomosis there is moderate tubular lesion that has been stable for long time.   3. Distal left main of 90% that is  giving flow to midsize ramus. Has been stable for many years.   4. Severe diffuse right coronary artery disease was 2 areas of underexpanded stents and severe in-stent restenosis of 90% in the midsegment.   5. Balloon angioplasty was attempted we were unable to expand the mid RCA stent. There was still significant in-stent restenosis of 50% after balloon angioplasty. Improved from 99%. CIERRA-3 flow.   6. Medical management for now.    Telemetry 1/19/2025 (personally reviewed): SR BBB HR 50-60s, non conducting PACs with MFPVCs, periods of Mobitz I    Physical exam:  General: well appearing, NAD, pleasant  HEENT: NCAT, MMM, PERRL  CV: RRR, no m/r/g  PULM: CTAB, non-labored respirations   ABD: soft, NT, ND, + bowel sounds   EXT: LLE BKA, small ulcer on the posterior aspect of L thigh without drainage, no edema in the RLE   SKIN: no rashes noted   NEURO: A&Ox4, no gross motor or sensory deficits, normal gait  PSYCH: pleasant mood, appropriate affect      Assessment/Plan   Chavez Llamas is a 79yM with PMHx of CAD s/p CABG 1990s (LIMA to LAD and SVG to unknown?) and multiple PCIs (most recent [facility] 12/2024), HFmrEF (40-45% 12/4/2024), HTN, T2DM (A1C 5.5 12/5/2025), PAD s/p left BKA who presented to [facility] [place], operated by [facility] 1/13 in the setting of nausea, vomiting, chest pain, and wound on LLE. Found to have NSTEMI s/p heparin gtt and transferred to  for high risk PCI.     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% 12/4/2024)  - CABG with LIMA to the LAD patent, SVG to an unknown vessel chronically occluded; S/p multiple PCIs with 5 stents placed to prox and mid RCA with severe ISR  - [facility] 12/2024- severe 3v disease, patent LIMA-LAD but mod tubular lesion after the anastomosis (stable for many years), distal LM of 90% giving flow to ramus (stable), severe diffuse RCA disease with severe midsegment ISR, s/p balloon angioplasty of RCA ISR (stenosis improved to 50% from 99%) with CIERRA 3 flow  - TTE notable for EF  45-50%, LV cavity mod dilated, basal and mid inferior wall abnormal, enlarged left atrium  - 1/17 episode of chest pain (rating 2/10) and nausea this morning. ECG without significant change from previous.  - Trop peak 212 on 1/14 -> repeat 1/17 HS trop 56  - PCI with Dr. Wetson rescheduled for Monday 1/20  - C/w ASA 81 mg, pasugrel 10mg, atorvastatin 40mg  - s/p heparin gtt dc'd after 48 hr on 1/15-->   - 117 restarted heparin gtt iso recurrent chest pain, will continue until PCI Monday  - cont Isordil 10mg TID for antianginal agent  - Continue home amlodipine 5mg, metoprolol succinate 25mg  - Continue ranolazine 500mg bid (started at OSH)   - Holding spironolactone 25mg iso TEODORO which has improved, can restart following PCI/continued stabilization of Cr     TEODORO on CKD3a, improving  - Baseline Cr 1.3?  - Nephrology consulted at OSH, feel TEODORO iso contrast. No hydronephrosis on CT 1/13  - Cr trend: 1.46 (1.53, 1.62, 1.79, 1.78, 1.22, 1.7, 1.59, 1.18)  - FeNa 1.1%  - UA neg  - Renally dose meds and avoid nephrotoxins   - s/p 500cc LR bolus 1/15     Emphysema on CTA  - No PFTs on file   - No home inhalers   - Outpatient repeat CT in 6 months to follow up assymetric L lung scarring      Chronic anemia (baseline Hgb ~10)  Chronic thrombocytopenia (baseline 110)  - CTM     T2DM (A1c 5.5% 12/2024)   Hypothyroidism  - Lantus 12U and SSI  - Hypoglycemia protocol  - Continue synthroid 50 mcg      Concern for LLE cellulitis   - XR knee 1/13/2025: No acute findings status post left below-the-knee amputation.  - Vascular US LE: No evidence of deep venous thrombus in the evaluated veins of the left leg from the inguinal ligament to the popliteal fossa.  - blood cx 1/13 NGTD x3 days  - wound cx 1/13 negative  - s/p Vanc/Zosyn (1/13)  - completed Keflex x5d (1/13-1/17)  - recommend orthotics appointment as an outpatient for prosthetic fitting, suspect prosthesis not fitting properly following recent weight loss    DVT ppx: heparin  gtt  DISPO: pending PCI with Dr. Weston 1/20  Code status: Full Code    Patient seen and discussed with Dr. Anaya.    Vikas Perera PA-C

## 2025-01-20 LAB
ACT BLD: 364 SEC (ref 83–199)
ALBUMIN SERPL BCP-MCNC: 3.5 G/DL (ref 3.4–5)
ALBUMIN SERPL BCP-MCNC: 3.7 G/DL (ref 3.4–5)
ALBUMIN SERPL BCP-MCNC: 4.2 G/DL (ref 3.4–5)
ANION GAP SERPL CALC-SCNC: 12 MMOL/L (ref 10–20)
ANION GAP SERPL CALC-SCNC: 16 MMOL/L (ref 10–20)
ANION GAP SERPL CALC-SCNC: 18 MMOL/L (ref 10–20)
BUN SERPL-MCNC: 20 MG/DL (ref 6–23)
BUN SERPL-MCNC: 21 MG/DL (ref 6–23)
BUN SERPL-MCNC: 25 MG/DL (ref 6–23)
CALCIUM SERPL-MCNC: 8.6 MG/DL (ref 8.6–10.6)
CALCIUM SERPL-MCNC: 9.1 MG/DL (ref 8.6–10.6)
CALCIUM SERPL-MCNC: 9.6 MG/DL (ref 8.6–10.6)
CHLORIDE SERPL-SCNC: 101 MMOL/L (ref 98–107)
CHLORIDE SERPL-SCNC: 102 MMOL/L (ref 98–107)
CHLORIDE SERPL-SCNC: 104 MMOL/L (ref 98–107)
CO2 SERPL-SCNC: 24 MMOL/L (ref 21–32)
CO2 SERPL-SCNC: 26 MMOL/L (ref 21–32)
CO2 SERPL-SCNC: 26 MMOL/L (ref 21–32)
CREAT SERPL-MCNC: 1.41 MG/DL (ref 0.5–1.3)
CREAT SERPL-MCNC: 1.67 MG/DL (ref 0.5–1.3)
CREAT SERPL-MCNC: 1.73 MG/DL (ref 0.5–1.3)
EGFRCR SERPLBLD CKD-EPI 2021: 39 ML/MIN/1.73M*2
EGFRCR SERPLBLD CKD-EPI 2021: 41 ML/MIN/1.73M*2
EGFRCR SERPLBLD CKD-EPI 2021: 50 ML/MIN/1.73M*2
ERYTHROCYTE [DISTWIDTH] IN BLOOD BY AUTOMATED COUNT: 15.4 % (ref 11.5–14.5)
ERYTHROCYTE [DISTWIDTH] IN BLOOD BY AUTOMATED COUNT: 15.4 % (ref 11.5–14.5)
ERYTHROCYTE [DISTWIDTH] IN BLOOD BY AUTOMATED COUNT: 15.7 % (ref 11.5–14.5)
GLUCOSE BLD MANUAL STRIP-MCNC: 128 MG/DL (ref 74–99)
GLUCOSE BLD MANUAL STRIP-MCNC: 140 MG/DL (ref 74–99)
GLUCOSE BLD MANUAL STRIP-MCNC: 157 MG/DL (ref 74–99)
GLUCOSE BLD MANUAL STRIP-MCNC: 174 MG/DL (ref 74–99)
GLUCOSE SERPL-MCNC: 121 MG/DL (ref 74–99)
GLUCOSE SERPL-MCNC: 148 MG/DL (ref 74–99)
GLUCOSE SERPL-MCNC: 189 MG/DL (ref 74–99)
HCT VFR BLD AUTO: 25.9 % (ref 41–52)
HCT VFR BLD AUTO: 30 % (ref 41–52)
HCT VFR BLD AUTO: 32.3 % (ref 41–52)
HGB BLD-MCNC: 10.5 G/DL (ref 13.5–17.5)
HGB BLD-MCNC: 8.4 G/DL (ref 13.5–17.5)
HGB BLD-MCNC: 9.8 G/DL (ref 13.5–17.5)
MAGNESIUM SERPL-MCNC: 1.82 MG/DL (ref 1.6–2.4)
MAGNESIUM SERPL-MCNC: 1.86 MG/DL (ref 1.6–2.4)
MAGNESIUM SERPL-MCNC: 1.98 MG/DL (ref 1.6–2.4)
MCH RBC QN AUTO: 28.8 PG (ref 26–34)
MCH RBC QN AUTO: 29 PG (ref 26–34)
MCH RBC QN AUTO: 29.1 PG (ref 26–34)
MCHC RBC AUTO-ENTMCNC: 32.4 G/DL (ref 32–36)
MCHC RBC AUTO-ENTMCNC: 32.5 G/DL (ref 32–36)
MCHC RBC AUTO-ENTMCNC: 32.7 G/DL (ref 32–36)
MCV RBC AUTO: 88 FL (ref 80–100)
MCV RBC AUTO: 89 FL (ref 80–100)
MCV RBC AUTO: 90 FL (ref 80–100)
NRBC BLD-RTO: 0 /100 WBCS (ref 0–0)
PHOSPHATE SERPL-MCNC: 3.4 MG/DL (ref 2.5–4.9)
PHOSPHATE SERPL-MCNC: 3.8 MG/DL (ref 2.5–4.9)
PHOSPHATE SERPL-MCNC: 4.1 MG/DL (ref 2.5–4.9)
PLATELET # BLD AUTO: 103 X10*3/UL (ref 150–450)
PLATELET # BLD AUTO: 128 X10*3/UL (ref 150–450)
PLATELET # BLD AUTO: 135 X10*3/UL (ref 150–450)
POTASSIUM SERPL-SCNC: 3.7 MMOL/L (ref 3.5–5.3)
POTASSIUM SERPL-SCNC: 4.5 MMOL/L (ref 3.5–5.3)
POTASSIUM SERPL-SCNC: 4.6 MMOL/L (ref 3.5–5.3)
RBC # BLD AUTO: 2.9 X10*6/UL (ref 4.5–5.9)
RBC # BLD AUTO: 3.4 X10*6/UL (ref 4.5–5.9)
RBC # BLD AUTO: 3.61 X10*6/UL (ref 4.5–5.9)
SODIUM SERPL-SCNC: 137 MMOL/L (ref 136–145)
SODIUM SERPL-SCNC: 138 MMOL/L (ref 136–145)
SODIUM SERPL-SCNC: 140 MMOL/L (ref 136–145)
UFH PPP CHRO-ACNC: 0.2 IU/ML
UFH PPP CHRO-ACNC: 0.3 IU/ML
WBC # BLD AUTO: 10.7 X10*3/UL (ref 4.4–11.3)
WBC # BLD AUTO: 11.9 X10*3/UL (ref 4.4–11.3)
WBC # BLD AUTO: 8.6 X10*3/UL (ref 4.4–11.3)

## 2025-01-20 PROCEDURE — 2550000001 HC RX 255 CONTRASTS

## 2025-01-20 PROCEDURE — 85027 COMPLETE CBC AUTOMATED: CPT

## 2025-01-20 PROCEDURE — 82947 ASSAY GLUCOSE BLOOD QUANT: CPT

## 2025-01-20 PROCEDURE — 92921 HC CATH PRQ TRLUML CORONARY ANGIOPLASTY ADDL BRANCH: CPT | Mod: 22

## 2025-01-20 PROCEDURE — C1887 CATHETER, GUIDING: HCPCS

## 2025-01-20 PROCEDURE — 2500000001 HC RX 250 WO HCPCS SELF ADMINISTERED DRUGS (ALT 637 FOR MEDICARE OP)

## 2025-01-20 PROCEDURE — C1725 CATH, TRANSLUMIN NON-LASER: HCPCS

## 2025-01-20 PROCEDURE — 85347 COAGULATION TIME ACTIVATED: CPT

## 2025-01-20 PROCEDURE — 2500000004 HC RX 250 GENERAL PHARMACY W/ HCPCS (ALT 636 FOR OP/ED): Performed by: PHYSICIAN ASSISTANT

## 2025-01-20 PROCEDURE — 2500000004 HC RX 250 GENERAL PHARMACY W/ HCPCS (ALT 636 FOR OP/ED): Performed by: STUDENT IN AN ORGANIZED HEALTH CARE EDUCATION/TRAINING PROGRAM

## 2025-01-20 PROCEDURE — 1200000002 HC GENERAL ROOM WITH TELEMETRY DAILY

## 2025-01-20 PROCEDURE — 99153 MOD SED SAME PHYS/QHP EA: CPT

## 2025-01-20 PROCEDURE — 02F03ZZ FRAGMENTATION IN CORONARY ARTERY, ONE ARTERY, PERCUTANEOUS APPROACH: ICD-10-PCS

## 2025-01-20 PROCEDURE — 92972 PERQ TRLUML CORONRY LITHOTRP: CPT

## 2025-01-20 PROCEDURE — 02703ZZ DILATION OF CORONARY ARTERY, ONE ARTERY, PERCUTANEOUS APPROACH: ICD-10-PCS

## 2025-01-20 PROCEDURE — 36415 COLL VENOUS BLD VENIPUNCTURE: CPT

## 2025-01-20 PROCEDURE — 85027 COMPLETE CBC AUTOMATED: CPT | Performed by: STUDENT IN AN ORGANIZED HEALTH CARE EDUCATION/TRAINING PROGRAM

## 2025-01-20 PROCEDURE — 80069 RENAL FUNCTION PANEL: CPT

## 2025-01-20 PROCEDURE — 85520 HEPARIN ASSAY: CPT | Performed by: STUDENT IN AN ORGANIZED HEALTH CARE EDUCATION/TRAINING PROGRAM

## 2025-01-20 PROCEDURE — 2500000002 HC RX 250 W HCPCS SELF ADMINISTERED DRUGS (ALT 637 FOR MEDICARE OP, ALT 636 FOR OP/ED)

## 2025-01-20 PROCEDURE — 2500000004 HC RX 250 GENERAL PHARMACY W/ HCPCS (ALT 636 FOR OP/ED)

## 2025-01-20 PROCEDURE — 36415 COLL VENOUS BLD VENIPUNCTURE: CPT | Performed by: STUDENT IN AN ORGANIZED HEALTH CARE EDUCATION/TRAINING PROGRAM

## 2025-01-20 PROCEDURE — 84484 ASSAY OF TROPONIN QUANT: CPT

## 2025-01-20 PROCEDURE — C1760 CLOSURE DEV, VASC: HCPCS

## 2025-01-20 PROCEDURE — 83735 ASSAY OF MAGNESIUM: CPT | Performed by: STUDENT IN AN ORGANIZED HEALTH CARE EDUCATION/TRAINING PROGRAM

## 2025-01-20 PROCEDURE — 2720000007 HC OR 272 NO HCPCS

## 2025-01-20 PROCEDURE — 99233 SBSQ HOSP IP/OBS HIGH 50: CPT | Performed by: INTERNAL MEDICINE

## 2025-01-20 PROCEDURE — C1769 GUIDE WIRE: HCPCS

## 2025-01-20 PROCEDURE — 99152 MOD SED SAME PHYS/QHP 5/>YRS: CPT

## 2025-01-20 PROCEDURE — C1894 INTRO/SHEATH, NON-LASER: HCPCS

## 2025-01-20 PROCEDURE — B2111ZZ FLUOROSCOPY OF MULTIPLE CORONARY ARTERIES USING LOW OSMOLAR CONTRAST: ICD-10-PCS

## 2025-01-20 PROCEDURE — 80069 RENAL FUNCTION PANEL: CPT | Performed by: STUDENT IN AN ORGANIZED HEALTH CARE EDUCATION/TRAINING PROGRAM

## 2025-01-20 PROCEDURE — 92920 PRQ TRLUML C ANGIOP 1ART&/BR: CPT

## 2025-01-20 PROCEDURE — 83735 ASSAY OF MAGNESIUM: CPT

## 2025-01-20 RX ORDER — CLOPIDOGREL BISULFATE 75 MG/1
75 TABLET ORAL DAILY
Status: DISCONTINUED | OUTPATIENT
Start: 2025-01-22 | End: 2025-01-25 | Stop reason: HOSPADM

## 2025-01-20 RX ORDER — NITROGLYCERIN 40 MG/100ML
INJECTION INTRAVENOUS AS NEEDED
Status: DISCONTINUED | OUTPATIENT
Start: 2025-01-20 | End: 2025-01-20 | Stop reason: HOSPADM

## 2025-01-20 RX ORDER — CLOPIDOGREL BISULFATE 300 MG/1
300 TABLET, FILM COATED ORAL ONCE
Status: COMPLETED | OUTPATIENT
Start: 2025-01-21 | End: 2025-01-21

## 2025-01-20 RX ORDER — ONDANSETRON HYDROCHLORIDE 2 MG/ML
4 INJECTION, SOLUTION INTRAVENOUS ONCE
Status: COMPLETED | OUTPATIENT
Start: 2025-01-20 | End: 2025-01-20

## 2025-01-20 RX ORDER — VERAPAMIL HYDROCHLORIDE 2.5 MG/ML
INJECTION, SOLUTION INTRAVENOUS AS NEEDED
Status: DISCONTINUED | OUTPATIENT
Start: 2025-01-20 | End: 2025-01-20 | Stop reason: HOSPADM

## 2025-01-20 RX ORDER — HEPARIN SODIUM 1000 [USP'U]/ML
INJECTION, SOLUTION INTRAVENOUS; SUBCUTANEOUS AS NEEDED
Status: DISCONTINUED | OUTPATIENT
Start: 2025-01-20 | End: 2025-01-20 | Stop reason: HOSPADM

## 2025-01-20 RX ORDER — SODIUM CHLORIDE 9 MG/ML
125 INJECTION, SOLUTION INTRAVENOUS CONTINUOUS
Status: ACTIVE | OUTPATIENT
Start: 2025-01-20 | End: 2025-01-20

## 2025-01-20 RX ORDER — FENTANYL CITRATE 50 UG/ML
INJECTION, SOLUTION INTRAMUSCULAR; INTRAVENOUS AS NEEDED
Status: DISCONTINUED | OUTPATIENT
Start: 2025-01-20 | End: 2025-01-20 | Stop reason: HOSPADM

## 2025-01-20 RX ORDER — MAGNESIUM SULFATE HEPTAHYDRATE 40 MG/ML
2 INJECTION, SOLUTION INTRAVENOUS ONCE
Status: COMPLETED | OUTPATIENT
Start: 2025-01-20 | End: 2025-01-20

## 2025-01-20 RX ORDER — MIDAZOLAM HYDROCHLORIDE 1 MG/ML
INJECTION, SOLUTION INTRAMUSCULAR; INTRAVENOUS AS NEEDED
Status: DISCONTINUED | OUTPATIENT
Start: 2025-01-20 | End: 2025-01-20 | Stop reason: HOSPADM

## 2025-01-20 RX ORDER — LIDOCAINE HYDROCHLORIDE 10 MG/ML
INJECTION, SOLUTION EPIDURAL; INFILTRATION; INTRACAUDAL; PERINEURAL AS NEEDED
Status: DISCONTINUED | OUTPATIENT
Start: 2025-01-20 | End: 2025-01-20 | Stop reason: HOSPADM

## 2025-01-20 RX ADMIN — PROMETHAZINE HYDROCHLORIDE 12.5 MG: 25 INJECTION INTRAMUSCULAR; INTRAVENOUS at 01:02

## 2025-01-20 RX ADMIN — MAGNESIUM SULFATE HEPTAHYDRATE 2 G: 40 INJECTION, SOLUTION INTRAVENOUS at 21:59

## 2025-01-20 RX ADMIN — ISOSORBIDE DINITRATE 10 MG: 10 TABLET ORAL at 16:13

## 2025-01-20 RX ADMIN — MIRTAZAPINE 15 MG: 15 TABLET, FILM COATED ORAL at 20:33

## 2025-01-20 RX ADMIN — RANOLAZINE 500 MG: 500 TABLET, EXTENDED RELEASE ORAL at 09:44

## 2025-01-20 RX ADMIN — RANOLAZINE 500 MG: 500 TABLET, EXTENDED RELEASE ORAL at 20:33

## 2025-01-20 RX ADMIN — ISOSORBIDE DINITRATE 10 MG: 10 TABLET ORAL at 09:44

## 2025-01-20 RX ADMIN — AMLODIPINE BESYLATE 5 MG: 5 TABLET ORAL at 09:44

## 2025-01-20 RX ADMIN — ONDANSETRON 4 MG: 2 INJECTION INTRAMUSCULAR; INTRAVENOUS at 16:59

## 2025-01-20 RX ADMIN — ACETAMINOPHEN 1000 MG: 10 INJECTION INTRAVENOUS at 01:02

## 2025-01-20 RX ADMIN — LEVOTHYROXINE SODIUM 50 MCG: 0.05 TABLET ORAL at 06:24

## 2025-01-20 RX ADMIN — BUSPIRONE HYDROCHLORIDE 7.5 MG: 15 TABLET ORAL at 09:45

## 2025-01-20 RX ADMIN — PRASUGREL 10 MG: 10 TABLET, FILM COATED ORAL at 09:45

## 2025-01-20 RX ADMIN — ISOSORBIDE DINITRATE 10 MG: 10 TABLET ORAL at 21:13

## 2025-01-20 RX ADMIN — BUSPIRONE HYDROCHLORIDE 7.5 MG: 15 TABLET ORAL at 20:33

## 2025-01-20 RX ADMIN — ATORVASTATIN CALCIUM 40 MG: 40 TABLET, FILM COATED ORAL at 20:33

## 2025-01-20 RX ADMIN — METOPROLOL SUCCINATE 25 MG: 25 TABLET, EXTENDED RELEASE ORAL at 09:44

## 2025-01-20 RX ADMIN — INSULIN GLARGINE 12 UNITS: 100 INJECTION, SOLUTION SUBCUTANEOUS at 20:33

## 2025-01-20 RX ADMIN — ASPIRIN 81 MG: 81 TABLET, COATED ORAL at 09:44

## 2025-01-20 ASSESSMENT — COGNITIVE AND FUNCTIONAL STATUS - GENERAL
STANDING UP FROM CHAIR USING ARMS: A LITTLE
CLIMB 3 TO 5 STEPS WITH RAILING: A LOT
STANDING UP FROM CHAIR USING ARMS: A LITTLE
HELP NEEDED FOR BATHING: A LITTLE
CLIMB 3 TO 5 STEPS WITH RAILING: A LOT
MOVING TO AND FROM BED TO CHAIR: A LITTLE
TOILETING: A LITTLE
CLIMB 3 TO 5 STEPS WITH RAILING: A LOT
WALKING IN HOSPITAL ROOM: A LOT
CLIMB 3 TO 5 STEPS WITH RAILING: A LOT
WALKING IN HOSPITAL ROOM: A LOT
WALKING IN HOSPITAL ROOM: A LOT
HELP NEEDED FOR BATHING: A LITTLE
MOVING TO AND FROM BED TO CHAIR: A LITTLE
TOILETING: A LITTLE
TOILETING: A LITTLE
MOBILITY SCORE: 18
MOVING TO AND FROM BED TO CHAIR: A LITTLE
CLIMB 3 TO 5 STEPS WITH RAILING: A LOT
STANDING UP FROM CHAIR USING ARMS: A LITTLE
STANDING UP FROM CHAIR USING ARMS: A LITTLE
MOVING TO AND FROM BED TO CHAIR: A LITTLE
HELP NEEDED FOR BATHING: A LITTLE
DAILY ACTIVITIY SCORE: 22
HELP NEEDED FOR BATHING: A LITTLE
MOVING TO AND FROM BED TO CHAIR: A LITTLE
WALKING IN HOSPITAL ROOM: A LOT
MOBILITY SCORE: 18
TOILETING: A LITTLE
HELP NEEDED FOR BATHING: A LITTLE
DAILY ACTIVITIY SCORE: 22
STANDING UP FROM CHAIR USING ARMS: A LITTLE
WALKING IN HOSPITAL ROOM: A LOT
TOILETING: A LITTLE

## 2025-01-20 ASSESSMENT — PAIN DESCRIPTION - LOCATION: LOCATION: UMBILICUS

## 2025-01-20 ASSESSMENT — PAIN - FUNCTIONAL ASSESSMENT
PAIN_FUNCTIONAL_ASSESSMENT: 0-10
PAIN_FUNCTIONAL_ASSESSMENT: 0-10

## 2025-01-20 ASSESSMENT — PAIN SCALES - GENERAL
PAINLEVEL_OUTOF10: 6
PAINLEVEL_OUTOF10: 0 - NO PAIN
PAINLEVEL_OUTOF10: 0 - NO PAIN

## 2025-01-20 NOTE — INTERVAL H&P NOTE
H&P reviewed. The patient was examined and there are no changes to the H&P.    Plan for PCI with Dr. Weston today. Weak right radial pulse, 1+ left radial pulse. All questions answered, family at the bedside.

## 2025-01-20 NOTE — PROGRESS NOTES
Interval events:    No acute events overnight    Subjective:  Patient states that he is ready to get his PCI done today. He denies chest pain or shortness of breath. He began having some urinary retention last night thought to be secondary to constipation and was straight cathed last night for 500 mL. No documented urine overnight but he states that he has voided small amounts. Bladder scan with 367 mL so repeat straight cath was done for another 374 mL. He denies pain with urination. He had several episodes of emesis last night and was given Zofran and Phenergan. He reports that his nausea has completely resolved and he thinks he ate a bad sandwich last night that upset his stomach.    Today in brief:  - PCI with Dr. Weston today at 12:00  - cont with current regimen    Objective:  Vitals:    01/20/25 0315   BP: 90/53   Pulse: 79   Resp: 20   Temp: 36.8 °C (98.2 °F)   SpO2: 99%     Weight         1/16/2025  0358 1/17/2025  0408 1/18/2025  0504 1/19/2025  0522 1/20/2025  0315    Weight: 48.4 kg (106 lb 11.2 oz) 67 kg (147 lb 11.3 oz) 67.6 kg (149 lb 0.5 oz) 67.3 kg (148 lb 5.9 oz) 66.8 kg (147 lb 4.3 oz)            Intake/Output Summary (Last 24 hours) at 1/20/2025 0731  Last data filed at 1/20/2025 0117  Gross per 24 hour   Intake 330 ml   Output 800 ml   Net -470 ml     Recent Results (from the past 24 hours)   POCT GLUCOSE    Collection Time: 01/19/25 12:31 PM   Result Value Ref Range    POCT Glucose 175 (H) 74 - 99 mg/dL   POCT GLUCOSE    Collection Time: 01/19/25  6:47 PM   Result Value Ref Range    POCT Glucose 159 (H) 74 - 99 mg/dL   POCT GLUCOSE    Collection Time: 01/19/25  9:14 PM   Result Value Ref Range    POCT Glucose 143 (H) 74 - 99 mg/dL   CBC    Collection Time: 01/19/25 10:57 PM   Result Value Ref Range    WBC 10.7 4.4 - 11.3 x10*3/uL    nRBC 0.0 0.0 - 0.0 /100 WBCs    RBC 3.61 (L) 4.50 - 5.90 x10*6/uL    Hemoglobin 10.5 (L) 13.5 - 17.5 g/dL    Hematocrit 32.3 (L) 41.0 - 52.0 %    MCV 90 80 - 100  fL    MCH 29.1 26.0 - 34.0 pg    MCHC 32.5 32.0 - 36.0 g/dL    RDW 15.4 (H) 11.5 - 14.5 %    Platelets 135 (L) 150 - 450 x10*3/uL   Renal Function Panel    Collection Time: 01/19/25 10:57 PM   Result Value Ref Range    Glucose 121 (H) 74 - 99 mg/dL    Sodium 140 136 - 145 mmol/L    Potassium 3.7 3.5 - 5.3 mmol/L    Chloride 102 98 - 107 mmol/L    Bicarbonate 24 21 - 32 mmol/L    Anion Gap 18 10 - 20 mmol/L    Urea Nitrogen 20 6 - 23 mg/dL    Creatinine 1.41 (H) 0.50 - 1.30 mg/dL    eGFR 50 (L) >60 mL/min/1.73m*2    Calcium 9.6 8.6 - 10.6 mg/dL    Phosphorus 3.4 2.5 - 4.9 mg/dL    Albumin 4.2 3.4 - 5.0 g/dL   Magnesium    Collection Time: 01/19/25 10:57 PM   Result Value Ref Range    Magnesium 1.98 1.60 - 2.40 mg/dL   Heparin Assay    Collection Time: 01/19/25 10:57 PM   Result Value Ref Range    Heparin Unfractionated 0.2 See Comment Below for Therapeutic Ranges IU/mL   POCT GLUCOSE    Collection Time: 01/20/25  6:47 AM   Result Value Ref Range    POCT Glucose 174 (H) 74 - 99 mg/dL     Inpatient Medications:  Scheduled medications   Medication Dose Route Frequency    amLODIPine  5 mg oral Daily    aspirin  81 mg oral Daily    atorvastatin  40 mg oral Nightly    bisacodyl  10 mg rectal Daily    busPIRone  7.5 mg oral BID    insulin glargine  12 Units subcutaneous Nightly    insulin lispro  0-5 Units subcutaneous TID AC    isosorbide dinitrate  10 mg oral TID    levothyroxine  50 mcg oral Daily    metoprolol succinate XL  25 mg oral Daily    mirtazapine  15 mg oral Nightly    prasugrel  10 mg oral Daily    ranolazine  500 mg oral BID    sennosides-docusate sodium  1 tablet oral Nightly    [Held by provider] spironolactone  25 mg oral q AM     PRN medications   Medication    dextrose    dextrose    glucagon    glucagon    heparin    nitroglycerin     Continuous Medications   Medication Dose Last Rate    heparin  0-4,000 Units/hr 900 Units/hr (01/19/25 2030)     Procedures/testing:  TRANSTHORACIC ECHO (TTE) COMPLETE  1/14/2025   1. Poorly visualized anatomical structures due to suboptimal image quality.   2. Left ventricular cavity size is mild to moderately dilated.   3. Left ventricular ejection fraction is mildly decreased, by visual estimate at 45-50%.   4. Basal and mid inferior wall is abnormal.   5. Spectral Doppler shows a Grade I (impaired relaxation pattern) of left ventricular diastolic filling with normal left atrial filling pressure.   6. Abnormal septal motion consistent with post-operative status.   7. There is mildly reduced right ventricular systolic function.   8. The left atrium is enlarged.   9. Atrial septal aneurysm present.  10. There is no evidence of a patent foramen ovale.    CT ANGIO CHEST ABDOMEN PELVIS; 1/13/2025   CHEST  1.  No evidence of aortic aneurysm, dissection, or acute intramural  hematoma. Moderate calcified and noncalcified atherosclerotic plaque  of the thoracic and abdominal aorta. Patent left renal artery stent  and partially visualized bilateral superficial femoral artery stents.  Sternotomy changes. Coronary artery atherosclerotic calcifications.  2. No evidence of consolidation or pleural effusion. Moderate  centrilobular and paraseptal emphysema as well as nonspecific  pulmonary fibrotic changes. Asymmetric scarring in the left lung apex  is new since 2010 and consider follow-up chest CT in 6 months to  confirm stability.  ABDOMEN - PELVIS  1.  No acute abnormality in the abdomen/pelvis. Mild bilateral renal  cortical thinning.  2. Unchanged chronic compression deformity of L4 vertebral body.    Left Heart Cath 12/6/2024   1. Severe three-vessel coronary artery disease.   2. Patent LIMA to LAD but after the anastomosis there is moderate tubular lesion that has been stable for long time.   3. Distal left main of 90% that is giving flow to midsize ramus. Has been stable for many years.   4. Severe diffuse right coronary artery disease was 2 areas of underexpanded stents and severe  in-stent restenosis of 90% in the midsegment.   5. Balloon angioplasty was attempted we were unable to expand the mid RCA stent. There was still significant in-stent restenosis of 50% after balloon angioplasty. Improved from 99%. CIERRA-3 flow.   6. Medical management for now.    Telemetry 1/20/2025 (personally reviewed): SR BBB HR 50-60s, non conducting PACs with MFPVCs, periods of Mobitz I    Physical exam:  General: well appearing, NAD, pleasant  HEENT: NCAT, MMM, PERRL  CV: RRR, no m/r/g  PULM: CTAB, non-labored respirations   ABD: soft, NT, ND, + bowel sounds   EXT: LLE BKA, small ulcer on the posterior aspect of L thigh without drainage, no edema in the RLE   SKIN: no rashes noted   NEURO: A&Ox4, no gross motor or sensory deficits, normal gait  PSYCH: pleasant mood, appropriate affect, laughing and joking with family at bedside      Assessment/Plan   Chavez Llamas is a 79yM with PMHx of CAD s/p CABG 1990s (LIMA to LAD and SVG to unknown?) and multiple PCIs (most recent [facility] 12/2024), HFmrEF (40-45% 12/4/2024), HTN, T2DM (A1C 5.5 12/5/2025), PAD s/p left BKA who presented to [facility] 1/13 in the setting of nausea, vomiting, chest pain, and wound on LLE. Found to have NSTEMI s/p heparin gtt and transferred to  for high risk PCI.     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% 12/4/2024)  - CABG with LIMA to the LAD patent, SVG to an unknown vessel chronically occluded; S/p multiple PCIs with 5 stents placed to prox and mid RCA with severe ISR  - [facility] 12/2024- severe 3v disease, patent LIMA-LAD but mod tubular lesion after the anastomosis (stable for many years), distal LM of 90% giving flow to ramus (stable), severe diffuse RCA disease with severe midsegment ISR, s/p balloon angioplasty of RCA ISR (stenosis improved to 50% from 99%) with CIERRA 3 flow  - TTE notable for EF 45-50%, LV cavity mod dilated, basal and mid inferior wall abnormal, enlarged left atrium  - 1/17 episode of chest  pain (rating 2/10) and nausea this morning. ECG without significant change from previous.  - Trop peak 212 on 1/14 -> repeat 1/17 HS trop 56  - PCI with Dr. Weston today at noon  - C/w ASA 81 mg, pasugrel 10mg, atorvastatin 40mg  - s/p heparin gtt dc'd after 48 hr on 1/15-->   - 117 restarted heparin gtt iso recurrent chest pain, will continue until PCI Monday  - cont Isordil 10mg TID for antianginal agent  - Continue home amlodipine 5mg, metoprolol succinate 25mg  - Continue ranolazine 500mg bid (started at OSH)   - Holding spironolactone 25mg iso TEODORO which has improved, can restart following PCI/continued stabilization of Cr     TEODORO on CKD3a, improving  - Baseline Cr 1.3?  - Nephrology consulted at OSH, feel TEODORO iso contrast. No hydronephrosis on CT 1/13  - Cr trend: 1.67 (1.41, 1.53, 1.62, 1.79, 1.78, 1.22, 1.7, 1.59, 1.18)  - FeNa 1.1%  - UA neg  - Renally dose meds and avoid nephrotoxins   - s/p 500cc LR bolus 1/15     Emphysema on CTA  - No PFTs on file   - No home inhalers   - Outpatient repeat CT in 6 months to follow up assymetric L lung scarring      Chronic anemia (baseline Hgb ~10)  Chronic thrombocytopenia (baseline 110)  - CTM     T2DM (A1c 5.5% 12/2024)   Hypothyroidism  - Lantus 12U and SSI  - Hypoglycemia protocol  - Continue synthroid 50 mcg      Concern for LLE cellulitis   - XR knee 1/13/2025: No acute findings status post left below-the-knee amputation.  - Vascular US LE: No evidence of deep venous thrombus in the evaluated veins of the left leg from the inguinal ligament to the popliteal fossa.  - blood cx 1/13 NGTD x3 days  - wound cx 1/13 negative  - s/p Vanc/Zosyn (1/13)  - completed Keflex x5d (1/13-1/17)  - recommend orthotics appointment as an outpatient for prosthetic fitting, suspect prosthesis not fitting properly following recent weight loss    DVT ppx: heparin gtt  DISPO: pending PCI with Dr. Weston today  Code status: Full Code    Patient seen and discussed with   John.    Shaylee Diaz PA-C

## 2025-01-20 NOTE — CARE PLAN
Problem: Skin  Goal: Prevent/manage excess moisture  Outcome: Progressing     Problem: Skin  Goal: Prevent/minimize sheer/friction injuries  Outcome: Progressing     Problem: Fall/Injury  Goal: Be free from injury by end of the shift  Outcome: Progressing     Problem: Pain  Goal: Turns in bed with improved pain control throughout the shift  Outcome: Progressing     Problem: Pain  Goal: Walks with improved pain control throughout the shift  Outcome: Progressing       The clinical goals for the shift include Pt will rate pain <4 by end of shift.

## 2025-01-21 ENCOUNTER — APPOINTMENT (OUTPATIENT)
Dept: CARDIOLOGY | Facility: HOSPITAL | Age: 80
End: 2025-01-21
Payer: MEDICARE

## 2025-01-21 LAB
ALBUMIN SERPL BCP-MCNC: 3.5 G/DL (ref 3.4–5)
ANION GAP SERPL CALC-SCNC: 14 MMOL/L (ref 10–20)
BUN SERPL-MCNC: 25 MG/DL (ref 6–23)
CALCIUM SERPL-MCNC: 8.6 MG/DL (ref 8.6–10.6)
CARDIAC TROPONIN I PNL SERPL HS: 132 NG/L (ref 0–53)
CARDIAC TROPONIN I PNL SERPL HS: 141 NG/L (ref 0–53)
CHLORIDE SERPL-SCNC: 103 MMOL/L (ref 98–107)
CO2 SERPL-SCNC: 23 MMOL/L (ref 21–32)
CREAT SERPL-MCNC: 1.68 MG/DL (ref 0.5–1.3)
EGFRCR SERPLBLD CKD-EPI 2021: 41 ML/MIN/1.73M*2
ERYTHROCYTE [DISTWIDTH] IN BLOOD BY AUTOMATED COUNT: 15.6 % (ref 11.5–14.5)
GLUCOSE BLD MANUAL STRIP-MCNC: 105 MG/DL (ref 74–99)
GLUCOSE BLD MANUAL STRIP-MCNC: 134 MG/DL (ref 74–99)
GLUCOSE BLD MANUAL STRIP-MCNC: 165 MG/DL (ref 74–99)
GLUCOSE BLD MANUAL STRIP-MCNC: 170 MG/DL (ref 74–99)
GLUCOSE SERPL-MCNC: 166 MG/DL (ref 74–99)
HCT VFR BLD AUTO: 26.4 % (ref 41–52)
HGB BLD-MCNC: 8.4 G/DL (ref 13.5–17.5)
MAGNESIUM SERPL-MCNC: 2.32 MG/DL (ref 1.6–2.4)
MCH RBC QN AUTO: 29.5 PG (ref 26–34)
MCHC RBC AUTO-ENTMCNC: 31.8 G/DL (ref 32–36)
MCV RBC AUTO: 93 FL (ref 80–100)
NRBC BLD-RTO: 0 /100 WBCS (ref 0–0)
PHOSPHATE SERPL-MCNC: 3.1 MG/DL (ref 2.5–4.9)
PLATELET # BLD AUTO: 121 X10*3/UL (ref 150–450)
POTASSIUM SERPL-SCNC: 4.3 MMOL/L (ref 3.5–5.3)
RBC # BLD AUTO: 2.85 X10*6/UL (ref 4.5–5.9)
SODIUM SERPL-SCNC: 136 MMOL/L (ref 136–145)
WBC # BLD AUTO: 7.5 X10*3/UL (ref 4.4–11.3)

## 2025-01-21 PROCEDURE — 84484 ASSAY OF TROPONIN QUANT: CPT

## 2025-01-21 PROCEDURE — 85027 COMPLETE CBC AUTOMATED: CPT

## 2025-01-21 PROCEDURE — 36415 COLL VENOUS BLD VENIPUNCTURE: CPT

## 2025-01-21 PROCEDURE — 93010 ELECTROCARDIOGRAM REPORT: CPT | Performed by: INTERNAL MEDICINE

## 2025-01-21 PROCEDURE — 2500000001 HC RX 250 WO HCPCS SELF ADMINISTERED DRUGS (ALT 637 FOR MEDICARE OP)

## 2025-01-21 PROCEDURE — 93005 ELECTROCARDIOGRAM TRACING: CPT

## 2025-01-21 PROCEDURE — 83735 ASSAY OF MAGNESIUM: CPT

## 2025-01-21 PROCEDURE — 99232 SBSQ HOSP IP/OBS MODERATE 35: CPT

## 2025-01-21 PROCEDURE — 2500000002 HC RX 250 W HCPCS SELF ADMINISTERED DRUGS (ALT 637 FOR MEDICARE OP, ALT 636 FOR OP/ED)

## 2025-01-21 PROCEDURE — 99233 SBSQ HOSP IP/OBS HIGH 50: CPT | Performed by: INTERNAL MEDICINE

## 2025-01-21 PROCEDURE — 80069 RENAL FUNCTION PANEL: CPT

## 2025-01-21 PROCEDURE — 2500000004 HC RX 250 GENERAL PHARMACY W/ HCPCS (ALT 636 FOR OP/ED)

## 2025-01-21 PROCEDURE — 1200000002 HC GENERAL ROOM WITH TELEMETRY DAILY

## 2025-01-21 PROCEDURE — 82947 ASSAY GLUCOSE BLOOD QUANT: CPT

## 2025-01-21 RX ORDER — ISOSORBIDE MONONITRATE 30 MG/1
30 TABLET, EXTENDED RELEASE ORAL DAILY
Status: CANCELLED | OUTPATIENT
Start: 2025-01-22

## 2025-01-21 RX ORDER — ENOXAPARIN SODIUM 100 MG/ML
40 INJECTION SUBCUTANEOUS EVERY 24 HOURS
Status: DISCONTINUED | OUTPATIENT
Start: 2025-01-21 | End: 2025-01-25 | Stop reason: HOSPADM

## 2025-01-21 RX ORDER — ISOSORBIDE MONONITRATE 30 MG/1
15 TABLET, EXTENDED RELEASE ORAL ONCE
Status: CANCELLED | OUTPATIENT
Start: 2025-01-21

## 2025-01-21 RX ORDER — ACETAMINOPHEN 325 MG/1
650 TABLET ORAL EVERY 6 HOURS PRN
Status: DISCONTINUED | OUTPATIENT
Start: 2025-01-21 | End: 2025-01-22

## 2025-01-21 RX ORDER — ISOSORBIDE MONONITRATE 30 MG/1
30 TABLET, EXTENDED RELEASE ORAL DAILY
Status: DISCONTINUED | OUTPATIENT
Start: 2025-01-22 | End: 2025-01-23

## 2025-01-21 RX ADMIN — BUSPIRONE HYDROCHLORIDE 7.5 MG: 15 TABLET ORAL at 20:19

## 2025-01-21 RX ADMIN — AMLODIPINE BESYLATE 5 MG: 5 TABLET ORAL at 09:06

## 2025-01-21 RX ADMIN — MIRTAZAPINE 15 MG: 15 TABLET, FILM COATED ORAL at 20:19

## 2025-01-21 RX ADMIN — ATORVASTATIN CALCIUM 40 MG: 40 TABLET, FILM COATED ORAL at 20:19

## 2025-01-21 RX ADMIN — CLOPIDOGREL BISULFATE 300 MG: 300 TABLET, FILM COATED ORAL at 09:07

## 2025-01-21 RX ADMIN — METOPROLOL SUCCINATE 25 MG: 25 TABLET, EXTENDED RELEASE ORAL at 09:06

## 2025-01-21 RX ADMIN — INSULIN LISPRO 1 UNITS: 100 INJECTION, SOLUTION INTRAVENOUS; SUBCUTANEOUS at 16:27

## 2025-01-21 RX ADMIN — NITROGLYCERIN 0.4 MG: 0.4 TABLET SUBLINGUAL at 17:30

## 2025-01-21 RX ADMIN — INSULIN GLARGINE 12 UNITS: 100 INJECTION, SOLUTION SUBCUTANEOUS at 20:19

## 2025-01-21 RX ADMIN — LEVOTHYROXINE SODIUM 50 MCG: 0.05 TABLET ORAL at 06:21

## 2025-01-21 RX ADMIN — RANOLAZINE 500 MG: 500 TABLET, EXTENDED RELEASE ORAL at 09:11

## 2025-01-21 RX ADMIN — ENOXAPARIN SODIUM 40 MG: 100 INJECTION SUBCUTANEOUS at 13:40

## 2025-01-21 RX ADMIN — RANOLAZINE 500 MG: 500 TABLET, EXTENDED RELEASE ORAL at 20:19

## 2025-01-21 RX ADMIN — ASPIRIN 81 MG: 81 TABLET, COATED ORAL at 09:06

## 2025-01-21 RX ADMIN — ISOSORBIDE DINITRATE 10 MG: 10 TABLET ORAL at 13:40

## 2025-01-21 RX ADMIN — ACETAMINOPHEN 650 MG: 325 TABLET ORAL at 15:54

## 2025-01-21 RX ADMIN — ISOSORBIDE DINITRATE 10 MG: 10 TABLET ORAL at 09:06

## 2025-01-21 RX ADMIN — SENNOSIDES AND DOCUSATE SODIUM 1 TABLET: 50; 8.6 TABLET ORAL at 20:19

## 2025-01-21 RX ADMIN — ISOSORBIDE DINITRATE 10 MG: 10 TABLET ORAL at 18:56

## 2025-01-21 RX ADMIN — BUSPIRONE HYDROCHLORIDE 7.5 MG: 15 TABLET ORAL at 09:06

## 2025-01-21 ASSESSMENT — COGNITIVE AND FUNCTIONAL STATUS - GENERAL
WALKING IN HOSPITAL ROOM: A LOT
DAILY ACTIVITIY SCORE: 22
MOBILITY SCORE: 18
TURNING FROM BACK TO SIDE WHILE IN FLAT BAD: A LITTLE
HELP NEEDED FOR BATHING: A LITTLE
TOILETING: A LITTLE
DAILY ACTIVITIY SCORE: 22
CLIMB 3 TO 5 STEPS WITH RAILING: A LOT
TOILETING: A LITTLE
HELP NEEDED FOR BATHING: A LITTLE
MOVING TO AND FROM BED TO CHAIR: A LITTLE
WALKING IN HOSPITAL ROOM: A LOT
STANDING UP FROM CHAIR USING ARMS: A LITTLE
CLIMB 3 TO 5 STEPS WITH RAILING: A LOT
MOVING TO AND FROM BED TO CHAIR: A LOT
STANDING UP FROM CHAIR USING ARMS: A LOT
MOBILITY SCORE: 15

## 2025-01-21 ASSESSMENT — PAIN - FUNCTIONAL ASSESSMENT
PAIN_FUNCTIONAL_ASSESSMENT: 0-10

## 2025-01-21 ASSESSMENT — PAIN SCALES - GENERAL
PAINLEVEL_OUTOF10: 0 - NO PAIN
PAINLEVEL_OUTOF10: 0 - NO PAIN
PAINLEVEL_OUTOF10: 6
PAINLEVEL_OUTOF10: 0 - NO PAIN

## 2025-01-21 ASSESSMENT — PAIN DESCRIPTION - LOCATION: LOCATION: HEAD

## 2025-01-21 NOTE — CARE PLAN
The patient's goals for the shift include      The clinical goals for the shift include remain HDS, monitor Right radial for bleeding    Problem: Pain  Goal: Takes deep breaths with improved pain control throughout the shift  Outcome: Not Progressing  Goal: Turns in bed with improved pain control throughout the shift  Outcome: Not Progressing  Goal: Walks with improved pain control throughout the shift  Outcome: Not Progressing  Goal: Performs ADL's with improved pain control throughout shift  Outcome: Not Progressing  Goal: Participates in PT with improved pain control throughout the shift  Outcome: Not Progressing  Goal: Free from opioid side effects throughout the shift  Outcome: Not Progressing  Goal: Free from acute confusion related to pain meds throughout the shift  Outcome: Not Progressing

## 2025-01-21 NOTE — POST-PROCEDURE NOTE
Physician Transition of Care Summary  Invasive Cardiovascular Lab    Procedure Date: 1/21/2025  Attending:    * Oscar Weston - Primary  Resident/Fellow/Other Assistant: Surgeons and Role:     * Asif Aguirre MD - Fellow    Indications:   Pre-op Diagnosis      * Elevated troponin [R79.89]    Post-procedure diagnosis:   Post-op Diagnosis     * Elevated troponin [R79.89]    Procedure(s):     * PCI SOFI Stent- Coronary      Procedure Findings:   POBA and IVL for severe ISR with reasonable results. Very difficult to treat the under expanded stent due to it being deployed on a calcific nodule.  Due to inadequate lesion preparation we opted not to use DCB    Description of the Procedure:   RRA  7Fr system  TR band     Complications:   Nil     Stents/Implants:   Implants       No implant documentation for this case.            Anticoagulation/Antiplatelet Plan:       Estimated Blood Loss:   5 mL    Anesthesia: Moderate Sedation Anesthesia Staff: No anesthesia staff entered.    Any Specimen(s) Removed:   No specimens collected during this procedure.    Disposition:   Return to [facility]  For follow up with regular cardiologist  Decision for interventions of the LAD based on symptoms and discussion with Dr Soriano in clinic      Electronically signed by: Asif Aguirre MD, 1/21/2025 11:55 AM

## 2025-01-21 NOTE — DISCHARGE INSTRUCTIONS
DISCHARGE INSTRUCTIONS FOR RADIAL (WRIST) ACCESS AFTER HEART CATHETERIZATION    Next 24 hrs.  -DO NOT drive a car, operate machinery or power tools.  It is recommended that a responsible adult be with you for the first 24 hours.   -DO NOT drink any alcoholic drinks or take any non-prescriptive medications that contain alcohol for the first 24 hours.   -DO NOT make any important decisions for the first 24 hours.  -You are advised to go directly home from the hospital    Wound Care:  -You may experience some tenderness, bruising or minimal inflammation.  If you have any concerns, you may contact the Cath Lab or if any of these symptoms become excessive, contact your cardiologist or go to the emergency room.   -No tub baths, soaking, or swimming for one week.   -May shower the next day after your procedure.    Radial site Care:   -DO NOT lift anything with your affected arm for the next 24 hours, then no lifting greater than 5 pounds with your affected arm for 5 more days. No bending or flexing the affected wrist.   -Avoid immersion in water of the affected site for the next 3 days.   -Apply manual pressure and call physician immediately if bleeding or hematoma occurs at the site.   -Remove dressing the next day and keep site clean, dry, and covered with a new band aid daily until healed.   -Report any symptoms other than slight tenderness at the site or tingling of the fingers and hand for up to 3 days.    Diet:   -Heart healthy diet; Low cholesterol, fat and sodium    Other Instructions:  -If you develop difficulty breathing, rash, hives, fever, chills, severe nausea, vomiting, light-headedness or any signs of infection, immediately contact your doctor and go to the nearest emergency room.

## 2025-01-21 NOTE — SIGNIFICANT EVENT
At approximately 17:30, notified by bedside RN that patient was experiencing chest pain and LUE pain. Patient previously experienced nausea and HA earlier this afternoon for which tylenol and Tigan (pt has prolonged Qtc) were ordered. Arrived at bedside shortly thereafter. Pt states it feels like pressure across his inferior midsternal area. He denies any SOB at this time and is saturating well in the high 90's. HR is < 100bpm and heart sounds normal. He is warm and well perfused, +2 pulses, with appropriate blood pressure. Lungs are CTA with equal air entry. Nitroglycerin administered with subsequent decrease in BP, however recheck stable at 115/65.  EKG obtained and discussed with interventional cardiology who felt there were no significant changes from his prior study. Discussed with cardiology attending, Dr. Lopez and will plan to administer morphine/nitroglycerin and repeat EKG if there is recurrence of chest discomfort. Will also send troponin now and obtain echocardiogram. Per interventional cardiology, there is no indication for cath lab activation at this time, however will monitor for clinical deterioration.        19:00 update: Pt sleeping. When awoken, he states his chest discomfort is a 2/10 compared to 8/10 from earlier. He states his LUE pain has resolved completely. He continues to deny any SOB. He states his nausea has almost resolved and that is HA is starting to go away. Team spoke with CICU fellow and they were notified of patient's situation. Will consider transfer to CICU in the event that patient continues to experience chest discomfort. Per team will hold off on echocardiogram at this time. Plan of care discussed with bedside RN. Patient status signed out to overnight provider for awareness.     Cruz Guy PA-C

## 2025-01-21 NOTE — PROGRESS NOTES
Subjective Data:  Patient seen this AM, bleeding from right radial site stabilized overnight with TR band removal around midnight. Right forearm swelling noted after IV infiltrated yesterday, tenderness noted to area above access site. Right radial dressing removed, no focal hematoma noted to access site, swelling located proximal to access site near prior IV infiltration site. +dopplerable right radial pulse.     Patient denies any chest pain this AM. All questions answered.    - switched from Effient to Plavix today   - pending post- PCI EKG    Overnight Events:    Slow TR band removal yesterday due to oozing during TR band removal     Objective Data:  Last Recorded Vitals:  Vitals:    01/20/25 2200 01/21/25 0004 01/21/25 0357 01/21/25 0800   BP: 99/52 100/51 117/69 111/62   BP Location: Left arm   Left arm   Patient Position: Lying Lying Lying Lying   Pulse: 70 66 79 79   Resp: 16 21 18 18   Temp:  36.6 °C (97.9 °F) 36.2 °C (97.2 °F) 36.7 °C (98.1 °F)   TempSrc: Temporal   Temporal   SpO2: 99% 100% 100% 98%   Weight:   67.3 kg (148 lb 5.9 oz)    Height:           Last Labs:  CBC - 1/20/2025:  7:36 PM  8.6 8.4 103    25.9      CMP - 1/20/2025:  7:36 PM  8.6 6.9 10 --- 1.5   3.8 3.5 11 70      PTT - 1/14/2025:  8:21 PM  1.2   13.4 41     TROPHS   Date/Time Value Ref Range Status   01/17/2025 10:17 AM 56 0 - 53 ng/L Final   01/14/2025 11:27  0 - 53 ng/L Final     Comment:     Previous result verified on 1/14/2025 2016 on specimen/case [id number] called with component [facility] for procedure Troponin I, High Sensitivity with value 212 ng/L.   01/14/2025 06:59  0 - 53 ng/L Final   01/13/2025 02:01 PM 66 0 - 20 ng/L Final   01/13/2025 11:20 AM 17 0 - 20 ng/L Final   12/07/2024 04:17 AM 2,068 0 - 20 ng/L Final     BNP   Date/Time Value Ref Range Status   01/14/2025 06:59  0 - 99 pg/mL Final   01/13/2025 11:20  0 - 99 pg/mL Final     HGBA1C   Date/Time Value Ref Range Status   12/05/2024 05:05 AM  5.5 See comment % Final   06/11/2024 09:29 AM 5.3 see below % Final     LDLCALC   Date/Time Value Ref Range Status   01/15/2025 01:06 PM 21 <=99 mg/dL Final     Comment:                                 Near   Borderline      AGE      Desirable  Optimal    High     High     Very High     0-19 Y     0 - 109     ---    110-129   >/= 130     ----    20-24 Y     0 - 119     ---    120-159   >/= 160     ----      >24 Y     0 -  99   100-129  130-159   160-189     >/=190     12/04/2024 11:47 PM 34 <=99 mg/dL Final     Comment:                                 Near   Borderline      AGE      Desirable  Optimal    High     High     Very High     0-19 Y     0 - 109     ---    110-129   >/= 130     ----    20-24 Y     0 - 119     ---    120-159   >/= 160     ----      >24 Y     0 -  99   100-129  130-159   160-189     >/=190     06/11/2024 09:29 AM 23 <=99 mg/dL Final     Comment:                                 Near   Borderline      AGE      Desirable  Optimal    High     High     Very High     0-19 Y     0 - 109     ---    110-129   >/= 130     ----    20-24 Y     0 - 119     ---    120-159   >/= 160     ----      >24 Y     0 -  99   100-129  130-159   160-189     >/=190       VLDL   Date/Time Value Ref Range Status   01/15/2025 01:06 PM 17 0 - 40 mg/dL Final   12/04/2024 11:47 PM 18 0 - 40 mg/dL Final   06/11/2024 09:29 AM 19 0 - 40 mg/dL Final      Last I/O:  I/O last 3 completed shifts:  In: 510 (7.6 mL/kg) [P.O.:360; IV Piggyback:150]  Out: 850 (12.6 mL/kg) [Urine:845 (0.3 mL/kg/hr); Blood:5]  Weight: 67.3 kg     Inpatient Medications:  Scheduled medications   Medication Dose Route Frequency    amLODIPine  5 mg oral Daily    aspirin  81 mg oral Daily    atorvastatin  40 mg oral Nightly    bisacodyl  10 mg rectal Daily    busPIRone  7.5 mg oral BID    clopidogrel  300 mg oral Once    [START ON 1/22/2025] clopidogrel  75 mg oral Daily    insulin glargine  12 Units subcutaneous Nightly    insulin lispro  0-5 Units  subcutaneous TID AC    isosorbide dinitrate  10 mg oral TID    levothyroxine  50 mcg oral Daily    metoprolol succinate XL  25 mg oral Daily    mirtazapine  15 mg oral Nightly    ranolazine  500 mg oral BID    sennosides-docusate sodium  1 tablet oral Nightly    [Held by provider] spironolactone  25 mg oral q AM     PRN medications   Medication    dextrose    dextrose    glucagon    glucagon    nitroglycerin     Continuous Medications   Medication Dose Last Rate     Physical Exam  Constitutional:       Appearance: Normal appearance.   Eyes:      Comments: Poor vision    Cardiovascular:      Rate and Rhythm: Normal rate.      Comments: Right radial pulse +doppler   Pulmonary:      Effort: Pulmonary effort is normal. No respiratory distress.   Skin:     General: Skin is warm and dry.   Neurological:      Mental Status: He is alert and oriented to person, place, and time.   Psychiatric:         Mood and Affect: Mood normal.         Behavior: Behavior normal.         Assessment/Plan   Chavez Llamas is an 79 year old M with PMHx of CAD s/p CABG 1990s (LIMA to LAD and SVG to unknown?) and multiple PCIs (most recent [facility] 12/2024), HFmrEF (40-45% 12/4/2024), HTN, T2DM (A1C 5.5 12/5/2025), PAD s/p left BKA who presented to [facility] 1/13 in the setting of nausea, vomiting, chest pain, and wound on LLE. Found to have NSTEMI and transferred to [facility] for high risk PCI.     1/20  - s/p RCA PCI of POBA and IVL for severe ISR today with Dr. Weston via RRA  - received pre and post- hydration for PCI due to GFR of 39  - effient to switch to plavix tomorrow     1/21  - switched from Effient to Plavix today     Interventional Recs:   - cont telemetry care per PCI protocol  - cont DAPT with aspirin and plavix  - education on compliance with DAPT provided  - at discharge, PLEASE send 90 day prescription of Plavix to [facility] and remaining refills to patient's home pharmacy   - continue high intensity statin, BB (or document  contraindications for use)  - please ensure cardiology follow up appointment after discharge in 1-3 weeks  - patient referred for cardiac rehab evaluation  -  5lb weight restriction for 5 days for right radial access, post-cath instructions added to AVS  - Decision for interventions of the LAD based on symptoms and discussion with Dr Soriano in clinic        Interventional Cardiology will continue to follow while patient remains in house.       General Cardiology Consult Pager: [phone] (weekday 7AM-6PM and weekend 7AM-2PM)and other: [phone]  EP Consult Pager: [phone] (weekday 7AM-6PM and weekend 7AM-2PM) and other: [phone]  EP Device Nurse Pager: [phone] (weekday 7AM-4PM)  Advanced Heart Failure Consult Pager: [phone] anytime  CICU Fellow Pager: [phone] anytime  Endovascular /Limb Salvage Team Pager: [phone] for day coverage (8am-5pm)  Interventional Cardiology Fellow Pager: [phone] (7AM-5PM) Night coverage: [facility] Cross Cover [id number]  Structural Heart Team Pager: [phone] anytime  Night coverage: [id number]    YANELI Herrera-CNP  Interventional Cardiology  Code Status:  Full Code

## 2025-01-21 NOTE — PROGRESS NOTES
Interval events:    Concern for urinary retention, however was able to void this AM without straight cath. Concern for RUE PIV infiltration.     Subjective:  Pt bladder scanned (420 ml) due to concern for urinary retention. He was able to void 240ml. Additionally, pt reports pain at the right forearm that began yesterday near previous IV site. He denies CP or SOB. States he is eating without difficulty.     Today in brief:  - S/p RCA PCI yesterday. POBA and IVL for severe ISR with reasonable results. Follow up outpatient for consideration of LAD intervention.   - Discontinue amlodipine iso low-normal BP.   - Transition isordil 10mg TID to Imdur 30mg daily for home going.   - Rest, ice, elevation for suspected RUE PIV infiltration.   - Start q4h RUE neurovascular checks.   - Creatinine uptrending.     Objective:  Vitals:    01/21/25 1132   BP: 112/64   Pulse: 72   Resp: 18   Temp: 36.5 °C (97.7 °F)   SpO2: 98%     Weight         1/17/2025  0408 1/18/2025  0504 1/19/2025  0522 1/20/2025  0315 1/21/2025  0357    Weight: 67 kg (147 lb 11.3 oz) 67.6 kg (149 lb 0.5 oz) 67.3 kg (148 lb 5.9 oz) 66.8 kg (147 lb 4.3 oz) 67.3 kg (148 lb 5.9 oz)            Intake/Output Summary (Last 24 hours) at 1/21/2025 1135  Last data filed at 1/21/2025 1132  Gross per 24 hour   Intake 600 ml   Output 265 ml   Net 335 ml     Recent Results (from the past 24 hours)   ACTIVATED CLOTTING TIME LOW    Collection Time: 01/20/25  1:17 PM   Result Value Ref Range    POCT Activated Clotting Time Low Range 364 (H) 83 - 199 sec   POCT GLUCOSE    Collection Time: 01/20/25  2:57 PM   Result Value Ref Range    POCT Glucose 157 (H) 74 - 99 mg/dL   POCT GLUCOSE    Collection Time: 01/20/25  4:05 PM   Result Value Ref Range    POCT Glucose 128 (H) 74 - 99 mg/dL   CBC    Collection Time: 01/20/25  7:36 PM   Result Value Ref Range    WBC 8.6 4.4 - 11.3 x10*3/uL    nRBC 0.0 0.0 - 0.0 /100 WBCs    RBC 2.90 (L) 4.50 - 5.90 x10*6/uL    Hemoglobin 8.4 (L) 13.5 -  17.5 g/dL    Hematocrit 25.9 (L) 41.0 - 52.0 %    MCV 89 80 - 100 fL    MCH 29.0 26.0 - 34.0 pg    MCHC 32.4 32.0 - 36.0 g/dL    RDW 15.7 (H) 11.5 - 14.5 %    Platelets 103 (L) 150 - 450 x10*3/uL   Renal Function Panel    Collection Time: 01/20/25  7:36 PM   Result Value Ref Range    Glucose 148 (H) 74 - 99 mg/dL    Sodium 137 136 - 145 mmol/L    Potassium 4.6 3.5 - 5.3 mmol/L    Chloride 104 98 - 107 mmol/L    Bicarbonate 26 21 - 32 mmol/L    Anion Gap 12 10 - 20 mmol/L    Urea Nitrogen 25 (H) 6 - 23 mg/dL    Creatinine 1.73 (H) 0.50 - 1.30 mg/dL    eGFR 39 (L) >60 mL/min/1.73m*2    Calcium 8.6 8.6 - 10.6 mg/dL    Phosphorus 3.8 2.5 - 4.9 mg/dL    Albumin 3.5 3.4 - 5.0 g/dL   Magnesium    Collection Time: 01/20/25  7:36 PM   Result Value Ref Range    Magnesium 1.82 1.60 - 2.40 mg/dL   POCT GLUCOSE    Collection Time: 01/21/25  6:20 AM   Result Value Ref Range    POCT Glucose 105 (H) 74 - 99 mg/dL     Inpatient Medications:  Scheduled medications   Medication Dose Route Frequency    amLODIPine  5 mg oral Daily    aspirin  81 mg oral Daily    atorvastatin  40 mg oral Nightly    bisacodyl  10 mg rectal Daily    busPIRone  7.5 mg oral BID    [START ON 1/22/2025] clopidogrel  75 mg oral Daily    insulin glargine  12 Units subcutaneous Nightly    insulin lispro  0-5 Units subcutaneous TID AC    isosorbide dinitrate  10 mg oral TID    levothyroxine  50 mcg oral Daily    metoprolol succinate XL  25 mg oral Daily    mirtazapine  15 mg oral Nightly    ranolazine  500 mg oral BID    sennosides-docusate sodium  1 tablet oral Nightly    [Held by provider] spironolactone  25 mg oral q AM     PRN medications   Medication    dextrose    dextrose    glucagon    glucagon    nitroglycerin     Continuous Medications   Medication Dose Last Rate     Procedures/testing:  TRANSTHORACIC ECHO (TTE) COMPLETE 1/14/2025   1. Poorly visualized anatomical structures due to suboptimal image quality.   2. Left ventricular cavity size is mild to  moderately dilated.   3. Left ventricular ejection fraction is mildly decreased, by visual estimate at 45-50%.   4. Basal and mid inferior wall is abnormal.   5. Spectral Doppler shows a Grade I (impaired relaxation pattern) of left ventricular diastolic filling with normal left atrial filling pressure.   6. Abnormal septal motion consistent with post-operative status.   7. There is mildly reduced right ventricular systolic function.   8. The left atrium is enlarged.   9. Atrial septal aneurysm present.  10. There is no evidence of a patent foramen ovale.    CT ANGIO CHEST ABDOMEN PELVIS; 1/13/2025   CHEST  1.  No evidence of aortic aneurysm, dissection, or acute intramural  hematoma. Moderate calcified and noncalcified atherosclerotic plaque  of the thoracic and abdominal aorta. Patent left renal artery stent  and partially visualized bilateral superficial femoral artery stents.  Sternotomy changes. Coronary artery atherosclerotic calcifications.  2. No evidence of consolidation or pleural effusion. Moderate  centrilobular and paraseptal emphysema as well as nonspecific  pulmonary fibrotic changes. Asymmetric scarring in the left lung apex  is new since 2010 and consider follow-up chest CT in 6 months to  confirm stability.  ABDOMEN - PELVIS  1.  No acute abnormality in the abdomen/pelvis. Mild bilateral renal  cortical thinning.  2. Unchanged chronic compression deformity of L4 vertebral body.    Left Heart Cath 12/6/2024   1. Severe three-vessel coronary artery disease.   2. Patent LIMA to LAD but after the anastomosis there is moderate tubular lesion that has been stable for long time.   3. Distal left main of 90% that is giving flow to midsize ramus. Has been stable for many years.   4. Severe diffuse right coronary artery disease was 2 areas of underexpanded stents and severe in-stent restenosis of 90% in the midsegment.   5. Balloon angioplasty was attempted we were unable to expand the mid RCA stent. There  was still significant in-stent restenosis of 50% after balloon angioplasty. Improved from 99%. CIERRA-3 flow.   6. Medical management for now.    Telemetry 1/21/2025 (personally reviewed): SR BBB HR 50-60s, non conducting PACs with PVCs, periods of Mobitz I    Physical exam:  General: well appearing, NAD, pleasant  HEENT: Atraumatic.   CV: RRR, no m/r/g  PULM: CTAB, no WOB.   ABD: soft, NT, ND  EXT: LLE BKA. No erythema of the LLE or RLE. No LE edema noted. RUE anterior forearm is indurated, edematous, and painful to touch. Posterior aspect of forearm is also tender to palpation, but less compared to anterior. Brusing to the RUE forearm and AC joint. RUE warm with cap refill <2 sec  SKIN: no rashes noted   NEURO: No focal deficits. RUE sensation intact, no numbness.  PSYCH: pleasant mood, appropriate affect    Assessment/Plan   Chavez Llamas is a 79yM with PMHx of CAD s/p CABG 1990s (LIMA to LAD and SVG to unknown?) and multiple PCIs (most recent [facility] 12/2024), HFmrEF (40-45% 12/4/2024), HTN, T2DM (A1C 5.5 12/5/2025), PAD s/p left BKA who presented to [facility] 1/13 in the setting of nausea, vomiting, chest pain, and wound on LLE. Found to have NSTEMI s/p heparin gtt and transferred to  for high risk PCI.     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% 12/4/2024)  - CABG with LIMA to the LAD patent, SVG to an unknown vessel chronically occluded; S/p multiple PCIs with 5 stents placed to prox and mid RCA with severe ISR  - [facility] 12/2024- severe 3v disease, patent LIMA-LAD but mod tubular lesion after the anastomosis (stable for many years), distal LM of 90% giving flow to ramus (stable), severe diffuse RCA disease with severe midsegment ISR, s/p balloon angioplasty of RCA ISR (stenosis improved to 50% from 99%) with CIERRA 3 flow  - TTE notable for EF 45-50%, LV cavity mod dilated, basal and mid inferior wall abnormal, enlarged left atrium  - 1/17 episode of chest pain (rating 2/10) and  nausea this morning. ECG without significant change from previous.  - Trop peak 212 on 1/14 -> repeat 1/17 HS trop 56  - RCA PCI with Dr. Weston yesterday: POBA and IVL for severe ISR with reasonable results. Follow up outpatient for consideration of LAD intervention.   - transition Pasugrel to clopidogrel. Loaded today with 300mg and will start 75mg daily tomorrow.   - C/w ASA 81 mg and atorvastatin 40mg  - s/p heparin gtt dc'd after 48 hr on 1/15-->   - 117 restarted heparin gtt iso recurrent chest pain, will continue until PCI Monday  - Replace Isordil 10mg TID for Imdur 30mg daily.   - Discontinue home amlodipine 5mg  - Continue home metoprolol succinate 25mg  - Continue ranolazine 500mg bid (started at OSH)   - Holding spironolactone 25mg iso TEODORO, consider restarting following stabilization of Cr.      TEODORO on CKD3a, improving  Recent history of Acute urinary retention   - Baseline Cr 1.3?  - Nephrology consulted at OSH, feel TEODORO iso contrast. No hydronephrosis on CT 1/13  - Cr trend:  1.73, 1.41, 1.53, 1.62, 1.79, 1.78, 1.22, 1.7, 1.59, 1.18)  - FeNa 1.1%  - UA neg  - Renally dose meds and avoid nephrotoxins   - s/p 500cc LR bolus 1/15  - Straight cath on 1/19.   - Continue to monitor for any recurrence of retention, consider tamsulosin if indicated.     RUE PIV Infiltration  - per patient, pain began yesterday.   - Indurated and bruised right anterior forearm that is tender to palpation.   - Suspect 2/2 infiltrated IV placed in that area with exacerbation of post cath TR band also used on that extremity. No appreciable focal hematoma and hand is warm and well-perfused.   - Ordered q4 neurovascular checks.   - Consider US if worsening of symptoms.     Emphysema on CTA  - No PFTs on file   - No home inhalers   - Outpatient repeat CT in 6 months to follow up assymetric L lung scarring      Chronic anemia (baseline Hgb ~10)  Chronic thrombocytopenia (baseline 110)  - CTM     T2DM (A1c 5.5% 12/2024)    Hypothyroidism  - Lantus 12U and SSI#1  - Hypoglycemia protocol  - Continue synthroid 50 mcg      Concern for LLE cellulitis   - XR knee 1/13/2025: No acute findings status post left below-the-knee amputation.  - Vascular US LE: No evidence of deep venous thrombus in the evaluated veins of the left leg from the inguinal ligament to the popliteal fossa.  - blood cx 1/13 NGTD x3 days  - wound cx 1/13 negative  - s/p Vanc/Zosyn (1/13)  - completed Keflex x5d (1/13-1/17)  - recommend orthotics appointment as an outpatient for prosthetic fitting, suspect prosthesis not fitting properly following recent weight loss  - LLE is well-appearing on today's exam. No erythema or edema.     DVT ppx: Lovenox  DISPO: Pending further medical optimization.   Code status: Full Code    Patient seen and discussed with Dr. Lopez.    Cruz Guy PA-C

## 2025-01-21 NOTE — CARE PLAN
Problem: Skin  Goal: Decreased wound size/increased tissue granulation at next dressing change  Outcome: Progressing  Goal: Participates in plan/prevention/treatment measures  Outcome: Progressing  Goal: Prevent/manage excess moisture  Outcome: Progressing  Goal: Prevent/minimize sheer/friction injuries  Outcome: Progressing  Goal: Promote/optimize nutrition  Outcome: Progressing  Goal: Promote skin healing  Outcome: Progressing     Problem: Fall/Injury  Goal: Not fall by end of shift  Outcome: Progressing  Goal: Be free from injury by end of the shift  Outcome: Progressing  Goal: Verbalize understanding of personal risk factors for fall in the hospital  Outcome: Progressing  Goal: Verbalize understanding of risk factor reduction measures to prevent injury from fall in the home  Outcome: Progressing  Goal: Use assistive devices by end of the shift  Outcome: Progressing  Goal: Pace activities to prevent fatigue by end of the shift  Outcome: Progressing     Problem: Pain  Goal: Takes deep breaths with improved pain control throughout the shift  Outcome: Progressing  Goal: Turns in bed with improved pain control throughout the shift  Outcome: Progressing  Goal: Walks with improved pain control throughout the shift  Outcome: Progressing  Goal: Performs ADL's with improved pain control throughout shift  Outcome: Progressing  Goal: Participates in PT with improved pain control throughout the shift  Outcome: Progressing  Goal: Free from opioid side effects throughout the shift  Outcome: Progressing  Goal: Free from acute confusion related to pain meds throughout the shift  Outcome: Progressing       The clinical goals for the shift include Patient will have PCI    Over the shift, the patient had the PCI through the right radial. Band on at 13 ml.  Patient came off of anesthesia very slowly. At times was hard to arouse but no interventions needed.

## 2025-01-22 ENCOUNTER — APPOINTMENT (OUTPATIENT)
Dept: CARDIOLOGY | Facility: HOSPITAL | Age: 80
End: 2025-01-22
Payer: MEDICARE

## 2025-01-22 LAB
ALBUMIN SERPL BCP-MCNC: 3.5 G/DL (ref 3.4–5)
ANION GAP SERPL CALC-SCNC: 13 MMOL/L (ref 10–20)
AORTIC VALVE PEAK VELOCITY: 1.35 M/S
ATRIAL RATE: 69 BPM
AV PEAK GRADIENT: 7 MMHG
AVA (PEAK VEL): 1.13 CM2
BUN SERPL-MCNC: 22 MG/DL (ref 6–23)
CALCIUM SERPL-MCNC: 8.7 MG/DL (ref 8.6–10.6)
CHLORIDE SERPL-SCNC: 102 MMOL/L (ref 98–107)
CO2 SERPL-SCNC: 26 MMOL/L (ref 21–32)
CREAT SERPL-MCNC: 1.64 MG/DL (ref 0.5–1.3)
EGFRCR SERPLBLD CKD-EPI 2021: 42 ML/MIN/1.73M*2
EJECTION FRACTION APICAL 4 CHAMBER: 37.7
EJECTION FRACTION: 40 %
EJECTION FRACTION: 45 %
ERYTHROCYTE [DISTWIDTH] IN BLOOD BY AUTOMATED COUNT: 15.8 % (ref 11.5–14.5)
GLUCOSE BLD MANUAL STRIP-MCNC: 114 MG/DL (ref 74–99)
GLUCOSE BLD MANUAL STRIP-MCNC: 132 MG/DL (ref 74–99)
GLUCOSE BLD MANUAL STRIP-MCNC: 145 MG/DL (ref 74–99)
GLUCOSE BLD MANUAL STRIP-MCNC: 173 MG/DL (ref 74–99)
GLUCOSE SERPL-MCNC: 153 MG/DL (ref 74–99)
HCT VFR BLD AUTO: 25.8 % (ref 41–52)
HGB BLD-MCNC: 8.7 G/DL (ref 13.5–17.5)
LEFT ATRIUM VOLUME AREA LENGTH INDEX BSA: 42.3 ML/M2
LEFT VENTRICLE INTERNAL DIMENSION DIASTOLE: 5.09 CM (ref 3.5–6)
LEFT VENTRICULAR OUTFLOW TRACT DIAMETER: 1.8 CM
MAGNESIUM SERPL-MCNC: 2.16 MG/DL (ref 1.6–2.4)
MCH RBC QN AUTO: 29.9 PG (ref 26–34)
MCHC RBC AUTO-ENTMCNC: 33.7 G/DL (ref 32–36)
MCV RBC AUTO: 89 FL (ref 80–100)
MITRAL VALVE E/A RATIO: 1.72
NRBC BLD-RTO: 0 /100 WBCS (ref 0–0)
P AXIS: 60 DEGREES
P OFFSET: 163 MS
P ONSET: 109 MS
PHOSPHATE SERPL-MCNC: 3.1 MG/DL (ref 2.5–4.9)
PLATELET # BLD AUTO: 131 X10*3/UL (ref 150–450)
POTASSIUM SERPL-SCNC: 4.3 MMOL/L (ref 3.5–5.3)
PR INTERVAL: 206 MS
Q ONSET: 212 MS
QRS COUNT: 11 BEATS
QRS DURATION: 162 MS
QT INTERVAL: 446 MS
QTC CALCULATION(BAZETT): 477 MS
QTC FREDERICIA: 467 MS
R AXIS: -4 DEGREES
RBC # BLD AUTO: 2.91 X10*6/UL (ref 4.5–5.9)
RIGHT VENTRICLE FREE WALL PEAK S': 7 CM/S
RIGHT VENTRICLE PEAK SYSTOLIC PRESSURE: 32.2 MMHG
RIGHT VENTRICLE PEAK SYSTOLIC PRESSURE: 51.5 MMHG
SODIUM SERPL-SCNC: 137 MMOL/L (ref 136–145)
T AXIS: 135 DEGREES
T OFFSET: 435 MS
TRICUSPID ANNULAR PLANE SYSTOLIC EXCURSION: 1 CM
VENTRICULAR RATE: 69 BPM
WBC # BLD AUTO: 7.6 X10*3/UL (ref 4.4–11.3)

## 2025-01-22 PROCEDURE — 99232 SBSQ HOSP IP/OBS MODERATE 35: CPT | Performed by: NURSE PRACTITIONER

## 2025-01-22 PROCEDURE — 1200000002 HC GENERAL ROOM WITH TELEMETRY DAILY

## 2025-01-22 PROCEDURE — 93308 TTE F-UP OR LMTD: CPT

## 2025-01-22 PROCEDURE — 2500000002 HC RX 250 W HCPCS SELF ADMINISTERED DRUGS (ALT 637 FOR MEDICARE OP, ALT 636 FOR OP/ED)

## 2025-01-22 PROCEDURE — 82728 ASSAY OF FERRITIN: CPT | Performed by: NURSE PRACTITIONER

## 2025-01-22 PROCEDURE — 2500000001 HC RX 250 WO HCPCS SELF ADMINISTERED DRUGS (ALT 637 FOR MEDICARE OP)

## 2025-01-22 PROCEDURE — 83540 ASSAY OF IRON: CPT | Performed by: NURSE PRACTITIONER

## 2025-01-22 PROCEDURE — 83735 ASSAY OF MAGNESIUM: CPT

## 2025-01-22 PROCEDURE — 93005 ELECTROCARDIOGRAM TRACING: CPT

## 2025-01-22 PROCEDURE — 93308 TTE F-UP OR LMTD: CPT | Performed by: INTERNAL MEDICINE

## 2025-01-22 PROCEDURE — 93321 DOPPLER ECHO F-UP/LMTD STD: CPT | Performed by: INTERNAL MEDICINE

## 2025-01-22 PROCEDURE — 2500000004 HC RX 250 GENERAL PHARMACY W/ HCPCS (ALT 636 FOR OP/ED)

## 2025-01-22 PROCEDURE — 93325 DOPPLER ECHO COLOR FLOW MAPG: CPT | Performed by: INTERNAL MEDICINE

## 2025-01-22 PROCEDURE — 36415 COLL VENOUS BLD VENIPUNCTURE: CPT

## 2025-01-22 PROCEDURE — 82947 ASSAY GLUCOSE BLOOD QUANT: CPT

## 2025-01-22 PROCEDURE — 93010 ELECTROCARDIOGRAM REPORT: CPT | Performed by: INTERNAL MEDICINE

## 2025-01-22 PROCEDURE — 80069 RENAL FUNCTION PANEL: CPT

## 2025-01-22 PROCEDURE — 85027 COMPLETE CBC AUTOMATED: CPT

## 2025-01-22 PROCEDURE — 93325 DOPPLER ECHO COLOR FLOW MAPG: CPT

## 2025-01-22 PROCEDURE — 99233 SBSQ HOSP IP/OBS HIGH 50: CPT | Performed by: INTERNAL MEDICINE

## 2025-01-22 RX ORDER — SCOPOLAMINE 1 MG/3D
1 PATCH, EXTENDED RELEASE TRANSDERMAL
Status: DISCONTINUED | OUTPATIENT
Start: 2025-01-22 | End: 2025-01-23

## 2025-01-22 RX ORDER — PANTOPRAZOLE SODIUM 40 MG/10ML
40 INJECTION, POWDER, LYOPHILIZED, FOR SOLUTION INTRAVENOUS 2 TIMES DAILY
Status: DISCONTINUED | OUTPATIENT
Start: 2025-01-22 | End: 2025-01-24

## 2025-01-22 RX ORDER — RANOLAZINE 500 MG/1
1000 TABLET, EXTENDED RELEASE ORAL 2 TIMES DAILY
Status: DISCONTINUED | OUTPATIENT
Start: 2025-01-22 | End: 2025-01-25 | Stop reason: HOSPADM

## 2025-01-22 RX ORDER — SUCRALFATE 1 G/1
1 TABLET ORAL
Status: DISCONTINUED | OUTPATIENT
Start: 2025-01-22 | End: 2025-01-23

## 2025-01-22 RX ORDER — PANTOPRAZOLE SODIUM 20 MG/1
20 TABLET, DELAYED RELEASE ORAL
Status: DISCONTINUED | OUTPATIENT
Start: 2025-01-23 | End: 2025-01-22

## 2025-01-22 RX ORDER — FUROSEMIDE 10 MG/ML
40 INJECTION INTRAMUSCULAR; INTRAVENOUS ONCE
Status: COMPLETED | OUTPATIENT
Start: 2025-01-22 | End: 2025-01-22

## 2025-01-22 RX ORDER — ACETAMINOPHEN 325 MG/1
650 TABLET ORAL EVERY 6 HOURS
Status: DISPENSED | OUTPATIENT
Start: 2025-01-22 | End: 2025-01-23

## 2025-01-22 RX ADMIN — CLOPIDOGREL BISULFATE 75 MG: 75 TABLET ORAL at 09:33

## 2025-01-22 RX ADMIN — ACETAMINOPHEN 650 MG: 325 TABLET ORAL at 21:35

## 2025-01-22 RX ADMIN — PANTOPRAZOLE SODIUM 40 MG: 40 INJECTION, POWDER, FOR SOLUTION INTRAVENOUS at 16:51

## 2025-01-22 RX ADMIN — ACETAMINOPHEN 650 MG: 325 TABLET ORAL at 12:50

## 2025-01-22 RX ADMIN — SENNOSIDES AND DOCUSATE SODIUM 1 TABLET: 50; 8.6 TABLET ORAL at 21:33

## 2025-01-22 RX ADMIN — ACETAMINOPHEN 650 MG: 325 TABLET ORAL at 16:51

## 2025-01-22 RX ADMIN — INSULIN LISPRO 1 UNITS: 100 INJECTION, SOLUTION INTRAVENOUS; SUBCUTANEOUS at 11:49

## 2025-01-22 RX ADMIN — RANOLAZINE 500 MG: 500 TABLET, EXTENDED RELEASE ORAL at 09:31

## 2025-01-22 RX ADMIN — BUSPIRONE HYDROCHLORIDE 7.5 MG: 15 TABLET ORAL at 09:33

## 2025-01-22 RX ADMIN — BISACODYL 10 MG: 10 SUPPOSITORY RECTAL at 18:24

## 2025-01-22 RX ADMIN — BUSPIRONE HYDROCHLORIDE 7.5 MG: 15 TABLET ORAL at 21:33

## 2025-01-22 RX ADMIN — RANOLAZINE 1000 MG: 500 TABLET, EXTENDED RELEASE ORAL at 21:33

## 2025-01-22 RX ADMIN — ISOSORBIDE MONONITRATE 30 MG: 30 TABLET, EXTENDED RELEASE ORAL at 09:34

## 2025-01-22 RX ADMIN — FUROSEMIDE 40 MG: 10 INJECTION, SOLUTION INTRAMUSCULAR; INTRAVENOUS at 15:07

## 2025-01-22 RX ADMIN — ATORVASTATIN CALCIUM 40 MG: 40 TABLET, FILM COATED ORAL at 21:34

## 2025-01-22 RX ADMIN — LEVOTHYROXINE SODIUM 50 MCG: 0.05 TABLET ORAL at 06:20

## 2025-01-22 RX ADMIN — SCOPOLAMINE 1 PATCH: 1.5 PATCH, EXTENDED RELEASE TRANSDERMAL at 12:50

## 2025-01-22 RX ADMIN — ASPIRIN 81 MG: 81 TABLET, COATED ORAL at 09:31

## 2025-01-22 RX ADMIN — METOPROLOL SUCCINATE 25 MG: 25 TABLET, EXTENDED RELEASE ORAL at 09:34

## 2025-01-22 RX ADMIN — MIRTAZAPINE 15 MG: 15 TABLET, FILM COATED ORAL at 21:34

## 2025-01-22 RX ADMIN — TRIMETHOBENZAMIDE HYDROCHLORIDE 200 MG: 100 INJECTION INTRAMUSCULAR at 09:31

## 2025-01-22 RX ADMIN — SUCRALFATE 1 G: 1 TABLET ORAL at 16:51

## 2025-01-22 ASSESSMENT — COGNITIVE AND FUNCTIONAL STATUS - GENERAL
MOVING TO AND FROM BED TO CHAIR: A LITTLE
MOVING TO AND FROM BED TO CHAIR: A LITTLE
DAILY ACTIVITIY SCORE: 22
DAILY ACTIVITIY SCORE: 22
TOILETING: A LITTLE
WALKING IN HOSPITAL ROOM: A LOT
DRESSING REGULAR UPPER BODY CLOTHING: A LITTLE
STANDING UP FROM CHAIR USING ARMS: A LITTLE
TOILETING: A LITTLE
STANDING UP FROM CHAIR USING ARMS: A LITTLE
CLIMB 3 TO 5 STEPS WITH RAILING: A LOT
CLIMB 3 TO 5 STEPS WITH RAILING: A LOT
TOILETING: A LITTLE
WALKING IN HOSPITAL ROOM: A LOT
MOVING TO AND FROM BED TO CHAIR: A LITTLE
DAILY ACTIVITIY SCORE: 19
STANDING UP FROM CHAIR USING ARMS: A LOT
CLIMB 3 TO 5 STEPS WITH RAILING: A LOT
TOILETING: A LITTLE
MOBILITY SCORE: 18
MOVING TO AND FROM BED TO CHAIR: A LITTLE
CLIMB 3 TO 5 STEPS WITH RAILING: A LOT
DRESSING REGULAR LOWER BODY CLOTHING: A LITTLE
MOBILITY SCORE: 18
STANDING UP FROM CHAIR USING ARMS: A LITTLE
HELP NEEDED FOR BATHING: A LITTLE
MOBILITY SCORE: 18
WALKING IN HOSPITAL ROOM: A LOT
MOBILITY SCORE: 17
DAILY ACTIVITIY SCORE: 22
HELP NEEDED FOR BATHING: A LOT
WALKING IN HOSPITAL ROOM: A LOT

## 2025-01-22 ASSESSMENT — PAIN SCALES - GENERAL
PAINLEVEL_OUTOF10: 0 - NO PAIN
PAINLEVEL_OUTOF10: 0 - NO PAIN

## 2025-01-22 ASSESSMENT — PAIN - FUNCTIONAL ASSESSMENT
PAIN_FUNCTIONAL_ASSESSMENT: 0-10
PAIN_FUNCTIONAL_ASSESSMENT: 0-10

## 2025-01-22 NOTE — CARE PLAN
The patient's goals for the shift include      The clinical goals for the shift include no nausea and chest pain throughout shift      Problem: Skin  Goal: Decreased wound size/increased tissue granulation at next dressing change  Outcome: Progressing  Goal: Participates in plan/prevention/treatment measures  Outcome: Progressing  Goal: Prevent/manage excess moisture  Outcome: Progressing  Goal: Prevent/minimize sheer/friction injuries  Outcome: Progressing  Goal: Promote/optimize nutrition  Outcome: Progressing  Goal: Promote skin healing  Outcome: Progressing     Problem: Fall/Injury  Goal: Not fall by end of shift  Outcome: Progressing  Goal: Be free from injury by end of the shift  Outcome: Progressing  Goal: Verbalize understanding of personal risk factors for fall in the hospital  Outcome: Progressing  Goal: Verbalize understanding of risk factor reduction measures to prevent injury from fall in the home  Outcome: Progressing  Goal: Use assistive devices by end of the shift  Outcome: Progressing  Goal: Pace activities to prevent fatigue by end of the shift  Outcome: Progressing     Problem: Pain  Goal: Takes deep breaths with improved pain control throughout the shift  Outcome: Progressing  Goal: Turns in bed with improved pain control throughout the shift  Outcome: Progressing  Goal: Walks with improved pain control throughout the shift  Outcome: Progressing  Goal: Performs ADL's with improved pain control throughout shift  Outcome: Progressing  Goal: Participates in PT with improved pain control throughout the shift  Outcome: Progressing  Goal: Free from opioid side effects throughout the shift  Outcome: Progressing  Goal: Free from acute confusion related to pain meds throughout the shift  Outcome: Progressing

## 2025-01-22 NOTE — CARE PLAN
Pt had no chest pain overnight. Still has some slight R. Arm pain, but was managed with cold packs. Urinated 250 out overnight

## 2025-01-22 NOTE — PROGRESS NOTES
"Interval events:    Reported no additional acute chest pain episodes overnight. Able to void overnight without issue.     Subjective:  - Pt states his chest pressure is persistent but remains at a 2/10. He denies further acute CP episodes. He states he continues to be nauseous. Interventional cardiology aware. He states his RUE pain is starting to improve by about \"a 1/3rd\". Per bedside nursing, no issue with dopplering right radial artery.     Today in brief:  - Limited echo to evaluate function, valves, effusion: EF 40%, mod-severe MR, mild dilation of IVC, mod-reduction in RV systolic function, trivial pericardial effusion.   - Repeat ECG today.   - ISO persistent chest tightness, increase Ranexa to 1,00mg BID.   - Lasix 40mg today in the setting of newly found mod-severe MR, dilated IVC.   - Will hold on RUE US as patient unable to tolerate pressure to anterior forearm and given that pain isn improving.   - Schedule tylenol x24hrs for RUE pain management.   - PPI and Carafate for indigestion.   - Scopolamine patch for nausea.     Objective:  Vitals:    01/22/25 0334   BP: 102/56   Pulse: 69   Resp: 20   Temp: 36 °C (96.8 °F)   SpO2: 96%     Weight         1/18/2025  0504 1/19/2025  0522 1/20/2025  0315 1/21/2025  0357 1/22/2025  0334    Weight: 67.6 kg (149 lb 0.5 oz) 67.3 kg (148 lb 5.9 oz) 66.8 kg (147 lb 4.3 oz) 67.3 kg (148 lb 5.9 oz) 67.2 kg (148 lb 2.4 oz)            Intake/Output Summary (Last 24 hours) at 1/22/2025 0709  Last data filed at 1/22/2025 0326  Gross per 24 hour   Intake 720 ml   Output 610 ml   Net 110 ml     Recent Results (from the past 24 hours)   POCT GLUCOSE    Collection Time: 01/21/25 12:44 PM   Result Value Ref Range    POCT Glucose 134 (H) 74 - 99 mg/dL   POCT GLUCOSE    Collection Time: 01/21/25  4:10 PM   Result Value Ref Range    POCT Glucose 170 (H) 74 - 99 mg/dL   CBC    Collection Time: 01/21/25  7:47 PM   Result Value Ref Range    WBC 7.5 4.4 - 11.3 x10*3/uL    nRBC 0.0 0.0 - " 0.0 /100 WBCs    RBC 2.85 (L) 4.50 - 5.90 x10*6/uL    Hemoglobin 8.4 (L) 13.5 - 17.5 g/dL    Hematocrit 26.4 (L) 41.0 - 52.0 %    MCV 93 80 - 100 fL    MCH 29.5 26.0 - 34.0 pg    MCHC 31.8 (L) 32.0 - 36.0 g/dL    RDW 15.6 (H) 11.5 - 14.5 %    Platelets 121 (L) 150 - 450 x10*3/uL   Magnesium    Collection Time: 01/21/25  7:47 PM   Result Value Ref Range    Magnesium 2.32 1.60 - 2.40 mg/dL   Renal Function Panel    Collection Time: 01/21/25  7:47 PM   Result Value Ref Range    Glucose 166 (H) 74 - 99 mg/dL    Sodium 136 136 - 145 mmol/L    Potassium 4.3 3.5 - 5.3 mmol/L    Chloride 103 98 - 107 mmol/L    Bicarbonate 23 21 - 32 mmol/L    Anion Gap 14 10 - 20 mmol/L    Urea Nitrogen 25 (H) 6 - 23 mg/dL    Creatinine 1.68 (H) 0.50 - 1.30 mg/dL    eGFR 41 (L) >60 mL/min/1.73m*2    Calcium 8.6 8.6 - 10.6 mg/dL    Phosphorus 3.1 2.5 - 4.9 mg/dL    Albumin 3.5 3.4 - 5.0 g/dL   Troponin I, High Sensitivity    Collection Time: 01/21/25  7:47 PM   Result Value Ref Range    Troponin I, High Sensitivity (CMC) 141 (HH) 0 - 53 ng/L   POCT GLUCOSE    Collection Time: 01/21/25  9:20 PM   Result Value Ref Range    POCT Glucose 165 (H) 74 - 99 mg/dL   POCT GLUCOSE    Collection Time: 01/22/25  6:08 AM   Result Value Ref Range    POCT Glucose 114 (H) 74 - 99 mg/dL     Inpatient Medications:  Scheduled medications   Medication Dose Route Frequency    aspirin  81 mg oral Daily    atorvastatin  40 mg oral Nightly    bisacodyl  10 mg rectal Daily    busPIRone  7.5 mg oral BID    clopidogrel  75 mg oral Daily    enoxaparin  40 mg subcutaneous q24h    insulin glargine  12 Units subcutaneous Nightly    insulin lispro  0-5 Units subcutaneous TID AC    isosorbide mononitrate ER  30 mg oral Daily    levothyroxine  50 mcg oral Daily    metoprolol succinate XL  25 mg oral Daily    mirtazapine  15 mg oral Nightly    ranolazine  500 mg oral BID    sennosides-docusate sodium  1 tablet oral Nightly    [Held by provider] spironolactone  25 mg oral q AM      PRN medications   Medication    acetaminophen    dextrose    dextrose    glucagon    glucagon    nitroglycerin    trimethobenzamide     Continuous Medications   Medication Dose Last Rate     Procedures/testing:  TRANSTHORACIC ECHO (TTE) COMPLETE 1/14/2025   1. Poorly visualized anatomical structures due to suboptimal image quality.   2. Left ventricular cavity size is mild to moderately dilated.   3. Left ventricular ejection fraction is mildly decreased, by visual estimate at 45-50%.   4. Basal and mid inferior wall is abnormal.   5. Spectral Doppler shows a Grade I (impaired relaxation pattern) of left ventricular diastolic filling with normal left atrial filling pressure.   6. Abnormal septal motion consistent with post-operative status.   7. There is mildly reduced right ventricular systolic function.   8. The left atrium is enlarged.   9. Atrial septal aneurysm present.  10. There is no evidence of a patent foramen ovale.    CT ANGIO CHEST ABDOMEN PELVIS; 1/13/2025   CHEST  1.  No evidence of aortic aneurysm, dissection, or acute intramural  hematoma. Moderate calcified and noncalcified atherosclerotic plaque  of the thoracic and abdominal aorta. Patent left renal artery stent  and partially visualized bilateral superficial femoral artery stents.  Sternotomy changes. Coronary artery atherosclerotic calcifications.  2. No evidence of consolidation or pleural effusion. Moderate  centrilobular and paraseptal emphysema as well as nonspecific  pulmonary fibrotic changes. Asymmetric scarring in the left lung apex  is new since 2010 and consider follow-up chest CT in 6 months to  confirm stability.  ABDOMEN - PELVIS  1.  No acute abnormality in the abdomen/pelvis. Mild bilateral renal  cortical thinning.  2. Unchanged chronic compression deformity of L4 vertebral body.    Left Heart Cath 12/6/2024   1. Severe three-vessel coronary artery disease.   2. Patent LIMA to LAD but after the anastomosis there is moderate  tubular lesion that has been stable for long time.   3. Distal left main of 90% that is giving flow to midsize ramus. Has been stable for many years.   4. Severe diffuse right coronary artery disease was 2 areas of underexpanded stents and severe in-stent restenosis of 90% in the midsegment.   5. Balloon angioplasty was attempted we were unable to expand the mid RCA stent. There was still significant in-stent restenosis of 50% after balloon angioplasty. Improved from 99%. CIERRA-3 flow.   6. Medical management for now.    Physical exam:  General: well appearing, NAD, pleasant  HEENT: Atraumatic.   CV: RRR, soft systolic murmur.   PULM: CTAB, no WOB.   ABD: soft, NT, ND  EXT: LLE BKA. No erythema of the LLE or RLE. No LE edema noted. RUE anterior forearm is indurated, mild edema, and painful to touch. Posterior aspect of forearm is also tender to palpation, but less compared to anterior. Brusing to the RUE forearm and AC joint. RUE warm with cap refill of 2 sec. RUE has similar color when compared to LUE.   SKIN: no rashes noted   NEURO: No focal deficits. Some decreased sensation to RUE digits.   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 Found to have NSTEMI s/p heparin gtt and transferred to  for high risk PCI.     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% 12/4/2024)  - CABG with LIMA to the LAD patent, SVG to an unknown vessel chronically occluded; S/p multiple PCIs with 5 stents placed to prox and mid RCA with severe ISR  - [facility] 12/2024- severe 3v disease, patent LIMA-LAD but mod tubular lesion after the anastomosis (stable for many years), distal LM of 90%  giving flow to ramus (stable), severe diffuse RCA disease with severe midsegment ISR, s/p balloon angioplasty of RCA ISR (stenosis improved to 50% from 99%) with CIERRA 3 flow  - TTE notable for EF 45-50%, LV cavity mod dilated, basal and mid inferior wall abnormal, enlarged left atrium  - 1/22: Limited echo: EF 40%, mod-severe MR, mild dilation of IVC, mod-reduction in RV systolic function, trivial pericardial effusion.   - 1/17 episode of chest pain (rating 2/10) and nausea this morning. ECG without significant change from previous.  - Trop peak 212 on 1/14 -> repeat 1/17 HS trop 56  - Repeat troponin yesterday evening iso chest tightness: 141 (suspect 2/2 recent PCI)  - RCA PCI with Dr. Weston yesterday: POBA and IVL for severe ISR with reasonable results.  - Decision to intervene on LAD disease will be based on symptoms and discussion with Dr. Soriano in clinic.   - S/p Pasugrel switched to clopidogrel. S/p clopidogrel load yesterday with 300mg. Today, start 75mg daily.   - Discussed today's updates with interventional cardiology, appreciate recs.   - C/w ASA 81 mg and atorvastatin 40mg  - Will need 90 day prescription of Plavix to [facility] and remaining refills to patient's home pharmacy upon discharge.   - s/p heparin gtt dc'd after 48 hr on 1/15-->   - 117 restarted heparin gtt iso recurrent chest pain, will continue until PCI Monday  - Continue Imdur 30mg daily.   - S/p amlodipine discontinuation.   - Continue home metoprolol succinate 25mg  - Increase ranolazine 500mg bid (started at OSH) to 1,000mg BID iso persistent mild chest tightness.   - lasix 40mg IV x1 today iso echo findings.   - Holding spironolactone 25mg iso TEODORO, consider restarting following stabilization of Cr.      TEODORO on CKD3a, improving  Recent history of Acute urinary retention   - Baseline Cr 1.3?  - Nephrology consulted at OSH, feel TEODORO iso contrast. No hydronephrosis on CT 1/13  - Cr trend:  1.68 (1.73, 1.41, 1.53, 1.62, 1.79, 1.78, 1.22,  "1.7, 1.59, 1.18)  - FeNa 1.1%  - UA neg  - Renally dose meds and avoid nephrotoxins   - s/p 500cc LR bolus 1/15  - Straight cath on 1/19.   - Continue to monitor for any recurrence of retention, consider tamsulosin if indicated. Pt currently voiding without difficulty.     RUE PIV Infiltration  - per patient, pain began 1/20.   - Indurated and bruised right anterior forearm that is tender to palpation.   - Suspect 2/2 infiltrated IV placed in that area with exacerbation of post cath TR band also used on that extremity. No appreciable focal hematoma and hand is warm and well-perfused. Per nursing, right radial artery has been dopplered well.   - Continue q4 neurovascular checks. Monitor for findings that might suggest acute ischemia 2/2 compartment syndrome. Perfusion on exam is reassuring at this time.   - Suspect difficulty with obtaining RUE US as patient has significant pain over anterior forearm.   - Tylenol for 24 hours for acute pain management.     GI Discomfort  Nausea  - Patient complaining of persistent nausea and \"indigestion\".  - No known emeses as of recent.   - Per chart review patient was previously seen by GI for coffee ground emesis in [facility]. No melena. Episode occurred in the setting of covid infection. At that time, GI recommended high dose PPI and carafate as he was on DAPT to treat for possible GI bleed. His H&H remained stable so no endoscopy was performed at that time.   - Given he is on DAPT, will start him on high-dose protonix and carafate. Will monitor his nausea and indigestion thereafter with low threshold to consult GI.   - H&H stable on today's labs. Monitor H&H.   - Start scopolamine patch for persistent nausea.     Emphysema on CTA  - No PFTs on file   - No home inhalers   - Outpatient repeat CT in 6 months to follow up assymetric L lung scarring      Chronic anemia (baseline Hgb ~10)  Chronic thrombocytopenia (baseline 110)  - CTM     T2DM (A1c 5.5% 12/2024)   Hypothyroidism  - " Lantus 12U and SSI#1  - Hypoglycemia protocol  - Continue synthroid 50 mcg      Concern for LLE cellulitis   - XR knee 1/13/2025: No acute findings status post left below-the-knee amputation.  - Vascular US LE: No evidence of deep venous thrombus in the evaluated veins of the left leg from the inguinal ligament to the popliteal fossa.  - blood cx 1/13 NGTD x3 days  - wound cx 1/13 negative  - s/p Vanc/Zosyn (1/13)  - completed Keflex x5d (1/13-1/17)  - recommend orthotics appointment as an outpatient for prosthetic fitting, suspect prosthesis not fitting properly following recent weight loss  - LLE is well-appearing on today's exam. No erythema or edema.     DVT ppx: Lovenox  DISPO: Pending further medical optimization, anginal symptom stability.   Code status: Full Code    Patient seen and discussed with Dr. Lopez.    Cruz Guy PA-C

## 2025-01-22 NOTE — PROGRESS NOTES
Subjective Data:  - yesterday evening at 17:30 with complaints of chest and ZEYNEP pain  - LUE pain likely ISO of infiltrated IV in AC, improving today with conservative management   - Repeat EKG done, reviewed by interventional fellow, no significant changes  - pain improved with nitroglycerin  - troponin 141 (132)  - today still with some chest pressure rating at 2/10  - Primary team plans to repeat TTE today     Objective Data:  Last Recorded Vitals:  Vitals:    01/21/25 2318 01/22/25 0334 01/22/25 0753 01/22/25 1133   BP: 115/67 102/56 124/65 115/68   BP Location: Left arm Left arm Left arm Left arm   Patient Position: Lying Lying Lying Lying   Pulse: 72 69 69 82   Resp: 19 20 18 18   Temp: 36.1 °C (97 °F) 36 °C (96.8 °F) 36.1 °C (97 °F) 36 °C (96.8 °F)   TempSrc: Temporal Temporal Temporal Temporal   SpO2: 97% 96% 97% 96%   Weight:  67.2 kg (148 lb 2.4 oz)     Height:           Last Labs:  CBC - 1/21/2025:  7:47 PM  7.5 8.4 121    26.4      CMP - 1/21/2025:  7:47 PM  8.6 6.9 10 --- 1.5   3.1 3.5 11 70      PTT - 1/14/2025:  8:21 PM  1.2   13.4 41     TROPHS   Date/Time Value Ref Range Status   01/21/2025 07:47  0 - 53 ng/L Final   01/20/2025 07:36  0 - 53 ng/L Final     Comment:     Previous result verified on 1/21/2025 2101 on specimen/case [id number] called with component [facility] for procedure Troponin I, High Sensitivity with value 141 ng/L.   01/17/2025 10:17 AM 56 0 - 53 ng/L Final   01/13/2025 02:01 PM 66 0 - 20 ng/L Final   01/13/2025 11:20 AM 17 0 - 20 ng/L Final   12/07/2024 04:17 AM 2,068 0 - 20 ng/L Final     BNP   Date/Time Value Ref Range Status   01/14/2025 06:59  0 - 99 pg/mL Final   01/13/2025 11:20  0 - 99 pg/mL Final     HGBA1C   Date/Time Value Ref Range Status   12/05/2024 05:05 AM 5.5 See comment % Final   06/11/2024 09:29 AM 5.3 see below % Final     LDLCALC   Date/Time Value Ref Range Status   01/15/2025 01:06 PM 21 <=99 mg/dL Final     Comment:                                  Near   Borderline      AGE      Desirable  Optimal    High     High     Very High     0-19 Y     0 - 109     ---    110-129   >/= 130     ----    20-24 Y     0 - 119     ---    120-159   >/= 160     ----      >24 Y     0 -  99   100-129  130-159   160-189     >/=190     12/04/2024 11:47 PM 34 <=99 mg/dL Final     Comment:                                 Near   Borderline      AGE      Desirable  Optimal    High     High     Very High     0-19 Y     0 - 109     ---    110-129   >/= 130     ----    20-24 Y     0 - 119     ---    120-159   >/= 160     ----      >24 Y     0 -  99   100-129  130-159   160-189     >/=190     06/11/2024 09:29 AM 23 <=99 mg/dL Final     Comment:                                 Near   Borderline      AGE      Desirable  Optimal    High     High     Very High     0-19 Y     0 - 109     ---    110-129   >/= 130     ----    20-24 Y     0 - 119     ---    120-159   >/= 160     ----      >24 Y     0 -  99   100-129  130-159   160-189     >/=190       VLDL   Date/Time Value Ref Range Status   01/15/2025 01:06 PM 17 0 - 40 mg/dL Final   12/04/2024 11:47 PM 18 0 - 40 mg/dL Final   06/11/2024 09:29 AM 19 0 - 40 mg/dL Final      Last I/O:  I/O last 3 completed shifts:  In: 1080 (16.1 mL/kg) [P.O.:1080]  Out: 630 (9.4 mL/kg) [Urine:630 (0.3 mL/kg/hr)]  Weight: 67.2 kg     Inpatient Medications:  Scheduled medications   Medication Dose Route Frequency    acetaminophen  650 mg oral q6h    aspirin  81 mg oral Daily    atorvastatin  40 mg oral Nightly    bisacodyl  10 mg rectal Daily    busPIRone  7.5 mg oral BID    clopidogrel  75 mg oral Daily    enoxaparin  40 mg subcutaneous q24h    insulin glargine  12 Units subcutaneous Nightly    insulin lispro  0-5 Units subcutaneous TID AC    isosorbide mononitrate ER  30 mg oral Daily    levothyroxine  50 mcg oral Daily    metoprolol succinate XL  25 mg oral Daily    mirtazapine  15 mg oral Nightly    [START ON 1/23/2025] pantoprazole  20 mg oral  Daily before breakfast    ranolazine  500 mg oral BID    scopolamine  1 patch transdermal q72h    sennosides-docusate sodium  1 tablet oral Nightly    [Held by provider] spironolactone  25 mg oral q AM     PRN medications   Medication    dextrose    dextrose    glucagon    glucagon    nitroglycerin     Continuous Medications   Medication Dose Last Rate     Physical Exam  Constitutional:       Appearance: Normal appearance.   Eyes:      Comments: Poor vision    Cardiovascular:      Rate and Rhythm: Normal rate.      Comments: Right radial pulse +doppler   Pulmonary:      Effort: Pulmonary effort is normal. No respiratory distress.   Musculoskeletal:      Comments: IV infiltrate of R AC, warm to touch, tender    Skin:     General: Skin is warm and dry.   Neurological:      Mental Status: He is alert and oriented to person, place, and time.   Psychiatric:         Mood and Affect: Mood normal.         Behavior: Behavior normal.         Assessment/Plan   Chavez Llamas is an 79 year old M with PMHx of CAD s/p CABG 1990s (LIMA to LAD and SVG to unknown?) and multiple PCIs (most recent [facility] 12/2024), HFmrEF (40-45% 12/4/2024), HTN, T2DM (A1C 5.5 12/5/2025), PAD s/p left BKA who presented to [facility] 1/13 in the setting of nausea, vomiting, chest pain, and wound on LLE. Found to have NSTEMI and transferred to [facility] for high risk PCI.     1/20  - s/p RCA PCI of POBA and IVL for severe ISR today with Dr. Weston via RRA  - received pre and post- hydration for PCI due to GFR of 39  - effient to switch to plavix tomorrow     1/21  - switched from Effient to Plavix today     1/22  - yesterday evening at 17:30 with complaints of chest and ZEYNEP pain  - LUE pain likely ISO of infiltrated IV in AC, improving today with conservative management   - Repeat EKG done, reviewed by interventional fellow, no significant changes  - pain improved with nitroglycerin  - troponin 141 (132)  - today still with some chest pressure rating at  2/10  - Primary team plans to repeat TTE today     Interventional Recs:   - cont telemetry care per PCI protocol  - cont DAPT with aspirin and plavix  - education on compliance with DAPT provided  - at discharge, PLEASE send 90 day prescription of Plavix to [facility] and remaining refills to patient's home pharmacy   - continue high intensity statin, BB (or document contraindications for use)  - please ensure cardiology follow up appointment after discharge in 1-3 weeks  - patient referred for cardiac rehab evaluation  -  5lb weight restriction for 5 days for right radial access, post-cath instructions added to AVS  - Decision for interventions of the LAD based on symptoms and discussion with Dr Soriano in clinic        Interventional Cardiology will continue to follow while patient remains in house.       General Cardiology Consult Pager: [phone] (weekday 7AM-6PM and weekend 7AM-2PM)and other: [phone]  EP Consult Pager: [phone] (weekday 7AM-6PM and weekend 7AM-2PM) and other: [phone]  EP Device Nurse Pager: [phone] (weekday 7AM-4PM)  Advanced Heart Failure Consult Pager: [phone] anytime  CICU Fellow Pager: [phone] anytime  Endovascular /Limb Salvage Team Pager: [phone] for day coverage (8am-5pm)  Interventional Cardiology Fellow Pager: [phone] (7AM-5PM) Night coverage: [facility] Cross Cover [id number]  Structural Heart Team Pager: [phone] anytime  Night coverage: [facility] [id number]    Maxx Crowell DNP, APRN-CNP  Interventional Cardiology    Code Status:  Full Code

## 2025-01-23 LAB
ALBUMIN SERPL BCP-MCNC: 3.4 G/DL (ref 3.4–5)
ANION GAP SERPL CALC-SCNC: 12 MMOL/L (ref 10–20)
ATRIAL RATE: 70 BPM
ATRIAL RATE: 72 BPM
ATRIAL RATE: 79 BPM
ATRIAL RATE: 83 BPM
ATRIAL RATE: 88 BPM
BUN SERPL-MCNC: 21 MG/DL (ref 6–23)
CALCIUM SERPL-MCNC: 8.7 MG/DL (ref 8.6–10.6)
CHLORIDE SERPL-SCNC: 102 MMOL/L (ref 98–107)
CO2 SERPL-SCNC: 27 MMOL/L (ref 21–32)
CREAT SERPL-MCNC: 1.6 MG/DL (ref 0.5–1.3)
EGFRCR SERPLBLD CKD-EPI 2021: 43 ML/MIN/1.73M*2
ERYTHROCYTE [DISTWIDTH] IN BLOOD BY AUTOMATED COUNT: 15.7 % (ref 11.5–14.5)
FERRITIN SERPL-MCNC: 168 NG/ML (ref 20–300)
GLUCOSE BLD MANUAL STRIP-MCNC: 128 MG/DL (ref 74–99)
GLUCOSE BLD MANUAL STRIP-MCNC: 131 MG/DL (ref 74–99)
GLUCOSE BLD MANUAL STRIP-MCNC: 151 MG/DL (ref 74–99)
GLUCOSE BLD MANUAL STRIP-MCNC: 177 MG/DL (ref 74–99)
GLUCOSE SERPL-MCNC: 133 MG/DL (ref 74–99)
HCT VFR BLD AUTO: 24.7 % (ref 41–52)
HGB BLD-MCNC: 8.2 G/DL (ref 13.5–17.5)
IRON SATN MFR SERPL: 16 % (ref 25–45)
IRON SERPL-MCNC: 33 UG/DL (ref 35–150)
MAGNESIUM SERPL-MCNC: 2.09 MG/DL (ref 1.6–2.4)
MCH RBC QN AUTO: 29.5 PG (ref 26–34)
MCHC RBC AUTO-ENTMCNC: 33.2 G/DL (ref 32–36)
MCV RBC AUTO: 89 FL (ref 80–100)
NRBC BLD-RTO: 0 /100 WBCS (ref 0–0)
P AXIS: 47 DEGREES
P AXIS: 54 DEGREES
P AXIS: 70 DEGREES
P AXIS: 71 DEGREES
P AXIS: 96 DEGREES
P OFFSET: 154 MS
P OFFSET: 158 MS
P OFFSET: 161 MS
P OFFSET: 164 MS
P ONSET: 104 MS
P ONSET: 104 MS
P ONSET: 105 MS
P ONSET: 91 MS
PHOSPHATE SERPL-MCNC: 3.1 MG/DL (ref 2.5–4.9)
PLATELET # BLD AUTO: 128 X10*3/UL (ref 150–450)
POTASSIUM SERPL-SCNC: 4.2 MMOL/L (ref 3.5–5.3)
PR INTERVAL: 168 MS
PR INTERVAL: 216 MS
PR INTERVAL: 226 MS
PR INTERVAL: 232 MS
PR INTERVAL: 240 MS
Q ONSET: 173 MS
Q ONSET: 211 MS
Q ONSET: 213 MS
Q ONSET: 213 MS
Q ONSET: 217 MS
QRS COUNT: 11 BEATS
QRS COUNT: 12 BEATS
QRS COUNT: 13 BEATS
QRS COUNT: 14 BEATS
QRS COUNT: 14 BEATS
QRS DURATION: 158 MS
QRS DURATION: 166 MS
QRS DURATION: 168 MS
QRS DURATION: 170 MS
QRS DURATION: 172 MS
QT INTERVAL: 418 MS
QT INTERVAL: 434 MS
QT INTERVAL: 438 MS
QT INTERVAL: 440 MS
QT INTERVAL: 450 MS
QTC CALCULATION(BAZETT): 481 MS
QTC CALCULATION(BAZETT): 486 MS
QTC CALCULATION(BAZETT): 502 MS
QTC CALCULATION(BAZETT): 505 MS
QTC CALCULATION(BAZETT): 509 MS
QTC FREDERICIA: 467 MS
QTC FREDERICIA: 474 MS
QTC FREDERICIA: 475 MS
QTC FREDERICIA: 480 MS
QTC FREDERICIA: 483 MS
R AXIS: -13 DEGREES
R AXIS: -24 DEGREES
R AXIS: -25 DEGREES
R AXIS: 205 DEGREES
R AXIS: 97 DEGREES
RBC # BLD AUTO: 2.78 X10*6/UL (ref 4.5–5.9)
SODIUM SERPL-SCNC: 137 MMOL/L (ref 136–145)
T AXIS: 127 DEGREES
T AXIS: 150 DEGREES
T AXIS: 164 DEGREES
T AXIS: 232 DEGREES
T AXIS: 65 DEGREES
T OFFSET: 392 MS
T OFFSET: 420 MS
T OFFSET: 433 MS
T OFFSET: 434 MS
T OFFSET: 438 MS
TIBC SERPL-MCNC: 205 UG/DL (ref 240–445)
UIBC SERPL-MCNC: 172 UG/DL (ref 110–370)
VENTRICULAR RATE: 70 BPM
VENTRICULAR RATE: 72 BPM
VENTRICULAR RATE: 79 BPM
VENTRICULAR RATE: 83 BPM
VENTRICULAR RATE: 88 BPM
WBC # BLD AUTO: 5.3 X10*3/UL (ref 4.4–11.3)

## 2025-01-23 PROCEDURE — 51701 INSERT BLADDER CATHETER: CPT

## 2025-01-23 PROCEDURE — 2500000001 HC RX 250 WO HCPCS SELF ADMINISTERED DRUGS (ALT 637 FOR MEDICARE OP)

## 2025-01-23 PROCEDURE — 99222 1ST HOSP IP/OBS MODERATE 55: CPT | Performed by: STUDENT IN AN ORGANIZED HEALTH CARE EDUCATION/TRAINING PROGRAM

## 2025-01-23 PROCEDURE — 80069 RENAL FUNCTION PANEL: CPT

## 2025-01-23 PROCEDURE — 2500000001 HC RX 250 WO HCPCS SELF ADMINISTERED DRUGS (ALT 637 FOR MEDICARE OP): Performed by: NURSE PRACTITIONER

## 2025-01-23 PROCEDURE — 82947 ASSAY GLUCOSE BLOOD QUANT: CPT

## 2025-01-23 PROCEDURE — 2500000004 HC RX 250 GENERAL PHARMACY W/ HCPCS (ALT 636 FOR OP/ED)

## 2025-01-23 PROCEDURE — 85027 COMPLETE CBC AUTOMATED: CPT

## 2025-01-23 PROCEDURE — 2500000002 HC RX 250 W HCPCS SELF ADMINISTERED DRUGS (ALT 637 FOR MEDICARE OP, ALT 636 FOR OP/ED)

## 2025-01-23 PROCEDURE — 83735 ASSAY OF MAGNESIUM: CPT

## 2025-01-23 PROCEDURE — 99233 SBSQ HOSP IP/OBS HIGH 50: CPT | Performed by: INTERNAL MEDICINE

## 2025-01-23 PROCEDURE — 99231 SBSQ HOSP IP/OBS SF/LOW 25: CPT

## 2025-01-23 PROCEDURE — 2500000002 HC RX 250 W HCPCS SELF ADMINISTERED DRUGS (ALT 637 FOR MEDICARE OP, ALT 636 FOR OP/ED): Performed by: NURSE PRACTITIONER

## 2025-01-23 PROCEDURE — 2500000004 HC RX 250 GENERAL PHARMACY W/ HCPCS (ALT 636 FOR OP/ED): Performed by: NURSE PRACTITIONER

## 2025-01-23 PROCEDURE — 1200000002 HC GENERAL ROOM WITH TELEMETRY DAILY

## 2025-01-23 RX ORDER — ONDANSETRON HYDROCHLORIDE 2 MG/ML
4 INJECTION, SOLUTION INTRAVENOUS EVERY 6 HOURS PRN
Status: DISCONTINUED | OUTPATIENT
Start: 2025-01-23 | End: 2025-01-25 | Stop reason: HOSPADM

## 2025-01-23 RX ORDER — TAMSULOSIN HYDROCHLORIDE 0.4 MG/1
0.4 CAPSULE ORAL NIGHTLY
Status: DISCONTINUED | OUTPATIENT
Start: 2025-01-23 | End: 2025-01-25 | Stop reason: HOSPADM

## 2025-01-23 RX ORDER — ISOSORBIDE MONONITRATE 60 MG/1
60 TABLET, EXTENDED RELEASE ORAL DAILY
Status: DISCONTINUED | OUTPATIENT
Start: 2025-01-24 | End: 2025-01-25 | Stop reason: HOSPADM

## 2025-01-23 RX ORDER — ISOSORBIDE MONONITRATE 30 MG/1
30 TABLET, EXTENDED RELEASE ORAL ONCE
Status: COMPLETED | OUTPATIENT
Start: 2025-01-23 | End: 2025-01-23

## 2025-01-23 RX ADMIN — PANTOPRAZOLE SODIUM 40 MG: 40 INJECTION, POWDER, FOR SOLUTION INTRAVENOUS at 08:26

## 2025-01-23 RX ADMIN — METOPROLOL SUCCINATE 25 MG: 25 TABLET, EXTENDED RELEASE ORAL at 08:38

## 2025-01-23 RX ADMIN — CLOPIDOGREL BISULFATE 75 MG: 75 TABLET ORAL at 08:38

## 2025-01-23 RX ADMIN — PANTOPRAZOLE SODIUM 40 MG: 40 INJECTION, POWDER, FOR SOLUTION INTRAVENOUS at 21:44

## 2025-01-23 RX ADMIN — RANOLAZINE 1000 MG: 500 TABLET, EXTENDED RELEASE ORAL at 08:38

## 2025-01-23 RX ADMIN — INSULIN LISPRO 1 UNITS: 100 INJECTION, SOLUTION INTRAVENOUS; SUBCUTANEOUS at 13:03

## 2025-01-23 RX ADMIN — SUCRALFATE 1 G: 1 TABLET ORAL at 08:39

## 2025-01-23 RX ADMIN — BUSPIRONE HYDROCHLORIDE 7.5 MG: 15 TABLET ORAL at 08:38

## 2025-01-23 RX ADMIN — ISOSORBIDE MONONITRATE 30 MG: 30 TABLET, EXTENDED RELEASE ORAL at 11:42

## 2025-01-23 RX ADMIN — INSULIN LISPRO 1 UNITS: 100 INJECTION, SOLUTION INTRAVENOUS; SUBCUTANEOUS at 18:04

## 2025-01-23 RX ADMIN — ENOXAPARIN SODIUM 40 MG: 100 INJECTION SUBCUTANEOUS at 13:03

## 2025-01-23 RX ADMIN — SENNOSIDES AND DOCUSATE SODIUM 1 TABLET: 50; 8.6 TABLET ORAL at 21:44

## 2025-01-23 RX ADMIN — BUSPIRONE HYDROCHLORIDE 7.5 MG: 15 TABLET ORAL at 21:44

## 2025-01-23 RX ADMIN — LEVOTHYROXINE SODIUM 50 MCG: 0.05 TABLET ORAL at 05:11

## 2025-01-23 RX ADMIN — RANOLAZINE 1000 MG: 500 TABLET, EXTENDED RELEASE ORAL at 21:44

## 2025-01-23 RX ADMIN — MIRTAZAPINE 15 MG: 15 TABLET, FILM COATED ORAL at 21:44

## 2025-01-23 RX ADMIN — ONDANSETRON 4 MG: 2 INJECTION INTRAMUSCULAR; INTRAVENOUS at 21:44

## 2025-01-23 RX ADMIN — ATORVASTATIN CALCIUM 40 MG: 40 TABLET, FILM COATED ORAL at 21:44

## 2025-01-23 RX ADMIN — ISOSORBIDE MONONITRATE 30 MG: 30 TABLET, EXTENDED RELEASE ORAL at 08:39

## 2025-01-23 RX ADMIN — ASPIRIN 81 MG: 81 TABLET, COATED ORAL at 08:38

## 2025-01-23 RX ADMIN — TAMSULOSIN HYDROCHLORIDE 0.4 MG: 0.4 CAPSULE ORAL at 21:44

## 2025-01-23 ASSESSMENT — COGNITIVE AND FUNCTIONAL STATUS - GENERAL
CLIMB 3 TO 5 STEPS WITH RAILING: A LOT
WALKING IN HOSPITAL ROOM: A LOT
MOBILITY SCORE: 14
DAILY ACTIVITIY SCORE: 22
TOILETING: A LITTLE
STANDING UP FROM CHAIR USING ARMS: A LITTLE
MOVING TO AND FROM BED TO CHAIR: A LITTLE
TOILETING: A LITTLE
TURNING FROM BACK TO SIDE WHILE IN FLAT BAD: A LITTLE
WALKING IN HOSPITAL ROOM: A LOT
TOILETING: A LITTLE
CLIMB 3 TO 5 STEPS WITH RAILING: A LOT
STANDING UP FROM CHAIR USING ARMS: A LITTLE
MOVING TO AND FROM BED TO CHAIR: A LITTLE
MOVING TO AND FROM BED TO CHAIR: A LITTLE
MOBILITY SCORE: 18
STANDING UP FROM CHAIR USING ARMS: A LITTLE
TOILETING: A LITTLE
WALKING IN HOSPITAL ROOM: A LOT
DAILY ACTIVITIY SCORE: 22
MOVING FROM LYING ON BACK TO SITTING ON SIDE OF FLAT BED WITH BEDRAILS: A LITTLE
STANDING UP FROM CHAIR USING ARMS: A LITTLE
DAILY ACTIVITIY SCORE: 22
CLIMB 3 TO 5 STEPS WITH RAILING: A LOT
MOBILITY SCORE: 18
MOVING TO AND FROM BED TO CHAIR: A LITTLE
DAILY ACTIVITIY SCORE: 22
HELP NEEDED FOR BATHING: A LITTLE
DAILY ACTIVITIY SCORE: 22
MOVING TO AND FROM BED TO CHAIR: A LOT
WALKING IN HOSPITAL ROOM: A LOT
HELP NEEDED FOR BATHING: A LITTLE
TOILETING: A LITTLE
CLIMB 3 TO 5 STEPS WITH RAILING: A LOT
HELP NEEDED FOR BATHING: A LITTLE
HELP NEEDED FOR BATHING: A LITTLE
MOVING TO AND FROM BED TO CHAIR: A LITTLE
CLIMB 3 TO 5 STEPS WITH RAILING: A LOT
CLIMB 3 TO 5 STEPS WITH RAILING: A LOT
MOBILITY SCORE: 18
MOBILITY SCORE: 18
WALKING IN HOSPITAL ROOM: A LOT
STANDING UP FROM CHAIR USING ARMS: A LOT
HELP NEEDED FOR BATHING: A LITTLE
STANDING UP FROM CHAIR USING ARMS: A LITTLE
HELP NEEDED FOR BATHING: A LITTLE
TOILETING: A LITTLE
CLIMB 3 TO 5 STEPS WITH RAILING: A LOT
HELP NEEDED FOR BATHING: A LITTLE
TOILETING: A LITTLE
STANDING UP FROM CHAIR USING ARMS: A LITTLE
WALKING IN HOSPITAL ROOM: A LOT
WALKING IN HOSPITAL ROOM: A LOT
MOVING TO AND FROM BED TO CHAIR: A LITTLE

## 2025-01-23 ASSESSMENT — PAIN - FUNCTIONAL ASSESSMENT
PAIN_FUNCTIONAL_ASSESSMENT: 0-10

## 2025-01-23 ASSESSMENT — PAIN SCALES - GENERAL
PAINLEVEL_OUTOF10: 0 - NO PAIN
PAINLEVEL_OUTOF10: 0 - NO PAIN
PAINLEVEL_OUTOF10: 2
PAINLEVEL_OUTOF10: 1
PAINLEVEL_OUTOF10: 2

## 2025-01-23 NOTE — PROGRESS NOTES
Subjective Data:  Patient seen and assessed this afternoon, lying in bed, NAD. Reports persistent, yet mild CP, now 2/10. Reports Rt arm feeling much better today, still having some tingling, but improving. Denies any SOB, comfortable on RA.     - today still with some chest pressure rating at 2/10, mild nausea, improving per pt  - Rt Radial site, MILLICENT, no hematoma or bleeding, some tingling noted, pulse palpable  - RFA pain, ISO of infiltrated IV in AC, continues to improving    Objective Data:  Last Recorded Vitals:  Vitals:    01/23/25 0406 01/23/25 0754 01/23/25 0835 01/23/25 1100   BP: 109/58 115/64 122/69 114/65   BP Location: Left arm Left arm Left arm Left arm   Patient Position: Lying Lying Lying Lying   Pulse: 69 67 74 78   Resp: 19 19 16 16   Temp: 36.1 °C (97 °F) 36 °C (96.8 °F) 36 °C (96.8 °F) 36 °C (96.8 °F)   TempSrc: Temporal Temporal Temporal Temporal   SpO2: 100% 94% 100% 100%   Weight: 67.3 kg (148 lb 5.9 oz)      Height:           Last Labs:  Results for orders placed or performed during the hospital encounter of 01/14/25 (from the past 24 hours)   Magnesium   Result Value Ref Range    Magnesium 2.16 1.60 - 2.40 mg/dL   Renal Function Panel   Result Value Ref Range    Glucose 153 (H) 74 - 99 mg/dL    Sodium 137 136 - 145 mmol/L    Potassium 4.3 3.5 - 5.3 mmol/L    Chloride 102 98 - 107 mmol/L    Bicarbonate 26 21 - 32 mmol/L    Anion Gap 13 10 - 20 mmol/L    Urea Nitrogen 22 6 - 23 mg/dL    Creatinine 1.64 (H) 0.50 - 1.30 mg/dL    eGFR 42 (L) >60 mL/min/1.73m*2    Calcium 8.7 8.6 - 10.6 mg/dL    Phosphorus 3.1 2.5 - 4.9 mg/dL    Albumin 3.5 3.4 - 5.0 g/dL   Ferritin   Result Value Ref Range    Ferritin 168 20 - 300 ng/mL   Iron and TIBC   Result Value Ref Range    Iron 33 (L) 35 - 150 ug/dL    UIBC 172 110 - 370 ug/dL    TIBC 205 (L) 240 - 445 ug/dL    % Saturation 16 (L) 25 - 45 %   CBC   Result Value Ref Range    WBC 7.6 4.4 - 11.3 x10*3/uL    nRBC 0.0 0.0 - 0.0 /100 WBCs    RBC 2.91 (L) 4.50 -  5.90 x10*6/uL    Hemoglobin 8.7 (L) 13.5 - 17.5 g/dL    Hematocrit 25.8 (L) 41.0 - 52.0 %    MCV 89 80 - 100 fL    MCH 29.9 26.0 - 34.0 pg    MCHC 33.7 32.0 - 36.0 g/dL    RDW 15.8 (H) 11.5 - 14.5 %    Platelets 131 (L) 150 - 450 x10*3/uL   POCT GLUCOSE   Result Value Ref Range    POCT Glucose 132 (H) 74 - 99 mg/dL   POCT GLUCOSE   Result Value Ref Range    POCT Glucose 131 (H) 74 - 99 mg/dL   POCT GLUCOSE   Result Value Ref Range    POCT Glucose 177 (H) 74 - 99 mg/dL        TROPHS   Date/Time Value Ref Range Status   01/21/2025 07:47  0 - 53 ng/L Final   01/20/2025 07:36  0 - 53 ng/L Final     Comment:     Previous result verified on 1/21/2025 2101 on specimen/case [id number] called with component [facility] for procedure Troponin I, High Sensitivity with value 141 ng/L.   01/17/2025 10:17 AM 56 0 - 53 ng/L Final   01/13/2025 02:01 PM 66 0 - 20 ng/L Final   01/13/2025 11:20 AM 17 0 - 20 ng/L Final   12/07/2024 04:17 AM 2,068 0 - 20 ng/L Final     BNP   Date/Time Value Ref Range Status   01/14/2025 06:59  0 - 99 pg/mL Final   01/13/2025 11:20  0 - 99 pg/mL Final     HGBA1C   Date/Time Value Ref Range Status   12/05/2024 05:05 AM 5.5 See comment % Final   06/11/2024 09:29 AM 5.3 see below % Final     LDLCALC   Date/Time Value Ref Range Status   01/15/2025 01:06 PM 21 <=99 mg/dL Final     Comment:                                 Near   Borderline      AGE      Desirable  Optimal    High     High     Very High     0-19 Y     0 - 109     ---    110-129   >/= 130     ----    20-24 Y     0 - 119     ---    120-159   >/= 160     ----      >24 Y     0 -  99   100-129  130-159   160-189     >/=190     12/04/2024 11:47 PM 34 <=99 mg/dL Final     Comment:                                 Near   Borderline      AGE      Desirable  Optimal    High     High     Very High     0-19 Y     0 - 109     ---    110-129   >/= 130     ----    20-24 Y     0 - 119     ---    120-159   >/= 160     ----      >24 Y     0  -  99   100-129  130-159   160-189     >/=190     06/11/2024 09:29 AM 23 <=99 mg/dL Final     Comment:                                 Near   Borderline      AGE      Desirable  Optimal    High     High     Very High     0-19 Y     0 - 109     ---    110-129   >/= 130     ----    20-24 Y     0 - 119     ---    120-159   >/= 160     ----      >24 Y     0 -  99   100-129  130-159   160-189     >/=190       VLDL   Date/Time Value Ref Range Status   01/15/2025 01:06 PM 17 0 - 40 mg/dL Final   12/04/2024 11:47 PM 18 0 - 40 mg/dL Final   06/11/2024 09:29 AM 19 0 - 40 mg/dL Final      Last I/O:  I/O last 3 completed shifts:  In: 480 (7.1 mL/kg) [P.O.:480]  Out: 1480 (22 mL/kg) [Urine:1480 (0.6 mL/kg/hr)]  Weight: 67.3 kg     Inpatient Medications:  Scheduled medications   Medication Dose Route Frequency    aspirin  81 mg oral Daily    atorvastatin  40 mg oral Nightly    bisacodyl  10 mg rectal Daily    busPIRone  7.5 mg oral BID    clopidogrel  75 mg oral Daily    enoxaparin  40 mg subcutaneous q24h    insulin glargine  12 Units subcutaneous Nightly    insulin lispro  0-5 Units subcutaneous TID AC    [START ON 1/24/2025] isosorbide mononitrate ER  60 mg oral Daily    levothyroxine  50 mcg oral Daily    metoprolol succinate XL  25 mg oral Daily    mirtazapine  15 mg oral Nightly    pantoprazole  40 mg intravenous BID    ranolazine  1,000 mg oral BID    sennosides-docusate sodium  1 tablet oral Nightly    [Held by provider] spironolactone  25 mg oral q AM    tamsulosin  0.4 mg oral Nightly     PRN medications   Medication    dextrose    dextrose    glucagon    glucagon    nitroglycerin    ondansetron     Continuous Medications   Medication Dose Last Rate     Physical Exam  Constitutional:       General: He is not in acute distress.     Appearance: Normal appearance.   HENT:      Nose: Nose normal.      Mouth/Throat:      Mouth: Mucous membranes are moist.   Eyes:      Comments: Poor vision    Cardiovascular:      Rate and  Rhythm: Normal rate.      Comments: Right radial pulse +doppler, access site MILLICENT, no hematoma or bleeding, some tingling noted    Pulmonary:      Effort: Pulmonary effort is normal. No respiratory distress.   Musculoskeletal:      Cervical back: Normal range of motion.      Comments: IV infiltrate of R AC, warm to touch, tender    Skin:     General: Skin is warm and dry.   Neurological:      General: No focal deficit present.      Mental Status: He is alert and oriented to person, place, and time.   Psychiatric:         Mood and Affect: Mood normal.         Behavior: Behavior normal.         Assessment/Plan   Chavez Llamas is an 79 year old M with PMHx of CAD s/p CABG 1990s (LIMA to LAD and SVG to unknown?) and multiple PCIs (most recent [facility] 12/2024), HFmrEF (40-45% 12/4/2024), HTN, T2DM (A1C 5.5 12/5/2025), PAD s/p left BKA who presented to [facility] 1/13 in the setting of nausea, vomiting, chest pain, and wound on LLE. Found to have NSTEMI and transferred to [facility] for high risk PCI.     1/20  - s/p RCA PCI of POBA and IVL for severe ISR today with Dr. Weston via RRA  - received pre and post- hydration for PCI due to GFR of 39    1/21  - switched from Effient to Plavix     1/22  - yesterday evening ~17:30 with complaints of chest and LUE pain  - RUE pain, likely ISO of infiltrated IV in AC, improving today with conservative management   - Repeat EKG done, reviewed by interventional fellow, no significant changes  - pain improved with nitroglycerin, troponin 141 (132)  - today still with some chest pressure rating at 2/10  - Primary team plans to repeat TTE today     1/23  - today still with some chest pressure rating at 2/10, mild nausea, improving per pt  - Rt Radial site, MILLICENT, no hematoma or bleeding, some tingling noted, pulse palpable  - RFA pain, ISO of infiltrated IV in AC, continues to improving  - limited TTE: EF 40%, DD, GHK, reduced RV function, mod to severe MR    Interventional Recommendations:   -  cont telemetry care per PCI protocol  - cont DAPT with aspirin and Plavix  - education on compliance with DAPT provided  - at discharge, PLEASE send 90 day prescription of Plavix to [facility] (for meds to beds and price check) and remaining refills to patient's home pharmacy   - continue high intensity statin, BB (or document contraindications for use)  - decision for intervention on the LAD will be based on symptoms and discussion with Dr Soriano in outpatient clinic   - please ensure cardiology follow up appointment after discharge in 1-3 weeks  - patient referred for cardiac rehab evaluation  - 5lb weight restriction for 5 days for right radial access, post-cath instructions added to AVS          Interventional Cardiology will continue to follow while patient remains in house.      Interventional Cardiology Fellow Pager: [phone] (7AM-5PM) Night coverage: [facility] Cross Cover [id number]    YANELI Dill-CNP   Interventional Cardiology     Code Status:  Full Code

## 2025-01-23 NOTE — SIGNIFICANT EVENT
Urology called for urinary retention required fulton catheter placement.     - Maintain fulton for now  - Start Flomax if no contraindication  - Please ensure patient is ambulatory, manege constipation, minimizing opioid use, avoid medication increasing risk of retention like anticholinergics Then can try TOV  - If failed TOV, can replace fulton and place referral order to urology for outpatient trial of void    Junod MD [place]  Urology PGY-3  Pager: [phone]

## 2025-01-23 NOTE — PROGRESS NOTES
"Interval events:    Urinary retention requiring fulton (no straight cath available)    Subjective:  - Pt states his dull chest pressure is persistent but remains at a 2/10. He denies further acute CP episodes. He states he continues to be nauseous. Interventional cardiology aware. He states his RUE pain is starting to improve by about \"a 1/3rd\".   Essentially patient feels the same as yesterday.     Weight stable. Cr stable but remains elevated. Patient concerned over decreased appetite. No SOB!         Objective:  Vitals:    01/23/25 1100   BP: 114/65   Pulse: 78   Resp: 16   Temp: 36 °C (96.8 °F)   SpO2: 100%     Weight         1/19/2025  0522 1/20/2025  0315 1/21/2025  0357 1/22/2025  0334 1/23/2025  0406    Weight: 67.3 kg (148 lb 5.9 oz) 66.8 kg (147 lb 4.3 oz) 67.3 kg (148 lb 5.9 oz) 67.2 kg (148 lb 2.4 oz) 67.3 kg (148 lb 5.9 oz)            Intake/Output Summary (Last 24 hours) at 1/23/2025 1355  Last data filed at 1/23/2025 1309  Gross per 24 hour   Intake 240 ml   Output 1560 ml   Net -1320 ml     Recent Results (from the past 24 hours)   Electrocardiogram, 12-lead PRN ACS symptoms    Collection Time: 01/22/25  2:15 PM   Result Value Ref Range    Ventricular Rate 79 BPM    Atrial Rate 79 BPM    OK Interval 232 ms    QRS Duration 170 ms    QT Interval 438 ms    QTC Calculation(Bazett) 502 ms    P Axis 47 degrees    R Axis -25 degrees    T Axis 127 degrees    QRS Count 13 beats    Q Onset 173 ms    T Offset 392 ms    QTC Fredericia 480 ms   POCT GLUCOSE    Collection Time: 01/22/25  3:46 PM   Result Value Ref Range    POCT Glucose 145 (H) 74 - 99 mg/dL   Magnesium    Collection Time: 01/22/25  7:44 PM   Result Value Ref Range    Magnesium 2.16 1.60 - 2.40 mg/dL   Renal Function Panel    Collection Time: 01/22/25  7:44 PM   Result Value Ref Range    Glucose 153 (H) 74 - 99 mg/dL    Sodium 137 136 - 145 mmol/L    Potassium 4.3 3.5 - 5.3 mmol/L    Chloride 102 98 - 107 mmol/L    Bicarbonate 26 21 - 32 mmol/L    " Anion Gap 13 10 - 20 mmol/L    Urea Nitrogen 22 6 - 23 mg/dL    Creatinine 1.64 (H) 0.50 - 1.30 mg/dL    eGFR 42 (L) >60 mL/min/1.73m*2    Calcium 8.7 8.6 - 10.6 mg/dL    Phosphorus 3.1 2.5 - 4.9 mg/dL    Albumin 3.5 3.4 - 5.0 g/dL   CBC    Collection Time: 01/22/25  7:45 PM   Result Value Ref Range    WBC 7.6 4.4 - 11.3 x10*3/uL    nRBC 0.0 0.0 - 0.0 /100 WBCs    RBC 2.91 (L) 4.50 - 5.90 x10*6/uL    Hemoglobin 8.7 (L) 13.5 - 17.5 g/dL    Hematocrit 25.8 (L) 41.0 - 52.0 %    MCV 89 80 - 100 fL    MCH 29.9 26.0 - 34.0 pg    MCHC 33.7 32.0 - 36.0 g/dL    RDW 15.8 (H) 11.5 - 14.5 %    Platelets 131 (L) 150 - 450 x10*3/uL   POCT GLUCOSE    Collection Time: 01/22/25  9:36 PM   Result Value Ref Range    POCT Glucose 132 (H) 74 - 99 mg/dL   POCT GLUCOSE    Collection Time: 01/23/25  6:20 AM   Result Value Ref Range    POCT Glucose 131 (H) 74 - 99 mg/dL   POCT GLUCOSE    Collection Time: 01/23/25 12:45 PM   Result Value Ref Range    POCT Glucose 177 (H) 74 - 99 mg/dL     Inpatient Medications:  Scheduled medications   Medication Dose Route Frequency    aspirin  81 mg oral Daily    atorvastatin  40 mg oral Nightly    bisacodyl  10 mg rectal Daily    busPIRone  7.5 mg oral BID    clopidogrel  75 mg oral Daily    enoxaparin  40 mg subcutaneous q24h    insulin glargine  12 Units subcutaneous Nightly    insulin lispro  0-5 Units subcutaneous TID AC    [START ON 1/24/2025] isosorbide mononitrate ER  60 mg oral Daily    levothyroxine  50 mcg oral Daily    metoprolol succinate XL  25 mg oral Daily    mirtazapine  15 mg oral Nightly    pantoprazole  40 mg intravenous BID    ranolazine  1,000 mg oral BID    sennosides-docusate sodium  1 tablet oral Nightly    [Held by provider] spironolactone  25 mg oral q AM    tamsulosin  0.4 mg oral Nightly     PRN medications   Medication    dextrose    dextrose    glucagon    glucagon    nitroglycerin    ondansetron     Continuous Medications   Medication Dose Last Rate      Procedures/testing:  TRANSTHORACIC ECHO (TTE) COMPLETE 1/14/2025   1. Poorly visualized anatomical structures due to suboptimal image quality.   2. Left ventricular cavity size is mild to moderately dilated.   3. Left ventricular ejection fraction is mildly decreased, by visual estimate at 45-50%.   4. Basal and mid inferior wall is abnormal.   5. Spectral Doppler shows a Grade I (impaired relaxation pattern) of left ventricular diastolic filling with normal left atrial filling pressure.   6. Abnormal septal motion consistent with post-operative status.   7. There is mildly reduced right ventricular systolic function.   8. The left atrium is enlarged.   9. Atrial septal aneurysm present.  10. There is no evidence of a patent foramen ovale.    CT ANGIO CHEST ABDOMEN PELVIS; 1/13/2025   CHEST  1.  No evidence of aortic aneurysm, dissection, or acute intramural  hematoma. Moderate calcified and noncalcified atherosclerotic plaque  of the thoracic and abdominal aorta. Patent left renal artery stent  and partially visualized bilateral superficial femoral artery stents.  Sternotomy changes. Coronary artery atherosclerotic calcifications.  2. No evidence of consolidation or pleural effusion. Moderate  centrilobular and paraseptal emphysema as well as nonspecific  pulmonary fibrotic changes. Asymmetric scarring in the left lung apex  is new since 2010 and consider follow-up chest CT in 6 months to  confirm stability.  ABDOMEN - PELVIS  1.  No acute abnormality in the abdomen/pelvis. Mild bilateral renal  cortical thinning.  2. Unchanged chronic compression deformity of L4 vertebral body.    Left Heart Cath 12/6/2024   1. Severe three-vessel coronary artery disease.   2. Patent LIMA to LAD but after the anastomosis there is moderate tubular lesion that has been stable for long time.   3. Distal left main of 90% that is giving flow to midsize ramus. Has been stable for many years.   4. Severe diffuse right coronary artery  disease was 2 areas of underexpanded stents and severe in-stent restenosis of 90% in the midsegment.   5. Balloon angioplasty was attempted we were unable to expand the mid RCA stent. There was still significant in-stent restenosis of 50% after balloon angioplasty. Improved from 99%. CIERRA-3 flow.   6. Medical management for now.    Physical exam:  General: well appearing, NAD, pleasant  HEENT: Atraumatic.   CV: RRR, soft systolic murmur. Tenderness to light palpation to chest both substernal and central chest wall described as sharp which is different to dull CP.   PULM: CTAB, no WOB.   ABD: soft, NT, ND  EXT: LLE BKA. No erythema of the LLE or RLE. No LE edema noted. RUE anterior forearm is indurated, mild edema, and painful to touch. Posterior aspect of forearm is also tender to palpation, but less compared to anterior. Brusing to the RUE forearm and AC joint. RUE warm with cap refill of 2 sec. RUE has similar color when compared to LUE.   SKIN: no rashes noted   NEURO: No focal deficits. Some decreased sensation to RUE digits.   PSYCH: pleasant mood, appropriate affect    Assessment/Plan   Chavez Llamas is a 79yM with PMHx of CAD s/p CABG 1990s (LIMA to LAD and SVG to unknown?) and multiple PCIs (most recent [facility] 12/2024), HFmrEF (40-45% 12/4/2024), HTN, T2DM (A1C 5.5 12/5/2025), PAD s/p left BKA who presented to [facility] 1/13 in the setting of nausea, vomiting, chest pain, and wound on LLE. Found to have NSTEMI s/p heparin gtt and transferred to  for high risk PCI.     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% 12/4/2024)  - CABG with LIMA to the LAD patent, SVG to an unknown vessel chronically occluded; S/p multiple PCIs with 5 stents placed to prox and mid RCA with severe ISR  - [facility] 12/2024- severe 3v disease, patent LIMA-LAD but mod tubular lesion after the anastomosis (stable for many years), distal LM of 90% giving flow to ramus (stable), severe diffuse RCA disease with  severe midsegment ISR, s/p balloon angioplasty of RCA ISR (stenosis improved to 50% from 99%) with CIERRA 3 flow  - TTE notable for EF 45-50%, LV cavity mod dilated, basal and mid inferior wall abnormal, enlarged left atrium  - 1/22: Limited echo: EF 40%, mod-severe MR, mild dilation of IVC, mod-reduction in RV systolic function, trivial pericardial effusion.   - 1/17 episode of chest pain (rating 2/10) and nausea this morning. ECG without significant change from previous.  - Trop peak 212 on 1/14 -> repeat 1/17 HS trop 56  - Repeat troponin 1/21 evening iso chest tightness: 141 (suspect 2/2 recent PCI)  - RCA PCI with Dr. Weston 1/21: POBA and IVL for severe ISR with reasonable results.  - Decision to intervene on LAD disease will be based on symptoms and discussion with Dr. Soriano in clinic.   - S/p Pasugrel switched to clopidogrel. S/p clopidogrel load 1/21 with 300mg. Then start 75mg daily starting 1/22.   - C/w ASA 81 mg and atorvastatin 40mg  - Will need 90 day prescription of Plavix to [facility] and remaining refills to patient's home pharmacy upon discharge.   - s/p heparin gtt dc'd after 48 hr on 1/15-->   - 117 restarted heparin gtt iso recurrent chest pain, will continue until PCI Monday  - S/p amlodipine discontinuation.   - Continue home metoprolol succinate 25mg  - 1/22 Increase ranolazine 500mg bid (started at OSH) to 1,000mg BID iso persistent mild chest tightness.   - 1/22 lasix 40mg IV x1 today iso echo findings.   - 1/22 Holding spironolactone 25mg iso TEODORO, consider restarting following stabilization of Cr.  - 1/23 Increase imdur to 60mg daily       TEODORO on CKD3a, improving  Recent history of Acute urinary retention   - Baseline Cr 1.3?  - Nephrology consulted at OSH, feel TEODORO iso contrast. No hydronephrosis on CT 1/13  - Cr trend:  1.64 (1.68, 1.73, 1.41, 1.53, 1.62, 1.79, 1.78, 1.22, 1.7, 1.59, 1.18)  - FeNa 1.1%  - UA neg  - Renally dose meds and avoid nephrotoxins   - s/p 500cc LR bolus 1/15  -  "Straight cath on 1/19.   - 1/23 urology consult due to urinary retention requiring fulton placement. Will see patient outpatient to work up cause, start tamsulosin, keep fulton in place. Please ensure patient is ambulatory, manege constipation, minimizing opioid use, avoid medication increasing risk of retention like anticholinergics Then can try TOV. If failed TOV, can replace fulton and place referral order to urology for outpatient trial of void     RUE PIV Infiltration  - per patient, pain began 1/20.   - Indurated and bruised right anterior forearm that is tender to palpation.   - Suspect 2/2 infiltrated IV placed in that area with exacerbation of post cath TR band also used on that extremity. No appreciable focal hematoma and hand is warm and well-perfused. Per nursing, right radial artery has been dopplered well.   - Continue q4 neurovascular checks. Monitor for findings that might suggest acute ischemia 2/2 compartment syndrome. Perfusion on exam is reassuring at this time.   - Suspect difficulty with obtaining RUE US as patient has significant pain over anterior forearm.   - Tylenol for 24 hours for acute pain management.   - continue warm pack     GI Discomfort  Nausea  - Patient complaining of persistent nausea and \"indigestion\".  - No known emeses as of recent.   - Per chart review patient was previously seen by GI for coffee ground emesis in [facility]. No melena. Episode occurred in the setting of covid infection. At that time, GI recommended high dose PPI and carafate as he was on DAPT to treat for possible GI bleed. His H&H remained stable so no endoscopy was performed at that time.   - 1/22 Given he is on DAPT, will start him on high-dose protonix and carafate. Will monitor his nausea and indigestion thereafter with low threshold to consult GI.   - 1/22 Start scopolamine patch for persistent nausea due to elevated QTC interval  - GI consult: stop carafate as it is not given properly and may interfere " with meds, DC scolpamine patch not effective and to reduced polypharmacy. OK for zofran now that QTC improved on subsequent EKG, ferritin and iron level, may consider EGD.     Emphysema on CTA  - No PFTs on file   - No home inhalers   - Outpatient repeat CT in 6 months to follow up assymetric L lung scarring      Chronic anemia (baseline Hgb ~10)  Chronic thrombocytopenia (baseline 110)  - Hgb today 8.7 (8.4, 8.4, 9.8, 10.5, 9, 9.5)     T2DM (A1c 5.5% 12/2024)   Hypothyroidism  - Lantus 12U and SSI#1  - Hypoglycemia protocol  - Continue synthroid 50 mcg      Concern for LLE cellulitis   - XR knee 1/13/2025: No acute findings status post left below-the-knee amputation.  - Vascular US LE: No evidence of deep venous thrombus in the evaluated veins of the left leg from the inguinal ligament to the popliteal fossa.  - blood cx 1/13 NGTD x3 days  - wound cx 1/13 negative  - s/p Vanc/Zosyn (1/13)  - completed Keflex x5d (1/13-1/17)  - recommend orthotics appointment as an outpatient for prosthetic fitting, suspect prosthesis not fitting properly following recent weight loss  - LLE is well-appearing on today's exam. No erythema or edema.     DVT ppx: Lovenox  DISPO: Pending further medical optimization, anginal symptom stability.   Code status: Full Code    Patient seen and discussed with Dr. Lopez.    Kaz Hall, APRN-CNP

## 2025-01-23 NOTE — CARE PLAN
Problem: Skin  Goal: Prevent/manage excess moisture  Outcome: Progressing     Problem: Skin  Goal: Prevent/minimize sheer/friction injuries  Outcome: Progressing     Problem: Fall/Injury  Goal: Be free from injury by end of the shift  Outcome: Progressing     Problem: Pain  Goal: Turns in bed with improved pain control throughout the shift  Outcome: Progressing       The clinical goals for the shift include Pt will have no c/o CP throughout shift.

## 2025-01-23 NOTE — CONSULTS
Gastroenterology Consult Service INITIAL CONSULT Note  Department of Gastroenterology & Hepatology  [facility]    [facility]  January 23, 2025   Patient: Chavez Llamas    Medical Record: [id number]    Reason for Consult: nausea without vomiting  Requesting Service: HHVI    SUBJECTIVE     History Of Present Illness  Chavez Llamas is a 79 y.o. male with a past medical history of HFrEF, CAD s/p CABG in 1990s, subsequent multiple PCIs, last in Dec 2024 admitted on 1/14/2025 with NSTEMI and is now s/p RCA PCI on 1/20/24. GI is consulted for persistent nausea.    Patient states that he first began experiencing nausea in December 2024. He states that with each of his heart attacks, his symptoms start with nausea and then transition to chest pain. He has been in and out of hospitals since his symptoms started. His symptoms are intermittent, though not associated with food or oral intake. He denies abdominal pain, vomiting. He denies hematemesis despite reports during initial admission to [facility] in December 2024. At that time, he was placed on high dose PPI and carafate. It is not clear the duration of these medications, but they were not given during this admission (1/14 - ) until yesterday. Patient was also given a scopolamine patch yesterday but states that symptoms are unchanged today.    He denies dysphagia, odynophagia, heartburn, sour taste, bloating. He endorses anorexia and poor appetite. He has lost 5 pounds per chart review since initial presentation in Dec 2024. Symptoms are not related to exertion. He does wake up nauseous most mornings.    Review of Systems: negative except as per HPI     Past Medical History:  Past Medical History:   Diagnosis Date    Old myocardial infarction 02/01/2017    Past heart attack     Home Medications  Medications Prior to Admission   Medication Sig Dispense Refill Last Dose/Taking    amLODIPine (Norvasc) 5 mg tablet Take  "1 tablet (5 mg) by mouth once daily. 30 tablet 0     aspirin 81 mg EC tablet Take 1 tablet (81 mg) by mouth once daily. 90 tablet 0     atorvastatin (Lipitor) 40 mg tablet Take 1 tablet (40 mg) by mouth once daily at bedtime. 90 tablet 0     busPIRone (Buspar) 15 mg tablet TAKE 1/2 (ONE-HALF) TABLET BY MOUTH IN THE MORNING AND AT BEDTIME 90 tablet 0     dicyclomine (Bentyl) 10 mg capsule TAKE 1 CAPSULE BY MOUTH TWICE DAILY AS NEEDED FOR  IBS (Patient not taking: Reported on 1/13/2025) 180 capsule 1     hydrOXYzine pamoate (Vistaril) 25 mg capsule Take 1 capsule (25 mg) by mouth 3 times a day as needed for anxiety. (Patient taking differently: Take 1 capsule (25 mg) by mouth as needed at bedtime for anxiety.) 30 capsule 0     insulin lispro (HumaLOG) 100 unit/mL injection Inject 10 Units under the skin early in the morning.. Inject 10 units subcutaneous daily.  Please check blood glucose level before injection. (Patient taking differently: Inject 5 Units under the skin once daily. Take with food. Please check blood glucose level before injection)       lancets (BD Ultra Fine Lancets) 33 gauge misc Testing T.I.D 100 each 3     Lantus Solostar U-100 Insulin 100 unit/mL (3 mL) pen INJECT 20 UNITS SUBCUTANEOUSLY TWICE DAILY. (Patient taking differently: Inject 20 Units under the skin once daily.) 36 mL 0     levothyroxine (Synthroid, Levoxyl) 50 mcg tablet TAKE 1 TABLET BY MOUTH ONCE DAILY IN THE MORNING BEFORE MEAL(S) ON AN EMPTY STOMACH 90 tablet 2     lubricating eye drops ophthalmic solution Administer 1 drop into both eyes if needed for dry eyes or other.       metoprolol succinate XL (Toprol-XL) 25 mg 24 hr tablet Take 1 tablet (25 mg) by mouth once daily. 90 tablet 3     mirtazapine (Remeron) 15 mg tablet TAKE 1 TABLET BY MOUTH ONCE DAILY AT BEDTIME 90 tablet 0     pen needle, diabetic 31 gauge x 3/16\" needle Use as directed 100 each 3     prasugrel (Effient) 10 mg tablet Take 1 tablet (10 mg) by mouth once daily. " 90 tablet 0     spironolactone (Aldactone) 25 mg tablet Take 1 tablet (25 mg) by mouth once daily in the morning.        Surgical History:    Past Surgical History:   Procedure Laterality Date    CARDIAC CATHETERIZATION N/A 12/6/2024    Procedure: Left Heart Cath;  Surgeon: Chaim Soriano MD;  Location: [facility] Cardiac Cath Lab;  Service: Cardiovascular;  Laterality: N/A;    CORONARY ARTERY BYPASS GRAFT  04/14/2015    Coronary Artery Surgery    MR HEAD ANGIO WO IV CONTRAST  2/23/2022    MR HEAD ANGIO WO IV CONTRAST [facility]     Allergies:  No Known Allergies    Social History:    Social History     Socioeconomic History    Marital status:      Spouse name: Not on file    Number of children: Not on file    Years of education: Not on file    Highest education level: Not on file   Occupational History    Not on file   Tobacco Use    Smoking status: Former     Types: Cigarettes    Smokeless tobacco: Never   Vaping Use    Vaping status: Never Used   Substance and Sexual Activity    Alcohol use: Not on file    Drug use: Not on file    Sexual activity: Not on file   Other Topics Concern    Not on file   Social History Narrative    Not on file     Social Drivers of Health     Financial Resource Strain: Low Risk  (1/16/2025)    Overall Financial Resource Strain (CARDIA)     Difficulty of Paying Living Expenses: Not very hard   Food Insecurity: No Food Insecurity (1/15/2025)    Hunger Vital Sign     Worried About Running Out of Food in the Last Year: Never true     Ran Out of Food in the Last Year: Never true   Transportation Needs: No Transportation Needs (1/16/2025)    PRAPARE - Transportation     Lack of Transportation (Medical): No     Lack of Transportation (Non-Medical): No   Physical Activity: Not on file   Stress: Not on file   Social Connections: Unknown (12/5/2024)    Social Connection and Isolation Panel [NHANES]     Frequency of Communication with Friends and Family: Not on file     Frequency of  Social Gatherings with Friends and Family: Patient unable to answer     Attends Taoist Services: Patient unable to answer     Active Member of Clubs or Organizations: Not on file     Attends Club or Organization Meetings: Patient unable to answer     Marital Status:    Intimate Partner Violence: Not At Risk (12/5/2024)    Humiliation, Afraid, Rape, and Kick questionnaire     Fear of Current or Ex-Partner: No     Emotionally Abused: No     Physically Abused: No     Sexually Abused: No   Housing Stability: Low Risk  (1/16/2025)    Housing Stability Vital Sign     Unable to Pay for Housing in the Last Year: No     Number of Times Moved in the Last Year: 1     Homeless in the Last Year: No     Family History:    Family History   Problem Relation Name Age of Onset    Diabetes Mother      Other (Cerebrovascular Accident) Father       OBJECTIVE     Vitals:    Vitals:    01/23/25 0007 01/23/25 0406 01/23/25 0754 01/23/25 0835   BP: 100/61 109/58 115/64 122/69   BP Location: Left arm Left arm Left arm Left arm   Patient Position: Lying Lying Lying Lying   Pulse: 61 69 67 74   Resp: 20 19 19 16   Temp: 36.2 °C (97.2 °F) 36.1 °C (97 °F) 36 °C (96.8 °F) 36 °C (96.8 °F)   TempSrc: Temporal Temporal Temporal Temporal   SpO2: 90% 100% 94% 100%   Weight:  67.3 kg (148 lb 5.9 oz)     Height:         Failed to redirect to the Timeline version of the [employer] SmartLink.    Intake/Output Summary (Last 24 hours) at 1/23/2025 1106  Last data filed at 1/23/2025 0901  Gross per 24 hour   Intake 360 ml   Output 1560 ml   Net -1200 ml         Physical Exam  General: well-nourished, no acute distress  HEENT: EOM intact, no scleral icterus, moist MM  Respiratory: nonlabored breathing on room air  Cardiovascular: RRR, no murmurs/rubs/gallops  Abdomen: Soft, nontender, nondistended, bowel sounds present. No masses palpated  Extremities: BKA, no edema  Neuro: alert and oriented, CNII-XII grossly intact, moves all 4 extremities with no  focal deficits  Psych: calm and cooperative    Medications    Current Facility-Administered Medications:     aspirin EC tablet 81 mg, 81 mg, oral, Daily, Trinity Tristan MD, 81 mg at 01/23/25 0838    atorvastatin (Lipitor) tablet 40 mg, 40 mg, oral, Nightly, Trinity Tristan MD, 40 mg at 01/22/25 2134    bisacodyl (Dulcolax) suppository 10 mg, 10 mg, rectal, Daily, Vikas Carrillo PA-C, 10 mg at 01/22/25 1824    busPIRone (Buspar) tablet 7.5 mg, 7.5 mg, oral, BID, Trinity Tristan MD, 7.5 mg at 01/23/25 0838    clopidogrel (Plavix) tablet 75 mg, 75 mg, oral, Daily, MARILY Herrera, 75 mg at 01/23/25 0838    dextrose 50 % injection 12.5 g, 12.5 g, intravenous, q15 min PRN, Trinity Tristan MD    dextrose 50 % injection 25 g, 25 g, intravenous, q15 min PRN, Trinity Tristan MD    enoxaparin (Lovenox) syringe 40 mg, 40 mg, subcutaneous, q24h, Cruz Guy PA-C, 40 mg at 01/21/25 1340    glucagon (Glucagen) injection 1 mg, 1 mg, intramuscular, q15 min PRN, Trinity Tristan MD    glucagon (Glucagen) injection 1 mg, 1 mg, intramuscular, q15 min PRN, Trinity Tristan MD    insulin glargine (Lantus) injection 12 Units, 12 Units, subcutaneous, Nightly, Trinity Tristan MD, 12 Units at 01/21/25 2019    insulin lispro injection 0-5 Units, 0-5 Units, subcutaneous, TID AC, Trinity Tristan MD, 1 Units at 01/22/25 1149    isosorbide mononitrate ER (Imdur) 24 hr tablet 30 mg, 30 mg, oral, Once, MARILY Hernandez    [START ON 1/24/2025] isosorbide mononitrate ER (Imdur) 24 hr tablet 60 mg, 60 mg, oral, Daily, Wida M Hall, APRN-CNP    levothyroxine (Synthroid, Levoxyl) tablet 50 mcg, 50 mcg, oral, Daily, Trinity Tristan MD, 50 mcg at 01/23/25 0511    metoprolol succinate XL (Toprol-XL) 24 hr tablet 25 mg, 25 mg, oral, Daily, Trinity Tristan MD, 25 mg at 01/23/25 0838    mirtazapine (Remeron) tablet 15 mg, 15 mg, oral, Nightly, Trinity Tristan MD, 15 mg at 01/22/25 2134    nitroglycerin (Nitrostat) SL tablet 0.4 mg, 0.4 mg, sublingual,  q5 min PRN, Vikas Carrillo PA-C, 0.4 mg at 01/21/25 1730    pantoprazole (ProtoNix) injection 40 mg, 40 mg, intravenous, BID, Cruz Guy PA-C, 40 mg at 01/23/25 0826    ranolazine (Ranexa) 12 hr tablet 1,000 mg, 1,000 mg, oral, BID, Cruz Guy PA-C, 1,000 mg at 01/23/25 0838    scopolamine (Transderm-Scop) patch 1 patch, 1 patch, transdermal, q72h, Cruz Guy PA-C, 1 patch at 01/22/25 1250    sennosides-docusate sodium (Joselin-Colace) 8.6-50 mg per tablet 1 tablet, 1 tablet, oral, Nightly, Vikas Carrillo PA-C, 1 tablet at 01/22/25 2133    [Held by provider] spironolactone (Aldactone) tablet 25 mg, 25 mg, oral, q AM, Haydee Flynn MD    sucralfate (Carafate) tablet 1 g, 1 g, oral, BID AC, Cruz Guy PA-C, 1 g at 01/23/25 0839     Labs  Lab Results   Component Value Date    WBC 7.6 01/22/2025    HGB 8.7 (L) 01/22/2025    HCT 25.8 (L) 01/22/2025    MCV 89 01/22/2025     (L) 01/22/2025     Lab Results   Component Value Date    GLUCOSE 153 (H) 01/22/2025    CALCIUM 8.7 01/22/2025     01/22/2025    K 4.3 01/22/2025    CO2 26 01/22/2025     01/22/2025    BUN 22 01/22/2025    CREATININE 1.64 (H) 01/22/2025     Lab Results   Component Value Date    ALT 11 01/14/2025    AST 10 01/14/2025    GGT 51 05/19/2021    ALKPHOS 70 01/14/2025    BILITOT 1.5 (H) 01/14/2025     Lab Results   Component Value Date    INR 1.2 (H) 01/14/2025    INR 1.1 02/22/2022    INR 1.3 (H) 02/18/2022    PROTIME 13.4 (H) 01/14/2025    PROTIME 11.9 02/22/2022    PROTIME 15.1 (H) 02/18/2022     Imaging:   XR ABDOMEN 1/20/25  1.  Nonspecific bowel gas pattern. Minimal fecal burden    CT ANGIO CHEST ABDOMEN PELVIS 1/13/25  CHEST  1.  No evidence of aortic aneurysm, dissection, or acute intramural hematoma. Moderate calcified and noncalcified atherosclerotic plaque of the thoracic and abdominal aorta. Patent left renal artery stent and partially visualized bilateral superficial femoral artery stents. Sternotomy  changes. Coronary artery atherosclerotic calcifications.  2. No evidence of consolidation or pleural effusion. Moderate centrilobular and paraseptal emphysema as well as nonspecific pulmonary fibrotic changes. Asymmetric scarring in the left lung apex is new since 2010 and consider follow-up chest CT in 6 months to confirm stability.    ABDOMEN - PELVIS  1.  No acute abnormality in the abdomen/pelvis. Mild bilateral renal cortical thinning.  2. Unchanged chronic compression deformity of L4 vertebral body.    GI Procedures:  No results found for this or any previous visit from the past 1825 days.                 ASSESSMENT/PLAN:  Chavez Llamas is a 79 y.o. male admitted on 1/14/2025 with NSTEMI. GI is consulted for persistent nausea after revascularization. Symptoms are nonspecific and concerning given their chronicity. Patient does have risk factors for PUD, malignancy, ischemia given DAPT, known arterial disease and prior smoking. He also has a chronic normocytic anemia, though it is not clear if he is iron deficient.  Patient would benefit from expedited endoscopic evaluation. At this time, he prefers to try anti-emetics and acid reducing medications to see if his symptoms improve.    Recommendations:  -Continue high dose PPI  -Discontinue carafate. It was not being administered appropriately and interferes with the absorption of many of patient's other medication.  -For nausea, if QTC is acceptable, recommend zofran. Can also consider tigan as this does not prolong QT.  -Discontinue scopolamine as it is not alleviating patient's symptoms.  -Please obtain ferritin and iron group to better characterize anemia  -Will consider diagnostic EGD in this high risk patient. Patient hesitant but will consider pending conservative management and lab testing.    Patient was seen and discussed with Dr. Varela.    Recommendations communicated with the primary team via secure chat.  Thank you for the consultation.  GI will  continue to follow.  Please do not hesitate to contact us again on Epic Chat or by paging the consultation team at [phone] between the weekday hours of 7 AM - 5 PM.  If there is an urgent concern during the weekend, after-hours, or holidays; then please page the on-call GI fellow at [phone]. Thank you.      Meryl Yoon MD  Gastroenterology and Hepatology Fellow  [facility]

## 2025-01-24 ENCOUNTER — PHARMACY VISIT (OUTPATIENT)
Dept: PHARMACY | Facility: CLINIC | Age: 80
End: 2025-01-24
Payer: COMMERCIAL

## 2025-01-24 LAB
ALBUMIN SERPL BCP-MCNC: 3.3 G/DL (ref 3.4–5)
ANION GAP SERPL CALC-SCNC: 14 MMOL/L (ref 10–20)
BUN SERPL-MCNC: 18 MG/DL (ref 6–23)
CALCIUM SERPL-MCNC: 8.5 MG/DL (ref 8.6–10.6)
CHLORIDE SERPL-SCNC: 101 MMOL/L (ref 98–107)
CO2 SERPL-SCNC: 26 MMOL/L (ref 21–32)
CREAT SERPL-MCNC: 1.6 MG/DL (ref 0.5–1.3)
EGFRCR SERPLBLD CKD-EPI 2021: 43 ML/MIN/1.73M*2
ERYTHROCYTE [DISTWIDTH] IN BLOOD BY AUTOMATED COUNT: 16 % (ref 11.5–14.5)
GLUCOSE BLD MANUAL STRIP-MCNC: 131 MG/DL (ref 74–99)
GLUCOSE BLD MANUAL STRIP-MCNC: 164 MG/DL (ref 74–99)
GLUCOSE BLD MANUAL STRIP-MCNC: 185 MG/DL (ref 74–99)
GLUCOSE BLD MANUAL STRIP-MCNC: 196 MG/DL (ref 74–99)
GLUCOSE SERPL-MCNC: 185 MG/DL (ref 74–99)
HCT VFR BLD AUTO: 24.2 % (ref 41–52)
HGB BLD-MCNC: 7.9 G/DL (ref 13.5–17.5)
MAGNESIUM SERPL-MCNC: 2.1 MG/DL (ref 1.6–2.4)
MCH RBC QN AUTO: 29.6 PG (ref 26–34)
MCHC RBC AUTO-ENTMCNC: 32.6 G/DL (ref 32–36)
MCV RBC AUTO: 91 FL (ref 80–100)
NRBC BLD-RTO: 0 /100 WBCS (ref 0–0)
PHOSPHATE SERPL-MCNC: 3.6 MG/DL (ref 2.5–4.9)
PLATELET # BLD AUTO: 123 X10*3/UL (ref 150–450)
POTASSIUM SERPL-SCNC: 4.5 MMOL/L (ref 3.5–5.3)
RBC # BLD AUTO: 2.67 X10*6/UL (ref 4.5–5.9)
SODIUM SERPL-SCNC: 136 MMOL/L (ref 136–145)
WBC # BLD AUTO: 5 X10*3/UL (ref 4.4–11.3)

## 2025-01-24 PROCEDURE — 2500000004 HC RX 250 GENERAL PHARMACY W/ HCPCS (ALT 636 FOR OP/ED)

## 2025-01-24 PROCEDURE — 2500000002 HC RX 250 W HCPCS SELF ADMINISTERED DRUGS (ALT 637 FOR MEDICARE OP, ALT 636 FOR OP/ED)

## 2025-01-24 PROCEDURE — 82947 ASSAY GLUCOSE BLOOD QUANT: CPT

## 2025-01-24 PROCEDURE — 99233 SBSQ HOSP IP/OBS HIGH 50: CPT | Performed by: INTERNAL MEDICINE

## 2025-01-24 PROCEDURE — 99232 SBSQ HOSP IP/OBS MODERATE 35: CPT | Performed by: STUDENT IN AN ORGANIZED HEALTH CARE EDUCATION/TRAINING PROGRAM

## 2025-01-24 PROCEDURE — 1200000002 HC GENERAL ROOM WITH TELEMETRY DAILY

## 2025-01-24 PROCEDURE — 2500000001 HC RX 250 WO HCPCS SELF ADMINISTERED DRUGS (ALT 637 FOR MEDICARE OP)

## 2025-01-24 PROCEDURE — 2500000001 HC RX 250 WO HCPCS SELF ADMINISTERED DRUGS (ALT 637 FOR MEDICARE OP): Performed by: NURSE PRACTITIONER

## 2025-01-24 PROCEDURE — 99231 SBSQ HOSP IP/OBS SF/LOW 25: CPT

## 2025-01-24 PROCEDURE — 2500000002 HC RX 250 W HCPCS SELF ADMINISTERED DRUGS (ALT 637 FOR MEDICARE OP, ALT 636 FOR OP/ED): Performed by: NURSE PRACTITIONER

## 2025-01-24 PROCEDURE — 80069 RENAL FUNCTION PANEL: CPT

## 2025-01-24 PROCEDURE — 36415 COLL VENOUS BLD VENIPUNCTURE: CPT

## 2025-01-24 PROCEDURE — RXMED WILLOW AMBULATORY MEDICATION CHARGE

## 2025-01-24 PROCEDURE — 83735 ASSAY OF MAGNESIUM: CPT

## 2025-01-24 PROCEDURE — 85027 COMPLETE CBC AUTOMATED: CPT

## 2025-01-24 RX ORDER — BISACODYL 10 MG/1
10 SUPPOSITORY RECTAL DAILY
Qty: 30 SUPPOSITORY | Refills: 3 | Status: SHIPPED | OUTPATIENT
Start: 2025-02-07 | End: 2025-01-25

## 2025-01-24 RX ORDER — CLOPIDOGREL BISULFATE 75 MG/1
75 TABLET ORAL DAILY
Qty: 90 TABLET | Refills: 3 | Status: SHIPPED | OUTPATIENT
Start: 2025-01-25 | End: 2025-01-24

## 2025-01-24 RX ORDER — INSULIN GLARGINE 100 [IU]/ML
12 INJECTION, SOLUTION SUBCUTANEOUS NIGHTLY
Qty: 15 ML | Refills: 3 | Status: SHIPPED | OUTPATIENT
Start: 2025-01-24 | End: 2025-01-24

## 2025-01-24 RX ORDER — ISOSORBIDE MONONITRATE 60 MG/1
60 TABLET, EXTENDED RELEASE ORAL DAILY
Qty: 30 TABLET | Refills: 3 | Status: SHIPPED | OUTPATIENT
Start: 2025-02-24 | End: 2025-01-25

## 2025-01-24 RX ORDER — TAMSULOSIN HYDROCHLORIDE 0.4 MG/1
0.4 CAPSULE ORAL NIGHTLY
Qty: 30 CAPSULE | Refills: 3 | Status: SHIPPED | OUTPATIENT
Start: 2025-02-24 | End: 2025-01-25

## 2025-01-24 RX ORDER — RANOLAZINE 1000 MG/1
1000 TABLET, EXTENDED RELEASE ORAL 2 TIMES DAILY
Qty: 60 TABLET | Refills: 3 | Status: SHIPPED | OUTPATIENT
Start: 2025-01-24 | End: 2025-01-24

## 2025-01-24 RX ORDER — PANTOPRAZOLE SODIUM 40 MG/1
40 TABLET, DELAYED RELEASE ORAL
Qty: 30 TABLET | Refills: 3 | Status: SHIPPED | OUTPATIENT
Start: 2025-01-24 | End: 2025-02-02 | Stop reason: HOSPADM

## 2025-01-24 RX ORDER — NITROGLYCERIN 0.4 MG/1
0.4 TABLET SUBLINGUAL EVERY 5 MIN PRN
Qty: 25 TABLET | Refills: 3 | Status: SHIPPED | OUTPATIENT
Start: 2025-01-24 | End: 2025-01-24

## 2025-01-24 RX ORDER — RANOLAZINE 1000 MG/1
1000 TABLET, EXTENDED RELEASE ORAL 2 TIMES DAILY
Qty: 60 TABLET | Refills: 3 | Status: SHIPPED | OUTPATIENT
Start: 2025-02-24 | End: 2025-01-25

## 2025-01-24 RX ORDER — TAMSULOSIN HYDROCHLORIDE 0.4 MG/1
0.4 CAPSULE ORAL NIGHTLY
Qty: 30 CAPSULE | Refills: 3 | Status: SHIPPED | OUTPATIENT
Start: 2025-01-24 | End: 2025-01-24

## 2025-01-24 RX ORDER — ISOSORBIDE MONONITRATE 60 MG/1
60 TABLET, EXTENDED RELEASE ORAL DAILY
Qty: 30 TABLET | Refills: 3 | Status: SHIPPED | OUTPATIENT
Start: 2025-01-25 | End: 2025-01-24

## 2025-01-24 RX ORDER — NITROGLYCERIN 0.4 MG/1
0.4 TABLET SUBLINGUAL EVERY 5 MIN PRN
Qty: 25 TABLET | Refills: 3 | Status: SHIPPED | OUTPATIENT
Start: 2025-01-24 | End: 2025-02-02 | Stop reason: HOSPADM

## 2025-01-24 RX ORDER — BISACODYL 10 MG/1
10 SUPPOSITORY RECTAL DAILY
Qty: 30 SUPPOSITORY | Refills: 3 | Status: SHIPPED | OUTPATIENT
Start: 2025-01-24 | End: 2025-01-24

## 2025-01-24 RX ORDER — PANTOPRAZOLE SODIUM 40 MG/1
40 TABLET, DELAYED RELEASE ORAL
Status: DISCONTINUED | OUTPATIENT
Start: 2025-01-25 | End: 2025-01-25 | Stop reason: HOSPADM

## 2025-01-24 RX ORDER — AMOXICILLIN 250 MG
1 CAPSULE ORAL NIGHTLY
Qty: 30 TABLET | Refills: 3 | Status: SHIPPED | OUTPATIENT
Start: 2025-01-24 | End: 2025-01-24

## 2025-01-24 RX ORDER — AMOXICILLIN 250 MG
1 CAPSULE ORAL NIGHTLY
Qty: 30 TABLET | Refills: 3 | Status: SHIPPED | OUTPATIENT
Start: 2025-02-24 | End: 2025-01-25

## 2025-01-24 RX ORDER — CLOPIDOGREL BISULFATE 75 MG/1
75 TABLET ORAL DAILY
Qty: 90 TABLET | Refills: 3 | Status: SHIPPED | OUTPATIENT
Start: 2025-04-24 | End: 2025-01-25

## 2025-01-24 RX ORDER — INSULIN GLARGINE 100 [IU]/ML
12 INJECTION, SOLUTION SUBCUTANEOUS NIGHTLY
Qty: 1 EACH | Refills: 3 | Status: SHIPPED | OUTPATIENT
Start: 2025-02-24 | End: 2025-01-25

## 2025-01-24 RX ADMIN — MIRTAZAPINE 15 MG: 15 TABLET, FILM COATED ORAL at 20:16

## 2025-01-24 RX ADMIN — BUSPIRONE HYDROCHLORIDE 7.5 MG: 15 TABLET ORAL at 20:16

## 2025-01-24 RX ADMIN — ISOSORBIDE MONONITRATE 60 MG: 60 TABLET, EXTENDED RELEASE ORAL at 09:32

## 2025-01-24 RX ADMIN — INSULIN LISPRO 1 UNITS: 100 INJECTION, SOLUTION INTRAVENOUS; SUBCUTANEOUS at 12:12

## 2025-01-24 RX ADMIN — RANOLAZINE 1000 MG: 500 TABLET, EXTENDED RELEASE ORAL at 20:16

## 2025-01-24 RX ADMIN — INSULIN LISPRO 1 UNITS: 100 INJECTION, SOLUTION INTRAVENOUS; SUBCUTANEOUS at 18:34

## 2025-01-24 RX ADMIN — TAMSULOSIN HYDROCHLORIDE 0.4 MG: 0.4 CAPSULE ORAL at 20:16

## 2025-01-24 RX ADMIN — SENNOSIDES AND DOCUSATE SODIUM 1 TABLET: 50; 8.6 TABLET ORAL at 20:16

## 2025-01-24 RX ADMIN — PANTOPRAZOLE SODIUM 40 MG: 40 INJECTION, POWDER, FOR SOLUTION INTRAVENOUS at 09:33

## 2025-01-24 RX ADMIN — METOPROLOL SUCCINATE 25 MG: 25 TABLET, EXTENDED RELEASE ORAL at 09:32

## 2025-01-24 RX ADMIN — LEVOTHYROXINE SODIUM 50 MCG: 0.05 TABLET ORAL at 06:00

## 2025-01-24 RX ADMIN — RANOLAZINE 1000 MG: 500 TABLET, EXTENDED RELEASE ORAL at 09:32

## 2025-01-24 RX ADMIN — ASPIRIN 81 MG: 81 TABLET, COATED ORAL at 09:32

## 2025-01-24 RX ADMIN — ATORVASTATIN CALCIUM 40 MG: 40 TABLET, FILM COATED ORAL at 20:16

## 2025-01-24 RX ADMIN — BUSPIRONE HYDROCHLORIDE 7.5 MG: 15 TABLET ORAL at 09:31

## 2025-01-24 RX ADMIN — INSULIN LISPRO 1 UNITS: 100 INJECTION, SOLUTION INTRAVENOUS; SUBCUTANEOUS at 09:41

## 2025-01-24 RX ADMIN — CLOPIDOGREL BISULFATE 75 MG: 75 TABLET ORAL at 09:32

## 2025-01-24 ASSESSMENT — COGNITIVE AND FUNCTIONAL STATUS - GENERAL
WALKING IN HOSPITAL ROOM: A LOT
HELP NEEDED FOR BATHING: A LITTLE
TOILETING: A LITTLE
CLIMB 3 TO 5 STEPS WITH RAILING: A LOT
TOILETING: A LITTLE
MOBILITY SCORE: 18
MOVING TO AND FROM BED TO CHAIR: A LITTLE
MOBILITY SCORE: 18
MOVING TO AND FROM BED TO CHAIR: A LITTLE
HELP NEEDED FOR BATHING: A LITTLE
HELP NEEDED FOR BATHING: A LITTLE
DAILY ACTIVITIY SCORE: 22
MOVING TO AND FROM BED TO CHAIR: A LITTLE
CLIMB 3 TO 5 STEPS WITH RAILING: A LOT
STANDING UP FROM CHAIR USING ARMS: A LITTLE
TOILETING: A LITTLE
WALKING IN HOSPITAL ROOM: A LOT
TOILETING: A LITTLE
HELP NEEDED FOR BATHING: A LITTLE
STANDING UP FROM CHAIR USING ARMS: A LITTLE
DAILY ACTIVITIY SCORE: 22
STANDING UP FROM CHAIR USING ARMS: A LITTLE
STANDING UP FROM CHAIR USING ARMS: A LITTLE
MOVING TO AND FROM BED TO CHAIR: A LITTLE
WALKING IN HOSPITAL ROOM: A LOT
WALKING IN HOSPITAL ROOM: A LOT
CLIMB 3 TO 5 STEPS WITH RAILING: A LOT
CLIMB 3 TO 5 STEPS WITH RAILING: A LOT

## 2025-01-24 ASSESSMENT — PAIN SCALES - GENERAL
PAINLEVEL_OUTOF10: 0 - NO PAIN

## 2025-01-24 ASSESSMENT — PAIN - FUNCTIONAL ASSESSMENT
PAIN_FUNCTIONAL_ASSESSMENT: 0-10

## 2025-01-24 NOTE — PROGRESS NOTES
Interval events:    Failed voiding trial yesterday- re-placed indwelling fulton catheter and started on low dose flomax last evening.       Subjective:         This morning on exam patient asking to discharge home- adament will not go home with fulton catheter.  Fulton catheter removed- bladder scans every 4 hours with instructions to straight cath for  urine >400cc.       On exam this morning reports chest pain/discomfort better in co,mparison to yesterday but still present 2 on 0-10 pain scale.      Objective:  Vitals:    01/24/25 1100   BP: 114/65   Pulse: 67   Resp: 18   Temp: 36.2 °C (97.2 °F)   SpO2:      Weight         1/20/2025  0315 1/21/2025  0357 1/22/2025  0334 1/23/2025  0406 1/24/2025  0448    Weight: 66.8 kg (147 lb 4.3 oz) 67.3 kg (148 lb 5.9 oz) 67.2 kg (148 lb 2.4 oz) 67.3 kg (148 lb 5.9 oz) 66.6 kg (146 lb 13.2 oz)            Intake/Output Summary (Last 24 hours) at 1/24/2025 1537  Last data filed at 1/24/2025 1200  Gross per 24 hour   Intake 240 ml   Output 750 ml   Net -510 ml     Recent Results (from the past 24 hours)   POCT GLUCOSE    Collection Time: 01/23/25  5:51 PM   Result Value Ref Range    POCT Glucose 151 (H) 74 - 99 mg/dL   CBC    Collection Time: 01/23/25  7:11 PM   Result Value Ref Range    WBC 5.3 4.4 - 11.3 x10*3/uL    nRBC 0.0 0.0 - 0.0 /100 WBCs    RBC 2.78 (L) 4.50 - 5.90 x10*6/uL    Hemoglobin 8.2 (L) 13.5 - 17.5 g/dL    Hematocrit 24.7 (L) 41.0 - 52.0 %    MCV 89 80 - 100 fL    MCH 29.5 26.0 - 34.0 pg    MCHC 33.2 32.0 - 36.0 g/dL    RDW 15.7 (H) 11.5 - 14.5 %    Platelets 128 (L) 150 - 450 x10*3/uL   Magnesium    Collection Time: 01/23/25  7:11 PM   Result Value Ref Range    Magnesium 2.09 1.60 - 2.40 mg/dL   Renal Function Panel    Collection Time: 01/23/25  7:11 PM   Result Value Ref Range    Glucose 133 (H) 74 - 99 mg/dL    Sodium 137 136 - 145 mmol/L    Potassium 4.2 3.5 - 5.3 mmol/L    Chloride 102 98 - 107 mmol/L    Bicarbonate 27 21 - 32 mmol/L    Anion Gap 12 10 - 20  mmol/L    Urea Nitrogen 21 6 - 23 mg/dL    Creatinine 1.60 (H) 0.50 - 1.30 mg/dL    eGFR 43 (L) >60 mL/min/1.73m*2    Calcium 8.7 8.6 - 10.6 mg/dL    Phosphorus 3.1 2.5 - 4.9 mg/dL    Albumin 3.4 3.4 - 5.0 g/dL   POCT GLUCOSE    Collection Time: 01/23/25 11:04 PM   Result Value Ref Range    POCT Glucose 128 (H) 74 - 99 mg/dL   POCT GLUCOSE    Collection Time: 01/24/25  9:33 AM   Result Value Ref Range    POCT Glucose 164 (H) 74 - 99 mg/dL   POCT GLUCOSE    Collection Time: 01/24/25 11:45 AM   Result Value Ref Range    POCT Glucose 185 (H) 74 - 99 mg/dL     Inpatient Medications:  Scheduled medications   Medication Dose Route Frequency    aspirin  81 mg oral Daily    atorvastatin  40 mg oral Nightly    bisacodyl  10 mg rectal Daily    busPIRone  7.5 mg oral BID    clopidogrel  75 mg oral Daily    enoxaparin  40 mg subcutaneous q24h    insulin glargine  12 Units subcutaneous Nightly    insulin lispro  0-5 Units subcutaneous TID AC    isosorbide mononitrate ER  60 mg oral Daily    levothyroxine  50 mcg oral Daily    metoprolol succinate XL  25 mg oral Daily    mirtazapine  15 mg oral Nightly    [START ON 1/25/2025] pantoprazole  40 mg oral Daily before breakfast    ranolazine  1,000 mg oral BID    sennosides-docusate sodium  1 tablet oral Nightly    [Held by provider] spironolactone  25 mg oral q AM    tamsulosin  0.4 mg oral Nightly     PRN medications   Medication    dextrose    dextrose    glucagon    glucagon    nitroglycerin    ondansetron     Continuous Medications   Medication Dose Last Rate     Procedures/testing:  TRANSTHORACIC ECHO (TTE) COMPLETE 1/14/2025   1. Poorly visualized anatomical structures due to suboptimal image quality.   2. Left ventricular cavity size is mild to moderately dilated.   3. Left ventricular ejection fraction is mildly decreased, by visual estimate at 45-50%.   4. Basal and mid inferior wall is abnormal.   5. Spectral Doppler shows a Grade I (impaired relaxation pattern) of left  ventricular diastolic filling with normal left atrial filling pressure.   6. Abnormal septal motion consistent with post-operative status.   7. There is mildly reduced right ventricular systolic function.   8. The left atrium is enlarged.   9. Atrial septal aneurysm present.  10. There is no evidence of a patent foramen ovale.    CT ANGIO CHEST ABDOMEN PELVIS; 1/13/2025   CHEST  1.  No evidence of aortic aneurysm, dissection, or acute intramural  hematoma. Moderate calcified and noncalcified atherosclerotic plaque  of the thoracic and abdominal aorta. Patent left renal artery stent  and partially visualized bilateral superficial femoral artery stents.  Sternotomy changes. Coronary artery atherosclerotic calcifications.  2. No evidence of consolidation or pleural effusion. Moderate  centrilobular and paraseptal emphysema as well as nonspecific  pulmonary fibrotic changes. Asymmetric scarring in the left lung apex  is new since 2010 and consider follow-up chest CT in 6 months to  confirm stability.  ABDOMEN - PELVIS  1.  No acute abnormality in the abdomen/pelvis. Mild bilateral renal  cortical thinning.  2. Unchanged chronic compression deformity of L4 vertebral body.    Left Heart Cath 12/6/2024   1. Severe three-vessel coronary artery disease.   2. Patent LIMA to LAD but after the anastomosis there is moderate tubular lesion that has been stable for long time.   3. Distal left main of 90% that is giving flow to midsize ramus. Has been stable for many years.   4. Severe diffuse right coronary artery disease was 2 areas of underexpanded stents and severe in-stent restenosis of 90% in the midsegment.   5. Balloon angioplasty was attempted we were unable to expand the mid RCA stent. There was still significant in-stent restenosis of 50% after balloon angioplasty. Improved from 99%. CIERRA-3 flow.   6. Medical management for now.    Physical exam:  General: well appearing, NAD, pleasant  HEENT: Atraumatic.   CV: RRR, soft  systolic murmur. Tenderness to light palpation to chest both substernal and central chest wall described as sharp which is different to dull CP.   PULM: CTAB, no WOB.   ABD: soft, NT, ND  EXT: LLE BKA. No erythema of the LLE or RLE. No LE edema noted. RUE anterior forearm is indurated, mild edema, and painful to touch. Posterior aspect of forearm is also tender to palpation, but less compared to anterior. Brusing to the RUE forearm and AC joint. RUE warm with cap refill of 2 sec. RUE has similar color when compared to LUE.   SKIN: no rashes noted   NEURO: No focal deficits. Some decreased sensation to RUE digits.   PSYCH: pleasant mood, appropriate affect    Assessment/Plan   Chavez Llamas is a 79yM with PMHx of CAD s/p CABG 1990s (LIMA to LAD and SVG to unknown?) and multiple PCIs (most recent [facility] 12/2024), HFmrEF (40-45% 12/4/2024), HTN, T2DM (A1C 5.5 12/5/2025), PAD s/p left BKA who presented to [facility] 1/13 in the setting of nausea, vomiting, chest pain, and wound on LLE. Found to have NSTEMI s/p heparin gtt and transferred to  for high risk PCI.     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% 12/4/2024)  - CABG with LIMA to the LAD patent, SVG to an unknown vessel chronically occluded; S/p multiple PCIs with 5 stents placed to prox and mid RCA with severe ISR  - [facility] 12/2024- severe 3v disease, patent LIMA-LAD but mod tubular lesion after the anastomosis (stable for many years), distal LM of 90% giving flow to ramus (stable), severe diffuse RCA disease with severe midsegment ISR, s/p balloon angioplasty of RCA ISR (stenosis improved to 50% from 99%) with CIERRA 3 flow  - TTE notable for EF 45-50%, LV cavity mod dilated, basal and mid inferior wall abnormal, enlarged left atrium  - 1/22: Limited echo: EF 40%, mod-severe MR, mild dilation of IVC, mod-reduction in RV systolic function, trivial pericardial effusion.   - 1/17 episode of chest pain (rating 2/10) and nausea this morning.  ECG without significant change from previous.  - Trop peak 212 on 1/14 -> repeat 1/17 HS trop 56  - Repeat troponin 1/21 evening iso chest tightness: 141 (suspect 2/2 recent PCI)  - RCA PCI with Dr. Weston 1/21: POBA and IVL for severe ISR with reasonable results.  - Decision to intervene on LAD disease will be based on symptoms and discussion with Dr. Soriano in clinic.   - S/p Pasugrel switched to clopidogrel. S/p clopidogrel load 1/21 with 300mg. Then start 75mg daily starting 1/22.   - C/w ASA 81 mg and atorvastatin 40mg  - Will need 90 day prescription of Plavix to [facility] and remaining refills to patient's home pharmacy upon discharge.   - s/p heparin gtt dc'd after 48 hr on 1/15-->   - 117 restarted heparin gtt iso recurrent chest pain, will continue until PCI Monday  - S/p amlodipine discontinuation.   - Continue home metoprolol succinate 25mg  - 1/22 Increase ranolazine 500mg bid (started at OSH) to 1,000mg BID iso persistent mild chest tightness.   - 1/22 lasix 40mg IV x1 today iso echo findings.   - 1/22 Holding spironolactone 25mg iso TEODORO, consider restarting following stabilization of Cr.  - 1/23 Increase imdur to 60mg daily    - 1/24 meds to beds delivered, discharge postponed until tomorrow per patrients request  - cardiology follow up appointment made with Dr. Gonzales 2/10/2010 [facility] medical office at 1000 hours     TEODORO on CKD3a, improving  Recent history of Acute urinary retention   - Baseline Cr 1.3?  - Nephrology consulted at OSH, feel TEODORO iso contrast. No hydronephrosis on CT 1/13  - Cr trend:  1.64 (1.68, 1.73, 1.41, 1.53, 1.62, 1.79, 1.78, 1.22, 1.7, 1.59, 1.18)  - FeNa 1.1%  - UA neg  - Renally dose meds and avoid nephrotoxins   - s/p 500cc LR bolus 1/15  - Straight cath on 1/19.   - 1/23 urology consult due to urinary retention requiring fulton placement. Will see patient outpatient to work up cause, start tamsulosin, keep fulton in place. Please ensure patient is ambulatory, manege  "constipation, minimizing opioid use, avoid medication increasing risk of retention like anticholinergics Then can try TOV. If failed TOV, can replace fulton and place referral order to urology for outpatient trial of void .  - 1/24 patient requesting to discharge home- adament he will not go home with the fulton catheter, fulton removed pending discharge. Order to bladder scan every 4 hours, straight cath for urine > 400 ml. Outpatient urology appointment made.    RUE PIV Infiltration  - per patient, pain began 1/20.   - Indurated and bruised right anterior forearm that is tender to palpation.   - Suspect 2/2 infiltrated IV placed in that area with exacerbation of post cath TR band also used on that extremity. No appreciable focal hematoma and hand is warm and well-perfused. Per nursing, right radial artery has been dopplered well.   - Continue q4 neurovascular checks. Monitor for findings that might suggest acute ischemia 2/2 compartment syndrome. Perfusion on exam is reassuring at this time.   - Suspect difficulty with obtaining RUE US as patient has significant pain over anterior forearm.   - Tylenol for 24 hours for acute pain management.   - continue warm pack     GI Discomfort  Nausea  - Patient complaining of persistent nausea and \"indigestion\".  - No known emeses as of recent.   - Per chart review patient was previously seen by GI for coffee ground emesis in [facility]. No melena. Episode occurred in the setting of covid infection. At that time, GI recommended high dose PPI and carafate as he was on DAPT to treat for possible GI bleed. His H&H remained stable so no endoscopy was performed at that time.   - 1/22 Given he is on DAPT, will start him on high-dose protonix and carafate. Will monitor his nausea and indigestion thereafter with low threshold to consult GI.   - 1/22 Start scopolamine patch for persistent nausea due to elevated QTC interval  - GI consult: stop carafate as it is not given properly and may " interfere with meds, DC scolpamine patch not effective and to reduced polypharmacy. OK for zofran now that QTC improved on subsequent EKG, ferritin and iron level, may consider EGD.   -iron level low at 33 and TIBC low at 205 % 16 not likely iron deficiency anemia  - scheduled GI consult outpatient for follow up EGD/colonoscopy recs    Emphysema on CTA  - No PFTs on file   - No home inhalers   - Outpatient repeat CT in 6 months to follow up assymetric L lung scarring      Chronic anemia (baseline Hgb ~10)  Chronic thrombocytopenia (baseline 110)  - Hgb today 8.7 (8.4, 8.4, 9.8, 10.5, 9, 9.5)     T2DM (A1c 5.5% 12/2024)   Hypothyroidism  - Lantus 12U and SSI#1  - Hypoglycemia protocol  - Continue synthroid 50 mcg      Concern for LLE cellulitis   - XR knee 1/13/2025: No acute findings status post left below-the-knee amputation.  - Vascular US LE: No evidence of deep venous thrombus in the evaluated veins of the left leg from the inguinal ligament to the popliteal fossa.  - blood cx 1/13 NGTD x3 days  - wound cx 1/13 negative  - s/p Vanc/Zosyn (1/13)  - completed Keflex x5d (1/13-1/17)  - recommend orthotics appointment as an outpatient for prosthetic fitting, suspect prosthesis not fitting properly following recent weight loss  - LLE is well-appearing on today's exam. No erythema or edema.     DVT ppx: Lovenox  DISPO: discharge postponed until tomorrow saturday 1/25   Code status: Full Code    Patient seen and discussed with Dr. Lopez.    Jaqueline Thorpe, APRN-CNP

## 2025-01-24 NOTE — PROGRESS NOTES
Subjective Data:  Patient seen and assessed this afternoon, lying in bed, NAD. Reports improving, mild CP, now 1/10. Reports Rt arm continues to improve, feeling much better today. Denies any SOB, comfortable on RA.     - reports chest pressure now 1/10, mild nausea, improving per pt  - Rt Radial site, MILLICENT, no hematoma or bleeding, pulse palpable, mild tingling improving  - RFA pain, ISO of infiltrated IV in AC, continues to improving  - per patient plan is for discharge tomorrow 1/25    Objective Data:  Last Recorded Vitals:  Vitals:    01/23/25 2316 01/24/25 0448 01/24/25 0816 01/24/25 1100   BP: 120/68 109/65 105/65 114/65   BP Location: Left arm Left arm Left arm Left arm   Patient Position: Lying Lying Lying Lying   Pulse: 70 63 65 67   Resp: 18 18 18 18   Temp: 36.5 °C (97.7 °F) 36.2 °C (97.2 °F) 36.5 °C (97.7 °F) 36.2 °C (97.2 °F)   TempSrc: Temporal Temporal Temporal Temporal   SpO2: 99% 96% 96%    Weight:  66.6 kg (146 lb 13.2 oz)     Height:           Last Labs:  Results for orders placed or performed during the hospital encounter of 01/14/25 (from the past 24 hours)   POCT GLUCOSE   Result Value Ref Range    POCT Glucose 151 (H) 74 - 99 mg/dL   CBC   Result Value Ref Range    WBC 5.3 4.4 - 11.3 x10*3/uL    nRBC 0.0 0.0 - 0.0 /100 WBCs    RBC 2.78 (L) 4.50 - 5.90 x10*6/uL    Hemoglobin 8.2 (L) 13.5 - 17.5 g/dL    Hematocrit 24.7 (L) 41.0 - 52.0 %    MCV 89 80 - 100 fL    MCH 29.5 26.0 - 34.0 pg    MCHC 33.2 32.0 - 36.0 g/dL    RDW 15.7 (H) 11.5 - 14.5 %    Platelets 128 (L) 150 - 450 x10*3/uL   Magnesium   Result Value Ref Range    Magnesium 2.09 1.60 - 2.40 mg/dL   Renal Function Panel   Result Value Ref Range    Glucose 133 (H) 74 - 99 mg/dL    Sodium 137 136 - 145 mmol/L    Potassium 4.2 3.5 - 5.3 mmol/L    Chloride 102 98 - 107 mmol/L    Bicarbonate 27 21 - 32 mmol/L    Anion Gap 12 10 - 20 mmol/L    Urea Nitrogen 21 6 - 23 mg/dL    Creatinine 1.60 (H) 0.50 - 1.30 mg/dL    eGFR 43 (L) >60  mL/min/1.73m*2    Calcium 8.7 8.6 - 10.6 mg/dL    Phosphorus 3.1 2.5 - 4.9 mg/dL    Albumin 3.4 3.4 - 5.0 g/dL   POCT GLUCOSE   Result Value Ref Range    POCT Glucose 128 (H) 74 - 99 mg/dL   POCT GLUCOSE   Result Value Ref Range    POCT Glucose 164 (H) 74 - 99 mg/dL   POCT GLUCOSE   Result Value Ref Range    POCT Glucose 185 (H) 74 - 99 mg/dL        TROPHS   Date/Time Value Ref Range Status   01/21/2025 07:47  0 - 53 ng/L Final   01/20/2025 07:36  0 - 53 ng/L Final     Comment:     Previous result verified on 1/21/2025 2101 on specimen/case [id number] called with component [facility] for procedure Troponin I, High Sensitivity with value 141 ng/L.   01/17/2025 10:17 AM 56 0 - 53 ng/L Final   01/13/2025 02:01 PM 66 0 - 20 ng/L Final   01/13/2025 11:20 AM 17 0 - 20 ng/L Final   12/07/2024 04:17 AM 2,068 0 - 20 ng/L Final     BNP   Date/Time Value Ref Range Status   01/14/2025 06:59  0 - 99 pg/mL Final   01/13/2025 11:20  0 - 99 pg/mL Final     HGBA1C   Date/Time Value Ref Range Status   12/05/2024 05:05 AM 5.5 See comment % Final   06/11/2024 09:29 AM 5.3 see below % Final     LDLCALC   Date/Time Value Ref Range Status   01/15/2025 01:06 PM 21 <=99 mg/dL Final     Comment:                                 Near   Borderline      AGE      Desirable  Optimal    High     High     Very High     0-19 Y     0 - 109     ---    110-129   >/= 130     ----    20-24 Y     0 - 119     ---    120-159   >/= 160     ----      >24 Y     0 -  99   100-129  130-159   160-189     >/=190     12/04/2024 11:47 PM 34 <=99 mg/dL Final     Comment:                                 Near   Borderline      AGE      Desirable  Optimal    High     High     Very High     0-19 Y     0 - 109     ---    110-129   >/= 130     ----    20-24 Y     0 - 119     ---    120-159   >/= 160     ----      >24 Y     0 -  99   100-129  130-159   160-189     >/=190     06/11/2024 09:29 AM 23 <=99 mg/dL Final     Comment:                                  Near   Borderline      AGE      Desirable  Optimal    High     High     Very High     0-19 Y     0 - 109     ---    110-129   >/= 130     ----    20-24 Y     0 - 119     ---    120-159   >/= 160     ----      >24 Y     0 -  99   100-129  130-159   160-189     >/=190       VLDL   Date/Time Value Ref Range Status   01/15/2025 01:06 PM 17 0 - 40 mg/dL Final   12/04/2024 11:47 PM 18 0 - 40 mg/dL Final   06/11/2024 09:29 AM 19 0 - 40 mg/dL Final      Last I/O:  I/O last 3 completed shifts:  In: 480 (7.2 mL/kg) [P.O.:480]  Out: 1150 (17.3 mL/kg) [Urine:1150 (0.5 mL/kg/hr)]  Weight: 66.6 kg     Inpatient Medications:  Scheduled medications   Medication Dose Route Frequency    aspirin  81 mg oral Daily    atorvastatin  40 mg oral Nightly    bisacodyl  10 mg rectal Daily    busPIRone  7.5 mg oral BID    clopidogrel  75 mg oral Daily    enoxaparin  40 mg subcutaneous q24h    insulin glargine  12 Units subcutaneous Nightly    insulin lispro  0-5 Units subcutaneous TID AC    isosorbide mononitrate ER  60 mg oral Daily    levothyroxine  50 mcg oral Daily    metoprolol succinate XL  25 mg oral Daily    mirtazapine  15 mg oral Nightly    [START ON 1/25/2025] pantoprazole  40 mg oral Daily before breakfast    ranolazine  1,000 mg oral BID    sennosides-docusate sodium  1 tablet oral Nightly    [Held by provider] spironolactone  25 mg oral q AM    tamsulosin  0.4 mg oral Nightly     PRN medications   Medication    dextrose    dextrose    glucagon    glucagon    nitroglycerin    ondansetron     Continuous Medications   Medication Dose Last Rate     Physical Exam  Constitutional:       General: He is not in acute distress.     Appearance: Normal appearance.   HENT:      Nose: Nose normal.      Mouth/Throat:      Mouth: Mucous membranes are moist.   Eyes:      Comments: Poor vision    Cardiovascular:      Rate and Rhythm: Normal rate.      Comments: Right radial pulse +doppler, access site MILLICENT, no hematoma or bleeding, mild  tingling- improving    Pulmonary:      Effort: Pulmonary effort is normal. No respiratory distress.   Musculoskeletal:      Cervical back: Normal range of motion.      Comments: IV infiltrate of RAC, warm to touch, tender, improving   Skin:     General: Skin is warm and dry.   Neurological:      General: No focal deficit present.      Mental Status: He is alert and oriented to person, place, and time.   Psychiatric:         Mood and Affect: Mood normal.         Behavior: Behavior normal.         Assessment/Plan   Chavez Llamas is an 79 year old M with PMHx of CAD s/p CABG 1990s (LIMA to LAD and SVG to unknown?) and multiple PCIs (most recent [facility] 12/2024), HFmrEF (40-45% 12/4/2024), HTN, T2DM (A1C 5.5 12/5/2025), PAD s/p left BKA who presented to [facility] 1/13 in the setting of nausea, vomiting, chest pain, and wound on LLE. Found to have NSTEMI and transferred to [facility] for high risk PCI.     1/20  - s/p RCA PCI of POBA and IVL for severe ISR today with Dr. Weston via A  - received pre and post- hydration for PCI due to GFR of 39    1/21  - switched from Effient to Plavix     1/22  - yesterday evening ~17:30 with complaints of chest and LUE pain  - RUE pain, likely ISO of infiltrated IV in AC, improving today with conservative management   - Repeat EKG done, reviewed by interventional fellow, no significant changes  - pain improved with nitroglycerin, troponin 141 (132)  - today still with some chest pressure rating at 2/10  - Primary team plans to repeat TTE today     1/23  - today still with some chest pressure rating at 2/10, mild nausea, improving per pt  - Rt Radial site, MILLICENT, no hematoma or bleeding, some tingling noted, pulse palpable  - RFA pain, ISO of infiltrated IV in AC, continues to improving  - limited TTE: EF 40%, DD, GHK, reduced RV function, mod to severe MR    1/24  - reports chest pressure now 1/10, mild nausea, both improving per pt  - Rt Radial site, MILLICENT, no hematoma or bleeding, pulse  palpable, mild tingling improving  - RFA pain, ISO of infiltrated IV in AC, continues to improving  - per patient plan is for discharge tomorrow 1/25    Interventional Recommendations:   - cont telemetry care per PCI protocol  - OK to discharge from interventional perspective   - cont DAPT with aspirin and Plavix  - education on compliance with DAPT provided  - at discharge, PLEASE send 90 day prescription of Plavix to [facility] (for meds to beds) and remaining refills to patient's home pharmacy   - continue high intensity statin, BB (or document contraindications for use)  - decision for intervention on the LAD will be based on symptoms and discussion with Dr Soriano in outpatient clinic   - please ensure cardiology follow up appointment after discharge in 1-3 weeks  - patient referred for cardiac rehab evaluation  - 5lb weight restriction for 5 days for right radial access, post-cath instructions added to AVS          Interventional Cardiology will now sign off, please page with any questions or concerns     Interventional Cardiology Fellow Pager: [phone] (7AM-5PM) Night coverage: [facility] Cross Cover [id number]    Louis Arnold, APRN-CNP   Interventional Cardiology     Code Status:  Full Code

## 2025-01-24 NOTE — PROGRESS NOTES
"[facility]  [facility] [place] [facility]  CONSULT FOLLOW-UP  January 24, 2025     Reason For Consult: nausea    History Of Present Illness  Chavez Llamas is a 79 y.o. male with a past medical history of HFrEF, CAD s/p CABG in 1990s, subsequent multiple PCIs, last in Dec 2024 admitted on 1/14/2025 with NSTEMI and is now s/p RCA PCI on 1/20/24. GI is consulted for persistent nausea.     SUBJECTIVE  1 dose of zofran overnight. 1 bowel movement. No recorded emesis.    OBJECTIVE  Last Recorded Vitals  Blood pressure 105/65, pulse 65, temperature 36.5 °C (97.7 °F), temperature source Temporal, resp. rate 18, height 1.727 m (5' 8\"), weight 66.6 kg (146 lb 13.2 oz), SpO2 96%.      Intake/Output Summary (Last 24 hours) at 1/24/2025 0834  Last data filed at 1/24/2025 0448  Gross per 24 hour   Intake 240 ml   Output 1000 ml   Net -760 ml     Physical Exam  General: well-nourished, no acute distress  HEENT: EOM intact, no scleral icterus, moist MM  Respiratory: nonlabored breathing on room air  Cardiovascular: Regular rate and rhythm  Abdomen: Soft, nontender, nondistended, bowel sounds present, no masses palpated  Extremities: no edema, no asterixis  Neuro: alert and oriented, CNII-XII grossly intact, moves all 4 extremities with no focal deficits    Medications    Current Facility-Administered Medications:     aspirin EC tablet 81 mg, 81 mg, oral, Daily, Trinity Tristan MD, 81 mg at 01/23/25 0838    atorvastatin (Lipitor) tablet 40 mg, 40 mg, oral, Nightly, Trinity Tristan MD, 40 mg at 01/23/25 2144    bisacodyl (Dulcolax) suppository 10 mg, 10 mg, rectal, Daily, Vikas Carrillo PA-C, 10 mg at 01/22/25 1824    busPIRone (Buspar) tablet 7.5 mg, 7.5 mg, oral, BID, Trinity Tristan MD, 7.5 mg at 01/23/25 2144    clopidogrel (Plavix) tablet 75 mg, 75 mg, oral, Daily, JOSÉ MIGUEL HerreraCNP, 75 mg at 01/23/25 0838    dextrose 50 % injection 12.5 g, 12.5 g, intravenous, q15 min PRN, Trinity Tristan, " MD    dextrose 50 % injection 25 g, 25 g, intravenous, q15 min PRN, Trinity Tristan MD    enoxaparin (Lovenox) syringe 40 mg, 40 mg, subcutaneous, q24h, Cruz Guy PA-C, 40 mg at 01/23/25 1303    glucagon (Glucagen) injection 1 mg, 1 mg, intramuscular, q15 min PRN, Trinity Tristan MD    glucagon (Glucagen) injection 1 mg, 1 mg, intramuscular, q15 min PRN, Trinity Tristan MD    insulin glargine (Lantus) injection 12 Units, 12 Units, subcutaneous, Nightly, Trinity Tristan MD, 12 Units at 01/21/25 2019    insulin lispro injection 0-5 Units, 0-5 Units, subcutaneous, TID AC, Trinity Tristan MD, 1 Units at 01/23/25 1804    isosorbide mononitrate ER (Imdur) 24 hr tablet 60 mg, 60 mg, oral, Daily, YANELI Hernandez-CNP    levothyroxine (Synthroid, Levoxyl) tablet 50 mcg, 50 mcg, oral, Daily, Trinity Tristan MD, 50 mcg at 01/24/25 0600    metoprolol succinate XL (Toprol-XL) 24 hr tablet 25 mg, 25 mg, oral, Daily, Trinity Tristan MD, 25 mg at 01/23/25 0838    mirtazapine (Remeron) tablet 15 mg, 15 mg, oral, Nightly, Trinity Tristan MD, 15 mg at 01/23/25 2144    nitroglycerin (Nitrostat) SL tablet 0.4 mg, 0.4 mg, sublingual, q5 min PRN, Vikas Carrillo PA-C, 0.4 mg at 01/21/25 1730    ondansetron (Zofran) injection 4 mg, 4 mg, intravenous, q6h PRN, MARILY Hernandez, 4 mg at 01/23/25 2144    pantoprazole (ProtoNix) injection 40 mg, 40 mg, intravenous, BID, Cruz Guy PA-C, 40 mg at 01/23/25 2144    ranolazine (Ranexa) 12 hr tablet 1,000 mg, 1,000 mg, oral, BID, Cruz Guy PA-C, 1,000 mg at 01/23/25 2144    sennosides-docusate sodium (Joselin-Colace) 8.6-50 mg per tablet 1 tablet, 1 tablet, oral, Nightly, Vikas Carrillo PA-C, 1 tablet at 01/23/25 2144    [Held by provider] spironolactone (Aldactone) tablet 25 mg, 25 mg, oral, q AM, Haydee Flynn MD    tamsulosin (Flomax) 24 hr capsule 0.4 mg, 0.4 mg, oral, Nightly, YANELI Hernandez-CNP, 0.4 mg at 01/23/25 2144    Labs  Lab Results   Component Value Date     WBC 5.3 01/23/2025    HGB 8.2 (L) 01/23/2025    HCT 24.7 (L) 01/23/2025    MCV 89 01/23/2025     (L) 01/23/2025     Lab Results   Component Value Date    GLUCOSE 133 (H) 01/23/2025    CALCIUM 8.7 01/23/2025     01/23/2025    K 4.2 01/23/2025    CO2 27 01/23/2025     01/23/2025    BUN 21 01/23/2025    CREATININE 1.60 (H) 01/23/2025     Lab Results   Component Value Date    ALT 11 01/14/2025    AST 10 01/14/2025    GGT 51 05/19/2021    ALKPHOS 70 01/14/2025    BILITOT 1.5 (H) 01/14/2025     Lab Results   Component Value Date    INR 1.2 (H) 01/14/2025    INR 1.1 02/22/2022    INR 1.3 (H) 02/18/2022    PROTIME 13.4 (H) 01/14/2025    PROTIME 11.9 02/22/2022    PROTIME 15.1 (H) 02/18/2022     Imaging:   XR ABDOMEN 1/20/25  1.  Nonspecific bowel gas pattern. Minimal fecal burden     CT ANGIO CHEST ABDOMEN PELVIS 1/13/25  CHEST  1.  No evidence of aortic aneurysm, dissection, or acute intramural hematoma. Moderate calcified and noncalcified atherosclerotic plaque of the thoracic and abdominal aorta. Patent left renal artery stent and partially visualized bilateral superficial femoral artery stents. Sternotomy changes. Coronary artery atherosclerotic calcifications.  2. No evidence of consolidation or pleural effusion. Moderate centrilobular and paraseptal emphysema as well as nonspecific pulmonary fibrotic changes. Asymmetric scarring in the left lung apex is new since 2010 and consider follow-up chest CT in 6 months to confirm stability.     ABDOMEN - PELVIS  1.  No acute abnormality in the abdomen/pelvis. Mild bilateral renal cortical thinning.  2. Unchanged chronic compression deformity of L4 vertebral body.    GI Procedures:  No results found for this or any previous visit from the past 1825 days.    ASSESSMENT/PLAN:  Chavez Llamas is a 79 y.o. male admitted on 1/14/2025 with NSTEMI. GI is consulted for persistent nausea after revascularization. Symptoms are nonspecific and concerning given their  chronicity. Patient does have risk factors for PUD, malignancy, ischemia given DAPT, known arterial disease and prior smoking. He also has a chronic normocytic anemia, though it is not clear if he is iron deficient.  Patient would benefit from expedited endoscopic evaluation. At this time, he prefers to try anti-emetics and acid reducing medications to see if his symptoms improve.    Iron studies are not consistent with JOSHUA, but there is a persistent anemia with low iron levels.  Recommendations:  -Continue PPI daily and antiemetics as needed  -Would recommend EGD and colonoscopy as an outpatient. Please refer patient to GI clinic with Dr. Yoon on discharge to discuss procedure planning.    Patient was seen and discussed with Dr. Longo.    Recommendations communicated with the primary team via telephone.  Thank you for this consult. Gastroenterology will continue to follow.  -During weekday hours of 7am-5pm please do not hesitate to contact me on [employer] Chat or page [phone] if there are any further questions between the weekday hours of 7 AM - 5 PM.   -After hours, on weekends, and on holidays, please page the [place] GI fellow at [phone]. Thank you.      Meryl Yoon MD  Gastroenterology and Hepatology Fellow  [facility]

## 2025-01-24 NOTE — PROGRESS NOTES
Patients Lace Score greater than 78. Patient stated that family Member Jelena makes pcp and all other appointments and did not need any assistance with that

## 2025-01-24 NOTE — CARE PLAN
Problem: Skin  Goal: Prevent/manage excess moisture  Outcome: Progressing     Problem: Skin  Goal: Prevent/minimize sheer/friction injuries  Outcome: Progressing     Problem: Fall/Injury  Goal: Be free from injury by end of the shift  Outcome: Progressing     Problem: Pain  Goal: Turns in bed with improved pain control throughout the shift  Outcome: Progressing       The clinical goals for the shift include Pt will have no c/o of N/V by end of shift.

## 2025-01-25 VITALS
DIASTOLIC BLOOD PRESSURE: 58 MMHG | RESPIRATION RATE: 18 BRPM | TEMPERATURE: 97 F | SYSTOLIC BLOOD PRESSURE: 121 MMHG | HEART RATE: 76 BPM | OXYGEN SATURATION: 97 % | BODY MASS INDEX: 22.22 KG/M2 | HEIGHT: 68 IN | WEIGHT: 146.61 LBS

## 2025-01-25 LAB
ABO GROUP (TYPE) IN BLOOD: NORMAL
ANTIBODY SCREEN: NORMAL
BASOPHILS # BLD AUTO: 0.02 X10*3/UL (ref 0–0.1)
BASOPHILS NFR BLD AUTO: 0.4 %
EOSINOPHIL # BLD AUTO: 0.17 X10*3/UL (ref 0–0.4)
EOSINOPHIL NFR BLD AUTO: 3.3 %
ERYTHROCYTE [DISTWIDTH] IN BLOOD BY AUTOMATED COUNT: 15.9 % (ref 11.5–14.5)
GLUCOSE BLD MANUAL STRIP-MCNC: 150 MG/DL (ref 74–99)
GLUCOSE BLD MANUAL STRIP-MCNC: 182 MG/DL (ref 74–99)
HCT VFR BLD AUTO: 23.1 % (ref 41–52)
HGB BLD-MCNC: 7.8 G/DL (ref 13.5–17.5)
IMM GRANULOCYTES # BLD AUTO: 0.04 X10*3/UL (ref 0–0.5)
IMM GRANULOCYTES NFR BLD AUTO: 0.8 % (ref 0–0.9)
LYMPHOCYTES # BLD AUTO: 0.38 X10*3/UL (ref 0.8–3)
LYMPHOCYTES NFR BLD AUTO: 7.3 %
MCH RBC QN AUTO: 29.2 PG (ref 26–34)
MCHC RBC AUTO-ENTMCNC: 33.8 G/DL (ref 32–36)
MCV RBC AUTO: 87 FL (ref 80–100)
MONOCYTES # BLD AUTO: 0.43 X10*3/UL (ref 0.05–0.8)
MONOCYTES NFR BLD AUTO: 8.3 %
NEUTROPHILS # BLD AUTO: 4.14 X10*3/UL (ref 1.6–5.5)
NEUTROPHILS NFR BLD AUTO: 79.9 %
NRBC BLD-RTO: 0 /100 WBCS (ref 0–0)
PLATELET # BLD AUTO: 120 X10*3/UL (ref 150–450)
RBC # BLD AUTO: 2.67 X10*6/UL (ref 4.5–5.9)
RH FACTOR (ANTIGEN D): NORMAL
WBC # BLD AUTO: 5.2 X10*3/UL (ref 4.4–11.3)

## 2025-01-25 PROCEDURE — 2500000001 HC RX 250 WO HCPCS SELF ADMINISTERED DRUGS (ALT 637 FOR MEDICARE OP)

## 2025-01-25 PROCEDURE — 2500000004 HC RX 250 GENERAL PHARMACY W/ HCPCS (ALT 636 FOR OP/ED)

## 2025-01-25 PROCEDURE — 36415 COLL VENOUS BLD VENIPUNCTURE: CPT | Performed by: NURSE PRACTITIONER

## 2025-01-25 PROCEDURE — 2500000002 HC RX 250 W HCPCS SELF ADMINISTERED DRUGS (ALT 637 FOR MEDICARE OP, ALT 636 FOR OP/ED)

## 2025-01-25 PROCEDURE — 2500000001 HC RX 250 WO HCPCS SELF ADMINISTERED DRUGS (ALT 637 FOR MEDICARE OP): Performed by: NURSE PRACTITIONER

## 2025-01-25 PROCEDURE — 82947 ASSAY GLUCOSE BLOOD QUANT: CPT

## 2025-01-25 PROCEDURE — 85025 COMPLETE CBC W/AUTO DIFF WBC: CPT | Performed by: NURSE PRACTITIONER

## 2025-01-25 PROCEDURE — 86901 BLOOD TYPING SEROLOGIC RH(D): CPT | Performed by: NURSE PRACTITIONER

## 2025-01-25 PROCEDURE — 99239 HOSP IP/OBS DSCHRG MGMT >30: CPT | Performed by: INTERNAL MEDICINE

## 2025-01-25 RX ORDER — TAMSULOSIN HYDROCHLORIDE 0.4 MG/1
0.4 CAPSULE ORAL NIGHTLY
Qty: 30 CAPSULE | Refills: 1 | Status: SHIPPED | OUTPATIENT
Start: 2025-01-25 | End: 2025-02-02 | Stop reason: HOSPADM

## 2025-01-25 RX ORDER — ONDANSETRON 4 MG/1
4 TABLET, FILM COATED ORAL EVERY 8 HOURS PRN
Qty: 20 TABLET | Refills: 0 | Status: SHIPPED | OUTPATIENT
Start: 2025-01-25 | End: 2025-01-25

## 2025-01-25 RX ORDER — RANOLAZINE 1000 MG/1
1000 TABLET, EXTENDED RELEASE ORAL 2 TIMES DAILY
Qty: 60 TABLET | Refills: 1 | Status: SHIPPED | OUTPATIENT
Start: 2025-01-25 | End: 2025-02-02 | Stop reason: HOSPADM

## 2025-01-25 RX ORDER — CLOPIDOGREL BISULFATE 75 MG/1
75 TABLET ORAL DAILY
Qty: 30 TABLET | Refills: 1 | Status: SHIPPED | OUTPATIENT
Start: 2025-01-25 | End: 2025-02-02 | Stop reason: HOSPADM

## 2025-01-25 RX ORDER — AMOXICILLIN 250 MG
1 CAPSULE ORAL NIGHTLY
Qty: 30 TABLET | Refills: 1 | Status: SHIPPED | OUTPATIENT
Start: 2025-01-25 | End: 2025-02-02 | Stop reason: HOSPADM

## 2025-01-25 RX ORDER — ONDANSETRON 4 MG/1
4 TABLET, FILM COATED ORAL EVERY 8 HOURS PRN
Qty: 20 TABLET | Refills: 0 | Status: SHIPPED | OUTPATIENT
Start: 2025-01-25 | End: 2025-02-02 | Stop reason: HOSPADM

## 2025-01-25 RX ORDER — INSULIN GLARGINE 100 [IU]/ML
12 INJECTION, SOLUTION SUBCUTANEOUS NIGHTLY
Qty: 3.6 ML | Refills: 1 | Status: SHIPPED | OUTPATIENT
Start: 2025-01-25 | End: 2025-02-02 | Stop reason: HOSPADM

## 2025-01-25 RX ORDER — BISACODYL 10 MG/1
10 SUPPOSITORY RECTAL DAILY
Qty: 30 SUPPOSITORY | Refills: 1 | Status: SHIPPED | OUTPATIENT
Start: 2025-01-25 | End: 2025-02-02 | Stop reason: HOSPADM

## 2025-01-25 RX ORDER — ISOSORBIDE MONONITRATE 60 MG/1
60 TABLET, EXTENDED RELEASE ORAL DAILY
Qty: 30 TABLET | Refills: 1 | Status: SHIPPED | OUTPATIENT
Start: 2025-01-25 | End: 2025-02-02 | Stop reason: HOSPADM

## 2025-01-25 RX ADMIN — METOPROLOL SUCCINATE 25 MG: 25 TABLET, EXTENDED RELEASE ORAL at 08:32

## 2025-01-25 RX ADMIN — CLOPIDOGREL BISULFATE 75 MG: 75 TABLET ORAL at 08:32

## 2025-01-25 RX ADMIN — ASPIRIN 81 MG: 81 TABLET, COATED ORAL at 08:32

## 2025-01-25 RX ADMIN — ENOXAPARIN SODIUM 40 MG: 100 INJECTION SUBCUTANEOUS at 13:25

## 2025-01-25 RX ADMIN — RANOLAZINE 1000 MG: 500 TABLET, EXTENDED RELEASE ORAL at 08:32

## 2025-01-25 RX ADMIN — LEVOTHYROXINE SODIUM 50 MCG: 0.05 TABLET ORAL at 06:13

## 2025-01-25 RX ADMIN — INSULIN LISPRO 1 UNITS: 100 INJECTION, SOLUTION INTRAVENOUS; SUBCUTANEOUS at 13:23

## 2025-01-25 RX ADMIN — PANTOPRAZOLE SODIUM 40 MG: 40 TABLET, DELAYED RELEASE ORAL at 06:12

## 2025-01-25 RX ADMIN — BUSPIRONE HYDROCHLORIDE 7.5 MG: 15 TABLET ORAL at 08:32

## 2025-01-25 RX ADMIN — ISOSORBIDE MONONITRATE 60 MG: 60 TABLET, EXTENDED RELEASE ORAL at 08:32

## 2025-01-25 ASSESSMENT — COGNITIVE AND FUNCTIONAL STATUS - GENERAL
MOBILITY SCORE: 15
CLIMB 3 TO 5 STEPS WITH RAILING: A LOT
HELP NEEDED FOR BATHING: A LITTLE
TURNING FROM BACK TO SIDE WHILE IN FLAT BAD: A LITTLE
DRESSING REGULAR LOWER BODY CLOTHING: A LITTLE
STANDING UP FROM CHAIR USING ARMS: A LOT
TOILETING: A LITTLE
WALKING IN HOSPITAL ROOM: A LOT
DAILY ACTIVITIY SCORE: 21
MOVING TO AND FROM BED TO CHAIR: A LOT

## 2025-01-25 NOTE — NURSING NOTE
Nursing discharge note 1/25/2025 1545:   Patient discharged home with family. Discharge instructions, appointments and medications all explained. Patient verbalized understanding. Telemetry monitor taken off. IV taken out with tip intact.  Azam So LPN

## 2025-01-25 NOTE — DISCHARGE SUMMARY
Discharge Diagnosis  CAD, multiple vessel    Issues Requiring Follow-Up  Chest pain with optimization of medications, Evaluation of RUE d/t infiltration of IV and cath site, GI follow up for EGD/ Colonoscopy. Review CBC and RFP for anemia and creat level improvement, evaluation of urinary retention, monitor left lower extremity for signs of cellulitis.     Test Results Pending At Discharge  Pending Labs       No current pending labs.            Hospital Course  Chavze Llamas is a 79yM with PMHx of CAD s/p CABG 1990s (LIMA to LAD and SVG to unknown?) and multiple PCIs (most recent [facility] 12/2024), HFmrEF (40-45% 12/4/2024), HTN, T2DM (A1C 5.5 12/5/2025), PAD s/p left BKA who presented to [facility] 1/13 in the setting of nausea, vomiting, chest pain, and wound on LLE. Found to have NSTEMI and transferred to [facility] for high risk PCI.      Recently admitted at [facility] from 12/4/24-12/10/24 with NSTEMI. Underwent cardiac catheterization which showed moderate obstruction in the LAD after his LIMA graft, and severe in-stent restenosis of his right coronary artery at the vessel and a difficult intervention resulted in PTCA of the 90% mid right coronary obstruction reduced to 50 to 70%, but no additional stenting was possible due to the inability to negotiate the previously stented mid RCA. Echo at that time showed EF 40-45%, DD, abnormal septal motion consistent with post op status and LBBB, mild to moderate MR, trace to mild TR, aortic valve sclerosis, mild AR, atrial septal aneurysm, plaque in ascending aorta.      On presentation to [facility], reporting complaints of severe substernal chest pain. Per EMS, patient received 1 nitroglycerin, 4 aspirin and BPs decreased from 150 systolic to 70 and patient with complaint of severe headache which resolved with IV fentanyl. At the OSH, labs notable CBC Hgb 9.6, Plts 102. RFP with Cr 1.59. Troponin (17<66), . EKG showing first degree heart block and LBBB similar in  comparison to prior EKGs. CTA chest negative for PE and aortic dissection. Started on heparin gtt and ranexa for NSTEMI. Hospital course complicated by TEODORO. Nephrology consulted, suspect due to use of IV contrast. Cardiology was consulted and recommended transfer to [facility] to undergo high risk PCI.     Patient was admitted to the CICU for NSTEMI managed with heparin gtt, ASA and statin. Troponin peaked at 212.  1/14 TTE- EF 45-50%, LV cavity  mild-mod dilated, basal and mid inferior wall abnormal, enlarged left atrium , atrial septal aneurysm present. Plan to undergo high risk PCI on 1/16 and transferred to the floor for further management. Course also c/b concern for LLE cellulitis and was managed with vanc/zosyn at OSH, transitioned to Keflex (1/13-1/17).     HVI course:  Recurrent episode of chest pain and nausea 1/17, heparin gtt restarted and Isordil 10mg TID started with resolution of symptoms. HS trop continued to downtrend.  Completed PCI to RCA with POBA for severely calcific ISR of RCA, with IVL. After the PCI on 1/20, Heparin was stopped and placed on Plavix 75 mg daily.  Isordil was stopped and replaced by Imdur 30 mg daily.  Still having 2/10 recurrent chest pain, Ranexa increased to 1,000 bid and increased Imdur to 60 mg daily.  With optimization of medications chest pain has improved. Will be discharged on Aspirin 81 mg,  atorvastatin 40 mg, plavix 75 mg, imdur 60 mg, metoprolol succ. 25 mg, and ranolazine 1,000 mg BID. Follow up with cardiology 2/10/25.    1/22 Limited TTE, EF 40%, mod-severe MR, mild dilation of IVC, mod-reduction in RV systolic function, trivial pericardial effusion. Concerned for fluid volume, patient was diuresis with lasix 40 mg IV x 1 then rechecked same day with limited TTE which showed EF 45%, mod MR, mildy reduced RV systolic function, enlarged LA, abn septal wall motion consistent with post op, basal and mid inferior wall abnormal. Worsening valve disease felt to be due to  volume overload. Patient has not needed diuresis since.     After procedure 1/20 pain developed in RUE. Suspected IV infiltrated post cath. Unable to do ultrasound due to pain and tenderness with palpitation.,Symptoms continue to improve with warm pack and elevation.    GI was consulted due to persistent nausea and recommended EGD/colonoscopy as an outpatient. Will be discharged on Zofran 4 mg as needed and Pantoprazole 40 mg . Has follow up appointment with GI 3/25/25    Anemia (baseline hgb- 10)  Admission hgb 9.6, Hgb trending down since procedure, no signs of bleeding, no changed in stool color. 1/25 Hgb  7.8. Will need to recheck CBC in 1 week.    Inpatient stay complicated by urinary retention-  Fulton catheter was replaced after patient failed voiding trial on 1/23. Per urology rec was also started on flomax 0.4 mg. Patient however was not willing to discharge home with indwelling fulton catheter so it was removed on 1/24.  Bladder scans have been continued and patient has been voiding successfully in range. Will be discharged on tamulosin (flomax) 0.4 mg. Urology follow up scheduled for 3/5/2025.     TEODORO, CKD3a improving. Baseline creat 1.3.  Nephrology from OSH suspects TEODORO due to contrast dye use for testing. 1/25 creat 1.60, admitting Creat 1.18. Will need to have RFP check in 1 week. Due to rising Cr, we elected to hold aldactone, and not initiate ACE/ARB/ ARNI for GDMT for CM. Additionally, not started on SGL2 inhibitor due to need for fulton during admission.     Emphysema and asymmetric scarring in the left lung apex is new since 2010  was found on CTA is was recommended to have a outpatient CT in 6 months to determine stability.     Patients diabetes managed at home with lantus 20 units BID and lispro 5u daily. HgbA1C 5.5 inpatient, lantus reduced to 12 units daily and Lispro Sliding Scale TID AC.    Hypothyroidism stable on synthroid 50 mcg daily.    Cellulitis concern of left low extremity has  improved after completion of Keflex x 5 days ( 1/13-1/17). Referral to orthotic recommended for proper prosthetic fitting.      Discharge weight: 66.5 kg    After all labs and VS were reviewed the decision was made that the patient was medically stable for discharge.  The patient was discharged in satisfactory condition.    More than 60 minutes were spent in coordinating patient discharge.          Pertinent Physical Exam At Time of Discharge  Physical Exam  General: well appearing, NAD, pleasant  HEENT: Atraumatic.   CV: RRR, soft systolic murmur. States CP is at 1 but managable  PULM: CTAB, no WOB.   ABD: soft, NT, ND  EXT: LLE BKA. No erythema of the LLE or RLE. No LE edema noted. RUE anterior forearm is painful to touch. Posterior aspect of forearm is also tender to palpation, but less compared to anterior. Brusing to the RUE forearm and AC joint. RUE warm with cap refill of 2 sec. RUE has similar color when compared to LUE.   SKIN: no rashes noted   NEURO: No focal deficits.   PSYCH: pleasant mood, appropriate affect     Home Medications     Medication List      START taking these medications     bisacodyl 10 mg suppository; Commonly known as: Dulcolax; Insert 1   suppository (10 mg) into the rectum once daily.   clopidogrel 75 mg tablet; Commonly known as: Plavix; Take 1 tablet (75   mg) by mouth once daily.   isosorbide mononitrate ER 60 mg 24 hr tablet; Commonly known as: Imdur;   Take 1 tablet (60 mg) by mouth once daily. Do not crush or chew.   nitroglycerin 0.4 mg SL tablet; Commonly known as: Nitrostat; Place 1   tablet (0.4 mg) under the tongue every 5 minutes if needed for chest pain.   ondansetron 4 mg tablet; Commonly known as: Zofran; Take 1 tablet (4 mg)   by mouth every 8 hours if needed for nausea or vomiting.   pantoprazole 40 mg EC tablet; Commonly known as: ProtoNix; Take 1 tablet   (40 mg) by mouth once daily in the morning. Take before meals. Do not   crush, chew, or split.   ranolazine  1,000 mg 12 hr tablet; Commonly known as: Ranexa; Take 1   tablet (1,000 mg) by mouth 2 times a day. Do not crush, chew, or split.   sennosides-docusate sodium 8.6-50 mg tablet; Commonly known as:   Joselin-Colace; Take 1 tablet by mouth once daily at bedtime.   tamsulosin 0.4 mg 24 hr capsule; Commonly known as: Flomax; Take 1   capsule (0.4 mg) by mouth once daily at bedtime.     CHANGE how you take these medications     hydrOXYzine pamoate 25 mg capsule; Commonly known as: Vistaril; Take 1   capsule (25 mg) by mouth 3 times a day as needed for anxiety.; What   changed: when to take this   insulin glargine 100 unit/mL (3 mL) pen; Commonly known as: Lantus;   Inject 12 Units under the skin once daily at bedtime. Take as directed per   insulin instructions.; What changed: See the new instructions.   insulin lispro 100 unit/mL injection; Commonly known as: HumaLOG; Inject   10 Units under the skin early in the morning.. Inject 10 units   subcutaneous daily.  Please check blood glucose level before injection.;   What changed: how much to take, when to take this, additional instructions     CONTINUE taking these medications     aspirin 81 mg EC tablet; Take 1 tablet (81 mg) by mouth once daily.   atorvastatin 40 mg tablet; Commonly known as: Lipitor; Take 1 tablet (40   mg) by mouth once daily at bedtime.   busPIRone 15 mg tablet; Commonly known as: Buspar; TAKE 1/2 (ONE-HALF)   TABLET BY MOUTH IN THE MORNING AND AT BEDTIME   lancets 33 gauge misc; Commonly known as: BD Ultra Fine Lancets; Testing   T.I.D   levothyroxine 50 mcg tablet; Commonly known as: Synthroid, Levoxyl; TAKE   1 TABLET BY MOUTH ONCE DAILY IN THE MORNING BEFORE MEAL(S) ON AN EMPTY   STOMACH   lubricating eye drops ophthalmic solution   metoprolol succinate XL 25 mg 24 hr tablet; Commonly known as:   Toprol-XL; Take 1 tablet (25 mg) by mouth once daily.   mirtazapine 15 mg tablet; Commonly known as: Remeron; TAKE 1 TABLET BY   MOUTH ONCE DAILY AT  "BEDTIME   pen needle, diabetic 31 gauge x 3/16\" needle; Use as directed     STOP taking these medications     amLODIPine 5 mg tablet; Commonly known as: Norvasc   prasugrel 10 mg tablet; Commonly known as: Effient   spironolactone 25 mg tablet; Commonly known as: Aldactone     ASK your doctor about these medications     dicyclomine 10 mg capsule; Commonly known as: Bentyl; TAKE 1 CAPSULE BY   MOUTH TWICE DAILY AS NEEDED FOR  IBS       Outpatient Follow-Up  Future Appointments   Date Time Provider Department [place]   2/10/2025 10:00 AM Juan Carlos Gonzales MD [id number] [facility]   3/5/2025  2:30 PM Javier Strickland PA-C [id number] [facility]   3/25/2025  1:00 PM YANELI Gonzalez-CNP [id number] Academic Melanie Rodríguez NP Student    I was present with the APRN student who participated in the documentation of this note. I have personally seen and re-examined the patient and performed the medical decision-making components (assessment and plan of care). I have reviewed the APRN student documentation and verified the findings in the note as written with additions or exceptions as stated in the body of this note.    Kaz Hall, APRN-CNP     "

## 2025-01-27 LAB
ATRIAL RATE: 70 BPM
ATRIAL RATE: 72 BPM
ATRIAL RATE: 83 BPM
ATRIAL RATE: 88 BPM
P AXIS: 54 DEGREES
P AXIS: 70 DEGREES
P AXIS: 71 DEGREES
P AXIS: 96 DEGREES
P OFFSET: 154 MS
P OFFSET: 158 MS
P OFFSET: 161 MS
P OFFSET: 164 MS
P ONSET: 104 MS
P ONSET: 104 MS
P ONSET: 105 MS
P ONSET: 91 MS
PR INTERVAL: 168 MS
PR INTERVAL: 216 MS
PR INTERVAL: 226 MS
PR INTERVAL: 240 MS
Q ONSET: 211 MS
Q ONSET: 213 MS
Q ONSET: 213 MS
Q ONSET: 217 MS
QRS COUNT: 11 BEATS
QRS COUNT: 12 BEATS
QRS COUNT: 14 BEATS
QRS COUNT: 14 BEATS
QRS DURATION: 158 MS
QRS DURATION: 166 MS
QRS DURATION: 168 MS
QRS DURATION: 172 MS
QT INTERVAL: 418 MS
QT INTERVAL: 434 MS
QT INTERVAL: 440 MS
QT INTERVAL: 450 MS
QTC CALCULATION(BAZETT): 481 MS
QTC CALCULATION(BAZETT): 486 MS
QTC CALCULATION(BAZETT): 505 MS
QTC CALCULATION(BAZETT): 509 MS
QTC FREDERICIA: 467 MS
QTC FREDERICIA: 474 MS
QTC FREDERICIA: 475 MS
QTC FREDERICIA: 483 MS
R AXIS: -13 DEGREES
R AXIS: -24 DEGREES
R AXIS: 205 DEGREES
R AXIS: 97 DEGREES
T AXIS: 150 DEGREES
T AXIS: 164 DEGREES
T AXIS: 232 DEGREES
T AXIS: 65 DEGREES
T OFFSET: 420 MS
T OFFSET: 433 MS
T OFFSET: 434 MS
T OFFSET: 438 MS
VENTRICULAR RATE: 70 BPM
VENTRICULAR RATE: 72 BPM
VENTRICULAR RATE: 83 BPM
VENTRICULAR RATE: 88 BPM

## 2025-01-28 PROBLEM — I63.9 CEREBROVASCULAR ACCIDENT (CVA), UNSPECIFIED MECHANISM (MULTI): Status: ACTIVE | Noted: 2025-01-01

## 2025-01-28 NOTE — H&P
History Of Present Illness  Chavez Llamas is a 79 y.o. male with past medical history of CAD s/p CABG 1990s (LIMA to LAD and SVG to unknown?) and multiple PCIs with most recent cardiac cath on 1/17/2025 with PCI to RCA, HFmrEF (40-45% 12/4/2024), HTN, T2DM (A1C 5.5 12/5/2025), PAD s/p left BKA, urinary retention during recent admission at [facility] this month, who came to hospital secondary to left-sided numbness.  Patient reports his daughter was at his house around 10 PM last night.  He states that shortly after she left, and patient is unsure of the time, states he began having left facial numbness, left arm pain that eventually turned into numbness.  He states that he thinks he might of had difficulty swallowing as he needed to really think about swallowing to swallow his food but denies getting choked per se.  Patient has chronic poor vision and legally blind.  He does report having some chest heaviness but no chest pain per se.  Patient's daughter had called him this morning and after finding out his symptoms of numbness, patient was brought to the ER.  Patient reports still having left facial numbness and left upper extremity numbness today as well.  He reports having decreased energy overall since his discharge from [facility] on 1/25.  He reports having some nausea yesterday, but felt this was due to him being hungry.  He denies fever, dysuria, diarrhea, lower extremity edema.  Patient reports having multiple episodes of solid stools yesterday and had decreased energy after having so many bowel movements.  Otherwise, 10 point review of systems is benign.  Patient's daughter, Jelena, is also at the bedside.     Past Medical History  Past Medical History:   Diagnosis Date    Old myocardial infarction 02/01/2017    Past heart attack       Surgical History  Past Surgical History:   Procedure Laterality Date    CARDIAC CATHETERIZATION N/A 12/6/2024    Procedure: Left Heart Cath;  Surgeon: Chaim Soriano MD;  Location:  "[facility];  Service: Cardiovascular;  Laterality: N/A;    CARDIAC CATHETERIZATION N/A 1/20/2025    Procedure: PCI SOFI Stent- Coronary;  Surgeon: Oscar Weston MD;  Location: [place] [address] Cardiac Cath Lab;  Service: Cardiovascular;  Laterality: N/A;    CORONARY ARTERY BYPASS GRAFT  04/14/2015    Coronary Artery Surgery    MR HEAD ANGIO WO IV CONTRAST  2/23/2022    MR HEAD ANGIO WO IV CONTRAST [facility]        Social History  He reports that he has quit smoking. His smoking use included cigarettes. He has never used smokeless tobacco. No history on file for alcohol use and drug use.    Family History  Family History   Problem Relation Name Age of Onset    Diabetes Mother      Other (Cerebrovascular Accident) Father          Allergies  Patient has no known allergies.    Review of Systems  -As stated in the HPI.  Otherwise, 10 point review of systems is benign.  Physical Exam  General: Patient is alert.  No acute distress.  HEENT: Clear sclera.  CVS: RRR.  Abdomen: Soft.  Nontender.  Bowel sounds present.  Extremities: Left BKA.  Right ankle with no pitting edema.  Psychiatric: Cooperative.  Neurologic: NIH stroke score is 1 (1 point for decree sensation left face, left arm, left leg.  Patient has decreased vision but is legally blind.  Patient has left BKA and unable to test heel-to-shin bilateral lower extremities.)  Last Recorded Vitals  Blood pressure 107/65, pulse 94, resp. rate 16, height 1.727 m (5' 8\"), weight 66.2 kg (146 lb), SpO2 99%.    Relevant Results        Results for orders placed or performed during the hospital encounter of 01/28/25 (from the past 24 hours)   CBC and Auto Differential   Result Value Ref Range    WBC 8.4 4.4 - 11.3 x10*3/uL    nRBC 0.0 0.0 - 0.0 /100 WBCs    RBC 3.08 (L) 4.50 - 5.90 x10*6/uL    Hemoglobin 9.1 (L) 13.5 - 17.5 g/dL    Hematocrit 28.2 (L) 41.0 - 52.0 %    MCV 92 80 - 100 fL    MCH 29.5 26.0 - 34.0 pg    MCHC 32.3 32.0 - 36.0 g/dL    RDW 16.8 (H) " 11.5 - 14.5 %    Platelets 151 150 - 450 x10*3/uL    Neutrophils % 82.7 40.0 - 80.0 %    Immature Granulocytes %, Automated 0.8 0.0 - 0.9 %    Lymphocytes % 6.3 13.0 - 44.0 %    Monocytes % 8.6 2.0 - 10.0 %    Eosinophils % 1.1 0.0 - 6.0 %    Basophils % 0.5 0.0 - 2.0 %    Neutrophils Absolute 6.90 (H) 1.60 - 5.50 x10*3/uL    Immature Granulocytes Absolute, Automated 0.07 0.00 - 0.50 x10*3/uL    Lymphocytes Absolute 0.53 (L) 0.80 - 3.00 x10*3/uL    Monocytes Absolute 0.72 0.05 - 0.80 x10*3/uL    Eosinophils Absolute 0.09 0.00 - 0.40 x10*3/uL    Basophils Absolute 0.04 0.00 - 0.10 x10*3/uL   Comprehensive metabolic panel   Result Value Ref Range    Glucose 140 (H) 74 - 99 mg/dL    Sodium 135 (L) 136 - 145 mmol/L    Potassium 4.2 3.5 - 5.3 mmol/L    Chloride 101 98 - 107 mmol/L    Bicarbonate 24 21 - 32 mmol/L    Anion Gap 14 10 - 20 mmol/L    Urea Nitrogen 24 (H) 6 - 23 mg/dL    Creatinine 1.64 (H) 0.50 - 1.30 mg/dL    eGFR 42 (L) >60 mL/min/1.73m*2    Calcium 8.7 8.6 - 10.3 mg/dL    Albumin 3.8 3.4 - 5.0 g/dL    Alkaline Phosphatase 60 33 - 136 U/L    Total Protein 7.1 6.4 - 8.2 g/dL    AST 11 9 - 39 U/L    Bilirubin, Total 1.2 0.0 - 1.2 mg/dL    ALT 7 (L) 10 - 52 U/L   Troponin I, High Sensitivity   Result Value Ref Range    Troponin I, High Sensitivity 460 (HH) 0 - 20 ng/L   Protime-INR   Result Value Ref Range    Protime 11.3 9.3 - 12.7 seconds    INR 1.1 0.9 - 1.2   APTT   Result Value Ref Range    aPTT 32.5 22.0 - 32.5 seconds   ECG 12 lead   Result Value Ref Range    Ventricular Rate 101 BPM    QRS Duration 160 ms    QT Interval 384 ms    QTC Calculation(Bazett) 497 ms    R Axis -10 degrees    T Axis 167 degrees    QRS Count 16 beats    Q Onset 215 ms    T Offset 407 ms    QTC Fredericia 456 ms   Sars-CoV-2 and Influenza A/B PCR   Result Value Ref Range    Flu A Result Not Detected Not Detected    Flu B Result Not Detected Not Detected    Coronavirus 2019, PCR Not Detected Not Detected   POCT GLUCOSE   Result  Value Ref Range    POCT Glucose 134 (H) 74 - 99 mg/dL   Troponin I, High Sensitivity   Result Value Ref Range    Troponin I, High Sensitivity 465 (HH) 0 - 20 ng/L      ECG 12 lead    Result Date: 1/28/2025  Sinus rhythm with 1st degree AV block Left bundle branch block Abnormal ECG When compared with ECG of 22-JAN-2025 14:14, (unconfirmed) Wide QRS rhythm has replaced Sinus rhythm Confirmed by Jonathan Tristan ([id number]) on 1/28/2025 11:23:11 AM    XR chest 1 view    Result Date: 1/28/2025  Interpreted By:  Vivian Melchor, STUDY: XR CHEST 1 VIEW 1/28/2025 7:28 am   INDICATION: Signs/Symptoms:weakness and brain attack   COMPARISON: 01/14/2025   ACCESSION NUMBER(S): [id number]   ORDERING CLINICIAN: MONICO BARGER   TECHNIQUE: AP semi-erect view of the chest   FINDINGS: There is postoperative change from median sternotomy and coronary revascularization procedure with the cardiac size at the upper limits of normal to slightly enlarged. There is stable left apical pleural thickening. No pleural effusion on either side is present. Cephalization of the pulmonary vascularity is noted without interstitial or alveolar edema. There is no new infiltrate or airspace consolidation.       Status post CABG with stable left apical pleural thickening.   Cephalization of the pulmonary vasculature suggesting mild pulmonary venous hypertension.   Signed by: Vivian Melchor 1/28/2025 9:05 AM Dictation workstation:   [id number]    CT brain attack head wo IV contrast    Result Date: 1/28/2025  Interpreted By:  Nagi Montgomery, STUDY: CT BRAIN ATTACK HEAD WO IV CONTRAST;  1/28/2025 7:03 am   INDICATION: Signs/Symptoms:left face numbness, dizziness.     COMPARISON: 12/04/2024   ACCESSION NUMBER(S): [id number]   ORDERING CLINICIAN: MONICO BARGER   TECHNIQUE: Unenhanced images were obtained through the brain.   FINDINGS: There is atrophy resulting in prominence of the ventricles and sulci. There are areas of decreased attenuation within the white matter which  are nonspecific but are commonly associated with small vessel ischemic disease. there is no mass effect or midline shift. No acute intracranial hemorrhage is identified. No extra-axial fluid collections are seen. No intraparenchymal mass lesions are identified.  Bone windows demonstrate no evidence of an acute calvarial fracture. Mild mucosal thickening in the paranasal sinuses.         Nagi Montgomery discussed the significance and urgency of this critical finding via epic secure chat. With  MONICO BARGER on 1/28/2025 at 7:13 am.  (**-RCF-**) Findings:  See findings.     MACRO: None.   Signed by: Nagi Montgomery 1/28/2025 7:14 AM Dictation workstation:   [id number]         Assessment/Plan   Assessment & Plan  Cerebrovascular accident (CVA), unspecified mechanism (Multi)    Chavez Llamas is a 79 y.o. male with past medical history of CAD s/p CABG 1990s (LIMA to LAD and SVG to unknown?) and multiple PCIs with most recent cardiac cath on 1/17/2025 with PCI to RCA, HFmrEF (40-45% 12/4/2024), HTN, T2DM (A1C 5.5 12/5/2025), PAD s/p left BKA, urinary retention during recent admission at [facility] this month, who came to hospital secondary to left-sided numbness who is being admitted to the hospital for possible CVA versus TIA along with elevated troponin.    Possible TIA versus CVA  -Patient with left facial numbness, left upper and lower extremity numbness.  -Neurology was consulted from the ER who recommended aspirin, Plavix, stroke order set and did not recommend tenecteplase or thrombectomy.  NIH stroke score was 1.  -We will give aspirin, Plavix, atorvastatin 80 mg daily.  -Check MRI/MRI of the brain, MRA of the neck.  Check lipid panel.  -As echo was performed on 1/20/2025, we will not repeat that at this time unless neurology recommends a repeat echo.  -Consult neurology.  -Patient's daughter, Jelena, was at the bedside and all of her questions were answered.    Evaded troponin with history of CAD  -I reviewed patient's EKG  which reveals left bundle branch block which was also present on EKG from 1/22/2025.  -Continue aspirin, Plavix, statin, isosorbide, metoprolol XL.  -Patient complained of some left arm pain initially which subsequently turned into more numbness type feeling but denies chest pain per se.  -Will consult cardiology.    Chronic systolic CHF with EF of 45% on echo on 1/20/2025  -Continue home meds of Toprol XL, isosorbide.   -Monitor.    Hypertension  -Continue home meds of Toprol, isosorbide monitor.    Diabetes  -Will hold home med of Lantus 12 units nightly for now as patient reported having some nausea yesterday, although he thought this was due to him being hungry.  Will place on SSI for now and monitor.  -Check HbA1c.    CKD stage III  -Patient with recent acute kidney injury while at [facility] as well felt to be due to contrast.  -Renal function appears to be stable in comparison to recent values, although would monitor and avoid nephrotoxic agents.    PAD status post left BKA  -Continue aspirin, Plavix, statin.    Hypothyroidism  -Continue levothyroxine.      BPH  -Continue Flomax.    GERD  -Continue Protonix.    DVT prophylaxis  -Heparin subcu.        Dustin Alanis, DO

## 2025-01-28 NOTE — CONSULTS
Inpatient consult to Neurology  Consult performed by: Darius Fuentes MD  Consult ordered by: Dustin lAanis DO      Vascular Neurology Consult Note    HPI  Chavez Llamas is a 79 y.o. male with PMH of CAD s/p CABG in 1990s, multiple PCIs, HFrEF, HTN, DM, PAD s/p L BKA, recent admission at [facility] for high risk PCI after presenting with NSTEMI who presented to [facility] on 1/28/2025 with L sided numbness, neurology consulted for concern for stroke.    Hx from pt and family by bedside. Pt has hx of recurrent NSTEMI last one of which was admission from 12/2024 and again in 1/2025 earlier, was just discharged from [facility] 1/25th. Pt states since discharge pt has had change in appetite and taste, has been having trouble eating as he could not tolerate most food taste and smell. Poor PO intake as well. Then last night pt started having L arm pain, followed by L face numbness and L forearm > L hand numbness. No weakness involved, but may have had L mouth droopiness, though may be confounded by the fact that pt does not has his teeth in. Came to ED for evaluation.     In the ED BP noted to be 103/91, glucose 140. NIHSS 1 for numbness. Admitted for further evaluation. Note pt's tropoinin was elevated at 460 on presentation, repeat one 465. CTH negative for acute process.     Pt has been on aspirin and plavix, compliant with it.         Past Medical History  Past Medical History:   Diagnosis Date    Old myocardial infarction 02/01/2017    Past heart attack     Surgical History  Past Surgical History:   Procedure Laterality Date    CARDIAC CATHETERIZATION N/A 12/6/2024    Procedure: Left Heart Cath;  Surgeon: Chaim Soriano MD;  Location: [facility] Cardiac Cath Lab;  Service: Cardiovascular;  Laterality: N/A;    CARDIAC CATHETERIZATION N/A 1/20/2025    Procedure: PCI SOFI Stent- Coronary;  Surgeon: Oscar Weston MD;  Location: [place] [address] Cardiac Cath Lab;  Service: Cardiovascular;  Laterality: N/A;    CORONARY ARTERY BYPASS GRAFT   "04/14/2015    Coronary Artery Surgery    MR HEAD ANGIO WO IV CONTRAST  2/23/2022    MR HEAD ANGIO WO IV CONTRAST [facility]     Social History  Social History     Tobacco Use    Smoking status: Former     Types: Cigarettes    Smokeless tobacco: Never   Vaping Use    Vaping status: Never Used     Allergies  Patient has no known allergies.    Home Medications  Current Outpatient Medications   Medication Instructions    aspirin 81 mg, oral, Daily    atorvastatin (LIPITOR) 40 mg, oral, Nightly    bisacodyl (DULCOLAX) 10 mg, rectal, Daily    busPIRone (Buspar) 15 mg tablet TAKE 1/2 (ONE-HALF) TABLET BY MOUTH IN THE MORNING AND AT BEDTIME    clopidogrel (PLAVIX) 75 mg, oral, Daily    dicyclomine (Bentyl) 10 mg capsule TAKE 1 CAPSULE BY MOUTH TWICE DAILY AS NEEDED FOR  IBS    hydrOXYzine pamoate (VISTARIL) 25 mg, oral, 3 times daily PRN    insulin glargine (LANTUS) 12 Units, subcutaneous, Nightly, Take as directed per insulin instructions.    insulin lispro (HUMALOG) 10 Units, subcutaneous, Daily, Inject 10 units subcutaneous daily.  Please check blood glucose level before injection.    isosorbide mononitrate ER (IMDUR) 60 mg, oral, Daily, Do not crush or chew.    lancets (BD Ultra Fine Lancets) 33 gauge misc Testing T.I.D    levothyroxine (Synthroid, Levoxyl) 50 mcg tablet TAKE 1 TABLET BY MOUTH ONCE DAILY IN THE MORNING BEFORE MEAL(S) ON AN EMPTY STOMACH    lubricating eye drops ophthalmic solution 1 drop, As needed    metoprolol succinate XL (TOPROL-XL) 25 mg, oral, Daily    mirtazapine (REMERON) 15 mg, oral, Nightly    nitroglycerin (NITROSTAT) 0.4 mg, sublingual, Every 5 min PRN    ondansetron (ZOFRAN) 4 mg, oral, Every 8 hours PRN    pantoprazole (PROTONIX) 40 mg, oral, Daily before breakfast, Do not crush, chew, or split.    pen needle, diabetic 31 gauge x 3/16\" needle Use as directed    ranolazine (RANEXA) 1,000 mg, oral, 2 times daily, Do not crush, chew, or split.    sennosides-docusate sodium " "(Joselin-Colace) 8.6-50 mg tablet 1 tablet, oral, Nightly    tamsulosin (FLOMAX) 0.4 mg, oral, Nightly          Objective   24h Vitals  Heart Rate:  [79-97]   Respirations:  [16-22]   BP: ()/(57-91)   Height:  [172.7 cm (5' 8\")]   Weight:  [66.2 kg (146 lb)]   Pulse Ox:  [90 %-99 %]      Physical Exam  Neurological Exam  Physical Exam    MENTAL STATUS:  General appearance: in NAD  Orientation: [place] to self, time, place and condition   Language: Expression, repetition, naming, comprehension intact.   Concentration: Intact  Fund of knowledge: Appropriate    CRANIAL NERVES:  - Fundoscopic exam: Deferred   - II/III: PERRL  - II:  Visual fields difficulty with finger counting (R eye legally blind, L eye also impaired)   - III, IV, VI: EOMI to pursuit without nystagmus  - V: V1-V3 reduced sensation on the left (\"feels heavy\")   - VII: Face muscles symmetric with smile and eye closure  - VIII: Intact to finger rub  - IX, X: Palate elevated symmetrically bilaterally, no hoarseness  - XI: 5/5 strength on shoulder shrugging bilaterally  - XII: Tongue midline without atrophy or fasciculation    MOTOR: Tone and bulk normal in all extremities  STRENGTH: 5/5 strength tested proximally and distally in BUE and BLE     REFLEXES: R L  Biceps   +2 +2  Brachioradialis +2 +2  Patellar   +2 +2  Achilles   +1 *  Plantar   Mute * (*BKA)   No clonus    COORDINATION: Intact on finger to nose bl, intact on heel to shin bl, EMIR intact bl  SENSORY: Reduced sensation on the L forearm, L hand can feel more than forearm but less than the R hand, LLE only slightly reduced compared to RLE   GAIT: deferred       NIHSS:  Level of Consciousness: 0=alert      LOC questions: 0=answers both questions      LOC commands: 0=performs both  Best Gaze: 0=normal  Visual: 0=normal  Facial Palsy: 0=normal  Motor:      Motor Arm (Left): 0=normal      Motor Arm (Right): 0=normal      Motor Leg (Left): 0=left leg normal      Motor Leg (Right): 0=right leg " normal  Limb Ataxia: 0=absent  Sensory: 1=partial loss  Best Language: 0=no aphasia  Dysarthria: 0=normal  Extinction and Inattention: 0=no abnormality    Recent Labs  Results from last 72 hours   Lab Units 01/28/25  0702   SODIUM mmol/L 135*   POTASSIUM mmol/L 4.2   BUN mg/dL 24*   CREATININE mg/dL 1.64*   CALCIUM mg/dL 8.7      Results from last 72 hours   Lab Units 01/28/25  0702   WBC AUTO x10*3/uL 8.4   HEMOGLOBIN g/dL 9.1*   HEMATOCRIT % 28.2*   PLATELETS AUTO x10*3/uL 151      Results from last 72 hours   Lab Units 01/28/25  0702   INR  1.1       Lab Results   Component Value Date    HGBA1C 5.5 12/05/2024    HGBA1C 5.3 06/11/2024    HGBA1C 6.6 (A) 06/05/2023    LDLF 27 06/05/2023    LDLF 32 09/21/2022    LDLF 20 09/03/2021        Imaging  CT brain attack head wo IV contrast    Result Date: 1/28/2025    Nagi Montgomery discussed the significance and urgency of this critical finding via epic secure chat. With  MONICO DENITA on 1/28/2025 at 7:13 am.  (**-RCF-**) Findings:  See findings.     MACRO: None.   Signed by: Nagi Montgomery 1/28/2025 7:14 AM Dictation workstation:   [id number]   No MRI head results found for the past 14 days  CT brain attack head wo IV contrast    Result Date: 1/28/2025    Nagi Montgomery discussed the significance and urgency of this critical finding via epic secure chat. With  MONICO NGAR on 1/28/2025 at 7:13 am.  (**-RCF-**) Findings:  See findings.     MACRO: None.   Signed by: Nagi Montgomery 1/28/2025 7:14 AM Dictation workstation:   [id number]          IV Thrombolysis IV Thrombolysis Checklist      IV Thrombolysis Given: No; Thrombolysis contraindication reason: Neurologic signs are mild and judged to be non-disabling, LKW >4h           Assessment/Plan   Chavez Llamas is a 79 y.o. male with PMH of CAD s/p CABG in 1990s, multiple PCIs, HFrEF, HTN, DM, PAD s/p L BKA, recent admission at [facility] for high risk PCI after presenting with NSTEMI who presented to [facility] on 1/28/2025 with L sided numbness,  neurology consulted for concern for stroke. Not typical pattern of stroke related numbness given numbness impacting more L forearm than hand, but nonetheless given his vascular risk factors would evaluate for stroke.     Diagnoses:  R/o stroke   Recurrent NSTEMI    Recommendations:  Agree with MRI brain, MRA head and neck   Continue home aspirin and plavix for now  LDL 21, A1c 5.5, SBP goal <220 (currently normotensive)  -- all at goal   NPO until nurse bedside swallow assessment   Please utilize focused stroke order set     Vascular Risk Factor modification goals:  Blood pressure goals: avoid hypotension SBP <100 that could worsen cerebral perfusion, long term BP goal normotensive <130/80. Acute BP goals see above.   Lipid Goals: education on healthy diet and statin therapy to maintain or achieve goal LDL-cholesterol < 70mg  Glucose Goals: early treatment of hyperglycemia to goal glucose 140-180 mg/dl with long-term goal A1c < 7%   Smoking Cessation and Education  Assessment for Rehabilitation needs including PT/OT and if indicated swallow evaluation and speech therapy   Patient and family education on signs and symptoms of stroke, calling 911, risk factors, and healthy strategies for stroke prevention.      Darius Fuentes MD   Neurology and Vascular Neurology      I personally spent 80 minutes today, exclusive of procedures, providing care for this patient, including preparation, face to face time, documentation and other services such as review of medical records, diagnostic result, patient education, counseling, coordination of care as specified in the encounter.

## 2025-01-28 NOTE — PROGRESS NOTES
01/28/25 1448   [facility] Disability Status   Are you deaf or do you have serious difficulty hearing? N   Are you blind or do you have serious difficulty seeing, even when wearing glasses? N   Because of a physical, mental, or emotional condition, do you have serious difficulty concentrating, remembering, or making decisions? (5 years old or older) N   Do you have serious difficulty walking or climbing stairs? Y   Do you have serious difficulty dressing or bathing? N   Because of a physical, mental, or emotional condition, do you have serious difficulty doing errands alone such as visiting the doctor? Y

## 2025-01-28 NOTE — CARE PLAN
Both POA and Living Will are on file  ADOD: 3 days    Pt gave permission for his dtr Jelena jackson to answer questions    Pt lives at home alone in a 1 story home with a basement; pt does not use the basement  He uses a walker, rollator, wheelchair, and he has a Left prosthetic leg; 1 fall in December, bruising, no fx  He has a hx of depression and anxiety, tx for both; no S.I. per dtr Jelena  Pt does not wear home 02, cpap/bipap/ nebulizer. He is a type II diabetic  Pts PCP is Dr. D'Amico and he uses [facility] in [place] for his meds; pt can afford his meds  Pt wears glasses, no hearing aids. He is able to read and comprehend what he reads. Pt has extensive cardiac hx and is here with a dx of CVA  Dtr Jelena said that her father is close to needed a LTC facility or A.L.  List of SNF/extended care emailed to Jelena at [email or web address]    DISCHARGE PLAN: TBD--DO NOT DISCHARGE PATIENT BEFORE SPEAKING WITH CARE COORDINATION; PT DOES NOT HAVE A DISCHARGE PLAN IN PLACE AT THIS TIME

## 2025-01-28 NOTE — PROGRESS NOTES
Pharmacy Medication History Review    Chavez Llamas is a 79 y.o. male admitted for Cerebrovascular accident (CVA), unspecified mechanism (Multi). Pharmacy reviewed the patient's [id number] medications and allergies for accuracy.    Medications ADDED:  none  Medications CHANGED:  Dicyclomine 10mg - not taking  Hydroxyzine 25mg - nightly  Insulin lantus - not taking   Insulin humalog - sliding scale  Mirtazapine 15mg - not taking  Medications REMOVED:   None      The list below reflects the updated PTA list. Comments regarding how patient may be taking medications differently can be found in the Admit Orders Activity  Prior to Admission Medications   Prescriptions Last Dose Informant   aspirin 81 mg EC tablet 1/27/2025 Morning Child   Sig: Take 1 tablet (81 mg) by mouth once daily.   atorvastatin (Lipitor) 40 mg tablet 1/26/2025 Evening Child   Sig: Take 1 tablet (40 mg) by mouth once daily at bedtime.   bisacodyl (Dulcolax) 10 mg suppository Unknown child   Sig: Insert 1 suppository (10 mg) into the rectum once daily.   busPIRone (Buspar) 15 mg tablet 1/27/2025 Morning Child   Sig: TAKE 1/2 (ONE-HALF) TABLET BY MOUTH IN THE MORNING AND AT BEDTIME   clopidogrel (Plavix) 75 mg tablet 1/27/2025 child   Sig: Take 1 tablet (75 mg) by mouth once daily.   dicyclomine (Bentyl) 10 mg capsule  Child   Sig: TAKE 1 CAPSULE BY MOUTH TWICE DAILY AS NEEDED FOR  IBS   Patient not taking: Reported on 1/13/2025   hydrOXYzine pamoate (Vistaril) 25 mg capsule 1/26/2025 Child   Sig: Take 1 capsule (25 mg) by mouth 3 times a day as needed for anxiety.   Patient taking differently: Take 1 capsule (25 mg) by mouth as needed at bedtime for anxiety.   insulin lispro (HumaLOG) 100 unit/mL injection 1/27/2025 Child   Sig: Inject 10 Units under the skin early in the morning.. Inject 10 units subcutaneous daily.  Please check blood glucose level before injection.   Patient taking differently: Inject under the skin once daily. SLIDING  "SCALE   isosorbide mononitrate ER (Imdur) 60 mg 24 hr tablet 1/27/2025 Child   Sig: Take 1 tablet (60 mg) by mouth once daily. Do not crush or chew.   lancets (BD Ultra Fine Lancets) 33 gauge misc  Child   Sig: Testing T.I.D   levothyroxine (Synthroid, Levoxyl) 50 mcg tablet 1/27/2025 Child   Sig: TAKE 1 TABLET BY MOUTH ONCE DAILY IN THE MORNING BEFORE MEAL(S) ON AN EMPTY STOMACH   lubricating eye drops ophthalmic solution Unknown Child   Sig: Administer 1 drop into both eyes if needed for dry eyes or other.   metoprolol succinate XL (Toprol-XL) 25 mg 24 hr tablet Past Week Child   Sig: Take 1 tablet (25 mg) by mouth once daily.   nitroglycerin (Nitrostat) 0.4 mg SL tablet Unknown child   Sig: Place 1 tablet (0.4 mg) under the tongue every 5 minutes if needed for chest pain.   pantoprazole (ProtoNix) 40 mg EC tablet 1/27/2025 child   Sig: Take 1 tablet (40 mg) by mouth once daily in the morning. Take before meals. Do not crush, chew, or split.   pen needle, diabetic 31 gauge x 3/16\" needle Unknown Child   Sig: Use as directed   ranolazine (Ranexa) 1,000 mg 12 hr tablet 1/27/2025 child   Sig: Take 1 tablet (1,000 mg) by mouth 2 times a day. Do not crush, chew, or split.   sennosides-docusate sodium (Joselin-Colace) 8.6-50 mg tablet Unknown child   Sig: Take 1 tablet by mouth once daily at bedtime.   tamsulosin (Flomax) 0.4 mg 24 hr capsule 1/28/2025 child   Sig: Take 1 capsule (0.4 mg) by mouth once daily at bedtime.      Facility-Administered Medications: None        The list below reflects the updated allergy list. Please review each documented allergy for additional clarification and justification.  Allergies  Reviewed by Swapna Bell CPhT on 1/28/2025   No Known Allergies         Pharmacy has been updated to [facility].    Sources used to complete the med history include dispense history, PTA medication list, patient/family interview. Family fair historians.    Below are additional concerns " with the patient's PTA list.  Some medications unclear of last dose. Informants could not report time of day all medications are taken.    Swapna Bell, CPhT-Adv  Please reach out via [employer] Secure Chat for questions

## 2025-01-28 NOTE — PROGRESS NOTES
01/28/25 1446   Discharge Planning   Living Arrangements Alone   Support Systems Family members;Children   Type of Residence Private residence   Number of Stairs to Enter Residence 2   Number of Stairs Within Residence 12   Do you have animals or pets at home? No   Who is requesting discharge planning? Provider   Home or Post Acute Services Post acute facilities (Rehab/SNF/etc)   Type of Post Acute Facility Services Skilled nursing   Expected Discharge Disposition SNF   Does the patient need discharge transport arranged? Yes   RoundTrip coordination needed? Yes   Has discharge transport been arranged? No   Financial Resource Strain   How hard is it for you to pay for the very basics like food, housing, medical care, and heating? Not hard   Housing Stability   In the last 12 months, was there a time when you were not able to pay the mortgage or rent on time? N   In the past 12 months, how many times have you moved where you were living? 0   At any time in the past 12 months, were you homeless or living in a shelter (including now)? N   Transportation Needs   In the past 12 months, has lack of transportation kept you from medical appointments or from getting medications? no   In the past 12 months, has lack of transportation kept you from meetings, work, or from getting things needed for daily living? No   Patient Choice   Provider Choice list and CMS website (https://medicare.gov/care-compare#search) for post-acute Quality and Resource Measure Data were provided and reviewed with: Family   Patient / Family choosing to utilize agency / facility established prior to hospitalization No   Stroke Family Assessment   Stroke Family Assessment Needed No

## 2025-01-28 NOTE — ED PROVIDER NOTES
EMERGENCY DEPARTMENT ENCOUNTER      Pt Name: Chavez Llamas  MRN: [id number]  Birthdate 1945  Date of evaluation: 1/28/2025  ED Provider: Kamala Mehta DO     CHIEF COMPLAINT     No chief complaint on file.      HISTORY OF PRESENT ILLNESS    Chavez Llamas is a 79 y.o. who presents to the emergency department with strokelike symptoms.  He states he felt weak all day yesterday.  His daughter was with him throughout the day and left sometime in the evening.  He then went to bed.  Patient is legally blind and unable to tell me what time he went to bed and therefore an unknown last known well however it was within the past 24 hours.  When he woke up this morning he felt left-sided facial numbness as well as dizziness that he describes as a lightheadedness.  When EMS arrived blood sugar was in the 200s.  He was transported here for further evaluation.  [place] stroke scale for EMS was negative.    REVIEW OF SYSTEMS     Focused ROS performed and negative other than as listed in HPI    PAST MEDICAL HISTORY     Past Medical History:   Diagnosis Date    Old myocardial infarction 02/01/2017    Past heart attack       SURGICAL HISTORY       Past Surgical History:   Procedure Laterality Date    CARDIAC CATHETERIZATION N/A 12/6/2024    Procedure: Left Heart Cath;  Surgeon: Chaim Soriano MD;  Location: [facility] Cardiac Cath Lab;  Service: Cardiovascular;  Laterality: N/A;    CARDIAC CATHETERIZATION N/A 1/20/2025    Procedure: PCI SOFI Stent- Coronary;  Surgeon: Oscar Weston MD;  Location: [place] [address] Cardiac Cath Lab;  Service: Cardiovascular;  Laterality: N/A;    CORONARY ARTERY BYPASS GRAFT  04/14/2015    Coronary Artery Surgery    MR HEAD ANGIO WO IV CONTRAST  2/23/2022    MR HEAD ANGIO WO IV CONTRAST [facility]       CURRENT MEDICATIONS       Previous Medications    ASPIRIN 81 MG EC TABLET    Take 1 tablet (81 mg) by mouth once daily.    ATORVASTATIN (LIPITOR) 40 MG TABLET    Take 1 tablet (40 mg) by  mouth once daily at bedtime.    BISACODYL (DULCOLAX) 10 MG SUPPOSITORY    Insert 1 suppository (10 mg) into the rectum once daily.    BUSPIRONE (BUSPAR) 15 MG TABLET    TAKE 1/2 (ONE-HALF) TABLET BY MOUTH IN THE MORNING AND AT BEDTIME    CLOPIDOGREL (PLAVIX) 75 MG TABLET    Take 1 tablet (75 mg) by mouth once daily.    DICYCLOMINE (BENTYL) 10 MG CAPSULE    TAKE 1 CAPSULE BY MOUTH TWICE DAILY AS NEEDED FOR  IBS    HYDROXYZINE PAMOATE (VISTARIL) 25 MG CAPSULE    Take 1 capsule (25 mg) by mouth 3 times a day as needed for anxiety.    INSULIN GLARGINE (LANTUS) 100 UNIT/ML (3 ML) PEN    Inject 12 Units under the skin once daily at bedtime. Take as directed per insulin instructions.    INSULIN LISPRO (HUMALOG) 100 UNIT/ML INJECTION    Inject 10 Units under the skin early in the morning.. Inject 10 units subcutaneous daily.  Please check blood glucose level before injection.    ISOSORBIDE MONONITRATE ER (IMDUR) 60 MG 24 HR TABLET    Take 1 tablet (60 mg) by mouth once daily. Do not crush or chew.    LANCETS (BD ULTRA FINE LANCETS) 33 GAUGE MISC    Testing T.I.D    LEVOTHYROXINE (SYNTHROID, LEVOXYL) 50 MCG TABLET    TAKE 1 TABLET BY MOUTH ONCE DAILY IN THE MORNING BEFORE MEAL(S) ON AN EMPTY STOMACH    LUBRICATING EYE DROPS OPHTHALMIC SOLUTION    Administer 1 drop into both eyes if needed for dry eyes or other.    METOPROLOL SUCCINATE XL (TOPROL-XL) 25 MG 24 HR TABLET    Take 1 tablet (25 mg) by mouth once daily.    MIRTAZAPINE (REMERON) 15 MG TABLET    TAKE 1 TABLET BY MOUTH ONCE DAILY AT BEDTIME    NITROGLYCERIN (NITROSTAT) 0.4 MG SL TABLET    Place 1 tablet (0.4 mg) under the tongue every 5 minutes if needed for chest pain.    ONDANSETRON (ZOFRAN) 4 MG TABLET    Take 1 tablet (4 mg) by mouth every 8 hours if needed for nausea or vomiting.    PANTOPRAZOLE (PROTONIX) 40 MG EC TABLET    Take 1 tablet (40 mg) by mouth once daily in the morning. Take before meals. Do not crush, chew, or split.    PEN NEEDLE, DIABETIC 31 GAUGE  "X 3/16\" NEEDLE    Use as directed    RANOLAZINE (RANEXA) 1,000 MG 12 HR TABLET    Take 1 tablet (1,000 mg) by mouth 2 times a day. Do not crush, chew, or split.    SENNOSIDES-DOCUSATE SODIUM (CODY-COLACE) 8.6-50 MG TABLET    Take 1 tablet by mouth once daily at bedtime.    TAMSULOSIN (FLOMAX) 0.4 MG 24 HR CAPSULE    Take 1 capsule (0.4 mg) by mouth once daily at bedtime.       ALLERGIES     Patient has no known allergies.    FAMILY HISTORY       Family History   Problem Relation Name Age of Onset    Diabetes Mother      Other (Cerebrovascular Accident) Father          SOCIAL HISTORY       Social History     Socioeconomic History    Marital status:    Tobacco Use    Smoking status: Former     Types: Cigarettes    Smokeless tobacco: Never   Vaping Use    Vaping status: Never Used     Social Drivers of Health     Financial Resource Strain: Low Risk  (1/16/2025)    Overall Financial Resource Strain (CARDIA)     Difficulty of Paying Living Expenses: Not very hard   Food Insecurity: No Food Insecurity (1/15/2025)    Hunger Vital Sign     Worried About Running Out of Food in the Last Year: Never true     Ran Out of Food in the Last Year: Never true   Transportation Needs: No Transportation Needs (1/16/2025)    PRAPARE - Transportation     Lack of Transportation (Medical): No     Lack of Transportation (Non-Medical): No   Social Connections: Unknown (12/5/2024)    Social Connection and Isolation Panel [NHANES]     Frequency of Social Gatherings with Friends and Family: Patient unable to answer     Attends Hindu Services: Patient unable to answer     Attends Club or Organization Meetings: Patient unable to answer     Marital Status:    Intimate Partner Violence: Not At Risk (12/5/2024)    Humiliation, Afraid, Rape, and Kick questionnaire     Fear of Current or Ex-Partner: No     Emotionally Abused: No     Physically Abused: No     Sexually Abused: No   Housing Stability: Low Risk  (1/16/2025)    Housing " Stability Vital Sign     Unable to Pay for Housing in the Last Year: No     Number of Times Moved in the Last Year: 1     Homeless in the Last Year: No       PHYSICAL EXAM       ED Triage Vitals   Temp Pulse Resp BP   -- -- -- --      SpO2 Temp src Heart Rate Source Patient Position   -- -- -- --      BP Location FiO2 (%)     -- --        General: Appears cachetic, chronically ill, but in no acute distress, alert  Head: Head atraumatic; normocephalic  Eyes: normal inspection; no icterus  ENT: mucosa moist without lesion  Neck: Normal inspection, no meningeal signs  Resp: Normal breath sounds, no wheeze or crackles; No respiratory distress  Chest Wall: no tenderness or deformity  Heart: Heart rate and rhythm regular; No Murmurs  Abdomen: Soft, Non-tender; No distention, guarding, rigidity, or rebound  MSK: Normal appearance; Moves all extremities; No Pedal edema, L BKA  Neuro: Alert; no focal motor deficits, moves all extremities, subjective decreased sensation L face and arm, intact L leg  Psych: Mood and Affect normal  Skin: Color appropriate; warm; Dry    DIAGNOSTIC RESULTS   Lab and radiology results are independently interpreted unless noted below.  RADIOLOGY (Per Emergency Physician):     Interpretation per the Radiologist below, if available at the time of this note:  CT brain attack head wo IV contrast   Final Result        Nagi Montgomery discussed the significance and urgency of this critical   finding via epic secure chat. With  MONICO BARGER on 1/28/2025 at 7:13   am.  (**-RCF-**) Findings:  See findings.             MACRO:   None.        Signed by: Nagi Montgomery 1/28/2025 7:14 AM   Dictation workstation:   [id number]      XR chest 1 view    (Results Pending)       LABS:  Abnormal Labs Reviewed   CBC WITH AUTO DIFFERENTIAL - Abnormal; Notable for the following components:       Result Value    RBC 3.08 (*)     Hemoglobin 9.1 (*)     Hematocrit 28.2 (*)     RDW 16.8 (*)     Neutrophils Absolute 6.90 (*)      Lymphocytes Absolute 0.53 (*)     All other components within normal limits   COMPREHENSIVE METABOLIC PANEL - Abnormal; Notable for the following components:    Glucose 140 (*)     Sodium 135 (*)     Urea Nitrogen 24 (*)     Creatinine 1.64 (*)     eGFR 42 (*)     ALT 7 (*)     All other components within normal limits   TROPONIN I, HIGH SENSITIVITY - Abnormal; Notable for the following components:    Troponin I, High Sensitivity 460 (*)     All other components within normal limits    Narrative:     Less than 99th percentile of normal range cutoff-  Female and children under 18 years old <14 ng/L; Male <21 ng/L: Negative  Repeat testing should be performed if clinically indicated.     Female and children under 18 years old 14-50 ng/L; Male 21-50 ng/L:  Consistent with possible cardiac damage and possible increased clinical   risk. Serial measurements may help to assess extent of myocardial damage.     >50 ng/L: Consistent with cardiac damage, increased clinical risk and  myocardial infarction. Serial measurements may help assess extent of   myocardial damage.      NOTE: Children less than 1 year old may have higher baseline troponin   levels and results should be interpreted in conjunction with the overall   clinical context.     NOTE: Troponin I testing is performed using a different   testing methodology at [facility] than at other   [facility]. Direct result comparisons should only   be made within the same method.   POCT GLUCOSE - Abnormal; Notable for the following components:    POCT Glucose 134 (*)     All other components within normal limits       All other labs were within normal range or not returned as of this dictation.      EMERGENCY DEPARTMENT COURSE/MDM   Patient presents with complaint of dizziness and left-sided facial numbness.  Given the neurologic symptoms plus dizziness we will treat as an acute CVA.  Last known well at this time is uncertain as the patient is unable to  give me a timeframe.  Brain attack is called.    Scoring Tools Utilized: NIH Stroke Scale: 1       ED Course as of 01/28/25 0839   Tue Jan 28, 2025   0658 Discussed with stroke neurology Dr Angulo. No indication for TNKase he is outside of the window and is not a thrombectomy candidate given low NIH and nondisabling symptoms. [EF]   0712 EKG personally interpreted by me performed at 0700  Sinus rhythm with ventricular rate 101 left bundle branch block pattern no acute ischemic changes discordant with computer read of unknown rhythm [EF]   0827 Workup returned showing an elevated troponin at 460.  Hemoglobin is improved from prior and creatinine is at baseline.  Flu and COVID are negative.  Patient is not having active chest pain and there are no acute ischemic changes on ECG.  Family is at the bedside and updated.  Patient will be admitted for further neurologic workup. [EF]      ED Course User Index  [EF] Kamala Mehta DO         Diagnoses as of 01/28/25 0839   Dizziness   Cerebrovascular accident (CVA), unspecified mechanism (Multi)   Elevated troponin       Meds Administered:  Medications   aspirin chewable tablet 324 mg (has no administration in time range)       PROCEDURES   Unless otherwise noted below, none  Critical Care    Performed by: Kamala Mehta DO  Authorized by: Kamala Mehta DO    Critical care provider statement:     Critical care time (minutes):  31    Critical care time was exclusive of:  Separately billable procedures and treating other patients    Critical care was necessary to treat or prevent imminent or life-threatening deterioration of the following conditions:  CNS failure or compromise    Critical care was time spent personally by me on the following activities:  Ordering and review of laboratory studies, ordering and performing treatments and interventions, ordering and review of radiographic studies, pulse oximetry, re-evaluation of patient's condition, review of old charts,  obtaining history from patient or surrogate, examination of patient, evaluation of patient's response to treatment, discussions with consultants and development of treatment plan with patient or surrogate    Care discussed with: admitting provider          FINAL IMPRESSION      1. Cerebrovascular accident (CVA), unspecified mechanism (Multi)    2. Dizziness    3. Elevated troponin          DISPOSITION    Admit 01/28/2025 08:38:43 AM  As a result of their workup, the patient will require admission to the hospital.  The patient was informed of his diagnosis.  The patient was given the opportunity to ask questions and I answered them. The patient agreed to be admitted to the hospital.    Critical Care time: See Procedure Note    (Comment: Please note this report has been produced using speech recognition software and may contain errors related to that system including errors in grammar, punctuation, and spelling, as well as words and phrases that may be inappropriate.  If there are any questions or concerns please feel free to contact the dictating provider for clarification.)    Kamala Mehta,  (electronically signed)  Emergency Medicine Physician    History provided by: Patient and EMS  Limitations to History: None  External Records Reviewed with Brief Summary: Discharge Summary from 1/25/25 which showed transfer from [facility] inpatient unit to [facility] for high risk PCI.  Patient had a drug-eluting stent to the RCA and there was disease to the proximal mid and mid distal RCA.  The patient has significant history of numerous PCI's  Social Determinants of Health which Significantly Impact Care: Problems related to living alone   EKG Independent Interpretation: EKG interpreted by myself. Please see ED Course for full interpretation.  Independent Result Review and Interpretation: Relevant laboratory and radiographic results were reviewed and independently interpreted by myself.  As necessary, they are commented on in  the ED Course.  Chronic conditions affecting the patient's care: As documented above in MDM  The patient was discussed with the following consultants/services: Dr. Angulo, stroke neuro and Hospitalist/Admitting Provider who accepted the patient for admission  Care Considerations: As documented above in MDM               Kamala Mehta DO  01/28/25 0852

## 2025-01-28 NOTE — PROGRESS NOTES
01/28/25 1445   Physical Activity   On average, how many days per week do you engage in moderate to strenuous exercise (like a brisk walk)? 0 days   On average, how many minutes do you engage in exercise at this level? 0 min   Financial Resource Strain   How hard is it for you to pay for the very basics like food, housing, medical care, and heating? Not hard   Housing Stability   In the last 12 months, was there a time when you were not able to pay the mortgage or rent on time? N   In the past 12 months, how many times have you moved where you were living? 0   At any time in the past 12 months, were you homeless or living in a shelter (including now)? N   Transportation Needs   In the past 12 months, has lack of transportation kept you from medical appointments or from getting medications? no   In the past 12 months, has lack of transportation kept you from meetings, work, or from getting things needed for daily living? No   Food Insecurity   Within the past 12 months, you worried that your food would run out before you got the money to buy more. Never true   Within the past 12 months, the food you bought just didn't last and you didn't have money to get more. Never true   Stress   Do you feel stress - tense, restless, nervous, or anxious, or unable to sleep at night because your mind is troubled all the time - these days? To some exte   Social Connections   In a typical week, how many times do you talk on the phone with family, friends, or neighbors? More than 3   How often do you get together with friends or relatives? More than 3   How often do you attend Taoist or Christian services? Never   Do you belong to any clubs or organizations such as Taoist groups, unions, fraternal or athletic groups, or school groups? No   How often do you attend meetings of the clubs or organizations you belong to? Never   Are you , , , , never , or living with a partner?     Intimate Partner Violence   Within the last year, have you been afraid of your partner or ex-partner? No   Within the last year, have you been humiliated or emotionally abused in other ways by your partner or ex-partner? No   Within the last year, have you been kicked, hit, slapped, or otherwise physically hurt by your partner or ex-partner? No   Within the last year, have you been raped or forced to have any kind of sexual activity by your partner or ex-partner? No   Alcohol Use   Q1: How often do you have a drink containing alcohol? Never   Q2: How many drinks containing alcohol do you have on a typical day when you are drinking? None   Q3: How often do you have six or more drinks on one occasion? Never   Utilities   In the past 12 months has the electric, gas, oil, or water company threatened to shut off services in your home? No   Health Literacy   How often do you need to have someone help you when you read instructions, pamphlets, or other written material from your doctor or pharmacy? Never

## 2025-01-28 NOTE — CONSULTS
"Inpatient consult to Neuro TeleStroke  Consult performed by: Shavonne Angulo MD  Consult ordered by: Kamala Mehta DO          History Of Present Illness:  Historian: Patient and ED Provider   Chavez Llamas is a 79 y.o. male presenting with left side numbness.  Last known well: last night  Had stroke symptoms resolved at time of presentation: No     Prior Functional Status (Modified Osmel Scale):  2 The patient has slight disability; unable to carry out all pre-stroke activities but able to look after self without daily help.     Stroke Risk Factors:  Diabetes Mellitus and H/O CAD    Last Recorded Vitals:  There were no vitals taken for this visit.      NIHSS:  1A. Level of Consciousness: 0  1B. Ask Month and Age: 0  1C. Blink Eyes & Squeeze Hands: 0  2. Best Gaze: 0  3. Visual: 0  4. Facial Palsy: 0  5A. Motor - Left Arm: 0  5B. Motor - Right Arm: 0  6A. Motor - Left Le  6B. Motor - Right Le  7. Limb Ataxia: 0  8. Sensory Loss: 1  9. Best Language: 0  10. Dysarthria: 0  11. Extinction and Inattention: 0  NIH Stroke Scale: 1       Relevant Results:  LABS:  No results found for: \"GLUCOSE\", \"INR\"   Results for orders placed or performed during the hospital encounter of 25 (from the past 24 hours)   ECG 12 lead   Result Value Ref Range    Ventricular Rate 101 BPM    QRS Duration 160 ms    QT Interval 384 ms    QTC Calculation(Bazett) 497 ms    R Axis -10 degrees    T Axis 167 degrees    QRS Count 16 beats    Q Onset 215 ms    T Offset 407 ms    QTC Fredericia 456 ms        CT Head Imaging:  [facility] imaging personally reviewed, showed no acute ischemic / hemorrhagic changes     CTA Head and Neck Imaging:  CTA imaging not performed     Diagnosis:  Supect Acute Ischemic Stroke    IV Thrombolysis IV Thrombolysis Checklist      IV Thrombolysis Given: No; Thrombolysis contraindication reason: Neurologic signs are mild and judged to be non-disabling and Time from Last Known Well (or stroke onset) is >4.5 " hours           Patient is a candidate for thrombectomy:  yes/no: No; contraindication reason: NIHSS <6    Additional Recommendations:  Stroke workup  MRI brain w/o contrast stroke protocol  EKG, troponin  Utox  TTE w/ bubble study  Lipid panel, A1c  ASA 81mg daily and Plavix 75mg daily  Lipid Goals: education on healthy diet and statin therapy to maintain or achieve goal LDL-cholesterol < 70mg   Blood pressure goals: avoid hypotension SBP <100 that could worsen cerebral perfusion, Ischemic stroke- early permissive hypertension SBP < 220 mmHg with cautious inpatient lowering  Glucose Goals: early treatment of hyperglycemia to goal glucose 140-180 mg/dl with long-term goal A1c < 7%   NPO until nurse bedside swallow assessment   Consider focused stroke order set   Smoking Cessation and Education  Assessment for Rehabilitation needs   Patient and family education on signs and symptoms of stroke, calling 911, healthy strategies for stroke prevention.      Disposition:  Patient will remain at referring facility for further evaluation and management.    Virtual or Telephone Consent    An interactive audio and video telecommunication system which permits real time communications between the patient (at the originating site) and provider (at the distant site) was utilized to provide this telehealth service.   Verbal consent was requested and obtained from Chavez Llamas on this date, 01/28/25 for a telehealth visit.    Virtual Visit start time: 0700    Shavonne Angulo MD

## 2025-01-29 LAB
ATRIAL RATE: 79 BPM
P AXIS: 47 DEGREES
PR INTERVAL: 232 MS
Q ONSET: 173 MS
QRS COUNT: 13 BEATS
QRS DURATION: 170 MS
QT INTERVAL: 438 MS
QTC CALCULATION(BAZETT): 502 MS
QTC FREDERICIA: 480 MS
R AXIS: -25 DEGREES
T AXIS: 127 DEGREES
T OFFSET: 392 MS
VENTRICULAR RATE: 79 BPM

## 2025-01-29 NOTE — PROGRESS NOTES
Spiritual Care Visit  Spiritual Care Request    Reason for Visit:  Routine Visit: Introduction  Crisis Visit: ED     Request Received From:       Focus of Care:  Visited With: Patient         Refer to :          Spiritual Care Assessment    Spiritual Assessment:                      Care Provided:       Sense of Community and or Shinto Affiliation:  Yarsanism   Values/Beliefs  Spiritual Requests During Hospitalization: Chavez asked for a blessing today     Addressed Needs/Concerns and/or [place] Through:     Sacramental Encounters  Communion: Does not want communion  Communion Given Indicator: No  Sacrament of Sick-Anointing: Patient declined anointing    Outcome:        Advance Directives:         Spiritual Care Annotation    Annotation:  Chavez asked for a blessing today but no sacraments from the Jainism.  Remigio Qureshi

## 2025-01-29 NOTE — CONSULTS
Inpatient consult to Cardiology  Consult performed by: Jonathan Tristan MD  Consult ordered by: Dustin Alanis DO  Reason for consult: Consulted for elevated troponin with recent PCI.  Assessment/Recommendations: Patient admitted with possible TIA versus CVA.  Left-sided facial numbness has resolved.  Recently had a NSTEMI with PCI done.  I will troponin although no active chest pain no active angina CHF signs symptoms currently on GDMT post PCI.  Continue current medication.  No further cardiac workup        History Of Present Illness:    Chavez Llamas is a 79 y.o. male patient with a recently had a [place] [facility]  Patient a PCI of right coronary done with underlying HFrEF.  Also history of hypertension diabetes.  Complain of left-sided facial numbness.  Daughter brought patient now here admitted for workup for possible TIA versus CVA.  Patient also underlying legally blind as well as poor functionality.  No active chest pain tightness.  Was just discharged 3 days ago [place] [facility]  Patient had elevated troponin which from recently NSTEMI and PCI.  Patient also history of left BKA.  Currently resting comfortable in ER bed 10 no active angina or CHF signs symptoms.  Last Recorded Vitals:  Vitals:    01/29/25 0700 01/29/25 0800 01/29/25 0900 01/29/25 0949   BP: 106/58 102/55 (!) 95/44 (!) 93/46   BP Location:    Left arm   Patient Position:    Lying   Pulse: 82 77 82 80   Resp: 17 18 18 18   SpO2: 98% 98%  100%   Weight:       Height:           Past Medical History:  He has a past medical history of Old myocardial infarction (02/01/2017).    Past Surgical History:  He has a past surgical history that includes Coronary artery bypass graft (04/14/2015); MR angio head wo IV contrast (2/23/2022); Cardiac catheterization (N/A, 12/6/2024); and Cardiac catheterization (N/A, 1/20/2025).      Social History:  He reports that he has quit smoking. His smoking use included cigarettes. He has never used smokeless tobacco. No  history on file for alcohol use and drug use.    Family History:  Family History   Problem Relation Name Age of Onset    Diabetes Mother      Other (Cerebrovascular Accident) Father          Allergies:  Patient has no known allergies.    ROS: See HPI  CONSTITUTIONAL: Chills- none. Fever- none. Weight change appropriate for age.  HEENT: Headache- Negative.  Change in vision- none.  Ear pain- none. Nasal congestion- none. Post-nasal drip-none.  Sore throat-none.  CARDIOLOGY: Chest pain- none.  Leg edema- none.  Murmurs-none.  Palpitation- none.  RESPIRATORY: Denies any shortness of breath.  GI: Abdominal pain- none.  Change in bowel habits- none.  Constipation- none.  Diarrhea- none.  Nausea- none.  Vomiting- none.  MUSCULOSKELETAL: Joint pain- none.  Muscle aches- none.  DERMATOLOGY: Rash- none.  NEUROLOGY: Dizziness- none.   Headache- none.  Patient with a left-sided facial as well as arm and leg numbness left-sided facial numbness is improving.  PSYCHIATRY: Denies any depression or anxiety.    Inpatient Medications:  Scheduled medications   Medication Dose Route Frequency    aspirin  81 mg oral Daily    atorvastatin  80 mg oral Nightly    busPIRone  7.5 mg oral BID    clopidogrel  75 mg oral Daily    enoxaparin  1 mg/kg subcutaneous q12h [facility]    isosorbide mononitrate ER  60 mg oral Daily    levothyroxine  50 mcg oral Daily    metoprolol succinate XL  25 mg oral Daily    pantoprazole  40 mg oral Daily before breakfast    polyethylene glycol  17 g oral Daily    ranolazine  1,000 mg oral BID    sennosides-docusate sodium  1 tablet oral Nightly    tamsulosin  0.4 mg oral Nightly     Outpatient Medications:  Current Outpatient Medications   Medication Instructions    aspirin 81 mg, oral, Daily    atorvastatin (LIPITOR) 40 mg, oral, Nightly    bisacodyl (DULCOLAX) 10 mg, rectal, Daily    busPIRone (Buspar) 15 mg tablet TAKE 1/2 (ONE-HALF) TABLET BY MOUTH IN THE MORNING AND AT BEDTIME    clopidogrel (PLAVIX) 75 mg, oral,  "Daily    dicyclomine (Bentyl) 10 mg capsule TAKE 1 CAPSULE BY MOUTH TWICE DAILY AS NEEDED FOR  IBS    hydrOXYzine pamoate (VISTARIL) 25 mg, oral, 3 times daily PRN    insulin glargine (LANTUS) 12 Units, subcutaneous, Nightly, Take as directed per insulin instructions.    insulin lispro (HUMALOG) 10 Units, subcutaneous, Daily, Inject 10 units subcutaneous daily.  Please check blood glucose level before injection.    isosorbide mononitrate ER (IMDUR) 60 mg, oral, Daily, Do not crush or chew.    lancets (BD Ultra Fine Lancets) 33 gauge misc Testing T.I.D    levothyroxine (Synthroid, Levoxyl) 50 mcg tablet TAKE 1 TABLET BY MOUTH ONCE DAILY IN THE MORNING BEFORE MEAL(S) ON AN EMPTY STOMACH    lubricating eye drops ophthalmic solution 1 drop, As needed    metoprolol succinate XL (TOPROL-XL) 25 mg, oral, Daily    mirtazapine (REMERON) 15 mg, oral, Nightly    nitroglycerin (NITROSTAT) 0.4 mg, sublingual, Every 5 min PRN    ondansetron (ZOFRAN) 4 mg, oral, Every 8 hours PRN    pantoprazole (PROTONIX) 40 mg, oral, Daily before breakfast, Do not crush, chew, or split.    pen needle, diabetic 31 gauge x 3/16\" needle Use as directed    ranolazine (RANEXA) 1,000 mg, oral, 2 times daily, Do not crush, chew, or split.    sennosides-docusate sodium (Joselin-Colace) 8.6-50 mg tablet 1 tablet, oral, Nightly    tamsulosin (FLOMAX) 0.4 mg, oral, Nightly       Last Recorded Vitals  BP (!) 93/46 (BP Location: Left arm, Patient Position: Lying)   Pulse 80   Resp 18   Wt 66.2 kg (146 lb)   SpO2 100%   No intake or output data in the 24 hours ending 01/29/25 1450    Physical Exam:  HEENT: Normocephalic/atraumatic pupils equal react light  Neck exam mild JVD, no bruit  Lung exam clear to auscultation.  Cardiac exam is regular rhythm S1-S2, soft systolic murmur heard.   Abdomen soft nontender, nondistended  Extremities no clubbing, cyanosis but trace edema, left BKA  Neuro exam grossly intact.  Last Labs:  CBC - 1/29/2025:  6:33 AM  6.2 8.6 " 124    26.3      CMP - 1/29/2025:  6:33 AM  8.3 7.1 11 --- 1.2   3.3 3.3 7 60      PTT - 1/28/2025:  7:02 AM  1.1   11.3 32.5     Troponin I, High Sensitivity   Date/Time Value Ref Range Status   01/28/2025 09:15  (HH) 0 - 20 ng/L Final     Comment:     Previous result verified on 1/28/2025 0816 on specimen/case [id number] called with component TRP for procedure Troponin I, High Sensitivity with value 460 ng/L.   01/28/2025 07:02  (HH) 0 - 20 ng/L Final   01/13/2025 02:01 PM 66 (HH) 0 - 20 ng/L Final     Troponin I, High Sensitivity (CMC)   Date/Time Value Ref Range Status   01/21/2025 07:47  (HH) 0 - 53 ng/L Final   01/20/2025 07:36  (HH) 0 - 53 ng/L Final     Comment:     Previous result verified on 1/21/2025 2101 on specimen/case [id number] called with component TRPHS for procedure Troponin I, High Sensitivity with value 141 ng/L.   01/17/2025 10:17 AM 56 (H) 0 - 53 ng/L Final     BNP   Date/Time Value Ref Range Status   01/14/2025 06:59  (H) 0 - 99 pg/mL Final   01/13/2025 11:20  (H) 0 - 99 pg/mL Final     LDL Calculated   Date/Time Value Ref Range Status   01/29/2025 06:33 AM 32 <=99 mg/dL Final     Comment:                                 Near   Borderline      AGE      Desirable  Optimal    High     High     Very High     0-19 Y     0 - 109     ---    110-129   >/= 130     ----    20-24 Y     0 - 119     ---    120-159   >/= 160     ----      >24 Y     0 -  99   100-129  130-159   160-189     >/=190     01/15/2025 01:06 PM 21 <=99 mg/dL Final     Comment:                                 Near   Borderline      AGE      Desirable  Optimal    High     High     Very High     0-19 Y     0 - 109     ---    110-129   >/= 130     ----    20-24 Y     0 - 119     ---    120-159   >/= 160     ----      >24 Y     0 -  99   100-129  130-159   160-189     >/=190     12/04/2024 11:47 PM 34 <=99 mg/dL Final     Comment:                                 Near   Borderline      AGE       Desirable  Optimal    High     High     Very High     0-19 Y     0 - 109     ---    110-129   >/= 130     ----    20-24 Y     0 - 119     ---    120-159   >/= 160     ----      >24 Y     0 -  99   100-129  130-159   160-189     >/=190       VLDL   Date/Time Value Ref Range Status   01/29/2025 06:33 AM 16 0 - 40 mg/dL Final   01/15/2025 01:06 PM 17 0 - 40 mg/dL Final   12/04/2024 11:47 PM 18 0 - 40 mg/dL Final          CT angio head and neck w and wo IV contrast  Narrative: Interpreted By:  Nick Arellano,   STUDY:  CT ANGIO HEAD AND NECK W AND WO IV CONTRAST;  1/29/2025 1:49 pm      INDICATION:  Signs/Symptoms:carotid stenosis.      COMPARISON:  CT head yesterday. CT chest 01/13/2025.      ACCESSION NUMBER(S):  [id number]      ORDERING CLINICIAN:  NITO HWANG      TECHNIQUE:  Unenhanced CT images of the head were obtained.  Subsequently,  75 ML  of Omnipaque 350 was administered intravenously and axial images of  the head and neck were acquired.  Coronal, sagittal, and 3-D  reconstructions were provided for review.      FINDINGS:      CTA COW: No major vascular occlusion. Moderate atherosclerotic  calcifications through the carotid siphons. No aneurysms.  No  vascular malformations. Patent dural venous sinuses and intracranial  deep venous structures.      CTA CAROTID: No aortic aneurysm or dissection. No significant  stenoses at the origins of the great vessels from the aortic arch. No  significant stenoses of the brachycephalic artery or bilateral  subclavian arteries.      Mixed fibrofatty and calcific atherosclerotic plaque crosses the left  carotid bifurcation. Narrowest luminal diameter in the proximal left  internal carotid artery is 1.7 mm which is approximately 60-65%  stenosis. There is mild plaque irregularity.      Mixed but predominantly fibrofatty atherosclerotic plaque spanning of  the right carotid bifurcation. Narrowest luminal diameter in the  proximal right internal carotid artery is 3.1  mm which is less than  50% stenosis. There is mild plaque irregularity.      Moderately severe short-segment atherosclerotic stenosis proximal V1  segment right vertebral artery.      NONVASCULAR HEAD: No intracranial mass. No intracranial hemorrhage.  No evidence of large evolving cortical infarction. Supratentorial  white matter hypodensities most likely related to chronic small  vessel ischemic change. Mild-to-moderate global brain parenchymal  volume loss. Bones intact. Postoperative changes bilateral orbital  globes.      NONVASCULAR NECK: No soft tissue mass. No adenopathy. Salivary glands  are unremarkable. Thyroid gland unremarkable. Bones intact.  Visualized small right-greater-than-left pleural effusions.  Emphysematous changes in the upper lungs. [place] opacity left  apex, similar to prior.          Impression: 1. No intracranial major vascular occlusion.  2. Approximately 60-65% short-segment atherosclerotic stenosis  proximal left internal carotid artery with mild plaque irregularity.  3. Less than 50% short-segment atherosclerotic stenosis proximal  right internal carotid artery with mild plaque irregularity.  4. Moderate to severe short-segment atherosclerotic stenosis proximal  V1 segment right vertebral artery.  5. Supratentorial white matter hypodensities most likely related to  chronic small vessel ischemic change.  6. Mild-to-moderate global brain parenchymal volume loss.  7. Visualized small right-greater-than-left pleural effusions.  Emphysematous changes in the upper lungs. [place] opacity left  apex, similar to prior.      MACRO:  None      Signed by: Nick Arellano 1/29/2025 2:39 PM  Dictation workstation:   [id number]  Vascular US Carotid Artery Duplex Bilateral  Preliminary Cardiology Report                [address] [address]             Phone [phone]           Preliminary Vascular Lab Report     [facility] US CAROTID ARTERY DUPLEX  BILATERAL       Patient Name:     ORLANDO HYDE JULIEN Reading Physician:  [id number] Jamia Ramirez MD  Study Date:       1/29/2025         Ordering Provider:  [id number] NITO HWANG  MRN/PID:          [id number]          Fellow:  Accession#:       [id number]      Technologist:       Marlon Thomas RVT  YOB: 1945        Technologist 2:  Gender:           M                 Encounter#:         [id number]  Admission Status: Inpatient         Location Performed: [facility]       Diagnosis/ICD: Occlusion and stenosis of bilateral carotid arteries-I65.23  CPT Codes:     68866 Cerebrovascular Carotid Duplex scan complete       PRELIMINARY CONCLUSIONS:     Right Carotid: Findings are consistent with less than 50% stenosis of the right proximal internal carotid artery. Right external carotid artery appears patent with no evidence of stenosis. The right vertebral artery is patent with antegrade flow. No evidence of hemodynamically significant stenosis in the right subclavian artery.  Left Carotid: Findings are consistent with 50 to 69% stenosis of the left proximal internal carotid artery. Left external carotid artery appears patent with no evidence of stenosis. The left vertebral artery is patent with antegrade flow. No evidence of hemodynamically significant stenosis in the left subclavian artery.  Additional Findings:  Technically difficult due to patient head movement and talking.       Imaging & Doppler Findings:  Right Plaque Morph: The proximal right internal carotid artery demonstrates heterogenous and calcified plaque.  Left Plaque Morph: The proximal left internal carotid artery demonstrates heterogenous and calcified plaque.      Right                        Left    PSV      EDV                PSV      EDV  56 cm/s            CCA P    51 cm/s  49 cm/s            CCA D    45 cm/s  102 cm/s 32 cm/s   ICA P    187 cm/s 61 cm/s  90 cm/s  22 cm/s   ICA M    94 cm/s  37 cm/s  71 cm/s  21 cm/s   ICA D     70 cm/s  26 cm/s  34 cm/s  14 cm/s Vertebral  87 cm/s  21 cm/s  81 cm/s          Subclavian 96 cm/s                     Right Left  ICA/CCA Ratio  2.1  4.1          VASCULAR PRELIMINARY REPORT  completed by Marlon Thomas RVT on 1/29/2025 at 11:45:27 AM       ** Final **      Assessment & Plan  Cerebrovascular accident (CVA), unspecified mechanism (Multi)  79-year-old patient with a known history of CAD, recent PCI.  Now with a left-sided facial numbness.  Status post left BKA.  No chest pain or tightness ongoing workup for TIA versus CVA.    Elevated troponin: History of recently NSTEMI with a PCI of RCA.  No active chest pain tightness.  Continue GDMT for NSTEMI including nitroglycerin, aspirin, statin, ACE Imdur, beta-blocker as well as Plavix.    CAD: Continue current Ranexa 1000 mg p.o. twice daily.  Also currently on a Imdur 60 mg tablet p.o. daily.    Hyperlipidemia: Continue current Lipitor 80 mg tablet p.o. daily.    DVT, aspiration fall precaution.  Will follow as needed.  Stable from cardiac wise.  Okay to discharge and outpatient follow-up with the primary cardiologist.      Code Status:  Full Code  I spent 60 minutes in the professional and overall care of this patient.  Jonathan Tristan MD

## 2025-01-29 NOTE — PROGRESS NOTES
"Occupational Therapy                 Therapy Communication Note    Patient Name: Chavez Llamas  MRN: [id number]  Department: [facility] ED  Room: [place] [address]  Today's Date: 1/29/2025     Discipline: Occupational Therapy    OT Missed Visit: Yes     Missed Visit Reason: Missed Visit Reason: Patient refused (Orders received, chart reviewed. OT initiated attempt for evaluation. Pt response \"I don't trust OT, PT or SW. You can leave.\" When therapist attempted to ask further explanation, etc. Pt stated \"I had a terrible experience. You can leave. I'm done now.\")    Missed Time: Cancel  "

## 2025-01-29 NOTE — PROGRESS NOTES
Physical Therapy                 Therapy Communication Note    Patient Name: Chavez Llamas  MRN: [id number]  Department: [facility] ED  Room: [place] [address]  Today's Date: 1/29/2025     Discipline: Physical Therapy    PT Missed Visit: Yes     Missed Visit Reason: Missed Visit Reason: Patient refused (Per report)    Missed Time: Attempt

## 2025-01-29 NOTE — ASSESSMENT & PLAN NOTE
79-year-old patient with a known history of CAD, recent PCI.  Now with a left-sided facial numbness.  Status post left BKA.  No chest pain or tightness ongoing workup for TIA versus CVA.    Elevated troponin: History of recently NSTEMI with a PCI of RCA.  No active chest pain tightness.  Continue GDMT for NSTEMI including nitroglycerin, aspirin, statin, ACE Imdur, beta-blocker as well as Plavix.    CAD: Continue current Ranexa 1000 mg p.o. twice daily.  Also currently on a Imdur 60 mg tablet p.o. daily.    Hyperlipidemia: Continue current Lipitor 80 mg tablet p.o. daily.    DVT, aspiration fall precaution.  Will follow as needed.  Stable from cardiac wise.  Okay to discharge and outpatient follow-up with the primary cardiologist.

## 2025-01-29 NOTE — PROGRESS NOTES
Chavez Llamas is a 79 y.o. male on day 1 of admission presenting with Cerebrovascular accident (CVA), unspecified mechanism (Multi).      Subjective   The patient reports chest pain and left arm pain  He has a recent coronary stent placed at [facility] [place]     Objective     Last Recorded Vitals  BP (!) 93/46 (BP Location: Left arm, Patient Position: Lying)   Pulse 80   Resp 18   Wt 66.2 kg (146 lb)   SpO2 100%   Intake/Output last 3 Shifts:  No intake or output data in the 24 hours ending 01/29/25 1125    Admission Weight  Weight: 66.2 kg (146 lb) (01/28/25 0943)    Daily Weight  01/28/25 : 66.2 kg (146 lb)    Image Results  MR angio head wo IV contrast, MR angio neck wo IV contrast, MR brain wo IV contrast  Narrative: Interpreted By:  Vasile Valente,   STUDY:  MR BRAIN WO IV CONTRAST; MR ANGIO HEAD WO IV CONTRAST; MR ANGIO NECK  WO IV CONTRAST;  1/28/2025 8:00 pm; 1/28/2025 7:59 pm      INDICATION:  Signs/Symptoms:possible cva vs tia.          COMPARISON:  MRI of the brain dated 02/23/2022; noncontrast CT head dated  01/20/2025;      ACCESSION NUMBER(S):  [id number]; [id number]; [id number]      ORDERING CLINICIAN:  LISA WASSERMAN      TECHNIQUE:  Axial T2, FLAIR, DWI, gradient echo T2 and  sagittal and coronal T1  weighted images of brain were acquired.      Time-of-flight MRA of the head  and neck was performed. The images  were reviewed as source images and maximum intensity projections.      FINDINGS:  Brain:      No diffusion restriction abnormality is present to suggest a recent  infarct.      There is no imaging evidence of recent intracranial hemorrhage. No  mass effect or midline shift is identified.      Mild brain parenchymal volume loss is present without abnormal  ventricular dilatation. Basal cisterns are patent. No extra-axial  fluid collections are identified.      Scattered patchy foci of hyperintense FLAIR and T2 signal are present  in the periventricular and subcortical white matter of  bilateral  cerebral hemispheres are nonspecific, although favored to represent  sequela of microvascular disease.      No abnormal fluid signal is present in the mastoid air cells on the  left. Trace fluid is present in the mastoid air cells and in the  right inferiorly.      Mild submucosal thickening is present in the inferior maxillary  sinuses bilaterally. Patient is status post cataract extraction.      MRA of head:      Intracranial carotid arteries demonstrate symmetric flow enhancement  bilaterally without evidence of occlusion or high-grade stenosis.      Visualized proximal anterior cerebral arteries are symmetric in  appearance without evidence of occlusion.      Middle cerebral arteries demonstrate symmetric flow enhancement  without evidence of high-grade stenosis in the proximal M1 segments  or focal vessel occlusion in the more distal visualized M2 and M3  segments.      Intracranial vertebral arteries demonstrate anatomic variant dominant  left vertebral artery without evidence of occlusion or high-grade  stenosis bilaterally.      There is suspected mild-to-moderate stenosis in the proximal P1  segment of the left posterior cerebral artery, with preserved flow  enhancement of the vessel in the more distal P2 and P3 segment. No  occlusion or stenosis is identified in the right.      MRA of neck:      Visualized common carotid arteries demonstrate symmetric flow  enhancement without evidence of occlusion or stenosis.      Moderate to severe stenosis is suspected in the proximal extracranial  left internal carotid artery due to calcified plaque with upwards of  moderate stenosis suspected on the right with preserved flow  enhancement in the vessels distally bilaterally.      Extracranial vertebral arteries demonstrate symmetric flow  enhancement bilaterally, without evidence of occlusion or high-grade  stenosis. Left vertebral artery is tortuous in course proximally.      Impression: MRI Brain:  1.  No  evidence of diffusion restriction abnormality to suggest a  recent infarct.  2. Low volume of patchy hyperintense FLAIR and T2 foci present in the  periventricular and subcortical white matter of bilateral cerebral  hemispheres are nonspecific, but favored to represent sequela of  microvascular disease.      MRA:  1.  No large vessel occlusion is identified in the proximal  intracranial circulation.  2. Moderate to severe stenosis is suspected in the proximal  extracranial left internal carotid artery due to calcified plaque  with preserved flow enhancement of the vessel more distally. Upwards  of moderate stenosis is suspected in the proximal extracranial right  internal carotid artery.      MACRO:  None      Signed by: Vasile Valente 1/28/2025 9:47 PM  Dictation workstation:   [id number]  ECG 12 lead  Sinus rhythm with 1st degree AV block  Left bundle branch block  Abnormal ECG  When compared with ECG of 22-JAN-2025 14:14, (unconfirmed)  Wide QRS rhythm has replaced Sinus rhythm  Confirmed by Jonathan Tristan ([id number]) on 1/28/2025 11:23:11 AM  XR chest 1 view  Narrative: Interpreted By:  Vivian Melchor,   STUDY:  XR CHEST 1 VIEW 1/28/2025 7:28 am      INDICATION:  Signs/Symptoms:weakness and brain attack      COMPARISON:  01/14/2025      ACCESSION NUMBER(S):  [id number]      ORDERING CLINICIAN:  MONICO BARGER      TECHNIQUE:  AP semi-erect view of the chest      FINDINGS:  There is postoperative change from median sternotomy and coronary  revascularization procedure with the cardiac size at the upper limits  of normal to slightly enlarged. There is stable left apical pleural  thickening. No pleural effusion on either side is present.  Cephalization of the pulmonary vascularity is noted without  interstitial or alveolar edema. There is no new infiltrate or  airspace consolidation.      Impression: Status post CABG with stable left apical pleural thickening.      Cephalization of the pulmonary vasculature suggesting  mild pulmonary  venous hypertension.      Signed by: Vivian Melcohr 1/28/2025 9:05 AM  Dictation workstation:   [id number]  CT brain attack head wo IV contrast  Narrative: Interpreted By:  Nagi Montgomery,   STUDY:  CT BRAIN ATTACK HEAD WO IV CONTRAST;  1/28/2025 7:03 am      INDICATION:  Signs/Symptoms:left face numbness, dizziness.          COMPARISON:  12/04/2024      ACCESSION NUMBER(S):  [id number]      ORDERING CLINICIAN:  MONICO BARGER      TECHNIQUE:  Unenhanced images were obtained through the brain.      FINDINGS:  There is atrophy resulting in prominence of the ventricles and sulci.  There are areas of decreased attenuation within the white matter  which are nonspecific but are commonly associated with small vessel  ischemic disease. there is no mass effect or midline shift. No acute  intracranial hemorrhage is identified. No extra-axial fluid  collections are seen. No intraparenchymal mass lesions are  identified.  Bone windows demonstrate no evidence of an acute  calvarial fracture. Mild mucosal thickening in the paranasal sinuses.      Impression:     Nagi Montgomery discussed the significance and urgency of this critical  finding via epic secure chat. With  MONICO BARGER on 1/28/2025 at 7:13  am.  (**-RCF-**) Findings:  See findings.          MACRO:  None.      Signed by: Nagi Montgomery 1/28/2025 7:14 AM  Dictation workstation:   [id number]      Physical Exam  General: Patient is alert.  No acute distress.  HEENT: Clear sclera.  CVS: RRR.  Abdomen: Soft.  Nontender.  Bowel sounds present.  Extremities: Left BKA.  Right ankle with no pitting edema.  Psychiatric: Cooperative.  Neurologic: NIH stroke score is 1 (1 point for decree sensation left face, left arm, left leg.  Patient has decreased vision but is legally blind.  Patient has left BKA and unable to test heel-to-shin bilateral lower extremities.)    Relevant Results  Results for orders placed or performed during the hospital encounter of 01/28/25 (from the past  24 hours)   Lipid Panel   Result Value Ref Range    Cholesterol 71 0 - 199 mg/dL    HDL-Cholesterol 23.9 mg/dL    Cholesterol/HDL Ratio 3.0     LDL Calculated 32 <=99 mg/dL    VLDL 16 0 - 40 mg/dL    Triglycerides 78 0 - 149 mg/dL    Non HDL Cholesterol 47 0 - 149 mg/dL   CBC   Result Value Ref Range    WBC 6.2 4.4 - 11.3 x10*3/uL    nRBC 0.0 0.0 - 0.0 /100 WBCs    RBC 2.87 (L) 4.50 - 5.90 x10*6/uL    Hemoglobin 8.6 (L) 13.5 - 17.5 g/dL    Hematocrit 26.3 (L) 41.0 - 52.0 %    MCV 92 80 - 100 fL    MCH 30.0 26.0 - 34.0 pg    MCHC 32.7 32.0 - 36.0 g/dL    RDW 16.8 (H) 11.5 - 14.5 %    Platelets 124 (L) 150 - 450 x10*3/uL   Renal Function Panel   Result Value Ref Range    Glucose 89 74 - 99 mg/dL    Sodium 136 136 - 145 mmol/L    Potassium 4.0 3.5 - 5.3 mmol/L    Chloride 104 98 - 107 mmol/L    Bicarbonate 25 21 - 32 mmol/L    Anion Gap 11 10 - 20 mmol/L    Urea Nitrogen 22 6 - 23 mg/dL    Creatinine 1.32 (H) 0.50 - 1.30 mg/dL    eGFR 55 (L) >60 mL/min/1.73m*2    Calcium 8.3 (L) 8.6 - 10.3 mg/dL    Phosphorus 3.3 2.5 - 4.9 mg/dL    Albumin 3.3 (L) 3.4 - 5.0 g/dL   Magnesium   Result Value Ref Range    Magnesium 1.95 1.60 - 2.40 mg/dL         Assessment/Plan   Transient ischemic attack  NSTEMI  CAD status post recent stent placement at [facility] [place]  Moderate to severe left carotid stenosis  Chronic systolic CHF with EF of 45% on echo on 1/20/2025   Hypertension, diabetes mellitus, CKD stage III, hypothyroidism, BPH, GERD    Plan:  MRI of the brain was negative for acute CVA  MRI of the brain was negative.  MRA of the neck showed left carotid stenosis  Consult vascular surgery  The patient continues to have chest pain, he has recent coronary stent placed.  He has elevated troponin.  I will start weight-based Lovenox twice daily.  Will consult cardiology.  Continue Plavix and aspirin  DVT prophylaxis    Vinh Bernal MD

## 2025-01-29 NOTE — CONSULTS
Reason for Consult   Moderate to severe left ICA stenosis, moderate right ICA stenosis    History Of Present Illness    This is an 79-year-old male with history of CAD status post CABG in the 90s, and recurrent episodes of chest pain and nausea requiring recent admissions who underwent cardiac cath on 1/20/2025 with intervention.  He presented to the emergency department yesterday evening for further evaluation of left facial numbness, left eye ptosis, left shoulder to elbow pain and numbness of the hand, as well as difficulty swallowing and chest heaviness.  Our service was consulted for concern of moderate to severe left ICA stenosis and moderate right ICA stenosis noted on MRA.  The MRI of the brain was negative for infarct or hemorrhage.  Patient had a carotid ultrasound on 12/6/2024 which noted less than 50% right ICA stenosis and 50 to 69% left ICA stenosis, left proximal ICA velocity 202/65 with a ratio 4.1.      On exam he has slightly asymmetric smile (however he is edentulous), left eye ptosis, left shoulder to elbow pain, left hand weakness,  strength 4 out of 5.  He endorsed 1 episode of left temporal to frontal headache yesterday, has since resolved.  Tells me he had complete numbness of his left hand last evening in the ED but this too has resolved.  Has difficulty with his site but denies amaurosis fugax.  He is right-handed.  He lives at home alone.  He had some slight confusion on exam, this is his baseline per his daughter.  He is also been having trouble swallowing and has had decreased appetite.  He has a history of left BKA and ambulates very short distances with a prosthetic.  Tells me he primarily uses his wheelchair at home.    Troponins on admission 460     Past Medical History  Past Medical History:   Diagnosis Date    Old myocardial infarction 02/01/2017    Past heart attack         Surgical History  Past Surgical History:   Procedure Laterality Date    CARDIAC CATHETERIZATION N/A  12/6/2024    Procedure: Left Heart Cath;  Surgeon: Chaim Soriano MD;  Location: [facility] Cardiac Cath Lab;  Service: Cardiovascular;  Laterality: N/A;    CARDIAC CATHETERIZATION N/A 1/20/2025    Procedure: PCI SOFI Stent- Coronary;  Surgeon: Oscar Weston MD;  Location: [place] [address] Cardiac Cath Lab;  Service: Cardiovascular;  Laterality: N/A;    CORONARY ARTERY BYPASS GRAFT  04/14/2015    Coronary Artery Surgery    MR HEAD ANGIO WO IV CONTRAST  2/23/2022    MR HEAD ANGIO WO IV CONTRAST [facility]          Social History  Social History     Socioeconomic History    Marital status:      Spouse name: Not on file    Number of children: Not on file    Years of education: Not on file    Highest education level: Not on file   Occupational History    Not on file   Tobacco Use    Smoking status: Former     Types: Cigarettes    Smokeless tobacco: Never   Vaping Use    Vaping status: Never Used   Substance and Sexual Activity    Alcohol use: Not on file    Drug use: Not on file    Sexual activity: Not on file   Other Topics Concern    Not on file   Social History Narrative    Not on file     Social Drivers of Health     Financial Resource Strain: Low Risk  (1/28/2025)    Overall Financial Resource Strain (CARDIA)     Difficulty of Paying Living Expenses: Not hard at all   Food Insecurity: No Food Insecurity (1/28/2025)    Hunger Vital Sign     Worried About Running Out of Food in the Last Year: Never true     Ran Out of Food in the Last Year: Never true   Transportation Needs: No Transportation Needs (1/28/2025)    PRAPARE - Transportation     Lack of Transportation (Medical): No     Lack of Transportation (Non-Medical): No   Physical Activity: Inactive (1/28/2025)    Exercise Vital Sign     Days of Exercise per Week: 0 days     Minutes of Exercise per Session: 0 min   Stress: Stress Concern Present (1/28/2025)    [employer] [place] [employer] - Occupational Stress Questionnaire     Feeling of  Stress : To some extent   Social Connections: Socially Isolated (1/28/2025)    Social Connection and Isolation Panel [NHANES]     Frequency of Communication with Friends and Family: More than three times a week     Frequency of Social Gatherings with Friends and Family: More than three times a week     Attends Jehovah's witness Services: Never     Active Member of Clubs or Organizations: No     Attends Club or Organization Meetings: Never     Marital Status:    Intimate Partner Violence: Not At Risk (1/28/2025)    Humiliation, Afraid, Rape, and Kick questionnaire     Fear of Current or Ex-Partner: No     Emotionally Abused: No     Physically Abused: No     Sexually Abused: No   Housing Stability: Low Risk  (1/28/2025)    Housing Stability Vital Sign     Unable to Pay for Housing in the Last Year: No     Number of Times Moved in the Last Year: 0     Homeless in the Last Year: No         Family History  Family History   Problem Relation Name Age of Onset    Diabetes Mother      Other (Cerebrovascular Accident) Father            Allergies  No Known Allergies      Relevant Results  Results for orders placed or performed during the hospital encounter of 01/28/25 (from the past 24 hours)   Lipid Panel   Result Value Ref Range    Cholesterol 71 0 - 199 mg/dL    HDL-Cholesterol 23.9 mg/dL    Cholesterol/HDL Ratio 3.0     LDL Calculated 32 <=99 mg/dL    VLDL 16 0 - 40 mg/dL    Triglycerides 78 0 - 149 mg/dL    Non HDL Cholesterol 47 0 - 149 mg/dL   CBC   Result Value Ref Range    WBC 6.2 4.4 - 11.3 x10*3/uL    nRBC 0.0 0.0 - 0.0 /100 WBCs    RBC 2.87 (L) 4.50 - 5.90 x10*6/uL    Hemoglobin 8.6 (L) 13.5 - 17.5 g/dL    Hematocrit 26.3 (L) 41.0 - 52.0 %    MCV 92 80 - 100 fL    MCH 30.0 26.0 - 34.0 pg    MCHC 32.7 32.0 - 36.0 g/dL    RDW 16.8 (H) 11.5 - 14.5 %    Platelets 124 (L) 150 - 450 x10*3/uL   Renal Function Panel   Result Value Ref Range    Glucose 89 74 - 99 mg/dL    Sodium 136 136 - 145 mmol/L    Potassium 4.0 3.5 -  5.3 mmol/L    Chloride 104 98 - 107 mmol/L    Bicarbonate 25 21 - 32 mmol/L    Anion Gap 11 10 - 20 mmol/L    Urea Nitrogen 22 6 - 23 mg/dL    Creatinine 1.32 (H) 0.50 - 1.30 mg/dL    eGFR 55 (L) >60 mL/min/1.73m*2    Calcium 8.3 (L) 8.6 - 10.3 mg/dL    Phosphorus 3.3 2.5 - 4.9 mg/dL    Albumin 3.3 (L) 3.4 - 5.0 g/dL   Magnesium   Result Value Ref Range    Magnesium 1.95 1.60 - 2.40 mg/dL     MR angio head wo IV contrast    Result Date: 1/28/2025  Interpreted By:  Vasile Valente, STUDY: MR BRAIN WO IV CONTRAST; MR ANGIO HEAD WO IV CONTRAST; MR ANGIO NECK WO IV CONTRAST;  1/28/2025 8:00 pm; 1/28/2025 7:59 pm   INDICATION: Signs/Symptoms:possible cva vs tia.     COMPARISON: MRI of the brain dated 02/23/2022; noncontrast CT head dated 01/20/2025;   ACCESSION NUMBER(S): [id number]; [id number]; [id number]   ORDERING CLINICIAN: LISA WASSERMAN   TECHNIQUE: Axial T2, FLAIR, DWI, gradient echo T2 and  sagittal and coronal T1 weighted images of brain were acquired.   Time-of-flight MRA of the head  and neck was performed. The images were reviewed as source images and maximum intensity projections.   FINDINGS: Brain:   No diffusion restriction abnormality is present to suggest a recent infarct.   There is no imaging evidence of recent intracranial hemorrhage. No mass effect or midline shift is identified.   Mild brain parenchymal volume loss is present without abnormal ventricular dilatation. Basal cisterns are patent. No extra-axial fluid collections are identified.   Scattered patchy foci of hyperintense FLAIR and T2 signal are present in the periventricular and subcortical white matter of bilateral cerebral hemispheres are nonspecific, although favored to represent sequela of microvascular disease.   No abnormal fluid signal is present in the mastoid air cells on the left. Trace fluid is present in the mastoid air cells and in the right inferiorly.   Mild submucosal thickening is present in the inferior maxillary  sinuses bilaterally. Patient is status post cataract extraction.   MRA of head:   Intracranial carotid arteries demonstrate symmetric flow enhancement bilaterally without evidence of occlusion or high-grade stenosis.   Visualized proximal anterior cerebral arteries are symmetric in appearance without evidence of occlusion.   Middle cerebral arteries demonstrate symmetric flow enhancement without evidence of high-grade stenosis in the proximal M1 segments or focal vessel occlusion in the more distal visualized M2 and M3 segments.   Intracranial vertebral arteries demonstrate anatomic variant dominant left vertebral artery without evidence of occlusion or high-grade stenosis bilaterally.   There is suspected mild-to-moderate stenosis in the proximal P1 segment of the left posterior cerebral artery, with preserved flow enhancement of the vessel in the more distal P2 and P3 segment. No occlusion or stenosis is identified in the right.   MRA of neck:   Visualized common carotid arteries demonstrate symmetric flow enhancement without evidence of occlusion or stenosis.   Moderate to severe stenosis is suspected in the proximal extracranial left internal carotid artery due to calcified plaque with upwards of moderate stenosis suspected on the right with preserved flow enhancement in the vessels distally bilaterally.   Extracranial vertebral arteries demonstrate symmetric flow enhancement bilaterally, without evidence of occlusion or high-grade stenosis. Left vertebral artery is tortuous in course proximally.       MRI Brain: 1.  No evidence of diffusion restriction abnormality to suggest a recent infarct. 2. Low volume of patchy hyperintense FLAIR and T2 foci present in the periventricular and subcortical white matter of bilateral cerebral hemispheres are nonspecific, but favored to represent sequela of microvascular disease.   MRA: 1.  No large vessel occlusion is identified in the proximal intracranial circulation. 2.  Moderate to severe stenosis is suspected in the proximal extracranial left internal carotid artery due to calcified plaque with preserved flow enhancement of the vessel more distally. Upwards of moderate stenosis is suspected in the proximal extracranial right internal carotid artery.   MACRO: None   Signed by: Vasile Valente 1/28/2025 9:47 PM Dictation workstation:   [id number]    MR angio neck wo IV contrast    Result Date: 1/28/2025  Interpreted By:  Vasile Valente, STUDY: MR BRAIN WO IV CONTRAST; MR ANGIO HEAD WO IV CONTRAST; MR ANGIO NECK WO IV CONTRAST;  1/28/2025 8:00 pm; 1/28/2025 7:59 pm   INDICATION: Signs/Symptoms:possible cva vs tia.     COMPARISON: MRI of the brain dated 02/23/2022; noncontrast CT head dated 01/20/2025;   ACCESSION NUMBER(S): [id number]; [id number]; [id number]   ORDERING CLINICIAN: LISA WASSERMAN   TECHNIQUE: Axial T2, FLAIR, DWI, gradient echo T2 and  sagittal and coronal T1 weighted images of brain were acquired.   Time-of-flight MRA of the head  and neck was performed. The images were reviewed as source images and maximum intensity projections.   FINDINGS: Brain:   No diffusion restriction abnormality is present to suggest a recent infarct.   There is no imaging evidence of recent intracranial hemorrhage. No mass effect or midline shift is identified.   Mild brain parenchymal volume loss is present without abnormal ventricular dilatation. Basal cisterns are patent. No extra-axial fluid collections are identified.   Scattered patchy foci of hyperintense FLAIR and T2 signal are present in the periventricular and subcortical white matter of bilateral cerebral hemispheres are nonspecific, although favored to represent sequela of microvascular disease.   No abnormal fluid signal is present in the mastoid air cells on the left. Trace fluid is present in the mastoid air cells and in the right inferiorly.   Mild submucosal thickening is present in the inferior maxillary  Moderate to severe stenosis is suspected in the proximal extracranial left internal carotid artery due to calcified plaque with preserved flow enhancement of the vessel more distally. Upwards of moderate stenosis is suspected in the proximal extracranial right internal carotid artery.   MACRO: None   Signed by: Vasile Valente 1/28/2025 9:47 PM Dictation workstation:   [id number]    MR brain wo IV contrast    Result Date: 1/28/2025  Interpreted By:  Vasile Valente, STUDY: MR BRAIN WO IV CONTRAST; MR ANGIO HEAD WO IV CONTRAST; MR ANGIO NECK WO IV CONTRAST;  1/28/2025 8:00 pm; 1/28/2025 7:59 pm   INDICATION: Signs/Symptoms:possible cva vs tia.     COMPARISON: MRI of the brain dated 02/23/2022; noncontrast CT head dated 01/20/2025;   ACCESSION NUMBER(S): [id number]; [id number]; [id number]   ORDERING CLINICIAN: LISA WASSERMAN   TECHNIQUE: Axial T2, FLAIR, DWI, gradient echo T2 and  sagittal and coronal T1 weighted images of brain were acquired.   Time-of-flight MRA of the head  and neck was performed. The images were reviewed as source images and maximum intensity projections.   FINDINGS: Brain:   No diffusion restriction abnormality is present to suggest a recent infarct.   There is no imaging evidence of recent intracranial hemorrhage. No mass effect or midline shift is identified.   Mild brain parenchymal volume loss is present without abnormal ventricular dilatation. Basal cisterns are patent. No extra-axial fluid collections are identified.   Scattered patchy foci of hyperintense FLAIR and T2 signal are present in the periventricular and subcortical white matter of bilateral cerebral hemispheres are nonspecific, although favored to represent sequela of microvascular disease.   No abnormal fluid signal is present in the mastoid air cells on the left. Trace fluid is present in the mastoid air cells and in the right inferiorly.   Mild submucosal thickening is present in the inferior maxillary sinuses  bilaterally. Patient is status post cataract extraction.   MRA of head:   Intracranial carotid arteries demonstrate symmetric flow enhancement bilaterally without evidence of occlusion or high-grade stenosis.   Visualized proximal anterior cerebral arteries are symmetric in appearance without evidence of occlusion.   Middle cerebral arteries demonstrate symmetric flow enhancement without evidence of high-grade stenosis in the proximal M1 segments or focal vessel occlusion in the more distal visualized M2 and M3 segments.   Intracranial vertebral arteries demonstrate anatomic variant dominant left vertebral artery without evidence of occlusion or high-grade stenosis bilaterally.   There is suspected mild-to-moderate stenosis in the proximal P1 segment of the left posterior cerebral artery, with preserved flow enhancement of the vessel in the more distal P2 and P3 segment. No occlusion or stenosis is identified in the right.   MRA of neck:   Visualized common carotid arteries demonstrate symmetric flow enhancement without evidence of occlusion or stenosis.   Moderate to severe stenosis is suspected in the proximal extracranial left internal carotid artery due to calcified plaque with upwards of moderate stenosis suspected on the right with preserved flow enhancement in the vessels distally bilaterally.   Extracranial vertebral arteries demonstrate symmetric flow enhancement bilaterally, without evidence of occlusion or high-grade stenosis. Left vertebral artery is tortuous in course proximally.       MRI Brain: 1.  No evidence of diffusion restriction abnormality to suggest a recent infarct. 2. Low volume of patchy hyperintense FLAIR and T2 foci present in the periventricular and subcortical white matter of bilateral cerebral hemispheres are nonspecific, but favored to represent sequela of microvascular disease.   MRA: 1.  No large vessel occlusion is identified in the proximal intracranial circulation. 2. Moderate to  severe stenosis is suspected in the proximal extracranial left internal carotid artery due to calcified plaque with preserved flow enhancement of the vessel more distally. Upwards of moderate stenosis is suspected in the proximal extracranial right internal carotid artery.   MACRO: None   Signed by: Vasile Valente 1/28/2025 9:47 PM Dictation workstation:   [id number]    ECG 12 lead    Result Date: 1/28/2025  Sinus rhythm with 1st degree AV block Left bundle branch block Abnormal ECG When compared with ECG of 22-JAN-2025 14:14, (unconfirmed) Wide QRS rhythm has replaced Sinus rhythm Confirmed by Jonathan Tristan ([id number]) on 1/28/2025 11:23:11 AM    XR chest 1 view    Result Date: 1/28/2025  Interpreted By:  Vivian Melchor, STUDY: XR CHEST 1 VIEW 1/28/2025 7:28 am   INDICATION: Signs/Symptoms:weakness and brain attack   COMPARISON: 01/14/2025   ACCESSION NUMBER(S): [id number]   ORDERING CLINICIAN: MONICO BARGER   TECHNIQUE: AP semi-erect view of the chest   FINDINGS: There is postoperative change from median sternotomy and coronary revascularization procedure with the cardiac size at the upper limits of normal to slightly enlarged. There is stable left apical pleural thickening. No pleural effusion on either side is present. Cephalization of the pulmonary vascularity is noted without interstitial or alveolar edema. There is no new infiltrate or airspace consolidation.       Status post CABG with stable left apical pleural thickening.   Cephalization of the pulmonary vasculature suggesting mild pulmonary venous hypertension.   Signed by: Vivian Melchor 1/28/2025 9:05 AM Dictation workstation:   [id number]    CT brain attack head wo IV contrast    Result Date: 1/28/2025  Interpreted By:  Nagi Montgomery, STUDY: CT BRAIN ATTACK HEAD WO IV CONTRAST;  1/28/2025 7:03 am   INDICATION: Signs/Symptoms:left face numbness, dizziness.     COMPARISON: 12/04/2024   ACCESSION NUMBER(S): [id number]   ORDERING CLINICIAN: MONICO BARGER    TECHNIQUE: Unenhanced images were obtained through the brain.   FINDINGS: There is atrophy resulting in prominence of the ventricles and sulci. There are areas of decreased attenuation within the white matter which are nonspecific but are commonly associated with small vessel ischemic disease. there is no mass effect or midline shift. No acute intracranial hemorrhage is identified. No extra-axial fluid collections are seen. No intraparenchymal mass lesions are identified.  Bone windows demonstrate no evidence of an acute calvarial fracture. Mild mucosal thickening in the paranasal sinuses.         Nagi Montgomery discussed the significance and urgency of this critical finding via epic secure chat. With  MONICO BARGER on 1/28/2025 at 7:13 am.  (**-RCF-**) Findings:  See findings.     MACRO: None.   Signed by: Nagi Montgomery 1/28/2025 7:14 AM Dictation workstation:   [id number]       Physical exam  Constitutional:  Alert and oriented to person, place, date/time in no acute distress.  HEENT:  Atraumatic, normocephalic. PERRL. EOMI.  Nares patent.  Mucous membranes moist.  Slight asymmetric smile, edentulous.  Tongue midline.  Left eye ptosis  Neck:  Trachea midline.  Respiratory:  Clear to auscultation.  Cardiac:  Regular rate and rhythm.    Cardiovascular:  No edema of the extremities.    Abdominal:  Soft, nontender, nondistended, bowel sounds present.  Musculoskeletal:  Moves extremities freely.  Left BKA.   strength 4 out of 5 on the left.  Dermatological: Clean and dry   Neurological: Alert and oriented to person, place, date/time with slight confusion which is baseline  Psych:  Calm, cooperative    Assessment and Plan  Carotid stenosis  Recurrent NSTEMI  Strokelike symptoms      79-year-old male with history of recurrent chest pain/NSTEMI's who presented to the emergency department with left-sided weakness, slight left facial droop, left eye ptosis, left arm pain and left hand numbness.      MRA noting moderate to  severe left ICA stenosis and moderate right ICA stenosis.  MRI of the brain was negative for infarct or hemorrhage.  Patient had a carotid ultrasound on 12/6/2024 which noted less than 50% right ICA stenosis and 50 to 69% left ICA stenosis, left proximal ICA velocity 202/65 with a ratio 4.1.     Patient discussed in detail with Dr. Lopez who also reviewed imaging. Typically patient would present with right carotid stenosis having left sided symptoms.  Unsure if symptoms are related to his carotid stenosis but will continue further workup.    - Repeat carotid duplex to be reviewed  - CT angio head and neck ordered  - On aspirin and Plavix  - On statin

## 2025-01-30 NOTE — SIGNIFICANT EVENT
Attempted to see patient twice and pt off unit for testing. Will see patient tomorrow. MRI of the brain negative for a stroke. Unclear why patient having lt facial and hand numbness.

## 2025-01-30 NOTE — ASSESSMENT & PLAN NOTE
79-year-old patient with a known history of CAD, recent PCI.  Now with a left-sided facial numbness.  Status post left BKA.  No chest pain or tightness ongoing workup for TIA versus CVA.    Elevated troponin: History of recently NSTEMI with a PCI of RCA.  No active chest pain tightness.  Continue GDMT for NSTEMI including nitroglycerin, aspirin, statin, ACE Imdur, beta-blocker as well as Plavix.    CAD: Continue current Ranexa 1000 mg p.o. twice daily.  Also currently on a Imdur 60 mg tablet p.o. daily.    Hyperlipidemia: Continue current Lipitor 80 mg tablet p.o. daily.    DVT, aspiration fall precaution.  Will follow as needed.  Stable from cardiac wise.  Okay to discharge and outpatient follow-up with the primary cardiologist.    1/30: Patient stable cardiac wise continue current aspirin, Plavix, Lipitor, Imdur with the beta-blocker.  Stable cardiac wise to be discharged.  Continue Ranexa if tolerated.  Outpatient office follow-up.

## 2025-01-30 NOTE — PROGRESS NOTES
Chavez Llamas is a 79 y.o. male on day 2 of admission presenting with Cerebrovascular accident (CVA), unspecified mechanism (Multi).      Subjective   The patient reports chest pain   He has a recent coronary stent placed at [facility]     Objective     Last Recorded Vitals  /60 (BP Location: Left arm, Patient Position: Lying)   Pulse 75   Temp 37.1 °C (98.8 °F) (Temporal)   Resp 18   Wt 54.7 kg (120 lb 9.5 oz)   SpO2 99%   Intake/Output last 3 Shifts:    Intake/Output Summary (Last 24 hours) at 1/30/2025 0810  Last data filed at 1/29/2025 2155  Gross per 24 hour   Intake 120 ml   Output --   Net 120 ml       Admission Weight  Weight: 66.2 kg (146 lb) (01/28/25 0943)    Daily Weight  01/30/25 : 54.7 kg (120 lb 9.5 oz)      Physical Exam  General: Patient is alert.  No acute distress.  HEENT: Clear sclera.  CVS: RRR.  Abdomen: Soft.  Nontender.  Bowel sounds present.  Extremities: Left BKA.  Right ankle with no pitting edema.  Psychiatric: Cooperative.  Neurologic: NIH stroke score is 1 (1 point for decree sensation left face, left arm, left leg.  Patient has decreased vision but is legally blind.  Patient has left BKA and unable to test heel-to-shin bilateral lower extremities.)    Relevant Results  Results for orders placed or performed during the hospital encounter of 01/28/25 (from the past 24 hours)   POCT GLUCOSE   Result Value Ref Range    POCT Glucose 201 (H) 74 - 99 mg/dL         Assessment/Plan   Transient ischemic attack  NSTEMI  CAD status post recent stent placement at [facility]  Moderate to severe left carotid stenosis  Chronic systolic CHF with EF of 45% on echo on 1/20/2025   Hypertension, diabetes mellitus, CKD stage III, hypothyroidism, BPH, GERD    Plan:  MRI of the brain was negative for acute CVA  MRA of the neck showed left carotid stenosis. Consult vascular surgery  The patient continues to have chest pain, he has recent coronary stent placed.  Reviewed cardiology recommendations.  Continue  medical management including Plavix aspirin Ranexa, Imdur, statin, beta-blocker, and ACE inhibitor  DVT prophylaxis  The patient is adamantly refusing PT OT, and will agree to short-term SNF    Vinh Bernal MD

## 2025-01-30 NOTE — CARE PLAN
Problem: General Stroke  Goal: Establish a mutual long term goal with patient by discharge  Outcome: Progressing  Goal: Demonstrate improvement in neurological exam throughout the shift  Outcome: Progressing  Goal: Maintain BP within ordered limits throughout shift  Outcome: Progressing  Goal: Participate in treatment (ie., meds, therapy) throughout shift  Outcome: Progressing  Goal: No symptoms of aspiration throughout shift  Outcome: Progressing  Goal: No symptoms of hemorrhage throughout shift  Outcome: Progressing  Goal: Tolerate enteral feeding throughout shift  Outcome: Progressing  Goal: Decreased nausea/vomiting throughout shift  Outcome: Progressing  Goal: Controlled blood glucose throughout shift  Outcome: Progressing  Goal: Out of bed three times today  Outcome: Progressing     Problem: Pain - Adult  Goal: Verbalizes/displays adequate comfort level or baseline comfort level  Outcome: Progressing     Problem: Safety - Adult  Goal: Free from fall injury  Outcome: Progressing     Problem: Discharge Planning  Goal: Discharge to home or other facility with appropriate resources  Outcome: Progressing     Problem: Chronic Conditions and Co-morbidities  Goal: Patient's chronic conditions and co-morbidity symptoms are monitored and maintained or improved  Outcome: Progressing     Problem: Nutrition  Goal: Nutrient intake appropriate for maintaining nutritional needs  Outcome: Progressing     Problem: Diabetes  Goal: Achieve decreasing blood glucose levels by end of shift  Outcome: Progressing  Goal: Increase stability of blood glucose readings by end of shift  Outcome: Progressing  Goal: Decrease in ketones present in urine by end of shift  Outcome: Progressing  Goal: Maintain electrolyte levels within acceptable range throughout shift  Outcome: Progressing  Goal: Maintain glucose levels >70mg/dl to <250mg/dl throughout shift  Outcome: Progressing  Goal: No changes in neurological exam by end of shift  Outcome:  Progressing  Goal: Learn about and adhere to nutrition recommendations by end of shift  Outcome: Progressing  Goal: Vital signs within normal range for age by end of shift  Outcome: Progressing  Goal: Increase self care and/or family involovement by end of shift  Outcome: Progressing  Goal: Receive DSME education by end of shift  Outcome: Progressing   The patient's goals for the shift include      The clinical goals for the shift include no falls this shift.    Over the shift, the patient did not make progress toward the following goals. Barriers to progression include weakness and amputation. Recommendations to address these barriers include bed alarm, PT/OT on consult to see patient.

## 2025-01-30 NOTE — PROGRESS NOTES
Occupational Therapy                 Therapy Communication Note    Patient Name: Chavez Llamas  MRN: [id number]  Department: [facility]  Room: 05/05-A  Today's Date: 1/30/2025     Discipline: Occupational Therapy    OT Missed Visit: Yes     Missed Visit Reason: Missed Visit Reason: Patient refused (Pt politely declined therapy evaluation this date. Nursing aware.)    Missed Time: Cancel    Comment:

## 2025-01-30 NOTE — PROGRESS NOTES
"Chavez Llamas is a 79 y.o. male on day 2 of admission presenting with Cerebrovascular accident (CVA), unspecified mechanism (Multi).    Subjective   Patient seen and examined.  He is a somewhat difficult historian but continues to note that the left side of his face \"feels different\" than the right side of his face.  He also states that his left arm \"feels different\" than his right arm.  Had a CT angiogram which showed approximately 65% stenosis of the left ICA and less than 50% stenosis of the right ICA.  Also, had vertebral artery stenosis on the right noted.  Duplex showed vertebral artery with antegrade flow but did not note any stenosis.       Objective     Physical Exam    Constitutional:  Alert and oriented to person, place, date/time in no acute distress.  HEENT:  Atraumatic, normocephalic.  Mucous membranes moist.  Pt edentulous.  Tongue midline.  Left eye lid ptosis  Neck:  Trachea midline.  Do not notice asymmetric smile today  Respiratory:  Clear to auscultation.  Cardiac:  Regular rate and rhythm.    Cardiovascular:  No edema of the extremities.    Abdominal:  Soft, nontender, nondistended, bowel sounds present.  Musculoskeletal:  Moves extremities freely.  Left BKA.   strength 4 out of 5 on the left.  5 out of 5 on the right.  Dermatological: Clean and dry   Neurological: Alert and oriented to person, place, date/time with slight confusion which is baseline  Psych:  Calm, cooperative       Last Recorded Vitals  Blood pressure 94/56, pulse 90, temperature 36.5 °C (97.7 °F), temperature source Temporal, resp. rate 16, height 1.727 m (5' 8\"), weight 54.7 kg (120 lb 9.5 oz), SpO2 100%.  Intake/Output last 3 Shifts:  I/O last 3 completed shifts:  In: 120 (2.2 mL/kg) [P.O.:120]  Out: - (0 mL/kg)   Weight: 54.7 kg     Relevant Results  Electrocardiogram, 12-lead PRN ACS symptoms    Result Date: 1/29/2025   Poor data quality, interpretation may be adversely affected Sinus rhythm with 1st degree AV " block Left bundle branch block Abnormal ECG Confirmed by Steven Terrell ([id number]) on 1/29/2025 5:43:54 PM    Vascular US Carotid Artery Duplex Bilateral    Result Date: 1/29/2025           [place] [id number]            Phone [phone]  Vascular Lab Report  [facility] US CAROTID ARTERY DUPLEX BILATERAL Patient Name:      ORLANDO HYDE JULIEN    Reading Physician:  [id number] Jamia Ramirez MD Study Date:        1/29/2025            Ordering Provider:  [id number] NITO HWANG MRN/PID:           [id number]             Fellow: Accession#:        [id number]         Technologist:       Marlon Thomas RVT Date of Birth/Age: 1945 / 79      Technologist 2:                    years Gender:            M                    Encounter#:         [id number] Admission Status:  Inpatient            Location Performed: [facility]  Diagnosis/ICD: Occlusion and stenosis of bilateral carotid arteries-I65.23 CPT Codes:     65213 Cerebrovascular Carotid Duplex scan complete  CONCLUSIONS: Right Carotid: Findings are consistent with less than 50% stenosis of the right proximal internal carotid artery. Right external carotid artery appears patent with no evidence of stenosis. The right vertebral artery is patent with antegrade flow. No evidence of hemodynamically significant stenosis in the right subclavian artery. Left Carotid: Findings are consistent with 50 to 69% stenosis of the left proximal internal carotid artery. Left external carotid artery appears patent with no evidence of stenosis. The left vertebral artery is patent with antegrade flow. No evidence of hemodynamically significant stenosis in the left subclavian artery. Additional Findings: Technically difficult due to patient head movement and talking.  Imaging & Doppler Findings: Right Plaque Morph: The proximal right  internal carotid artery demonstrates heterogenous and calcified plaque. Left Plaque Morph: The proximal left internal carotid artery demonstrates heterogenous and calcified plaque.   Right                        Left   PSV      EDV                PSV      EDV 56 cm/s            CCA P    51 cm/s 49 cm/s            CCA D    45 cm/s 102 cm/s 32 cm/s   ICA P    187 cm/s 61 cm/s 90 cm/s  22 cm/s   ICA M    94 cm/s  37 cm/s 71 cm/s  21 cm/s   ICA D    70 cm/s  26 cm/s 34 cm/s  14 cm/s Vertebral  87 cm/s  21 cm/s 81 cm/s          Subclavian 96 cm/s                Right Left ICA/CCA Ratio  2.1  4.1   [id number] Jamia Ramirez MD Electronically signed by [id number] Jamia Ramirez MD on 1/29/2025 at 2:55:10 PM  ** Final **     CT angio head and neck w and wo IV contrast    Result Date: 1/29/2025  Interpreted By:  Nick Arellano, STUDY: CT ANGIO HEAD AND NECK W AND WO IV CONTRAST;  1/29/2025 1:49 pm   INDICATION: Signs/Symptoms:carotid stenosis.   COMPARISON: CT head yesterday. CT chest 01/13/2025.   ACCESSION NUMBER(S): [id number]   ORDERING CLINICIAN: NITO HWANG   TECHNIQUE: Unenhanced CT images of the head were obtained.  Subsequently,  75 ML of Omnipaque 350 was administered intravenously and axial images of the head and neck were acquired.  Coronal, sagittal, and 3-D reconstructions were provided for review.   FINDINGS:   CTA COW: No major vascular occlusion. Moderate atherosclerotic calcifications through the carotid siphons. No aneurysms.  No vascular malformations. Patent dural venous sinuses and intracranial deep venous structures.   CTA CAROTID: No aortic aneurysm or dissection. No significant stenoses at the origins of the great vessels from the aortic arch. No significant stenoses of the brachycephalic artery or bilateral subclavian arteries.   Mixed fibrofatty and calcific atherosclerotic plaque crosses the left carotid bifurcation. Narrowest luminal diameter in the proximal left internal carotid artery is 1.7 mm which is  approximately 60-65% stenosis. There is mild plaque irregularity.   Mixed but predominantly fibrofatty atherosclerotic plaque spanning of the right carotid bifurcation. Narrowest luminal diameter in the proximal right internal carotid artery is 3.1 mm which is less than 50% stenosis. There is mild plaque irregularity.   Moderately severe short-segment atherosclerotic stenosis proximal V1 segment right vertebral artery.   NONVASCULAR HEAD: No intracranial mass. No intracranial hemorrhage. No evidence of large evolving cortical infarction. Supratentorial white matter hypodensities most likely related to chronic small vessel ischemic change. Mild-to-moderate global brain parenchymal volume loss. Bones intact. Postoperative changes bilateral orbital globes.   NONVASCULAR NECK: No soft tissue mass. No adenopathy. Salivary glands are unremarkable. Thyroid gland unremarkable. Bones intact. Visualized small right-greater-than-left pleural effusions. Emphysematous changes in the upper lungs. [place] opacity left apex, similar to prior.         1. No intracranial major vascular occlusion. 2. Approximately 60-65% short-segment atherosclerotic stenosis proximal left internal carotid artery with mild plaque irregularity. 3. Less than 50% short-segment atherosclerotic stenosis proximal right internal carotid artery with mild plaque irregularity. 4. Moderate to severe short-segment atherosclerotic stenosis proximal V1 segment right vertebral artery. 5. Supratentorial white matter hypodensities most likely related to chronic small vessel ischemic change. 6. Mild-to-moderate global brain parenchymal volume loss. 7. Visualized small right-greater-than-left pleural effusions. Emphysematous changes in the upper lungs. [place] opacity left apex, similar to prior.   MACRO: None   Signed by: Nick Arellano 1/29/2025 2:39 PM Dictation workstation:   [id number]    MR angio head wo IV contrast    Result Date: 1/28/2025  Interpreted  By:  Vasile Valente, STUDY: MR BRAIN WO IV CONTRAST; MR ANGIO HEAD WO IV CONTRAST; MR ANGIO NECK WO IV CONTRAST;  1/28/2025 8:00 pm; 1/28/2025 7:59 pm   INDICATION: Signs/Symptoms:possible cva vs tia.     COMPARISON: MRI of the brain dated 02/23/2022; noncontrast CT head dated 01/20/2025;   ACCESSION NUMBER(S): [id number]; [id number]; [id number]   ORDERING CLINICIAN: LISA WASSERMAN   TECHNIQUE: Axial T2, FLAIR, DWI, gradient echo T2 and  sagittal and coronal T1 weighted images of brain were acquired.   Time-of-flight MRA of the head  and neck was performed. The images were reviewed as source images and maximum intensity projections.   FINDINGS: Brain:   No diffusion restriction abnormality is present to suggest a recent infarct.   There is no imaging evidence of recent intracranial hemorrhage. No mass effect or midline shift is identified.   Mild brain parenchymal volume loss is present without abnormal ventricular dilatation. Basal cisterns are patent. No extra-axial fluid collections are identified.   Scattered patchy foci of hyperintense FLAIR and T2 signal are present in the periventricular and subcortical white matter of bilateral cerebral hemispheres are nonspecific, although favored to represent sequela of microvascular disease.   No abnormal fluid signal is present in the mastoid air cells on the left. Trace fluid is present in the mastoid air cells and in the right inferiorly.   Mild submucosal thickening is present in the inferior maxillary sinuses bilaterally. Patient is status post cataract extraction.   MRA of head:   Intracranial carotid arteries demonstrate symmetric flow enhancement bilaterally without evidence of occlusion or high-grade stenosis.   Visualized proximal anterior cerebral arteries are symmetric in appearance without evidence of occlusion.   Middle cerebral arteries demonstrate symmetric flow enhancement without evidence of high-grade stenosis in the proximal M1 segments or  focal vessel occlusion in the more distal visualized M2 and M3 segments.   Intracranial vertebral arteries demonstrate anatomic variant dominant left vertebral artery without evidence of occlusion or high-grade stenosis bilaterally.   There is suspected mild-to-moderate stenosis in the proximal P1 segment of the left posterior cerebral artery, with preserved flow enhancement of the vessel in the more distal P2 and P3 segment. No occlusion or stenosis is identified in the right.   MRA of neck:   Visualized common carotid arteries demonstrate symmetric flow enhancement without evidence of occlusion or stenosis.   Moderate to severe stenosis is suspected in the proximal extracranial left internal carotid artery due to calcified plaque with upwards of moderate stenosis suspected on the right with preserved flow enhancement in the vessels distally bilaterally.   Extracranial vertebral arteries demonstrate symmetric flow enhancement bilaterally, without evidence of occlusion or high-grade stenosis. Left vertebral artery is tortuous in course proximally.       MRI Brain: 1.  No evidence of diffusion restriction abnormality to suggest a recent infarct. 2. Low volume of patchy hyperintense FLAIR and T2 foci present in the periventricular and subcortical white matter of bilateral cerebral hemispheres are nonspecific, but favored to represent sequela of microvascular disease.   MRA: 1.  No large vessel occlusion is identified in the proximal intracranial circulation. 2. Moderate to severe stenosis is suspected in the proximal extracranial left internal carotid artery due to calcified plaque with preserved flow enhancement of the vessel more distally. Upwards of moderate stenosis is suspected in the proximal extracranial right internal carotid artery.   MACRO: None   Signed by: Vasile Valente 1/28/2025 9:47 PM Dictation workstation:   [id number]    MR angio neck wo IV contrast    Result Date: 1/28/2025  Interpreted By:   Vasile Valente, STUDY: MR BRAIN WO IV CONTRAST; MR ANGIO HEAD WO IV CONTRAST; MR ANGIO NECK WO IV CONTRAST;  1/28/2025 8:00 pm; 1/28/2025 7:59 pm   INDICATION: Signs/Symptoms:possible cva vs tia.     COMPARISON: MRI of the brain dated 02/23/2022; noncontrast CT head dated 01/20/2025;   ACCESSION NUMBER(S): [id number]; [id number]; [id number]   ORDERING CLINICIAN: LISA WASSERMAN   TECHNIQUE: Axial T2, FLAIR, DWI, gradient echo T2 and  sagittal and coronal T1 weighted images of brain were acquired.   Time-of-flight MRA of the head  and neck was performed. The images were reviewed as source images and maximum intensity projections.   FINDINGS: Brain:   No diffusion restriction abnormality is present to suggest a recent infarct.   There is no imaging evidence of recent intracranial hemorrhage. No mass effect or midline shift is identified.   Mild brain parenchymal volume loss is present without abnormal ventricular dilatation. Basal cisterns are patent. No extra-axial fluid collections are identified.   Scattered patchy foci of hyperintense FLAIR and T2 signal are present in the periventricular and subcortical white matter of bilateral cerebral hemispheres are nonspecific, although favored to represent sequela of microvascular disease.   No abnormal fluid signal is present in the mastoid air cells on the left. Trace fluid is present in the mastoid air cells and in the right inferiorly.   Mild submucosal thickening is present in the inferior maxillary sinuses bilaterally. Patient is status post cataract extraction.   MRA of head:   Intracranial carotid arteries demonstrate symmetric flow enhancement bilaterally without evidence of occlusion or high-grade stenosis.   Visualized proximal anterior cerebral arteries are symmetric in appearance without evidence of occlusion.   Middle cerebral arteries demonstrate symmetric flow enhancement without evidence of high-grade stenosis in the proximal M1 segments or focal  vessel occlusion in the more distal visualized M2 and M3 segments.   Intracranial vertebral arteries demonstrate anatomic variant dominant left vertebral artery without evidence of occlusion or high-grade stenosis bilaterally.   There is suspected mild-to-moderate stenosis in the proximal P1 segment of the left posterior cerebral artery, with preserved flow enhancement of the vessel in the more distal P2 and P3 segment. No occlusion or stenosis is identified in the right.   MRA of neck:   Visualized common carotid arteries demonstrate symmetric flow enhancement without evidence of occlusion or stenosis.   Moderate to severe stenosis is suspected in the proximal extracranial left internal carotid artery due to calcified plaque with upwards of moderate stenosis suspected on the right with preserved flow enhancement in the vessels distally bilaterally.   Extracranial vertebral arteries demonstrate symmetric flow enhancement bilaterally, without evidence of occlusion or high-grade stenosis. Left vertebral artery is tortuous in course proximally.       MRI Brain: 1.  No evidence of diffusion restriction abnormality to suggest a recent infarct. 2. Low volume of patchy hyperintense FLAIR and T2 foci present in the periventricular and subcortical white matter of bilateral cerebral hemispheres are nonspecific, but favored to represent sequela of microvascular disease.   MRA: 1.  No large vessel occlusion is identified in the proximal intracranial circulation. 2. Moderate to severe stenosis is suspected in the proximal extracranial left internal carotid artery due to calcified plaque with preserved flow enhancement of the vessel more distally. Upwards of moderate stenosis is suspected in the proximal extracranial right internal carotid artery.   MACRO: None   Signed by: Vasile Valente 1/28/2025 9:47 PM Dictation workstation:   [id number]    MR brain wo IV contrast    Result Date: 1/28/2025  Interpreted By:   Vasile aVlente, STUDY: MR BRAIN WO IV CONTRAST; MR ANGIO HEAD WO IV CONTRAST; MR ANGIO NECK WO IV CONTRAST;  1/28/2025 8:00 pm; 1/28/2025 7:59 pm   INDICATION: Signs/Symptoms:possible cva vs tia.     COMPARISON: MRI of the brain dated 02/23/2022; noncontrast CT head dated 01/20/2025;   ACCESSION NUMBER(S): [id number]; [id number]; [id number]   ORDERING CLINICIAN: LISA WASSERMAN   TECHNIQUE: Axial T2, FLAIR, DWI, gradient echo T2 and  sagittal and coronal T1 weighted images of brain were acquired.   Time-of-flight MRA of the head  and neck was performed. The images were reviewed as source images and maximum intensity projections.   FINDINGS: Brain:   No diffusion restriction abnormality is present to suggest a recent infarct.   There is no imaging evidence of recent intracranial hemorrhage. No mass effect or midline shift is identified.   Mild brain parenchymal volume loss is present without abnormal ventricular dilatation. Basal cisterns are patent. No extra-axial fluid collections are identified.   Scattered patchy foci of hyperintense FLAIR and T2 signal are present in the periventricular and subcortical white matter of bilateral cerebral hemispheres are nonspecific, although favored to represent sequela of microvascular disease.   No abnormal fluid signal is present in the mastoid air cells on the left. Trace fluid is present in the mastoid air cells and in the right inferiorly.   Mild submucosal thickening is present in the inferior maxillary sinuses bilaterally. Patient is status post cataract extraction.   MRA of head:   Intracranial carotid arteries demonstrate symmetric flow enhancement bilaterally without evidence of occlusion or high-grade stenosis.   Visualized proximal anterior cerebral arteries are symmetric in appearance without evidence of occlusion.   Middle cerebral arteries demonstrate symmetric flow enhancement without evidence of high-grade stenosis in the proximal M1 segments or focal  vessel occlusion in the more distal visualized M2 and M3 segments.   Intracranial vertebral arteries demonstrate anatomic variant dominant left vertebral artery without evidence of occlusion or high-grade stenosis bilaterally.   There is suspected mild-to-moderate stenosis in the proximal P1 segment of the left posterior cerebral artery, with preserved flow enhancement of the vessel in the more distal P2 and P3 segment. No occlusion or stenosis is identified in the right.   MRA of neck:   Visualized common carotid arteries demonstrate symmetric flow enhancement without evidence of occlusion or stenosis.   Moderate to severe stenosis is suspected in the proximal extracranial left internal carotid artery due to calcified plaque with upwards of moderate stenosis suspected on the right with preserved flow enhancement in the vessels distally bilaterally.   Extracranial vertebral arteries demonstrate symmetric flow enhancement bilaterally, without evidence of occlusion or high-grade stenosis. Left vertebral artery is tortuous in course proximally.       MRI Brain: 1.  No evidence of diffusion restriction abnormality to suggest a recent infarct. 2. Low volume of patchy hyperintense FLAIR and T2 foci present in the periventricular and subcortical white matter of bilateral cerebral hemispheres are nonspecific, but favored to represent sequela of microvascular disease.   MRA: 1.  No large vessel occlusion is identified in the proximal intracranial circulation. 2. Moderate to severe stenosis is suspected in the proximal extracranial left internal carotid artery due to calcified plaque with preserved flow enhancement of the vessel more distally. Upwards of moderate stenosis is suspected in the proximal extracranial right internal carotid artery.   MACRO: None   Signed by: Vasile Valente 1/28/2025 9:47 PM Dictation workstation:   [id number]    ECG 12 lead    Result Date: 1/28/2025  Sinus rhythm with 1st degree AV block  Left bundle branch block Abnormal ECG When compared with ECG of 22-JAN-2025 14:14, (unconfirmed) Wide QRS rhythm has replaced Sinus rhythm Confirmed by Jonathan Tristan ([id number]) on 1/28/2025 11:23:11 AM    XR chest 1 view    Result Date: 1/28/2025  Interpreted By:  Vivian Melchor, STUDY: XR CHEST 1 VIEW 1/28/2025 7:28 am   INDICATION: Signs/Symptoms:weakness and brain attack   COMPARISON: 01/14/2025   ACCESSION NUMBER(S): [id number]   ORDERING CLINICIAN: MONICO BARGER   TECHNIQUE: AP semi-erect view of the chest   FINDINGS: There is postoperative change from median sternotomy and coronary revascularization procedure with the cardiac size at the upper limits of normal to slightly enlarged. There is stable left apical pleural thickening. No pleural effusion on either side is present. Cephalization of the pulmonary vascularity is noted without interstitial or alveolar edema. There is no new infiltrate or airspace consolidation.       Status post CABG with stable left apical pleural thickening.   Cephalization of the pulmonary vasculature suggesting mild pulmonary venous hypertension.   Signed by: Vivian Melchor 1/28/2025 9:05 AM Dictation workstation:   [id number]    CT brain attack head wo IV contrast    Result Date: 1/28/2025  Interpreted By:  Nagi Montgomery, STUDY: CT BRAIN ATTACK HEAD WO IV CONTRAST;  1/28/2025 7:03 am   INDICATION: Signs/Symptoms:left face numbness, dizziness.     COMPARISON: 12/04/2024   ACCESSION NUMBER(S): [id number]   ORDERING CLINICIAN: MONICO BARGER   TECHNIQUE: Unenhanced images were obtained through the brain.   FINDINGS: There is atrophy resulting in prominence of the ventricles and sulci. There are areas of decreased attenuation within the white matter which are nonspecific but are commonly associated with small vessel ischemic disease. there is no mass effect or midline shift. No acute intracranial hemorrhage is identified. No extra-axial fluid collections are seen. No intraparenchymal mass lesions  are identified.  Bone windows demonstrate no evidence of an acute calvarial fracture. Mild mucosal thickening in the paranasal sinuses.         Nagi Montgomery discussed the significance and urgency of this critical finding via epic secure chat. With  MONICO BARGER on 1/28/2025 at 7:13 am.  (**-RCF-**) Findings:  See findings.     MACRO: None.   Signed by: Nagi Mnotgomery 1/28/2025 7:14 AM Dictation workstation:   [id number]    Electrocardiogram 12 Lead    Result Date: 1/27/2025   Poor data quality, interpretation may be adversely affected Normal sinus rhythm Left bundle branch block Abnormal ECG When compared with ECG of 17-JAN-2025 14:06, Premature ventricular complexes are no longer Present KS interval has decreased Nonspecific T wave abnormality no longer evident in Inferior leads Confirmed by Kristofer Terrell ([id number]) on 1/27/2025 8:06:06 PM    ECG 12 lead    Result Date: 1/27/2025  Sinus rhythm with 1st degree AV block Rightward axis Left bundle branch block Abnormal ECG When compared with ECG of 21-JAN-2025 09:23, KS interval has increased Questionable change in QRS axis T wave inversion now evident in Inferior leads Confirmed by Dustin Varela ([id number]) on 1/27/2025 9:47:06 AM    Electrocardiogram, 12-lead PRN ACS symptoms    Result Date: 1/27/2025   Suspect arm lead reversal, interpretation assumes no reversal Sinus rhythm with 1st degree AV block Nonspecific intraventricular block Lateral infarct , age undetermined Cannot rule out Inferior infarct , age undetermined Abnormal ECG When compared with ECG of 21-JAN-2025 15:26, Questionable change in QRS axis Minimal criteria for Inferior infarct are now Present T wave inversion no longer evident in Inferior leads T wave inversion no longer evident in Lateral leads Confirmed by Dustin Varela ([id number]) on 1/27/2025 9:46:47 AM    Electrocardiogram, 12-lead PRN ACS symptoms    Result Date: 1/27/2025  Sinus rhythm with 1st degree AV block with occasional Premature ventricular  complexes Left bundle branch block Abnormal ECG When compared with ECG of 17-JAN-2025 09:58, Premature ventricular complexes are now Present Premature atrial complexes are no longer Present Confirmed by Fred Sher ([id number]) on 1/27/2025 9:25:16 AM    Cardiac Catheterization Procedure    Result Date: 1/23/2025   [facility], Cath Lab, [address] [place] [address] Cardiovascular Catheterization Report Patient Name:      ORLANDO VICTORIA     Performing Physician:  [id number] Weston MD Study Date:        1/20/2025             Verifying Physician:   Shashi Weston MD MRN/PID:           [id number]              Cardiologist/Co-Scrub: Accession#:        [id number]          Ordering Provider:     [id number] LISA SARAH Date of Birth/Age: 1945 / 79 years Cardiologist: Gender:            M                     Fellow:                [id number] Asif Aguirre MD Encounter#:        [id number]            Surgeon:  Study: PCI - Percutaneous Coronary Intervention  Indications: ORLANDO VICTORIA is a 79 year old male who presents with dyslipidemia and prior percutaneous coronary intervention, Angina. ORLANDO VICTORIA is a 79 year old male who presents with prior myocardial infarction, prior percutaneous coronary intervention and a chest pain assessment of typical angina Angina. Recent MI. Medical History: Stress test performed: No. CTA performed: NoPta Chiang accessed: No. LVEF Assessed: No. Cardiac arrest: No. Cardiac surgical consult: No. Cardiovascular instability: No. Frailty status of patient entering lab: 6 = Moderately frail.  Procedure Description: After infiltration with 1% Lidocaine, the right radial artery was cannulated with a  modified Seldinger technique. Subsequently a 6 Dominican sheath was placed retrograde in the right radial artery. Additional catheter(s) used to visualize the coronary arteries were: AL1 and JR4. After completion of the procedure, the arterial sheath was pulled and a TR Band Radial Compression Device was utilized to obtain patent hemostasis.  Coronary Angiography: The coronary circulation is right dominant.  Left Main Coronary Artery: The left main coronary artery not today.  Right Coronary Artery Distribution: The right coronary artery is a normal caliber vessel. The mid right coronary artery showed 99% in-stent restenosis. This lesion was heavily calcified.  Coronary Interventions: Angiography reveals a 95% stenosis of the proximal to mid and mid to distal right coronary artery coronary artery. Pre-intervention CIERRA flow was 1. Percutaneous coronary intervention was performed within the proximal to mid and mid to distal right coronary artery. The vessel was pre-dilated using a compliant balloon 2.5 mm x 12 mm at 10 VERONICA. The stent was post dilated using a non-compliant balloon 3.0 mm x 12 mm at 20 VERONICA. Additional dilatation was performed using a non-compliant balloon 2.5 mm x 20 mm at 26 VERONICA. The stenosis was successfully reduced from 95% to 40%. Post-intervention CIERRA flow was 3.  Coronary Intervention Comments: AL1 catheter was used to engage the RCA in a 7 Fr system. The catheter was not giving us enought [facility] to anterior take off of the ostium. Pro water was used to cross the severe mid ISR and was able to do so with some manipulation. 2.0X15mm sapphire balloon was used for pre dilatation at multiple inflations between 10-18A. Angiographically the balloon did not expand well. Now we swapped this for a 2.5X12 NC and were able to inflate the proximal RCA at 14-16A. The NC unfortunatley could not go past the mid RCA lesion. We introduced a guideliner for better support, but it was still  difficult to advance the equipment. Pt was also having radial spasm that we had to contend with. We eventually switched the AL for a JR4 and raj wired the lesion with a whisper Es. We introduced a wolverine 2.5X15 and inflated at multiple pressures between 14-16A but were still unable to cross the mid lesion. We now used IVL 2.5X12mm but found it difficult to advance the balloon. We  decided to remove the pro water as the IVL balloon was over the whisper Es and delivered 12 pulses between 9-18A. Angiographically the ISR looked better with some encouraging results and CIERRA 3 flow. 3.0X12 NC Euphora was used to further dilate the proximal lesion and inflated at 20A for 24 seconds, there was still difficulty passing it through the mid lesion. 2.5X20 spahirre balloon was introduced and able to cross the mid lesion. Multiple dilatations at 16-20A. Acceptable results, partial success, however, lesion not well enough prepared and therefore, we opted not to use DCB.  Coronary Lesion Summary: Vessel        Stenosis         Vessel Segment RCA    99% in-stent restenosis      [facility]  Hemo Personnel: +--------------------+---------+ Name                Duty      +--------------------+---------+ Oscar Weston MD 1 +--------------------+---------+  Hemodynamic Pressures:  +----+---------------------+----------+-------------+--------------+---------+ Site      Date Time      Phase NameSystolic mmHgDiastolic mmHgMean mmHg +----+---------------------+----------+-------------+--------------+---------+   AO1/20/2025 12:33:43 PM      Rest           78            36       48 +----+---------------------+----------+-------------+--------------+---------+   AO1/20/2025 12:38:57 PM      Rest           80            41       56 +----+---------------------+----------+-------------+--------------+---------+   AO1/20/2025 12:41:26 PM      Rest           95            46       65  +----+---------------------+----------+-------------+--------------+---------+   AO1/20/2025 12:42:35 PM      Rest          100            49       69 +----+---------------------+----------+-------------+--------------+---------+   AO1/20/2025 12:44:04 PM      Rest          101            48       69 +----+---------------------+----------+-------------+--------------+---------+   AO1/20/2025 12:51:35 PM      Rest           73            29       46 +----+---------------------+----------+-------------+--------------+---------+  Cardiac Cath Post Procedure Notes: Post Procedure Diagnosis: POBA and IVL of the severe mid ISR of RCA.                           Angiographically acceptable results. Blood Loss:               Estimated blood loss during the procedure was 10 mls. Specimens Removed:        Number of specimen(s) removed: none.  Recommendations: Maximize medical therapy. Agressive risk factor modification efforts. Lipid lowering agent or Statin therapy. ____________________________________________________________________________________ CONCLUSIONS:  1. Single vessel disease.  2. Culprit vessel(s): right coronary artery.  3. Partial success in reopening severely calcific ISR of the RCA, with IVL.  4. Complex procedure. ICD 10 Codes: Angina pectoris, unspecified-I20.9  CPT Codes: Complicated/Unusual Procedure-MOD22; Intravascular Lithotripsy-54295; Angioplasty, Right Coronary addl branch major Artery (PCI)-36692.  [id number] Oscar Weston MD Performing Physician Electronically signed by [id number] Oscar Weston MD on 1/23/2025 at 11:17:46 AM  ** Final **     Transthoracic Echo (TTE) Limited    Result Date: 1/22/2025   [facility], [address] [place] [address]                Tel [phone] and Fax [phone] TRANSTHORACIC ECHOCARDIOGRAM REPORT  Patient Name:       ORLANDO Oconnell Physician:    [id number] Steven                                                                Xander DAHL Study Date:         1/22/2025           Ordering Provider:    [id number] IMER POSADAS MRN/PID:            [id number]            Fellow: Accession#:         [id number]        Nurse: Date of Birth/Age:  1945 / 79     Sonographer:          Nick nelson RDCS Gender assigned at  M                   Additional Staff: Birth: Height:             172.72 cm           Admit Date:           1/14/2025 Weight:             67.13 kg            Admission Status:     Inpatient -                                                               Routine BSA / BMI:          1.80 m2 / 22.50     Encounter#:           [id number]                     kg/m2 Blood Pressure:     124/65 mmHg         Department Location:  [facility]                                                               Non Invasive Study Type:    TRANSTHORACIC ECHO (TTE) LIMITED Diagnosis/ICD: Chest pain, unspecified-R07.9 Indication:    CP CPT Code:      Echo Limited-93593; Color Doppler-50607; Doppler Limited-87118 Patient History: Pertinent History: CAD, HTN and Hyperlipidemia. CABG 1990s and multiple PCIs. Study Detail: The following Echo studies were performed: Doppler, color flow and               2D. Technically challenging study due to patient lying in supine               position.  PHYSICIAN INTERPRETATION: Left Ventricle: Left ventricular ejection fraction is mildly decreased, by visual estimate at 45%. The left ventricular cavity size is normal. Abnormal (paradoxical) septal motion consistent with post-operative status. Left ventricular diastolic filling was not assessed. LV Wall Scoring: The basal and mid inferior wall and basal and mid inferolateral wall are hypokinetic. Left Atrium: The left atrium is enlarged. Right Ventricle: The right ventricle is normal in size. There is mildly reduced right ventricular  systolic function. RV poorly visualized. Right Atrium: The right atrium was not well visualized. Aortic Valve: The aortic valve is probably trileaflet. There is mild to moderate aortic valve cusp calcification. There is trace to mild aortic valve regurgitation. Mitral Valve: The mitral valve is normal in structure. There is mild mitral annular calcification. There is moderate mitral valve regurgitation. Tricuspid Valve: The tricuspid valve is structurally normal. There is mild tricuspid regurgitation. Pulmonic Valve: The pulmonic valve is not well visualized. Pulmonic valve regurgitation was not assessed. Pericardium: Trivial pericardial effusion. Aorta: The aortic root is normal. Pulmonary Artery: The tricuspid regurgitant velocity is 2.70 m/s, and with an estimated right atrial pressure of 3 mmHg, the estimated pulmonary artery pressure is borderline elevated with the RVSP at 32.2 mmHg. Systemic Veins: The inferior vena cava was not well visualized. In comparison to the previous echocardiogram(s): Compared with study dated 1/22/2025,.  CONCLUSIONS:  1. Poorly visualized anatomical structures due to suboptimal image quality.  2. Left ventricular ejection fraction is mildly decreased, by visual estimate at 45%.  3. Basal and mid inferior wall and basal and mid inferolateral wall are abnormal.  4. Abnormal septal motion consistent with post-operative status.  5. There is mildly reduced right ventricular systolic function.  6. The left atrium is enlarged.  7. Moderate mitral valve regurgitation. QUANTITATIVE DATA SUMMARY:  AORTA MEASUREMENTS:         Normal Ranges: Ao Sinus, d:        3.40 cm (2.1-3.5cm)  LV SYSTOLIC FUNCTION BY 2D PLANIMETRY (MOD):                      Normal Ranges: EF-Visual:      45 % LV EF Reported: 45 %  TRICUSPID VALVE/RVSP:          Normal Ranges: Peak TR Velocity:     2.70 m/s RV Syst Pressure:     32 mmHg  (< 30mmHg) IVC Diam:             2.00 cm  [id number] Steven Terrell MD Electronically signed  on 1/22/2025 at 12:48:37 PM  Wall Scoring  ** Final **     Transthoracic Echo (TTE) Limited    Result Date: 1/22/2025   [facility], [address] [place] [address]                Tel [phone] and Fax [phone] TRANSTHORACIC ECHOCARDIOGRAM REPORT  Patient Name:       ORLANDO VICTORIA   Reading Physician:    [id number] Steven Terrell MD Study Date:         1/22/2025           Ordering Provider:    [id number] CHEPE PERRY MRN/PID:            [id number]            Fellow: Accession#:         [id number]        Nurse: Date of Birth/Age:  1945 / 79     Sonographer:          Suellen Merritt RCS                     years Gender assigned at                     Additional Staff: Birth: Height:             172.72 cm           Admit Date: Weight:             67.13 kg            Admission Status:     Inpatient - STAT BSA / BMI:          1.80 m2 / 22.50     Encounter#:           [id number]                     kg/m2 Blood Pressure:     111/60 mmHg         Department Location:  [place] [address] Study Type:    TRANSTHORACIC ECHO (TTE) LIMITED Diagnosis/ICD: Chest pain, unspecified-R07.9 Indication:    CP post PCI CPT Code:      Echo Limited-70527; Doppler Limited-54894; Color Doppler-60565 Patient History: Pertinent History: HTN, Hyperlipidemia and CAD. CABG 1990s, multiple PCIs. Study Detail: The following Echo studies were performed: 2D, M-Mode, Doppler and               color flow.  PHYSICIAN INTERPRETATION: Left Ventricle: Left ventricular ejection fraction is moderately decreased, by visual estimate at 40%. There is mild left ventricular hypertrophy. There is global hypokinesis of the left ventricle with minor regional variations. The left ventricular cavity size is upper limits of normal. There is mildly increased  septal and mildly increased posterior left ventricular wall thickness. Abnormal (paradoxical) septal motion consistent with post-operative status. Spectral Doppler shows a Grade II (pseudonormal pattern) of left ventricular diastolic filling with an elevated left atrial pressure. LV Wall Scoring: The basal and mid inferior wall is akinetic. Left Atrium: The left atrium is mildly dilated. Right Ventricle: The right ventricle is upper limits of normal in size. There is moderately reduced right ventricular systolic function. Right Atrium: The right atrium is normal in size. Aortic Valve: The aortic valve was not well visualized. There is mild aortic valve cusp calcification. There is evidence of mild to moderate aortic valve stenosis. There is trace aortic valve regurgitation. The peak instantaneous gradient of the aortic valve is 7 mmHg. Mitral Valve: The mitral valve is mildly thickened. There is mild mitral annular calcification. There is moderate to severe mitral valve regurgitation. Tricuspid Valve: The tricuspid valve is structurally normal. There is mild tricuspid regurgitation. Pulmonic Valve: The pulmonic valve is structurally normal. There is trace pulmonic valve regurgitation. Pericardium: Trivial pericardial effusion. Aorta: The aortic root is normal. The ascending aorta was not well visualized. The Ao Sinus is 3.30 cm. The Asc Ao is 3.20 cm. There is no dilatation of the ascending aorta. There is no dilatation of the aortic root. Pulmonary Artery: The tricuspid regurgitant velocity is 3.14 m/s, and with an estimated right atrial pressure of 12 mmHg, the estimated pulmonary artery pressure is moderately elevated with the RVSP at 51.5 mmHg. Systemic Veins: The inferior vena cava appears mildly dilated. In comparison to the previous echocardiogram(s): Compared with study dated 1/15/2025, MR is now worse.  CONCLUSIONS:  1. Left ventricular ejection fraction is moderately decreased, by visual estimate at 40%.   2. There is global hypokinesis of the left ventricle with minor regional variations.  3. Basal and mid inferior wall is abnormal.  4. Spectral Doppler shows a Grade II (pseudonormal pattern) of left ventricular diastolic filling with an elevated left atrial pressure.  5. Abnormal septal motion consistent with post-operative status.  6. There is moderately reduced right ventricular systolic function.  7. The left atrium is mildly dilated.  8. Mild to moderate aortic valve stenosis.  9. Moderate to severe mitral valve regurgitation. 10. Moderately elevated pulmonary artery pressure. 11. The inferior vena cava appears mildly dilated. QUANTITATIVE DATA SUMMARY:  2D MEASUREMENTS:          Normal Ranges: IVSd:            1.16 cm  (0.6-1.1cm) LVPWd:           1.05 cm  (0.6-1.1cm) LVIDd:           5.09 cm  (3.9-5.9cm) LVIDs:           4.73 cm LV Mass Index:   119 g/m2 LVEDV Index:     67 ml/m2 LV % FS          7.0 %  LA VOLUME:                    Normal Ranges: LA Vol A4C:        69.6 ml    (22+/-6mL/m2) LA Vol A2C:        81.6 ml LA Vol BP:         76.0 ml LA Vol Index A4C:  38.7ml/m2 LA Vol Index A2C:  45.4 ml/m2 LA Vol Index BP:   42.3 ml/m2 LA Area A4C:       22.1 cm2 LA Area A2C:       24.2 cm2 LA Major Axis A4C: 6.0 cm LA Major Axis A2C: 6.1 cm LA Volume Index:   42.3 ml/m2  RA VOLUME BY A/L METHOD:            Normal Ranges: RA Vol A4C:              35.2 ml    (8.3-19.5ml) RA Vol Index A4C:        19.6 ml/m2 RA Area A4C:             15.5 cm2 RA Major Axis A4C:       5.8 cm  AORTA MEASUREMENTS:         Normal Ranges: Ao Sinus, d:        3.30 cm (2.1-3.5cm) Asc Ao, d:          3.20 cm (2.1-3.4cm)  LV SYSTOLIC FUNCTION BY 2D PLANIMETRY (MOD):                      Normal Ranges: EF-A4C View:    38 % (>=55%) EF-A2C View:    35 % EF-Biplane:     38 % EF-Visual:      40 % LV EF Reported: 40 %  LV DIASTOLIC FUNCTION:             Normal Ranges: MV Peak E:             1.10 m/s    (0.7-1.2 m/s) MV Peak A:             0.64 m/s     (0.42-0.7 m/s) E/A Ratio:             1.72        (1.0-2.2) MV e'                  0.073 m/s   (>8.0) MV lateral e'          0.09 m/s MV medial e'           0.06 m/s MV A Dur:              142.72 msec E/e' Ratio:            15.16       (<8.0)  MITRAL VALVE:          Normal Ranges: MV DT:        175 msec (150-240msec)  AORTIC VALVE:           Normal Ranges: AoV Vmax:      1.35 m/s (<=1.7m/s) AoV Peak P.3 mmHg (<20mmHg) LVOT Max Deonte:  0.60 m/s (<=1.1m/s) LVOT VTI:      10.58 cm LVOT Diameter: 1.80 cm  (1.8-2.4cm) AoV Area,Vmax: 1.13 cm2 (2.5-4.5cm2)  RIGHT VENTRICLE: RV Basal 3.80 cm RV Mid   2.80 cm RV Major 7.0 cm TAPSE:   10.0 mm RV s'    0.07 m/s  TRICUSPID VALVE/RVSP:          Normal Ranges: Peak TR Velocity:     3.14 m/s RV Syst Pressure:     51 mmHg  (< 30mmHg) IVC Diam:             2.30 cm  PULMONIC VALVE:         Normal Ranges: PV Accel Time:  77 msec (>120ms)  AORTA: Asc Ao Diam 3.62 cm  [id number] Steven Terrell MD Electronically signed on 2025 at 9:11:07 AM  Wall Scoring  ** Final **     Electrocardiogram, 12-lead PRN ACS symptoms    Result Date: 2025  Sinus rhythm with Premature atrial complexes Left bundle branch block Abnormal ECG When compared with ECG of 15-CECILE-2025 12:08, Premature atrial complexes are now Present Confirmed by Matteo Steward ([id number]) on 2025 8:46:19 AM    XR abdomen 1 view    Result Date: 2025  Interpreted By:  Reva Sharpe, STUDY: XR ABDOMEN 1 VIEW;  2025 6:46 pm   INDICATION: Signs/Symptoms:constipation, abdominal pain.     COMPARISON: CT: 2025   ACCESSION NUMBER(S): [id number]   ORDERING CLINICIAN: MARK DEVOOGD   FINDINGS: Nonobstructive bowel gas pattern. Limited evaluation of pneumoperitoneum on supine imaging, however no gross evidence of free air is noted.   Minimal fecal burden.   Osseous structures demonstrate no acute bony changes.       1.  Nonspecific bowel gas pattern. Minimal fecal burden.   MACRO: None   Signed by: Reva Sharpe  1/20/2025 10:25 AM Dictation workstation:   [id number]    Electrocardiogram, 12-lead PRN ACS symptoms    Result Date: 1/17/2025   Poor data quality, interpretation may be adversely affected Sinus bradycardia with marked sinus arrhythmia with 1st degree AV block with occasional Premature ventricular complexes Left bundle branch block Abnormal ECG Confirmed by Sanford Lassiter ([id number]) on 1/17/2025 11:16:19 AM    ECG 12 lead    Result Date: 1/17/2025  Sinus rhythm with 1st degree AV block Left bundle branch block Abnormal ECG When compared with ECG of 13-JAN-2025 13:46, (unconfirmed) Fusion complexes are no longer Present Premature ventricular complexes are no longer Present Premature supraventricular complexes are no longer Present Questionable change in QRS axis T wave inversion now evident in Inferior leads Confirmed by Sanford Lassiter ([id number]) on 1/17/2025 10:30:43 AM    ECG 12 lead    Result Date: 1/17/2025  Sinus rhythm with 1st degree AV block with Premature supraventricular complexes and Premature ventricular complexes or Fusion complexes Left bundle branch block Abnormal ECG Confirmed by Sanford Lassiter ([id number]) on 1/17/2025 10:07:53 AM    ECG 12 lead    Result Date: 1/17/2025  Normal sinus rhythm Left axis deviation Left bundle branch block Abnormal ECG When compared with ECG of 04-DEC-2024 20:58, [place] interval has decreased Confirmed by Sanford Lassiter ([id number]) on 1/17/2025 10:07:20 AM    Electrocardiogram, 12-lead PRN ACS symptoms    Result Date: 1/16/2025  Sinus rhythm with 1st degree AV block Left bundle branch block Abnormal ECG When compared with ECG of 14-JAN-2025 01:11, Premature ventricular complexes are no longer Present Nonspecific T wave abnormality has replaced inverted T waves in Inferior leads QT has lengthened Confirmed by Fred Sher ([id number]) on 1/16/2025 2:56:07 PM    ECG 12 lead    Result Date: 1/15/2025  Sinus rhythm with 1st degree AV block Left bundle branch block Abnormal ECG When  compared with ECG of 13-JAN-2025 15:16, (unconfirmed) No significant change was found Confirmed by Sanford Lassiter ([id number]) on 1/15/2025 10:56:59 AM    XR chest 1 view    Result Date: 1/15/2025  Interpreted By:  Raji, Ashish,  and Gresham Margret STUDY: XR CHEST 1 VIEW;  1/14/2025 11:45 pm   INDICATION: Signs/Symptoms:Chest pain.   COMPARISON: Chest radiograph 01/13/2025   ACCESSION NUMBER(S): [id number]   ORDERING CLINICIAN: KEYA BIRCH   FINDINGS: AP radiograph of the chest was provided.   Postsurgical changes of median sternotomy with intact cerclage wires. Postsurgical changes of CABG.   CARDIOMEDIASTINAL SILHOUETTE: Cardiomediastinal silhouette is stable in size and configuration.   LUNGS: Mild bibasilar atelectasis. No focal consolidation, effusion, or pneumothorax. Pleural apical thickening on the left and linear parenchymal changes consistent with scarring and postsurgical change. There are metallic clips adjacent to the lateral aspect of the superior mediastinum on the left.   ABDOMEN: No remarkable upper abdominal findings.   BONES: No acute osseous abnormality.       1. Similar mild bibasilar pulmonary atelectasis. 2. Pleural apical thickening on the left with increased linear densities at the left apex, noted on the CT of January 13, 2025 and a follow-up CT has been suggested in 6 months.   I personally reviewed the image(s)/study and resident interpretation as stated by Dr. Margret Miguel MD. I agree with the findings as stated. This study was interpreted at [facility], [place], [place].   MACRO: None   Signed by: Ashish Alegria 1/15/2025 9:40 AM Dictation workstation:   [id number]    Transthoracic Echo (TTE) Complete    Result Date: 1/15/2025   [facility], [address] [place] [address]                Tel [phone] and Fax [phone] TRANSTHORACIC ECHOCARDIOGRAM REPORT  Patient Name:       ORLANDO Oconnell  Physician:    [id number] Steven Terrell MD Study Date:         1/15/2025           Ordering Provider:    [id number] KEYA BIRCH MRN/PID:            [id number]            Fellow: Accession#:         [id number]        Nurse: Date of Birth/Age:  1945 / 79     Sonographer:          Nick nelson                                     KELLY Gender assigned at  M                   Additional Staff: Birth: Height:             147.32 cm           Admit Date:           1/14/2025 Weight:             66.23 kg            Admission Status:     Inpatient - STAT BSA / BMI:          1.59 m2 / 30.51     Encounter#:           [id number]                     kg/m2 Blood Pressure:     118/55 mmHg         Department Location:  [facility] Study Type:    TRANSTHORACIC ECHO (TTE) COMPLETE Diagnosis/ICD: Atherosclerotic heart disease of native coronary artery without                angina pectoris-I25.10 Indication:    CAD CPT Code:      Echo Limited-72181; Doppler Limited-09958; Color Doppler-16460 Patient History: Pertinent History: CAD, Hyperlipidemia and HTN. CABG in 1990s and multiple PCIs. Study Detail: The following Echo studies were performed: 2D, Doppler and color               flow. Technically challenging study due to patient lying in supine               position. Agitated saline used as a contrast agent for intraseptal               flow evaluation.  PHYSICIAN INTERPRETATION: Left Ventricle: Left ventricular ejection fraction is mildly decreased, by visual estimate at 45-50%. The left ventricular cavity size is mild to moderately dilated. Abnormal (paradoxical) septal motion consistent with post-operative status. Spectral Doppler shows a Grade I (impaired relaxation pattern) of left ventricular diastolic filling with normal left atrial filling pressure. LV Wall Scoring: The basal and  mid inferior wall is akinetic. Left Atrium: The left atrium is enlarged. There is no evidence of a patent foramen ovale. A bubble study using agitated saline was performed. Bubble study is negative. 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 Right Ventricle: The right ventricle is normal in size. There is mildly reduced right ventricular systolic function. Right Atrium: The right atrium is normal in size. Aortic Valve: The aortic valve was not well visualized. There is mild to moderate aortic valve cusp calcification. There is trace to mild aortic valve regurgitation. Apparent aortic stenosis. Mitral Valve: The mitral valve is minimally calcified. There is mild mitral annular calcification. There is mild mitral valve regurgitation. Tricuspid Valve: The tricuspid valve is structurally normal. There is mild tricuspid regurgitation. Pulmonic Valve: The pulmonic valve is not well visualized. There is trace pulmonic valve regurgitation. Pericardium: Trivial pericardial effusion. Aorta: The aortic root is normal. The aortic root is at the upper limits of normal size. In comparison to the previous echocardiogram(s): Compared with study dated 12/6/2024, no significant change.  CONCLUSIONS:  1. Poorly visualized anatomical structures due to suboptimal image quality.  2. Left ventricular cavity size is mild to moderately dilated.  3. Left ventricular ejection fraction is mildly decreased, by visual estimate at 45-50%.  4. Basal and mid inferior wall is abnormal.  5. Spectral Doppler shows a Grade I (impaired relaxation pattern) of left ventricular diastolic filling with normal left atrial filling pressure.  6. Abnormal septal motion consistent with post-operative status.  7. There is mildly reduced right ventricular systolic function.  8. The left atrium is enlarged.  9. Atrial septal aneurysm present. 10. There is no evidence of a patent foramen ovale. QUANTITATIVE DATA SUMMARY:  2D MEASUREMENTS:           Normal  Ranges: LVEDV Index:     128 ml/m2  AORTA MEASUREMENTS:         Normal Ranges: Ao Sinus, d:        3.70 cm (2.1-3.5cm)  LV SYSTOLIC FUNCTION BY 2D PLANIMETRY (MOD):                      Normal Ranges: EF-A4C View:    57 % (>=55%) EF-A2C View:    53 % EF-Biplane:     55 % EF-Visual:      48 % LV EF Reported: 48 %  TRICUSPID VALVE/RVSP:          Normal Ranges: Peak TR Velocity:     2.36 m/s RV Syst Pressure:     25 mmHg  (< 30mmHg)  [id number] Steven Terrell MD Electronically signed on 1/15/2025 at 9:19:04 AM  Wall Scoring  ** Final **     CT angio chest abdomen pelvis    Result Date: 1/13/2025  Interpreted By:  Andrez Veloz, STUDY: CT ANGIO CHEST ABDOMEN PELVIS;  1/13/2025 5:00 pm   INDICATION: Signs/Symptoms:Dissection protocol   COMPARISON: CTA chest abdomen pelvis dated 12/04/2024.   ACCESSION NUMBER(S): [id number]   ORDERING CLINICIAN: RAINER FAULKNER   TECHNIQUE: CT of the chest, abdomen, and pelvis was performed was obtained before and after administration of intravenous contrast in the arterial phase, as part of CT angiography protocol. Contiguous axial images were obtained at  5 mm slice thickness through the chest, and at  3 mm through the abdomen and pelvis. Coronal and sagittal reconstructions at  3 mm slice thickness were performed. 75 ml of contrast material Omnipaque 350 were administered intravenously without immediate complication.   3D volume rendering of the aorta was obtained on a separate workstation and also reviewed.   FINDINGS: POTENTIAL LIMITATIONS OF THE STUDY: Motion and mixing artifact which limits evaluation of the distal branch vessels.   The visualized thyroid gland is within normal limits.   HEART AND VESSELS: No discrete filling defects within the main pulmonary artery or its branches.   Main pulmonary artery and its branches are normal in caliber.   The thoracic aorta is of normal contour without aneurysm. No evidence of acute intramural hematoma or dissection. Moderate calcified and  noncalcified atherosclerotic plaque of the thoracic aorta with noncalcified atherosclerotic plaque noted in the anterior surface of aortic arch, axial image 87, similar to prior.   Severe coronary artery atherosclerotic calcifications versus stents..The study is not optimized for evaluation of coronary arteries.   The cardiac chambers are not enlarged. There are sternotomy changes.   No evidence of pericardial effusion.   MEDIASTINUM AND GABRIELLA, AND AXILLA: No evidence of thoracic lymphadenopathy by CT criteria.   No mediastinal masses.   LUNGS AND AIRWAYS: The trachea and central airways are patent. No endobronchial lesion.   There is moderate upper lobe predominant paraseptal and centrilobular emphysema. There is also peripheral nonspecific interstitial scarring with more asymmetric scarring and traction bronchiectasis in the left lung apex. There is no pleural effusion or pneumothorax.   ABDOMEN/PELVIS: Liver: The liver is normal in size and contour. There are no focal hepatic lesions.   Gallbladder/biliary system: There are no radiopaque gallstones. There is no intrahepatic or extrahepatic biliary dilatation.   Spleen: Normal in size.   Pancreas: Not enlarged. No peripancreatic edema or ductal dilatation. No pancreatic mass is identified.   Adrenals: Within normal limits.   Kidneys: Mild bilateral renal cortical thinning. Bilateral intrarenal vascular atherosclerotic calcifications noted.  There is no hydronephrosis. There is no nephrolithiasis. 1.6 cm right lower pole renal cyst.   Urinary bladder: Grossly normal. Prostate is mildly enlarged.   Bowel: Stomach is partially collapsed without gross abnormality. No bowel dilatation or obstruction. Normal appendix. Formed stool throughout the colon without wall thickening or acute inflammatory change.   Vessels: The abdominal aorta is normal in caliber. The celiac, superior, and inferior mesenteric arteries are patent. There is a patent left renal artery origin stent.  Moderate atherosclerotic plaque of the abdominal aorta and its branches. Partially visualized bilateral superficial femoral artery stents.   Lymph nodes: No lymphadenopathy in the abdomen or pelvis.   Peritoneal/retroperitoneum: There is no evidence of intraperitoneal free air. There is no retroperitoneal hematoma. There are no pelvic or mesenteric masses identified.   OSSEOUS STRUCTURES: There are no suspicious osseous lesions. There is osteopenia. Unchanged mild chronic compression deformity of L4 vertebral body.       CHEST 1.  No evidence of aortic aneurysm, dissection, or acute intramural hematoma. Moderate calcified and noncalcified atherosclerotic plaque of the thoracic and abdominal aorta. Patent left renal artery stent and partially visualized bilateral superficial femoral artery stents. Sternotomy changes. Coronary artery atherosclerotic calcifications. 2. No evidence of consolidation or pleural effusion. Moderate centrilobular and paraseptal emphysema as well as nonspecific pulmonary fibrotic changes. Asymmetric scarring in the left lung apex is new since 2010 and consider follow-up chest CT in 6 months to confirm stability.   ABDOMEN - PELVIS 1.  No acute abnormality in the abdomen/pelvis. Mild bilateral renal cortical thinning. 2. Unchanged chronic compression deformity of L4 vertebral body.     Signed by: Andrez Veloz 1/13/2025 5:49 PM Dictation workstation:   [id number]    XR knee left 4+ views    Result Date: 1/13/2025  Interpreted By:  Leland Murphy, STUDY: XR KNEE LEFT 4+ VIEWS   INDICATION: Signs/Symptoms:Cellulitis.   COMPARISON: None   ACCESSION NUMBER(S): [id number]   ORDERING CLINICIAN: RAINER FAULKNER   FINDINGS: Below-the-knee amputation. Surgical margins left lower leg and fibula satisfactory. Mild osteoarthritis left knee. No acute findings.       No acute findings status post left below-the-knee amputation.   Signed by: Leland Murphy 1/13/2025 2:50 PM Dictation workstation:    [id number]    Lower extremity venous duplex left    Result Date: 1/13/2025  Interpreted By:  Kishor Andrade, [facility]; 1/13/2025 1:36 pm   INDICATION: Pain cellulitis   COMPARISON: None   ACCESSION NUMBER(S): [id number]   ORDERING CLINICIAN: RAINER FAULKNER   TECHNIQUE: Static grayscale, color Doppler, and spectral Doppler sonographic images of the bilateral lower extremities were obtained for duplex evaluation.   FINDINGS: LEFT LOWER EXTREMITY: There is no evidence of deep venous thrombus in the visualized portions of the common femoral vein, femoral vein, or popliteal vein. Below-knee amputation present.       1. No evidence of deep venous thrombus in the evaluated veins of the left leg from the inguinal ligament to the popliteal fossa. Below-knee amputation present.   Signed by: Kishor Andrade 1/13/2025 1:38 PM Dictation workstation:   [id number]    XR chest 1 view    Result Date: 1/13/2025  Interpreted By:  Duane Chou, STUDY: XR CHEST 1 VIEW;  1/13/2025 11:34 am   INDICATION: Signs/Symptoms:pain.     COMPARISON: 12/08/2024.   ACCESSION NUMBER(S): [id number]   ORDERING CLINICIAN: RAINER FAULKNER   FINDINGS: CARDIOMEDIASTINAL SILHOUETTE: Borderline size of the cardiac silhouette, aortic calcifications and postoperative changes of the mediastinum are similar to prior.   LUNGS: Mild bibasilar atelectasis is present. No localized consolidation. No definite pleural effusion. No appreciable pneumothorax.   ABDOMEN: No remarkable upper abdominal findings.   BONES: No acute osseous changes.       1.  Mild bibasilar atelectasis. No localized consolidation.       MACRO: None.   Signed by: Duane Chou 1/13/2025 11:40 AM Dictation workstation:   [id number]       Results for orders placed or performed during the hospital encounter of 01/28/25 (from the past 24 hours)   POCT GLUCOSE   Result Value Ref Range    POCT Glucose 201 (H) 74 - 99 mg/dL   POCT GLUCOSE   Result Value  Ref Range    POCT Glucose 141 (H) 74 - 99 mg/dL                   Assessment/Plan   Carotid stenosis  Recurrent NSTEMI  Strokelike symptoms        79-year-old male with history of recurrent chest pain/NSTEMI's who presented to the emergency department with left-sided weakness, slight left facial droop, left eye ptosis, left arm pain and left hand numbness.       MRA noting moderate to severe left ICA stenosis and moderate right ICA stenosis.  MRI of the brain was negative for infarct or hemorrhage.  Patient had a carotid ultrasound on 12/6/2024 which noted less than 50% right ICA stenosis and 50 to 69% left ICA stenosis, left proximal ICA velocity 202/65 with a ratio 4.1.      CT angiogram ordered yesterday and was completed.  Approximately 65% stenosis of the left ICA and less than 50% stenosis of the right ICA.  Patient with complaints of left-sided symptoms which should not be caused by left carotid stenosis.    Patient needs follow-up in 6 months with a repeat carotid duplex.  Continue dual antiplatelet therapy, statin therapy.    Await neurology input.       I spent 35 minutes in the professional and overall care of this patient.      Massimo Trevizo, APRN-CNP

## 2025-01-30 NOTE — PROGRESS NOTES
Subjective Data:  Patient is alert awake.  Chest tightness.  Left BKA.  So far has improved.  Recently PCI of RCA done.  No active chest pain tightness continue dual antiplatelet therapy 1 year post PCI.  Overnight Events:    None  Objective   Last Recorded Vitals  BP 94/56 (BP Location: Right arm, Patient Position: Lying)   Pulse 90   Temp 36.5 °C (97.7 °F) (Temporal)   Resp 16   Wt 54.7 kg (120 lb 9.5 oz)   SpO2 100%     Intake/Output Summary (Last 24 hours) at 1/30/2025 1459  Last data filed at 1/29/2025 2155  Gross per 24 hour   Intake 120 ml   Output --   Net 120 ml     Physical Exam:  HEENT: Normocephalic/atraumatic pupils equal react light  Neck exam mild JVD, no bruit  Lung exam clear to auscultation.  Cardiac exam is regular rhythm S1-S2, soft systolic murmur heard.   Abdomen soft nontender, nondistended  Extremities no clubbing, cyanosis, trace edema, left leg BKA  Neuro exam grossly intact.  Image Results  Electrocardiogram, 12-lead PRN ACS symptoms  Poor data quality, interpretation may be adversely affected  Sinus rhythm with 1st degree AV block  Left bundle branch block  Abnormal ECG  Confirmed by Steven Terrell ([id number]) on 1/29/2025 5:43:54 PM  Vascular US Carotid Artery Duplex [place] [address]             Phone [phone]       Vascular Lab Report     [facility] US CAROTID ARTERY DUPLEX BILATERAL    Patient Name:      ORLANDO Oconnell Physician:  [id number] Jamia Ramirez MD  Study Date:        1/29/2025            Ordering Provider:  [id number] NITO HWANG  MRN/PID:           [id number]             Fellow:  Accession#:        [id number]         Technologist:       Marlon Thomas RVGUY  Date of Birth/Age: 1945 / 79      Technologist 2:                     years  Gender:            M                    Encounter#:         [id number]  Admission Status:  Inpatient             Location Performed: [facility]       Diagnosis/ICD: Occlusion and stenosis of bilateral carotid arteries-I65.23  CPT Codes:     46124 Cerebrovascular Carotid Duplex scan complete       CONCLUSIONS:  Right Carotid: Findings are consistent with less than 50% stenosis of the right proximal internal carotid artery. Right external carotid artery appears patent with no evidence of stenosis. The right vertebral artery is patent with antegrade flow. No evidence of hemodynamically significant stenosis in the right subclavian artery.  Left Carotid: Findings are consistent with 50 to 69% stenosis of the left proximal internal carotid artery. Left external carotid artery appears patent with no evidence of stenosis. The left vertebral artery is patent with antegrade flow. No evidence of hemodynamically significant stenosis in the left subclavian artery.  Additional Findings:  Technically difficult due to patient head movement and talking.       Imaging & Doppler Findings:  Right Plaque Morph: The proximal right internal carotid artery demonstrates heterogenous and calcified plaque.  Left Plaque Morph: The proximal left internal carotid artery demonstrates heterogenous and calcified plaque.      Right                        Left    PSV      EDV                PSV      EDV  56 cm/s            CCA P    51 cm/s  49 cm/s            CCA D    45 cm/s  102 cm/s 32 cm/s   ICA P    187 cm/s 61 cm/s  90 cm/s  22 cm/s   ICA M    94 cm/s  37 cm/s  71 cm/s  21 cm/s   ICA D    70 cm/s  26 cm/s  34 cm/s  14 cm/s Vertebral  87 cm/s  21 cm/s  81 cm/s          Subclavian 96 cm/s                     Right Left  ICA/CCA Ratio  2.1  4.1          [id number] Jamia Ramirez MD  Electronically signed by [id number] Jamia Ramirez MD on 1/29/2025 at 2:55:10 PM       ** Final **  CT angio head and neck w and wo IV contrast  Narrative: Interpreted By:  Nick Arellano,   STUDY:  CT ANGIO HEAD AND  NECK W AND WO IV CONTRAST;  1/29/2025 1:49 pm      INDICATION:  Signs/Symptoms:carotid stenosis.      COMPARISON:  CT head yesterday. CT chest 01/13/2025.      ACCESSION NUMBER(S):  [id number]      ORDERING CLINICIAN:  NITO HWANG      TECHNIQUE:  Unenhanced CT images of the head were obtained.  Subsequently,  75 ML  of Omnipaque 350 was administered intravenously and axial images of  the head and neck were acquired.  Coronal, sagittal, and 3-D  reconstructions were provided for review.      FINDINGS:      CTA COW: No major vascular occlusion. Moderate atherosclerotic  calcifications through the carotid siphons. No aneurysms.  No  vascular malformations. Patent dural venous sinuses and intracranial  deep venous structures.      CTA CAROTID: No aortic aneurysm or dissection. No significant  stenoses at the origins of the great vessels from the aortic arch. No  significant stenoses of the brachycephalic artery or bilateral  subclavian arteries.      Mixed fibrofatty and calcific atherosclerotic plaque crosses the left  carotid bifurcation. Narrowest luminal diameter in the proximal left  internal carotid artery is 1.7 mm which is approximately 60-65%  stenosis. There is mild plaque irregularity.      Mixed but predominantly fibrofatty atherosclerotic plaque spanning of  the right carotid bifurcation. Narrowest luminal diameter in the  proximal right internal carotid artery is 3.1 mm which is less than  50% stenosis. There is mild plaque irregularity.      Moderately severe short-segment atherosclerotic stenosis proximal V1  segment right vertebral artery.      NONVASCULAR HEAD: No intracranial mass. No intracranial hemorrhage.  No evidence of large evolving cortical infarction. Supratentorial  white matter hypodensities most likely related to chronic small  vessel ischemic change. Mild-to-moderate global brain parenchymal  volume loss. Bones intact. Postoperative changes bilateral orbital  globes.      NONVASCULAR  NECK: No soft tissue mass. No adenopathy. Salivary glands  are unremarkable. Thyroid gland unremarkable. Bones intact.  Visualized small right-greater-than-left pleural effusions.  Emphysematous changes in the upper lungs. Nathalie opacity left  apex, similar to prior.          Impression: 1. No intracranial major vascular occlusion.  2. Approximately 60-65% short-segment atherosclerotic stenosis  proximal left internal carotid artery with mild plaque irregularity.  3. Less than 50% short-segment atherosclerotic stenosis proximal  right internal carotid artery with mild plaque irregularity.  4. Moderate to severe short-segment atherosclerotic stenosis proximal  V1 segment right vertebral artery.  5. Supratentorial white matter hypodensities most likely related to  chronic small vessel ischemic change.  6. Mild-to-moderate global brain parenchymal volume loss.  7. Visualized small right-greater-than-left pleural effusions.  Emphysematous changes in the upper lungs. Nathalie opacity left  apex, similar to prior.      MACRO:  None      Signed by: Nick Arellano 1/29/2025 2:39 PM  Dictation workstation:   [id number]    Last Labs:  CBC - 1/29/2025:  6:33 AM  6.2 8.6 124    26.3      CMP - 1/29/2025:  6:33 AM  8.3 7.1 11 --- 1.2   3.3 3.3 7 60      PTT - 1/28/2025:  7:02 AM  1.1   11.3 32.5     Inpatient Medications:  Scheduled medications   Medication Dose Route Frequency    aspirin  81 mg oral Daily    atorvastatin  80 mg oral Nightly    busPIRone  7.5 mg oral BID    clopidogrel  75 mg oral Daily    enoxaparin  1 mg/kg subcutaneous q12h JENNIFER    isosorbide mononitrate ER  60 mg oral Daily    levothyroxine  50 mcg oral Daily    metoprolol succinate XL  25 mg oral Daily    pantoprazole  40 mg oral Daily before breakfast    polyethylene glycol  17 g oral Daily    ranolazine  1,000 mg oral BID    sennosides-docusate sodium  1 tablet oral Nightly    tamsulosin  0.4 mg oral Nightly     Assessment & Plan  Cerebrovascular  accident (CVA), unspecified mechanism (Multi)  79-year-old patient with a known history of CAD, recent PCI.  Now with a left-sided facial numbness.  Status post left BKA.  No chest pain or tightness ongoing workup for TIA versus CVA.    Elevated troponin: History of recently NSTEMI with a PCI of RCA.  No active chest pain tightness.  Continue GDMT for NSTEMI including nitroglycerin, aspirin, statin, ACE Imdur, beta-blocker as well as Plavix.    CAD: Continue current Ranexa 1000 mg p.o. twice daily.  Also currently on a Imdur 60 mg tablet p.o. daily.    Hyperlipidemia: Continue current Lipitor 80 mg tablet p.o. daily.    DVT, aspiration fall precaution.  Will follow as needed.  Stable from cardiac wise.  Okay to discharge and outpatient follow-up with the primary cardiologist.    1/30: Patient stable cardiac wise continue current aspirin, Plavix, Lipitor, Imdur with the beta-blocker.  Stable cardiac wise to be discharged.  Continue Ranexa if tolerated.  Outpatient office follow-up.    Code Status:  Full Code  I spent 35 minutes in the professional and overall care of this patient.  Jonathan Tristan MD

## 2025-01-30 NOTE — PROGRESS NOTES
Physical Therapy                 Therapy Communication Note    Patient Name: Chavez Llamas  MRN: [id number]  Department: [address]  Room: 05/05-A  Today's Date: 1/30/2025     Discipline: Physical Therapy    Missed Visit Reason: Patient refused. Pt politely declined evaluation this date. RN aware.    Missed Time: Attempt

## 2025-01-30 NOTE — PROGRESS NOTES
"Neurology Follow Up    Referred by: No ref. provider found, PCP: Christopher D'Amico,     Subjective:  Chavez Llamas is a 79 y.o. male with PMH of CAD s/p CABG in 1990s, multiple PCIs, HFrEF, HTN, DM, PAD s/p L BKA, recent admission at [facility] for high risk PCI after presenting with NSTEMI who presented to [facility] on 1/28/2025 LOS 2 with L sided numbness, neurology consulted for concern for stroke.     Pt seen and examined resting in bed. States he is still having left facial and LUE numbness which he describes as a lack of feeling.     Objective   Blood pressure 94/56, pulse 90, temperature 36.5 °C (97.7 °F), temperature source Temporal, resp. rate 16, height 1.727 m (5' 8\"), weight 54.7 kg (120 lb 9.5 oz), SpO2 100%.    Neurological Exam:  Neurological Exam  Physical Exam     MENTAL STATUS:  General appearance: in NAD  Orientation: Stuart to self, time, place and condition   Language: Expression, repetition, naming, comprehension intact.   Concentration: Intact  Fund of knowledge: Appropriate     CRANIAL NERVES:  - Fundoscopic exam: Deferred   - II/III: PERRL  - II:  Visual fields difficulty with finger counting (R eye legally blind, L eye also impaired)   - III, IV, VI: EOMI to pursuit without nystagmus  - V: V1-V3 reduced sensation on the left \"lack of feeling\"   - VII: Face muscles symmetric with smile and eye closure  - VIII: Intact to finger rub  - IX, X: Palate elevated symmetrically bilaterally, no hoarseness  - XI: 5/5 strength on shoulder shrugging bilaterally  - XII: Tongue midline without atrophy or fasciculation     MOTOR: Tone and bulk normal in all extremities  STRENGTH: 5/5 strength tested proximally and distally in BUE and BLE (maybe had slight give away weakness in the LUE compared to the RUE but was fluctuating)      COORDINATION: Intact on finger to nose bl, intact on heel to shin bl, EMIR intact bl  SENSORY: Reduced sensation in the left arm compared to the rt arm.   GAIT: deferred "           Reviewed relevant results, independent review of imaging:   Encounter Date: 01/28/25   ECG 12 lead   Result Value    Ventricular Rate 101    QRS Duration 160    QT Interval 384    QTC Calculation(Bazett) 497    R Axis -10    T Axis 167    QRS Count 16    Q Onset 215    T Offset 407    QTC Fredericia 456    Narrative    Sinus rhythm with 1st degree AV block  Left bundle branch block  Abnormal ECG  When compared with ECG of 22-JAN-2025 14:14, (unconfirmed)  Wide QRS rhythm has replaced Sinus rhythm  Confirmed by Jonathan Tristan ([id number]) on 1/28/2025 11:23:11 AM          Results from last 7 days   Lab Units 01/28/25  0702   HEMOGLOBIN A1C % 4.7     BNP   Date/Time Value Ref Range Status   01/14/2025 06:59  (H) 0 - 99 pg/mL Final         Assessment:   Chavez Llamas is a 79 y.o. male with PMH of CAD s/p CABG in 1990s, multiple PCIs, HFrEF, HTN, DM, PAD s/p L BKA, recent admission at [facility] for high risk PCI after presenting with NSTEMI who presented to [facility] on 1/28/2025 with L sided numbness, neurology consulted for concern for stroke. Not typical pattern of stroke related numbness given numbness impacting more L forearm than hand. MRI of the brain negative for a stroke. CTA did show approximately 65% stenosis of the left ICA and less than 50% stenosis of the right ICA. Does have some intracranial arthrosclerosis. Patient's symptoms of left-sided numbness doesn't fit with the left side stenosis. It is unclear why the patient is having left sided numbness. No further neurologic work-up indicated at this time. Neurology will sign off. Pt can follow up out patient if still having left-sided numbness. Can continue DAPT per vascular.      Diagnoses:  Recurrent NSTEMI     Neurology will sign off at this time. Please contact if you have any further question. Thank you     I spent 60 minutes in the professional and overall care of this patient.      Sujata Frederick, APRN-CNP

## 2025-01-30 NOTE — PROGRESS NOTES
01/30/25 1627   Discharge Planning   Home or Post Acute Services Post acute facilities (Rehab/SNF/etc)   Type of Post Acute Facility Services Skilled nursing   Expected Discharge Disposition SNF   Does the patient need discharge transport arranged? Yes   RoundTrip coordination needed? Yes     Met with patient at bedside to discuss discharge planning.  Patient declined SNF. Patient has been refusing to work with PT/OT.   Received a call from POA step daughter Jelena Harris stating patient is now agreeable to SNF however he does not wish to speak to a TCC or SW.    SNF choices of [facility], [facility] and [facility] were provided by POA/daughter Jelena. [facility] No precert is needed.  Will need to send PT/OT notes when available.      Daughter requesting to speak with Dr. Tristan.  Dr. Tristan secure messaged with request to call patient's daughter.

## 2025-01-31 NOTE — CARE PLAN
Problem: General Stroke  Goal: Establish a mutual long term goal with patient by discharge  Outcome: Progressing  Goal: Demonstrate improvement in neurological exam throughout the shift  Outcome: Progressing  Goal: Maintain BP within ordered limits throughout shift  Outcome: Progressing  Goal: Participate in treatment (ie., meds, therapy) throughout shift  Outcome: Progressing  Goal: No symptoms of aspiration throughout shift  Outcome: Progressing  Goal: No symptoms of hemorrhage throughout shift  Outcome: Progressing  Goal: Tolerate enteral feeding throughout shift  Outcome: Progressing  Goal: Decreased nausea/vomiting throughout shift  Outcome: Progressing  Goal: Controlled blood glucose throughout shift  Outcome: Progressing  Goal: Out of bed three times today  Outcome: Progressing     Problem: Safety - Adult  Goal: Free from fall injury  Outcome: Progressing     Problem: Discharge Planning  Goal: Discharge to home or other facility with appropriate resources  Outcome: Progressing     Problem: Chronic Conditions and Co-morbidities  Goal: Patient's chronic conditions and co-morbidity symptoms are monitored and maintained or improved  Outcome: Progressing     Problem: Nutrition  Goal: Nutrient intake appropriate for maintaining nutritional needs  Outcome: Progressing     Problem: Diabetes  Goal: Achieve decreasing blood glucose levels by end of shift  Outcome: Progressing  Goal: Increase stability of blood glucose readings by end of shift  Outcome: Progressing  Goal: Decrease in ketones present in urine by end of shift  Outcome: Progressing  Goal: Maintain electrolyte levels within acceptable range throughout shift  Outcome: Progressing  Goal: Maintain glucose levels >70mg/dl to <250mg/dl throughout shift  Outcome: Progressing  Goal: No changes in neurological exam by end of shift  Outcome: Progressing  Goal: Learn about and adhere to nutrition recommendations by end of shift  Outcome: Progressing  Goal: Vital  signs within normal range for age by end of shift  Outcome: Progressing  Goal: Increase self care and/or family involovement by end of shift  Outcome: Progressing  Goal: Receive DSME education by end of shift  Outcome: Progressing     Problem: Fall/Injury  Goal: Not fall by end of shift  Outcome: Progressing  Goal: Be free from injury by end of the shift  Outcome: Progressing  Goal: Verbalize understanding of personal risk factors for fall in the hospital  Outcome: Progressing  Goal: Verbalize understanding of risk factor reduction measures to prevent injury from fall in the home  Outcome: Progressing  Goal: Use assistive devices by end of the shift  Outcome: Progressing  Goal: Pace activities to prevent fatigue by end of the shift  Outcome: Progressing     Problem: ADLs  Goal: Pt will complete ADL tasks at mod I with use of AE prn   Outcome: Progressing     Problem: Pain  Goal: Takes deep breaths with improved pain control throughout the shift  Outcome: Progressing  Goal: Turns in bed with improved pain control throughout the shift  Outcome: Progressing  Goal: Walks with improved pain control throughout the shift  Outcome: Progressing  Goal: Performs ADL's with improved pain control throughout shift  Outcome: Progressing  Goal: Participates in PT with improved pain control throughout the shift  Outcome: Progressing  Goal: Free from opioid side effects throughout the shift  Outcome: Progressing  Goal: Free from acute confusion related to pain meds throughout the shift  Outcome: Progressing   The patient's goals for the shift include      The clinical goals for the shift include no falls this shift.

## 2025-01-31 NOTE — PROGRESS NOTES
Pt was very nauseas this morning. Requested order for prn IV zofran. Giving that a little time to work, then will give am meds once his nausea has settled.          Nurse to nurse report called to 4S nurse.

## 2025-01-31 NOTE — NURSING NOTE
Dietary came into room stating patient stated he felt he was having a stroke, Nurses ran into room, Patient was confused but was awake, I team was called. Patient's head then rolled back and he curled on his right side, DEIDRE GONZALEZ was called compressions were started. Compressions stopped when patient was arise able. Patient was put on life pack and had a HR of 28. ICU nurses then arrived.

## 2025-01-31 NOTE — PROGRESS NOTES
01/31/25 1259   Discharge Planning   Home or Post Acute Services Post acute facilities (Rehab/SNF/etc)   Type of Post Acute Facility Services Skilled nursing   Expected Discharge Disposition SNF   Does the patient need discharge transport arranged? Yes   RoundTrip coordination needed? Yes     Patient is now agreeable to SNF.  Choices provided by anu carlson/MAXIMINO [facility]. [facility]. [facility] 3. [facility].  [facility] declined due to no bed availability.   Both [facility] and [facility] are able to accept however neither will have a bed available until middle of next week.  Per morning rounds, patient is ready for discharge.  [facility] notified that additional choices are needed. Jelena will talk to her family and provide additional choices later today.  No precert is needed.     Received choice of [facility] from step aldo [facility].  Referral sent.      UPDATE:  [facility].  Will need a precert when closer to discharge.     DISCHARGE PLAN IS NOT SECURE. NEEDS PRECERT STARTED WHEN CLOSER TO DISCHARGE

## 2025-01-31 NOTE — H&P
[facility] Critical Care Medicine       Date:  1/31/2025  Patient:  Chavez Llamas  YOB: 1945  MRN:  [id number]   Admit Date:  1/28/2025      Chief complaint: stroke-like symptoms       History of Present Illness:  Chavez Llamas is a 79 y.o. year old male patient with past medical history of CAD s/p CABG 1990s, complicated cardiac history post-operatively with multiple PCIs - most notably 1/17/25 with PCI to RCA at [facility], hypertension, T2DM, peripheral arterial disease s/p left BKA, CKD stage 3, GERD, hypothyroidism, bilateral cataracts with legal blindness, who presented to  ED 1/28 with complaints of generalized weakness x1 day and development of stroke-like symptoms. Per chart review, it appears that he woke up on the morning of arrival with left-sided facial numbness, dizziness, and light-headedness. Rush stroke scale negative per EMS. NIH score 1 in ED. Code brain attack activated. Case discussed with telestroke provider and TNK was not indicated due to outside of window.     Pt denies chest pain, SOB, cough, leg swelling, nausea, vomiting, diarrhea, constipation, abdominal pain, blood in urine/stool, painful urination, recent illness, fevers, headache, LOC/seizures, weakness, fatigue, bruising/bleeding, changes in appetite, vision changes, hearing changes.       ED Course: Vitals on arrival to the ED include, /91, HR 89, RR 18, SpO2 99% on RA. CMP with glucose 140, sodium 135, BUN/Cr 24/1.64, EGFR 42, and ALT 7.  Troponin elevated at 460.  CBC with WBC 8.4 and H/H of 9.1/28.2.  Coag panel with PT/INR 11.3/1.1 and aPTT 32.5.  Pt negative for covid 19, flu A and B.  CT head with atrophy resulting in prominence of the ventricles and sulci, areas of decreased attenuation within the white matter which are nonspecific but are commonly associated with small vessel ischemic disease.  Code Brain attack was activated by the ED provider in which pt was not a candidate for  thrombolytics.  Pt was admitted under general medicine for further evaluation and treatment.     Interval hospital course: Pt followed by neurology for concern of CVA vs TIA in which an MRI of the brain was negative and MRA of the neck significant for left carotid stenosis.  Vascular surgery following with plan for surveillance with ultrasound.  Pt noted with an asymptommatic NSTEMI and significant cardiac history, cardiology following and will continue on outpatient meds.        Interval ICU Events:  1/31: Transferred to ICU for further management s/p respiratory vs cardiac arrest and bradycardia given 1x dose of atropine.  Deferred intubated at this time at pt had a positive gag reflex with noted vomiting, and was protecting his airway.  Pt noted to have an elevated lactate and TEODORO s/p arrest.     Objective     Medical History:  Past Medical History:   Diagnosis Date    Old myocardial infarction 02/01/2017    Past heart attack     Past Surgical History:   Procedure Laterality Date    CARDIAC CATHETERIZATION N/A 12/6/2024    Procedure: Left Heart Cath;  Surgeon: Chaim Soriano MD;  Location: [facility] Cardiac Cath Lab;  Service: Cardiovascular;  Laterality: N/A;    CARDIAC CATHETERIZATION N/A 1/20/2025    Procedure: PCI SOFI Stent- Coronary;  Surgeon: Oscar Weston MD;  Location: [place] [address] Cardiac Cath Lab;  Service: Cardiovascular;  Laterality: N/A;    CORONARY ARTERY BYPASS GRAFT  04/14/2015    Coronary Artery Surgery    MR HEAD ANGIO WO IV CONTRAST  2/23/2022    MR HEAD ANGIO WO IV CONTRAST [facility]     Medications Prior to Admission   Medication Sig Dispense Refill Last Dose/Taking    aspirin 81 mg EC tablet Take 1 tablet (81 mg) by mouth once daily. 90 tablet 0 1/27/2025 Morning    busPIRone (Buspar) 15 mg tablet TAKE 1/2 (ONE-HALF) TABLET BY MOUTH IN THE MORNING AND AT BEDTIME 90 tablet 0 1/27/2025 Morning    clopidogrel (Plavix) 75 mg tablet Take 1 tablet (75 mg) by mouth once daily. 30 tablet 1  1/27/2025    insulin lispro (HumaLOG) 100 unit/mL injection Inject 10 Units under the skin early in the morning.. Inject 10 units subcutaneous daily.  Please check blood glucose level before injection. (Patient taking differently: Inject under the skin once daily. SLIDING SCALE)   1/27/2025    isosorbide mononitrate ER (Imdur) 60 mg 24 hr tablet Take 1 tablet (60 mg) by mouth once daily. Do not crush or chew. 30 tablet 1 1/27/2025    levothyroxine (Synthroid, Levoxyl) 50 mcg tablet TAKE 1 TABLET BY MOUTH ONCE DAILY IN THE MORNING BEFORE MEAL(S) ON AN EMPTY STOMACH 90 tablet 2 1/27/2025    metoprolol succinate XL (Toprol-XL) 25 mg 24 hr tablet Take 1 tablet (25 mg) by mouth once daily. 90 tablet 3 Past Week    pantoprazole (ProtoNix) 40 mg EC tablet Take 1 tablet (40 mg) by mouth once daily in the morning. Take before meals. Do not crush, chew, or split. 30 tablet 3 1/27/2025    ranolazine (Ranexa) 1,000 mg 12 hr tablet Take 1 tablet (1,000 mg) by mouth 2 times a day. Do not crush, chew, or split. 60 tablet 1 1/27/2025    tamsulosin (Flomax) 0.4 mg 24 hr capsule Take 1 capsule (0.4 mg) by mouth once daily at bedtime. 30 capsule 1 1/28/2025    atorvastatin (Lipitor) 40 mg tablet Take 1 tablet (40 mg) by mouth once daily at bedtime. 90 tablet 0 1/26/2025 Evening    bisacodyl (Dulcolax) 10 mg suppository Insert 1 suppository (10 mg) into the rectum once daily. 30 suppository 1 Unknown    dicyclomine (Bentyl) 10 mg capsule TAKE 1 CAPSULE BY MOUTH TWICE DAILY AS NEEDED FOR  IBS (Patient not taking: Reported on 1/13/2025) 180 capsule 1     hydrOXYzine pamoate (Vistaril) 25 mg capsule Take 1 capsule (25 mg) by mouth 3 times a day as needed for anxiety. (Patient taking differently: Take 1 capsule (25 mg) by mouth as needed at bedtime for anxiety.) 30 capsule 0 1/26/2025    insulin glargine (Lantus) 100 unit/mL (3 mL) pen Inject 12 Units under the skin once daily at bedtime. Take as directed per insulin instructions. (Patient  "not taking: Reported on 1/28/2025) 3.6 mL 1 Not Taking    lancets (BD Ultra Fine Lancets) 33 gauge misc Testing T.I.D 100 each 3     lubricating eye drops ophthalmic solution Administer 1 drop into both eyes if needed for dry eyes or other.   Unknown    mirtazapine (Remeron) 15 mg tablet TAKE 1 TABLET BY MOUTH ONCE DAILY AT BEDTIME (Patient not taking: Reported on 1/28/2025) 90 tablet 0 Not Taking    nitroglycerin (Nitrostat) 0.4 mg SL tablet Place 1 tablet (0.4 mg) under the tongue every 5 minutes if needed for chest pain. 25 tablet 3 Unknown    ondansetron (Zofran) 4 mg tablet Take 1 tablet (4 mg) by mouth every 8 hours if needed for nausea or vomiting. (Patient not taking: Reported on 1/28/2025) 20 tablet 0 Not Taking    pen needle, diabetic 31 gauge x 3/16\" needle Use as directed 100 each 3 Unknown    sennosides-docusate sodium (Joselin-Colace) 8.6-50 mg tablet Take 1 tablet by mouth once daily at bedtime. 30 tablet 1 Unknown     Patient has no known allergies.  Social History     Tobacco Use    Smoking status: Former     Types: Cigarettes    Smokeless tobacco: Never   Vaping Use    Vaping status: Never Used     Family History   Problem Relation Name Age of Onset    Diabetes Mother      Other (Cerebrovascular Accident) Father         Hospital Medications:           Current Facility-Administered Medications:     acetaminophen (Tylenol) tablet 650 mg, 650 mg, oral, q4h PRN **OR** acetaminophen (Tylenol) oral liquid 650 mg, 650 mg, oral, q4h PRN **OR** acetaminophen (Tylenol) suppository 650 mg, 650 mg, rectal, q4h PRN, Santhosh Bettencourt PA-C    aspirin chewable tablet 81 mg, 81 mg, oral, Daily, Santhosh Bettencourt PA-C, 81 mg at 01/31/25 1233    atorvastatin (Lipitor) tablet 80 mg, 80 mg, oral, Nightly, Santhosh Bettencourt PA-C, 80 mg at 01/30/25 2212    busPIRone (Buspar) tablet 7.5 mg, 7.5 mg, oral, BID, Santhosh Bettencourt PA-C, 7.5 mg at 01/31/25 1233    clopidogrel (Plavix) tablet 75 mg, 75 mg, oral, Daily, Santhosh M " MARYA Bettencourt, 75 mg at 25 1234    enoxaparin (Lovenox) syringe 70 mg, 1 mg/kg, subcutaneous, q12h JENNIFER, Santhosh Bettencourt PA-C    [] hydrALAZINE (Apresoline) injection 10 mg, 10 mg, intravenous, q20 min PRN **FOLLOWED BY** hydrALAZINE (Apresoline) tablet 25 mg, 25 mg, oral, q6h PRN, Santhosh Bettencourt PA-C    hydrOXYzine pamoate (Vistaril) capsule 25 mg, 25 mg, oral, Nightly PRN, Santhosh Bettencourt PA-C    isosorbide mononitrate ER (Imdur) 24 hr tablet 60 mg, 60 mg, oral, Daily, Santhosh Bettencourt PA-C, 60 mg at 25 1234    levothyroxine (Synthroid, Levoxyl) tablet 50 mcg, 50 mcg, oral, Daily, Santhosh Bettencourt PA-C, 50 mcg at 25 0516    metoprolol succinate XL (Toprol-XL) 24 hr tablet 25 mg, 25 mg, oral, Daily, Santhosh Bettencourt PA-C, 25 mg at 25 1000    nitroglycerin (Nitrostat) SL tablet 0.4 mg, 0.4 mg, sublingual, q5 min PRN, Santhosh Bettencourt PA-C    ondansetron (Zofran) injection 4 mg, 4 mg, intravenous, q8h PRN, Santhosh Bettencourt PA-C, 4 mg at 25 1219    oxygen (O2) therapy, , inhalation, Continuous PRN - O2/gases, Santhosh Bettencourt PA-C    pantoprazole (ProtoNix) EC tablet 40 mg, 40 mg, oral, Daily before breakfast, Santhosh Bettencourt PA-C, 40 mg at 25 0516    polyethylene glycol (Glycolax, Miralax) packet 17 g, 17 g, oral, Daily, Santhosh Bettencourt PA-C, 17 g at 25 1236    polyethylene glycol (Glycolax, Miralax) packet 17 g, 17 g, oral, Daily PRN, Santhosh Bettencourt PA-C, 17 g at 25 0141    ranolazine (Ranexa) 12 hr tablet 1,000 mg, 1,000 mg, oral, BID, Santhosh Bettencourt PA-C, 1,000 mg at 25 1233    sennosides-docusate sodium (Joselin-Colace) 8.6-50 mg per tablet 1 tablet, 1 tablet, oral, Nightly, Santhosh Bettencourt PA-C, 1 tablet at 25 2212    tamsulosin (Flomax) 24 hr capsule 0.4 mg, 0.4 mg, oral, Nightly, Santhosh Bettencourt PA-C, 0.4 mg at 25 2212    Review of Systems:  14 point review of systems was completed and negative except for those specially mention in  my HPI    Physical Exam:    Heart Rate:  [78-88]   Temp:  [36.2 °C (97.2 °F)-37.8 °C (100 °F)]   Resp:  [17-18]   BP: (101-112)/(54-71)   Weight:  [57.5 kg (126 lb 12.2 oz)]   SpO2:  [92 %-99 %]     Physical Exam  Constitutional:       General: He is in acute distress.      Appearance: He is ill-appearing.      Interventions: Nasal cannula in place.   HENT:      Mouth/Throat:      Mouth: Mucous membranes are dry.   Eyes:      Conjunctiva/sclera: Conjunctivae normal.   Cardiovascular:      Rate and Rhythm: Regular rhythm. Bradycardia present.      Pulses:           Radial pulses are 1+ on the right side and 1+ on the left side.   Pulmonary:      Effort: Tachypnea present.      Breath sounds: Normal breath sounds and air entry. No decreased breath sounds.   Abdominal:      General: There is no distension.      Tenderness: There is no abdominal tenderness.   Musculoskeletal:      Right lower leg: No edema.      Comments: Left BKA   Skin:     General: Skin is warm and dry.      Capillary Refill: Capillary refill takes less than 2 seconds.   Neurological:      Mental Status: He is alert.      GCS: GCS eye subscore is 4. GCS verbal subscore is 5. GCS motor subscore is 6.         Objective:    I have reviewed all medications, laboratory results, and imaging pertinent for today's encounter.           Intake/Output Summary (Last 24 hours) at 1/31/2025 1445  Last data filed at 1/31/2025 0930  Gross per 24 hour   Intake 200 ml   Output 200 ml   Net 0 ml       Daily Labs:  CBC:   Results from last 7 days   Lab Units 01/29/25  0633 01/28/25  0702   WBC AUTO x10*3/uL 6.2 8.4   HEMOGLOBIN g/dL 8.6* 9.1*   HEMATOCRIT % 26.3* 28.2*   MCV fL 92 92     BMP:    Results from last 7 days   Lab Units 01/29/25  0633 01/28/25  0702 01/24/25  1938   SODIUM mmol/L 136 135* 136   POTASSIUM mmol/L 4.0 4.2 4.5   CHLORIDE mmol/L 104 101 101   CO2 mmol/L 25 24 26   BUN mg/dL 22 24* 18   CREATININE mg/dL 1.32* 1.64* 1.60*   CALCIUM mg/dL 8.3* 8.7  8.5*   GLUCOSE mg/dL 89 140* 185*   MAGNESIUM mg/dL 1.95  --  2.10     ABG:   Results from last 7 days   Lab Units 01/31/25  1353   POCT PH, ARTERIAL pH 7.37*   POCT PCO2, ARTERIAL mm Hg 23*   POCT PO2, ARTERIAL mm Hg 306*   POCT SO2, ARTERIAL % 99   POCT HCO3 CALCULATED, ARTERIAL mmol/L 13.3*   POCT LACTATE, ARTERIAL mmol/L 8.8*          Assessment/Plan:    I am currently managing this critically ill patient for the following problems:    Neuro/Psych/Pain Ctrl/Sedation:   TIA   - Pain Control: acetaminophen PRN  - Continue home buspar   - prn vistaril for anxiety   - continue dual antiplatelet therapy   - neurology consulted with recs   - CAM ICU qshift, sleep-wake hygiene, delirium precautions     Respiratory/ENT:  Acute hypoxic respiratory failure s/p cardiopulmonary arrest   - Supplemental O2: 4L NC  - Titrate FiO2 as tolerated to Maintain SpO2 >92%  - Continuous pulse ox monitoring   - Pulm hygiene    Cardiovascular:   Cardiopulmonary arrest   NSTEMI  Left Carotid Stenosis   Hx of CAD s/p CABG (1990s) and multiple cardiac catheterizations (most recent 1/17/25)  Hx of HFrEF  Hx of HTN  Hx of PAD s/p left BKA  - hydralazine q6 prn for SBP >220  - continue home ASA, plavix, and lipitor   - continue imdur, metoprolol, ranexa  - cardiology consulted and following   - vascular surgery consulted, plan to follow up outpatient with US   - Goal to/Maintain MAPs >65  - Continuous cardiac monitoring   - EKGs PRN for ACS symptoms, arrhythmias     GI:  Hx of GERD   - Diet: carb control   - BR: mindy-colace and miralax   - GI Prophylaxis: continue protonox     Renal/Volume Status/Electrolytes:  TEODORO on CKD stage III  Hx of BPH  - Baseline Cr 1.20-1.40   - Hourly I/O's, maintain urine output >0.5cc/kg/hr  - Continue home flomax   - Replete electrolytes to maintain K >4.0 and Mg >2.0  - Daily RFP, Mg    Endocrine:  T2DM  Hx of hypothyroidism   Home diabetic regimen: lantus 12 units at bedtime and 10 units lispro every day   - SSI  ACHS   - hold home lantus in setting of controlled glucose   - SSI ACHS as indicated   - continue home synthroid   - Hypoglycemia protocol PRN     Infectious Disease:  Elevated lactate s/p arrest, trending down   - continue to trend lactate until normalized   - Monitor SIRS criteria    Heme/Onc:  - Daily CBC monitoring   - Transfuse if Hgb <7.0 or symptomatic anemia    MSK/Skin:  - PT/OT eval when appropriate  - ICU skin protocol, padded pressure points  - Q2hr turns    Ethics/Code Status:  Full Code     :  DVT Prophylaxis: lovenox  GI Prophylaxis: protonix   Bowel Regimen: mindy-colace and miralax   Diet: carb control   CVC: none   [place]: none   [place]: none   Restraints: none   Disposition: transfer to ICU       Critical Care Time:  62 minutes spent in preparing to see patient (I.e. labs, imaging, etc.), documentation, discussion plan of care with patient/family/caregiver, and/or coordination of care with multidisciplinary team including the attending. Time does not include completion of procedure time.     Plan of care discussed with Dr. Nieves Yu PAJosesitoC  Pulmonary & Critical Care Medicine   [facility]

## 2025-01-31 NOTE — CARE PLAN
Problem: General Stroke  Goal: Establish a mutual long term goal with patient by discharge  Outcome: Progressing  Goal: Demonstrate improvement in neurological exam throughout the shift  Outcome: Progressing  Goal: Maintain BP within ordered limits throughout shift  Outcome: Progressing  Goal: Participate in treatment (ie., meds, therapy) throughout shift  Outcome: Progressing  Goal: No symptoms of aspiration throughout shift  Outcome: Progressing  Goal: No symptoms of hemorrhage throughout shift  Outcome: Progressing  Goal: Tolerate enteral feeding throughout shift  Outcome: Progressing  Goal: Decreased nausea/vomiting throughout shift  Outcome: Progressing  Goal: Controlled blood glucose throughout shift  Outcome: Progressing  Goal: Out of bed three times today  Outcome: Progressing     Problem: Pain - Adult  Goal: Verbalizes/displays adequate comfort level or baseline comfort level  Outcome: Progressing     Problem: Safety - Adult  Goal: Free from fall injury  Outcome: Progressing     Problem: Discharge Planning  Goal: Discharge to home or other facility with appropriate resources  Outcome: Progressing     Problem: Chronic Conditions and Co-morbidities  Goal: Patient's chronic conditions and co-morbidity symptoms are monitored and maintained or improved  Outcome: Progressing     Problem: Nutrition  Goal: Nutrient intake appropriate for maintaining nutritional needs  Outcome: Progressing     Problem: Diabetes  Goal: Achieve decreasing blood glucose levels by end of shift  Outcome: Progressing  Goal: Increase stability of blood glucose readings by end of shift  Outcome: Progressing  Goal: Decrease in ketones present in urine by end of shift  Outcome: Progressing  Goal: Maintain electrolyte levels within acceptable range throughout shift  Outcome: Progressing  Goal: Maintain glucose levels >70mg/dl to <250mg/dl throughout shift  Outcome: Progressing  Goal: No changes in neurological exam by end of shift  Outcome:  Progressing  Goal: Learn about and adhere to nutrition recommendations by end of shift  Outcome: Progressing  Goal: Vital signs within normal range for age by end of shift  Outcome: Progressing  Goal: Increase self care and/or family involovement by end of shift  Outcome: Progressing  Goal: Receive DSME education by end of shift  Outcome: Progressing     Problem: Fall/Injury  Goal: Not fall by end of shift  Outcome: Progressing  Goal: Be free from injury by end of the shift  Outcome: Progressing  Goal: Verbalize understanding of personal risk factors for fall in the hospital  Outcome: Progressing  Goal: Verbalize understanding of risk factor reduction measures to prevent injury from fall in the home  Outcome: Progressing  Goal: Use assistive devices by end of the shift  Outcome: Progressing  Goal: Pace activities to prevent fatigue by end of the shift  Outcome: Progressing   The patient's goals for the shift include      The clinical goals for the shift include no falls this shift.    Over the shift, the patient did not make progress toward the following goals. Barriers to progression include generalized weakness. Recommendations to address these barriers include patient willing to work with PT/OT with prosthetic.

## 2025-01-31 NOTE — PROGRESS NOTES
Pt had refused PT/OT, and referral to rehab earlier today. However his family came and brought his prothesis, he is now agreeable to work with therapy tomorrow and accept rehab if deemed necessary.

## 2025-01-31 NOTE — PROGRESS NOTES
Subjective Data:  Patient underlying CVA, so far stable cardiac wise.  History of PCI.  Pending rehab placement.  Overnight Events:    None  Objective   Last Recorded Vitals  /64 (BP Location: Right arm, Patient Position: Lying)   Pulse 79   Temp 36.2 °C (97.2 °F) (Temporal)   Resp 18   Wt 57.5 kg (126 lb 12.2 oz)   SpO2 96%     Intake/Output Summary (Last 24 hours) at 1/31/2025 1248  Last data filed at 1/31/2025 0930  Gross per 24 hour   Intake 200 ml   Output 200 ml   Net 0 ml     Physical Exam:  HEENT: Normocephalic/atraumatic pupils equal react light  Neck exam mild JVD, no bruit  Lung exam clear to auscultation  Cardiac exam is regular rhythm S1-S2, soft systolic murmur heard.   Abdomen soft nontender, nondistended  Extremities no clubbing, cyanosis, trace edema, left BKA  Neuro exam grossly intact.  Image Results  Electrocardiogram, 12-lead PRN ACS symptoms  Poor data quality, interpretation may be adversely affected  Sinus rhythm with 1st degree AV block  Left bundle branch block  Abnormal ECG  Confirmed by Steven Terrell ([id number]) on 1/29/2025 5:43:54 PM  Vascular US Carotid Artery [place] [address]             Phone [phone]       Vascular Lab Report     [facility] US CAROTID ARTERY DUPLEX BILATERAL    Patient Name:      ORLANDO Oconnell Physician:  [id number] Jamia Ramirez MD  Study Date:        1/29/2025            Ordering Provider:  [id number] NITO HWANG  MRN/PID:           [id number]             Fellow:  Accession#:        [id number]         Technologist:       Marlon Thomas RVT  Date of Birth/Age: 1945 / 79      Technologist 2:                     years  Gender:            M                    Encounter#:         [id number]  Admission Status:  Inpatient            Location Performed: [facility]                                                               [facility]       Diagnosis/ICD: Occlusion and stenosis of bilateral carotid arteries-I65.23  CPT Codes:     95559 Cerebrovascular Carotid Duplex scan complete       CONCLUSIONS:  Right Carotid: Findings are consistent with less than 50% stenosis of the right proximal internal carotid artery. Right external carotid artery appears patent with no evidence of stenosis. The right vertebral artery is patent with antegrade flow. No evidence of hemodynamically significant stenosis in the right subclavian artery.  Left Carotid: Findings are consistent with 50 to 69% stenosis of the left proximal internal carotid artery. Left external carotid artery appears patent with no evidence of stenosis. The left vertebral artery is patent with antegrade flow. No evidence of hemodynamically significant stenosis in the left subclavian artery.  Additional Findings:  Technically difficult due to patient head movement and talking.       Imaging & Doppler Findings:  Right Plaque Morph: The proximal right internal carotid artery demonstrates heterogenous and calcified plaque.  Left Plaque Morph: The proximal left internal carotid artery demonstrates heterogenous and calcified plaque.      Right                        Left    PSV      EDV                PSV      EDV  56 cm/s            CCA P    51 cm/s  49 cm/s            CCA D    45 cm/s  102 cm/s 32 cm/s   ICA P    187 cm/s 61 cm/s  90 cm/s  22 cm/s   ICA M    94 cm/s  37 cm/s  71 cm/s  21 cm/s   ICA D    70 cm/s  26 cm/s  34 cm/s  14 cm/s Vertebral  87 cm/s  21 cm/s  81 cm/s          Subclavian 96 cm/s                     Right Left  ICA/CCA Ratio  2.1  4.1          [id number] Jamia Ramirez MD  Electronically signed by [id number] Jamia Ramirez MD on 1/29/2025 at 2:55:10 PM       ** Final **  CT angio head and neck w and wo IV contrast  Narrative: Interpreted By:  Nick Arellano,   STUDY:  CT ANGIO HEAD AND NECK W AND WO IV CONTRAST;  1/29/2025 1:49 pm      INDICATION:  Signs/Symptoms:carotid  stenosis.      COMPARISON:  CT head yesterday. CT chest 01/13/2025.      ACCESSION NUMBER(S):  [id number]      ORDERING CLINICIAN:  NITO HWANG      TECHNIQUE:  Unenhanced CT images of the head were obtained.  Subsequently,  75 ML  of Omnipaque 350 was administered intravenously and axial images of  the head and neck were acquired.  Coronal, sagittal, and 3-D  reconstructions were provided for review.      FINDINGS:      CTA COW: No major vascular occlusion. Moderate atherosclerotic  calcifications through the carotid siphons. No aneurysms.  No  vascular malformations. Patent dural venous sinuses and intracranial  deep venous structures.      CTA CAROTID: No aortic aneurysm or dissection. No significant  stenoses at the origins of the great vessels from the aortic arch. No  significant stenoses of the brachycephalic artery or bilateral  subclavian arteries.      Mixed fibrofatty and calcific atherosclerotic plaque crosses the left  carotid bifurcation. Narrowest luminal diameter in the proximal left  internal carotid artery is 1.7 mm which is approximately 60-65%  stenosis. There is mild plaque irregularity.      Mixed but predominantly fibrofatty atherosclerotic plaque spanning of  the right carotid bifurcation. Narrowest luminal diameter in the  proximal right internal carotid artery is 3.1 mm which is less than  50% stenosis. There is mild plaque irregularity.      Moderately severe short-segment atherosclerotic stenosis proximal V1  segment right vertebral artery.      NONVASCULAR HEAD: No intracranial mass. No intracranial hemorrhage.  No evidence of large evolving cortical infarction. Supratentorial  white matter hypodensities most likely related to chronic small  vessel ischemic change. Mild-to-moderate global brain parenchymal  volume loss. Bones intact. Postoperative changes bilateral orbital  globes.      NONVASCULAR NECK: No soft tissue mass. No adenopathy. Salivary glands  are unremarkable. Thyroid  gland unremarkable. Bones intact.  Visualized small right-greater-than-left pleural effusions.  Emphysematous changes in the upper lungs. Proctor opacity left  apex, similar to prior.          Impression: 1. No intracranial major vascular occlusion.  2. Approximately 60-65% short-segment atherosclerotic stenosis  proximal left internal carotid artery with mild plaque irregularity.  3. Less than 50% short-segment atherosclerotic stenosis proximal  right internal carotid artery with mild plaque irregularity.  4. Moderate to severe short-segment atherosclerotic stenosis proximal  V1 segment right vertebral artery.  5. Supratentorial white matter hypodensities most likely related to  chronic small vessel ischemic change.  6. Mild-to-moderate global brain parenchymal volume loss.  7. Visualized small right-greater-than-left pleural effusions.  Emphysematous changes in the upper lungs. Proctor opacity left  apex, similar to prior.      MACRO:  None      Signed by: Nick Arellano 1/29/2025 2:39 PM  Dictation workstation:   [id number]    Last Labs:  CBC - 1/29/2025:  6:33 AM  6.2 8.6 124    26.3      CMP - 1/29/2025:  6:33 AM  8.3 7.1 11 --- 1.2   3.3 3.3 7 60      PTT - 1/28/2025:  7:02 AM  1.1   11.3 32.5     Inpatient Medications:  Scheduled medications   Medication Dose Route Frequency    aspirin  81 mg oral Daily    atorvastatin  80 mg oral Nightly    busPIRone  7.5 mg oral BID    clopidogrel  75 mg oral Daily    enoxaparin  1 mg/kg subcutaneous q12h JENNIFER    isosorbide mononitrate ER  60 mg oral Daily    levothyroxine  50 mcg oral Daily    metoprolol succinate XL  25 mg oral Daily    pantoprazole  40 mg oral Daily before breakfast    polyethylene glycol  17 g oral Daily    ranolazine  1,000 mg oral BID    sennosides-docusate sodium  1 tablet oral Nightly    tamsulosin  0.4 mg oral Nightly     Assessment & Plan  Cerebrovascular accident (CVA), unspecified mechanism (Multi)  79-year-old patient with a known  history of CAD, recent PCI.  Now with a left-sided facial numbness.  Status post left BKA.  No chest pain or tightness ongoing workup for TIA versus CVA.    Elevated troponin: History of recently NSTEMI with a PCI of RCA.  No active chest pain tightness.  Continue GDMT for NSTEMI including nitroglycerin, aspirin, statin, ACE Imdur, beta-blocker as well as Plavix.    CAD: Continue current Ranexa 1000 mg p.o. twice daily.  Also currently on a Imdur 60 mg tablet p.o. daily.    Hyperlipidemia: Continue current Lipitor 80 mg tablet p.o. daily.    DVT, aspiration fall precaution.  Will follow as needed.  Stable from cardiac wise.  Okay to discharge and outpatient follow-up with the primary cardiologist.    1/30: Patient stable cardiac wise continue current aspirin, Plavix, Lipitor, Imdur with the beta-blocker.  Stable cardiac wise to be discharged.  Continue Ranexa if tolerated.  Outpatient office follow-up.    1/31: Patient stable cardiac wise.  Continue current GDMT.  Okay to discharge from cardiac perspective.  Continue current aspirin, Lipitor, Plavix with Imdur as well as metoprolol and Ranexa.    Code Status:  Full Code  I spent 35 minutes in the professional and overall care of this patient.  Jonathan Tristan MD

## 2025-01-31 NOTE — SIGNIFICANT EVENT
Responded to rapid response/code blue.     Patient had just arrived to RNF bed after being transferred from SDU when he reportedly went unresponsive and did not have a palpable pulse. Chest compressions were initiated but had return of pulse after a minute or so.     On arrival to bedside, patient had HR in the 30s. Given atropine x1. Presence of pulse was confirmed by doppler. BP stable. BG in the 200s. Patient was minimally responsive and was being bagged by RT. Confirmed code status - FULL CODE.     GALDINO Donnelly and Dr. Pickett from the ICU team present at bedside. Decision made to intubate patient. Care transferred to the ICU team.

## 2025-01-31 NOTE — PROGRESS NOTES
Chavez Llamas is a 79 y.o. male on day 3 of admission presenting with Cerebrovascular accident (CVA), unspecified mechanism (Multi).      Subjective   The patient reports nausea     Objective     Last Recorded Vitals  /64 (BP Location: Right arm, Patient Position: Lying)   Pulse 79   Temp 36.2 °C (97.2 °F) (Temporal)   Resp 18   Wt 57.5 kg (126 lb 12.2 oz)   SpO2 96%   Intake/Output last 3 Shifts:    Intake/Output Summary (Last 24 hours) at 1/31/2025 0946  Last data filed at 1/30/2025 2210  Gross per 24 hour   Intake 200 ml   Output --   Net 200 ml       Admission Weight  Weight: 66.2 kg (146 lb) (01/28/25 0943)    Daily Weight  01/31/25 : 57.5 kg (126 lb 12.2 oz)      Physical Exam  General: Patient is alert.  No acute distress.  HEENT: Clear sclera.  CVS: RRR.  Abdomen: Soft.  Nontender.  Bowel sounds present.  Extremities: Left BKA.  Right ankle with no pitting edema.  Psychiatric: Cooperative.  Neurologic: NIH stroke score is 1 (1 point for decree sensation left face, left arm, left leg.  Patient has decreased vision but is legally blind.  Patient has left BKA and unable to test heel-to-shin bilateral lower extremities.)    Relevant Results  Results for orders placed or performed during the hospital encounter of 01/28/25 (from the past 24 hours)   POCT GLUCOSE   Result Value Ref Range    POCT Glucose 199 (H) 74 - 99 mg/dL   POCT GLUCOSE   Result Value Ref Range    POCT Glucose 162 (H) 74 - 99 mg/dL         Assessment/Plan   Transient ischemic attack  NSTEMI  CAD status post [facility]  Moderate to severe left carotid stenosis  Chronic systolic CHF with EF of 45% on echo on 1/20/2025   Hypertension, diabetes mellitus, CKD stage III, hypothyroidism, BPH, GERD    Plan:  MRI of the brain was negative for acute CVA  MRA of the neck showed left carotid stenosis.  He needs to follow-up with vascular surgery in 6 months for surveillance ultrasound  The patient continues to have chest  pain, he has recent coronary stent placed.  Reviewed cardiology recommendations.  Continue medical management including Plavix aspirin Ranexa, Imdur, statin, beta-blocker, and ACE inhibitor  DVT prophylaxis  The patient is now agreeing to short-term SNF, PT OT evaluation is ordered    He is cleared for discharge by all consultants  Discussed with case management, possible SNF placement on Monday    Vinh Bernal MD

## 2025-01-31 NOTE — PROGRESS NOTES
Pt was very nauseas this morning. Requested order for prn IV zofran. Giving that a little time to work, then will give am meds once his nausea has settled.

## 2025-01-31 NOTE — PROGRESS NOTES
Occupational Therapy    Evaluation    Patient Name: Chavez Llamas  MRN: [id number]  Department: [address]  Room: [address] [place] [address]-  Today's Date: 01/31/25  Time Calculation  Start Time: 1011  Stop Time: 1043  Time Calculation (min): 32 min       Assessment:  OT Assessment: Pt would benefit from acute OT services to address deficits in ADLs, transfers and functional mobility  Prognosis: Good  Barriers to Discharge Home: Caregiver assistance, Physical needs  Caregiver Assistance: Patient lives alone and/or does not have reliable caregiver assistance  Physical Needs: Intermittent ADL assistance needed, 24hr mobility assistance needed, High falls risk due to function or environment  Medical Staff Made Aware: Yes  End of Session Communication: Bedside nurse  End of Session Patient Position: Bed, 3 rail up, Alarm on (all needs in reach)  OT Assessment Results: Decreased ADL status, Decreased upper extremity strength, Decreased endurance, Decreased functional mobility, Decreased IADLs  Prognosis: Good  Medical Staff Made Aware: Yes  Strengths: Premorbid level of function, Support of extended family/friends  Barriers to Participation: Comorbidities  Plan:  Treatment Interventions: ADL retraining, Functional transfer training, UE strengthening/ROM, Endurance training, Patient/family training, Equipment evaluation/education  OT Frequency: 3 times per week  OT Discharge Recommendations: Moderate intensity level of continued care  OT - OK to Discharge: Yes  Treatment Interventions: ADL retraining, Functional transfer training, UE strengthening/ROM, Endurance training, Patient/family training, Equipment evaluation/education    Subjective   Current Problem:  1. Cerebrovascular accident (CVA), unspecified mechanism (Multi)        2. Dizziness        3. Elevated troponin        4. Bilateral carotid artery stenosis  Vascular US Carotid Artery Duplex Bilateral    Vascular US Carotid Artery Duplex Bilateral        General:   OT Received On:  01/31/25  General  Reason for Referral: Impaired ADLs. Pt admitted from home with c/o L facial and L arm numbness  Referred By: Dustin Alanis DO  Past Medical History Relevant to Rehab: anemia, anxiety/depression, COPD, DM-II, HTN, MI, CHF, s/p L BKA  Family/Caregiver Present: No  Co-Treatment: PT  Prior to Session Communication: Bedside nurse  Patient Position Received: Bed, 3 rail up, Alarm off, not on at start of session  General Comment: Cleared by nursing. Pt pleasant and agreeable to therapy   Precautions:  Hearing/Visual Limitations: legally blind  Medical Precautions: Fall precautions  Prosthesis/Orthosis Used: Below knee prosthesis (LLE)    Vital Signs Comment: HR 90     Pain:  Pain Assessment  Pain Assessment: 0-10  0-10 (Numeric) Pain Score: 1  Pain Type: Acute pain  Pain Location: Arm  Pain Orientation: Right (at old IV site)  Pain Interventions: Repositioned  Response to Interventions: Absence of non-verbal indicators of pain    Objective   Cognition:  Overall Cognitive Status: Within Functional Limits  Orientation Level: Oriented X4         Home Living:  Type of Home: House  Lives With: Alone  Home Adaptive Equipment: Wheelchair-manual, Walker rolling or standard  Home Layout: Two level, Able to live on main level with bedroom/bathroom (pt reports he does not use his basement)  Home Access: Stairs to enter with rails  Entrance Stairs-Rails: Both  Entrance Stairs-Number of Steps: 1+2  Bathroom Shower/Tub: Tub/shower unit (pt reports he has not gotten in the tube and has been sponge bathing since wife passed away last June)  Bathroom Equipment: Grab bars in shower, Shower chair with back  Prior Function:  Level of [place]: Independent with ADLs and functional transfers, Needs assistance with homemaking  Receives Help From: Family (dtr)  ADL Assistance: Independent  Homemaking Assistance: Needs assistance (dtr assists with shopping and transportation)  Driving/Transportation:  Family/Friend  Ambulatory Assistance:  (self propels w/c, mod I for limited functional mobility with RW and LLE prosthesis)  Hand Dominance: Right     ADL:  Eating Assistance: Stand by  Grooming Assistance: Stand by  Bathing Assistance: Moderate  UE Dressing Assistance: Minimal  LE Dressing Assistance: Minimal  Toileting Assistance with Device: Moderate  Activities of Daily Living:       LE Dressing  LE Dressing: Yes  Shoe Level of Assistance: Close supervision, Setup  Prosthetic Level of Assistance: Close supervision, Setup  LE Dressing Where Assessed: Edge of bed  LE Dressing Comments: donning/doffing R shoe and L LE prosthesis     Activity Tolerance:  Endurance: Decreased tolerance for upright activites     Bed Mobility/Transfers: Bed Mobility  Bed Mobility:  (close supervision for safety for supine>seated EOB with use of bed rail and HOB elevated; close supervision for safety seated EOB>supine with HOB flat)    Transfers  Transfer:  (min A x1-2 for balance and controlled descent sit<>stand bed level x2 trials with arm in arm assist)     Functional Mobility:  Functional Mobility  Functional Mobility Performed:  (min A x2 for functional mobility ~3-4 side steps with arm in arm assist with LLE prosthesis donned)  Sitting Balance:  Static Sitting Balance  Static Sitting-Balance Support: Feet supported  Static Sitting-Level of Assistance: Close supervision  Sensation:  Sensation Comment: pt denied numbness/paresthesia BUEs  Strength:  Strength Comments: BUEs grossly 3+/5  Hand Function:  Hand Function  Gross Grasp: Functional  Coordination: Functional  Extremities: RUE   RUE : Within Functional Limits and LUE   LUE: Within Functional Limits    Outcome Measures: [facility] Daily Activity  Putting on and taking off regular lower body clothing: A little  Bathing (including washing, rinsing, drying): A lot  Putting on and taking off regular upper body clothing: A little  Toileting, which includes using toilet, bedpan or  urinal: A lot  Taking care of personal grooming such as brushing teeth: A little  Eating Meals: A little  Daily Activity - Total Score: 16    Education Documentation  ADL Training, taught by Carolyn Lima OT at 1/31/2025  1:20 PM.  Learner: Patient  Readiness: Acceptance  Method: Explanation, Demonstration  Response: Verbalizes Understanding  Comment: Educated on OT POC    Education Comments  No comments found.    Goals:  Encounter Problems       Encounter Problems (Active)       ADLs       Pt will complete ADL tasks at mod I with use of AE prn  (Progressing)       Start:  01/31/25    Expected End:  02/21/25               Functional Mobility       Pt will perform functional mobility short household distances at mod I with use of RW and LLE prosthesis.    (Progressing)       Start:  01/31/25    Expected End:  02/21/25               OT Transfers       Pt will perform functional transfers at mod I. (Progressing)       Start:  01/31/25    Expected End:  02/21/25

## 2025-01-31 NOTE — ASSESSMENT & PLAN NOTE
79-year-old patient with a known history of CAD, recent PCI.  Now with a left-sided facial numbness.  Status post left BKA.  No chest pain or tightness ongoing workup for TIA versus CVA.    Elevated troponin: History of recently NSTEMI with a PCI of RCA.  No active chest pain tightness.  Continue GDMT for NSTEMI including nitroglycerin, aspirin, statin, ACE Imdur, beta-blocker as well as Plavix.    CAD: Continue current Ranexa 1000 mg p.o. twice daily.  Also currently on a Imdur 60 mg tablet p.o. daily.    Hyperlipidemia: Continue current Lipitor 80 mg tablet p.o. daily.    DVT, aspiration fall precaution.  Will follow as needed.  Stable from cardiac wise.  Okay to discharge and outpatient follow-up with the primary cardiologist.    1/30: Patient stable cardiac wise continue current aspirin, Plavix, Lipitor, Imdur with the beta-blocker.  Stable cardiac wise to be discharged.  Continue Ranexa if tolerated.  Outpatient office follow-up.    1/31: Patient stable cardiac wise.  Continue current GDMT.  Okay to discharge from cardiac perspective.  Continue current aspirin, Lipitor, Plavix with Imdur as well as metoprolol and Ranexa.

## 2025-01-31 NOTE — PROGRESS NOTES
Physical Therapy    Physical Therapy Evaluation & Treatment    Patient Name: Chavez Llamas  MRN: [id number]  Department: [facility] 3 E  Room: 05/05-A  Today's Date: 1/31/2025   Time Calculation  Start Time: 1010  Stop Time: 1042  Time Calculation (min): 32 min    Assessment/Plan   PT Assessment  PT Assessment Results: Decreased strength, Decreased mobility, Decreased endurance, Impaired balance, Impaired vision  Rehab Prognosis: Good  Barriers to Discharge Home: Caregiver assistance  Caregiver Assistance: Caregiver assistance needed per identified barriers - however, no caregiver assistance available at home  Strengths: Support of extended family/friends, Rehab experience, Premorbid level of function, Attitude of self  Barriers to Participation: Comorbidities  End of Session Communication: Bedside nurse  Assessment Comment: He presented with the above listed impairments (see Assessment Results). Pt required minimally increased assist during functional mobility compared to his reported baseline. Pt would benefit from continued skilled PT services for maximizing independence and safety prior to & after discharge (MODERATE intensity).  End of Session Patient Position: Bed, 3 rail up, Alarm off, not on at start of session (call light and needs within reach)   IP OR SWING BED PT PLAN  Inpatient or Swing Bed: Inpatient  PT Plan  Treatment/Interventions: Bed mobility, Transfer training, Gait training, Strengthening, Therapeutic exercise, Therapeutic activity, Balance training  PT Plan: Ongoing PT  PT Frequency: 4 times per week  PT Discharge Recommendations: Moderate intensity level of continued care  PT Recommended Transfer Status: Assist x1, Assistive device (FWW)  PT - OK to Discharge: Yes      Subjective     General Visit Information:  General  Reason for Referral: Impaired functional mobility. This 79 year old was admitted for CVA.  Past Medical History Relevant to Rehab: anemia, anxiety/depression, COPD, DM-II, HTN, MI,  CHF, s/p L BKA  Family/Caregiver Present: No  Co-Treatment: OT  Co-Treatment Reason: To maximize safety of pt/staff during functional mobility  Prior to Session Communication: Bedside nurse  Patient Position Received: Bed, 3 rail up, Alarm off, not on at start of session  General Comment: Cleared by RN for participation. Pt agreed to session and was fully engaged throughout.    Home Living:  Home Living  Type of Home: House  Lives With: Alone  Home Adaptive Equipment: Wheelchair-manual  Home Layout: Able to live on main level with bedroom/bathroom  Home Access: Stairs to enter with rails  Entrance Stairs-Rails: Both  Entrance Stairs-Number of Steps: 1 + 2  Bathroom Shower/Tub: Tub/shower unit  Bathroom Equipment: Grab bars in shower, Shower chair with back    Prior Level of Function:  Prior Function Per Pt/Caregiver Report  ADL Assistance: Independent  Homemaking Assistance: Independent  Ambulatory Assistance: Independent (Reported 1 fall in the past 6 months.)    Precautions:  Precautions  Hearing/Visual Limitations: Legally blind  Medical Precautions: Fall precautions  Prosthesis/Orthosis Used:  (L below knee prosthesis)      Objective   Pain:  Pain Assessment  0-10 (Numeric) Pain Score: 1  Pain Type: Acute pain  Pain Location: Arm  Pain Orientation: Right  Pain Interventions: Repositioned  Response to Interventions: Absence of non-verbal indicators of pain    Cognition:  Cognition  Overall Cognitive Status: Within Functional Limits  Orientation Level: Oriented X4    General Assessments:    Activity Tolerance  Endurance: Decreased tolerance for upright activites    Sensation  Sensation Comment: Not tested; pt denied paresthesias    ROM  BLE AROM: grossly WFL; pt with L BKA.    Strength  Strength Comments: BLE: grossly >/=3+/5    Coordination  Movements are Fluid and Coordinated: Yes    Postural Control  Postural Control: Within Functional Limits    Static Sitting Balance  Static Sitting-Balance Support: Bilateral  upper extremity supported  Static Sitting-Level of Assistance: Close supervision    Static Standing Balance  Static Standing-Balance Support: Bilateral upper extremity supported  Static Standing-Level of Assistance: Minimum assistance  Dynamic Standing Balance  Dynamic Standing-Balance Support: Bilateral upper extremity supported  Dynamic Standing-Level of Assistance: Minimum assistance (Laurence x2)    Functional Assessments:  Bed Mobility  Bed Mobility: Yes  Bed Mobility 1  Bed Mobility 1: Supine to sitting  Level of Assistance 1: Close supervision (HOB elevated ~40° and used R bed rail)  Bed Mobility 2  Bed Mobility  2: Sitting to supine  Level of Assistance 2: Close supervision (bed flat, no rail; toward R side)    Transfers  Transfer: Yes  Transfer 1  Technique 1: Sit to stand  Transfer Device 1:  (bilateral arm-in-arm support)  Transfer Level of Assistance 1: Minimum assistance. X2 trials at EOB.  Transfers 2  Technique 2: Stand to sit  Transfer Device 2:  (bilateral arm-in-arm support)  Transfer Level of Assistance 2: Minimum assistance    Ambulation/Gait Training  Ambulation/Gait Training Performed: Yes  Ambulation/Gait Training 1  Surface 1: Level tile  Device 1:  (bilateral arm-in-arm support)  Assistance 1: Minimum assistance (Laurence x2)  Quality of Gait 1:  (2 lateral steps at EOB toward R side. Steady gait.)     Treatments:  Therapeutic Activity  See above for 2nd trial of sit<>stand.    Outcome Measures:  [facility] Basic Mobility  Turning from your back to your side while in a flat bed without using bedrails: A little  Moving from lying on your back to sitting on the side of a flat bed without using bedrails: A little  Moving to and from bed to chair (including a wheelchair): A lot  Standing up from a chair using your arms (e.g. wheelchair or bedside chair): A little  To walk in hospital room: A little  Climbing 3-5 steps with railing: A lot  Basic Mobility - Total Score: 16    Encounter Problems       Encounter  Problems (Active)       PT Problem       Pt will transition supine<>sit with mod I.        Start:  01/31/25    Expected End:  03/15/25            Pt will transfer sit<>stand with FWW & mod I.        Start:  01/31/25    Expected End:  03/15/25            Pt will ambulate >/=30 ft with FWW & mod I.        Start:  01/31/25    Expected End:  03/15/25                   Education Documentation  Precautions, taught by Louise Cruz PT at 1/31/2025 12:08 PM.  Learner: Patient  Readiness: Acceptance  Method: Explanation  Response: Needs Reinforcement  Comment: Fall precautions. Educated pt in PT role and POC.    Mobility Training, taught by Louise Cruz PT at 1/31/2025 12:08 PM.  Learner: Patient  Readiness: Acceptance  Method: Explanation  Response: Needs Reinforcement  Comment: Fall precautions. Educated pt in PT role and POC.    Education Comments  No comments found.

## 2025-01-31 NOTE — SIGNIFICANT EVENT
"[facility] CRITICAL CARE SIGNIFICANT EVENT NOTE:    Date:  1/31/2025  Patient:  Chavez Llamas  YOB: 1945  MRN:  [id number]   Admit Date:  1/28/2025      Responded to [employer] called overhead. On my arrival, the patient was laying on his left side with his eyes closed, and moaning. Per nursing that was at bedside during the event, he was just moved to that room about an hour prior. He was alert and oriented x3 about 10 mins prior per reports from family as they spoke on the phone with him at that time. Nursing reported that he \"stopped breathing\" and did not respond to stimuli, so chest compressions were started - unsure if pulse check was done. After <10 chest compressions, the patient began to spontaneously move all extremities, and again groan in pain. He was attached to lifepack and was noted to be bradycardic. HR as low as 28 and mentation remained poor, so he was given atropine x1 with appropriate response in HR to 50s-60s. BP normotensive. POCT glucose 251.     Due to poor mentation and inability to get an adequate pulse ox reading, I requested RT to BVM the patient and prepare for intubation. ICU intensivist Dr. Pickett arrived to bedside and prepared for intubation. Medication administration was deferred as patient's mentation remained poor, and he was minimally responsive to noxious stimuli, and was deemed needing an emergent airway, so a glidescope was slightly inserted into oropharyngeal airway and patient immediately vomited. He was sat up right away and continued to vomit. Suction was used for secretions. His mentation improved and he was able to state his name and birthday. Placed on NC. Deferred intubation at this time as patient seemed to be protecting his airway. STAT ABG revealed PaO2 >300 on NRB mask and lactate 8.8. He was urgently transported to the ICU for further management.     Santhosh Bettencourt PA-C  Pulmonary and Critical Care Medicine   [facility]    "

## 2025-02-01 NOTE — PROGRESS NOTES
Spiritual Care Visit  Spiritual Care Request    Reason for Visit:  Routine Visit: Introduction     Request Received From:       Focus of Care:  Visited With: Patient         Refer to :          Spiritual Care Assessment    Spiritual Assessment:                      Care Provided:       Sense of Community and or Anabaptism Affiliation:  Christian   Values/Beliefs  Spiritual Requests During Hospitalization: Mikye asked for a blessing today.     Addressed Needs/Concerns and/or [place] Through:     Sacramental Encounters  Communion: Does not want communion  Communion Given Indicator: No  Sacrament of Sick-Anointing: Patient declined anointing    Outcome:        Advance Directives:         Spiritual Care Annotation    Annotation:  Paul asked for a blessing today. No sacraments from the Gnosticism.  Remigio Qureshi

## 2025-02-01 NOTE — CARE PLAN
The patient's goals for the shift include to rest/get some sleep    The clinical goals for the shift include Patient will remain hemodynamically stable    Over the shift, the patient did not make progress toward the following goals. Barriers to progression include. Recommendations to address these barriers include  Problem: General Stroke  Goal: Establish a mutual long term goal with patient by discharge  Outcome: Progressing  Goal: Demonstrate improvement in neurological exam throughout the shift  Outcome: Progressing  Goal: Maintain BP within ordered limits throughout shift  Outcome: Progressing  Goal: Participate in treatment (ie., meds, therapy) throughout shift  Outcome: Progressing  Goal: No symptoms of aspiration throughout shift  Outcome: Progressing  Goal: No symptoms of hemorrhage throughout shift  Outcome: Progressing  Goal: Tolerate enteral feeding throughout shift  Outcome: Progressing  Goal: Decreased nausea/vomiting throughout shift  Outcome: Progressing  Goal: Controlled blood glucose throughout shift  Outcome: Progressing  Goal: Out of bed three times today  Outcome: Progressing     Problem: Safety - Adult  Goal: Free from fall injury  Outcome: Progressing     Problem: Discharge Planning  Goal: Discharge to home or other facility with appropriate resources  Outcome: Progressing     Problem: Chronic Conditions and Co-morbidities  Goal: Patient's chronic conditions and co-morbidity symptoms are monitored and maintained or improved  Outcome: Progressing     Problem: Nutrition  Goal: Nutrient intake appropriate for maintaining nutritional needs  Outcome: Progressing     Problem: Diabetes  Goal: Achieve decreasing blood glucose levels by end of shift  Outcome: Progressing  Goal: Increase stability of blood glucose readings by end of shift  Outcome: Progressing  Goal: Decrease in ketones present in urine by end of shift  Outcome: Progressing  Goal: Maintain electrolyte levels within acceptable range  throughout shift  Outcome: Progressing  Goal: Maintain glucose levels >70mg/dl to <250mg/dl throughout shift  Outcome: Progressing  Goal: No changes in neurological exam by end of shift  Outcome: Progressing  Goal: Learn about and adhere to nutrition recommendations by end of shift  Outcome: Progressing  Goal: Vital signs within normal range for age by end of shift  Outcome: Progressing  Goal: Increase self care and/or family involovement by end of shift  Outcome: Progressing  Goal: Receive DSME education by end of shift  Outcome: Progressing     Problem: Fall/Injury  Goal: Not fall by end of shift  Outcome: Progressing  Goal: Be free from injury by end of the shift  Outcome: Progressing  Goal: Verbalize understanding of personal risk factors for fall in the hospital  Outcome: Progressing  Goal: Verbalize understanding of risk factor reduction measures to prevent injury from fall in the home  Outcome: Progressing  Goal: Use assistive devices by end of the shift  Outcome: Progressing  Goal: Pace activities to prevent fatigue by end of the shift  Outcome: Progressing     Problem: ADLs  Goal: Pt will complete ADL tasks at mod I with use of AE prn   Outcome: Progressing     Problem: Pain  Goal: Takes deep breaths with improved pain control throughout the shift  Outcome: Progressing  Goal: Turns in bed with improved pain control throughout the shift  Outcome: Progressing  Goal: Walks with improved pain control throughout the shift  Outcome: Progressing  Goal: Performs ADL's with improved pain control throughout shift  Outcome: Progressing  Goal: Participates in PT with improved pain control throughout the shift  Outcome: Progressing  Goal: Free from opioid side effects throughout the shift  Outcome: Progressing  Goal: Free from acute confusion related to pain meds throughout the shift  Outcome: Progressing   .

## 2025-02-01 NOTE — CARE PLAN
The patient's goals for the shift include  stable hemodynamics    The clinical goals for the shift include Patient will remain hemodynamically stable    Over the shift, the patient did make progress toward the following goals.       Problem: General Stroke  Goal: Establish a mutual long term goal with patient by discharge  Outcome: Progressing  Goal: Demonstrate improvement in neurological exam throughout the shift  Outcome: Progressing  Goal: Maintain BP within ordered limits throughout shift  Outcome: Progressing  Goal: Participate in treatment (ie., meds, therapy) throughout shift  Outcome: Progressing  Goal: No symptoms of aspiration throughout shift  Outcome: Progressing  Goal: No symptoms of hemorrhage throughout shift  Outcome: Progressing  Goal: Tolerate enteral feeding throughout shift  Outcome: Progressing  Goal: Decreased nausea/vomiting throughout shift  Outcome: Progressing  Goal: Controlled blood glucose throughout shift  Outcome: Progressing  Goal: Out of bed three times today  Outcome: Progressing     Problem: Safety - Adult  Goal: Free from fall injury  Outcome: Progressing     Problem: Discharge Planning  Goal: Discharge to home or other facility with appropriate resources  Outcome: Progressing     Problem: Chronic Conditions and Co-morbidities  Goal: Patient's chronic conditions and co-morbidity symptoms are monitored and maintained or improved  Outcome: Progressing     Problem: Nutrition  Goal: Nutrient intake appropriate for maintaining nutritional needs  Outcome: Progressing     Problem: Diabetes  Goal: Achieve decreasing blood glucose levels by end of shift  Outcome: Progressing  Goal: Increase stability of blood glucose readings by end of shift  Outcome: Progressing  Goal: Decrease in ketones present in urine by end of shift  Outcome: Progressing  Goal: Maintain electrolyte levels within acceptable range throughout shift  Outcome: Progressing  Goal: Maintain glucose levels >70mg/dl to  <250mg/dl throughout shift  Outcome: Progressing  Goal: No changes in neurological exam by end of shift  Outcome: Progressing  Goal: Learn about and adhere to nutrition recommendations by end of shift  Outcome: Progressing  Goal: Vital signs within normal range for age by end of shift  Outcome: Progressing  Goal: Increase self care and/or family involovement by end of shift  Outcome: Progressing  Goal: Receive DSME education by end of shift  Outcome: Progressing     Problem: Fall/Injury  Goal: Not fall by end of shift  Outcome: Progressing  Goal: Be free from injury by end of the shift  Outcome: Progressing  Goal: Verbalize understanding of personal risk factors for fall in the hospital  Outcome: Progressing  Goal: Verbalize understanding of risk factor reduction measures to prevent injury from fall in the home  Outcome: Progressing  Goal: Use assistive devices by end of the shift  Outcome: Progressing  Goal: Pace activities to prevent fatigue by end of the shift  Outcome: Progressing     Problem: ADLs  Goal: Pt will complete ADL tasks at mod I with use of AE prn   Outcome: Progressing     Problem: Pain  Goal: Takes deep breaths with improved pain control throughout the shift  Outcome: Progressing  Goal: Turns in bed with improved pain control throughout the shift  Outcome: Progressing  Goal: Walks with improved pain control throughout the shift  Outcome: Progressing  Goal: Performs ADL's with improved pain control throughout shift  Outcome: Progressing  Goal: Participates in PT with improved pain control throughout the shift  Outcome: Progressing  Goal: Free from opioid side effects throughout the shift  Outcome: Progressing  Goal: Free from acute confusion related to pain meds throughout the shift  Outcome: Progressing

## 2025-02-01 NOTE — PROGRESS NOTES
[facility] Critical Care Medicine       Date:  2/1/2025  Patient:  Chavez Llamas  YOB: 1945  MRN:  [id number]   Admit Date:  1/28/2025      Chief complaint: stroke-like symptoms, generalized weakness        History of Present Illness:  Chavez Llamas is a 79 y.o. year old male patient with past medical history of coronary artery disease s/p CABG 1990s, complicated cardiac history post-operatively with multiple PCIs - most recently 1/20/25 with PCI to RCA at [facility] [place], recurrent angina, HFmrEF, hypertension, type 2 diabetes mellitus, peripheral arterial disease s/p left BKA, CKD stage 3, GERD, hypothyroidism, legal blindness, who presented to  ED 1/28 with complaints of generalized weakness x1 day and development of stroke-like symptoms. Per chart review, it appears that he woke up on the morning of arrival with left-sided facial numbness, dizziness, and light-headedness. Also reports of difficulty swallowing. [place] stroke scale negative per EMS. NIH score 1 in ED. Code brain attack activated. Case discussed with telestroke provider and TNK was not indicated due to outside of window.      ED Course: Vitals on arrival to the ED include, /91, HR 89, RR 18, SpO2 99% on RA. CMP with glucose 140, sodium 135, BUN/Cr 24/1.64, EGFR 42, and ALT 7.  Troponin elevated at 460.  CBC with WBC 8.4 and H/H of 9.1/28.2.  Coag panel with PT/INR 11.3/1.1 and aPTT 32.5.  Pt negative for covid 19, flu A and B.  CT head with atrophy resulting in prominence of the ventricles and sulci, areas of decreased attenuation within the white matter which are nonspecific but are commonly associated with small vessel ischemic disease.  Code Brain attack was activated by the ED provider in which pt was not a candidate for thrombolytics.  Pt was admitted under general medicine for further evaluation and treatment.      Interval Hospital Course (1/28-1/31): Pt followed by neurology for concern of CVA vs TIA in which an MRI  of the brain was negative and MRA of the neck significant for left carotid stenosis.  Vascular surgery following with plan for surveillance with ultrasound.  Pt noted with an asymptommatic NSTEMI and significant cardiac history, cardiology following and will continue on current medication regimen.      Interval ICU Events:  1/31: Patient arrived to ICU post rapid-response/code blue. See significant event notes for further details. Labs showing improvement in lactic acid, but mild elevation in troponin from prior. O2 stable on nasal cannula. Will cont to trend trop to peak.     2/1: HR remained stable with no further bradycardic events, had a few episodes of short sinus pauses on telemetry in evening 1-2 sec. No pauses since midnight. Serial EKGs stable with no new ST changes. Later this morning, patient had an episode of midsternal chest pain, with worsening left sided facial pain, and decreased left-sided vision, and vomiting. EKG showing PVCs and ST abnormalities in V4-V6 which is changed from yesterday. Discussed with on-call cardiology who extensively discussed the case with patient and family at bedside. Plan for medical and symptom management for now with expanding GDMT. Symptoms returned to baseline about 1hr after initial episode. Repeat troponin downtrending.    Objective     Medical History:  Past Medical History:   Diagnosis Date    Old myocardial infarction 02/01/2017    Past heart attack     Past Surgical History:   Procedure Laterality Date    CARDIAC CATHETERIZATION N/A 12/6/2024    Procedure: Left Heart Cath;  Surgeon: Chaim Soriano MD;  Location: [facility] Cardiac Cath Lab;  Service: Cardiovascular;  Laterality: N/A;    CARDIAC CATHETERIZATION N/A 1/20/2025    Procedure: PCI SOFI Stent- Coronary;  Surgeon: Oscar Weston MD;  Location: [place] [address] Cardiac Cath Lab;  Service: Cardiovascular;  Laterality: N/A;    CORONARY ARTERY BYPASS GRAFT  04/14/2015    Coronary Artery Surgery    MR HEAD ANGIO WO IV  CONTRAST  2/23/2022    MR HEAD ANGIO WO IV CONTRAST [facility]     Medications Prior to Admission   Medication Sig Dispense Refill Last Dose/Taking    aspirin 81 mg EC tablet Take 1 tablet (81 mg) by mouth once daily. 90 tablet 0 1/27/2025 Morning    busPIRone (Buspar) 15 mg tablet TAKE 1/2 (ONE-HALF) TABLET BY MOUTH IN THE MORNING AND AT BEDTIME 90 tablet 0 1/27/2025 Morning    clopidogrel (Plavix) 75 mg tablet Take 1 tablet (75 mg) by mouth once daily. 30 tablet 1 1/27/2025    insulin lispro (HumaLOG) 100 unit/mL injection Inject 10 Units under the skin early in the morning.. Inject 10 units subcutaneous daily.  Please check blood glucose level before injection. (Patient taking differently: Inject under the skin once daily. SLIDING SCALE)   1/27/2025    isosorbide mononitrate ER (Imdur) 60 mg 24 hr tablet Take 1 tablet (60 mg) by mouth once daily. Do not crush or chew. 30 tablet 1 1/27/2025    levothyroxine (Synthroid, Levoxyl) 50 mcg tablet TAKE 1 TABLET BY MOUTH ONCE DAILY IN THE MORNING BEFORE MEAL(S) ON AN EMPTY STOMACH 90 tablet 2 1/27/2025    metoprolol succinate XL (Toprol-XL) 25 mg 24 hr tablet Take 1 tablet (25 mg) by mouth once daily. 90 tablet 3 Past Week    pantoprazole (ProtoNix) 40 mg EC tablet Take 1 tablet (40 mg) by mouth once daily in the morning. Take before meals. Do not crush, chew, or split. 30 tablet 3 1/27/2025    ranolazine (Ranexa) 1,000 mg 12 hr tablet Take 1 tablet (1,000 mg) by mouth 2 times a day. Do not crush, chew, or split. 60 tablet 1 1/27/2025    tamsulosin (Flomax) 0.4 mg 24 hr capsule Take 1 capsule (0.4 mg) by mouth once daily at bedtime. 30 capsule 1 1/28/2025    atorvastatin (Lipitor) 40 mg tablet Take 1 tablet (40 mg) by mouth once daily at bedtime. 90 tablet 0 1/26/2025 Evening    bisacodyl (Dulcolax) 10 mg suppository Insert 1 suppository (10 mg) into the rectum once daily. 30 suppository 1 Unknown    dicyclomine (Bentyl) 10 mg capsule TAKE 1 CAPSULE BY MOUTH  "TWICE DAILY AS NEEDED FOR  IBS (Patient not taking: Reported on 1/13/2025) 180 capsule 1     hydrOXYzine pamoate (Vistaril) 25 mg capsule Take 1 capsule (25 mg) by mouth 3 times a day as needed for anxiety. (Patient taking differently: Take 1 capsule (25 mg) by mouth as needed at bedtime for anxiety.) 30 capsule 0 1/26/2025    insulin glargine (Lantus) 100 unit/mL (3 mL) pen Inject 12 Units under the skin once daily at bedtime. Take as directed per insulin instructions. (Patient not taking: Reported on 1/28/2025) 3.6 mL 1 Not Taking    lancets (BD Ultra Fine Lancets) 33 gauge misc Testing T.I.D 100 each 3     lubricating eye drops ophthalmic solution Administer 1 drop into both eyes if needed for dry eyes or other.   Unknown    mirtazapine (Remeron) 15 mg tablet TAKE 1 TABLET BY MOUTH ONCE DAILY AT BEDTIME (Patient not taking: Reported on 1/28/2025) 90 tablet 0 Not Taking    nitroglycerin (Nitrostat) 0.4 mg SL tablet Place 1 tablet (0.4 mg) under the tongue every 5 minutes if needed for chest pain. 25 tablet 3 Unknown    ondansetron (Zofran) 4 mg tablet Take 1 tablet (4 mg) by mouth every 8 hours if needed for nausea or vomiting. (Patient not taking: Reported on 1/28/2025) 20 tablet 0 Not Taking    pen needle, diabetic 31 gauge x 3/16\" needle Use as directed 100 each 3 Unknown    sennosides-docusate sodium (Joselin-Colace) 8.6-50 mg tablet Take 1 tablet by mouth once daily at bedtime. 30 tablet 1 Unknown     Patient has no known allergies.  Social History     Tobacco Use    Smoking status: Former     Types: Cigarettes    Smokeless tobacco: Never   Vaping Use    Vaping status: Never Used     Family History   Problem Relation Name Age of Onset    Diabetes Mother      Other (Cerebrovascular Accident) Father         Hospital Medications:           Current Facility-Administered Medications:     acetaminophen (Tylenol) tablet 650 mg, 650 mg, oral, q4h PRN, 650 mg at 01/31/25 1531 **OR** acetaminophen (Tylenol) oral liquid " 650 mg, 650 mg, oral, q4h PRN **OR** acetaminophen (Tylenol) suppository 650 mg, 650 mg, rectal, q4h PRN, Santhosh Bettencourt PA-C    aspirin chewable tablet 81 mg, 81 mg, oral, Daily, Santhosh Bettencourt PA-C, 81 mg at 25 0841    atorvastatin (Lipitor) tablet 80 mg, 80 mg, oral, Nightly, Santhosh Bettencourt PA-C, 80 mg at 25 2200    busPIRone (Buspar) tablet 7.5 mg, 7.5 mg, oral, BID, Santhosh Bettencourt PA-C, 7.5 mg at 25 0845    clopidogrel (Plavix) tablet 75 mg, 75 mg, oral, Daily, Santhosh Bettencourt PA-C, 75 mg at 25 0843    enoxaparin (Lovenox) syringe 70 mg, 1 mg/kg, subcutaneous, q12h JENNIFER, Santhosh Bettencourt PA-C    [] hydrALAZINE (Apresoline) injection 10 mg, 10 mg, intravenous, q20 min PRN **FOLLOWED BY** hydrALAZINE (Apresoline) tablet 25 mg, 25 mg, oral, q6h PRN, Santhosh Bettencourt PA-C    hydrOXYzine pamoate (Vistaril) capsule 25 mg, 25 mg, oral, Nightly PRN, Santhosh Bettencourt PA-C    insulin lispro injection 0-5 Units, 0-5 Units, subcutaneous, Before meals & nightly, Eloy Yu PA-C    isosorbide mononitrate ER (Imdur) 24 hr tablet 60 mg, 60 mg, oral, Daily, Santhosh Bettencourt PA-C, 60 mg at 25 0844    levothyroxine (Synthroid, Levoxyl) tablet 50 mcg, 50 mcg, oral, Daily, Santhosh Bettencourt PA-C, 50 mcg at 25 0557    metoprolol succinate XL (Toprol-XL) 24 hr tablet 25 mg, 25 mg, oral, Daily, Santhosh Bettencourt PA-C, 25 mg at 25 1000    nitroglycerin (Nitrostat) SL tablet 0.4 mg, 0.4 mg, sublingual, q5 min PRN, Santhosh Bettencourt PA-C    ondansetron (Zofran) injection 4 mg, 4 mg, intravenous, q8h PRN, Santhosh Bettencourt PA-C, 4 mg at 25 1219    oxygen (O2) therapy, , inhalation, Continuous PRN - O2/gases, Santhosh Bettencourt PA-C, Last Rate: 240,000 mL/hr at 25 1530, 4 L/min at 25 1530    pantoprazole (ProtoNix) EC tablet 40 mg, 40 mg, oral, Daily before breakfast, Santhosh Bettencourt PA-C, 40 mg at 25 0600    polyethylene glycol (Glycolax, Miralax) packet 17 g,  17 g, oral, Daily, Santhosh Bettencourt PA-C, 17 g at 02/01/25 0844    polyethylene glycol (Glycolax, Miralax) packet 17 g, 17 g, oral, Daily PRN, Santhosh EARLY MARYA Bettencourt, 17 g at 01/31/25 0141    ranolazine (Ranexa) 12 hr tablet 1,000 mg, 1,000 mg, oral, BID, Santhosh Bettencourt PA-C, 1,000 mg at 02/01/25 0844    sennosides-docusate sodium (Joselin-Colace) 8.6-50 mg per tablet 1 tablet, 1 tablet, oral, Nightly, Santhosh Bettencourt PA-C, 1 tablet at 01/31/25 2200    tamsulosin (Flomax) 24 hr capsule 0.4 mg, 0.4 mg, oral, Nightly, Santhosh Bettencourt PA-C, 0.4 mg at 01/31/25 2200    Review of Systems:  14 point review of systems was completed and negative except for those specially mention in my HPI    Physical Exam:    Heart Rate:  []   Temp:  [36.3 °C (97.3 °F)-36.6 °C (97.9 °F)]   Resp:  [7-26]   BP: ()/()   Weight:  [57.5 kg (126 lb 12.2 oz)-65 kg (143 lb 4.8 oz)]   SpO2:  [84 %-100 %]     Physical Exam  Constitutional:       General: He is not in acute distress.  HENT:      Head: Normocephalic and atraumatic.      Mouth/Throat:      Mouth: Mucous membranes are dry.   Eyes:      Conjunctiva/sclera: Conjunctivae normal.   Cardiovascular:      Rate and Rhythm: Rhythm regularly irregular.      Pulses:           Radial pulses are 2+ on the right side and 2+ on the left side.   Pulmonary:      Effort: Pulmonary effort is normal. No respiratory distress.   Abdominal:      General: There is no distension.      Palpations: Abdomen is soft.      Tenderness: There is no abdominal tenderness.   Musculoskeletal:      Right lower leg: No edema.      Left lower leg: No edema.      Comments: L BKA   Neurological:      Mental Status: He is alert and oriented to person, place, and time.      GCS: GCS eye subscore is 4. GCS verbal subscore is 5. GCS motor subscore is 6.      Comments: 4/5 upper extremity strength, apple to move RLE to command, mentation intact          Objective:    I have reviewed all medications, laboratory  results, and imaging pertinent for today's encounter.           Intake/Output Summary (Last 24 hours) at 2/1/2025 0925  Last data filed at 2/1/2025 0800  Gross per 24 hour   Intake 120 ml   Output 375 ml   Net -255 ml       Daily Labs:  CBC:   Results from last 7 days   Lab Units 02/01/25  0431 01/31/25  1619   WBC AUTO x10*3/uL 6.1 7.5   HEMOGLOBIN g/dL 8.3* 8.7*   HEMATOCRIT % 26.3* 27.2*   MCV fL 92 93     BMP:    Results from last 7 days   Lab Units 02/01/25  0431 01/31/25  1619   SODIUM mmol/L 133* 133*   POTASSIUM mmol/L 4.6 5.0   CHLORIDE mmol/L 100 100   CO2 mmol/L 26 24   BUN mg/dL 30* 31*   CREATININE mg/dL 1.81* 1.96*   CALCIUM mg/dL 8.4* 8.7   GLUCOSE mg/dL 117* 219*   MAGNESIUM mg/dL 2.22 2.17     ABG:   Results from last 7 days   Lab Units 01/31/25  1353   POCT PH, ARTERIAL pH 7.37*   POCT PCO2, ARTERIAL mm Hg 23*   POCT PO2, ARTERIAL mm Hg 306*   POCT SO2, ARTERIAL % 99   POCT HCO3 CALCULATED, ARTERIAL mmol/L 13.3*   POCT LACTATE, ARTERIAL mmol/L 8.8*      VBG:             Assessment/Plan:    I am currently managing this critically ill patient for the following problems:    Neuro/Psych/Pain Ctrl/Sedation:   Transient ischemic attack vs ischemic cerebral vascular accident   Episodic left sided facial pain - trigeminal neuralgia vs temporal arteritis?, etiology unclear, mixed, non-specific symptoms   - Pain Control: acetaminophen PRN  - Continue home buspar  - Continue DAPT  - Neurology consulted, signed off  - CAM ICU qshift, sleep-wake hygiene, delirium precautions     Respiratory/ENT:  Acute hypoxic respiratory insufficiency post-cardiac event  Possible aspiration   - Supplemental O2: 3L NC  - Titrate FiO2 as tolerated to maintain SpO2 >92%  - Continuous pulse ox monitoring   - Pulmonary hygiene    Cardiovascular:   ?Cardiac vs respiratory arrest?  NSTEMI  Left moderate internal carotid artery stenosis   History of of CAD s/p CABG (1990s) and multiple cardiac catheterizations (most recent  1/17/25)  History of HFmrEF, HTN, PAD s/p left BKA  Recurrent vs unstable angina  Elevated lactate - 8.8 post-possible arrest, downtrend to 2.1 on arrival to ICU  - Continue home aspirin, plavix, and lipitor   - Continue imdur, metoprolol, ranexa  -- Imdur dose decreased today, metoprolol increased   - Cardiology consulted and following   - Vascular surgery consulted for carotid disease, plan to follow up outpatient with carotid ultrasound in 6 months  - Continue therapeutic lovenox     - Morphine PRN for anginal symptoms   - Maintain MAPs >65  - Continuous cardiac monitoring   - EKGs PRN for ACS symptoms, arrhythmias     GI:  GERD   - Diet: carb controlled   - BR: colace and miralax   - GI Prophylaxis: PPI    Renal/Volume Status/Electrolytes:  Acute kidney injury superimposed on chronic kidney disease stage 3A/B  History of BPH  - Baseline Cr 1.3-1.5  - Hourly I/O's, maintain urine output >0.5cc/kg/hr  - Replete electrolytes to maintain K >4.0 and Mg >2.0  - Daily RFP, Mg    Endocrine:  Type 2 diabetes mellitus   History of hypothyroidism   - SSI ACHS   - Continue home synthroid   - Hypoglycemia protocol PRN     Infectious Disease:  No active concerns   - Antibiotics: not indicated   - Monitor SIRS criteria    Heme/Onc:  Normocytic anemia   - Baseline Hgb  - Transfuse if Hgb <7.0 or symptomatic anemia  - Daily CBC    MSK/Skin:  No active concerns   - PT/OT eval   - ICU skin protocol, padded pressure points  - Q2hr turns    Ethics/Code Status:  Full code    :  DVT Prophylaxis: SQH, SCDs  GI Prophylaxis: none  Bowel Regimen: colace, miralax  Diet: carb controlled  CVC: none  Janina: none  Ayers: none  Restraints: none  Disposition: ICU    Critical Care Time:  72 minutes spent in preparing to see patient (I.e. labs, imaging, etc.), documentation, discussion plan of care with patient/family/caregiver, and/or coordination of care with multidisciplinary team including the attending. Time does not include  completion of procedure time.     Santhosh Bettencourt PA-C  Pulmonary & Critical Care Medicine   [facility]

## 2025-02-01 NOTE — PROGRESS NOTES
"Chavez Llamas is a 79 y.o. male on day 4 of admission presenting with Cerebrovascular accident (CVA), unspecified mechanism (Multi).    Subjective   Patient seen and examined in the ICU.  Family at the bedside.  Patient is a very difficult historian.  He apparently had an episode of loss of consciousness, bradycardia (given atropine), attempted intubation yesterday.  He was transferred to the ICU but seems to be essentially back to his baseline.  He does report this morning an episode where his left eyelid was partially close but he lost vision and it went \"completely black\".  The patient also continues to have stable left-sided facial numbness, new bilateral hand numbness (as opposed to only left).       Objective     Physical Exam    Constitutional:  Alert and oriented to person, place, date/time in no acute distress.  HEENT:  Atraumatic, normocephalic.  Mucous membranes moist.  Pt edentulous.  Tongue midline.  Left eye lid ptosis  Neck:  Trachea midline.  Do not notice asymmetric smile today  Respiratory:  Clear to auscultation.  Cardiac:  Regular rate and rhythm.    Cardiovascular:  No edema of the extremities.    Abdominal:  Soft, nontender, nondistended, bowel sounds present.  Musculoskeletal:  Moves extremities freely.  Left BKA.   strength 4 out of 5 on the left.  5 out of 5 on the right.  Dermatological: Clean and dry   Neurological: Alert and oriented to person, place, date/time with slight confusion which is baseline  Psych:  Calm, cooperative    Last Recorded Vitals  Blood pressure 103/59, pulse 84, temperature 36.7 °C (98.1 °F), temperature source Oral, resp. rate 19, height 1.727 m (5' 8\"), weight 65 kg (143 lb 4.8 oz), SpO2 97%.  Intake/Output last 3 Shifts:  I/O last 3 completed shifts:  In: 200 (3.1 mL/kg) [P.O.:200]  Out: 200 (3.1 mL/kg) [Urine:200 (0.1 mL/kg/hr)]  Weight: 65 kg     Relevant Results  Scheduled medications  aspirin, 81 mg, oral, Daily  atorvastatin, 80 mg, oral, " Nightly  busPIRone, 7.5 mg, oral, BID  clopidogrel, 75 mg, oral, Daily  enoxaparin, 1 mg/kg, subcutaneous, q12h JENNIFER  insulin lispro, 0-5 Units, subcutaneous, Before meals & nightly  [START ON 2025] isosorbide mononitrate ER, 30 mg, oral, Daily  levothyroxine, 50 mcg, oral, Daily  [START ON 2025] metoprolol succinate XL, 50 mg, oral, Daily  pantoprazole, 40 mg, oral, Daily before breakfast  polyethylene glycol, 17 g, oral, Daily  ranolazine, 1,000 mg, oral, BID  sennosides-docusate sodium, 1 tablet, oral, Nightly  tamsulosin, 0.4 mg, oral, Nightly      Continuous medications     PRN medications  PRN medications: acetaminophen **OR** acetaminophen **OR** acetaminophen, [] hydrALAZINE **FOLLOWED BY** hydrALAZINE, hydrOXYzine pamoate, morphine, morphine, ondansetron, oxygen, polyethylene glycol      XR chest 1 view    Result Date: 2025  Interpreted By:  Brissa Jacob, STUDY: XR CHEST 1 VIEW; ;  2025 3:34 pm   INDICATION: Signs/Symptoms:r/o aspiration, hypoxia, choking event.     COMPARISON: 2025   ACCESSION NUMBER(S): [id number]   ORDERING CLINICIAN: MICHAEL ZUNIGA   TECHNIQUE: Single AP view chest   FINDINGS: Cardiomediastinal silhouette is within normal limits. Median sternotomy wires and surgical clips in place. There are mild chronic appearing interstitial changes demonstrated mild persistent increased interstitial prominence improved from the prior examination. Hazy opacity about the right costophrenic angle with subtle basilar effusion suggested.   Left lung apex pleural thickening       1. Improved interstitial pulmonary edema suggested.   2. Subtle right basilar effusion.   3. No focal infiltrate demonstrated.     MACRO: None   Signed by: Brissa Jacob 2025 3:57 PM Dictation workstation:   [id number]    Electrocardiogram, 12-lead PRN ACS symptoms    Result Date: 2025   Poor data quality, interpretation may be adversely affected Sinus rhythm with 1st degree AV  block Left bundle branch block Abnormal ECG Confirmed by Steven Terrell ([id number]) on 1/29/2025 5:43:54 PM    Vascular US Carotid Artery Duplex Bilateral    Result Date: 1/29/2025           [place] [id number]            Phone [phone]  Vascular Lab Report  [facility] US CAROTID ARTERY DUPLEX BILATERAL Patient Name:      ORLANDO HYDE JULIEN    Reading Physician:  [id number] Jamia Ramirez MD Study Date:        1/29/2025            Ordering Provider:  [id number] NITO HWANG MRN/PID:           [id number]             Fellow: Accession#:        [id number]         Technologist:       Marlon Thomas RVT Date of Birth/Age: 1945 / 79      Technologist 2:                    years Gender:            M                    Encounter#:         [id number] Admission Status:  Inpatient            Location Performed: [facility]  Diagnosis/ICD: Occlusion and stenosis of bilateral carotid arteries-I65.23 CPT Codes:     32830 Cerebrovascular Carotid Duplex scan complete  CONCLUSIONS: Right Carotid: Findings are consistent with less than 50% stenosis of the right proximal internal carotid artery. Right external carotid artery appears patent with no evidence of stenosis. The right vertebral artery is patent with antegrade flow. No evidence of hemodynamically significant stenosis in the right subclavian artery. Left Carotid: Findings are consistent with 50 to 69% stenosis of the left proximal internal carotid artery. Left external carotid artery appears patent with no evidence of stenosis. The left vertebral artery is patent with antegrade flow. No evidence of hemodynamically significant stenosis in the left subclavian artery. Additional Findings: Technically difficult due to patient head movement and talking.  Imaging & Doppler Findings: Right Plaque Morph: The proximal right  internal carotid artery demonstrates heterogenous and calcified plaque. Left Plaque Morph: The proximal left internal carotid artery demonstrates heterogenous and calcified plaque.   Right                        Left   PSV      EDV                PSV      EDV 56 cm/s            CCA P    51 cm/s 49 cm/s            CCA D    45 cm/s 102 cm/s 32 cm/s   ICA P    187 cm/s 61 cm/s 90 cm/s  22 cm/s   ICA M    94 cm/s  37 cm/s 71 cm/s  21 cm/s   ICA D    70 cm/s  26 cm/s 34 cm/s  14 cm/s Vertebral  87 cm/s  21 cm/s 81 cm/s          Subclavian 96 cm/s                Right Left ICA/CCA Ratio  2.1  4.1   [id number] Jamia Ramirez MD Electronically signed by [id number] Jamia Ramirez MD on 1/29/2025 at 2:55:10 PM  ** Final **     CT angio head and neck w and wo IV contrast    Result Date: 1/29/2025  Interpreted By:  Nick Arellano, STUDY: CT ANGIO HEAD AND NECK W AND WO IV CONTRAST;  1/29/2025 1:49 pm   INDICATION: Signs/Symptoms:carotid stenosis.   COMPARISON: CT head yesterday. CT chest 01/13/2025.   ACCESSION NUMBER(S): [id number]   ORDERING CLINICIAN: NITO HWANG   TECHNIQUE: Unenhanced CT images of the head were obtained.  Subsequently,  75 ML of Omnipaque 350 was administered intravenously and axial images of the head and neck were acquired.  Coronal, sagittal, and 3-D reconstructions were provided for review.   FINDINGS:   CTA COW: No major vascular occlusion. Moderate atherosclerotic calcifications through the carotid siphons. No aneurysms.  No vascular malformations. Patent dural venous sinuses and intracranial deep venous structures.   CTA CAROTID: No aortic aneurysm or dissection. No significant stenoses at the origins of the great vessels from the aortic arch. No significant stenoses of the brachycephalic artery or bilateral subclavian arteries.   Mixed fibrofatty and calcific atherosclerotic plaque crosses the left carotid bifurcation. Narrowest luminal diameter in the proximal left internal carotid artery is 1.7 mm which is  approximately 60-65% stenosis. There is mild plaque irregularity.   Mixed but predominantly fibrofatty atherosclerotic plaque spanning of the right carotid bifurcation. Narrowest luminal diameter in the proximal right internal carotid artery is 3.1 mm which is less than 50% stenosis. There is mild plaque irregularity.   Moderately severe short-segment atherosclerotic stenosis proximal V1 segment right vertebral artery.   NONVASCULAR HEAD: No intracranial mass. No intracranial hemorrhage. No evidence of large evolving cortical infarction. Supratentorial white matter hypodensities most likely related to chronic small vessel ischemic change. Mild-to-moderate global brain parenchymal volume loss. Bones intact. Postoperative changes bilateral orbital globes.   NONVASCULAR NECK: No soft tissue mass. No adenopathy. Salivary glands are unremarkable. Thyroid gland unremarkable. Bones intact. Visualized small right-greater-than-left pleural effusions. Emphysematous changes in the upper lungs. Ackerly opacity left apex, similar to prior.         1. No intracranial major vascular occlusion. 2. Approximately 60-65% short-segment atherosclerotic stenosis proximal left internal carotid artery with mild plaque irregularity. 3. Less than 50% short-segment atherosclerotic stenosis proximal right internal carotid artery with mild plaque irregularity. 4. Moderate to severe short-segment atherosclerotic stenosis proximal V1 segment right vertebral artery. 5. Supratentorial white matter hypodensities most likely related to chronic small vessel ischemic change. 6. Mild-to-moderate global brain parenchymal volume loss. 7. Visualized small right-greater-than-left pleural effusions. Emphysematous changes in the upper lungs. Ackerly opacity left apex, similar to prior.   MACRO: None   Signed by: Nick Arellano 1/29/2025 2:39 PM Dictation workstation:   [id number]    MR angio head wo IV contrast    Result Date: 1/28/2025  Interpreted  By:  Vasile Valente, STUDY: MR BRAIN WO IV CONTRAST; MR ANGIO HEAD WO IV CONTRAST; MR ANGIO NECK WO IV CONTRAST;  1/28/2025 8:00 pm; 1/28/2025 7:59 pm   INDICATION: Signs/Symptoms:possible cva vs tia.     COMPARISON: MRI of the brain dated 02/23/2022; noncontrast CT head dated 01/20/2025;   ACCESSION NUMBER(S): [id number]; [id number]; [id number]   ORDERING CLINICIAN: LISA WASSERMAN   TECHNIQUE: Axial T2, FLAIR, DWI, gradient echo T2 and  sagittal and coronal T1 weighted images of brain were acquired.   Time-of-flight MRA of the head  and neck was performed. The images were reviewed as source images and maximum intensity projections.   FINDINGS: Brain:   No diffusion restriction abnormality is present to suggest a recent infarct.   There is no imaging evidence of recent intracranial hemorrhage. No mass effect or midline shift is identified.   Mild brain parenchymal volume loss is present without abnormal ventricular dilatation. Basal cisterns are patent. No extra-axial fluid collections are identified.   Scattered patchy foci of hyperintense FLAIR and T2 signal are present in the periventricular and subcortical white matter of bilateral cerebral hemispheres are nonspecific, although favored to represent sequela of microvascular disease.   No abnormal fluid signal is present in the mastoid air cells on the left. Trace fluid is present in the mastoid air cells and in the right inferiorly.   Mild submucosal thickening is present in the inferior maxillary sinuses bilaterally. Patient is status post cataract extraction.   MRA of head:   Intracranial carotid arteries demonstrate symmetric flow enhancement bilaterally without evidence of occlusion or high-grade stenosis.   Visualized proximal anterior cerebral arteries are symmetric in appearance without evidence of occlusion.   Middle cerebral arteries demonstrate symmetric flow enhancement without evidence of high-grade stenosis in the proximal M1 segments or  focal vessel occlusion in the more distal visualized M2 and M3 segments.   Intracranial vertebral arteries demonstrate anatomic variant dominant left vertebral artery without evidence of occlusion or high-grade stenosis bilaterally.   There is suspected mild-to-moderate stenosis in the proximal P1 segment of the left posterior cerebral artery, with preserved flow enhancement of the vessel in the more distal P2 and P3 segment. No occlusion or stenosis is identified in the right.   MRA of neck:   Visualized common carotid arteries demonstrate symmetric flow enhancement without evidence of occlusion or stenosis.   Moderate to severe stenosis is suspected in the proximal extracranial left internal carotid artery due to calcified plaque with upwards of moderate stenosis suspected on the right with preserved flow enhancement in the vessels distally bilaterally.   Extracranial vertebral arteries demonstrate symmetric flow enhancement bilaterally, without evidence of occlusion or high-grade stenosis. Left vertebral artery is tortuous in course proximally.       MRI Brain: 1.  No evidence of diffusion restriction abnormality to suggest a recent infarct. 2. Low volume of patchy hyperintense FLAIR and T2 foci present in the periventricular and subcortical white matter of bilateral cerebral hemispheres are nonspecific, but favored to represent sequela of microvascular disease.   MRA: 1.  No large vessel occlusion is identified in the proximal intracranial circulation. 2. Moderate to severe stenosis is suspected in the proximal extracranial left internal carotid artery due to calcified plaque with preserved flow enhancement of the vessel more distally. Upwards of moderate stenosis is suspected in the proximal extracranial right internal carotid artery.   MACRO: None   Signed by: Vasile Valente 1/28/2025 9:47 PM Dictation workstation:   [id number]    MR angio neck wo IV contrast    Result Date: 1/28/2025  Interpreted By:   Vasile Valente, STUDY: MR BRAIN WO IV CONTRAST; MR ANGIO HEAD WO IV CONTRAST; MR ANGIO NECK WO IV CONTRAST;  1/28/2025 8:00 pm; 1/28/2025 7:59 pm   INDICATION: Signs/Symptoms:possible cva vs tia.     COMPARISON: MRI of the brain dated 02/23/2022; noncontrast CT head dated 01/20/2025;   ACCESSION NUMBER(S): [id number]; [id number]; [id number]   ORDERING CLINICIAN: LISA WASSERMAN   TECHNIQUE: Axial T2, FLAIR, DWI, gradient echo T2 and  sagittal and coronal T1 weighted images of brain were acquired.   Time-of-flight MRA of the head  and neck was performed. The images were reviewed as source images and maximum intensity projections.   FINDINGS: Brain:   No diffusion restriction abnormality is present to suggest a recent infarct.   There is no imaging evidence of recent intracranial hemorrhage. No mass effect or midline shift is identified.   Mild brain parenchymal volume loss is present without abnormal ventricular dilatation. Basal cisterns are patent. No extra-axial fluid collections are identified.   Scattered patchy foci of hyperintense FLAIR and T2 signal are present in the periventricular and subcortical white matter of bilateral cerebral hemispheres are nonspecific, although favored to represent sequela of microvascular disease.   No abnormal fluid signal is present in the mastoid air cells on the left. Trace fluid is present in the mastoid air cells and in the right inferiorly.   Mild submucosal thickening is present in the inferior maxillary sinuses bilaterally. Patient is status post cataract extraction.   MRA of head:   Intracranial carotid arteries demonstrate symmetric flow enhancement bilaterally without evidence of occlusion or high-grade stenosis.   Visualized proximal anterior cerebral arteries are symmetric in appearance without evidence of occlusion.   Middle cerebral arteries demonstrate symmetric flow enhancement without evidence of high-grade stenosis in the proximal M1 segments or focal  vessel occlusion in the more distal visualized M2 and M3 segments.   Intracranial vertebral arteries demonstrate anatomic variant dominant left vertebral artery without evidence of occlusion or high-grade stenosis bilaterally.   There is suspected mild-to-moderate stenosis in the proximal P1 segment of the left posterior cerebral artery, with preserved flow enhancement of the vessel in the more distal P2 and P3 segment. No occlusion or stenosis is identified in the right.   MRA of neck:   Visualized common carotid arteries demonstrate symmetric flow enhancement without evidence of occlusion or stenosis.   Moderate to severe stenosis is suspected in the proximal extracranial left internal carotid artery due to calcified plaque with upwards of moderate stenosis suspected on the right with preserved flow enhancement in the vessels distally bilaterally.   Extracranial vertebral arteries demonstrate symmetric flow enhancement bilaterally, without evidence of occlusion or high-grade stenosis. Left vertebral artery is tortuous in course proximally.       MRI Brain: 1.  No evidence of diffusion restriction abnormality to suggest a recent infarct. 2. Low volume of patchy hyperintense FLAIR and T2 foci present in the periventricular and subcortical white matter of bilateral cerebral hemispheres are nonspecific, but favored to represent sequela of microvascular disease.   MRA: 1.  No large vessel occlusion is identified in the proximal intracranial circulation. 2. Moderate to severe stenosis is suspected in the proximal extracranial left internal carotid artery due to calcified plaque with preserved flow enhancement of the vessel more distally. Upwards of moderate stenosis is suspected in the proximal extracranial right internal carotid artery.   MACRO: None   Signed by: Vasile Valente 1/28/2025 9:47 PM Dictation workstation:   [id number]    MR brain wo IV contrast    Result Date: 1/28/2025  Interpreted By:   Vasile Valente, STUDY: MR BRAIN WO IV CONTRAST; MR ANGIO HEAD WO IV CONTRAST; MR ANGIO NECK WO IV CONTRAST;  1/28/2025 8:00 pm; 1/28/2025 7:59 pm   INDICATION: Signs/Symptoms:possible cva vs tia.     COMPARISON: MRI of the brain dated 02/23/2022; noncontrast CT head dated 01/20/2025;   ACCESSION NUMBER(S): [id number]; [id number]; [id number]   ORDERING CLINICIAN: LISA WASSERMAN   TECHNIQUE: Axial T2, FLAIR, DWI, gradient echo T2 and  sagittal and coronal T1 weighted images of brain were acquired.   Time-of-flight MRA of the head  and neck was performed. The images were reviewed as source images and maximum intensity projections.   FINDINGS: Brain:   No diffusion restriction abnormality is present to suggest a recent infarct.   There is no imaging evidence of recent intracranial hemorrhage. No mass effect or midline shift is identified.   Mild brain parenchymal volume loss is present without abnormal ventricular dilatation. Basal cisterns are patent. No extra-axial fluid collections are identified.   Scattered patchy foci of hyperintense FLAIR and T2 signal are present in the periventricular and subcortical white matter of bilateral cerebral hemispheres are nonspecific, although favored to represent sequela of microvascular disease.   No abnormal fluid signal is present in the mastoid air cells on the left. Trace fluid is present in the mastoid air cells and in the right inferiorly.   Mild submucosal thickening is present in the inferior maxillary sinuses bilaterally. Patient is status post cataract extraction.   MRA of head:   Intracranial carotid arteries demonstrate symmetric flow enhancement bilaterally without evidence of occlusion or high-grade stenosis.   Visualized proximal anterior cerebral arteries are symmetric in appearance without evidence of occlusion.   Middle cerebral arteries demonstrate symmetric flow enhancement without evidence of high-grade stenosis in the proximal M1 segments or focal  vessel occlusion in the more distal visualized M2 and M3 segments.   Intracranial vertebral arteries demonstrate anatomic variant dominant left vertebral artery without evidence of occlusion or high-grade stenosis bilaterally.   There is suspected mild-to-moderate stenosis in the proximal P1 segment of the left posterior cerebral artery, with preserved flow enhancement of the vessel in the more distal P2 and P3 segment. No occlusion or stenosis is identified in the right.   MRA of neck:   Visualized common carotid arteries demonstrate symmetric flow enhancement without evidence of occlusion or stenosis.   Moderate to severe stenosis is suspected in the proximal extracranial left internal carotid artery due to calcified plaque with upwards of moderate stenosis suspected on the right with preserved flow enhancement in the vessels distally bilaterally.   Extracranial vertebral arteries demonstrate symmetric flow enhancement bilaterally, without evidence of occlusion or high-grade stenosis. Left vertebral artery is tortuous in course proximally.       MRI Brain: 1.  No evidence of diffusion restriction abnormality to suggest a recent infarct. 2. Low volume of patchy hyperintense FLAIR and T2 foci present in the periventricular and subcortical white matter of bilateral cerebral hemispheres are nonspecific, but favored to represent sequela of microvascular disease.   MRA: 1.  No large vessel occlusion is identified in the proximal intracranial circulation. 2. Moderate to severe stenosis is suspected in the proximal extracranial left internal carotid artery due to calcified plaque with preserved flow enhancement of the vessel more distally. Upwards of moderate stenosis is suspected in the proximal extracranial right internal carotid artery.   MACRO: None   Signed by: Vasile Valente 1/28/2025 9:47 PM Dictation workstation:   [id number]    ECG 12 lead    Result Date: 1/28/2025  Sinus rhythm with 1st degree AV block  Left bundle branch block Abnormal ECG When compared with ECG of 22-JAN-2025 14:14, (unconfirmed) Wide QRS rhythm has replaced Sinus rhythm Confirmed by Jonathan Tristan ([id number]) on 1/28/2025 11:23:11 AM    XR chest 1 view    Result Date: 1/28/2025  Interpreted By:  Vivian Melchor, STUDY: XR CHEST 1 VIEW 1/28/2025 7:28 am   INDICATION: Signs/Symptoms:weakness and brain attack   COMPARISON: 01/14/2025   ACCESSION NUMBER(S): [id number]   ORDERING CLINICIAN: MONICO BARGER   TECHNIQUE: AP semi-erect view of the chest   FINDINGS: There is postoperative change from median sternotomy and coronary revascularization procedure with the cardiac size at the upper limits of normal to slightly enlarged. There is stable left apical pleural thickening. No pleural effusion on either side is present. Cephalization of the pulmonary vascularity is noted without interstitial or alveolar edema. There is no new infiltrate or airspace consolidation.       Status post CABG with stable left apical pleural thickening.   Cephalization of the pulmonary vasculature suggesting mild pulmonary venous hypertension.   Signed by: Vivian Melchor 1/28/2025 9:05 AM Dictation workstation:   [id number]    CT brain attack head wo IV contrast    Result Date: 1/28/2025  Interpreted By:  Nagi Montgomery, STUDY: CT BRAIN ATTACK HEAD WO IV CONTRAST;  1/28/2025 7:03 am   INDICATION: Signs/Symptoms:left face numbness, dizziness.     COMPARISON: 12/04/2024   ACCESSION NUMBER(S): [id number]   ORDERING CLINICIAN: MONICO BARGER   TECHNIQUE: Unenhanced images were obtained through the brain.   FINDINGS: There is atrophy resulting in prominence of the ventricles and sulci. There are areas of decreased attenuation within the white matter which are nonspecific but are commonly associated with small vessel ischemic disease. there is no mass effect or midline shift. No acute intracranial hemorrhage is identified. No extra-axial fluid collections are seen. No intraparenchymal mass lesions  are identified.  Bone windows demonstrate no evidence of an acute calvarial fracture. Mild mucosal thickening in the paranasal sinuses.         Nagi Montgomery discussed the significance and urgency of this critical finding via epic secure chat. With  MONICO BARGER on 1/28/2025 at 7:13 am.  (**-RCF-**) Findings:  See findings.     MACRO: None.   Signed by: Nagi Montgomery 1/28/2025 7:14 AM Dictation workstation:   [id number]    Electrocardiogram 12 Lead    Result Date: 1/27/2025   Poor data quality, interpretation may be adversely affected Normal sinus rhythm Left bundle branch block Abnormal ECG When compared with ECG of 17-JAN-2025 14:06, Premature ventricular complexes are no longer Present OK interval has decreased Nonspecific T wave abnormality no longer evident in Inferior leads Confirmed by Kristofer Terrell ([id number]) on 1/27/2025 8:06:06 PM    ECG 12 lead    Result Date: 1/27/2025  Sinus rhythm with 1st degree AV block Rightward axis Left bundle branch block Abnormal ECG When compared with ECG of 21-JAN-2025 09:23, OK interval has increased Questionable change in QRS axis T wave inversion now evident in Inferior leads Confirmed by Dustin Varela ([id number]) on 1/27/2025 9:47:06 AM    Electrocardiogram, 12-lead PRN ACS symptoms    Result Date: 1/27/2025   Suspect arm lead reversal, interpretation assumes no reversal Sinus rhythm with 1st degree AV block Nonspecific intraventricular block Lateral infarct , age undetermined Cannot rule out Inferior infarct , age undetermined Abnormal ECG When compared with ECG of 21-JAN-2025 15:26, Questionable change in QRS axis Minimal criteria for Inferior infarct are now Present T wave inversion no longer evident in Inferior leads T wave inversion no longer evident in Lateral leads Confirmed by Dustin Varela ([id number]) on 1/27/2025 9:46:47 AM    Electrocardiogram, 12-lead PRN ACS symptoms    Result Date: 1/27/2025  Sinus rhythm with 1st degree AV block with occasional Premature ventricular  complexes Left bundle branch block Abnormal ECG When compared with ECG of 17-JAN-2025 09:58, Premature ventricular complexes are now Present Premature atrial complexes are no longer Present Confirmed by Fred Sher ([id number]) on 1/27/2025 9:25:16 AM    Cardiac Catheterization Procedure    Result Date: 1/23/2025   [facility], Cath Lab, [address] [place] [address] Cardiovascular Catheterization Report Patient Name:      ORLANDO VICTORIA     Performing Physician:  [id number] Weston MD Study Date:        1/20/2025             Verifying Physician:   Shashi Weston MD MRN/PID:           [id number]              Cardiologist/Co-Scrub: Accession#:        [id number]          Ordering Provider:     [id number] LISA SARAH Date of Birth/Age: 1945 / 79 years Cardiologist: Gender:            M                     Fellow:                [id number] Asif Aguirre MD Encounter#:        [id number]            Surgeon:  Study: PCI - Percutaneous Coronary Intervention  Indications: ORLANDO VICTORIA is a 79 year old male who presents with dyslipidemia and prior percutaneous coronary intervention, Angina. ORLANDO VICTORIA is a 79 year old male who presents with prior myocardial infarction, prior percutaneous coronary intervention and a chest pain assessment of typical angina Angina. Recent MI. Medical History: Stress test performed: No. CTA performed: NoaPt Chiang accessed: No. LVEF Assessed: No. Cardiac arrest: No. Cardiac surgical consult: No. Cardiovascular instability: No. Frailty status of patient entering lab: 6 = Moderately frail.  Procedure Description: After infiltration with 1% Lidocaine, the right radial artery was cannulated with a  modified Seldinger technique. Subsequently a 6 Lebanese sheath was placed retrograde in the right radial artery. Additional catheter(s) used to visualize the coronary arteries were: AL1 and JR4. After completion of the procedure, the arterial sheath was pulled and a TR Band Radial Compression Device was utilized to obtain patent hemostasis.  Coronary Angiography: The coronary circulation is right dominant.  Left Main Coronary Artery: The left main coronary artery not today.  Right Coronary Artery Distribution: The right coronary artery is a normal caliber vessel. The mid right coronary artery showed 99% in-stent restenosis. This lesion was heavily calcified.  Coronary Interventions: Angiography reveals a 95% stenosis of the proximal to mid and mid to distal right coronary artery coronary artery. Pre-intervention CIERRA flow was 1. Percutaneous coronary intervention was performed within the proximal to mid and mid to distal right coronary artery. The vessel was pre-dilated using a compliant balloon 2.5 mm x 12 mm at 10 VERONICA. The stent was post dilated using a non-compliant balloon 3.0 mm x 12 mm at 20 VERONICA. Additional dilatation was performed using a non-compliant balloon 2.5 mm x 20 mm at 26 VERONICA. The stenosis was successfully reduced from 95% to 40%. Post-intervention CIERRA flow was 3.  Coronary Intervention Comments: AL1 catheter was used to engage the RCA in a 7 Fr system. The catheter was not giving us enought [facility] to anterior take off of the ostium. Pro water was used to cross the severe mid ISR and was able to do so with some manipulation. 2.0X15mm sapphire balloon was used for pre dilatation at multiple inflations between 10-18A. Angiographically the balloon did not expand well. Now we swapped this for a 2.5X12 NC and were able to inflate the proximal RCA at 14-16A. The NC unfortunatley could not go past the mid RCA lesion. We introduced a guideliner for better support, but it was still  difficult to advance the equipment. Pt was also having radial spasm that we had to contend with. We eventually switched the AL for a JR4 and raj wired the lesion with a whisper Es. We introduced a wolverine 2.5X15 and inflated at multiple pressures between 14-16A but were still unable to cross the mid lesion. We now used IVL 2.5X12mm but found it difficult to advance the balloon. We  decided to remove the pro water as the IVL balloon was over the whisper Es and delivered 12 pulses between 9-18A. Angiographically the ISR looked better with some encouraging results and CIERRA 3 flow. 3.0X12 NC Euphora was used to further dilate the proximal lesion and inflated at 20A for 24 seconds, there was still difficulty passing it through the mid lesion. 2.5X20 spahirre balloon was introduced and able to cross the mid lesion. Multiple dilatations at 16-20A. Acceptable results, partial success, however, lesion not well enough prepared and therefore, we opted not to use DCB.  Coronary Lesion Summary: Vessel        Stenosis         Vessel Segment RCA    99% in-stent restenosis      [facility]  Hemo Personnel: +--------------------+---------+ Name                Duty      +--------------------+---------+ Oscar Weston MD 1 +--------------------+---------+  Hemodynamic Pressures:  +----+---------------------+----------+-------------+--------------+---------+ Site      Date Time      Phase NameSystolic mmHgDiastolic mmHgMean mmHg +----+---------------------+----------+-------------+--------------+---------+   AO1/20/2025 12:33:43 PM      Rest           78            36       48 +----+---------------------+----------+-------------+--------------+---------+   AO1/20/2025 12:38:57 PM      Rest           80            41       56 +----+---------------------+----------+-------------+--------------+---------+   AO1/20/2025 12:41:26 PM      Rest           95            46       19  +----+---------------------+----------+-------------+--------------+---------+   AO1/20/2025 12:42:35 PM      Rest          100            49       69 +----+---------------------+----------+-------------+--------------+---------+   AO1/20/2025 12:44:04 PM      Rest          101            48       69 +----+---------------------+----------+-------------+--------------+---------+   AO1/20/2025 12:51:35 PM      Rest           73            29       46 +----+---------------------+----------+-------------+--------------+---------+  Cardiac Cath Post Procedure Notes: Post Procedure Diagnosis: POBA and IVL of the severe mid ISR of RCA.                           Angiographically acceptable results. Blood Loss:               Estimated blood loss during the procedure was 10 mls. Specimens Removed:        Number of specimen(s) removed: none.  Recommendations: Maximize medical therapy. Agressive risk factor modification efforts. Lipid lowering agent or Statin therapy. ____________________________________________________________________________________ CONCLUSIONS:  1. Single vessel disease.  2. Culprit vessel(s): right coronary artery.  3. Partial success in reopening severely calcific ISR of the RCA, with IVL.  4. Complex procedure. ICD 10 Codes: Angina pectoris, unspecified-I20.9  CPT Codes: Complicated/Unusual Procedure-MOD22; Intravascular Lithotripsy-93657; Angioplasty, Right Coronary addl branch major Artery (PCI)-03591.  [id number] Oscar Weston MD Performing Physician Electronically signed by [id number] Oscar Weston MD on 1/23/2025 at 11:17:46 AM  ** Final **     Transthoracic Echo (TTE) Limited    Result Date: 1/22/2025   [facility], [address] [place] [address]                Tel [phone] and Fax [phone] TRANSTHORACIC ECHOCARDIOGRAM REPORT  Patient Name:       ORLANDO Oconnell Physician:    [id number] Steven                                                                Xander DAHL Study Date:         1/22/2025           Ordering Provider:    [id number] IMER POSADAS MRN/PID:            [id number]            Fellow: Accession#:         [id number]        Nurse: Date of Birth/Age:  1945 / 79     Sonographer:          Nick nelson RDCS Gender assigned at  M                   Additional Staff: Birth: Height:             172.72 cm           Admit Date:           1/14/2025 Weight:             67.13 kg            Admission Status:     Inpatient -                                                               Routine BSA / BMI:          1.80 m2 / 22.50     Encounter#:           [id number]                     kg/m2 Blood Pressure:     124/65 mmHg         Department Location:  [facility]                                                               Non Invasive Study Type:    TRANSTHORACIC ECHO (TTE) LIMITED Diagnosis/ICD: Chest pain, unspecified-R07.9 Indication:    CP CPT Code:      Echo Limited-97240; Color Doppler-59246; Doppler Limited-53610 Patient History: Pertinent History: CAD, HTN and Hyperlipidemia. CABG 1990s and multiple PCIs. Study Detail: The following Echo studies were performed: Doppler, color flow and               2D. Technically challenging study due to patient lying in supine               position.  PHYSICIAN INTERPRETATION: Left Ventricle: Left ventricular ejection fraction is mildly decreased, by visual estimate at 45%. The left ventricular cavity size is normal. Abnormal (paradoxical) septal motion consistent with post-operative status. Left ventricular diastolic filling was not assessed. LV Wall Scoring: The basal and mid inferior wall and basal and mid inferolateral wall are hypokinetic. Left Atrium: The left atrium is enlarged. Right Ventricle: The right ventricle is normal in size. There is mildly reduced right ventricular  systolic function. RV poorly visualized. Right Atrium: The right atrium was not well visualized. Aortic Valve: The aortic valve is probably trileaflet. There is mild to moderate aortic valve cusp calcification. There is trace to mild aortic valve regurgitation. Mitral Valve: The mitral valve is normal in structure. There is mild mitral annular calcification. There is moderate mitral valve regurgitation. Tricuspid Valve: The tricuspid valve is structurally normal. There is mild tricuspid regurgitation. Pulmonic Valve: The pulmonic valve is not well visualized. Pulmonic valve regurgitation was not assessed. Pericardium: Trivial pericardial effusion. Aorta: The aortic root is normal. Pulmonary Artery: The tricuspid regurgitant velocity is 2.70 m/s, and with an estimated right atrial pressure of 3 mmHg, the estimated pulmonary artery pressure is borderline elevated with the RVSP at 32.2 mmHg. Systemic Veins: The inferior vena cava was not well visualized. In comparison to the previous echocardiogram(s): Compared with study dated 1/22/2025,.  CONCLUSIONS:  1. Poorly visualized anatomical structures due to suboptimal image quality.  2. Left ventricular ejection fraction is mildly decreased, by visual estimate at 45%.  3. Basal and mid inferior wall and basal and mid inferolateral wall are abnormal.  4. Abnormal septal motion consistent with post-operative status.  5. There is mildly reduced right ventricular systolic function.  6. The left atrium is enlarged.  7. Moderate mitral valve regurgitation. QUANTITATIVE DATA SUMMARY:  AORTA MEASUREMENTS:         Normal Ranges: Ao Sinus, d:        3.40 cm (2.1-3.5cm)  LV SYSTOLIC FUNCTION BY 2D PLANIMETRY (MOD):                      Normal Ranges: EF-Visual:      45 % LV EF Reported: 45 %  TRICUSPID VALVE/RVSP:          Normal Ranges: Peak TR Velocity:     2.70 m/s RV Syst Pressure:     32 mmHg  (< 30mmHg) IVC Diam:             2.00 cm  [id number] Steven Terrell MD Electronically signed  on 1/22/2025 at 12:48:37 PM  Wall Scoring  ** Final **     Transthoracic Echo (TTE) Limited    Result Date: 1/22/2025   [facility], [address] [place] [address]                Tel [phone] and Fax [phone] TRANSTHORACIC ECHOCARDIOGRAM REPORT  Patient Name:       ORLANDO VICTORIA   Reading Physician:    [id number] Steven Terrell MD Study Date:         1/22/2025           Ordering Provider:    [id number] CHEPE PERRY MRN/PID:            [id number]            Fellow: Accession#:         [id number]        Nurse: Date of Birth/Age:  1945 / 79     Sonographer:          Suellen Merritt RCS                     years Gender assigned at                     Additional Staff: Birth: Height:             172.72 cm           Admit Date: Weight:             67.13 kg            Admission Status:     Inpatient - STAT BSA / BMI:          1.80 m2 / 22.50     Encounter#:           [id number]                     kg/m2 Blood Pressure:     111/60 mmHg         Department Location:  [place] [address] Study Type:    TRANSTHORACIC ECHO (TTE) LIMITED Diagnosis/ICD: Chest pain, unspecified-R07.9 Indication:    CP post PCI CPT Code:      Echo Limited-08888; Doppler Limited-25613; Color Doppler-09700 Patient History: Pertinent History: HTN, Hyperlipidemia and CAD. CABG 1990s, multiple PCIs. Study Detail: The following Echo studies were performed: 2D, M-Mode, Doppler and               color flow.  PHYSICIAN INTERPRETATION: Left Ventricle: Left ventricular ejection fraction is moderately decreased, by visual estimate at 40%. There is mild left ventricular hypertrophy. There is global hypokinesis of the left ventricle with minor regional variations. The left ventricular cavity size is upper limits of normal. There is mildly increased  septal and mildly increased posterior left ventricular wall thickness. Abnormal (paradoxical) septal motion consistent with post-operative status. Spectral Doppler shows a Grade II (pseudonormal pattern) of left ventricular diastolic filling with an elevated left atrial pressure. LV Wall Scoring: The basal and mid inferior wall is akinetic. Left Atrium: The left atrium is mildly dilated. Right Ventricle: The right ventricle is upper limits of normal in size. There is moderately reduced right ventricular systolic function. Right Atrium: The right atrium is normal in size. Aortic Valve: The aortic valve was not well visualized. There is mild aortic valve cusp calcification. There is evidence of mild to moderate aortic valve stenosis. There is trace aortic valve regurgitation. The peak instantaneous gradient of the aortic valve is 7 mmHg. Mitral Valve: The mitral valve is mildly thickened. There is mild mitral annular calcification. There is moderate to severe mitral valve regurgitation. Tricuspid Valve: The tricuspid valve is structurally normal. There is mild tricuspid regurgitation. Pulmonic Valve: The pulmonic valve is structurally normal. There is trace pulmonic valve regurgitation. Pericardium: Trivial pericardial effusion. Aorta: The aortic root is normal. The ascending aorta was not well visualized. The Ao Sinus is 3.30 cm. The Asc Ao is 3.20 cm. There is no dilatation of the ascending aorta. There is no dilatation of the aortic root. Pulmonary Artery: The tricuspid regurgitant velocity is 3.14 m/s, and with an estimated right atrial pressure of 12 mmHg, the estimated pulmonary artery pressure is moderately elevated with the RVSP at 51.5 mmHg. Systemic Veins: The inferior vena cava appears mildly dilated. In comparison to the previous echocardiogram(s): Compared with study dated 1/15/2025, MR is now worse.  CONCLUSIONS:  1. Left ventricular ejection fraction is moderately decreased, by visual estimate at 40%.   2. There is global hypokinesis of the left ventricle with minor regional variations.  3. Basal and mid inferior wall is abnormal.  4. Spectral Doppler shows a Grade II (pseudonormal pattern) of left ventricular diastolic filling with an elevated left atrial pressure.  5. Abnormal septal motion consistent with post-operative status.  6. There is moderately reduced right ventricular systolic function.  7. The left atrium is mildly dilated.  8. Mild to moderate aortic valve stenosis.  9. Moderate to severe mitral valve regurgitation. 10. Moderately elevated pulmonary artery pressure. 11. The inferior vena cava appears mildly dilated. QUANTITATIVE DATA SUMMARY:  2D MEASUREMENTS:          Normal Ranges: IVSd:            1.16 cm  (0.6-1.1cm) LVPWd:           1.05 cm  (0.6-1.1cm) LVIDd:           5.09 cm  (3.9-5.9cm) LVIDs:           4.73 cm LV Mass Index:   119 g/m2 LVEDV Index:     67 ml/m2 LV % FS          7.0 %  LA VOLUME:                    Normal Ranges: LA Vol A4C:        69.6 ml    (22+/-6mL/m2) LA Vol A2C:        81.6 ml LA Vol BP:         76.0 ml LA Vol Index A4C:  38.7ml/m2 LA Vol Index A2C:  45.4 ml/m2 LA Vol Index BP:   42.3 ml/m2 LA Area A4C:       22.1 cm2 LA Area A2C:       24.2 cm2 LA Major Axis A4C: 6.0 cm LA Major Axis A2C: 6.1 cm LA Volume Index:   42.3 ml/m2  RA VOLUME BY A/L METHOD:            Normal Ranges: RA Vol A4C:              35.2 ml    (8.3-19.5ml) RA Vol Index A4C:        19.6 ml/m2 RA Area A4C:             15.5 cm2 RA Major Axis A4C:       5.8 cm  AORTA MEASUREMENTS:         Normal Ranges: Ao Sinus, d:        3.30 cm (2.1-3.5cm) Asc Ao, d:          3.20 cm (2.1-3.4cm)  LV SYSTOLIC FUNCTION BY 2D PLANIMETRY (MOD):                      Normal Ranges: EF-A4C View:    38 % (>=55%) EF-A2C View:    35 % EF-Biplane:     38 % EF-Visual:      40 % LV EF Reported: 40 %  LV DIASTOLIC FUNCTION:             Normal Ranges: MV Peak E:             1.10 m/s    (0.7-1.2 m/s) MV Peak A:             0.64 m/s     (0.42-0.7 m/s) E/A Ratio:             1.72        (1.0-2.2) MV e'                  0.073 m/s   (>8.0) MV lateral e'          0.09 m/s MV medial e'           0.06 m/s MV A Dur:              142.72 msec E/e' Ratio:            15.16       (<8.0)  MITRAL VALVE:          Normal Ranges: MV DT:        175 msec (150-240msec)  AORTIC VALVE:           Normal Ranges: AoV Vmax:      1.35 m/s (<=1.7m/s) AoV Peak P.3 mmHg (<20mmHg) LVOT Max Deonte:  0.60 m/s (<=1.1m/s) LVOT VTI:      10.58 cm LVOT Diameter: 1.80 cm  (1.8-2.4cm) AoV Area,Vmax: 1.13 cm2 (2.5-4.5cm2)  RIGHT VENTRICLE: RV Basal 3.80 cm RV Mid   2.80 cm RV Major 7.0 cm TAPSE:   10.0 mm RV s'    0.07 m/s  TRICUSPID VALVE/RVSP:          Normal Ranges: Peak TR Velocity:     3.14 m/s RV Syst Pressure:     51 mmHg  (< 30mmHg) IVC Diam:             2.30 cm  PULMONIC VALVE:         Normal Ranges: PV Accel Time:  77 msec (>120ms)  AORTA: Asc Ao Diam 3.62 cm  [id number] Steven Terrell MD Electronically signed on 2025 at 9:11:07 AM  Wall Scoring  ** Final **     Electrocardiogram, 12-lead PRN ACS symptoms    Result Date: 2025  Sinus rhythm with Premature atrial complexes Left bundle branch block Abnormal ECG When compared with ECG of 15-CECILE-2025 12:08, Premature atrial complexes are now Present Confirmed by Matteo Steward ([id number]) on 2025 8:46:19 AM    XR abdomen 1 view    Result Date: 2025  Interpreted By:  Reva Sharpe, STUDY: XR ABDOMEN 1 VIEW;  2025 6:46 pm   INDICATION: Signs/Symptoms:constipation, abdominal pain.     COMPARISON: CT: 2025   ACCESSION NUMBER(S): [id number]   ORDERING CLINICIAN: MARK DEVOOGD   FINDINGS: Nonobstructive bowel gas pattern. Limited evaluation of pneumoperitoneum on supine imaging, however no gross evidence of free air is noted.   Minimal fecal burden.   Osseous structures demonstrate no acute bony changes.       1.  Nonspecific bowel gas pattern. Minimal fecal burden.   MACRO: None   Signed by: Reva Sharpe  1/20/2025 10:25 AM Dictation workstation:   [id number]    Electrocardiogram, 12-lead PRN ACS symptoms    Result Date: 1/17/2025   Poor data quality, interpretation may be adversely affected Sinus bradycardia with marked sinus arrhythmia with 1st degree AV block with occasional Premature ventricular complexes Left bundle branch block Abnormal ECG Confirmed by Sanford Lassiter ([id number]) on 1/17/2025 11:16:19 AM    ECG 12 lead    Result Date: 1/17/2025  Sinus rhythm with 1st degree AV block Left bundle branch block Abnormal ECG When compared with ECG of 13-JAN-2025 13:46, (unconfirmed) Fusion complexes are no longer Present Premature ventricular complexes are no longer Present Premature supraventricular complexes are no longer Present Questionable change in QRS axis T wave inversion now evident in Inferior leads Confirmed by Sanford Lassiter ([id number]) on 1/17/2025 10:30:43 AM    ECG 12 lead    Result Date: 1/17/2025  Sinus rhythm with 1st degree AV block with Premature supraventricular complexes and Premature ventricular complexes or Fusion complexes Left bundle branch block Abnormal ECG Confirmed by Sanford Lassiter ([id number]) on 1/17/2025 10:07:53 AM    ECG 12 lead    Result Date: 1/17/2025  Normal sinus rhythm Left axis deviation Left bundle branch block Abnormal ECG When compared with ECG of 04-DEC-2024 20:58, PA interval has decreased Confirmed by Sanford Lassiter ([id number]) on 1/17/2025 10:07:20 AM    Electrocardiogram, 12-lead PRN ACS symptoms    Result Date: 1/16/2025  Sinus rhythm with 1st degree AV block Left bundle branch block Abnormal ECG When compared with ECG of 14-JAN-2025 01:11, Premature ventricular complexes are no longer Present Nonspecific T wave abnormality has replaced inverted T waves in Inferior leads QT has lengthened Confirmed by Fred Sher ([id number]) on 1/16/2025 2:56:07 PM    ECG 12 lead    Result Date: 1/15/2025  Sinus rhythm with 1st degree AV block Left bundle branch block Abnormal ECG When  compared with ECG of 13-JAN-2025 15:16, (unconfirmed) No significant change was found Confirmed by Sanford Lassiter ([id number]) on 1/15/2025 10:56:59 AM    XR chest 1 view    Result Date: 1/15/2025  Interpreted By:  Raji, Ashish,  and Westlake Margret STUDY: XR CHEST 1 VIEW;  1/14/2025 11:45 pm   INDICATION: Signs/Symptoms:Chest pain.   COMPARISON: Chest radiograph 01/13/2025   ACCESSION NUMBER(S): [id number]   ORDERING CLINICIAN: KEYA BIRCH   FINDINGS: AP radiograph of the chest was provided.   Postsurgical changes of median sternotomy with intact cerclage wires. Postsurgical changes of CABG.   CARDIOMEDIASTINAL SILHOUETTE: Cardiomediastinal silhouette is stable in size and configuration.   LUNGS: Mild bibasilar atelectasis. No focal consolidation, effusion, or pneumothorax. Pleural apical thickening on the left and linear parenchymal changes consistent with scarring and postsurgical change. There are metallic clips adjacent to the lateral aspect of the superior mediastinum on the left.   ABDOMEN: No remarkable upper abdominal findings.   BONES: No acute osseous abnormality.       1. Similar mild bibasilar pulmonary atelectasis. 2. Pleural apical thickening on the left with increased linear densities at the left apex, noted on the CT of January 13, 2025 and a follow-up CT has been suggested in 6 months.   I personally reviewed the image(s)/study and resident interpretation as stated by Dr. Margret Miguel MD. I agree with the findings as stated. This study was interpreted at [facility], [place], [place].   MACRO: None   Signed by: Ashish Alegria 1/15/2025 9:40 AM Dictation workstation:   [id number]    Transthoracic Echo (TTE) Complete    Result Date: 1/15/2025   [facility], [address] [place] [address]                Tel [phone] and Fax [phone] TRANSTHORACIC ECHOCARDIOGRAM REPORT  Patient Name:       ORLANDO Oconnell  Physician:    [id number] Steven Terrell MD Study Date:         1/15/2025           Ordering Provider:    [id number] KEYA BIRCH MRN/PID:            [id number]            Fellow: Accession#:         [id number]        Nurse: Date of Birth/Age:  1945 / 79     Sonographer:          Nick nelson                                     KELLY Gender assigned at  M                   Additional Staff: Birth: Height:             147.32 cm           Admit Date:           1/14/2025 Weight:             66.23 kg            Admission Status:     Inpatient - STAT BSA / BMI:          1.59 m2 / 30.51     Encounter#:           [id number]                     kg/m2 Blood Pressure:     118/55 mmHg         Department Location:  [facility] Study Type:    TRANSTHORACIC ECHO (TTE) COMPLETE Diagnosis/ICD: Atherosclerotic heart disease of native coronary artery without                angina pectoris-I25.10 Indication:    CAD CPT Code:      Echo Limited-86110; Doppler Limited-10098; Color Doppler-31955 Patient History: Pertinent History: CAD, Hyperlipidemia and HTN. CABG in 1990s and multiple PCIs. Study Detail: The following Echo studies were performed: 2D, Doppler and color               flow. Technically challenging study due to patient lying in supine               position. Agitated saline used as a contrast agent for intraseptal               flow evaluation.  PHYSICIAN INTERPRETATION: Left Ventricle: Left ventricular ejection fraction is mildly decreased, by visual estimate at 45-50%. The left ventricular cavity size is mild to moderately dilated. Abnormal (paradoxical) septal motion consistent with post-operative status. Spectral Doppler shows a Grade I (impaired relaxation pattern) of left ventricular diastolic filling with normal left atrial filling pressure. LV Wall Scoring: The basal and  mid inferior wall is akinetic. Left Atrium: The left atrium is enlarged. There is no evidence of a patent foramen ovale. A bubble study using agitated saline was performed. Bubble study is negative. 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 Right Ventricle: The right ventricle is normal in size. There is mildly reduced right ventricular systolic function. Right Atrium: The right atrium is normal in size. Aortic Valve: The aortic valve was not well visualized. There is mild to moderate aortic valve cusp calcification. There is trace to mild aortic valve regurgitation. Apparent aortic stenosis. Mitral Valve: The mitral valve is minimally calcified. There is mild mitral annular calcification. There is mild mitral valve regurgitation. Tricuspid Valve: The tricuspid valve is structurally normal. There is mild tricuspid regurgitation. Pulmonic Valve: The pulmonic valve is not well visualized. There is trace pulmonic valve regurgitation. Pericardium: Trivial pericardial effusion. Aorta: The aortic root is normal. The aortic root is at the upper limits of normal size. In comparison to the previous echocardiogram(s): Compared with study dated 12/6/2024, no significant change.  CONCLUSIONS:  1. Poorly visualized anatomical structures due to suboptimal image quality.  2. Left ventricular cavity size is mild to moderately dilated.  3. Left ventricular ejection fraction is mildly decreased, by visual estimate at 45-50%.  4. Basal and mid inferior wall is abnormal.  5. Spectral Doppler shows a Grade I (impaired relaxation pattern) of left ventricular diastolic filling with normal left atrial filling pressure.  6. Abnormal septal motion consistent with post-operative status.  7. There is mildly reduced right ventricular systolic function.  8. The left atrium is enlarged.  9. Atrial septal aneurysm present. 10. There is no evidence of a patent foramen ovale. QUANTITATIVE DATA SUMMARY:  2D MEASUREMENTS:           Normal  Ranges: LVEDV Index:     128 ml/m2  AORTA MEASUREMENTS:         Normal Ranges: Ao Sinus, d:        3.70 cm (2.1-3.5cm)  LV SYSTOLIC FUNCTION BY 2D PLANIMETRY (MOD):                      Normal Ranges: EF-A4C View:    57 % (>=55%) EF-A2C View:    53 % EF-Biplane:     55 % EF-Visual:      48 % LV EF Reported: 48 %  TRICUSPID VALVE/RVSP:          Normal Ranges: Peak TR Velocity:     2.36 m/s RV Syst Pressure:     25 mmHg  (< 30mmHg)  [id number] Steven Terrell MD Electronically signed on 1/15/2025 at 9:19:04 AM  Wall Scoring  ** Final **     CT angio chest abdomen pelvis    Result Date: 1/13/2025  Interpreted By:  Andrez Veloz, STUDY: CT ANGIO CHEST ABDOMEN PELVIS;  1/13/2025 5:00 pm   INDICATION: Signs/Symptoms:Dissection protocol   COMPARISON: CTA chest abdomen pelvis dated 12/04/2024.   ACCESSION NUMBER(S): [id number]   ORDERING CLINICIAN: RAINER FAULKNER   TECHNIQUE: CT of the chest, abdomen, and pelvis was performed was obtained before and after administration of intravenous contrast in the arterial phase, as part of CT angiography protocol. Contiguous axial images were obtained at  5 mm slice thickness through the chest, and at  3 mm through the abdomen and pelvis. Coronal and sagittal reconstructions at  3 mm slice thickness were performed. 75 ml of contrast material Omnipaque 350 were administered intravenously without immediate complication.   3D volume rendering of the aorta was obtained on a separate workstation and also reviewed.   FINDINGS: POTENTIAL LIMITATIONS OF THE STUDY: Motion and mixing artifact which limits evaluation of the distal branch vessels.   The visualized thyroid gland is within normal limits.   HEART AND VESSELS: No discrete filling defects within the main pulmonary artery or its branches.   Main pulmonary artery and its branches are normal in caliber.   The thoracic aorta is of normal contour without aneurysm. No evidence of acute intramural hematoma or dissection. Moderate calcified and  noncalcified atherosclerotic plaque of the thoracic aorta with noncalcified atherosclerotic plaque noted in the anterior surface of aortic arch, axial image 87, similar to prior.   Severe coronary artery atherosclerotic calcifications versus stents..The study is not optimized for evaluation of coronary arteries.   The cardiac chambers are not enlarged. There are sternotomy changes.   No evidence of pericardial effusion.   MEDIASTINUM AND GABRIELLA, AND AXILLA: No evidence of thoracic lymphadenopathy by CT criteria.   No mediastinal masses.   LUNGS AND AIRWAYS: The trachea and central airways are patent. No endobronchial lesion.   There is moderate upper lobe predominant paraseptal and centrilobular emphysema. There is also peripheral nonspecific interstitial scarring with more asymmetric scarring and traction bronchiectasis in the left lung apex. There is no pleural effusion or pneumothorax.   ABDOMEN/PELVIS: Liver: The liver is normal in size and contour. There are no focal hepatic lesions.   Gallbladder/biliary system: There are no radiopaque gallstones. There is no intrahepatic or extrahepatic biliary dilatation.   Spleen: Normal in size.   Pancreas: Not enlarged. No peripancreatic edema or ductal dilatation. No pancreatic mass is identified.   Adrenals: Within normal limits.   Kidneys: Mild bilateral renal cortical thinning. Bilateral intrarenal vascular atherosclerotic calcifications noted.  There is no hydronephrosis. There is no nephrolithiasis. 1.6 cm right lower pole renal cyst.   Urinary bladder: Grossly normal. Prostate is mildly enlarged.   Bowel: Stomach is partially collapsed without gross abnormality. No bowel dilatation or obstruction. Normal appendix. Formed stool throughout the colon without wall thickening or acute inflammatory change.   Vessels: The abdominal aorta is normal in caliber. The celiac, superior, and inferior mesenteric arteries are patent. There is a patent left renal artery origin stent.  Moderate atherosclerotic plaque of the abdominal aorta and its branches. Partially visualized bilateral superficial femoral artery stents.   Lymph nodes: No lymphadenopathy in the abdomen or pelvis.   Peritoneal/retroperitoneum: There is no evidence of intraperitoneal free air. There is no retroperitoneal hematoma. There are no pelvic or mesenteric masses identified.   OSSEOUS STRUCTURES: There are no suspicious osseous lesions. There is osteopenia. Unchanged mild chronic compression deformity of L4 vertebral body.       CHEST 1.  No evidence of aortic aneurysm, dissection, or acute intramural hematoma. Moderate calcified and noncalcified atherosclerotic plaque of the thoracic and abdominal aorta. Patent left renal artery stent and partially visualized bilateral superficial femoral artery stents. Sternotomy changes. Coronary artery atherosclerotic calcifications. 2. No evidence of consolidation or pleural effusion. Moderate centrilobular and paraseptal emphysema as well as nonspecific pulmonary fibrotic changes. Asymmetric scarring in the left lung apex is new since 2010 and consider follow-up chest CT in 6 months to confirm stability.   ABDOMEN - PELVIS 1.  No acute abnormality in the abdomen/pelvis. Mild bilateral renal cortical thinning. 2. Unchanged chronic compression deformity of L4 vertebral body.     Signed by: Andrez Veloz 1/13/2025 5:49 PM Dictation workstation:   [id number]    XR knee left 4+ views    Result Date: 1/13/2025  Interpreted By:  Leland Murphy, STUDY: XR KNEE LEFT 4+ VIEWS   INDICATION: Signs/Symptoms:Cellulitis.   COMPARISON: None   ACCESSION NUMBER(S): [id number]   ORDERING CLINICIAN: RAINER FAULKNER   FINDINGS: Below-the-knee amputation. Surgical margins left lower leg and fibula satisfactory. Mild osteoarthritis left knee. No acute findings.       No acute findings status post left below-the-knee amputation.   Signed by: Leland Murphy 1/13/2025 2:50 PM Dictation workstation:    [id number]    Lower extremity venous duplex left    Result Date: 1/13/2025  Interpreted By:  Kishor Andrade, STUD[facility]: [facility]UP[facility] LEFT; 1/13/2025 1:36 pm   INDICATION: Pain cellulitis   COMPARISON: None   ACCESSION NUMBER(S): [id number]   ORDERING CLINICIAN: RAINER FAULKNER   TECHNIQUE: Static grayscale, color Doppler, and spectral Doppler sonographic images of the bilateral lower extremities were obtained for duplex evaluation.   FINDINGS: LEFT LOWER EXTREMITY: There is no evidence of deep venous thrombus in the visualized portions of the common femoral vein, femoral vein, or popliteal vein. Below-knee amputation present.       1. No evidence of deep venous thrombus in the evaluated veins of the left leg from the inguinal ligament to the popliteal fossa. Below-knee amputation present.   Signed by: Kishor Andrade 1/13/2025 1:38 PM Dictation workstation:   [id number]    XR chest 1 view    Result Date: 1/13/2025  Interpreted By:  Duane Chou, STUDY: XR CHEST 1 VIEW;  1/13/2025 11:34 am   INDICATION: Signs/Symptoms:pain.     COMPARISON: 12/08/2024.   ACCESSION NUMBER(S): [id number]   ORDERING CLINICIAN: RAINER FAULKNER   FINDINGS: CARDIOMEDIASTINAL SILHOUETTE: Borderline size of the cardiac silhouette, aortic calcifications and postoperative changes of the mediastinum are similar to prior.   LUNGS: Mild bibasilar atelectasis is present. No localized consolidation. No definite pleural effusion. No appreciable pneumothorax.   ABDOMEN: No remarkable upper abdominal findings.   BONES: No acute osseous changes.       1.  Mild bibasilar atelectasis. No localized consolidation.       MACRO: None.   Signed by: Duane Chou 1/13/2025 11:40 AM Dictation workstation:   [id number]      Assessment/Plan   Carotid stenosis  Recurrent NSTEMI  Strokelike symptoms  Left eye amaurosis fugax        79-year-old male with history of recurrent chest pain/NSTEMI's who presented to the emergency department with  "left-sided weakness, slight left facial droop, left eye ptosis, left arm pain and left hand numbness.       MRA noting moderate to severe left ICA stenosis and moderate right ICA stenosis.  MRI of the brain was negative for infarct or hemorrhage.  Patient had a carotid ultrasound on 12/6/2024 which noted less than 50% right ICA stenosis and 50 to 69% left ICA stenosis, left proximal ICA velocity 202/65 with a ratio 4.1.      CT angiogram was completed.  Approximately 65% stenosis of the left ICA and less than 50% stenosis of the right ICA.  Patient with complaints of left-sided symptoms which should not be caused by left carotid stenosis.    2/1/2025.  Patient with episode of loss of consciousness, bradycardia yesterday.  He was given atropine and there was an attempted intubation, however, this was ultimately aborted as the patient was mentating well quickly after the episode.  He was then transported to the ICU    He is a very difficult historian and does not answer questions directly, however, he does seem to explain a left eye vision loss episode that occurred this morning.  He states his left eye vision is still not back to what it is at baseline.  He states he is \"nearly blind\" in his right eye.  This is concerning for amaurosis fugax.  He is currently on therapeutic anticoagulation, antiplatelet therapy, and statin therapy.  Will discuss with on-call vascular surgeon, however, I am not sure this patient is a surgical candidate.  Consider reinvolving neurology given these new findings.       I spent 35 minutes in the professional and overall care of this patient.      Massimo Trevizo, APRN-CNP      "

## 2025-02-01 NOTE — PROGRESS NOTES
Speech-Language Pathology                 Therapy Communication Note    Patient Name: Chavez Llaams  MRN: [id number]  Department: [facility] S ICU  Room: 13/13-A  Today's Date: 2/1/2025     Discipline: Speech Language Pathology          Missed Visit Reason:  Pt adamantly, but politely, declines participation with swallow eval. Family at bedside provides encouragement, but pt still declines. Cancel swallow eval per pt preference.     Missed Time: Cancel    Comment:

## 2025-02-01 NOTE — PROGRESS NOTES
Subjective Data:  Patient complaining of chest pain, back pain, shortness of breath.  Was transferred to ICU yesterday after loss of consciousness event.    Overnight Events:    Telemetry overnight reviewed no events except for PVCs.     Objective Data:  Last Recorded Vitals:  Vitals:    02/01/25 0900 02/01/25 1000 02/01/25 1100 02/01/25 1200   BP: 98/82 104/83 110/64    Pulse: 80 83 81    Resp: 14 20 18    Temp:    36.5 °C (97.7 °F)   TempSrc:    Oral   SpO2: 98% 95% 98%    Weight:       Height:           Last Labs:  CBC - 2/1/2025:  4:31 AM  6.1 8.3 135    26.3      CMP - 2/1/2025:  4:31 AM  8.4 7.1 17 --- 2.0   4.0 3.4 15 62      PTT - 1/31/2025:  4:19 PM  1.2   12.3 34.2     TROPHS   Date/Time Value Ref Range Status   02/01/2025 10:19  0 - 20 ng/L Final     Comment:     Previous result verified on 1/31/2025 1712 on specimen/case [id number] called with component TRPHS for procedure Troponin I, High Sensitivity with value 661 ng/L.   02/01/2025 04:31  0 - 20 ng/L Final     Comment:     Previous result verified on 1/31/2025 1712 on specimen/case [id number] called with component TRPHS for procedure Troponin I, High Sensitivity with value 661 ng/L.   01/31/2025 04:19  0 - 20 ng/L Final     BNP   Date/Time Value Ref Range Status   01/31/2025 04:19 PM 1,961 0 - 99 pg/mL Final   01/14/2025 06:59  0 - 99 pg/mL Final     HGBA1C   Date/Time Value Ref Range Status   01/28/2025 07:02 AM 4.7 See comment % Final   12/05/2024 05:05 AM 5.5 See comment % Final     LDLCALC   Date/Time Value Ref Range Status   01/29/2025 06:33 AM 32 <=99 mg/dL Final     Comment:                                 Near   Borderline      AGE      Desirable  Optimal    High     High     Very High     0-19 Y     0 - 109     ---    110-129   >/= 130     ----    20-24 Y     0 - 119     ---    120-159   >/= 160     ----      >24 Y     0 -  99   100-129  130-159   160-189     >/=190     01/15/2025 01:06 PM 21 <=99 mg/dL Final      Comment:                                 Near   Borderline      AGE      Desirable  Optimal    High     High     Very High     0-19 Y     0 - 109     ---    110-129   >/= 130     ----    20-24 Y     0 - 119     ---    120-159   >/= 160     ----      >24 Y     0 -  99   100-129  130-159   160-189     >/=190     12/04/2024 11:47 PM 34 <=99 mg/dL Final     Comment:                                 Near   Borderline      AGE      Desirable  Optimal    High     High     Very High     0-19 Y     0 - 109     ---    110-129   >/= 130     ----    20-24 Y     0 - 119     ---    120-159   >/= 160     ----      >24 Y     0 -  99   100-129  130-159   160-189     >/=190       VLDL   Date/Time Value Ref Range Status   01/29/2025 06:33 AM 16 0 - 40 mg/dL Final   01/15/2025 01:06 PM 17 0 - 40 mg/dL Final   12/04/2024 11:47 PM 18 0 - 40 mg/dL Final      Last I/O:  I/O last 3 completed shifts:  In: 200 (3.1 mL/kg) [P.O.:200]  Out: 200 (3.1 mL/kg) [Urine:200 (0.1 mL/kg/hr)]  Weight: 65 kg     Past Cardiology Tests (Last 3 Years):  EKG:  ECG 12 lead 01/28/2025      Electrocardiogram, 12-lead PRN ACS symptoms 01/22/2025      Electrocardiogram, 12-lead PRN ACS symptoms 01/21/2025      ECG 12 lead 01/21/2025      Electrocardiogram 12 Lead 01/21/2025      Electrocardiogram, 12-lead PRN ACS symptoms 01/17/2025      Electrocardiogram, 12-lead PRN ACS symptoms 01/17/2025      Electrocardiogram, 12-lead PRN ACS symptoms 01/14/2025      ECG 12 lead 01/14/2025      Electrocardiogram, 12-lead PRN ACS symptoms 01/14/2025      ECG 12 lead 01/13/2025      ECG 12 lead 01/13/2025      ECG 12 lead 01/13/2025      ECG 12 lead 12/04/2024      ECG 12 lead 12/04/2024    Echo:  Transthoracic Echo (TTE) Limited 01/22/2025      Transthoracic Echo (TTE) Limited 01/22/2025      Transthoracic Echo (TTE) Complete 01/15/2025      Transthoracic Echo (TTE) Complete 12/06/2024    Ejection Fractions:  EF   Date/Time Value Ref Range Status   01/22/2025 11:27 AM 45 %     01/22/2025 08:26 AM 40 %    01/15/2025 08:52 AM 48 %      Cath:  Cardiac Catheterization Procedure 01/20/2025      Cardiac Catheterization Procedure 12/06/2024    Stress Test:  No results found for this or any previous visit from the past 1095 days.    Cardiac Imaging:  No results found for this or any previous visit from the past 1095 days.      Inpatient Medications:  Scheduled medications   Medication Dose Route Frequency    aspirin  81 mg oral Daily    atorvastatin  80 mg oral Nightly    busPIRone  7.5 mg oral BID    clopidogrel  75 mg oral Daily    enoxaparin  1 mg/kg subcutaneous q12h JENNIFER    insulin lispro  0-5 Units subcutaneous Before meals & nightly    [START ON 2/2/2025] isosorbide mononitrate ER  30 mg oral Daily    levothyroxine  50 mcg oral Daily    [START ON 2/2/2025] metoprolol succinate XL  50 mg oral Daily    pantoprazole  40 mg oral Daily before breakfast    polyethylene glycol  17 g oral Daily    ranolazine  1,000 mg oral BID    sennosides-docusate sodium  1 tablet oral Nightly    tamsulosin  0.4 mg oral Nightly     PRN medications   Medication    acetaminophen    Or    acetaminophen    Or    acetaminophen    hydrALAZINE    hydrOXYzine pamoate    morphine    morphine    nitroglycerin    ondansetron    oxygen    polyethylene glycol     Continuous Medications   Medication Dose Last Rate       Physical Exam:  General: Patient is in mild distress due to chest pain.  HEENT: atraumatic normocephalic.  Neck: is supple jugular venous pressure within normal limits no thyromegaly.  Cardiovascular regular rate and rhythm normal heart sounds no murmurs rubs or gallops.  Lungs: clear to auscultation bilaterally.  Abdomen: is soft nontender.  Extremities warm to touch no edema.       Assessment/Plan   #1 chest pain.  Patient admitted to the hospital with a right sided facial numbness and arm numbness.  Patient underwent workup for TIA/stroke all workup is negative.  Has a history of coronary artery disease.   Had severe RCA mid lesion treated with balloon angioplasty by myself few weeks ago and 2 weeks ago by Dr. Weston.  Patient chest pain could be related now to renarrowing of his mid RCA.  However he has no options for treatment of his RCA except medical management.  He is on optimal medical therapy medical wise.  We will increase metoprolol to 50 mg oral twice daily.  Continue ranolazine.  Decrease isosorbide to 30 mg oral daily.  Reviewed his cath films again.  He does not have after the LAD anastomosis of LIMA some disease as well as distal left main providing flow to ramus.  However his symptoms most likely are related to mid RCA disease.  Had lengthy discussion with his stepdaughter his power of  as well as with patient treatment options.  I do not believe PCI of LAD through LIMA or left main into ramus will change outcome.  And both procedure carry significant risk.  For now recommend medical management with aspirin, statin, blood pressure and heart rate control.  I do not recommend heparin at this point since I believe his RCA will not remain patent for the next few weeks and there is no treatment option with intervention or bypass surgery.    #2 shortness of breath.  Patient has mild LV stock dysfunction ejection fraction of 45%.  Appears euvolemic for the time being.  Could be angina equivalent.  NT-proBNP slightly elevated.  Will try to control his heart rate better.  We may consider small dose diuretics.  Or we may consider  SGLT inhibitors.    #3 hyperlipidemia continue statin.    4. hypertension blood pressure and heart rate well-controlled will optimize his treatment.    Thank you for the consult  Peripheral IV 01/31/25 20 G Distal;Right;Anterior Forearm (Active)   Site Assessment Clean;Dry;Intact 02/01/25 0800   Dressing Type Transparent 02/01/25 0800   Line Status Flushed;No blood return;Saline locked 02/01/25 0800   Dressing Status Clean;Dry;Occlusive 02/01/25 0800   Number of days: 1        Peripheral IV 01/31/25 20 G Left Forearm (Active)   Site Assessment Clean;Dry;Intact 02/01/25 0800   Dressing Type Transparent 02/01/25 0800   Line Status Flushed;No blood return;Saline locked 02/01/25 0800   Dressing Status Clean;Dry;Occlusive 02/01/25 0800   Number of days: 1       Code Status:  Full Code    Chaim Soriano MD

## 2025-02-02 PROBLEM — R51.9 LEFT-SIDED FACE PAIN: Status: ACTIVE | Noted: 2025-01-01

## 2025-02-02 PROBLEM — R51.9 LEFT-SIDED FACE PAIN: Status: RESOLVED | Noted: 2025-01-01 | Resolved: 2025-01-01

## 2025-02-02 NOTE — SIGNIFICANT EVENT
[facility] CRITICAL CARE SIGNIFICANT EVENT NOTE:    Date:  2/2/2025  Patient:  Chavez Llamas  YOB: 1945  MRN:  [id number]   Admit Date:  1/28/2025    Around 1610 patient started vomiting, then developed chest pain, consistent with his prior anginal events. EKG obtained showing sinus rhythm with occasional PVCs, LAD - appeared equivalent to EKG obtained yesterday with no new ST changes. Patient became bradycardic in the 40s and dopamine was titrated up to 7.5 mcg/kg/min. Family notified providers that they witnessed an episode of cessation of breathing. Upon arrival to the room, the patient had what appeared to be possible seizure-like activity with stiffening of his extremities, eyes rolling in the back of his head, unresponsiveness to external stimuli, but moaning and breathing. Pulse palpated. Rapid response called. Patient lost pulse shortly after. Started chest compressions. Code blue called. ACLS protocol with chest compressions and epinephrine with ROSC on first pulse check. Intubated for airway protection. Dopamine continued to be titrated up with the addition of levophed. See MAR for further orders. Updated cardiology, recommendations received, not an operative candidate. Spoke with Timothy Bowles and Rianna - received verbal consent for central and arterial line placement. Code status changed to DNR-CCA. Patient continued to be bradycardic and hypotensive despite vasopressor therapy. PEA on monitor. POCUS with no cardiac activity. Time of death called at 1806.     CCT: 65 minutes     Santhosh Bettencourt PA-C  Pulmonary and Critical Care Medicine   [facility]

## 2025-02-02 NOTE — SIGNIFICANT EVENT
[facility] CRITICAL CARE SIGNIFICANT EVENT NOTE:    Date:  2/1/2025  Patient:  Chavez Llamas  YOB: 1945  MRN:  [id number]   Admit Date:  1/28/2025    Concerns/Exam:   I presented to bedside around 1815 as telemetry showed bradycardia with HR in the 30s. Patient was acutely delirious, saying he was very tired and wanted to go to sleep continuously, spoke about heaven, spoke about death, and asked if he had visitors during the day because he was dying. Concerns for evolving RCA infarction with presumed acute neurologic hypoperfusion causing altered mentation in the setting of decreased cardiac output. He stated midsternal chest pain was 3/10 which eventually worsened to about 6/10. EKG showed second degree heart block mobitz type 1. BP initially normotensive, but ultimately became hypotensive as bradycardia persisted. Pulse oximetry remained normal on room air.     Assessment and Plan:  - EKG showing Mobitz type 1  - Spoke with on call cardiology   -- Start dopamine at 5mcg/kg/min  -- Ultimately titrated up to 7.5mcg/kg/min as he remained hypotensive with SBP in the 70s-80   - Attempted to place peripheral IVs, but patient was yelling in pain with needle sticks and agitated, moving extremities making it difficulty to obtain adequate vascular access   - Hold metoprolol due to bradycardia and imdur due to hypotension  - Updated POMARY Bowles who came to bedside shortly after   - Discussed goals of care as patient is critically ill with hemodynamic instability and high risk for acute decompensation  -- Jelena said her and Rianna (both POAs) had discussed yesterday when he was transferred to ICU that patient will remain a full code, no intubation. I did discuss that we do emergently intubate in the setting of cardiac arrest for airway protection and this would be ok with them. If patient starts to develop acute respiratory failure, will urgently rediscuss goals of care with Jelena and Rianna as intubation due to  solely respiratory failure may not align with patient's goals    CCT: 55 minutes     Santhosh Bettencourt PA-C  Pulmonary and Critical Care Medicine   [facility]

## 2025-02-02 NOTE — PROCEDURES
CENTRAL LINE PLACEMENT    Consent was not obtained as procedure was done in an emergent situation.     Indication(s):  -Inadequate IV access  -Administration of vasoactive or caustic agents  -CVP monitoring    Site:  - Left internal jugular vein    Catheter: 7 Fr, 3 lumen, 16 cm radiopaque polyurethane    Sedation: none  Patient positioned supine +/- slight Trendelenburg.  Sterility: Chlorhexidine skin prep. Hat and mask on myself and assistant(s). Antiseptic hand foam. Sterile gown, gloves and drape.  Local anesthesia: none  Ultrasound-guided insertion:  -YES (with sterile ultrasound probe cover)    Seldinger technique with 18 Ga x 2.5 in introducer needle. Guidewire thread easily through introducer needle.  -Needle position confirmed to be intravenous by fall of blood to gravity within pressure transduction tubing.  -Guidewire position confirmed to be intravenous by visualization on ultrasound in short and long axis views.  Small skin incision adjacent to guidewire with #11 scalpel. 8.5 Fr tissue dilator over guidewire. Catheter thread easily over guidewire and guidewire removed. All ports aspirated for complete removal of air, then flushed with sterile NS.  Catheter secured with sutures @ 16 cm at skin.  -Transparent film dressing with CHG.  Sterility maintained throughout procedure. No complications.     Chest x-ray  -pending.    Other details: None.    This note was prepared using voice recognition software. The details of this note are correct and have been reviewed, and corrected to the best of my ability. Some grammatical areas may persist related to the Dragon software.     I have reviewed all medications, laboratory results, and imaging pertinent for today's encounter.  Critical Care [facility]   Ji Tristan PA-C

## 2025-02-02 NOTE — PROGRESS NOTES
Subjective Data:  Patient getting intermittent episodes of confusion.  Still having mild chest pain 2 out of 10.  Shortness of breath.  Developed bradycardia overnight we will start him on dopamine drip 5 mcg/kg/min.    Overnight Events:    Telemetry overnight reviewed bradycardia intermittently but now back into normal rhythm on dopamine     Objective Data:  Last Recorded Vitals:  Vitals:    02/02/25 0800 02/02/25 0900 02/02/25 1000 02/02/25 1200   BP: 107/61 109/65 111/65    Pulse: 87 89 83    Resp: 12 18 17    Temp: 36.7 °C (98.1 °F)   36.3 °C (97.3 °F)   TempSrc: Oral   Oral   SpO2: 95% 99% 96%    Weight:       Height:           Last Labs:  CBC - 2/2/2025:  4:18 AM  10.1 8.7 147    27.1      CMP - 2/2/2025:  4:17 AM  8.2 7.1 17 --- 2.0   3.9 3.5 15 62      PTT - 1/31/2025:  4:19 PM  1.2   12.3 34.2     TROPHS   Date/Time Value Ref Range Status   02/01/2025 08:07  0 - 20 ng/L Final   02/01/2025 10:19  0 - 20 ng/L Final     Comment:     Previous result verified on 1/31/2025 1712 on specimen/case [id number] called with component TRPHS for procedure Troponin I, High Sensitivity with value 661 ng/L.   02/01/2025 04:31  0 - 20 ng/L Final     Comment:     Previous result verified on 1/31/2025 1712 on specimen/case [id number] called with component TRPHS for procedure Troponin I, High Sensitivity with value 661 ng/L.     BNP   Date/Time Value Ref Range Status   01/31/2025 04:19 PM 1,961 0 - 99 pg/mL Final   01/14/2025 06:59  0 - 99 pg/mL Final     HGBA1C   Date/Time Value Ref Range Status   01/28/2025 07:02 AM 4.7 See comment % Final   12/05/2024 05:05 AM 5.5 See comment % Final     LDLCALC   Date/Time Value Ref Range Status   01/29/2025 06:33 AM 32 <=99 mg/dL Final     Comment:                                 Near   Borderline      AGE      Desirable  Optimal    High     High     Very High     0-19 Y     0 - 109     ---    110-129   >/= 130     ----    20-24 Y     0 - 119     ---     120-159   >/= 160     ----      >24 Y     0 -  99   100-129  130-159   160-189     >/=190     01/15/2025 01:06 PM 21 <=99 mg/dL Final     Comment:                                 Near   Borderline      AGE      Desirable  Optimal    High     High     Very High     0-19 Y     0 - 109     ---    110-129   >/= 130     ----    20-24 Y     0 - 119     ---    120-159   >/= 160     ----      >24 Y     0 -  99   100-129  130-159   160-189     >/=190     12/04/2024 11:47 PM 34 <=99 mg/dL Final     Comment:                                 Near   Borderline      AGE      Desirable  Optimal    High     High     Very High     0-19 Y     0 - 109     ---    110-129   >/= 130     ----    20-24 Y     0 - 119     ---    120-159   >/= 160     ----      >24 Y     0 -  99   100-129  130-159   160-189     >/=190       VLDL   Date/Time Value Ref Range Status   01/29/2025 06:33 AM 16 0 - 40 mg/dL Final   01/15/2025 01:06 PM 17 0 - 40 mg/dL Final   12/04/2024 11:47 PM 18 0 - 40 mg/dL Final      Last I/O:  I/O last 3 completed shifts:  In: 745.5 (11.6 mL/kg) [P.O.:660; I.V.:85.5 (1.3 mL/kg)]  Out: 325 (5.1 mL/kg) [Urine:325 (0.1 mL/kg/hr)]  Weight: 64.3 kg     Past Cardiology Tests (Last 3 Years):  EKG:  ECG 12 lead 01/28/2025      Electrocardiogram, 12-lead PRN ACS symptoms 01/22/2025      Electrocardiogram, 12-lead PRN ACS symptoms 01/21/2025      ECG 12 lead 01/21/2025      Electrocardiogram 12 Lead 01/21/2025      Electrocardiogram, 12-lead PRN ACS symptoms 01/17/2025      Electrocardiogram, 12-lead PRN ACS symptoms 01/17/2025      Electrocardiogram, 12-lead PRN ACS symptoms 01/14/2025      ECG 12 lead 01/14/2025      Electrocardiogram, 12-lead PRN ACS symptoms 01/14/2025      ECG 12 lead 01/13/2025      ECG 12 lead 01/13/2025      ECG 12 lead 01/13/2025      ECG 12 lead 12/04/2024      ECG 12 lead 12/04/2024    Echo:  Transthoracic Echo (TTE) Limited 01/22/2025      Transthoracic Echo (TTE) Limited 01/22/2025      Transthoracic Echo  (TTE) Complete 01/15/2025      Transthoracic Echo (TTE) Complete 12/06/2024    Ejection Fractions:  EF   Date/Time Value Ref Range Status   01/22/2025 11:27 AM 45 %    01/22/2025 08:26 AM 40 %    01/15/2025 08:52 AM 48 %      Cath:  Cardiac Catheterization Procedure 01/20/2025      Cardiac Catheterization Procedure 12/06/2024    Stress Test:  No results found for this or any previous visit from the past 1095 days.    Cardiac Imaging:  No results found for this or any previous visit from the past 1095 days.      Inpatient Medications:  Scheduled medications   Medication Dose Route Frequency    aspirin  81 mg oral Daily    atorvastatin  80 mg oral Nightly    busPIRone  7.5 mg oral BID    clopidogrel  75 mg oral Daily    enoxaparin  1 mg/kg subcutaneous q12h JENNIFER    insulin lispro  0-5 Units subcutaneous Before meals & nightly    [Held by provider] isosorbide mononitrate ER  30 mg oral Daily    levothyroxine  50 mcg oral Daily    [Held by provider] metoprolol succinate XL  50 mg oral Daily    pantoprazole  40 mg oral Daily before breakfast    polyethylene glycol  17 g oral Daily    ranolazine  1,000 mg oral BID    sennosides-docusate sodium  1 tablet oral Nightly    tamsulosin  0.4 mg oral Nightly     PRN medications   Medication    acetaminophen    Or    acetaminophen    Or    acetaminophen    hydrALAZINE    hydrOXYzine pamoate    lubricating eye drops    morphine    morphine    ondansetron    oxygen    polyethylene glycol     Continuous Medications   Medication Dose Last Rate    DOPamine  5 mcg/kg/min 5 mcg/kg/min (02/02/25 1104)       Physical Exam:  General: Patient is in mild distress due to chest pain.  HEENT: atraumatic normocephalic.  Neck: is supple jugular venous pressure within normal limits no thyromegaly.  Cardiovascular regular rate and rhythm normal heart sounds no murmurs rubs or gallops.  Lungs: clear to auscultation bilaterally.  Abdomen: is soft nontender.  Extremities warm to touch no edema.            Assessment/Plan  #1 chest pain.  Patient admitted to the hospital with a right sided facial numbness and arm numbness.  Patient underwent workup for TIA/stroke all workup is negative.  Has a history of coronary artery disease.  Had severe RCA mid lesion treated with balloon angioplasty by myself few weeks ago and 2 weeks ago by Dr. Weston.  Patient chest pain could be related now to renarrowing of his mid RCA.  However he has no options for treatment of his RCA except medical management.  He is on optimal medical therapy medical wise.  We will increase metoprolol to 50 mg oral twice daily.  Continue ranolazine.  Decrease isosorbide to 30 mg oral daily.  Reviewed his cath films again.  He does not have after the LAD anastomosis of LIMA some disease as well as distal left main providing flow to ramus.  However his symptoms most likely are related to mid RCA disease.  Had lengthy discussion with his stepdaughter his power of  as well as with patient treatment options.  I do not believe PCI of LAD through LIMA or left main into ramus will change outcome.  And both procedure carry significant risk.  For now recommend medical management with aspirin, statin, blood pressure and heart rate control.  I do not recommend heparin at this point since I believe his RCA will not remain patent for the next few weeks and there is no treatment option with intervention or bypass surgery.  Patient had bradycardia overnight currently on dopamine.  Likely is closing his right coronary artery.  We will continue medical management.  Agree with involving palliative care at this point.     #2 shortness of breath.  Patient has mild LV stock dysfunction ejection fraction of 45%.  Appears euvolemic for the time being.  Could be angina equivalent.  NT-proBNP slightly elevated.  Will try to control his heart rate better.  We may consider small dose diuretics.  Or we may consider  SGLT inhibitors.     #3 hyperlipidemia continue  statin.     4. hypertension blood pressure and heart rate well-controlled will optimize his treatment.     Thank you for the consult  Peripheral IV 01/31/25 20 G Left Forearm (Active)   Site Assessment Clean;Intact;Dry 02/02/25 0900   Dressing Status Clean;Dry;Occlusive 02/02/25 0900   Number of days: 2       Code Status:  Full Code      Chaim Soriano MD

## 2025-02-02 NOTE — CARE PLAN
The patient's goals for the shift include      The clinical goals for the shift include Patient will be hds throughout this shift    Over the shift, the patient did not make progress toward the following goals. Barriers to progression include . Recommendations to address these barriers include .

## 2025-02-02 NOTE — PROCEDURES
ENDOTRACHEAL INTUBATION    Indication(s):  -Post Cardiac Arrest     Induction agents:  -Fentanyl 50 mcg  -Versed 2 mg  -Etomidate 20 mg    Procedure: Conscious intubation post cardiac arrest   Pt was BMV performed with 100% O2 by ambu. Induction agents as above. Video Laryngoscopy with S3, grade III view. Atraumatic intubation with #7.5 ETT @ 24 cm @ lips. +EtCO2 color change. Breath sound equal bilaterally. Patient in critical condition with no responsiveness.     This note was prepared using voice recognition software. The details of this note are correct and have been reviewed, and corrected to the best of my ability. Some grammatical areas may persist related to the Dragon software.     I have reviewed all medications, laboratory results, and imaging pertinent for today's encounter.  Critical Care [facility]   Ji Tristan PA-C

## 2025-02-02 NOTE — SIGNIFICANT EVENT
Preliminary cause of death:    Patient with severe CAD of the graft vessels, who was in the ICU for ongoing CP due to worsening ischemia, not a candidate for treatment, who coded earlier today, intubated and placed on MV. Also bradycardic and hypotensive, requiring multiple pressors. After Discussing with the family, he was made DNR. At 18:07, patient lost his pulse with minimal electrical activity on monitor, No heart sounds, no response to tactile stimuli, subsequently he was pronounced . Family updated and at the bedside.

## 2025-02-02 NOTE — CARE PLAN
Problem: General Stroke  Goal: Establish a mutual long term goal with patient by discharge  Outcome: Progressing  Goal: Demonstrate improvement in neurological exam throughout the shift  Outcome: Progressing  Goal: Maintain BP within ordered limits throughout shift  Outcome: Progressing  Goal: Participate in treatment (ie., meds, therapy) throughout shift  Outcome: Progressing  Goal: No symptoms of aspiration throughout shift  Outcome: Progressing  Goal: No symptoms of hemorrhage throughout shift  Outcome: Progressing  Goal: Tolerate enteral feeding throughout shift  Outcome: Progressing  Goal: Decreased nausea/vomiting throughout shift  Outcome: Progressing  Goal: Controlled blood glucose throughout shift  Outcome: Progressing  Goal: Out of bed three times today  Outcome: Progressing     Problem: Safety - Adult  Goal: Free from fall injury  Outcome: Progressing     Problem: Discharge Planning  Goal: Discharge to home or other facility with appropriate resources  Outcome: Progressing     Problem: Chronic Conditions and Co-morbidities  Goal: Patient's chronic conditions and co-morbidity symptoms are monitored and maintained or improved  Outcome: Progressing     Problem: Nutrition  Goal: Nutrient intake appropriate for maintaining nutritional needs  Outcome: Progressing     Problem: Diabetes  Goal: Achieve decreasing blood glucose levels by end of shift  Outcome: Progressing  Goal: Increase stability of blood glucose readings by end of shift  Outcome: Progressing  Goal: Decrease in ketones present in urine by end of shift  Outcome: Progressing  Goal: Maintain electrolyte levels within acceptable range throughout shift  Outcome: Progressing  Goal: Maintain glucose levels >70mg/dl to <250mg/dl throughout shift  Outcome: Progressing  Goal: No changes in neurological exam by end of shift  Outcome: Progressing  Goal: Learn about and adhere to nutrition recommendations by end of shift  Outcome: Progressing  Goal: Vital  signs within normal range for age by end of shift  Outcome: Progressing  Goal: Increase self care and/or family involovement by end of shift  Outcome: Progressing  Goal: Receive DSME education by end of shift  Outcome: Progressing     Problem: Fall/Injury  Goal: Not fall by end of shift  Outcome: Progressing  Goal: Be free from injury by end of the shift  Outcome: Progressing  Goal: Verbalize understanding of personal risk factors for fall in the hospital  Outcome: Progressing  Goal: Verbalize understanding of risk factor reduction measures to prevent injury from fall in the home  Outcome: Progressing  Goal: Use assistive devices by end of the shift  Outcome: Progressing  Goal: Pace activities to prevent fatigue by end of the shift  Outcome: Progressing     Problem: ADLs  Goal: Pt will complete ADL tasks at mod I with use of AE prn   Outcome: Progressing     Problem: Pain  Goal: Takes deep breaths with improved pain control throughout the shift  Outcome: Progressing  Goal: Turns in bed with improved pain control throughout the shift  Outcome: Progressing  Goal: Walks with improved pain control throughout the shift  Outcome: Progressing  Goal: Performs ADL's with improved pain control throughout shift  Outcome: Progressing  Goal: Participates in PT with improved pain control throughout the shift  Outcome: Progressing  Goal: Free from opioid side effects throughout the shift  Outcome: Progressing  Goal: Free from acute confusion related to pain meds throughout the shift  Outcome: Progressing   The patient's goals for the shift include      The clinical goals for the shift include Patient will remain hemodynamically stable.    Over the shift, the patient did not make progress toward the following goals. Barriers to progression include. Recommendations to address these barriers include.

## 2025-02-02 NOTE — PROGRESS NOTES
[facility] Critical Care Medicine       Date:  2/2/2025  Patient:  Chavez Llamas  YOB: 1945  MRN:  [id number]   Admit Date:  1/28/2025      Chief complaint: stroke-like symptoms, generalized weakness        History of Present Illness:  Chavez Llamas is a 79 y.o. year old male patient with past medical history of coronary artery disease s/p CABG 1990s, complicated cardiac history post-operatively with multiple PCIs - most recently 1/20/25 with PCI to RCA at [facility] [place], recurrent angina, HFmrEF, hypertension, type 2 diabetes mellitus, peripheral arterial disease s/p left BKA, CKD stage 3, GERD, hypothyroidism, legal blindness, who presented to  ED 1/28 with complaints of generalized weakness x1 day and development of stroke-like symptoms. Per chart review, it appears that he woke up on the morning of arrival with left-sided facial numbness, dizziness, and light-headedness. Also reports of difficulty swallowing. [place] stroke scale negative per EMS. NIH score 1 in ED. Code brain attack activated. Case discussed with telestroke provider and TNK was not indicated due to outside of window.      ED Course: Vitals on arrival to the ED include, /91, HR 89, RR 18, SpO2 99% on RA. CMP with glucose 140, sodium 135, BUN/Cr 24/1.64, EGFR 42, and ALT 7.  Troponin elevated at 460.  CBC with WBC 8.4 and H/H of 9.1/28.2.  Coag panel with PT/INR 11.3/1.1 and aPTT 32.5.  Pt negative for covid 19, flu A and B.  CT head with atrophy resulting in prominence of the ventricles and sulci, areas of decreased attenuation within the white matter which are nonspecific but are commonly associated with small vessel ischemic disease.  Code Brain attack was activated by the ED provider in which pt was not a candidate for thrombolytics.  Pt was admitted under general medicine for further evaluation and treatment.      Interval Hospital Course (1/28-1/31): Pt followed by neurology for concern of CVA vs TIA in which an MRI  of the brain was negative and MRA of the neck significant for left carotid stenosis.  Vascular surgery following with plan for surveillance with ultrasound.  Pt noted with an asymptommatic NSTEMI and significant cardiac history, cardiology following and will continue on current medication regimen.       Interval ICU Events:  1/31: Patient arrived to ICU post rapid-response/code blue. See significant event notes for further details. Labs showing improvement in lactic acid, but mild elevation in troponin from prior. O2 stable on nasal cannula. Will cont to trend trop to peak.      2/1: HR remained stable with no further bradycardic events, had a few episodes of short sinus pauses on telemetry in evening 1-2 sec. No pauses since midnight. Serial EKGs stable with no new ST changes. Later this morning, patient had an episode of midsternal chest pain, with worsening left sided facial pain, and decreased left-sided vision, and vomiting. EKG showing PVCs and ST abnormalities in V4-V6 which is changed from yesterday. Discussed with on-call cardiology who extensively discussed the case with patient and family at bedside. Plan for medical and symptom management for now with expanding GDMT. Symptoms returned to baseline about 1hr after initial episode. Repeat troponin downtrending.    2/2: Another episode of decompensation yesterday evening. Pt became acutely bradycardic and hypotensive requiring atropine x1 and dopamine gtt. EKG showing second degree AV block - Mobitz type 1. See significant event note from yesterday regarding further details of the event. Hemodynamics have been stable on dopamine gtt @ 5mcg/kg/min.     Objective     Medical History:  Past Medical History:   Diagnosis Date    Old myocardial infarction 02/01/2017    Past heart attack     Past Surgical History:   Procedure Laterality Date    CARDIAC CATHETERIZATION N/A 12/6/2024    Procedure: Left Heart Cath;  Surgeon: Chaim Soriano MD;  Location: [facility]  Cath Lab;  Service: Cardiovascular;  Laterality: N/A;    CARDIAC CATHETERIZATION N/A 1/20/2025    Procedure: PCI SOFI Stent- Coronary;  Surgeon: Oscar Weston MD;  Location: [place] [address] Cardiac Cath Lab;  Service: Cardiovascular;  Laterality: N/A;    CORONARY ARTERY BYPASS GRAFT  04/14/2015    Coronary Artery Surgery    MR HEAD ANGIO WO IV CONTRAST  2/23/2022    MR HEAD ANGIO WO IV CONTRAST [facility]     Medications Prior to Admission   Medication Sig Dispense Refill Last Dose/Taking    aspirin 81 mg EC tablet Take 1 tablet (81 mg) by mouth once daily. 90 tablet 0 1/27/2025 Morning    busPIRone (Buspar) 15 mg tablet TAKE 1/2 (ONE-HALF) TABLET BY MOUTH IN THE MORNING AND AT BEDTIME 90 tablet 0 1/27/2025 Morning    clopidogrel (Plavix) 75 mg tablet Take 1 tablet (75 mg) by mouth once daily. 30 tablet 1 1/27/2025    insulin lispro (HumaLOG) 100 unit/mL injection Inject 10 Units under the skin early in the morning.. Inject 10 units subcutaneous daily.  Please check blood glucose level before injection. (Patient taking differently: Inject under the skin once daily. SLIDING SCALE)   1/27/2025    isosorbide mononitrate ER (Imdur) 60 mg 24 hr tablet Take 1 tablet (60 mg) by mouth once daily. Do not crush or chew. 30 tablet 1 1/27/2025    levothyroxine (Synthroid, Levoxyl) 50 mcg tablet TAKE 1 TABLET BY MOUTH ONCE DAILY IN THE MORNING BEFORE MEAL(S) ON AN EMPTY STOMACH 90 tablet 2 1/27/2025    metoprolol succinate XL (Toprol-XL) 25 mg 24 hr tablet Take 1 tablet (25 mg) by mouth once daily. 90 tablet 3 Past Week    pantoprazole (ProtoNix) 40 mg EC tablet Take 1 tablet (40 mg) by mouth once daily in the morning. Take before meals. Do not crush, chew, or split. 30 tablet 3 1/27/2025    ranolazine (Ranexa) 1,000 mg 12 hr tablet Take 1 tablet (1,000 mg) by mouth 2 times a day. Do not crush, chew, or split. 60 tablet 1 1/27/2025    tamsulosin (Flomax) 0.4 mg 24 hr capsule Take 1 capsule (0.4 mg) by mouth once daily  "at bedtime. 30 capsule 1 1/28/2025    atorvastatin (Lipitor) 40 mg tablet Take 1 tablet (40 mg) by mouth once daily at bedtime. 90 tablet 0 1/26/2025 Evening    bisacodyl (Dulcolax) 10 mg suppository Insert 1 suppository (10 mg) into the rectum once daily. 30 suppository 1 Unknown    dicyclomine (Bentyl) 10 mg capsule TAKE 1 CAPSULE BY MOUTH TWICE DAILY AS NEEDED FOR  IBS (Patient not taking: Reported on 1/13/2025) 180 capsule 1     hydrOXYzine pamoate (Vistaril) 25 mg capsule Take 1 capsule (25 mg) by mouth 3 times a day as needed for anxiety. (Patient taking differently: Take 1 capsule (25 mg) by mouth as needed at bedtime for anxiety.) 30 capsule 0 1/26/2025    insulin glargine (Lantus) 100 unit/mL (3 mL) pen Inject 12 Units under the skin once daily at bedtime. Take as directed per insulin instructions. (Patient not taking: Reported on 1/28/2025) 3.6 mL 1 Not Taking    lancets (BD Ultra Fine Lancets) 33 gauge misc Testing T.I.D 100 each 3     lubricating eye drops ophthalmic solution Administer 1 drop into both eyes if needed for dry eyes or other.   Unknown    mirtazapine (Remeron) 15 mg tablet TAKE 1 TABLET BY MOUTH ONCE DAILY AT BEDTIME (Patient not taking: Reported on 1/28/2025) 90 tablet 0 Not Taking    nitroglycerin (Nitrostat) 0.4 mg SL tablet Place 1 tablet (0.4 mg) under the tongue every 5 minutes if needed for chest pain. 25 tablet 3 Unknown    ondansetron (Zofran) 4 mg tablet Take 1 tablet (4 mg) by mouth every 8 hours if needed for nausea or vomiting. (Patient not taking: Reported on 1/28/2025) 20 tablet 0 Not Taking    pen needle, diabetic 31 gauge x 3/16\" needle Use as directed 100 each 3 Unknown    sennosides-docusate sodium (Joselin-Colace) 8.6-50 mg tablet Take 1 tablet by mouth once daily at bedtime. 30 tablet 1 Unknown     Patient has no known allergies.  Social History     Tobacco Use    Smoking status: Former     Types: Cigarettes    Smokeless tobacco: Never   Vaping Use    Vaping status: Never " Used     Family History   Problem Relation Name Age of Onset    Diabetes Mother      Other (Cerebrovascular Accident) Father         Hospital Medications:    DOPamine, 5 mcg/kg/min, Last Rate: 5 mcg/kg/min (25 1104)          Current Facility-Administered Medications:     acetaminophen (Tylenol) tablet 650 mg, 650 mg, oral, q4h PRN, 650 mg at 25 1531 **OR** acetaminophen (Tylenol) oral liquid 650 mg, 650 mg, oral, q4h PRN **OR** acetaminophen (Tylenol) suppository 650 mg, 650 mg, rectal, q4h PRN, Santhosh Bettencourt PA-C    aspirin chewable tablet 81 mg, 81 mg, oral, Daily, Santhosh Bettencourt PA-C, 81 mg at 25 0858    atorvastatin (Lipitor) tablet 80 mg, 80 mg, oral, Nightly, Santhosh Bettencourt PA-C, 80 mg at 25 2200    busPIRone (Buspar) tablet 7.5 mg, 7.5 mg, oral, BID, Santhosh Bettencourt PA-C, 7.5 mg at 25 0857    clopidogrel (Plavix) tablet 75 mg, 75 mg, oral, Daily, Santhosh Bettencourt PA-C, 75 mg at 25 0857    DOPamine (Intropin) 400 mg in dextrose 5% 250 mL (1.6 mg/mL) infusion (premix), 5 mcg/kg/min, intravenous, Continuous, Eloy Yu PA-C, Last Rate: 12.19 mL/hr at 25 1104, 5 mcg/kg/min at 25 1104    enoxaparin (Lovenox) syringe 70 mg, 1 mg/kg, subcutaneous, q12h JENNIFER, Santhosh Bettencourt PA-C    [] hydrALAZINE (Apresoline) injection 10 mg, 10 mg, intravenous, q20 min PRN **FOLLOWED BY** hydrALAZINE (Apresoline) tablet 25 mg, 25 mg, oral, q6h PRN, Santhosh Bettencourt PA-C    hydrOXYzine pamoate (Vistaril) capsule 25 mg, 25 mg, oral, Nightly PRN, Santhosh M Rut, PA-C    insulin lispro injection 0-5 Units, 0-5 Units, subcutaneous, Before meals & nightly, Eloy Yu PA-C, 2 Units at 25 1159    [Held by provider] isosorbide mononitrate ER (Imdur) 24 hr tablet 30 mg, 30 mg, oral, Daily, Chaim Soriano MD    levothyroxine (Synthroid, Levoxyl) tablet 50 mcg, 50 mcg, oral, Daily, Santhosh Bettencourt PA-C, 50 mcg at 25 0641    lubricating eye drops ophthalmic  solution 2 drop, 2 drop, Both Eyes, TID PRN, Santhosh Bettencourt PA-C    [Held by provider] metoprolol succinate XL (Toprol-XL) 24 hr tablet 50 mg, 50 mg, oral, Daily, Chaim Soriano MD    morphine injection 1 mg, 1 mg, intravenous, q3h PRN, Santhosh Bettencourt PA-C    morphine injection 2 mg, 2 mg, intravenous, q4h PRN, Santhosh Bettencourt PA-C    ondansetron (Zofran) injection 4 mg, 4 mg, intravenous, q8h PRN, Santhosh Bettencourt PA-C, 4 mg at 01/31/25 1219    oxygen (O2) therapy, , inhalation, Continuous PRN - O2/gases, Santhosh Bettencourt PA-C, Last Rate: 240,000 mL/hr at 01/31/25 1530, 4 L/min at 01/31/25 1530    pantoprazole (ProtoNix) EC tablet 40 mg, 40 mg, oral, Daily before breakfast, Santhosh Bettencourt PA-C, 40 mg at 02/02/25 0641    polyethylene glycol (Glycolax, Miralax) packet 17 g, 17 g, oral, Daily, Santhosh Bettencourt PA-C, 17 g at 02/02/25 0857    polyethylene glycol (Glycolax, Miralax) packet 17 g, 17 g, oral, Daily PRN, Santhosh Bettencourt PA-C, 17 g at 01/31/25 0141    ranolazine (Ranexa) 12 hr tablet 1,000 mg, 1,000 mg, oral, BID, Santhosh Bettencourt PA-C, 1,000 mg at 02/02/25 0857    sennosides-docusate sodium (Joselin-Colace) 8.6-50 mg per tablet 1 tablet, 1 tablet, oral, Nightly, Santhosh Bettencourt PA-C, 1 tablet at 01/31/25 2200    tamsulosin (Flomax) 24 hr capsule 0.4 mg, 0.4 mg, oral, Nightly, Santhosh Bettencourt PA-C, 0.4 mg at 01/31/25 2200    Review of Systems:  14 point review of systems was completed and negative except for those specially mention in my HPI    Physical Exam:    Heart Rate:  []   Temp:  [36.2 °C (97.2 °F)-36.7 °C (98.1 °F)]   Resp:  [10-30]   BP: ()/(49-82)   Weight:  [64.3 kg (141 lb 12.1 oz)]   SpO2:  [93 %-100 %]     Physical Exam  Constitutional:       General: He is not in acute distress.     Appearance: He is ill-appearing.   HENT:      Head: Normocephalic and atraumatic.      Mouth/Throat:      Mouth: Mucous membranes are dry.   Eyes:      Conjunctiva/sclera: Conjunctivae normal.    Cardiovascular:      Rate and Rhythm: Normal rate. Rhythm regularly irregular.   Pulmonary:      Effort: Pulmonary effort is normal.      Breath sounds: Normal breath sounds.   Abdominal:      General: There is no distension.      Palpations: Abdomen is soft.      Tenderness: There is no abdominal tenderness.   Musculoskeletal:      Right lower leg: No edema.      Comments: L BKA   Skin:     General: Skin is warm and dry.      Coloration: Skin is pale.   Neurological:      Mental Status: He is alert and oriented to person, place, and time. Mental status is at baseline.      GCS: GCS eye subscore is 4. GCS verbal subscore is 5. GCS motor subscore is 6.      Motor: Weakness present.         Objective:    I have reviewed all medications, laboratory results, and imaging pertinent for today's encounter.           Intake/Output Summary (Last 24 hours) at 2/2/2025 1415  Last data filed at 2/2/2025 1100  Gross per 24 hour   Intake 385.52 ml   Output 150 ml   Net 235.52 ml       Daily Labs:  CBC:   Results from last 7 days   Lab Units 02/02/25  0418 02/01/25  0431   WBC AUTO x10*3/uL 10.1 6.1   HEMOGLOBIN g/dL 8.7* 8.3*   HEMATOCRIT % 27.1* 26.3*   MCV fL 93 92     BMP:    Results from last 7 days   Lab Units 02/02/25  0417 02/01/25  0431   SODIUM mmol/L 133* 133*   POTASSIUM mmol/L 5.0 4.6   CHLORIDE mmol/L 101 100   CO2 mmol/L 24 26   BUN mg/dL 36* 30*   CREATININE mg/dL 2.05* 1.81*   CALCIUM mg/dL 8.2* 8.4*   GLUCOSE mg/dL 226* 117*   MAGNESIUM mg/dL 2.24 2.22     ABG:   Results from last 7 days   Lab Units 01/31/25  1353   POCT PH, ARTERIAL pH 7.37*   POCT PCO2, ARTERIAL mm Hg 23*   POCT PO2, ARTERIAL mm Hg 306*   POCT SO2, ARTERIAL % 99   POCT HCO3 CALCULATED, ARTERIAL mmol/L 13.3*   POCT LACTATE, ARTERIAL mmol/L 8.8*      VBG:             Assessment/Plan:    I am currently managing this critically ill patient for the following problems:    Neuro/Psych/Pain Ctrl/Sedation:   Transient ischemic attack vs ischemic  cerebral vascular accident   Episodic left sided facial pain - trigeminal neuralgia vs temporal arteritis?, etiology unclear, mixed, non-specific symptoms   - Pain Control: acetaminophen PRN  - Continue home buspar  - Continue DAPT  - Neurology consulted, signed off  - CAM ICU qshift, sleep-wake hygiene, delirium precautions      Respiratory/ENT:  Acute hypoxic respiratory insufficiency post-cardiac event  Possible aspiration   - Supplemental O2: none, on room air  - Maintain SpO2 >92%  - Continuous pulse ox monitoring   - Pulmonary hygiene     Cardiovascular:   ?Cardiac vs respiratory arrest?  NSTEMI  Left moderate internal carotid artery stenosis   History of of CAD s/p CABG (1990s) and multiple cardiac catheterizations (most recent 1/17/25)  History of HFmrEF, HTN, PAD s/p left BKA  Recurrent vs unstable angina  Elevated lactate - 8.8 post-possible arrest, downtrend to 2.1 on arrival to ICU  - Continue dopamine gtt @ 5mcg/kg/min  - Continue home aspirin, plavix, lipitor, ranexa   - Hold imdur, metoprolol    -- Imdur dose decreased today, metoprolol increased   - Cardiology consulted and following   - Vascular surgery consulted for carotid disease, plan to follow up outpatient with carotid ultrasound in 6 months, not currently a surgical candidate   - Continue therapeutic lovenox     - Morphine PRN for anginal symptoms   - Maintain MAPs >65  - Continuous cardiac monitoring   - EKGs PRN for ACS symptoms, arrhythmias      GI:  GERD   - Diet: carb controlled   - BR: colace and miralax   - GI Prophylaxis: PPI     Renal/Volume Status/Electrolytes:  Acute kidney injury superimposed on chronic kidney disease stage 3A/B  History of BPH  - Baseline Cr 1.3-1.5  - Hourly I/O's, maintain urine output >0.5cc/kg/hr  - Replete electrolytes to maintain K >4.0 and Mg >2.0  - Daily RFP, Mg    Endocrine:  Type 2 diabetes mellitus   History of hypothyroidism   - SSI ACHS   - Continue home synthroid   - Hypoglycemia protocol PRN       Infectious Disease:  No active concerns   - Antibiotics: not indicated   - Monitor SIRS criteria     Heme/Onc:  Normocytic anemia   - Transfuse if Hgb <7.0 or symptomatic anemia  - Daily CBC     MSK/Skin:  No active concerns   - PT/OT eval   - ICU skin protocol, padded pressure points  - Q2hr turns     Ethics/Code Status:  Full code     :  DVT Prophylaxis: SQH, SCDs  GI Prophylaxis: none  Bowel Regimen: colace, miralax  Diet: carb controlled  CVC: none  Janina: none  Ayers: none  Restraints: none  Disposition: ICU    Critical Care Time:  63 minutes spent in preparing to see patient (I.e. labs, imaging, etc.), documentation, discussion plan of care with patient/family/caregiver, and/or coordination of care with multidisciplinary team including the attending. Time does not include completion of procedure time.     Santhosh Bettencourt PA-C  Pulmonary & Critical Care Medicine   [facility]

## 2025-02-02 NOTE — PROGRESS NOTES
"Chavez Llamas is a 79 y.o. male on day 5 of admission presenting with Cerebrovascular accident (CVA), unspecified mechanism (Multi).    Subjective   Re-engaged after respiratory vs cardiac arrest with bradycardia requiring 1x dose of atropine, brief chest compressions. Noted unresponsiveness and intubation attempted. Pt vomited during attempt for intubation, suctioned, mentation improved, held off on intubation. Elevated lactate and TEODORO post arrest. Pt reportedly had L arm pain and L face pain again prior to the episode.    Pt seen by bedside. States 'you guys would just not let me sleep'. Continues to have L facial and arm sx which he describes as both heaviness / numbness but also painful, similar to prior, not changed. Declines medication for the pain given already pt is on a lot of medications, he feels overwhelmed.            Objective     Last Recorded Vitals  Blood pressure 111/65, pulse 83, temperature 36.7 °C (98.1 °F), temperature source Oral, resp. rate 17, height 1.727 m (5' 8\"), weight 64.3 kg (141 lb 12.1 oz), SpO2 96%.    Physical Exam  Neurological Exam  Relevant Results    NIH Stroke Scale  1A. Level of Consciousness: Alert, Keenly Responsive  1B. Ask Month and Age: Both Questions Right  1C. Blink Eyes & Squeeze Hands: Performs Both Tasks  2. Best Gaze: Normal  3. Visual: No Visual Loss  4. Facial Palsy: Normal Symmetrical Movements  5A. Motor - Left Arm: No Drift  5B. Motor - Right Arm: No Drift  6A. Motor - Left Leg: No Drift  6B. Motor - Right Leg: No Drift  7. Limb Ataxia: Absent  8. Sensory Loss: Mild-to-Moderate Sensory Loss  9. Best Language: No Aphasia  10. Dysarthria: Normal  11. Extinction and Inattention: No Abnormality  NIH Stroke Scale: 1           [place] Coma Scale  Best Eye Response: Spontaneous  Best Verbal Response: Oriented  Best Motor Response: Follows commands  [place] Coma Scale Score: 15                                  Assessment/Plan      Assessment & Plan  Left-sided " face pain    Chavez Llamas is a 79 y.o. male with PMH of CAD s/p CABG in 1990s, multiple PCIs, HFrEF, HTN, DM, PAD s/p L BKA, [place] [facility] [place] after presenting with NSTEMI who [facility] with L sided numbness, neurology consulted for concern for stroke. Not typical pattern of stroke related numbness given numbness impacting more L forearm than hand. MRI of the brain negative for a stroke. CTA did show approximately 65% stenosis of the left ICA and less than 50% stenosis of the right ICA. Does have some degree of intracranial arthrosclerosis but none directly explaining pt's sx.     Reportedly pt had recurrent L facial pain and L arm numbness again and had another cardiac event with bradycardia. Also at some point pt complained of L sided vision change vs L eye vision change which to me pt denies today. Pt's sx poorly localizes to any specific brain location or vascular territory, and he has had negative MRI with this sx ongoing just few days before. Transient L eye vision change (amaurosis) could occur with LICA stenosis and would not show up on MRI, but this would not explain his L sided sx. If it was global cerebral hypoperfusion from heart / bradycardia, would anticipate more R sided sx given the relatively more severe stenosis of LICA.     His L arm pain which is most likely CAD referred pain does seem to correlate with his cardiac events. At this point I am wondering about psychologically referred pain to his L face as well given that pt reports they tend to worsen together. Other possibilities include more benign etiologies such as trigeminal neuralgia or diabetic cranial neuropathy, for which gabapentin can be tried but pt declines, he states he can live with it     Diagnoses:  Recurrent NSTEMI  L facial and arm pain, numbness sparing the hand - ddx referred pain, or local neuropathy such as trigeminal neuralgia, diabetic cranial neuropathy    Recs:  - pt has  already had MRI / MRA / CTA / carotid ultrasound for the L facial and arm pain/numbness, with negative MRI while sx is ongoing which effectively rules out stroke. Would not repeat the evaluation unless new neurological sx develops. Do not think his sx fits any specific vascular territory including either ICAs   - remaining differential as above, either non-neurological in origin or more benign etiologies are possible. Gabapentin offered for these possibilities but declined.  -  rest of care per primary team, cardiology             I spent 35 minutes in the professional and overall care of this patient.      Darius Fuentes MD

## 2025-02-02 NOTE — PROGRESS NOTES
"Chavez Llamas is a 79 y.o. male on day 5 of admission presenting with Left-sided face pain.    Subjective   Patient seen and examined.  Daughter Jelena at the bedside.  Patient had another episode of bradycardia with heart rate into the 30s yesterday.  Was also acutely delirious at that time.  Seems to be back to baseline currently.  His left eye vision change has improved from initial presentation.  He still has some level of deficit in the left eye vision.  Left sided numbness remains the same.       Objective     Physical Exam    Constitutional:  Alert and oriented to person, place, date/time in no acute distress.  HEENT:  Atraumatic, normocephalic.  Mucous membranes moist.  Pt edentulous.  Tongue midline.  Left eye lid ptosis  Neck:  Trachea midline.  Do not notice asymmetric smile today  Respiratory:  Clear to auscultation.  Cardiac:  Regular rate and rhythm.    Cardiovascular:  No edema of the extremities.    Abdominal:  Soft, nontender, nondistended, bowel sounds present.  Musculoskeletal:  Moves extremities freely.  Left BKA.   strength 4 out of 5 on the left.  5 out of 5 on the right.  Dermatological: Clean and dry   Neurological: Alert and oriented to person, place, date/time with slight confusion which is baseline  Psych:  Calm, cooperative    Last Recorded Vitals  Blood pressure 111/65, pulse 83, temperature 36.3 °C (97.3 °F), temperature source Oral, resp. rate 17, height 1.727 m (5' 8\"), weight 64.3 kg (141 lb 12.1 oz), SpO2 96%.  Intake/Output last 3 Shifts:  I/O last 3 completed shifts:  In: 745.5 (11.6 mL/kg) [P.O.:660; I.V.:85.5 (1.3 mL/kg)]  Out: 325 (5.1 mL/kg) [Urine:325 (0.1 mL/kg/hr)]  Weight: 64.3 kg     Relevant Results  XR chest 1 view    Result Date: 1/31/2025  Interpreted By:  Brissa Jacob, STUDY: XR CHEST 1 VIEW; ;  1/31/2025 3:34 pm   INDICATION: Signs/Symptoms:r/o aspiration, hypoxia, choking event.     COMPARISON: 01/28/2025   ACCESSION NUMBER(S): [id number]   ORDERING " CLINICIAN: MICHAEL ZUNIGA   TECHNIQUE: Single AP view chest   FINDINGS: Cardiomediastinal silhouette is within normal limits. Median sternotomy wires and surgical clips in place. There are mild chronic appearing interstitial changes demonstrated mild persistent increased interstitial prominence improved from the prior examination. Hazy opacity about the right costophrenic angle with subtle basilar effusion suggested.   Left lung apex pleural thickening       1. Improved interstitial pulmonary edema suggested.   2. Subtle right basilar effusion.   3. No focal infiltrate demonstrated.     MACRO: None   Signed by: Brissa Jacob 1/31/2025 3:57 PM Dictation workstation:   [id number]    Electrocardiogram, 12-lead PRN ACS symptoms    Result Date: 1/29/2025   Poor data quality, interpretation may be adversely affected Sinus rhythm with 1st degree AV block Left bundle branch block Abnormal ECG Confirmed by Steven Terrell ([id number]) on 1/29/2025 5:43:54 PM    Vascular US Carotid Artery Duplex Bilateral    Result Date: 1/29/2025           [place] [address]            Phone [phone]  Vascular Lab Report  [facility] US CAROTID ARTERY DUPLEX BILATERAL Patient Name:      ORLANDO VICTORIA    Reading Physician:  [id number] Jamia Ramirez MD Study Date:        1/29/2025            Ordering Provider:  [id number] NITO HWANG MRN/PID:           [id number]             Fellow: Accession#:        [id number]         Technologist:       Marlon Thomas RVT Date of Birth/Age: 1945 / 79      Technologist 2:                    years Gender:            M                    Encounter#:         [id number] Admission Status:  Inpatient            Location Performed: [facility]  Diagnosis/ICD: Occlusion and stenosis of bilateral carotid arteries-I65.23 CPT Codes:     85058  Cerebrovascular Carotid Duplex scan complete  CONCLUSIONS: Right Carotid: Findings are consistent with less than 50% stenosis of the right proximal internal carotid artery. Right external carotid artery appears patent with no evidence of stenosis. The right vertebral artery is patent with antegrade flow. No evidence of hemodynamically significant stenosis in the right subclavian artery. Left Carotid: Findings are consistent with 50 to 69% stenosis of the left proximal internal carotid artery. Left external carotid artery appears patent with no evidence of stenosis. The left vertebral artery is patent with antegrade flow. No evidence of hemodynamically significant stenosis in the left subclavian artery. Additional Findings: Technically difficult due to patient head movement and talking.  Imaging & Doppler Findings: Right Plaque Morph: The proximal right internal carotid artery demonstrates heterogenous and calcified plaque. Left Plaque Morph: The proximal left internal carotid artery demonstrates heterogenous and calcified plaque.   Right                        Left   PSV      EDV                PSV      EDV 56 cm/s            CCA P    51 cm/s 49 cm/s            CCA D    45 cm/s 102 cm/s 32 cm/s   ICA P    187 cm/s 61 cm/s 90 cm/s  22 cm/s   ICA M    94 cm/s  37 cm/s 71 cm/s  21 cm/s   ICA D    70 cm/s  26 cm/s 34 cm/s  14 cm/s Vertebral  87 cm/s  21 cm/s 81 cm/s          Subclavian 96 cm/s                Right Left ICA/CCA Ratio  2.1  4.1   [id number] Jamia Ramirez MD Electronically signed by [id number] Jamia Ramirez MD on 1/29/2025 at 2:55:10 PM  ** Final **     CT angio head and neck w and wo IV contrast    Result Date: 1/29/2025  Interpreted By:  Nick Arellano, STUDY: CT ANGIO HEAD AND NECK W AND WO IV CONTRAST;  1/29/2025 1:49 pm   INDICATION: Signs/Symptoms:carotid stenosis.   COMPARISON: CT head yesterday. CT chest 01/13/2025.   ACCESSION NUMBER(S): [id number]   ORDERING CLINICIAN: NITO HWANG   TECHNIQUE: Unenhanced CT  images of the head were obtained.  Subsequently,  75 ML of Omnipaque 350 was administered intravenously and axial images of the head and neck were acquired.  Coronal, sagittal, and 3-D reconstructions were provided for review.   FINDINGS:   CTA COW: No major vascular occlusion. Moderate atherosclerotic calcifications through the carotid siphons. No aneurysms.  No vascular malformations. Patent dural venous sinuses and intracranial deep venous structures.   CTA CAROTID: No aortic aneurysm or dissection. No significant stenoses at the origins of the great vessels from the aortic arch. No significant stenoses of the brachycephalic artery or bilateral subclavian arteries.   Mixed fibrofatty and calcific atherosclerotic plaque crosses the left carotid bifurcation. Narrowest luminal diameter in the proximal left internal carotid artery is 1.7 mm which is approximately 60-65% stenosis. There is mild plaque irregularity.   Mixed but predominantly fibrofatty atherosclerotic plaque spanning of the right carotid bifurcation. Narrowest luminal diameter in the proximal right internal carotid artery is 3.1 mm which is less than 50% stenosis. There is mild plaque irregularity.   Moderately severe short-segment atherosclerotic stenosis proximal V1 segment right vertebral artery.   NONVASCULAR HEAD: No intracranial mass. No intracranial hemorrhage. No evidence of large evolving cortical infarction. Supratentorial white matter hypodensities most likely related to chronic small vessel ischemic change. Mild-to-moderate global brain parenchymal volume loss. Bones intact. Postoperative changes bilateral orbital globes.   NONVASCULAR NECK: No soft tissue mass. No adenopathy. Salivary glands are unremarkable. Thyroid gland unremarkable. Bones intact. Visualized small right-greater-than-left pleural effusions. Emphysematous changes in the upper lungs. [place] opacity left apex, similar to prior.         1. No intracranial major vascular  occlusion. 2. Approximately 60-65% short-segment atherosclerotic stenosis proximal left internal carotid artery with mild plaque irregularity. 3. Less than 50% short-segment atherosclerotic stenosis proximal right internal carotid artery with mild plaque irregularity. 4. Moderate to severe short-segment atherosclerotic stenosis proximal V1 segment right vertebral artery. 5. Supratentorial white matter hypodensities most likely related to chronic small vessel ischemic change. 6. Mild-to-moderate global brain parenchymal volume loss. 7. Visualized small right-greater-than-left pleural effusions. Emphysematous changes in the upper lungs. [place] opacity left apex, similar to prior.   MACRO: None   Signed by: Nick Arellano 1/29/2025 2:39 PM Dictation workstation:   [id number]    MR angio head wo IV contrast    Result Date: 1/28/2025  Interpreted By:  Vasile Valente, STUDY: MR BRAIN WO IV CONTRAST; MR ANGIO HEAD WO IV CONTRAST; MR ANGIO NECK WO IV CONTRAST;  1/28/2025 8:00 pm; 1/28/2025 7:59 pm   INDICATION: Signs/Symptoms:possible cva vs tia.     COMPARISON: MRI of the brain dated 02/23/2022; noncontrast CT head dated 01/20/2025;   ACCESSION NUMBER(S): [id number]; [id number]; [id number]   ORDERING CLINICIAN: LISA WASSERMAN   TECHNIQUE: Axial T2, FLAIR, DWI, gradient echo T2 and  sagittal and coronal T1 weighted images of brain were acquired.   Time-of-flight MRA of the head  and neck was performed. The images were reviewed as source images and maximum intensity projections.   FINDINGS: Brain:   No diffusion restriction abnormality is present to suggest a recent infarct.   There is no imaging evidence of recent intracranial hemorrhage. No mass effect or midline shift is identified.   Mild brain parenchymal volume loss is present without abnormal ventricular dilatation. Basal cisterns are patent. No extra-axial fluid collections are identified.   Scattered patchy foci of hyperintense FLAIR and T2 signal  are present in the periventricular and subcortical white matter of bilateral cerebral hemispheres are nonspecific, although favored to represent sequela of microvascular disease.   No abnormal fluid signal is present in the mastoid air cells on the left. Trace fluid is present in the mastoid air cells and in the right inferiorly.   Mild submucosal thickening is present in the inferior maxillary sinuses bilaterally. Patient is status post cataract extraction.   MRA of head:   Intracranial carotid arteries demonstrate symmetric flow enhancement bilaterally without evidence of occlusion or high-grade stenosis.   Visualized proximal anterior cerebral arteries are symmetric in appearance without evidence of occlusion.   Middle cerebral arteries demonstrate symmetric flow enhancement without evidence of high-grade stenosis in the proximal M1 segments or focal vessel occlusion in the more distal visualized M2 and M3 segments.   Intracranial vertebral arteries demonstrate anatomic variant dominant left vertebral artery without evidence of occlusion or high-grade stenosis bilaterally.   There is suspected mild-to-moderate stenosis in the proximal P1 segment of the left posterior cerebral artery, with preserved flow enhancement of the vessel in the more distal P2 and P3 segment. No occlusion or stenosis is identified in the right.   MRA of neck:   Visualized common carotid arteries demonstrate symmetric flow enhancement without evidence of occlusion or stenosis.   Moderate to severe stenosis is suspected in the proximal extracranial left internal carotid artery due to calcified plaque with upwards of moderate stenosis suspected on the right with preserved flow enhancement in the vessels distally bilaterally.   Extracranial vertebral arteries demonstrate symmetric flow enhancement bilaterally, without evidence of occlusion or high-grade stenosis. Left vertebral artery is tortuous in course proximally.       MRI Brain: 1.  No  evidence of diffusion restriction abnormality to suggest a recent infarct. 2. Low volume of patchy hyperintense FLAIR and T2 foci present in the periventricular and subcortical white matter of bilateral cerebral hemispheres are nonspecific, but favored to represent sequela of microvascular disease.   MRA: 1.  No large vessel occlusion is identified in the proximal intracranial circulation. 2. Moderate to severe stenosis is suspected in the proximal extracranial left internal carotid artery due to calcified plaque with preserved flow enhancement of the vessel more distally. Upwards of moderate stenosis is suspected in the proximal extracranial right internal carotid artery.   MACRO: None   Signed by: Vasile Valente 1/28/2025 9:47 PM Dictation workstation:   [id number]    MR angio neck wo IV contrast    Result Date: 1/28/2025  Interpreted By:  Vasile Valente, STUDY: MR BRAIN WO IV CONTRAST; MR ANGIO HEAD WO IV CONTRAST; MR ANGIO NECK WO IV CONTRAST;  1/28/2025 8:00 pm; 1/28/2025 7:59 pm   INDICATION: Signs/Symptoms:possible cva vs tia.     COMPARISON: MRI of the brain dated 02/23/2022; noncontrast CT head dated 01/20/2025;   ACCESSION NUMBER(S): [id number]; [id number]; [id number]   ORDERING CLINICIAN: LISA WASSERMAN   TECHNIQUE: Axial T2, FLAIR, DWI, gradient echo T2 and  sagittal and coronal T1 weighted images of brain were acquired.   Time-of-flight MRA of the head  and neck was performed. The images were reviewed as source images and maximum intensity projections.   FINDINGS: Brain:   No diffusion restriction abnormality is present to suggest a recent infarct.   There is no imaging evidence of recent intracranial hemorrhage. No mass effect or midline shift is identified.   Mild brain parenchymal volume loss is present without abnormal ventricular dilatation. Basal cisterns are patent. No extra-axial fluid collections are identified.   Scattered patchy foci of hyperintense FLAIR and T2 signal are  present in the periventricular and subcortical white matter of bilateral cerebral hemispheres are nonspecific, although favored to represent sequela of microvascular disease.   No abnormal fluid signal is present in the mastoid air cells on the left. Trace fluid is present in the mastoid air cells and in the right inferiorly.   Mild submucosal thickening is present in the inferior maxillary sinuses bilaterally. Patient is status post cataract extraction.   MRA of head:   Intracranial carotid arteries demonstrate symmetric flow enhancement bilaterally without evidence of occlusion or high-grade stenosis.   Visualized proximal anterior cerebral arteries are symmetric in appearance without evidence of occlusion.   Middle cerebral arteries demonstrate symmetric flow enhancement without evidence of high-grade stenosis in the proximal M1 segments or focal vessel occlusion in the more distal visualized M2 and M3 segments.   Intracranial vertebral arteries demonstrate anatomic variant dominant left vertebral artery without evidence of occlusion or high-grade stenosis bilaterally.   There is suspected mild-to-moderate stenosis in the proximal P1 segment of the left posterior cerebral artery, with preserved flow enhancement of the vessel in the more distal P2 and P3 segment. No occlusion or stenosis is identified in the right.   MRA of neck:   Visualized common carotid arteries demonstrate symmetric flow enhancement without evidence of occlusion or stenosis.   Moderate to severe stenosis is suspected in the proximal extracranial left internal carotid artery due to calcified plaque with upwards of moderate stenosis suspected on the right with preserved flow enhancement in the vessels distally bilaterally.   Extracranial vertebral arteries demonstrate symmetric flow enhancement bilaterally, without evidence of occlusion or high-grade stenosis. Left vertebral artery is tortuous in course proximally.       MRI Brain: 1.  No  evidence of diffusion restriction abnormality to suggest a recent infarct. 2. Low volume of patchy hyperintense FLAIR and T2 foci present in the periventricular and subcortical white matter of bilateral cerebral hemispheres are nonspecific, but favored to represent sequela of microvascular disease.   MRA: 1.  No large vessel occlusion is identified in the proximal intracranial circulation. 2. Moderate to severe stenosis is suspected in the proximal extracranial left internal carotid artery due to calcified plaque with preserved flow enhancement of the vessel more distally. Upwards of moderate stenosis is suspected in the proximal extracranial right internal carotid artery.   MACRO: None   Signed by: Vasile Valente 1/28/2025 9:47 PM Dictation workstation:   [id number]    MR brain wo IV contrast    Result Date: 1/28/2025  Interpreted By:  Vasile Valente, STUDY: MR BRAIN WO IV CONTRAST; MR ANGIO HEAD WO IV CONTRAST; MR ANGIO NECK WO IV CONTRAST;  1/28/2025 8:00 pm; 1/28/2025 7:59 pm   INDICATION: Signs/Symptoms:possible cva vs tia.     COMPARISON: MRI of the brain dated 02/23/2022; noncontrast CT head dated 01/20/2025;   ACCESSION NUMBER(S): [id number]; [id number]; [id number]   ORDERING CLINICIAN: LISA WASSERMAN   TECHNIQUE: Axial T2, FLAIR, DWI, gradient echo T2 and  sagittal and coronal T1 weighted images of brain were acquired.   Time-of-flight MRA of the head  and neck was performed. The images were reviewed as source images and maximum intensity projections.   FINDINGS: Brain:   No diffusion restriction abnormality is present to suggest a recent infarct.   There is no imaging evidence of recent intracranial hemorrhage. No mass effect or midline shift is identified.   Mild brain parenchymal volume loss is present without abnormal ventricular dilatation. Basal cisterns are patent. No extra-axial fluid collections are identified.   Scattered patchy foci of hyperintense FLAIR and T2 signal are  evidence of diffusion restriction abnormality to suggest a recent infarct. 2. Low volume of patchy hyperintense FLAIR and T2 foci present in the periventricular and subcortical white matter of bilateral cerebral hemispheres are nonspecific, but favored to represent sequela of microvascular disease.   MRA: 1.  No large vessel occlusion is identified in the proximal intracranial circulation. 2. Moderate to severe stenosis is suspected in the proximal extracranial left internal carotid artery due to calcified plaque with preserved flow enhancement of the vessel more distally. Upwards of moderate stenosis is suspected in the proximal extracranial right internal carotid artery.   MACRO: None   Signed by: Vasile Valente 1/28/2025 9:47 PM Dictation workstation:   [id number]    ECG 12 lead    Result Date: 1/28/2025  Sinus rhythm with 1st degree AV block Left bundle branch block Abnormal ECG When compared with ECG of 22-JAN-2025 14:14, (unconfirmed) Wide QRS rhythm has replaced Sinus rhythm Confirmed by Jonathan Tristan ([id number]) on 1/28/2025 11:23:11 AM    XR chest 1 view    Result Date: 1/28/2025  Interpreted By:  Vivian Melchor, STUDY: XR CHEST 1 VIEW 1/28/2025 7:28 am   INDICATION: Signs/Symptoms:weakness and brain attack   COMPARISON: 01/14/2025   ACCESSION NUMBER(S): [id number]   ORDERING CLINICIAN: MONICO BARGER   TECHNIQUE: AP semi-erect view of the chest   FINDINGS: There is postoperative change from median sternotomy and coronary revascularization procedure with the cardiac size at the upper limits of normal to slightly enlarged. There is stable left apical pleural thickening. No pleural effusion on either side is present. Cephalization of the pulmonary vascularity is noted without interstitial or alveolar edema. There is no new infiltrate or airspace consolidation.       Status post CABG with stable left apical pleural thickening.   Cephalization of the pulmonary vasculature suggesting mild pulmonary venous  hypertension.   Signed by: Vivian Melchor 1/28/2025 9:05 AM Dictation workstation:   [id number]    CT brain attack head wo IV contrast    Result Date: 1/28/2025  Interpreted By:  Nagi Montgomery, STUDY: CT BRAIN ATTACK HEAD WO IV CONTRAST;  1/28/2025 7:03 am   INDICATION: Signs/Symptoms:left face numbness, dizziness.     COMPARISON: 12/04/2024   ACCESSION NUMBER(S): [id number]   ORDERING CLINICIAN: MONICO BARGER   TECHNIQUE: Unenhanced images were obtained through the brain.   FINDINGS: There is atrophy resulting in prominence of the ventricles and sulci. There are areas of decreased attenuation within the white matter which are nonspecific but are commonly associated with small vessel ischemic disease. there is no mass effect or midline shift. No acute intracranial hemorrhage is identified. No extra-axial fluid collections are seen. No intraparenchymal mass lesions are identified.  Bone windows demonstrate no evidence of an acute calvarial fracture. Mild mucosal thickening in the paranasal sinuses.         Nagi Montgomery discussed the significance and urgency of this critical finding via epic secure chat. With  MONICO BARGER on 1/28/2025 at 7:13 am.  (**-RCF-**) Findings:  See findings.     MACRO: None.   Signed by: Nagi Montgomery 1/28/2025 7:14 AM Dictation workstation:   [id number]    Electrocardiogram 12 Lead    Result Date: 1/27/2025   Poor data quality, interpretation may be adversely affected Normal sinus rhythm Left bundle branch block Abnormal ECG When compared with ECG of 17-JAN-2025 14:06, Premature ventricular complexes are no longer Present FL interval has decreased Nonspecific T wave abnormality no longer evident in Inferior leads Confirmed by Kristofer Terrell ([id number]) on 1/27/2025 8:06:06 PM    ECG 12 lead    Result Date: 1/27/2025  Sinus rhythm with 1st degree AV block Rightward axis Left bundle branch block Abnormal ECG When compared with ECG of 21-JAN-2025 09:23, FL interval has increased Questionable change in QRS  axis T wave inversion now evident in Inferior leads Confirmed by Dustin Varela ([id number]) on 1/27/2025 9:47:06 AM    Electrocardiogram, 12-lead PRN ACS symptoms    Result Date: 1/27/2025   Suspect arm lead reversal, interpretation assumes no reversal Sinus rhythm with 1st degree AV block Nonspecific intraventricular block Lateral infarct , age undetermined Cannot rule out Inferior infarct , age undetermined Abnormal ECG When compared with ECG of 21-JAN-2025 15:26, Questionable change in QRS axis Minimal criteria for Inferior infarct are now Present T wave inversion no longer evident in Inferior leads T wave inversion no longer evident in Lateral leads Confirmed by Dustin Varela ([id number]) on 1/27/2025 9:46:47 AM    Electrocardiogram, 12-lead PRN ACS symptoms    Result Date: 1/27/2025  Sinus rhythm with 1st degree AV block with occasional Premature ventricular complexes Left bundle branch block Abnormal ECG When compared with ECG of 17-JAN-2025 09:58, Premature ventricular complexes are now Present Premature atrial complexes are no longer Present Confirmed by Fred Sher ([id number]) on 1/27/2025 9:25:16 AM    Cardiac Catheterization Procedure    Result Date: 1/23/2025   [facility], Cath Lab, [address] [place] [address] Cardiovascular Catheterization Report Patient Name:      ORLANDO VICTORIA     Performing Physician:  [id number] Weston MD Study Date:        1/20/2025             Verifying Physician:   Shashi Weston MD MRN/PID:           [id number]              Cardiologist/Co-Scrub: Accession#:        [id number]          Ordering Provider:     [id number] DUSTIN SARAH Date of Birth/Age: 1945 / 79 years Cardiologist: Gender:            M                     Fellow:                [id number]  Asif Aguirre MD Encounter#:        [id number]            Surgeon:  Study: PCI - Percutaneous Coronary Intervention  Indications: ORLANDO VICTORIA is a 79 year old male who presents with dyslipidemia and prior percutaneous coronary intervention, Angina. ORLANDO VICTORIA is a 79 year old male who presents with prior myocardial infarction, prior percutaneous coronary intervention and a chest pain assessment of typical angina Angina. Recent MI. Medical History: Stress test performed: No. CTA performed: NoPat Chiang accessed: No. LVEF Assessed: No. Cardiac arrest: No. Cardiac surgical consult: No. Cardiovascular instability: No. Frailty status of patient entering lab: 6 = Moderately frail.  Procedure Description: After infiltration with 1% Lidocaine, the right radial artery was cannulated with a modified Seldinger technique. Subsequently a 6 [place] sheath was placed retrograde in the right radial artery. Additional catheter(s) used to visualize the coronary arteries were: AL1 and JR4. After completion of the procedure, the arterial sheath was pulled and a TR Band Radial Compression Device was utilized to obtain patent hemostasis.  Coronary Angiography: The coronary circulation is right dominant.  Left Main Coronary Artery: The left main coronary artery not today.  Right Coronary Artery Distribution: The right coronary artery is a normal caliber vessel. The mid right coronary artery showed 99% in-stent restenosis. This lesion was heavily calcified.  Coronary Interventions: Angiography reveals a 95% stenosis of the proximal to mid and mid to distal right coronary artery coronary artery. Pre-intervention CIERRA flow was 1. Percutaneous coronary intervention was performed within the proximal to mid and mid to distal right coronary artery. The vessel was pre-dilated using a compliant balloon 2.5 mm x 12 mm at 10 VERONICA. The stent was post dilated using a  non-compliant balloon 3.0 mm x 12 mm at 20 VERONICA. Additional dilatation was performed using a non-compliant balloon 2.5 mm x 20 mm at 26 VERONICA. The stenosis was successfully reduced from 95% to 40%. Post-intervention CIERRA flow was 3.  Coronary Intervention Comments: AL1 catheter was used to engage the RCA in a 7 Fr system. The catheter was not giving us enought support from the begninnign due to anterior take off of the ostium. Pro water was used to cross the severe mid ISR and was able to do so with some manipulation. 2.0X15mm sapphire balloon was used for pre dilatation at multiple inflations between 10-18A. Angiographically the balloon did not expand well. Now we swapped this for a 2.5X12 NC and were able to inflate the proximal RCA at 14-16A. The NC unfortunatley could not go past the mid RCA lesion. We introduced a guideliner for better support, but it was still difficult to advance the equipment. Pt was also having radial spasm that we had to contend with. We eventually switched the AL for a JR4 and raj wired the lesion with a whisper Es. We introduced a wolverine 2.5X15 and inflated at multiple pressures between 14-16A but were still unable to cross the mid lesion. We now used IVL 2.5X12mm but found it difficult to advance the balloon. We  decided to remove the pro water as the IVL balloon was over the whisper Es and delivered 12 pulses between 9-18A. Angiographically the ISR looked better with some encouraging results and CIERRA 3 flow. 3.0X12 NC Euphora was used to further dilate the proximal lesion and inflated at 20A for 24 seconds, there was still difficulty passing it through the mid lesion. 2.5X20 spahirre balloon was introduced and able to cross the mid lesion. Multiple dilatations at 16-20A. Acceptable results, partial success, however, lesion not well enough prepared and therefore, we opted not to use DCB.  Coronary Lesion Summary: Vessel        Stenosis         Vessel Segment RCA    99% in-stent  restenosis      mid  Hemo Personnel: +--------------------+---------+ Name                Duty      +--------------------+---------+ Oscar Weston MD 1 +--------------------+---------+  Hemodynamic Pressures:  +----+---------------------+----------+-------------+--------------+---------+ Site      Date Time      Phase NameSystolic mmHgDiastolic mmHgMean mmHg +----+---------------------+----------+-------------+--------------+---------+   AO1/20/2025 12:33:43 PM      Rest           78            36       48 +----+---------------------+----------+-------------+--------------+---------+   AO1/20/2025 12:38:57 PM      Rest           80            41       56 +----+---------------------+----------+-------------+--------------+---------+   AO1/20/2025 12:41:26 PM      Rest           95            46       63 +----+---------------------+----------+-------------+--------------+---------+   AO1/20/2025 12:42:35 PM      Rest          100            49       69 +----+---------------------+----------+-------------+--------------+---------+   AO1/20/2025 12:44:04 PM      Rest          101            48       69 +----+---------------------+----------+-------------+--------------+---------+   AO1/20/2025 12:51:35 PM      Rest           73            29       46 +----+---------------------+----------+-------------+--------------+---------+  Cardiac Cath Post Procedure Notes: Post Procedure Diagnosis: POBA and IVL of the severe mid ISR of RCA.                           Angiographically acceptable results. Blood Loss:               Estimated blood loss during the procedure was 10 mls. Specimens Removed:        Number of specimen(s) removed: none.  Recommendations: Maximize medical therapy. Agressive risk factor modification efforts. Lipid lowering agent or Statin therapy. ____________________________________________________________________________________ CONCLUSIONS:   1. Single vessel disease.  2. Culprit vessel(s): right coronary artery.  3. Partial success in reopening severely calcific ISR of the RCA, with IVL.  4. Complex procedure. ICD 10 Codes: Angina pectoris, unspecified-I20.9  CPT Codes: Complicated/Unusual Procedure-MOD22; Intravascular Lithotripsy-79814; Angioplasty, Right Coronary addl branch major Artery (PCI)-11739.  [id number] Oscar Weston MD Performing Physician Electronically signed by [id number] Oscar Weston MD on 1/23/2025 at 11:17:46 AM  ** Final **     Transthoracic Echo (TTE) Limited    Result Date: 1/22/2025   [facility] (formerly [facility]3], [address] [place] [address]                Tel [phone] and Fax [phone] TRANSTHORACIC ECHOCARDIOGRAM REPORT  Patient Name:       ORLANDO Oconnell Physician:    [id number] Steven Terrell MD Study Date:         1/22/2025           Ordering Provider:    [id number] POSADAS MRN/PID:            [id number]            Fellow: Accession#:         [id number]        Nurse: Date of Birth/Age:  1945 / 79     Sonographer:          Nick nelson                                     KELLY Gender assigned at  M                   Additional Staff: Birth: Height:             172.72 cm           Admit Date:           1/14/2025 Weight:             67.13 kg            Admission Status:     Inpatient -                                                               Routine BSA / BMI:          1.80 m2 / 22.50     Encounter#:           [id number]                     kg/m2 Blood Pressure:     124/65 mmHg         Department Location:  [facility]                                                               Non Invasive Study Type:    TRANSTHORACIC ECHO (TTE) LIMITED Diagnosis/ICD: Chest pain, unspecified-R07.9 Indication:    CP CPT Code:      Echo Limited-93428; Color  Doppler-03010; Doppler Limited-71528 Patient History: Pertinent History: CAD, HTN and Hyperlipidemia. CABG 1990s and multiple PCIs. Study Detail: The following Echo studies were performed: Doppler, color flow and               2D. Technically challenging study due to patient lying in supine               position.  PHYSICIAN INTERPRETATION: Left Ventricle: Left ventricular ejection fraction is mildly decreased, by visual estimate at 45%. The left ventricular cavity size is normal. Abnormal (paradoxical) septal motion consistent with post-operative status. Left ventricular diastolic filling was not assessed. LV Wall Scoring: The basal and mid inferior wall and basal and mid inferolateral wall are hypokinetic. Left Atrium: The left atrium is enlarged. Right Ventricle: The right ventricle is normal in size. There is mildly reduced right ventricular systolic function. RV poorly visualized. Right Atrium: The right atrium was not well visualized. Aortic Valve: The aortic valve is probably trileaflet. There is mild to moderate aortic valve cusp calcification. There is trace to mild aortic valve regurgitation. Mitral Valve: The mitral valve is normal in structure. There is mild mitral annular calcification. There is moderate mitral valve regurgitation. Tricuspid Valve: The tricuspid valve is structurally normal. There is mild tricuspid regurgitation. Pulmonic Valve: The pulmonic valve is not well visualized. Pulmonic valve regurgitation was not assessed. Pericardium: Trivial pericardial effusion. Aorta: The aortic root is normal. Pulmonary Artery: The tricuspid regurgitant velocity is 2.70 m/s, and with an estimated right atrial pressure of 3 mmHg, the estimated pulmonary artery pressure is borderline elevated with the RVSP at 32.2 mmHg. Systemic Veins: The inferior vena cava was not well visualized. In comparison to the previous echocardiogram(s): Compared with study dated 1/22/2025,.  CONCLUSIONS:  1. Poorly visualized  anatomical structures due to suboptimal image quality.  2. Left ventricular ejection fraction is mildly decreased, by visual estimate at 45%.  3. Basal and mid inferior wall and basal and mid inferolateral wall are abnormal.  4. Abnormal septal motion consistent with post-operative status.  5. There is mildly reduced right ventricular systolic function.  6. The left atrium is enlarged.  7. Moderate mitral valve regurgitation. QUANTITATIVE DATA SUMMARY:  AORTA MEASUREMENTS:         Normal Ranges: Ao Sinus, d:        3.40 cm (2.1-3.5cm)  LV SYSTOLIC FUNCTION BY 2D PLANIMETRY (MOD):                      Normal Ranges: EF-Visual:      45 % LV EF Reported: 45 %  TRICUSPID VALVE/RVSP:          Normal Ranges: Peak TR Velocity:     2.70 m/s RV Syst Pressure:     32 mmHg  (< 30mmHg) IVC Diam:             2.00 cm  [id number] Steven Terrell MD Electronically signed on 1/22/2025 at 12:48:37 PM  Wall Scoring  ** Final **     Transthoracic Echo (TTE) Limited    Result Date: 1/22/2025   [facility], [address] [place] [address]                Tel [phone] and Fax [phone] TRANSTHORACIC ECHOCARDIOGRAM REPORT  Patient Name:       ORLANDO VICTORIA   Reading Physician:    [id number] Steven Terrell MD Study Date:         1/22/2025           Ordering Provider:    [id number] CHEPE PERRY MRN/PID:            [id number]            Fellow: Accession#:         [id number]        Nurse: Date of Birth/Age:  1945 / 79     Sonographer:          Suellen Merritt RCS                     years Gender assigned at  M                   Additional Staff: Birth: Height:             172.72 cm           Admit Date: Weight:             67.13 kg            Admission Status:     Inpatient - STAT BSA / BMI:          1.80 m2 / 22.50     Encounter#:           [id number]                     kg/m2 Blood Pressure:      111/60 mmHg         Department Location:  [place] [address] Study Type:    TRANSTHORACIC ECHO (TTE) LIMITED Diagnosis/ICD: Chest pain, unspecified-R07.9 Indication:    CP post PCI CPT Code:      Echo Limited-81753; Doppler Limited-25423; Color Doppler-93047 Patient History: Pertinent History: HTN, Hyperlipidemia and CAD. CABG 1990s, multiple PCIs. Study Detail: The following Echo studies were performed: 2D, M-Mode, Doppler and               color flow.  PHYSICIAN INTERPRETATION: Left Ventricle: Left ventricular ejection fraction is moderately decreased, by visual estimate at 40%. There is mild left ventricular hypertrophy. There is global hypokinesis of the left ventricle with minor regional variations. The left ventricular cavity size is upper limits of normal. There is mildly increased septal and mildly increased posterior left ventricular wall thickness. Abnormal (paradoxical) septal motion consistent with post-operative status. Spectral Doppler shows a Grade II (pseudonormal pattern) of left ventricular diastolic filling with an elevated left atrial pressure. LV Wall Scoring: The basal and mid inferior wall is akinetic. Left Atrium: The left atrium is mildly dilated. Right Ventricle: The right ventricle is upper limits of normal in size. There is moderately reduced right ventricular systolic function. Right Atrium: The right atrium is normal in size. Aortic Valve: The aortic valve was not well visualized. There is mild aortic valve cusp calcification. There is evidence of mild to moderate aortic valve stenosis. There is trace aortic valve regurgitation. The peak instantaneous gradient of the aortic valve is 7 mmHg. Mitral Valve: The mitral valve is mildly thickened. There is mild mitral annular calcification. There is moderate to severe mitral valve regurgitation. Tricuspid Valve: The tricuspid valve is structurally normal. There is mild tricuspid  regurgitation. Pulmonic Valve: The pulmonic valve is structurally normal. There is trace pulmonic valve regurgitation. Pericardium: Trivial pericardial effusion. Aorta: The aortic root is normal. The ascending aorta was not well visualized. The Ao Sinus is 3.30 cm. The Asc Ao is 3.20 cm. There is no dilatation of the ascending aorta. There is no dilatation of the aortic root. Pulmonary Artery: The tricuspid regurgitant velocity is 3.14 m/s, and with an estimated right atrial pressure of 12 mmHg, the estimated pulmonary artery pressure is moderately elevated with the RVSP at 51.5 mmHg. Systemic Veins: The inferior vena cava appears mildly dilated. In comparison to the previous echocardiogram(s): Compared with study dated 1/15/2025, MR is now worse.  CONCLUSIONS:  1. Left ventricular ejection fraction is moderately decreased, by visual estimate at 40%.  2. There is global hypokinesis of the left ventricle with minor regional variations.  3. Basal and mid inferior wall is abnormal.  4. Spectral Doppler shows a Grade II (pseudonormal pattern) of left ventricular diastolic filling with an elevated left atrial pressure.  5. Abnormal septal motion consistent with post-operative status.  6. There is moderately reduced right ventricular systolic function.  7. The left atrium is mildly dilated.  8. Mild to moderate aortic valve stenosis.  9. Moderate to severe mitral valve regurgitation. 10. Moderately elevated pulmonary artery pressure. 11. The inferior vena cava appears mildly dilated. QUANTITATIVE DATA SUMMARY:  2D MEASUREMENTS:          Normal Ranges: IVSd:            1.16 cm  (0.6-1.1cm) LVPWd:           1.05 cm  (0.6-1.1cm) LVIDd:           5.09 cm  (3.9-5.9cm) LVIDs:           4.73 cm LV Mass Index:   119 g/m2 LVEDV Index:     67 ml/m2 LV % FS          7.0 %  LA VOLUME:                    Normal Ranges: LA Vol A4C:        69.6 ml    (22+/-6mL/m2) LA Vol A2C:        81.6 ml LA Vol BP:         76.0 ml LA Vol Index A4C:   38.7ml/m2 LA Vol Index A2C:  45.4 ml/m2 LA Vol Index BP:   42.3 ml/m2 LA Area A4C:       22.1 cm2 LA Area A2C:       24.2 cm2 LA Major Axis A4C: 6.0 cm LA Major Axis A2C: 6.1 cm LA Volume Index:   42.3 ml/m2  RA VOLUME BY A/L METHOD:            Normal Ranges: RA Vol A4C:              35.2 ml    (8.3-19.5ml) RA Vol Index A4C:        19.6 ml/m2 RA Area A4C:             15.5 cm2 RA Major Axis A4C:       5.8 cm  AORTA MEASUREMENTS:         Normal Ranges: Ao Sinus, d:        3.30 cm (2.1-3.5cm) Asc Ao, d:          3.20 cm (2.1-3.4cm)  LV SYSTOLIC FUNCTION BY 2D PLANIMETRY (MOD):                      Normal Ranges: EF-A4C View:    38 % (>=55%) EF-A2C View:    35 % EF-Biplane:     38 % EF-Visual:      40 % LV EF Reported: 40 %  LV DIASTOLIC FUNCTION:             Normal Ranges: MV Peak E:             1.10 m/s    (0.7-1.2 m/s) MV Peak A:             0.64 m/s    (0.42-0.7 m/s) E/A Ratio:             1.72        (1.0-2.2) MV e'                  0.073 m/s   (>8.0) MV lateral e'          0.09 m/s MV medial e'           0.06 m/s MV A Dur:              142.72 msec E/e' Ratio:            15.16       (<8.0)  MITRAL VALVE:          Normal Ranges: MV DT:        175 msec (150-240msec)  AORTIC VALVE:           Normal Ranges: AoV Vmax:      1.35 m/s (<=1.7m/s) AoV Peak P.3 mmHg (<20mmHg) LVOT Max Deonte:  0.60 m/s (<=1.1m/s) LVOT VTI:      10.58 cm LVOT Diameter: 1.80 cm  (1.8-2.4cm) AoV Area,Vmax: 1.13 cm2 (2.5-4.5cm2)  RIGHT VENTRICLE: RV Basal 3.80 cm RV Mid   2.80 cm RV Major 7.0 cm TAPSE:   10.0 mm RV s'    0.07 m/s  TRICUSPID VALVE/RVSP:          Normal Ranges: Peak TR Velocity:     3.14 m/s RV Syst Pressure:     51 mmHg  (< 30mmHg) IVC Diam:             2.30 cm  PULMONIC VALVE:         Normal Ranges: PV Accel Time:  77 msec (>120ms)  AORTA: Asc Ao Diam 3.62 cm  [id number] Steven Terrell MD Electronically signed on 2025 at 9:11:07 AM  Wall Scoring  ** Final **     Electrocardiogram, 12-lead PRN ACS symptoms    Result Date:  1/22/2025  Sinus rhythm with Premature atrial complexes Left bundle branch block Abnormal ECG When compared with ECG of 15-CECILE-2025 12:08, Premature atrial complexes are now Present Confirmed by Matteo Steward ([id number]) on 1/22/2025 8:46:19 AM    XR abdomen 1 view    Result Date: 1/20/2025  Interpreted By:  Reva Sharpe, STUDY: XR ABDOMEN 1 VIEW;  1/19/2025 6:46 pm   INDICATION: Signs/Symptoms:constipation, abdominal pain.     COMPARISON: CT: 01/13/2025   ACCESSION NUMBER(S): [id number]   ORDERING CLINICIAN: MARK WELCH   FINDINGS: Nonobstructive bowel gas pattern. Limited evaluation of pneumoperitoneum on supine imaging, however no gross evidence of free air is noted.   Minimal fecal burden.   Osseous structures demonstrate no acute bony changes.       1.  Nonspecific bowel gas pattern. Minimal fecal burden.   MACRO: None   Signed by: Reva Sharpe 1/20/2025 10:25 AM Dictation workstation:   [id number]    Electrocardiogram, 12-lead PRN ACS symptoms    Result Date: 1/17/2025   Poor data quality, interpretation may be adversely affected Sinus bradycardia with marked sinus arrhythmia with 1st degree AV block with occasional Premature ventricular complexes Left bundle branch block Abnormal ECG Confirmed by Sanford Lassiter ([id number]) on 1/17/2025 11:16:19 AM    ECG 12 lead    Result Date: 1/17/2025  Sinus rhythm with 1st degree AV block Left bundle branch block Abnormal ECG When compared with ECG of 13-JAN-2025 13:46, (unconfirmed) Fusion complexes are no longer Present Premature ventricular complexes are no longer Present Premature supraventricular complexes are no longer Present Questionable change in QRS axis T wave inversion now evident in Inferior leads Confirmed by Sanford Lassiter ([id number]) on 1/17/2025 10:30:43 AM    ECG 12 lead    Result Date: 1/17/2025  Sinus rhythm with 1st degree AV block with Premature supraventricular complexes and Premature ventricular complexes or Fusion complexes Left bundle branch  block Abnormal ECG Confirmed by Sanford Lassiter ([id number]) on 1/17/2025 10:07:53 AM    ECG 12 lead    Result Date: 1/17/2025  Normal sinus rhythm Left axis deviation Left bundle branch block Abnormal ECG When compared with ECG of 04-DEC-2024 20:58, IA interval has decreased Confirmed by Sanford Lassiter ([id number]) on 1/17/2025 10:07:20 AM    Electrocardiogram, 12-lead PRN ACS symptoms    Result Date: 1/16/2025  Sinus rhythm with 1st degree AV block Left bundle branch block Abnormal ECG When compared with ECG of 14-JAN-2025 01:11, Premature ventricular complexes are no longer Present Nonspecific T wave abnormality has replaced inverted T waves in Inferior leads QT has lengthened Confirmed by Fred Sher ([id number]) on 1/16/2025 2:56:07 PM    ECG 12 lead    Result Date: 1/15/2025  Sinus rhythm with 1st degree AV block Left bundle branch block Abnormal ECG When compared with ECG of 13-JAN-2025 15:16, (unconfirmed) No significant change was found Confirmed by Sanford Lassiter ([id number]) on 1/15/2025 10:56:59 AM    XR chest 1 view    Result Date: 1/15/2025  Interpreted By:  Raji, Ashish,  and [place] Margret STUDY: XR CHEST 1 VIEW;  1/14/2025 11:45 pm   INDICATION: Signs/Symptoms:Chest pain.   COMPARISON: Chest radiograph 01/13/2025   ACCESSION NUMBER(S): [id number]   ORDERING CLINICIAN: KEYA BIRCH   FINDINGS: AP radiograph of the chest was provided.   Postsurgical changes of median sternotomy with intact cerclage wires. Postsurgical changes of CABG.   CARDIOMEDIASTINAL SILHOUETTE: Cardiomediastinal silhouette is stable in size and configuration.   LUNGS: Mild bibasilar atelectasis. No focal consolidation, effusion, or pneumothorax. Pleural apical thickening on the left and linear parenchymal changes consistent with scarring and postsurgical change. There are metallic clips adjacent to the lateral aspect of the superior mediastinum on the left.   ABDOMEN: No remarkable upper abdominal findings.   BONES: No acute osseous  abnormality.       1. Similar mild bibasilar pulmonary atelectasis. 2. Pleural apical thickening on the left with increased linear densities at the left apex, noted on the CT of January 13, 2025 and a follow-up CT has been suggested in 6 months.   I personally reviewed the image(s)/study and resident interpretation as stated by Dr. Margret Miguel MD. I agree with the findings as stated. This study was interpreted at [facility], [place], [place].   MACRO: None   Signed by: Ashish Alegria 1/15/2025 9:40 AM Dictation workstation:   [id number]    Transthoracic Echo (TTE) Complete    Result Date: 1/15/2025   [facility], [address] [place] [address]                Tel [phone] and Fax [phone] TRANSTHORACIC ECHOCARDIOGRAM REPORT  Patient Name:       ORLANDO VICTORIA   Reading Physician:    [id number] Steven Terrell MD Study Date:         1/15/2025           Ordering Provider:    [id number] BIRCH MRN/PID:            [id number]            Fellow: Accession#:         [id number]        Nurse: Date of Birth/Age:  1945 / 79     Sonographer:          Nick nelson                                     KELLY Gender assigned at  M                   Additional Staff: Birth: Height:             147.32 cm           Admit Date:           1/14/2025 Weight:             66.23 kg            Admission Status:     Inpatient - STAT BSA / BMI:          1.59 m2 / 30.51     Encounter#:           [id number]                     kg/m2 Blood Pressure:     118/55 mmHg         Department Location:  [facility] Study Type:    TRANSTHORACIC ECHO (TTE) COMPLETE Diagnosis/ICD: Atherosclerotic heart disease of native coronary artery without                angina pectoris-I25.10 Indication:    CAD CPT Code:      Echo Limited-80033;  Doppler Limited-48757; Color Doppler-03173 Patient History: Pertinent History: CAD, Hyperlipidemia and HTN. CABG in 1990s and multiple PCIs. Study Detail: The following Echo studies were performed: 2D, Doppler and color               flow. Technically challenging study due to patient lying in supine               position. Agitated saline used as a contrast agent for intraseptal               flow evaluation.  PHYSICIAN INTERPRETATION: Left Ventricle: Left ventricular ejection fraction is mildly decreased, by visual estimate at 45-50%. The left ventricular cavity size is mild to moderately dilated. Abnormal (paradoxical) septal motion consistent with post-operative status. Spectral Doppler shows a Grade I (impaired relaxation pattern) of left ventricular diastolic filling with normal left atrial filling pressure. LV Wall Scoring: The basal and mid inferior wall is akinetic. Left Atrium: The left atrium is enlarged. There is no evidence of a patent foramen ovale. A bubble study using agitated saline was performed. Bubble study is negative. 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 Right Ventricle: The right ventricle is normal in size. There is mildly reduced right ventricular systolic function. Right Atrium: The right atrium is normal in size. Aortic Valve: The aortic valve was not well visualized. There is mild to moderate aortic valve cusp calcification. There is trace to mild aortic valve regurgitation. Apparent aortic stenosis. Mitral Valve: The mitral valve is minimally calcified. There is mild mitral annular calcification. There is mild mitral valve regurgitation. Tricuspid Valve: The tricuspid valve is structurally normal. There is mild tricuspid regurgitation. Pulmonic Valve: The pulmonic valve is not well visualized. There is trace pulmonic valve regurgitation. Pericardium: Trivial pericardial effusion. Aorta: The aortic root is normal. The aortic root is at the upper limits of normal size. In  comparison to the previous echocardiogram(s): Compared with study dated 12/6/2024, no significant change.  CONCLUSIONS:  1. Poorly visualized anatomical structures due to suboptimal image quality.  2. Left ventricular cavity size is mild to moderately dilated.  3. Left ventricular ejection fraction is mildly decreased, by visual estimate at 45-50%.  4. Basal and mid inferior wall is abnormal.  5. Spectral Doppler shows a Grade I (impaired relaxation pattern) of left ventricular diastolic filling with normal left atrial filling pressure.  6. Abnormal septal motion consistent with post-operative status.  7. There is mildly reduced right ventricular systolic function.  8. The left atrium is enlarged.  9. Atrial septal aneurysm present. 10. There is no evidence of a patent foramen ovale. QUANTITATIVE DATA SUMMARY:  2D MEASUREMENTS:           Normal Ranges: LVEDV Index:     128 ml/m2  AORTA MEASUREMENTS:         Normal Ranges: Ao Sinus, d:        3.70 cm (2.1-3.5cm)  LV SYSTOLIC FUNCTION BY 2D PLANIMETRY (MOD):                      Normal Ranges: EF-A4C View:    57 % (>=55%) EF-A2C View:    53 % EF-Biplane:     55 % EF-Visual:      48 % LV EF Reported: 48 %  TRICUSPID VALVE/RVSP:          Normal Ranges: Peak TR Velocity:     2.36 m/s RV Syst Pressure:     25 mmHg  (< 30mmHg)  [id number] Steven Terrell MD Electronically signed on 1/15/2025 at 9:19:04 AM  Wall Scoring  ** Final **     CT angio chest abdomen pelvis    Result Date: 1/13/2025  Interpreted By:  Andrez Veloz, STUDY: CT ANGIO CHEST ABDOMEN PELVIS;  1/13/2025 5:00 pm   INDICATION: Signs/Symptoms:Dissection protocol   COMPARISON: CTA chest abdomen pelvis dated 12/04/2024.   ACCESSION NUMBER(S): [id number]   ORDERING CLINICIAN: RAINER FAULKNER   TECHNIQUE: CT of the chest, abdomen, and pelvis was performed was obtained before and after administration of intravenous contrast in the arterial phase, as part of CT angiography protocol. Contiguous axial images were obtained  at  5 mm slice thickness through the chest, and at  3 mm through the abdomen and pelvis. Coronal and sagittal reconstructions at  3 mm slice thickness were performed. 75 ml of contrast material Omnipaque 350 were administered intravenously without immediate complication.   3D volume rendering of the aorta was obtained on a separate workstation and also reviewed.   FINDINGS: POTENTIAL LIMITATIONS OF THE STUDY: Motion and mixing artifact which limits evaluation of the distal branch vessels.   The visualized thyroid gland is within normal limits.   HEART AND VESSELS: No discrete filling defects within the main pulmonary artery or its branches.   Main pulmonary artery and its branches are normal in caliber.   The thoracic aorta is of normal contour without aneurysm. No evidence of acute intramural hematoma or dissection. Moderate calcified and noncalcified atherosclerotic plaque of the thoracic aorta with noncalcified atherosclerotic plaque noted in the anterior surface of aortic arch, axial image 87, similar to prior.   Severe coronary artery atherosclerotic calcifications versus stents..The study is not optimized for evaluation of coronary arteries.   The cardiac chambers are not enlarged. There are sternotomy changes.   No evidence of pericardial effusion.   MEDIASTINUM AND GABRIELLA, AND AXILLA: No evidence of thoracic lymphadenopathy by CT criteria.   No mediastinal masses.   LUNGS AND AIRWAYS: The trachea and central airways are patent. No endobronchial lesion.   There is moderate upper lobe predominant paraseptal and centrilobular emphysema. There is also peripheral nonspecific interstitial scarring with more asymmetric scarring and traction bronchiectasis in the left lung apex. There is no pleural effusion or pneumothorax.   ABDOMEN/PELVIS: Liver: The liver is normal in size and contour. There are no focal hepatic lesions.   Gallbladder/biliary system: There are no radiopaque gallstones. There is no intrahepatic or  extrahepatic biliary dilatation.   Spleen: Normal in size.   Pancreas: Not enlarged. No peripancreatic edema or ductal dilatation. No pancreatic mass is identified.   Adrenals: Within normal limits.   Kidneys: Mild bilateral renal cortical thinning. Bilateral intrarenal vascular atherosclerotic calcifications noted.  There is no hydronephrosis. There is no nephrolithiasis. 1.6 cm right lower pole renal cyst.   Urinary bladder: Grossly normal. Prostate is mildly enlarged.   Bowel: Stomach is partially collapsed without gross abnormality. No bowel dilatation or obstruction. Normal appendix. Formed stool throughout the colon without wall thickening or acute inflammatory change.   Vessels: The abdominal aorta is normal in caliber. The celiac, superior, and inferior mesenteric arteries are patent. There is a patent left renal artery origin stent. Moderate atherosclerotic plaque of the abdominal aorta and its branches. Partially visualized bilateral superficial femoral artery stents.   Lymph nodes: No lymphadenopathy in the abdomen or pelvis.   Peritoneal/retroperitoneum: There is no evidence of intraperitoneal free air. There is no retroperitoneal hematoma. There are no pelvic or mesenteric masses identified.   OSSEOUS STRUCTURES: There are no suspicious osseous lesions. There is osteopenia. Unchanged mild chronic compression deformity of L4 vertebral body.       CHEST 1.  No evidence of aortic aneurysm, dissection, or acute intramural hematoma. Moderate calcified and noncalcified atherosclerotic plaque of the thoracic and abdominal aorta. Patent left renal artery stent and partially visualized bilateral superficial femoral artery stents. Sternotomy changes. Coronary artery atherosclerotic calcifications. 2. No evidence of consolidation or pleural effusion. Moderate centrilobular and paraseptal emphysema as well as nonspecific pulmonary fibrotic changes. Asymmetric scarring in the left lung apex is new since 2010 and  consider follow-up chest CT in 6 months to confirm stability.   ABDOMEN - PELVIS 1.  No acute abnormality in the abdomen/pelvis. Mild bilateral renal cortical thinning. 2. Unchanged chronic compression deformity of L4 vertebral body.     Signed by: Andrez Veloz 1/13/2025 5:49 PM Dictation workstation:   [id number]    XR knee left 4+ views    Result Date: 1/13/2025  Interpreted By:  Leland Murphy, STUDY: XR KNEE LEFT 4+ VIEWS   INDICATION: Signs/Symptoms:Cellulitis.   COMPARISON: None   ACCESSION NUMBER(S): [id number]   ORDERING CLINICIAN: RAINER FAULKNER   FINDINGS: Below-the-knee amputation. Surgical margins left lower leg and fibula satisfactory. Mild osteoarthritis left knee. No acute findings.       No acute findings status post left below-the-knee amputation.   Signed by: Leland Murphy 1/13/2025 2:50 PM Dictation workstation:   [id number]    Lower extremity venous duplex left    Result Date: 1/13/2025  Interpreted By:  Kishor Andrade, STUDY: [facility] LOWER EXTREMITY VENOUS DUPLEX LEFT; 1/13/2025 1:36 pm   INDICATION: Pain cellulitis   COMPARISON: None   ACCESSION NUMBER(S): [id number]   ORDERING CLINICIAN: RAINER FAULKNER   TECHNIQUE: Static grayscale, color Doppler, and spectral Doppler sonographic images of the bilateral lower extremities were obtained for duplex evaluation.   FINDINGS: LEFT LOWER EXTREMITY: There is no evidence of deep venous thrombus in the visualized portions of the common femoral vein, femoral vein, or popliteal vein. Below-knee amputation present.       1. No evidence of deep venous thrombus in the evaluated veins of the left leg from the inguinal ligament to the popliteal fossa. Below-knee amputation present.   Signed by: Kishor Andrade 1/13/2025 1:38 PM Dictation workstation:   [id number]    XR chest 1 view    Result Date: 1/13/2025  Interpreted By:  Duane Chou, STUDY: XR CHEST 1 VIEW;  1/13/2025 11:34 am   INDICATION: Signs/Symptoms:pain.     COMPARISON: 12/08/2024.   ACCESSION  NUMBER(S): [id number]   ORDERING CLINICIAN: RAINER FAULKNER   FINDINGS: CARDIOMEDIASTINAL SILHOUETTE: Borderline size of the cardiac silhouette, aortic calcifications and postoperative changes of the mediastinum are similar to prior.   LUNGS: Mild bibasilar atelectasis is present. No localized consolidation. No definite pleural effusion. No appreciable pneumothorax.   ABDOMEN: No remarkable upper abdominal findings.   BONES: No acute osseous changes.       1.  Mild bibasilar atelectasis. No localized consolidation.       MACRO: None.   Signed by: Duane Chou 1/13/2025 11:40 AM Dictation workstation:   [id number]      Results for orders placed or performed during the hospital encounter of 01/28/25 (from the past 24 hours)   POCT GLUCOSE   Result Value Ref Range    POCT Glucose 228 (H) 74 - 99 mg/dL   POCT GLUCOSE   Result Value Ref Range    POCT Glucose 249 (H) 74 - 99 mg/dL   Troponin I, High Sensitivity   Result Value Ref Range    Troponin I, High Sensitivity 615 (HH) 0 - 20 ng/L   Renal function panel   Result Value Ref Range    Glucose 226 (H) 74 - 99 mg/dL    Sodium 133 (L) 136 - 145 mmol/L    Potassium 5.0 3.5 - 5.3 mmol/L    Chloride 101 98 - 107 mmol/L    Bicarbonate 24 21 - 32 mmol/L    Anion Gap 13 10 - 20 mmol/L    Urea Nitrogen 36 (H) 6 - 23 mg/dL    Creatinine 2.05 (H) 0.50 - 1.30 mg/dL    eGFR 32 (L) >60 mL/min/1.73m*2    Calcium 8.2 (L) 8.6 - 10.3 mg/dL    Phosphorus 3.9 2.5 - 4.9 mg/dL    Albumin 3.5 3.4 - 5.0 g/dL   Magnesium   Result Value Ref Range    Magnesium 2.24 1.60 - 2.40 mg/dL   CBC and Auto Differential   Result Value Ref Range    WBC 10.1 4.4 - 11.3 x10*3/uL    nRBC 0.0 0.0 - 0.0 /100 WBCs    RBC 2.91 (L) 4.50 - 5.90 x10*6/uL    Hemoglobin 8.7 (L) 13.5 - 17.5 g/dL    Hematocrit 27.1 (L) 41.0 - 52.0 %    MCV 93 80 - 100 fL    MCH 29.9 26.0 - 34.0 pg    MCHC 32.1 32.0 - 36.0 g/dL    RDW 17.0 (H) 11.5 - 14.5 %    Platelets 147 (L) 150 - 450 x10*3/uL    Neutrophils % 90.6 40.0 - 80.0 %     Immature Granulocytes %, Automated 0.7 0.0 - 0.9 %    Lymphocytes % 2.7 13.0 - 44.0 %    Monocytes % 5.5 2.0 - 10.0 %    Eosinophils % 0.2 0.0 - 6.0 %    Basophils % 0.3 0.0 - 2.0 %    Neutrophils Absolute 9.13 (H) 1.60 - 5.50 x10*3/uL    Immature Granulocytes Absolute, Automated 0.07 0.00 - 0.50 x10*3/uL    Lymphocytes Absolute 0.27 (L) 0.80 - 3.00 x10*3/uL    Monocytes Absolute 0.55 0.05 - 0.80 x10*3/uL    Eosinophils Absolute 0.02 0.00 - 0.40 x10*3/uL    Basophils Absolute 0.03 0.00 - 0.10 x10*3/uL   POCT GLUCOSE   Result Value Ref Range    POCT Glucose 241 (H) 74 - 99 mg/dL   POCT GLUCOSE   Result Value Ref Range    POCT Glucose 205 (H) 74 - 99 mg/dL          Assessment/Plan   Carotid stenosis  Recurrent NSTEMI  Strokelike symptoms  Left eye amaurosis fugax        79-year-old male with history of recurrent chest pain/NSTEMI's who presented to the emergency department with left-sided weakness, slight left facial droop, left eye ptosis, left arm pain and left hand numbness.       MRA noting moderate to severe left ICA stenosis and moderate right ICA stenosis.  MRI of the brain was negative for infarct or hemorrhage.  Patient had a carotid ultrasound on 12/6/2024 which noted less than 50% right ICA stenosis and 50 to 69% left ICA stenosis, left proximal ICA velocity 202/65 with a ratio 4.1.      CT angiogram was completed.  Approximately 65% stenosis of the left ICA and less than 50% stenosis of the right ICA.  Patient with complaints of left-sided symptoms which should not be caused by left carotid stenosis.     2/1/2025.  Patient with episode of loss of consciousness, bradycardia yesterday.  He was given atropine and there was an attempted intubation, however, this was ultimately aborted as the patient was mentating well quickly after the episode.  He was then transported to the ICU     He is a very difficult historian and does not answer questions directly, however, he does seem to explain a left eye vision  "loss episode that occurred this morning.  He states his left eye vision is still not back to what it is at baseline.  He states he is \"nearly blind\" in his right eye.  This is concerning for amaurosis fugax.  He is currently on therapeutic anticoagulation, antiplatelet therapy, and statin therapy.  Will discuss with on-call vascular surgeon, however, I am not sure this patient is a surgical candidate.  Consider reinvolving neurology given these new findings.    2/2/25 Patient with some improvement of left eye vision.  On questioning, he did confirm that his left eye vision went \"completely black\" during episode yesterday morning.  I did discuss with Dr. Clarke on 2/1/2025 who recommended continuing medical therapy.  Patient had a recurrent bradycardia last evening with a heart rate in the 30s.  Whether this is a symptomatic or asymptomatic carotid stenosis, the patient is not currently a surgical candidate due to cardiac status currently.  We will continue to follow along.  Discussed with daughter and patient that it is important to promptly report any new neurologic symptom.          I spent  minutes in the professional and overall care of this patient.      Massimo Trevizo, APRN-CNP      "

## 2025-02-03 ENCOUNTER — TELEPHONE (OUTPATIENT)
Dept: INTENSIVE CARE | Facility: HOSPITAL | Age: 80
End: 2025-02-03

## 2025-02-03 LAB
ATRIAL RATE: 91 BPM
P AXIS: 76 DEGREES
P OFFSET: 166 MS
P ONSET: 109 MS
PR INTERVAL: 216 MS
Q ONSET: 217 MS
QRS COUNT: 15 BEATS
QRS DURATION: 168 MS
QT INTERVAL: 408 MS
QTC CALCULATION(BAZETT): 501 MS
QTC FREDERICIA: 469 MS
R AXIS: -49 DEGREES
T AXIS: 136 DEGREES
T OFFSET: 421 MS
VENTRICULAR RATE: 91 BPM

## 2025-02-03 NOTE — NURSING NOTE
[facility] NO LONGER HAS A HOLD ON PT. CAN BE RELEASED. SPOKE WITH ZAHIRA MUKHERJEE FROM [facility]. NOTIFIED NURSING SUPERVISOR JACQUELINE OF UPDATE.

## 2025-02-03 NOTE — DISCHARGE SUMMARY
Discharge Diagnosis  Pt  @1807 on 2025    Hospital Course   Chavez Llamas is a 79 y.o. year old male patient with past medical history of CAD s/p CABG , complicated cardiac history post-operatively with multiple PCIs - most notably 25 with PCI to RCA at [facility], HFmrEF, hypertension, T2DM, peripheral arterial disease s/p left BKA, CKD stage 3, GERD, hypothyroidism, bilateral cataracts with legal blindness, who presented to  ED  with complaints of generalized weakness x1 day and development of stroke-like symptoms. Per chart review, it appears that he woke up on the morning of arrival with left-sided facial numbness, dizziness, and light-headedness. Julian stroke scale negative per EMS. NIH score 1 in ED. Code brain attack activated. Case discussed with telestroke provider and TNK was not indicated due to outside of window.  At the time, pt denies chest pain, SOB, cough, leg swelling, nausea, vomiting, diarrhea, constipation, abdominal pain, blood in urine/stool, painful urination, recent illness, fevers, headache, LOC/seizures, weakness, fatigue, bruising/bleeding, changes in appetite, vision changes, hearing changes.     Pt admitted under general medicine for continued evaluation/treatment with concern of TIA and noted NSTEMI.               Interval hospital course: Pt followed by neurology for concern of CVA vs TIA in which an MRI of the brain was negative and MRA of the neck significant for left carotid stenosis.  Vascular surgery following with plan for surveillance with ultrasound.  Pt noted with an asymptommatic NSTEMI and significant cardiac history, cardiology following and will continue on outpatient meds.                Pt was transferred to ICU on 2025 for further management s/p respiratory vs cardiac arrest and bradycardia given 1x dose of atropine.  Deferred intubated at this time at pt had a positive gag reflex with noted vomiting, and was protecting his airway.  Pt  noted to have an elevated lactate and TEODORO s/p arrest.  Lactate resolved within normal limits.  Pt was noted to have worsening bradycardia and hypotension on  and was started on a dopamine gtt with cardiology's recommendation.  Goals of care discussions were had at this time, as pt in not a candidate for additional cardiac surgery interventions and only additional medical management is available.  Pt is electing to remain as a Full Code at this time.      Pt was noted to have worsening bradycardia in the 40's on 2025, and dopamine was titrated up to 7.5 mcg/kg/min.  Family was at bedside and noted the pt appeared to stop breathing and have stiffened extremities, moaning, and unresponsive.  Shortly following this, pt was noted to become pulseless and chest compressions were started along with ACLS protocol.  ROSC was achieved at the first pulse check, pt was intubated and started on levophed as well as increased dopamine requirements.  Discussed pt with cardiologist, and pt is not a candidate for further operative treatments.  After additional family discussions with pt POA's, pt code status was changed to DNR-CCA.  Pt was noted to have worsening bradycardia and hypotension.  PEA was noted on the monitor @1807, confirmed with bedside POCUS and pt was pronounced  at bedside.        Pertinent Physical Exam At Time of Discharge  Pt unresponsive.  Absent heart sounds.  Absent pulses.  Absent breath sounds.  Pupils fixed and non-reactive.       Eloy Yu PA-C  Pulmonary and Critical Care Medicine   [facility]

## 2025-02-10 ENCOUNTER — APPOINTMENT (OUTPATIENT)
Dept: CARDIOLOGY | Facility: CLINIC | Age: 80
End: 2025-02-10
Payer: MEDICARE

## 2025-02-11 NOTE — DOCUMENTATION CLARIFICATION NOTE
"    PATIENT:               ORLANDO VICTORIA  ACCT #:                  [id number]  MRN:                       [id number]  :                       1945  ADMIT DATE:       2025 6:47 AM  DISCH DATE:        2025 6:55 PM  RESPONDING PROVIDER #:        [id number]          PROVIDER RESPONSE TEXT:    Cardiogenic shock    CDI QUERY TEXT:    Clarification        Instruction:    Based on your assessment of the patient and the clinical information, please provide the requested documentation by clicking on the appropriate radio button and enter any additional information if prompted.    Question: Is there a diagnosis associated with the clinical information    When answering this query, please exercise your independent professional judgment. The fact that a question is being asked, does not imply that any particular answer is desired or expected.    The patient's clinical indicators include:  Clinical Information:  79 year old man presented with stroke like symptoms and found to have a TIA    Clinical Indicators:   2:15pm medical: \" Another episode of decompensation yesterday evening. Pt became acutely bradycardic and hypotensive requiring atropine x1 and dopamine gtt. EKG showing second degree AV block - Mobitz type 1. See significant event note from yesterday regarding further details of the event. Hemodynamics have been stable on dopamine gtt at 5mcg/kg/min. \"     significant event note: \"Patient became bradycardic in the 40s and dopamine was titrated up to 7.5 mcg/kg/min.   ...  Rapid response called. Patient lost pulse shortly after. Started chest compressions. Code blue called. ACLS protocol with chest compressions and epinephrine with ROSC on first pulse check. Intubated for airway protection. Dopamine continued to be titrated up with the addition of levophed. ...Updated cardiology, recommendations received, not an operative candidate.  Code status changed to DNR-CCA. Patient continued to be bradycardic " "and hypotensive despite vasopressor therapy. \"      Treatment: IV Norepinephrine 8mg 2/2, EKG, atropine 1mg syringe,  IV dopamine2/2 400mg , central line    Risk Factors: cardiac arrest, hypotension, bradycardia, age  Options provided:  -- Cardiogenic shock  -- Hypotension only without shock  -- Other - I will add my own diagnosis  -- Refer to Clinical Documentation Reviewer    Query created by: Sandra Osborn on 2/11/2025 8:50 AM      Electronically signed by:  MICHAEL ZUNIGA PA-C 2/11/2025 9:35 AM          "

## 2025-03-05 ENCOUNTER — APPOINTMENT (OUTPATIENT)
Dept: UROLOGY | Facility: CLINIC | Age: 80
End: 2025-03-05
Payer: MEDICARE

## 2025-03-25 ENCOUNTER — APPOINTMENT (OUTPATIENT)
Dept: GASTROENTEROLOGY | Facility: HOSPITAL | Age: 80
End: 2025-03-25
Payer: MEDICARE

## 2025-07-15 NOTE — Clinical Note
Angioplasty of the right coronary artery lesion. Inflation 1: Pressure = 12 manuel; . 10 pulses Lor de jesus from [facility]  Patient requesting refill for Lisinopril     ( Former Dr Duncan patient, assigned to Dr Hoover )     Medication pended to run thru Protocol

## (undated) DEVICE — Device

## (undated) DEVICE — CATHETER, BALLOON DILATION, EUPHORA SEMICOMPLIANT 2.50  X 12 MM X 142CM

## (undated) DEVICE — CATHETER, GUIDE EXTENSION, GUIDEZILLA II, 6FR

## (undated) DEVICE — KIT, NAMIC STANDARD LEFT HEART, CUSTOM, LWMC

## (undated) DEVICE — ACCESS KIT, S-MAK MINI, 4FR 10CM 0.018IN 40CM, NT/PT, ECHO ENHANCE NEEDLE

## (undated) DEVICE — INTRODUCER, SHEATH, AVANTI PLUS, W/MINI WIRE, STANDARD, 6MS FR, 11 CM

## (undated) DEVICE — MICROINTRODUCER KIT, VSI, 4FR X 40CM, STIFFEN

## (undated) DEVICE — GUIDEWIRE, HI-TORQUE, VERSACORE, 260CM, FLOPPY

## (undated) DEVICE — CATHETER, BALLOON, NC EUPHORA NONCOMPLIANT 2.0 X 8 X 142CM

## (undated) DEVICE — CATHETER, ANGIO, IMPULSE, FR4, 5 FR X 100 CM

## (undated) DEVICE — SHEATH, GLIDESHEATH, SLENDER, 6FR 10CM

## (undated) DEVICE — CATHETER, GUIDELINER, 6FR V2

## (undated) DEVICE — GUIDEWIRE, ASAHI PROWATER, .014/180CM, STRAIGHT"

## (undated) DEVICE — SHEATH, PINNACLE, W/.038 GW 10CM, 5FR INTRODUCER, 2.5 CM DIALATOR

## (undated) DEVICE — CATHETER, VISTA BRIGHT TIP, 6F, .070, 3 DRC, 100CM

## (undated) DEVICE — CATHETER, BALLOON, NC EUPHORA NONCOMPLIANT 3.0 X 12 X 142CM

## (undated) DEVICE — GUIDEWIRE, HI-TORQUE WHISPER ES, 0.014IN/3CM STRT TIP/190CM

## (undated) DEVICE — PACK, ANGIO P2, CUSTOM, LAKE

## (undated) DEVICE — CATHETER, ANGIO, IMPULSE, FL4, 5 FR X 100 CM

## (undated) DEVICE — INTRODUCER, GLIDESHEATH SLENDER A-KIT, 7FR 10CM

## (undated) DEVICE — COVER, EQUIPMENT, ZERO GRAVITY

## (undated) DEVICE — SHEATH, PINNACLE, 10 CM,  7FR INTRODUCER, 7FR DIA, 2.5 CM DIALATOR

## (undated) DEVICE — CATHETER, WOLVERINE CB MR, US 15 X 2.50MM

## (undated) DEVICE — SURVIVAL KIT, EVEREST 30

## (undated) DEVICE — CATHETER, BALLOON, NC EUPHORA NONCOMPLIANT 2.5 X 15 X 142CM

## (undated) DEVICE — GUIDEWIRE, J TIP, 3 MM, 0.035 IN X 180 CM, PTFE

## (undated) DEVICE — CATHETER, BALLOON, NC EUPHORA NONCOMPLIANT 2.5 X 12 X 142CM

## (undated) DEVICE — TR BAND, RADIAL COMPRESSION, STANDARD, 24CM

## (undated) DEVICE — INFLATION KIT, ADVANTAGE, ENCORE 26 (1/BOX)

## (undated) DEVICE — GUIDEWIRE, INQWIRE, 3MM J, .035 X 210CM, FIXED

## (undated) DEVICE — CATHETER, BALLOON DILATION, EUPHORA SEMICOMPLIANT 2.0  X 10 MM X 142CM

## (undated) DEVICE — CATHETER, C2+ IVL, SHOCKWAVE I, 2.5 X 12MM